# Patient Record
Sex: FEMALE | Race: WHITE | NOT HISPANIC OR LATINO | Employment: OTHER | URBAN - METROPOLITAN AREA
[De-identification: names, ages, dates, MRNs, and addresses within clinical notes are randomized per-mention and may not be internally consistent; named-entity substitution may affect disease eponyms.]

---

## 2017-05-13 ENCOUNTER — APPOINTMENT (EMERGENCY)
Dept: RADIOLOGY | Facility: HOSPITAL | Age: 77
DRG: 190 | End: 2017-05-13
Payer: COMMERCIAL

## 2017-05-13 ENCOUNTER — HOSPITAL ENCOUNTER (INPATIENT)
Facility: HOSPITAL | Age: 77
LOS: 2 days | Discharge: HOME | DRG: 190 | End: 2017-05-15
Attending: EMERGENCY MEDICINE | Admitting: INTERNAL MEDICINE
Payer: COMMERCIAL

## 2017-05-13 DIAGNOSIS — R09.02 HYPOXIA: ICD-10-CM

## 2017-05-13 DIAGNOSIS — R06.03 RESPIRATORY DISTRESS: Primary | ICD-10-CM

## 2017-05-13 DIAGNOSIS — J45.909 REACTIVE AIRWAY DISEASE: ICD-10-CM

## 2017-05-13 PROBLEM — K21.9 GERD (GASTROESOPHAGEAL REFLUX DISEASE): Status: ACTIVE | Noted: 2017-05-13

## 2017-05-13 PROBLEM — E87.1 HYPONATREMIA: Status: ACTIVE | Noted: 2017-05-13

## 2017-05-13 PROBLEM — Z72.0 TOBACCO ABUSE: Status: ACTIVE | Noted: 2017-05-13

## 2017-05-13 PROBLEM — J96.01 ACUTE RESPIRATORY FAILURE WITH HYPOXIA (HCC): Status: ACTIVE | Noted: 2017-05-13

## 2017-05-13 PROBLEM — J20.9 BRONCHITIS, ACUTE: Status: ACTIVE | Noted: 2017-05-13

## 2017-05-13 LAB
ALBUMIN SERPL BCP-MCNC: 4.1 G/DL (ref 3.5–5)
ALP SERPL-CCNC: 87 U/L (ref 46–116)
ALT SERPL W P-5'-P-CCNC: 17 U/L (ref 12–78)
ANION GAP SERPL CALCULATED.3IONS-SCNC: 11 MMOL/L (ref 4–13)
APTT PPP: 29 SECONDS (ref 23–35)
AST SERPL W P-5'-P-CCNC: 21 U/L (ref 5–45)
BASOPHILS # BLD AUTO: 0.07 THOUSANDS/ΜL (ref 0–0.1)
BASOPHILS NFR BLD AUTO: 1 % (ref 0–1)
BILIRUB SERPL-MCNC: 0.4 MG/DL (ref 0.2–1)
BUN SERPL-MCNC: 8 MG/DL (ref 5–25)
CALCIUM SERPL-MCNC: 9.1 MG/DL (ref 8.3–10.1)
CHLORIDE SERPL-SCNC: 95 MMOL/L (ref 100–108)
CO2 SERPL-SCNC: 24 MMOL/L (ref 21–32)
CREAT SERPL-MCNC: 0.81 MG/DL (ref 0.6–1.3)
EOSINOPHIL # BLD AUTO: 0.25 THOUSAND/ΜL (ref 0–0.61)
EOSINOPHIL NFR BLD AUTO: 4 % (ref 0–6)
ERYTHROCYTE [DISTWIDTH] IN BLOOD BY AUTOMATED COUNT: 18.5 % (ref 11.6–15.1)
GFR SERPL CREATININE-BSD FRML MDRD: >60 ML/MIN/1.73SQ M
GLUCOSE SERPL-MCNC: 118 MG/DL (ref 65–140)
GLUCOSE SERPL-MCNC: 266 MG/DL (ref 65–140)
GLUCOSE SERPL-MCNC: 267 MG/DL (ref 65–140)
HCT VFR BLD AUTO: 39.8 % (ref 34.8–46.1)
HGB BLD-MCNC: 12.9 G/DL (ref 11.5–15.4)
INR PPP: 0.96 (ref 0.86–1.16)
LYMPHOCYTES # BLD AUTO: 2.38 THOUSANDS/ΜL (ref 0.6–4.47)
LYMPHOCYTES NFR BLD AUTO: 36 % (ref 14–44)
MCH RBC QN AUTO: 23.1 PG (ref 26.8–34.3)
MCHC RBC AUTO-ENTMCNC: 32.4 G/DL (ref 31.4–37.4)
MCV RBC AUTO: 71 FL (ref 82–98)
MONOCYTES # BLD AUTO: 1.01 THOUSAND/ΜL (ref 0.17–1.22)
MONOCYTES NFR BLD AUTO: 16 % (ref 4–12)
NEUTROPHILS # BLD AUTO: 2.82 THOUSANDS/ΜL (ref 1.85–7.62)
NEUTS SEG NFR BLD AUTO: 43 % (ref 43–75)
NT-PROBNP SERPL-MCNC: 73 PG/ML
PLATELET # BLD AUTO: 333 THOUSANDS/UL (ref 149–390)
PMV BLD AUTO: 8.3 FL (ref 8.9–12.7)
POTASSIUM SERPL-SCNC: 3.9 MMOL/L (ref 3.5–5.3)
PROT SERPL-MCNC: 7.3 G/DL (ref 6.4–8.2)
PROTHROMBIN TIME: 13.1 SECONDS (ref 12.1–14.4)
RBC # BLD AUTO: 5.58 MILLION/UL (ref 3.81–5.12)
SODIUM SERPL-SCNC: 130 MMOL/L (ref 136–145)
TROPONIN I SERPL-MCNC: <0.02 NG/ML
WBC # BLD AUTO: 6.53 THOUSAND/UL (ref 4.31–10.16)

## 2017-05-13 PROCEDURE — 85610 PROTHROMBIN TIME: CPT | Performed by: EMERGENCY MEDICINE

## 2017-05-13 PROCEDURE — 94660 CPAP INITIATION&MGMT: CPT

## 2017-05-13 PROCEDURE — 85025 COMPLETE CBC W/AUTO DIFF WBC: CPT | Performed by: EMERGENCY MEDICINE

## 2017-05-13 PROCEDURE — 87205 SMEAR GRAM STAIN: CPT | Performed by: NURSE PRACTITIONER

## 2017-05-13 PROCEDURE — 96374 THER/PROPH/DIAG INJ IV PUSH: CPT

## 2017-05-13 PROCEDURE — 94640 AIRWAY INHALATION TREATMENT: CPT

## 2017-05-13 PROCEDURE — 93005 ELECTROCARDIOGRAM TRACING: CPT | Performed by: EMERGENCY MEDICINE

## 2017-05-13 PROCEDURE — 84484 ASSAY OF TROPONIN QUANT: CPT | Performed by: EMERGENCY MEDICINE

## 2017-05-13 PROCEDURE — 96375 TX/PRO/DX INJ NEW DRUG ADDON: CPT

## 2017-05-13 PROCEDURE — 94760 N-INVAS EAR/PLS OXIMETRY 1: CPT

## 2017-05-13 PROCEDURE — 71010 HB CHEST X-RAY 1 VIEW FRONTAL (PORTABLE): CPT

## 2017-05-13 PROCEDURE — 99285 EMERGENCY DEPT VISIT HI MDM: CPT

## 2017-05-13 PROCEDURE — 83880 ASSAY OF NATRIURETIC PEPTIDE: CPT | Performed by: EMERGENCY MEDICINE

## 2017-05-13 PROCEDURE — 36415 COLL VENOUS BLD VENIPUNCTURE: CPT | Performed by: EMERGENCY MEDICINE

## 2017-05-13 PROCEDURE — 82948 REAGENT STRIP/BLOOD GLUCOSE: CPT

## 2017-05-13 PROCEDURE — 87070 CULTURE OTHR SPECIMN AEROBIC: CPT | Performed by: NURSE PRACTITIONER

## 2017-05-13 PROCEDURE — 85730 THROMBOPLASTIN TIME PARTIAL: CPT | Performed by: EMERGENCY MEDICINE

## 2017-05-13 PROCEDURE — 80053 COMPREHEN METABOLIC PANEL: CPT | Performed by: EMERGENCY MEDICINE

## 2017-05-13 RX ORDER — LEVALBUTEROL 1.25 MG/.5ML
1.25 SOLUTION, CONCENTRATE RESPIRATORY (INHALATION)
Status: DISCONTINUED | OUTPATIENT
Start: 2017-05-13 | End: 2017-05-15 | Stop reason: HOSPADM

## 2017-05-13 RX ORDER — AZITHROMYCIN 250 MG/1
500 TABLET, FILM COATED ORAL DAILY
Status: DISCONTINUED | OUTPATIENT
Start: 2017-05-13 | End: 2017-05-13

## 2017-05-13 RX ORDER — LISINOPRIL 2.5 MG/1
5 TABLET ORAL DAILY
COMMUNITY
End: 2017-05-26 | Stop reason: HOSPADM

## 2017-05-13 RX ORDER — LORAZEPAM 2 MG/ML
0.5 INJECTION INTRAMUSCULAR ONCE
Status: COMPLETED | OUTPATIENT
Start: 2017-05-13 | End: 2017-05-13

## 2017-05-13 RX ORDER — METHYLPREDNISOLONE SODIUM SUCCINATE 125 MG/2ML
125 INJECTION, POWDER, LYOPHILIZED, FOR SOLUTION INTRAMUSCULAR; INTRAVENOUS DAILY
Status: DISCONTINUED | OUTPATIENT
Start: 2017-05-13 | End: 2017-05-13

## 2017-05-13 RX ORDER — LORAZEPAM 0.5 MG/1
0.5 TABLET ORAL DAILY
Status: ON HOLD | COMMUNITY
End: 2017-05-15

## 2017-05-13 RX ORDER — LISINOPRIL 2.5 MG/1
2.5 TABLET ORAL DAILY
Status: DISCONTINUED | OUTPATIENT
Start: 2017-05-13 | End: 2017-05-15 | Stop reason: HOSPADM

## 2017-05-13 RX ORDER — PANTOPRAZOLE SODIUM 40 MG/1
40 TABLET, DELAYED RELEASE ORAL EVERY 12 HOURS
COMMUNITY

## 2017-05-13 RX ORDER — DILTIAZEM HYDROCHLORIDE 180 MG/1
180 CAPSULE, COATED, EXTENDED RELEASE ORAL DAILY
Status: DISCONTINUED | OUTPATIENT
Start: 2017-05-13 | End: 2017-05-15 | Stop reason: HOSPADM

## 2017-05-13 RX ORDER — GABAPENTIN 100 MG/1
200 CAPSULE ORAL 3 TIMES DAILY
COMMUNITY
End: 2020-04-13

## 2017-05-13 RX ORDER — OXYCODONE HYDROCHLORIDE AND ACETAMINOPHEN 5; 325 MG/1; MG/1
1 TABLET ORAL EVERY 8 HOURS PRN
Status: DISCONTINUED | OUTPATIENT
Start: 2017-05-13 | End: 2017-05-15 | Stop reason: HOSPADM

## 2017-05-13 RX ORDER — NICOTINE 21 MG/24HR
1 PATCH, TRANSDERMAL 24 HOURS TRANSDERMAL DAILY
Status: DISCONTINUED | OUTPATIENT
Start: 2017-05-13 | End: 2017-05-15 | Stop reason: HOSPADM

## 2017-05-13 RX ORDER — AZITHROMYCIN 250 MG/1
250 TABLET, FILM COATED ORAL DAILY
Status: DISCONTINUED | OUTPATIENT
Start: 2017-05-14 | End: 2017-05-13

## 2017-05-13 RX ORDER — OXYCODONE HYDROCHLORIDE AND ACETAMINOPHEN 5; 325 MG/1; MG/1
1 TABLET ORAL 2 TIMES DAILY PRN
COMMUNITY
End: 2022-04-22 | Stop reason: HOSPADM

## 2017-05-13 RX ORDER — ALBUTEROL SULFATE 90 UG/1
2 AEROSOL, METERED RESPIRATORY (INHALATION) EVERY 6 HOURS PRN
COMMUNITY
End: 2020-07-30 | Stop reason: SDUPTHER

## 2017-05-13 RX ORDER — METHYLPREDNISOLONE SODIUM SUCCINATE 40 MG/ML
40 INJECTION, POWDER, LYOPHILIZED, FOR SOLUTION INTRAMUSCULAR; INTRAVENOUS EVERY 8 HOURS
Status: DISCONTINUED | OUTPATIENT
Start: 2017-05-13 | End: 2017-05-14

## 2017-05-13 RX ORDER — DILTIAZEM HYDROCHLORIDE 180 MG/1
180 CAPSULE, COATED, EXTENDED RELEASE ORAL DAILY
COMMUNITY

## 2017-05-13 RX ADMIN — METHYLPREDNISOLONE SODIUM SUCCINATE 40 MG: 40 INJECTION, POWDER, FOR SOLUTION INTRAMUSCULAR; INTRAVENOUS at 17:38

## 2017-05-13 RX ADMIN — IPRATROPIUM BROMIDE 0.5 MG: 0.5 SOLUTION RESPIRATORY (INHALATION) at 19:33

## 2017-05-13 RX ADMIN — NICOTINE 1 PATCH: 14 PATCH TRANSDERMAL at 12:05

## 2017-05-13 RX ADMIN — OXYCODONE HYDROCHLORIDE AND ACETAMINOPHEN 1 TABLET: 5; 325 TABLET ORAL at 13:07

## 2017-05-13 RX ADMIN — FLUTICASONE PROPIONATE AND SALMETEROL 1 PUFF: 50; 250 POWDER RESPIRATORY (INHALATION) at 11:38

## 2017-05-13 RX ADMIN — IPRATROPIUM BROMIDE 0.5 MG: 0.5 SOLUTION RESPIRATORY (INHALATION) at 07:38

## 2017-05-13 RX ADMIN — OXYCODONE HYDROCHLORIDE AND ACETAMINOPHEN 1 TABLET: 5; 325 TABLET ORAL at 20:52

## 2017-05-13 RX ADMIN — LORAZEPAM 0.5 MG: 2 INJECTION INTRAMUSCULAR; INTRAVENOUS at 06:38

## 2017-05-13 RX ADMIN — INSULIN LISPRO 2 UNITS: 100 INJECTION, SOLUTION INTRAVENOUS; SUBCUTANEOUS at 21:46

## 2017-05-13 RX ADMIN — ENOXAPARIN SODIUM 40 MG: 40 INJECTION SUBCUTANEOUS at 11:38

## 2017-05-13 RX ADMIN — INSULIN LISPRO 2 UNITS: 100 INJECTION, SOLUTION INTRAVENOUS; SUBCUTANEOUS at 18:29

## 2017-05-13 RX ADMIN — IPRATROPIUM BROMIDE 0.5 MG: 0.5 SOLUTION RESPIRATORY (INHALATION) at 13:41

## 2017-05-13 RX ADMIN — LEVALBUTEROL HYDROCHLORIDE 1.25 MG: 1.25 SOLUTION, CONCENTRATE RESPIRATORY (INHALATION) at 19:33

## 2017-05-13 RX ADMIN — LEVALBUTEROL HYDROCHLORIDE 1.25 MG: 1.25 SOLUTION, CONCENTRATE RESPIRATORY (INHALATION) at 13:41

## 2017-05-13 RX ADMIN — LISINOPRIL 2.5 MG: 2.5 TABLET ORAL at 11:38

## 2017-05-13 RX ADMIN — FLUTICASONE PROPIONATE AND SALMETEROL 1 PUFF: 50; 250 POWDER RESPIRATORY (INHALATION) at 21:45

## 2017-05-13 RX ADMIN — DILTIAZEM HYDROCHLORIDE 180 MG: 180 CAPSULE, COATED, EXTENDED RELEASE ORAL at 11:37

## 2017-05-13 RX ADMIN — AZITHROMYCIN FOR INJECTION INJECTION, POWDER, LYOPHILIZED, FOR SOLUTION 500 MG: 500 INJECTION INTRAVENOUS at 07:10

## 2017-05-13 RX ADMIN — LEVALBUTEROL HYDROCHLORIDE 1.25 MG: 1.25 SOLUTION, CONCENTRATE RESPIRATORY (INHALATION) at 07:38

## 2017-05-14 LAB
ANION GAP SERPL CALCULATED.3IONS-SCNC: 8 MMOL/L (ref 4–13)
BUN SERPL-MCNC: 15 MG/DL (ref 5–25)
CALCIUM SERPL-MCNC: 9.6 MG/DL (ref 8.3–10.1)
CHLORIDE SERPL-SCNC: 96 MMOL/L (ref 100–108)
CO2 SERPL-SCNC: 27 MMOL/L (ref 21–32)
CREAT SERPL-MCNC: 0.87 MG/DL (ref 0.6–1.3)
ERYTHROCYTE [DISTWIDTH] IN BLOOD BY AUTOMATED COUNT: 18.7 % (ref 11.6–15.1)
GFR SERPL CREATININE-BSD FRML MDRD: >60 ML/MIN/1.73SQ M
GLUCOSE SERPL-MCNC: 178 MG/DL (ref 65–140)
GLUCOSE SERPL-MCNC: 183 MG/DL (ref 65–140)
GLUCOSE SERPL-MCNC: 223 MG/DL (ref 65–140)
GLUCOSE SERPL-MCNC: 236 MG/DL (ref 65–140)
GLUCOSE SERPL-MCNC: 253 MG/DL (ref 65–140)
HCT VFR BLD AUTO: 38.8 % (ref 34.8–46.1)
HGB BLD-MCNC: 12.3 G/DL (ref 11.5–15.4)
MCH RBC QN AUTO: 22.8 PG (ref 26.8–34.3)
MCHC RBC AUTO-ENTMCNC: 31.7 G/DL (ref 31.4–37.4)
MCV RBC AUTO: 72 FL (ref 82–98)
PLATELET # BLD AUTO: 297 THOUSANDS/UL (ref 149–390)
PMV BLD AUTO: 8.5 FL (ref 8.9–12.7)
POTASSIUM SERPL-SCNC: 4.3 MMOL/L (ref 3.5–5.3)
RBC # BLD AUTO: 5.39 MILLION/UL (ref 3.81–5.12)
SODIUM SERPL-SCNC: 131 MMOL/L (ref 136–145)
WBC # BLD AUTO: 5.45 THOUSAND/UL (ref 4.31–10.16)

## 2017-05-14 PROCEDURE — 82948 REAGENT STRIP/BLOOD GLUCOSE: CPT

## 2017-05-14 PROCEDURE — 94660 CPAP INITIATION&MGMT: CPT

## 2017-05-14 PROCEDURE — 94640 AIRWAY INHALATION TREATMENT: CPT

## 2017-05-14 PROCEDURE — 94760 N-INVAS EAR/PLS OXIMETRY 1: CPT

## 2017-05-14 PROCEDURE — 85027 COMPLETE CBC AUTOMATED: CPT | Performed by: PHYSICIAN ASSISTANT

## 2017-05-14 PROCEDURE — 80048 BASIC METABOLIC PNL TOTAL CA: CPT | Performed by: PHYSICIAN ASSISTANT

## 2017-05-14 RX ORDER — GABAPENTIN 100 MG/1
200 CAPSULE ORAL 3 TIMES DAILY
Status: DISCONTINUED | OUTPATIENT
Start: 2017-05-14 | End: 2017-05-15 | Stop reason: HOSPADM

## 2017-05-14 RX ORDER — PANTOPRAZOLE SODIUM 40 MG/1
40 TABLET, DELAYED RELEASE ORAL EVERY 12 HOURS
Status: DISCONTINUED | OUTPATIENT
Start: 2017-05-14 | End: 2017-05-15 | Stop reason: HOSPADM

## 2017-05-14 RX ORDER — METHYLPREDNISOLONE SODIUM SUCCINATE 40 MG/ML
40 INJECTION, POWDER, LYOPHILIZED, FOR SOLUTION INTRAMUSCULAR; INTRAVENOUS EVERY 12 HOURS SCHEDULED
Status: DISCONTINUED | OUTPATIENT
Start: 2017-05-14 | End: 2017-05-14

## 2017-05-14 RX ORDER — METHYLPREDNISOLONE SODIUM SUCCINATE 40 MG/ML
40 INJECTION, POWDER, LYOPHILIZED, FOR SOLUTION INTRAMUSCULAR; INTRAVENOUS EVERY 12 HOURS SCHEDULED
Status: COMPLETED | OUTPATIENT
Start: 2017-05-14 | End: 2017-05-14

## 2017-05-14 RX ORDER — ACETAMINOPHEN 325 MG/1
650 TABLET ORAL EVERY 6 HOURS PRN
Status: DISCONTINUED | OUTPATIENT
Start: 2017-05-14 | End: 2017-05-15 | Stop reason: HOSPADM

## 2017-05-14 RX ORDER — LORAZEPAM 0.5 MG/1
0.5 TABLET ORAL
Status: DISCONTINUED | OUTPATIENT
Start: 2017-05-14 | End: 2017-05-14

## 2017-05-14 RX ORDER — PREDNISONE 20 MG/1
40 TABLET ORAL DAILY
Status: DISCONTINUED | OUTPATIENT
Start: 2017-05-15 | End: 2017-05-15

## 2017-05-14 RX ORDER — LORAZEPAM 0.5 MG/1
0.5 TABLET ORAL EVERY 8 HOURS PRN
Status: DISCONTINUED | OUTPATIENT
Start: 2017-05-14 | End: 2017-05-15 | Stop reason: HOSPADM

## 2017-05-14 RX ADMIN — IPRATROPIUM BROMIDE 0.5 MG: 0.5 SOLUTION RESPIRATORY (INHALATION) at 07:18

## 2017-05-14 RX ADMIN — METHYLPREDNISOLONE SODIUM SUCCINATE 40 MG: 40 INJECTION, POWDER, FOR SOLUTION INTRAMUSCULAR; INTRAVENOUS at 09:04

## 2017-05-14 RX ADMIN — INSULIN LISPRO 3 UNITS: 100 INJECTION, SOLUTION INTRAVENOUS; SUBCUTANEOUS at 11:44

## 2017-05-14 RX ADMIN — METHYLPREDNISOLONE SODIUM SUCCINATE 40 MG: 40 INJECTION, POWDER, FOR SOLUTION INTRAMUSCULAR; INTRAVENOUS at 04:10

## 2017-05-14 RX ADMIN — ENOXAPARIN SODIUM 40 MG: 40 INJECTION SUBCUTANEOUS at 09:05

## 2017-05-14 RX ADMIN — PANTOPRAZOLE SODIUM 40 MG: 40 TABLET, DELAYED RELEASE ORAL at 23:47

## 2017-05-14 RX ADMIN — OXYCODONE HYDROCHLORIDE AND ACETAMINOPHEN 1 TABLET: 5; 325 TABLET ORAL at 20:59

## 2017-05-14 RX ADMIN — GABAPENTIN 200 MG: 100 CAPSULE ORAL at 20:59

## 2017-05-14 RX ADMIN — GABAPENTIN 200 MG: 100 CAPSULE ORAL at 13:06

## 2017-05-14 RX ADMIN — LORAZEPAM 0.5 MG: 0.5 TABLET ORAL at 09:16

## 2017-05-14 RX ADMIN — LEVALBUTEROL HYDROCHLORIDE 1.25 MG: 1.25 SOLUTION, CONCENTRATE RESPIRATORY (INHALATION) at 13:47

## 2017-05-14 RX ADMIN — INSULIN LISPRO 3 UNITS: 100 INJECTION, SOLUTION INTRAVENOUS; SUBCUTANEOUS at 16:09

## 2017-05-14 RX ADMIN — NICOTINE 1 PATCH: 14 PATCH TRANSDERMAL at 09:06

## 2017-05-14 RX ADMIN — LISINOPRIL 2.5 MG: 2.5 TABLET ORAL at 09:05

## 2017-05-14 RX ADMIN — INSULIN LISPRO 1 UNITS: 100 INJECTION, SOLUTION INTRAVENOUS; SUBCUTANEOUS at 21:00

## 2017-05-14 RX ADMIN — INSULIN LISPRO 1 UNITS: 100 INJECTION, SOLUTION INTRAVENOUS; SUBCUTANEOUS at 07:42

## 2017-05-14 RX ADMIN — LORAZEPAM 0.5 MG: 0.5 TABLET ORAL at 04:09

## 2017-05-14 RX ADMIN — LEVALBUTEROL HYDROCHLORIDE 1.25 MG: 1.25 SOLUTION, CONCENTRATE RESPIRATORY (INHALATION) at 19:14

## 2017-05-14 RX ADMIN — METHYLPREDNISOLONE SODIUM SUCCINATE 40 MG: 40 INJECTION, POWDER, FOR SOLUTION INTRAMUSCULAR; INTRAVENOUS at 20:59

## 2017-05-14 RX ADMIN — OXYCODONE HYDROCHLORIDE AND ACETAMINOPHEN 1 TABLET: 5; 325 TABLET ORAL at 07:40

## 2017-05-14 RX ADMIN — PANTOPRAZOLE SODIUM 40 MG: 40 TABLET, DELAYED RELEASE ORAL at 13:06

## 2017-05-14 RX ADMIN — LORAZEPAM 0.5 MG: 0.5 TABLET ORAL at 20:59

## 2017-05-14 RX ADMIN — LEVALBUTEROL HYDROCHLORIDE 1.25 MG: 1.25 SOLUTION, CONCENTRATE RESPIRATORY (INHALATION) at 07:17

## 2017-05-14 RX ADMIN — FLUTICASONE PROPIONATE AND SALMETEROL 1 PUFF: 50; 250 POWDER RESPIRATORY (INHALATION) at 21:10

## 2017-05-14 RX ADMIN — IPRATROPIUM BROMIDE 0.5 MG: 0.5 SOLUTION RESPIRATORY (INHALATION) at 13:47

## 2017-05-14 RX ADMIN — IPRATROPIUM BROMIDE 0.5 MG: 0.5 SOLUTION RESPIRATORY (INHALATION) at 19:15

## 2017-05-14 RX ADMIN — GABAPENTIN 200 MG: 100 CAPSULE ORAL at 18:03

## 2017-05-14 RX ADMIN — FLUTICASONE PROPIONATE AND SALMETEROL 1 PUFF: 50; 250 POWDER RESPIRATORY (INHALATION) at 09:05

## 2017-05-14 RX ADMIN — DILTIAZEM HYDROCHLORIDE 180 MG: 180 CAPSULE, COATED, EXTENDED RELEASE ORAL at 09:04

## 2017-05-15 VITALS
DIASTOLIC BLOOD PRESSURE: 72 MMHG | WEIGHT: 160.27 LBS | RESPIRATION RATE: 18 BRPM | HEART RATE: 83 BPM | SYSTOLIC BLOOD PRESSURE: 153 MMHG | HEIGHT: 61 IN | BODY MASS INDEX: 30.26 KG/M2 | TEMPERATURE: 98.1 F | OXYGEN SATURATION: 90 %

## 2017-05-15 PROBLEM — J20.9 BRONCHITIS, ACUTE: Status: RESOLVED | Noted: 2017-05-13 | Resolved: 2017-05-15

## 2017-05-15 PROBLEM — E87.1 HYPONATREMIA: Status: RESOLVED | Noted: 2017-05-13 | Resolved: 2017-05-15

## 2017-05-15 PROBLEM — J96.01 ACUTE RESPIRATORY FAILURE WITH HYPOXIA (HCC): Status: RESOLVED | Noted: 2017-05-13 | Resolved: 2017-05-15

## 2017-05-15 LAB
ANION GAP SERPL CALCULATED.3IONS-SCNC: 7 MMOL/L (ref 4–13)
BASOPHILS # BLD AUTO: 0 THOUSANDS/ΜL (ref 0–0.1)
BASOPHILS NFR BLD AUTO: 0 % (ref 0–1)
BUN SERPL-MCNC: 19 MG/DL (ref 5–25)
CALCIUM SERPL-MCNC: 9.5 MG/DL (ref 8.3–10.1)
CHLORIDE SERPL-SCNC: 98 MMOL/L (ref 100–108)
CO2 SERPL-SCNC: 28 MMOL/L (ref 21–32)
CREAT SERPL-MCNC: 0.83 MG/DL (ref 0.6–1.3)
EOSINOPHIL # BLD AUTO: 0 THOUSAND/ΜL (ref 0–0.61)
EOSINOPHIL NFR BLD AUTO: 0 % (ref 0–6)
ERYTHROCYTE [DISTWIDTH] IN BLOOD BY AUTOMATED COUNT: 19.1 % (ref 11.6–15.1)
GFR SERPL CREATININE-BSD FRML MDRD: >60 ML/MIN/1.73SQ M
GLUCOSE SERPL-MCNC: 156 MG/DL (ref 65–140)
GLUCOSE SERPL-MCNC: 204 MG/DL (ref 65–140)
GLUCOSE SERPL-MCNC: 215 MG/DL (ref 65–140)
GLUCOSE SERPL-MCNC: 220 MG/DL (ref 65–140)
HCT VFR BLD AUTO: 39.9 % (ref 34.8–46.1)
HGB BLD-MCNC: 12.6 G/DL (ref 11.5–15.4)
LYMPHOCYTES # BLD AUTO: 0.52 THOUSANDS/ΜL (ref 0.6–4.47)
LYMPHOCYTES NFR BLD AUTO: 5 % (ref 14–44)
MCH RBC QN AUTO: 23.2 PG (ref 26.8–34.3)
MCHC RBC AUTO-ENTMCNC: 31.6 G/DL (ref 31.4–37.4)
MCV RBC AUTO: 74 FL (ref 82–98)
MONOCYTES # BLD AUTO: 0.64 THOUSAND/ΜL (ref 0.17–1.22)
MONOCYTES NFR BLD AUTO: 6 % (ref 4–12)
NEUTROPHILS # BLD AUTO: 10.03 THOUSANDS/ΜL (ref 1.85–7.62)
NEUTS SEG NFR BLD AUTO: 89 % (ref 43–75)
PLATELET # BLD AUTO: 350 THOUSANDS/UL (ref 149–390)
PMV BLD AUTO: 9.1 FL (ref 8.9–12.7)
POTASSIUM SERPL-SCNC: 4.9 MMOL/L (ref 3.5–5.3)
RBC # BLD AUTO: 5.43 MILLION/UL (ref 3.81–5.12)
SODIUM SERPL-SCNC: 133 MMOL/L (ref 136–145)
WBC # BLD AUTO: 11.19 THOUSAND/UL (ref 4.31–10.16)

## 2017-05-15 PROCEDURE — 85025 COMPLETE CBC W/AUTO DIFF WBC: CPT | Performed by: NURSE PRACTITIONER

## 2017-05-15 PROCEDURE — 80048 BASIC METABOLIC PNL TOTAL CA: CPT | Performed by: NURSE PRACTITIONER

## 2017-05-15 PROCEDURE — A9270 NON-COVERED ITEM OR SERVICE: HCPCS | Performed by: NURSE PRACTITIONER

## 2017-05-15 PROCEDURE — 82948 REAGENT STRIP/BLOOD GLUCOSE: CPT

## 2017-05-15 PROCEDURE — 94760 N-INVAS EAR/PLS OXIMETRY 1: CPT

## 2017-05-15 PROCEDURE — 94640 AIRWAY INHALATION TREATMENT: CPT

## 2017-05-15 RX ORDER — ACETAMINOPHEN 325 MG/1
650 TABLET ORAL EVERY 6 HOURS PRN
Qty: 30 TABLET | Refills: 0 | Status: SHIPPED | OUTPATIENT
Start: 2017-05-15 | End: 2017-05-19 | Stop reason: ALTCHOICE

## 2017-05-15 RX ORDER — METHYLPREDNISOLONE 4 MG/1
TABLET ORAL
Qty: 30 TABLET | Refills: 1 | Status: SHIPPED | OUTPATIENT
Start: 2017-05-15 | End: 2017-05-26 | Stop reason: HOSPADM

## 2017-05-15 RX ORDER — LORAZEPAM 0.5 MG/1
0.5 TABLET ORAL DAILY
Qty: 20 TABLET | Refills: 0 | Status: SHIPPED | OUTPATIENT
Start: 2017-05-15 | End: 2020-04-13

## 2017-05-15 RX ADMIN — ENOXAPARIN SODIUM 40 MG: 40 INJECTION SUBCUTANEOUS at 08:39

## 2017-05-15 RX ADMIN — PREDNISONE 40 MG: 20 TABLET ORAL at 09:04

## 2017-05-15 RX ADMIN — GABAPENTIN 200 MG: 100 CAPSULE ORAL at 09:04

## 2017-05-15 RX ADMIN — DILTIAZEM HYDROCHLORIDE 180 MG: 180 CAPSULE, COATED, EXTENDED RELEASE ORAL at 09:03

## 2017-05-15 RX ADMIN — LISINOPRIL 2.5 MG: 2.5 TABLET ORAL at 09:05

## 2017-05-15 RX ADMIN — INSULIN LISPRO 2 UNITS: 100 INJECTION, SOLUTION INTRAVENOUS; SUBCUTANEOUS at 12:22

## 2017-05-15 RX ADMIN — LEVALBUTEROL HYDROCHLORIDE 1.25 MG: 1.25 SOLUTION, CONCENTRATE RESPIRATORY (INHALATION) at 07:57

## 2017-05-15 RX ADMIN — INSULIN LISPRO 2 UNITS: 100 INJECTION, SOLUTION INTRAVENOUS; SUBCUTANEOUS at 16:10

## 2017-05-15 RX ADMIN — GABAPENTIN 200 MG: 100 CAPSULE ORAL at 16:22

## 2017-05-15 RX ADMIN — LORAZEPAM 0.5 MG: 0.5 TABLET ORAL at 09:03

## 2017-05-15 RX ADMIN — INSULIN LISPRO 1 UNITS: 100 INJECTION, SOLUTION INTRAVENOUS; SUBCUTANEOUS at 08:38

## 2017-05-15 RX ADMIN — FLUTICASONE PROPIONATE AND SALMETEROL 1 PUFF: 50; 250 POWDER RESPIRATORY (INHALATION) at 09:06

## 2017-05-15 RX ADMIN — IPRATROPIUM BROMIDE 0.5 MG: 0.5 SOLUTION RESPIRATORY (INHALATION) at 07:57

## 2017-05-15 RX ADMIN — NICOTINE 1 PATCH: 14 PATCH TRANSDERMAL at 09:05

## 2017-05-15 RX ADMIN — OXYCODONE HYDROCHLORIDE AND ACETAMINOPHEN 1 TABLET: 5; 325 TABLET ORAL at 09:02

## 2017-05-15 RX ADMIN — PANTOPRAZOLE SODIUM 40 MG: 40 TABLET, DELAYED RELEASE ORAL at 11:30

## 2017-05-16 LAB
ATRIAL RATE: 61 BPM
BACTERIA SPT RESP CULT: NORMAL
GRAM STN SPEC: NORMAL
P AXIS: 63 DEGREES
PR INTERVAL: 138 MS
QRS AXIS: -27 DEGREES
QRSD INTERVAL: 76 MS
QT INTERVAL: 416 MS
QTC INTERVAL: 418 MS
T WAVE AXIS: 68 DEGREES
VENTRICULAR RATE: 61 BPM

## 2017-05-19 ENCOUNTER — APPOINTMENT (EMERGENCY)
Dept: RADIOLOGY | Facility: HOSPITAL | Age: 77
DRG: 189 | End: 2017-05-19
Payer: COMMERCIAL

## 2017-05-19 ENCOUNTER — HOSPITAL ENCOUNTER (INPATIENT)
Facility: HOSPITAL | Age: 77
LOS: 7 days | Discharge: HOME | DRG: 189 | End: 2017-05-26
Attending: EMERGENCY MEDICINE | Admitting: INTERNAL MEDICINE
Payer: COMMERCIAL

## 2017-05-19 DIAGNOSIS — J44.1 COPD EXACERBATION (HCC): Primary | ICD-10-CM

## 2017-05-19 DIAGNOSIS — R09.02 HYPOXIA: ICD-10-CM

## 2017-05-19 DIAGNOSIS — E11.8 TYPE 2 DIABETES MELLITUS WITH COMPLICATION, WITH LONG-TERM CURRENT USE OF INSULIN (HCC): ICD-10-CM

## 2017-05-19 DIAGNOSIS — R06.03 RESPIRATORY DISTRESS: ICD-10-CM

## 2017-05-19 DIAGNOSIS — Z79.4 TYPE 2 DIABETES MELLITUS WITH COMPLICATION, WITH LONG-TERM CURRENT USE OF INSULIN (HCC): ICD-10-CM

## 2017-05-19 PROBLEM — D72.829 LEUKOCYTOSIS: Status: ACTIVE | Noted: 2017-05-19

## 2017-05-19 PROBLEM — J96.01 ACUTE RESPIRATORY FAILURE WITH HYPOXIA (HCC): Status: ACTIVE | Noted: 2017-05-19

## 2017-05-19 LAB
ALBUMIN SERPL BCP-MCNC: 3.7 G/DL (ref 3.5–5)
ALP SERPL-CCNC: 86 U/L (ref 46–116)
ALT SERPL W P-5'-P-CCNC: 23 U/L (ref 12–78)
ANION GAP SERPL CALCULATED.3IONS-SCNC: 9 MMOL/L (ref 4–13)
ARTERIAL PATENCY WRIST A: ABNORMAL
AST SERPL W P-5'-P-CCNC: 10 U/L (ref 5–45)
BASE EXCESS BLDA CALC-SCNC: 3 MMOL/L (ref -2–3)
BASOPHILS # BLD AUTO: 0.01 THOUSANDS/ΜL (ref 0–0.1)
BASOPHILS NFR BLD AUTO: 0 % (ref 0–1)
BILIRUB SERPL-MCNC: 0.5 MG/DL (ref 0.2–1)
BUN SERPL-MCNC: 19 MG/DL (ref 5–25)
CA-I BLD-SCNC: 1.13 MMOL/L (ref 1.12–1.32)
CALCIUM SERPL-MCNC: 9.1 MG/DL (ref 8.3–10.1)
CHLORIDE SERPL-SCNC: 94 MMOL/L (ref 100–108)
CLARITY, POC: CLEAR
CO2 SERPL-SCNC: 30 MMOL/L (ref 21–32)
COLOR, POC: NORMAL
CREAT SERPL-MCNC: 0.84 MG/DL (ref 0.6–1.3)
EOSINOPHIL # BLD AUTO: 0.1 THOUSAND/ΜL (ref 0–0.61)
EOSINOPHIL NFR BLD AUTO: 1 % (ref 0–6)
ERYTHROCYTE [DISTWIDTH] IN BLOOD BY AUTOMATED COUNT: 19.9 % (ref 11.6–15.1)
EXT BILIRUBIN, UA: NEGATIVE
EXT BLOOD URINE: NEGATIVE
EXT GLUCOSE, UA: NEGATIVE
EXT KETONES: NEGATIVE
EXT NITRITE, UA: NEGATIVE
EXT PH, UA: 7
EXT PROTEIN, UA: NORMAL
EXT SPECIFIC GRAVITY, UA: 1.01
EXT UROBILINOGEN: NORMAL
FIO2 GAS DIL.REBREATH: 45 L
GFR SERPL CREATININE-BSD FRML MDRD: >60 ML/MIN/1.73SQ M
GLUCOSE SERPL-MCNC: 151 MG/DL (ref 65–140)
GLUCOSE SERPL-MCNC: 161 MG/DL (ref 65–140)
GLUCOSE SERPL-MCNC: 170 MG/DL (ref 65–140)
GLUCOSE SERPL-MCNC: 236 MG/DL (ref 65–140)
HCO3 BLDA-SCNC: 25.3 MMOL/L (ref 22–28)
HCT VFR BLD AUTO: 41.9 % (ref 34.8–46.1)
HCT VFR BLD CALC: 47 % (ref 34.8–46.1)
HGB BLD-MCNC: 13.5 G/DL (ref 11.5–15.4)
HGB BLDA-MCNC: 16 G/DL (ref 11.5–15.4)
LACTATE SERPL-SCNC: 1.6 MMOL/L (ref 0.5–2)
LYMPHOCYTES # BLD AUTO: 1.87 THOUSANDS/ΜL (ref 0.6–4.47)
LYMPHOCYTES NFR BLD AUTO: 12 % (ref 14–44)
MCH RBC QN AUTO: 23.2 PG (ref 26.8–34.3)
MCHC RBC AUTO-ENTMCNC: 32.2 G/DL (ref 31.4–37.4)
MCV RBC AUTO: 72 FL (ref 82–98)
MONOCYTES # BLD AUTO: 2.35 THOUSAND/ΜL (ref 0.17–1.22)
MONOCYTES NFR BLD AUTO: 15 % (ref 4–12)
NEUTROPHILS # BLD AUTO: 11.86 THOUSANDS/ΜL (ref 1.85–7.62)
NEUTS SEG NFR BLD AUTO: 72 % (ref 43–75)
PCO2 BLD: 26 MMOL/L (ref 21–32)
PCO2 BLD: 31 MM HG (ref 36–44)
PH BLD: 7.52 [PH] (ref 7.35–7.45)
PLATELET # BLD AUTO: 353 THOUSANDS/UL (ref 149–390)
PMV BLD AUTO: 9 FL (ref 8.9–12.7)
PO2 BLD: 45 MM HG (ref 75–129)
POTASSIUM BLD-SCNC: 3.7 MMOL/L (ref 3.5–5.3)
POTASSIUM SERPL-SCNC: 3.6 MMOL/L (ref 3.5–5.3)
PROT SERPL-MCNC: 7 G/DL (ref 6.4–8.2)
RBC # BLD AUTO: 5.83 MILLION/UL (ref 3.81–5.12)
SAO2 % BLD FROM PO2: 86 % (ref 95–98)
SODIUM BLD-SCNC: 131 MMOL/L (ref 136–145)
SODIUM SERPL-SCNC: 133 MMOL/L (ref 136–145)
SPECIMEN SOURCE: ABNORMAL
TROPONIN I SERPL-MCNC: <0.02 NG/ML
WBC # BLD AUTO: 16.19 THOUSAND/UL (ref 4.31–10.16)
WBC # BLD EST: NORMAL 10*3/UL

## 2017-05-19 PROCEDURE — A9270 NON-COVERED ITEM OR SERVICE: HCPCS | Performed by: PHYSICIAN ASSISTANT

## 2017-05-19 PROCEDURE — 82948 REAGENT STRIP/BLOOD GLUCOSE: CPT

## 2017-05-19 PROCEDURE — 93005 ELECTROCARDIOGRAM TRACING: CPT

## 2017-05-19 PROCEDURE — 82803 BLOOD GASES ANY COMBINATION: CPT

## 2017-05-19 PROCEDURE — 82330 ASSAY OF CALCIUM: CPT

## 2017-05-19 PROCEDURE — 99285 EMERGENCY DEPT VISIT HI MDM: CPT

## 2017-05-19 PROCEDURE — 96374 THER/PROPH/DIAG INJ IV PUSH: CPT

## 2017-05-19 PROCEDURE — 84484 ASSAY OF TROPONIN QUANT: CPT | Performed by: PHYSICIAN ASSISTANT

## 2017-05-19 PROCEDURE — 71020 HB CHEST X-RAY 2VW FRONTAL&LATL: CPT

## 2017-05-19 PROCEDURE — 84132 ASSAY OF SERUM POTASSIUM: CPT

## 2017-05-19 PROCEDURE — 96375 TX/PRO/DX INJ NEW DRUG ADDON: CPT

## 2017-05-19 PROCEDURE — 94760 N-INVAS EAR/PLS OXIMETRY 1: CPT

## 2017-05-19 PROCEDURE — 94640 AIRWAY INHALATION TREATMENT: CPT

## 2017-05-19 PROCEDURE — 36415 COLL VENOUS BLD VENIPUNCTURE: CPT | Performed by: PHYSICIAN ASSISTANT

## 2017-05-19 PROCEDURE — 94644 CONT INHLJ TX 1ST HOUR: CPT

## 2017-05-19 PROCEDURE — 83605 ASSAY OF LACTIC ACID: CPT | Performed by: PHYSICIAN ASSISTANT

## 2017-05-19 PROCEDURE — 81002 URINALYSIS NONAUTO W/O SCOPE: CPT | Performed by: PHYSICIAN ASSISTANT

## 2017-05-19 PROCEDURE — 84295 ASSAY OF SERUM SODIUM: CPT

## 2017-05-19 PROCEDURE — 82947 ASSAY GLUCOSE BLOOD QUANT: CPT

## 2017-05-19 PROCEDURE — 94660 CPAP INITIATION&MGMT: CPT

## 2017-05-19 PROCEDURE — 93005 ELECTROCARDIOGRAM TRACING: CPT | Performed by: PHYSICIAN ASSISTANT

## 2017-05-19 PROCEDURE — 85014 HEMATOCRIT: CPT

## 2017-05-19 PROCEDURE — 85025 COMPLETE CBC W/AUTO DIFF WBC: CPT | Performed by: PHYSICIAN ASSISTANT

## 2017-05-19 PROCEDURE — 80053 COMPREHEN METABOLIC PANEL: CPT | Performed by: PHYSICIAN ASSISTANT

## 2017-05-19 RX ORDER — PANTOPRAZOLE SODIUM 40 MG/1
40 TABLET, DELAYED RELEASE ORAL
Status: DISCONTINUED | OUTPATIENT
Start: 2017-05-20 | End: 2017-05-26 | Stop reason: HOSPADM

## 2017-05-19 RX ORDER — ALBUTEROL SULFATE 2.5 MG/3ML
SOLUTION RESPIRATORY (INHALATION)
Status: DISPENSED
Start: 2017-05-19 | End: 2017-05-20

## 2017-05-19 RX ORDER — GABAPENTIN 100 MG/1
200 CAPSULE ORAL 3 TIMES DAILY
Status: DISCONTINUED | OUTPATIENT
Start: 2017-05-19 | End: 2017-05-26 | Stop reason: HOSPADM

## 2017-05-19 RX ORDER — NICOTINE 21 MG/24HR
14 PATCH, TRANSDERMAL 24 HOURS TRANSDERMAL DAILY
Status: DISCONTINUED | OUTPATIENT
Start: 2017-05-20 | End: 2017-05-26 | Stop reason: HOSPADM

## 2017-05-19 RX ORDER — ACETAMINOPHEN 325 MG/1
650 TABLET ORAL EVERY 6 HOURS PRN
Status: DISCONTINUED | OUTPATIENT
Start: 2017-05-19 | End: 2017-05-26 | Stop reason: HOSPADM

## 2017-05-19 RX ORDER — SODIUM CHLORIDE 9 MG/ML
100 INJECTION, SOLUTION INTRAVENOUS CONTINUOUS
Status: DISCONTINUED | OUTPATIENT
Start: 2017-05-19 | End: 2017-05-20

## 2017-05-19 RX ORDER — OXYCODONE HYDROCHLORIDE AND ACETAMINOPHEN 5; 325 MG/1; MG/1
1 TABLET ORAL EVERY 4 HOURS PRN
Status: DISCONTINUED | OUTPATIENT
Start: 2017-05-19 | End: 2017-05-26 | Stop reason: HOSPADM

## 2017-05-19 RX ORDER — LORAZEPAM 2 MG/ML
0.5 INJECTION INTRAMUSCULAR ONCE
Status: COMPLETED | OUTPATIENT
Start: 2017-05-19 | End: 2017-05-19

## 2017-05-19 RX ORDER — DILTIAZEM HYDROCHLORIDE 180 MG/1
180 CAPSULE, COATED, EXTENDED RELEASE ORAL DAILY
Status: DISCONTINUED | OUTPATIENT
Start: 2017-05-20 | End: 2017-05-26 | Stop reason: HOSPADM

## 2017-05-19 RX ORDER — METHYLPREDNISOLONE SODIUM SUCCINATE 40 MG/ML
40 INJECTION, POWDER, LYOPHILIZED, FOR SOLUTION INTRAMUSCULAR; INTRAVENOUS EVERY 6 HOURS SCHEDULED
Status: DISCONTINUED | OUTPATIENT
Start: 2017-05-19 | End: 2017-05-19

## 2017-05-19 RX ORDER — LORAZEPAM 2 MG/ML
0.5 INJECTION INTRAMUSCULAR EVERY 4 HOURS PRN
Status: DISCONTINUED | OUTPATIENT
Start: 2017-05-19 | End: 2017-05-24

## 2017-05-19 RX ORDER — LEVALBUTEROL 1.25 MG/.5ML
1.25 SOLUTION, CONCENTRATE RESPIRATORY (INHALATION)
Status: DISCONTINUED | OUTPATIENT
Start: 2017-05-19 | End: 2017-05-22

## 2017-05-19 RX ORDER — HEPARIN SODIUM 5000 [USP'U]/ML
5000 INJECTION, SOLUTION INTRAVENOUS; SUBCUTANEOUS EVERY 8 HOURS SCHEDULED
Status: DISCONTINUED | OUTPATIENT
Start: 2017-05-19 | End: 2017-05-26 | Stop reason: HOSPADM

## 2017-05-19 RX ORDER — SODIUM CHLORIDE FOR INHALATION 0.9 %
3 VIAL, NEBULIZER (ML) INHALATION ONCE
Status: DISCONTINUED | OUTPATIENT
Start: 2017-05-19 | End: 2017-05-26 | Stop reason: HOSPADM

## 2017-05-19 RX ORDER — METHYLPREDNISOLONE SODIUM SUCCINATE 40 MG/ML
40 INJECTION, POWDER, LYOPHILIZED, FOR SOLUTION INTRAMUSCULAR; INTRAVENOUS EVERY 8 HOURS SCHEDULED
Status: DISCONTINUED | OUTPATIENT
Start: 2017-05-20 | End: 2017-05-19

## 2017-05-19 RX ORDER — METHYLPREDNISOLONE SODIUM SUCCINATE 40 MG/ML
40 INJECTION, POWDER, LYOPHILIZED, FOR SOLUTION INTRAMUSCULAR; INTRAVENOUS EVERY 8 HOURS SCHEDULED
Status: DISCONTINUED | OUTPATIENT
Start: 2017-05-20 | End: 2017-05-20

## 2017-05-19 RX ORDER — ALBUTEROL SULFATE 2.5 MG/3ML
10 SOLUTION RESPIRATORY (INHALATION) ONCE
Status: COMPLETED | OUTPATIENT
Start: 2017-05-19 | End: 2017-05-19

## 2017-05-19 RX ORDER — METHYLPREDNISOLONE SODIUM SUCCINATE 125 MG/2ML
125 INJECTION, POWDER, LYOPHILIZED, FOR SOLUTION INTRAMUSCULAR; INTRAVENOUS ONCE
Status: COMPLETED | OUTPATIENT
Start: 2017-05-19 | End: 2017-05-19

## 2017-05-19 RX ADMIN — GABAPENTIN 200 MG: 100 CAPSULE ORAL at 21:27

## 2017-05-19 RX ADMIN — IPRATROPIUM BROMIDE 1 MG: 0.5 SOLUTION RESPIRATORY (INHALATION) at 16:45

## 2017-05-19 RX ADMIN — LORAZEPAM 0.5 MG: 2 INJECTION INTRAMUSCULAR; INTRAVENOUS at 23:18

## 2017-05-19 RX ADMIN — ALBUTEROL SULFATE 10 MG: 2.5 SOLUTION RESPIRATORY (INHALATION) at 16:45

## 2017-05-19 RX ADMIN — LORAZEPAM 0.5 MG: 2 INJECTION INTRAMUSCULAR; INTRAVENOUS at 18:43

## 2017-05-19 RX ADMIN — LEVALBUTEROL HYDROCHLORIDE 1.25 MG: 1.25 SOLUTION, CONCENTRATE RESPIRATORY (INHALATION) at 19:25

## 2017-05-19 RX ADMIN — METHYLPREDNISOLONE SODIUM SUCCINATE 40 MG: 40 INJECTION, POWDER, FOR SOLUTION INTRAMUSCULAR; INTRAVENOUS at 23:10

## 2017-05-19 RX ADMIN — OXYCODONE HYDROCHLORIDE AND ACETAMINOPHEN 1 TABLET: 5; 325 TABLET ORAL at 21:27

## 2017-05-19 RX ADMIN — INSULIN LISPRO 2 UNITS: 100 INJECTION, SOLUTION INTRAVENOUS; SUBCUTANEOUS at 23:11

## 2017-05-19 RX ADMIN — SODIUM CHLORIDE 100 ML/HR: 0.9 INJECTION, SOLUTION INTRAVENOUS at 19:54

## 2017-05-19 RX ADMIN — HEPARIN SODIUM 5000 UNITS: 5000 INJECTION, SOLUTION INTRAVENOUS; SUBCUTANEOUS at 21:27

## 2017-05-19 RX ADMIN — METHYLPREDNISOLONE SODIUM SUCCINATE 125 MG: 125 INJECTION, POWDER, FOR SOLUTION INTRAMUSCULAR; INTRAVENOUS at 18:40

## 2017-05-19 RX ADMIN — IPRATROPIUM BROMIDE 0.5 MG: 0.5 SOLUTION RESPIRATORY (INHALATION) at 19:25

## 2017-05-19 RX ADMIN — FLUTICASONE PROPIONATE AND SALMETEROL 1 PUFF: 50; 250 POWDER RESPIRATORY (INHALATION) at 23:11

## 2017-05-20 LAB
ANION GAP SERPL CALCULATED.3IONS-SCNC: 7 MMOL/L (ref 4–13)
ANISOCYTOSIS BLD QL SMEAR: PRESENT
ATRIAL RATE: 112 BPM
BASOPHILS # BLD MANUAL: 0 THOUSAND/UL (ref 0–0.1)
BASOPHILS NFR MAR MANUAL: 0 % (ref 0–1)
BUN SERPL-MCNC: 20 MG/DL (ref 5–25)
CALCIUM SERPL-MCNC: 9 MG/DL (ref 8.3–10.1)
CHLORIDE SERPL-SCNC: 97 MMOL/L (ref 100–108)
CO2 SERPL-SCNC: 28 MMOL/L (ref 21–32)
CREAT SERPL-MCNC: 0.79 MG/DL (ref 0.6–1.3)
EOSINOPHIL # BLD MANUAL: 0 THOUSAND/UL (ref 0–0.4)
EOSINOPHIL NFR BLD MANUAL: 0 % (ref 0–6)
ERYTHROCYTE [DISTWIDTH] IN BLOOD BY AUTOMATED COUNT: 19.2 % (ref 11.6–15.1)
GFR SERPL CREATININE-BSD FRML MDRD: >60 ML/MIN/1.73SQ M
GLUCOSE SERPL-MCNC: 225 MG/DL (ref 65–140)
GLUCOSE SERPL-MCNC: 258 MG/DL (ref 65–140)
GLUCOSE SERPL-MCNC: 281 MG/DL (ref 65–140)
GLUCOSE SERPL-MCNC: 318 MG/DL (ref 65–140)
GLUCOSE SERPL-MCNC: 347 MG/DL (ref 65–140)
HCT VFR BLD AUTO: 40 % (ref 34.8–46.1)
HGB BLD-MCNC: 12.8 G/DL (ref 11.5–15.4)
HYPERCHROMIA BLD QL SMEAR: PRESENT
LYMPHOCYTES # BLD AUTO: 0.27 THOUSAND/UL (ref 0.6–4.47)
LYMPHOCYTES # BLD AUTO: 3 % (ref 14–44)
MCH RBC QN AUTO: 23.1 PG (ref 26.8–34.3)
MCHC RBC AUTO-ENTMCNC: 32 G/DL (ref 31.4–37.4)
MCV RBC AUTO: 72 FL (ref 82–98)
MICROCYTES BLD QL AUTO: PRESENT
MONOCYTES # BLD AUTO: 0 THOUSAND/UL (ref 0–1.22)
MONOCYTES NFR BLD: 0 % (ref 4–12)
MYELOCYTES NFR BLD MANUAL: 1 % (ref 0–1)
NEUTROPHILS # BLD MANUAL: 8.49 THOUSAND/UL (ref 1.85–7.62)
NEUTS SEG NFR BLD AUTO: 96 % (ref 43–75)
P AXIS: 86 DEGREES
PLATELET # BLD AUTO: 311 THOUSANDS/UL (ref 149–390)
PLATELET BLD QL SMEAR: ADEQUATE
PMV BLD AUTO: 8.9 FL (ref 8.9–12.7)
POTASSIUM SERPL-SCNC: 4.3 MMOL/L (ref 3.5–5.3)
PR INTERVAL: 148 MS
QRS AXIS: -80 DEGREES
QRSD INTERVAL: 84 MS
QT INTERVAL: 326 MS
QTC INTERVAL: 437 MS
RBC # BLD AUTO: 5.54 MILLION/UL (ref 3.81–5.12)
SODIUM SERPL-SCNC: 132 MMOL/L (ref 136–145)
T WAVE AXIS: 77 DEGREES
TOTAL CELLS COUNTED SPEC: 100
VENTRICULAR RATE: 108 BPM
WBC # BLD AUTO: 8.84 THOUSAND/UL (ref 4.31–10.16)

## 2017-05-20 PROCEDURE — 94760 N-INVAS EAR/PLS OXIMETRY 1: CPT

## 2017-05-20 PROCEDURE — A9270 NON-COVERED ITEM OR SERVICE: HCPCS | Performed by: PHYSICIAN ASSISTANT

## 2017-05-20 PROCEDURE — 85027 COMPLETE CBC AUTOMATED: CPT | Performed by: PHYSICIAN ASSISTANT

## 2017-05-20 PROCEDURE — 82948 REAGENT STRIP/BLOOD GLUCOSE: CPT

## 2017-05-20 PROCEDURE — 94640 AIRWAY INHALATION TREATMENT: CPT

## 2017-05-20 PROCEDURE — 85007 BL SMEAR W/DIFF WBC COUNT: CPT | Performed by: PHYSICIAN ASSISTANT

## 2017-05-20 PROCEDURE — 80048 BASIC METABOLIC PNL TOTAL CA: CPT | Performed by: PHYSICIAN ASSISTANT

## 2017-05-20 PROCEDURE — 94660 CPAP INITIATION&MGMT: CPT

## 2017-05-20 RX ORDER — AZITHROMYCIN 250 MG/1
250 TABLET, FILM COATED ORAL 3 TIMES WEEKLY
Status: DISCONTINUED | OUTPATIENT
Start: 2017-05-22 | End: 2017-05-26 | Stop reason: HOSPADM

## 2017-05-20 RX ORDER — METHYLPREDNISOLONE SODIUM SUCCINATE 40 MG/ML
40 INJECTION, POWDER, LYOPHILIZED, FOR SOLUTION INTRAMUSCULAR; INTRAVENOUS EVERY 12 HOURS SCHEDULED
Status: DISCONTINUED | OUTPATIENT
Start: 2017-05-20 | End: 2017-05-21

## 2017-05-20 RX ADMIN — METHYLPREDNISOLONE SODIUM SUCCINATE 40 MG: 40 INJECTION, POWDER, FOR SOLUTION INTRAMUSCULAR; INTRAVENOUS at 05:03

## 2017-05-20 RX ADMIN — LEVALBUTEROL HYDROCHLORIDE 1.25 MG: 1.25 SOLUTION, CONCENTRATE RESPIRATORY (INHALATION) at 13:10

## 2017-05-20 RX ADMIN — SODIUM CHLORIDE 100 ML/HR: 0.9 INJECTION, SOLUTION INTRAVENOUS at 05:15

## 2017-05-20 RX ADMIN — IPRATROPIUM BROMIDE 0.5 MG: 0.5 SOLUTION RESPIRATORY (INHALATION) at 07:44

## 2017-05-20 RX ADMIN — HEPARIN SODIUM 5000 UNITS: 5000 INJECTION, SOLUTION INTRAVENOUS; SUBCUTANEOUS at 21:11

## 2017-05-20 RX ADMIN — INSULIN LISPRO 3 UNITS: 100 INJECTION, SOLUTION INTRAVENOUS; SUBCUTANEOUS at 09:49

## 2017-05-20 RX ADMIN — LEVALBUTEROL HYDROCHLORIDE 1.25 MG: 1.25 SOLUTION, CONCENTRATE RESPIRATORY (INHALATION) at 21:34

## 2017-05-20 RX ADMIN — HEPARIN SODIUM 5000 UNITS: 5000 INJECTION, SOLUTION INTRAVENOUS; SUBCUTANEOUS at 15:00

## 2017-05-20 RX ADMIN — PANTOPRAZOLE SODIUM 40 MG: 40 TABLET, DELAYED RELEASE ORAL at 05:03

## 2017-05-20 RX ADMIN — METHYLPREDNISOLONE SODIUM SUCCINATE 40 MG: 40 INJECTION, POWDER, FOR SOLUTION INTRAMUSCULAR; INTRAVENOUS at 21:00

## 2017-05-20 RX ADMIN — GABAPENTIN 200 MG: 100 CAPSULE ORAL at 17:09

## 2017-05-20 RX ADMIN — GABAPENTIN 200 MG: 100 CAPSULE ORAL at 21:00

## 2017-05-20 RX ADMIN — INSULIN LISPRO 2 UNITS: 100 INJECTION, SOLUTION INTRAVENOUS; SUBCUTANEOUS at 21:17

## 2017-05-20 RX ADMIN — HEPARIN SODIUM 5000 UNITS: 5000 INJECTION, SOLUTION INTRAVENOUS; SUBCUTANEOUS at 05:03

## 2017-05-20 RX ADMIN — LEVALBUTEROL HYDROCHLORIDE 1.25 MG: 1.25 SOLUTION, CONCENTRATE RESPIRATORY (INHALATION) at 01:43

## 2017-05-20 RX ADMIN — DILTIAZEM HYDROCHLORIDE 180 MG: 180 CAPSULE, COATED, EXTENDED RELEASE ORAL at 09:23

## 2017-05-20 RX ADMIN — NICOTINE 14 MG: 14 PATCH TRANSDERMAL at 09:22

## 2017-05-20 RX ADMIN — LORAZEPAM 0.5 MG: 2 INJECTION INTRAMUSCULAR; INTRAVENOUS at 21:26

## 2017-05-20 RX ADMIN — GABAPENTIN 200 MG: 100 CAPSULE ORAL at 09:21

## 2017-05-20 RX ADMIN — LEVALBUTEROL HYDROCHLORIDE 1.25 MG: 1.25 SOLUTION, CONCENTRATE RESPIRATORY (INHALATION) at 07:43

## 2017-05-20 RX ADMIN — IPRATROPIUM BROMIDE 0.5 MG: 0.5 SOLUTION RESPIRATORY (INHALATION) at 01:44

## 2017-05-20 RX ADMIN — INSULIN LISPRO 3 UNITS: 100 INJECTION, SOLUTION INTRAVENOUS; SUBCUTANEOUS at 17:09

## 2017-05-20 RX ADMIN — FLUTICASONE PROPIONATE AND SALMETEROL 1 PUFF: 50; 250 POWDER RESPIRATORY (INHALATION) at 09:48

## 2017-05-20 RX ADMIN — OXYCODONE HYDROCHLORIDE AND ACETAMINOPHEN 1 TABLET: 5; 325 TABLET ORAL at 05:03

## 2017-05-20 RX ADMIN — IPRATROPIUM BROMIDE 0.5 MG: 0.5 SOLUTION RESPIRATORY (INHALATION) at 13:10

## 2017-05-20 RX ADMIN — OXYCODONE HYDROCHLORIDE AND ACETAMINOPHEN 1 TABLET: 5; 325 TABLET ORAL at 21:14

## 2017-05-20 RX ADMIN — INSULIN LISPRO 4 UNITS: 100 INJECTION, SOLUTION INTRAVENOUS; SUBCUTANEOUS at 12:30

## 2017-05-20 RX ADMIN — IPRATROPIUM BROMIDE 0.5 MG: 0.5 SOLUTION RESPIRATORY (INHALATION) at 21:34

## 2017-05-20 RX ADMIN — FLUTICASONE PROPIONATE AND SALMETEROL 1 PUFF: 50; 250 POWDER RESPIRATORY (INHALATION) at 21:00

## 2017-05-21 LAB
GLUCOSE SERPL-MCNC: 276 MG/DL (ref 65–140)
GLUCOSE SERPL-MCNC: 293 MG/DL (ref 65–140)
GLUCOSE SERPL-MCNC: 312 MG/DL (ref 65–140)
GLUCOSE SERPL-MCNC: 385 MG/DL (ref 65–140)

## 2017-05-21 PROCEDURE — 82948 REAGENT STRIP/BLOOD GLUCOSE: CPT

## 2017-05-21 PROCEDURE — A9270 NON-COVERED ITEM OR SERVICE: HCPCS | Performed by: PHYSICIAN ASSISTANT

## 2017-05-21 PROCEDURE — 94760 N-INVAS EAR/PLS OXIMETRY 1: CPT

## 2017-05-21 PROCEDURE — 94640 AIRWAY INHALATION TREATMENT: CPT

## 2017-05-21 RX ORDER — METHYLPREDNISOLONE SODIUM SUCCINATE 40 MG/ML
30 INJECTION, POWDER, LYOPHILIZED, FOR SOLUTION INTRAMUSCULAR; INTRAVENOUS EVERY 12 HOURS SCHEDULED
Status: DISCONTINUED | OUTPATIENT
Start: 2017-05-21 | End: 2017-05-23

## 2017-05-21 RX ADMIN — LEVALBUTEROL HYDROCHLORIDE 1.25 MG: 1.25 SOLUTION, CONCENTRATE RESPIRATORY (INHALATION) at 20:50

## 2017-05-21 RX ADMIN — HEPARIN SODIUM 5000 UNITS: 5000 INJECTION, SOLUTION INTRAVENOUS; SUBCUTANEOUS at 21:37

## 2017-05-21 RX ADMIN — IPRATROPIUM BROMIDE 0.5 MG: 0.5 SOLUTION RESPIRATORY (INHALATION) at 07:47

## 2017-05-21 RX ADMIN — OXYCODONE HYDROCHLORIDE AND ACETAMINOPHEN 1 TABLET: 5; 325 TABLET ORAL at 21:36

## 2017-05-21 RX ADMIN — LEVALBUTEROL HYDROCHLORIDE 1.25 MG: 1.25 SOLUTION, CONCENTRATE RESPIRATORY (INHALATION) at 07:47

## 2017-05-21 RX ADMIN — LORAZEPAM 0.5 MG: 2 INJECTION INTRAMUSCULAR; INTRAVENOUS at 21:37

## 2017-05-21 RX ADMIN — LORAZEPAM 0.5 MG: 2 INJECTION INTRAMUSCULAR; INTRAVENOUS at 09:30

## 2017-05-21 RX ADMIN — PANTOPRAZOLE SODIUM 40 MG: 40 TABLET, DELAYED RELEASE ORAL at 05:43

## 2017-05-21 RX ADMIN — LEVALBUTEROL HYDROCHLORIDE 1.25 MG: 1.25 SOLUTION, CONCENTRATE RESPIRATORY (INHALATION) at 13:45

## 2017-05-21 RX ADMIN — NICOTINE 14 MG: 14 PATCH TRANSDERMAL at 09:32

## 2017-05-21 RX ADMIN — HEPARIN SODIUM 5000 UNITS: 5000 INJECTION, SOLUTION INTRAVENOUS; SUBCUTANEOUS at 05:43

## 2017-05-21 RX ADMIN — GABAPENTIN 200 MG: 100 CAPSULE ORAL at 17:00

## 2017-05-21 RX ADMIN — FLUTICASONE PROPIONATE AND SALMETEROL 1 PUFF: 50; 250 POWDER RESPIRATORY (INHALATION) at 13:10

## 2017-05-21 RX ADMIN — INSULIN LISPRO 3 UNITS: 100 INJECTION, SOLUTION INTRAVENOUS; SUBCUTANEOUS at 17:00

## 2017-05-21 RX ADMIN — INSULIN LISPRO 4 UNITS: 100 INJECTION, SOLUTION INTRAVENOUS; SUBCUTANEOUS at 21:38

## 2017-05-21 RX ADMIN — IPRATROPIUM BROMIDE 0.5 MG: 0.5 SOLUTION RESPIRATORY (INHALATION) at 13:45

## 2017-05-21 RX ADMIN — METHYLPREDNISOLONE SODIUM SUCCINATE 30 MG: 40 INJECTION, POWDER, FOR SOLUTION INTRAMUSCULAR; INTRAVENOUS at 21:37

## 2017-05-21 RX ADMIN — GABAPENTIN 200 MG: 100 CAPSULE ORAL at 21:37

## 2017-05-21 RX ADMIN — DILTIAZEM HYDROCHLORIDE 180 MG: 180 CAPSULE, COATED, EXTENDED RELEASE ORAL at 09:29

## 2017-05-21 RX ADMIN — INSULIN LISPRO 3 UNITS: 100 INJECTION, SOLUTION INTRAVENOUS; SUBCUTANEOUS at 10:36

## 2017-05-21 RX ADMIN — HEPARIN SODIUM 5000 UNITS: 5000 INJECTION, SOLUTION INTRAVENOUS; SUBCUTANEOUS at 13:09

## 2017-05-21 RX ADMIN — LEVALBUTEROL HYDROCHLORIDE 1.25 MG: 1.25 SOLUTION, CONCENTRATE RESPIRATORY (INHALATION) at 01:31

## 2017-05-21 RX ADMIN — INSULIN LISPRO 3 UNITS: 100 INJECTION, SOLUTION INTRAVENOUS; SUBCUTANEOUS at 13:10

## 2017-05-21 RX ADMIN — GABAPENTIN 200 MG: 100 CAPSULE ORAL at 09:29

## 2017-05-21 RX ADMIN — IPRATROPIUM BROMIDE 0.5 MG: 0.5 SOLUTION RESPIRATORY (INHALATION) at 01:31

## 2017-05-21 RX ADMIN — METHYLPREDNISOLONE SODIUM SUCCINATE 40 MG: 40 INJECTION, POWDER, FOR SOLUTION INTRAMUSCULAR; INTRAVENOUS at 09:30

## 2017-05-21 RX ADMIN — IPRATROPIUM BROMIDE 0.5 MG: 0.5 SOLUTION RESPIRATORY (INHALATION) at 20:50

## 2017-05-21 RX ADMIN — FLUTICASONE PROPIONATE AND SALMETEROL 1 PUFF: 50; 250 POWDER RESPIRATORY (INHALATION) at 21:38

## 2017-05-22 LAB
GLUCOSE SERPL-MCNC: 320 MG/DL (ref 65–140)
GLUCOSE SERPL-MCNC: 344 MG/DL (ref 65–140)
GLUCOSE SERPL-MCNC: 351 MG/DL (ref 65–140)
GLUCOSE SERPL-MCNC: 352 MG/DL (ref 65–140)

## 2017-05-22 PROCEDURE — A9270 NON-COVERED ITEM OR SERVICE: HCPCS | Performed by: PHYSICIAN ASSISTANT

## 2017-05-22 PROCEDURE — 94760 N-INVAS EAR/PLS OXIMETRY 1: CPT

## 2017-05-22 PROCEDURE — 82948 REAGENT STRIP/BLOOD GLUCOSE: CPT

## 2017-05-22 PROCEDURE — A9270 NON-COVERED ITEM OR SERVICE: HCPCS | Performed by: NURSE PRACTITIONER

## 2017-05-22 PROCEDURE — 94640 AIRWAY INHALATION TREATMENT: CPT

## 2017-05-22 PROCEDURE — A9270 NON-COVERED ITEM OR SERVICE: HCPCS | Performed by: INTERNAL MEDICINE

## 2017-05-22 RX ORDER — LEVALBUTEROL 1.25 MG/.5ML
1.25 SOLUTION, CONCENTRATE RESPIRATORY (INHALATION)
Status: DISCONTINUED | OUTPATIENT
Start: 2017-05-23 | End: 2017-05-26 | Stop reason: HOSPADM

## 2017-05-22 RX ORDER — ALBUTEROL SULFATE 2.5 MG/3ML
2.5 SOLUTION RESPIRATORY (INHALATION) EVERY 6 HOURS PRN
Status: DISCONTINUED | OUTPATIENT
Start: 2017-05-22 | End: 2017-05-26 | Stop reason: HOSPADM

## 2017-05-22 RX ADMIN — LORAZEPAM 0.5 MG: 2 INJECTION INTRAMUSCULAR; INTRAVENOUS at 21:56

## 2017-05-22 RX ADMIN — HEPARIN SODIUM 5000 UNITS: 5000 INJECTION, SOLUTION INTRAVENOUS; SUBCUTANEOUS at 05:45

## 2017-05-22 RX ADMIN — GABAPENTIN 200 MG: 100 CAPSULE ORAL at 21:58

## 2017-05-22 RX ADMIN — IPRATROPIUM BROMIDE 0.5 MG: 0.5 SOLUTION RESPIRATORY (INHALATION) at 07:14

## 2017-05-22 RX ADMIN — LEVALBUTEROL HYDROCHLORIDE 1.25 MG: 1.25 SOLUTION, CONCENTRATE RESPIRATORY (INHALATION) at 07:14

## 2017-05-22 RX ADMIN — GABAPENTIN 200 MG: 100 CAPSULE ORAL at 16:02

## 2017-05-22 RX ADMIN — FLUTICASONE PROPIONATE AND SALMETEROL 1 PUFF: 50; 500 POWDER RESPIRATORY (INHALATION) at 21:59

## 2017-05-22 RX ADMIN — HEPARIN SODIUM 5000 UNITS: 5000 INJECTION, SOLUTION INTRAVENOUS; SUBCUTANEOUS at 21:54

## 2017-05-22 RX ADMIN — LEVALBUTEROL HYDROCHLORIDE 1.25 MG: 1.25 SOLUTION, CONCENTRATE RESPIRATORY (INHALATION) at 19:31

## 2017-05-22 RX ADMIN — IPRATROPIUM BROMIDE 0.5 MG: 0.5 SOLUTION RESPIRATORY (INHALATION) at 13:20

## 2017-05-22 RX ADMIN — LORAZEPAM 0.5 MG: 2 INJECTION INTRAMUSCULAR; INTRAVENOUS at 08:19

## 2017-05-22 RX ADMIN — METHYLPREDNISOLONE SODIUM SUCCINATE 30 MG: 40 INJECTION, POWDER, FOR SOLUTION INTRAMUSCULAR; INTRAVENOUS at 08:18

## 2017-05-22 RX ADMIN — INSULIN LISPRO 4 UNITS: 100 INJECTION, SOLUTION INTRAVENOUS; SUBCUTANEOUS at 12:27

## 2017-05-22 RX ADMIN — GABAPENTIN 200 MG: 100 CAPSULE ORAL at 08:16

## 2017-05-22 RX ADMIN — DILTIAZEM HYDROCHLORIDE 180 MG: 180 CAPSULE, COATED, EXTENDED RELEASE ORAL at 08:16

## 2017-05-22 RX ADMIN — INSULIN LISPRO 4 UNITS: 100 INJECTION, SOLUTION INTRAVENOUS; SUBCUTANEOUS at 16:46

## 2017-05-22 RX ADMIN — METHYLPREDNISOLONE SODIUM SUCCINATE 30 MG: 40 INJECTION, POWDER, FOR SOLUTION INTRAMUSCULAR; INTRAVENOUS at 21:57

## 2017-05-22 RX ADMIN — INSULIN LISPRO 4 UNITS: 100 INJECTION, SOLUTION INTRAVENOUS; SUBCUTANEOUS at 08:28

## 2017-05-22 RX ADMIN — OXYCODONE HYDROCHLORIDE AND ACETAMINOPHEN 1 TABLET: 5; 325 TABLET ORAL at 08:18

## 2017-05-22 RX ADMIN — NICOTINE 14 MG: 14 PATCH TRANSDERMAL at 08:21

## 2017-05-22 RX ADMIN — PANTOPRAZOLE SODIUM 40 MG: 40 TABLET, DELAYED RELEASE ORAL at 05:45

## 2017-05-22 RX ADMIN — OXYCODONE HYDROCHLORIDE AND ACETAMINOPHEN 1 TABLET: 5; 325 TABLET ORAL at 22:06

## 2017-05-22 RX ADMIN — AZITHROMYCIN 250 MG: 250 TABLET, FILM COATED ORAL at 08:17

## 2017-05-22 RX ADMIN — HEPARIN SODIUM 5000 UNITS: 5000 INJECTION, SOLUTION INTRAVENOUS; SUBCUTANEOUS at 14:18

## 2017-05-22 RX ADMIN — FLUTICASONE PROPIONATE AND SALMETEROL 1 PUFF: 50; 250 POWDER RESPIRATORY (INHALATION) at 08:21

## 2017-05-22 RX ADMIN — INSULIN LISPRO 3 UNITS: 100 INJECTION, SOLUTION INTRAVENOUS; SUBCUTANEOUS at 21:55

## 2017-05-22 RX ADMIN — IPRATROPIUM BROMIDE 0.5 MG: 0.5 SOLUTION RESPIRATORY (INHALATION) at 19:31

## 2017-05-22 RX ADMIN — LEVALBUTEROL HYDROCHLORIDE 1.25 MG: 1.25 SOLUTION, CONCENTRATE RESPIRATORY (INHALATION) at 13:20

## 2017-05-23 LAB
GLUCOSE SERPL-MCNC: 256 MG/DL (ref 65–140)
GLUCOSE SERPL-MCNC: 329 MG/DL (ref 65–140)
GLUCOSE SERPL-MCNC: 377 MG/DL (ref 65–140)
GLUCOSE SERPL-MCNC: 390 MG/DL (ref 65–140)

## 2017-05-23 PROCEDURE — A9270 NON-COVERED ITEM OR SERVICE: HCPCS | Performed by: PHYSICIAN ASSISTANT

## 2017-05-23 PROCEDURE — 82948 REAGENT STRIP/BLOOD GLUCOSE: CPT

## 2017-05-23 PROCEDURE — 94760 N-INVAS EAR/PLS OXIMETRY 1: CPT

## 2017-05-23 PROCEDURE — 94640 AIRWAY INHALATION TREATMENT: CPT

## 2017-05-23 RX ORDER — INSULIN GLARGINE 100 [IU]/ML
10 INJECTION, SOLUTION SUBCUTANEOUS
Status: DISCONTINUED | OUTPATIENT
Start: 2017-05-23 | End: 2017-05-24

## 2017-05-23 RX ORDER — METHYLPREDNISOLONE SODIUM SUCCINATE 40 MG/ML
20 INJECTION, POWDER, LYOPHILIZED, FOR SOLUTION INTRAMUSCULAR; INTRAVENOUS EVERY 12 HOURS SCHEDULED
Status: COMPLETED | OUTPATIENT
Start: 2017-05-23 | End: 2017-05-23

## 2017-05-23 RX ORDER — PREDNISONE 20 MG/1
20 TABLET ORAL 2 TIMES DAILY WITH MEALS
Status: DISCONTINUED | OUTPATIENT
Start: 2017-05-24 | End: 2017-05-26 | Stop reason: HOSPADM

## 2017-05-23 RX ADMIN — LORAZEPAM 0.5 MG: 2 INJECTION INTRAMUSCULAR; INTRAVENOUS at 08:17

## 2017-05-23 RX ADMIN — INSULIN LISPRO 3 UNITS: 100 INJECTION, SOLUTION INTRAVENOUS; SUBCUTANEOUS at 17:50

## 2017-05-23 RX ADMIN — METHYLPREDNISOLONE SODIUM SUCCINATE 20 MG: 40 INJECTION, POWDER, FOR SOLUTION INTRAMUSCULAR; INTRAVENOUS at 21:26

## 2017-05-23 RX ADMIN — DILTIAZEM HYDROCHLORIDE 180 MG: 180 CAPSULE, COATED, EXTENDED RELEASE ORAL at 08:16

## 2017-05-23 RX ADMIN — IPRATROPIUM BROMIDE 0.5 MG: 0.5 SOLUTION RESPIRATORY (INHALATION) at 07:37

## 2017-05-23 RX ADMIN — GABAPENTIN 200 MG: 100 CAPSULE ORAL at 08:17

## 2017-05-23 RX ADMIN — FLUTICASONE PROPIONATE AND SALMETEROL 1 PUFF: 50; 500 POWDER RESPIRATORY (INHALATION) at 21:27

## 2017-05-23 RX ADMIN — PANTOPRAZOLE SODIUM 40 MG: 40 TABLET, DELAYED RELEASE ORAL at 05:24

## 2017-05-23 RX ADMIN — LEVALBUTEROL HYDROCHLORIDE 1.25 MG: 1.25 SOLUTION, CONCENTRATE RESPIRATORY (INHALATION) at 07:37

## 2017-05-23 RX ADMIN — GABAPENTIN 200 MG: 100 CAPSULE ORAL at 16:13

## 2017-05-23 RX ADMIN — INSULIN LISPRO 5 UNITS: 100 INJECTION, SOLUTION INTRAVENOUS; SUBCUTANEOUS at 12:40

## 2017-05-23 RX ADMIN — INSULIN LISPRO 2 UNITS: 100 INJECTION, SOLUTION INTRAVENOUS; SUBCUTANEOUS at 08:20

## 2017-05-23 RX ADMIN — GABAPENTIN 200 MG: 100 CAPSULE ORAL at 21:26

## 2017-05-23 RX ADMIN — OXYCODONE HYDROCHLORIDE AND ACETAMINOPHEN 1 TABLET: 5; 325 TABLET ORAL at 08:16

## 2017-05-23 RX ADMIN — FLUTICASONE PROPIONATE AND SALMETEROL 1 PUFF: 50; 500 POWDER RESPIRATORY (INHALATION) at 08:19

## 2017-05-23 RX ADMIN — HEPARIN SODIUM 5000 UNITS: 5000 INJECTION, SOLUTION INTRAVENOUS; SUBCUTANEOUS at 05:24

## 2017-05-23 RX ADMIN — HEPARIN SODIUM 5000 UNITS: 5000 INJECTION, SOLUTION INTRAVENOUS; SUBCUTANEOUS at 21:26

## 2017-05-23 RX ADMIN — HEPARIN SODIUM 5000 UNITS: 5000 INJECTION, SOLUTION INTRAVENOUS; SUBCUTANEOUS at 14:26

## 2017-05-23 RX ADMIN — LEVALBUTEROL HYDROCHLORIDE 1.25 MG: 1.25 SOLUTION, CONCENTRATE RESPIRATORY (INHALATION) at 13:46

## 2017-05-23 RX ADMIN — METHYLPREDNISOLONE SODIUM SUCCINATE 30 MG: 40 INJECTION, POWDER, FOR SOLUTION INTRAMUSCULAR; INTRAVENOUS at 08:14

## 2017-05-23 RX ADMIN — NICOTINE 14 MG: 14 PATCH TRANSDERMAL at 08:14

## 2017-05-23 RX ADMIN — LORAZEPAM 0.5 MG: 2 INJECTION INTRAMUSCULAR; INTRAVENOUS at 21:34

## 2017-05-23 RX ADMIN — OXYCODONE HYDROCHLORIDE AND ACETAMINOPHEN 1 TABLET: 5; 325 TABLET ORAL at 21:29

## 2017-05-23 RX ADMIN — INSULIN GLARGINE 10 UNITS: 100 INJECTION, SOLUTION SUBCUTANEOUS at 21:26

## 2017-05-23 RX ADMIN — INSULIN LISPRO 4 UNITS: 100 INJECTION, SOLUTION INTRAVENOUS; SUBCUTANEOUS at 21:26

## 2017-05-23 RX ADMIN — LEVALBUTEROL HYDROCHLORIDE 1.25 MG: 1.25 SOLUTION, CONCENTRATE RESPIRATORY (INHALATION) at 19:17

## 2017-05-23 RX ADMIN — IPRATROPIUM BROMIDE 0.5 MG: 0.5 SOLUTION RESPIRATORY (INHALATION) at 13:46

## 2017-05-23 RX ADMIN — IPRATROPIUM BROMIDE 0.5 MG: 0.5 SOLUTION RESPIRATORY (INHALATION) at 19:17

## 2017-05-24 LAB
GLUCOSE SERPL-MCNC: 239 MG/DL (ref 65–140)
GLUCOSE SERPL-MCNC: 298 MG/DL (ref 65–140)
GLUCOSE SERPL-MCNC: 321 MG/DL (ref 65–140)
GLUCOSE SERPL-MCNC: 350 MG/DL (ref 65–140)

## 2017-05-24 PROCEDURE — 94760 N-INVAS EAR/PLS OXIMETRY 1: CPT

## 2017-05-24 PROCEDURE — 94640 AIRWAY INHALATION TREATMENT: CPT

## 2017-05-24 PROCEDURE — 82948 REAGENT STRIP/BLOOD GLUCOSE: CPT

## 2017-05-24 PROCEDURE — A9270 NON-COVERED ITEM OR SERVICE: HCPCS | Performed by: PHYSICIAN ASSISTANT

## 2017-05-24 PROCEDURE — A9270 NON-COVERED ITEM OR SERVICE: HCPCS | Performed by: INTERNAL MEDICINE

## 2017-05-24 PROCEDURE — A9270 NON-COVERED ITEM OR SERVICE: HCPCS | Performed by: NURSE PRACTITIONER

## 2017-05-24 RX ORDER — LORAZEPAM 0.5 MG/1
0.5 TABLET ORAL EVERY 8 HOURS PRN
Status: DISCONTINUED | OUTPATIENT
Start: 2017-05-24 | End: 2017-05-26 | Stop reason: HOSPADM

## 2017-05-24 RX ORDER — INSULIN GLARGINE 100 [IU]/ML
20 INJECTION, SOLUTION SUBCUTANEOUS
Status: DISCONTINUED | OUTPATIENT
Start: 2017-05-24 | End: 2017-05-26 | Stop reason: HOSPADM

## 2017-05-24 RX ADMIN — OXYCODONE HYDROCHLORIDE AND ACETAMINOPHEN 1 TABLET: 5; 325 TABLET ORAL at 21:59

## 2017-05-24 RX ADMIN — INSULIN LISPRO 3 UNITS: 100 INJECTION, SOLUTION INTRAVENOUS; SUBCUTANEOUS at 13:20

## 2017-05-24 RX ADMIN — AZITHROMYCIN 250 MG: 250 TABLET, FILM COATED ORAL at 08:17

## 2017-05-24 RX ADMIN — PREDNISONE 20 MG: 20 TABLET ORAL at 08:17

## 2017-05-24 RX ADMIN — IPRATROPIUM BROMIDE 0.5 MG: 0.5 SOLUTION RESPIRATORY (INHALATION) at 08:34

## 2017-05-24 RX ADMIN — PREDNISONE 20 MG: 20 TABLET ORAL at 17:12

## 2017-05-24 RX ADMIN — LORAZEPAM 0.5 MG: 0.5 TABLET ORAL at 17:19

## 2017-05-24 RX ADMIN — IPRATROPIUM BROMIDE 0.5 MG: 0.5 SOLUTION RESPIRATORY (INHALATION) at 20:07

## 2017-05-24 RX ADMIN — GABAPENTIN 200 MG: 100 CAPSULE ORAL at 22:00

## 2017-05-24 RX ADMIN — IPRATROPIUM BROMIDE 0.5 MG: 0.5 SOLUTION RESPIRATORY (INHALATION) at 13:30

## 2017-05-24 RX ADMIN — LEVALBUTEROL HYDROCHLORIDE 1.25 MG: 1.25 SOLUTION, CONCENTRATE RESPIRATORY (INHALATION) at 08:34

## 2017-05-24 RX ADMIN — INSULIN LISPRO 2 UNITS: 100 INJECTION, SOLUTION INTRAVENOUS; SUBCUTANEOUS at 08:24

## 2017-05-24 RX ADMIN — INSULIN LISPRO 3 UNITS: 100 INJECTION, SOLUTION INTRAVENOUS; SUBCUTANEOUS at 17:15

## 2017-05-24 RX ADMIN — INSULIN LISPRO 4 UNITS: 100 INJECTION, SOLUTION INTRAVENOUS; SUBCUTANEOUS at 13:20

## 2017-05-24 RX ADMIN — GABAPENTIN 200 MG: 100 CAPSULE ORAL at 08:17

## 2017-05-24 RX ADMIN — LEVALBUTEROL HYDROCHLORIDE 1.25 MG: 1.25 SOLUTION, CONCENTRATE RESPIRATORY (INHALATION) at 13:30

## 2017-05-24 RX ADMIN — PANTOPRAZOLE SODIUM 40 MG: 40 TABLET, DELAYED RELEASE ORAL at 05:55

## 2017-05-24 RX ADMIN — HEPARIN SODIUM 5000 UNITS: 5000 INJECTION, SOLUTION INTRAVENOUS; SUBCUTANEOUS at 13:22

## 2017-05-24 RX ADMIN — LORAZEPAM 0.5 MG: 2 INJECTION INTRAMUSCULAR; INTRAVENOUS at 08:20

## 2017-05-24 RX ADMIN — LORAZEPAM 0.5 MG: 0.5 TABLET ORAL at 09:03

## 2017-05-24 RX ADMIN — INSULIN LISPRO 4 UNITS: 100 INJECTION, SOLUTION INTRAVENOUS; SUBCUTANEOUS at 17:14

## 2017-05-24 RX ADMIN — HEPARIN SODIUM 5000 UNITS: 5000 INJECTION, SOLUTION INTRAVENOUS; SUBCUTANEOUS at 05:55

## 2017-05-24 RX ADMIN — INSULIN LISPRO 3 UNITS: 100 INJECTION, SOLUTION INTRAVENOUS; SUBCUTANEOUS at 22:04

## 2017-05-24 RX ADMIN — GABAPENTIN 200 MG: 100 CAPSULE ORAL at 17:12

## 2017-05-24 RX ADMIN — NICOTINE 14 MG: 14 PATCH TRANSDERMAL at 08:18

## 2017-05-24 RX ADMIN — INSULIN GLARGINE 20 UNITS: 100 INJECTION, SOLUTION SUBCUTANEOUS at 22:00

## 2017-05-24 RX ADMIN — FLUTICASONE PROPIONATE AND SALMETEROL 1 PUFF: 50; 500 POWDER RESPIRATORY (INHALATION) at 22:03

## 2017-05-24 RX ADMIN — OXYCODONE HYDROCHLORIDE AND ACETAMINOPHEN 1 TABLET: 5; 325 TABLET ORAL at 08:20

## 2017-05-24 RX ADMIN — DILTIAZEM HYDROCHLORIDE 180 MG: 180 CAPSULE, COATED, EXTENDED RELEASE ORAL at 08:17

## 2017-05-24 RX ADMIN — HEPARIN SODIUM 5000 UNITS: 5000 INJECTION, SOLUTION INTRAVENOUS; SUBCUTANEOUS at 21:59

## 2017-05-24 RX ADMIN — LEVALBUTEROL HYDROCHLORIDE 1.25 MG: 1.25 SOLUTION, CONCENTRATE RESPIRATORY (INHALATION) at 20:07

## 2017-05-24 RX ADMIN — FLUTICASONE PROPIONATE AND SALMETEROL 1 PUFF: 50; 500 POWDER RESPIRATORY (INHALATION) at 08:22

## 2017-05-25 LAB
GLUCOSE SERPL-MCNC: 151 MG/DL (ref 65–140)
GLUCOSE SERPL-MCNC: 315 MG/DL (ref 65–140)
GLUCOSE SERPL-MCNC: 325 MG/DL (ref 65–140)
GLUCOSE SERPL-MCNC: 351 MG/DL (ref 65–140)

## 2017-05-25 PROCEDURE — A9270 NON-COVERED ITEM OR SERVICE: HCPCS | Performed by: PHYSICIAN ASSISTANT

## 2017-05-25 PROCEDURE — A9270 NON-COVERED ITEM OR SERVICE: HCPCS | Performed by: INTERNAL MEDICINE

## 2017-05-25 PROCEDURE — 94760 N-INVAS EAR/PLS OXIMETRY 1: CPT

## 2017-05-25 PROCEDURE — 82948 REAGENT STRIP/BLOOD GLUCOSE: CPT

## 2017-05-25 PROCEDURE — 94640 AIRWAY INHALATION TREATMENT: CPT

## 2017-05-25 RX ADMIN — INSULIN LISPRO 4 UNITS: 100 INJECTION, SOLUTION INTRAVENOUS; SUBCUTANEOUS at 08:20

## 2017-05-25 RX ADMIN — LORAZEPAM 0.5 MG: 0.5 TABLET ORAL at 19:11

## 2017-05-25 RX ADMIN — INSULIN LISPRO 4 UNITS: 100 INJECTION, SOLUTION INTRAVENOUS; SUBCUTANEOUS at 18:05

## 2017-05-25 RX ADMIN — HEPARIN SODIUM 5000 UNITS: 5000 INJECTION, SOLUTION INTRAVENOUS; SUBCUTANEOUS at 22:45

## 2017-05-25 RX ADMIN — LORAZEPAM 0.5 MG: 0.5 TABLET ORAL at 15:41

## 2017-05-25 RX ADMIN — INSULIN LISPRO 3 UNITS: 100 INJECTION, SOLUTION INTRAVENOUS; SUBCUTANEOUS at 22:44

## 2017-05-25 RX ADMIN — INSULIN LISPRO 4 UNITS: 100 INJECTION, SOLUTION INTRAVENOUS; SUBCUTANEOUS at 18:04

## 2017-05-25 RX ADMIN — FLUTICASONE PROPIONATE AND SALMETEROL 1 PUFF: 50; 500 POWDER RESPIRATORY (INHALATION) at 22:45

## 2017-05-25 RX ADMIN — FLUTICASONE PROPIONATE AND SALMETEROL 1 PUFF: 50; 500 POWDER RESPIRATORY (INHALATION) at 08:19

## 2017-05-25 RX ADMIN — OXYCODONE HYDROCHLORIDE AND ACETAMINOPHEN 1 TABLET: 5; 325 TABLET ORAL at 22:57

## 2017-05-25 RX ADMIN — LEVALBUTEROL HYDROCHLORIDE 1.25 MG: 1.25 SOLUTION, CONCENTRATE RESPIRATORY (INHALATION) at 07:17

## 2017-05-25 RX ADMIN — INSULIN LISPRO 1 UNITS: 100 INJECTION, SOLUTION INTRAVENOUS; SUBCUTANEOUS at 11:58

## 2017-05-25 RX ADMIN — GABAPENTIN 200 MG: 100 CAPSULE ORAL at 22:45

## 2017-05-25 RX ADMIN — IPRATROPIUM BROMIDE 0.5 MG: 0.5 SOLUTION RESPIRATORY (INHALATION) at 07:17

## 2017-05-25 RX ADMIN — DILTIAZEM HYDROCHLORIDE 180 MG: 180 CAPSULE, COATED, EXTENDED RELEASE ORAL at 08:19

## 2017-05-25 RX ADMIN — IPRATROPIUM BROMIDE 0.5 MG: 0.5 SOLUTION RESPIRATORY (INHALATION) at 13:21

## 2017-05-25 RX ADMIN — LEVALBUTEROL HYDROCHLORIDE 1.25 MG: 1.25 SOLUTION, CONCENTRATE RESPIRATORY (INHALATION) at 19:21

## 2017-05-25 RX ADMIN — LEVALBUTEROL HYDROCHLORIDE 1.25 MG: 1.25 SOLUTION, CONCENTRATE RESPIRATORY (INHALATION) at 13:21

## 2017-05-25 RX ADMIN — INSULIN LISPRO 4 UNITS: 100 INJECTION, SOLUTION INTRAVENOUS; SUBCUTANEOUS at 11:58

## 2017-05-25 RX ADMIN — HEPARIN SODIUM 5000 UNITS: 5000 INJECTION, SOLUTION INTRAVENOUS; SUBCUTANEOUS at 13:00

## 2017-05-25 RX ADMIN — PREDNISONE 20 MG: 20 TABLET ORAL at 19:03

## 2017-05-25 RX ADMIN — IPRATROPIUM BROMIDE 0.5 MG: 0.5 SOLUTION RESPIRATORY (INHALATION) at 19:20

## 2017-05-25 RX ADMIN — PREDNISONE 20 MG: 20 TABLET ORAL at 08:19

## 2017-05-25 RX ADMIN — GABAPENTIN 200 MG: 100 CAPSULE ORAL at 19:03

## 2017-05-25 RX ADMIN — INSULIN GLARGINE 20 UNITS: 100 INJECTION, SOLUTION SUBCUTANEOUS at 22:45

## 2017-05-25 RX ADMIN — GABAPENTIN 200 MG: 100 CAPSULE ORAL at 08:19

## 2017-05-25 RX ADMIN — HYDROCODONE BITARTRATE AND HOMATROPINE METHYLBROMIDE 5 ML: 5; 1.5 SOLUTION ORAL at 19:04

## 2017-05-25 RX ADMIN — OXYCODONE HYDROCHLORIDE AND ACETAMINOPHEN 1 TABLET: 5; 325 TABLET ORAL at 08:25

## 2017-05-25 RX ADMIN — LORAZEPAM 0.5 MG: 0.5 TABLET ORAL at 08:27

## 2017-05-25 RX ADMIN — INSULIN LISPRO 3 UNITS: 100 INJECTION, SOLUTION INTRAVENOUS; SUBCUTANEOUS at 08:20

## 2017-05-25 RX ADMIN — PANTOPRAZOLE SODIUM 40 MG: 40 TABLET, DELAYED RELEASE ORAL at 05:53

## 2017-05-25 RX ADMIN — NICOTINE 14 MG: 14 PATCH TRANSDERMAL at 08:20

## 2017-05-25 RX ADMIN — HEPARIN SODIUM 5000 UNITS: 5000 INJECTION, SOLUTION INTRAVENOUS; SUBCUTANEOUS at 05:53

## 2017-05-26 VITALS
WEIGHT: 159.39 LBS | TEMPERATURE: 98.1 F | HEIGHT: 61 IN | RESPIRATION RATE: 18 BRPM | OXYGEN SATURATION: 93 % | DIASTOLIC BLOOD PRESSURE: 74 MMHG | SYSTOLIC BLOOD PRESSURE: 156 MMHG | BODY MASS INDEX: 30.09 KG/M2 | HEART RATE: 84 BPM

## 2017-05-26 LAB
GLUCOSE SERPL-MCNC: 246 MG/DL (ref 65–140)
GLUCOSE SERPL-MCNC: 344 MG/DL (ref 65–140)

## 2017-05-26 PROCEDURE — A9270 NON-COVERED ITEM OR SERVICE: HCPCS | Performed by: INTERNAL MEDICINE

## 2017-05-26 PROCEDURE — 94760 N-INVAS EAR/PLS OXIMETRY 1: CPT

## 2017-05-26 PROCEDURE — A9270 NON-COVERED ITEM OR SERVICE: HCPCS | Performed by: PHYSICIAN ASSISTANT

## 2017-05-26 PROCEDURE — A9270 NON-COVERED ITEM OR SERVICE: HCPCS | Performed by: NURSE PRACTITIONER

## 2017-05-26 PROCEDURE — 82948 REAGENT STRIP/BLOOD GLUCOSE: CPT

## 2017-05-26 PROCEDURE — 94640 AIRWAY INHALATION TREATMENT: CPT

## 2017-05-26 RX ORDER — INSULIN GLARGINE 100 [IU]/ML
25 INJECTION, SOLUTION SUBCUTANEOUS
Qty: 10 ML | Refills: 0 | Status: SHIPPED | OUTPATIENT
Start: 2017-05-26 | End: 2020-04-13

## 2017-05-26 RX ORDER — AZITHROMYCIN 250 MG/1
250 TABLET, FILM COATED ORAL 3 TIMES WEEKLY
Qty: 12 TABLET | Refills: 0 | Status: SHIPPED | OUTPATIENT
Start: 2017-05-26 | End: 2020-04-13

## 2017-05-26 RX ORDER — PREDNISONE 10 MG/1
30 TABLET ORAL DAILY
Qty: 18 TABLET | Refills: 0 | Status: SHIPPED | OUTPATIENT
Start: 2017-05-26 | End: 2020-04-13

## 2017-05-26 RX ORDER — NICOTINE 21 MG/24HR
1 PATCH, TRANSDERMAL 24 HOURS TRANSDERMAL DAILY
Qty: 28 PATCH | Refills: 0 | Status: SHIPPED | OUTPATIENT
Start: 2017-05-26 | End: 2020-04-13

## 2017-05-26 RX ADMIN — LEVALBUTEROL HYDROCHLORIDE 1.25 MG: 1.25 SOLUTION, CONCENTRATE RESPIRATORY (INHALATION) at 14:13

## 2017-05-26 RX ADMIN — NICOTINE 14 MG: 14 PATCH TRANSDERMAL at 09:01

## 2017-05-26 RX ADMIN — DILTIAZEM HYDROCHLORIDE 180 MG: 180 CAPSULE, COATED, EXTENDED RELEASE ORAL at 09:02

## 2017-05-26 RX ADMIN — HYDROCODONE BITARTRATE AND HOMATROPINE METHYLBROMIDE 5 ML: 5; 1.5 SOLUTION ORAL at 14:50

## 2017-05-26 RX ADMIN — LEVALBUTEROL HYDROCHLORIDE 1.25 MG: 1.25 SOLUTION, CONCENTRATE RESPIRATORY (INHALATION) at 07:34

## 2017-05-26 RX ADMIN — INSULIN LISPRO 4 UNITS: 100 INJECTION, SOLUTION INTRAVENOUS; SUBCUTANEOUS at 09:03

## 2017-05-26 RX ADMIN — INSULIN LISPRO 4 UNITS: 100 INJECTION, SOLUTION INTRAVENOUS; SUBCUTANEOUS at 11:40

## 2017-05-26 RX ADMIN — FLUTICASONE PROPIONATE AND SALMETEROL 1 PUFF: 50; 500 POWDER RESPIRATORY (INHALATION) at 09:02

## 2017-05-26 RX ADMIN — IPRATROPIUM BROMIDE 0.5 MG: 0.5 SOLUTION RESPIRATORY (INHALATION) at 07:34

## 2017-05-26 RX ADMIN — AZITHROMYCIN 250 MG: 250 TABLET, FILM COATED ORAL at 09:01

## 2017-05-26 RX ADMIN — LORAZEPAM 0.5 MG: 0.5 TABLET ORAL at 09:09

## 2017-05-26 RX ADMIN — HEPARIN SODIUM 5000 UNITS: 5000 INJECTION, SOLUTION INTRAVENOUS; SUBCUTANEOUS at 05:42

## 2017-05-26 RX ADMIN — PANTOPRAZOLE SODIUM 40 MG: 40 TABLET, DELAYED RELEASE ORAL at 05:42

## 2017-05-26 RX ADMIN — GABAPENTIN 200 MG: 100 CAPSULE ORAL at 09:01

## 2017-05-26 RX ADMIN — PREDNISONE 20 MG: 20 TABLET ORAL at 09:01

## 2017-05-26 RX ADMIN — IPRATROPIUM BROMIDE 0.5 MG: 0.5 SOLUTION RESPIRATORY (INHALATION) at 14:14

## 2017-05-26 RX ADMIN — OXYCODONE HYDROCHLORIDE AND ACETAMINOPHEN 1 TABLET: 5; 325 TABLET ORAL at 09:02

## 2017-05-26 RX ADMIN — INSULIN LISPRO 2 UNITS: 100 INJECTION, SOLUTION INTRAVENOUS; SUBCUTANEOUS at 11:40

## 2017-05-26 RX ADMIN — HYDROCODONE BITARTRATE AND HOMATROPINE METHYLBROMIDE 5 ML: 5; 1.5 SOLUTION ORAL at 06:10

## 2017-06-01 ENCOUNTER — ALLSCRIPTS OFFICE VISIT (OUTPATIENT)
Dept: OTHER | Facility: OTHER | Age: 77
End: 2017-06-01

## 2017-08-15 ENCOUNTER — ALLSCRIPTS OFFICE VISIT (OUTPATIENT)
Dept: OTHER | Facility: OTHER | Age: 77
End: 2017-08-15

## 2017-12-11 ENCOUNTER — GENERIC CONVERSION - ENCOUNTER (OUTPATIENT)
Dept: OTHER | Facility: OTHER | Age: 77
End: 2017-12-11

## 2018-01-13 VITALS
WEIGHT: 162 LBS | HEART RATE: 86 BPM | BODY MASS INDEX: 31.8 KG/M2 | DIASTOLIC BLOOD PRESSURE: 80 MMHG | RESPIRATION RATE: 16 BRPM | SYSTOLIC BLOOD PRESSURE: 120 MMHG | OXYGEN SATURATION: 93 % | HEIGHT: 60 IN | TEMPERATURE: 97 F

## 2018-01-14 VITALS
DIASTOLIC BLOOD PRESSURE: 90 MMHG | OXYGEN SATURATION: 94 % | SYSTOLIC BLOOD PRESSURE: 158 MMHG | TEMPERATURE: 98.8 F | RESPIRATION RATE: 14 BRPM | HEIGHT: 60 IN | WEIGHT: 168 LBS | HEART RATE: 76 BPM | BODY MASS INDEX: 32.98 KG/M2

## 2018-01-23 NOTE — MISCELLANEOUS
Message  phone call from [address], looking for medical records on [address] [facility]  4-3-14 and 1-30-14 ofc letters faxed to [phone]      Active Problems    1  Anemia (285 9) (D64 9)   2  Chronic hypoxemic respiratory failure (518 83,799 02) (J96 11)   3  COPD (chronic obstructive pulmonary disease) (496) (J44 9)   4  History of Current Smoker (305 1)    Current Meds   1  Advair Diskus 250-50 MCG/DOSE Inhalation Aerosol Powder Breath Activated; INHALE   1 PUFF TWICE DAILY; Therapy: (Recorded:16Jan2014) to Recorded   2  Ativan TABS (LORazepam); 1 5mg at bedtime; Therapy: (Recorded:86Bdg4205) to Recorded   3  Calcium 600/Vitamin D 600-400 MG-UNIT Oral Tablet Chewable; Therapy: (Recorded:16Jan2014) to Recorded   4  Cardizem  MG Oral Capsule Extended Release 24 Hour (DilTIAZem HCl ER   Coated Beads); Therapy: (Recorded:16Jan2014) to Recorded   5  MetFORMIN HCl  MG Oral Tablet Extended Release 24 Hour; 2 tabs in the am   1   tab in pm;   Therapy: (Recorded:[id number]) to Recorded   6  Neurontin 100 MG Oral Capsule (Gabapentin); TAKE 2 CAPSULES 3 TIMES DAILY; Therapy: (Recorded:15Aug2017) to Recorded   7  OxyCODONE HCl - 5 MG Oral Tablet; oxycodine 5 25mg  bid; Therapy: (Recorded:15Aug2017) to Recorded   8  PriLOSEC PACK; bid; Therapy: (Recorded:30Jan2014) to Recorded   9  Proventil HFA AERS; Therapy: (Recorded:16Jan2014) to Recorded    Allergies    1  Metamucil CAPS   2   PredniSONE TABS   3  TETANUS    Signatures   Electronically signed by : Saima Palacios, ; Dec 11 2017 10:10AM EST                       (Author)

## 2018-01-29 ENCOUNTER — DOCTOR'S OFFICE (OUTPATIENT)
Dept: URBAN - METROPOLITAN AREA CLINIC 136 | Facility: CLINIC | Age: 78
Setting detail: OPHTHALMOLOGY
End: 2018-01-29
Payer: COMMERCIAL

## 2018-01-29 DIAGNOSIS — H35.379: ICD-10-CM

## 2018-01-29 DIAGNOSIS — E11.9: ICD-10-CM

## 2018-01-29 DIAGNOSIS — F17.200: ICD-10-CM

## 2018-01-29 DIAGNOSIS — H34.8122: ICD-10-CM

## 2018-01-29 DIAGNOSIS — H35.033: ICD-10-CM

## 2018-01-29 DIAGNOSIS — H25.11: ICD-10-CM

## 2018-01-29 DIAGNOSIS — H43.812: ICD-10-CM

## 2018-01-29 DIAGNOSIS — Z96.1: ICD-10-CM

## 2018-01-29 PROCEDURE — 92014 COMPRE OPH EXAM EST PT 1/>: CPT | Performed by: OPHTHALMOLOGY

## 2018-01-29 PROCEDURE — 92134 CPTRZ OPH DX IMG PST SGM RTA: CPT | Performed by: OPHTHALMOLOGY

## 2018-01-29 ASSESSMENT — REFRACTION_AUTOREFRACTION
OS_CYLINDER: SPH
OD_CYLINDER: +0.75
OS_SPHERE: +1.75
OD_SPHERE: +1.25
OD_AXIS: 136

## 2018-01-29 ASSESSMENT — VISUAL ACUITY
OS_BCVA: 20/70+1
OD_BCVA: 20/70-1

## 2018-01-29 ASSESSMENT — REFRACTION_OUTSIDERX
OD_VA1: 20/40+
OD_VA2: 20/25(J1)
OD_CYLINDER: +0.75
OS_VA2: 20/25(J1)
OD_ADD: +2.75
OS_VA1: 20/40
OS_VA3: 20/
OS_ADD: +2.75
OS_CYLINDER: SPH
OU_VA: 20/
OD_SPHERE: +1.25
OS_SPHERE: +1.75
OD_AXIS: 135
OD_VA3: 20/

## 2018-01-29 ASSESSMENT — REFRACTION_CURRENTRX
OD_SPHERE: +4.00
OS_OVR_VA: 20/
OD_OVR_VA: 20/
OD_ADD: +2.50
OS_AXIS: 172
OS_CYLINDER: +0.25
OS_OVR_VA: 20/
OD_AXIS: 171
OS_VPRISM_DIRECTION: PROGS
OS_OVR_VA: 20/
OD_OVR_VA: 20/
OS_SPHERE: +1.75
OD_CYLINDER: +0.25
OS_ADD: +2.50
OD_VPRISM_DIRECTION: PROGS
OD_OVR_VA: 20/

## 2018-01-29 ASSESSMENT — REFRACTION_MANIFEST
OD_VA3: 20/
OD_VA3: 20/
OS_VA1: 20/
OD_VA1: 20/
OU_VA: 20/
OD_VA1: 20/
OS_VA2: 20/
OS_VA2: 20/
OS_VA3: 20/
OS_VA1: 20/
OS_VA3: 20/
OD_VA2: 20/
OD_VA2: 20/
OU_VA: 20/

## 2018-01-29 ASSESSMENT — SPHEQUIV_DERIVED: OD_SPHEQUIV: 1.625

## 2018-01-29 ASSESSMENT — CONFRONTATIONAL VISUAL FIELD TEST (CVF)
OD_FINDINGS: FULL
OS_FINDINGS: FULL

## 2018-02-21 ENCOUNTER — DOCTOR'S OFFICE (OUTPATIENT)
Dept: URBAN - METROPOLITAN AREA CLINIC 137 | Facility: CLINIC | Age: 78
Setting detail: OPHTHALMOLOGY
End: 2018-02-21
Payer: MEDICARE

## 2018-02-21 DIAGNOSIS — H25.11: ICD-10-CM

## 2018-02-21 DIAGNOSIS — H35.379: ICD-10-CM

## 2018-02-21 DIAGNOSIS — H35.033: ICD-10-CM

## 2018-02-21 DIAGNOSIS — H43.812: ICD-10-CM

## 2018-02-21 DIAGNOSIS — Z96.1: ICD-10-CM

## 2018-02-21 DIAGNOSIS — H34.8122: ICD-10-CM

## 2018-02-21 DIAGNOSIS — E11.9: ICD-10-CM

## 2018-02-21 PROCEDURE — 92014 COMPRE OPH EXAM EST PT 1/>: CPT | Performed by: OPHTHALMOLOGY

## 2018-02-21 ASSESSMENT — REFRACTION_MANIFEST
OU_VA: 20/
OD_VA2: 20/
OD_VA3: 20/
OS_VA2: 20/
OU_VA: 20/
OD_VA1: 20/
OS_VA3: 20/
OS_VA3: 20/
OS_VA2: 20/
OS_VA1: 20/
OS_VA1: 20/
OD_VA2: 20/
OD_VA3: 20/
OD_VA1: 20/

## 2018-02-21 ASSESSMENT — REFRACTION_OUTSIDERX
OD_AXIS: 135
OS_VA3: 20/
OD_CYLINDER: +0.75
OS_VA2: 20/25(J1)
OS_CYLINDER: SPH
OD_VA1: 20/40+
OD_VA2: 20/25(J1)
OU_VA: 20/
OD_SPHERE: +1.25
OS_ADD: +2.75
OD_VA3: 20/
OS_VA1: 20/40
OS_SPHERE: +1.75
OD_ADD: +2.75

## 2018-02-21 ASSESSMENT — REFRACTION_CURRENTRX
OS_OVR_VA: 20/
OD_CYLINDER: +0.75
OS_CYLINDER: SPH
OD_VPRISM_DIRECTION: PROGS
OD_OVR_VA: 20/
OD_ADD: +2.75
OS_SPHERE: +1.75
OS_OVR_VA: 20/
OS_OVR_VA: 20/
OD_AXIS: 135
OS_ADD: +2.75
OD_SPHERE: +1.25
OD_OVR_VA: 20/
OD_OVR_VA: 20/
OS_VPRISM_DIRECTION: PROGS

## 2018-02-21 ASSESSMENT — VISUAL ACUITY
OS_BCVA: 20/40
OD_BCVA: 20/70

## 2018-02-21 ASSESSMENT — REFRACTION_AUTOREFRACTION
OD_AXIS: 143
OS_SPHERE: +1.50
OS_CYLINDER: SPH
OD_CYLINDER: +1.00
OD_SPHERE: -0.25

## 2018-02-21 ASSESSMENT — CONFRONTATIONAL VISUAL FIELD TEST (CVF)
OS_FINDINGS: FULL
OD_FINDINGS: FULL

## 2018-02-21 ASSESSMENT — SPHEQUIV_DERIVED: OD_SPHEQUIV: 0.25

## 2018-03-16 ENCOUNTER — DOCTOR'S OFFICE (OUTPATIENT)
Dept: URBAN - METROPOLITAN AREA CLINIC 137 | Facility: CLINIC | Age: 78
Setting detail: OPHTHALMOLOGY
End: 2018-03-16
Payer: MEDICARE

## 2018-03-16 DIAGNOSIS — H25.011: ICD-10-CM

## 2018-03-16 PROCEDURE — CATARACT K CATARACT KIT: Performed by: OPHTHALMOLOGY

## 2018-03-16 PROCEDURE — 76519 ECHO EXAM OF EYE: CPT | Performed by: OPHTHALMOLOGY

## 2018-04-09 ENCOUNTER — AMBUL SURGICAL CARE (OUTPATIENT)
Dept: URBAN - METROPOLITAN AREA SURGERY 32 | Facility: SURGERY | Age: 78
Setting detail: OPHTHALMOLOGY
End: 2018-04-09
Payer: MEDICARE

## 2018-04-09 DIAGNOSIS — H25.11: ICD-10-CM

## 2018-04-09 PROCEDURE — G8918 PT W/O PREOP ORDER IV AB PRO: HCPCS | Performed by: OPHTHALMOLOGY

## 2018-04-09 PROCEDURE — G8907 PT DOC NO EVENTS ON DISCHARG: HCPCS | Performed by: OPHTHALMOLOGY

## 2018-04-09 PROCEDURE — 66984 XCAPSL CTRC RMVL W/O ECP: CPT | Performed by: OPHTHALMOLOGY

## 2018-04-10 ENCOUNTER — RX ONLY (RX ONLY)
Age: 78
End: 2018-04-10

## 2018-04-10 ENCOUNTER — DOCTOR'S OFFICE (OUTPATIENT)
Dept: URBAN - METROPOLITAN AREA CLINIC 137 | Facility: CLINIC | Age: 78
Setting detail: OPHTHALMOLOGY
End: 2018-04-10
Payer: MEDICARE

## 2018-04-10 DIAGNOSIS — Z96.1: ICD-10-CM

## 2018-04-10 PROCEDURE — 99024 POSTOP FOLLOW-UP VISIT: CPT | Performed by: OPHTHALMOLOGY

## 2018-04-10 ASSESSMENT — REFRACTION_CURRENTRX
OD_CYLINDER: +0.75
OS_OVR_VA: 20/
OS_ADD: +2.75
OD_OVR_VA: 20/
OS_VPRISM_DIRECTION: PROGS
OD_AXIS: 135
OD_OVR_VA: 20/
OS_CYLINDER: SPH
OD_ADD: +2.75
OD_SPHERE: +1.25
OD_VPRISM_DIRECTION: PROGS
OD_OVR_VA: 20/
OS_OVR_VA: 20/
OS_OVR_VA: 20/
OS_SPHERE: +1.75

## 2018-04-10 ASSESSMENT — REFRACTION_OUTSIDERX
OD_VA3: 20/
OD_VA2: 20/25(J1)
OD_ADD: +2.75
OS_VA1: 20/40
OD_CYLINDER: +0.75
OS_CYLINDER: SPH
OS_VA3: 20/
OD_VA1: 20/40+
OS_ADD: +2.75
OU_VA: 20/
OD_AXIS: 135
OD_SPHERE: +1.25
OS_SPHERE: +1.75
OS_VA2: 20/25(J1)

## 2018-04-10 ASSESSMENT — REFRACTION_MANIFEST
OD_VA2: 20/
OS_VA1: 20/
OS_VA3: 20/
OU_VA: 20/
OD_VA1: 20/
OS_VA3: 20/
OS_VA2: 20/
OS_VA2: 20/
OS_VA1: 20/
OD_VA2: 20/
OD_VA3: 20/
OD_VA3: 20/
OU_VA: 20/
OD_VA1: 20/

## 2018-04-10 ASSESSMENT — REFRACTION_AUTOREFRACTION
OD_SPHERE: -0.25
OS_CYLINDER: SPH
OD_CYLINDER: +1.00
OS_SPHERE: +1.50
OD_AXIS: 143

## 2018-04-10 ASSESSMENT — SPHEQUIV_DERIVED: OD_SPHEQUIV: 0.25

## 2018-04-10 ASSESSMENT — VISUAL ACUITY
OD_BCVA: 20/70
OS_BCVA: 20/25+1

## 2018-04-18 ENCOUNTER — DOCTOR'S OFFICE (OUTPATIENT)
Dept: URBAN - METROPOLITAN AREA CLINIC 137 | Facility: CLINIC | Age: 78
Setting detail: OPHTHALMOLOGY
End: 2018-04-18
Payer: MEDICARE

## 2018-04-18 DIAGNOSIS — Z96.1: ICD-10-CM

## 2018-04-18 PROCEDURE — 99024 POSTOP FOLLOW-UP VISIT: CPT | Performed by: OPHTHALMOLOGY

## 2018-04-18 ASSESSMENT — REFRACTION_CURRENTRX
OS_OVR_VA: 20/
OS_CYLINDER: SPH
OD_AXIS: 135
OD_VPRISM_DIRECTION: PROGS
OD_CYLINDER: +0.75
OS_SPHERE: +1.75
OS_OVR_VA: 20/
OD_SPHERE: +1.25
OS_ADD: +2.75
OD_ADD: +2.75
OS_OVR_VA: 20/
OD_OVR_VA: 20/
OS_VPRISM_DIRECTION: PROGS
OD_OVR_VA: 20/
OD_OVR_VA: 20/

## 2018-04-18 ASSESSMENT — REFRACTION_OUTSIDERX
OU_VA: 20/
OS_SPHERE: +1.75
OD_VA3: 20/
OS_VA2: 20/25(J1)
OD_CYLINDER: +0.75
OS_VA3: 20/
OD_ADD: +2.75
OS_CYLINDER: SPH
OD_AXIS: 135
OD_SPHERE: +1.25
OD_VA2: 20/25(J1)
OD_VA1: 20/40+
OS_VA1: 20/40
OS_ADD: +2.75

## 2018-04-18 ASSESSMENT — REFRACTION_MANIFEST
OD_VA2: 20/
OS_VA2: 20/
OS_VA3: 20/
OS_VA2: 20/
OD_VA1: 20/
OU_VA: 20/
OS_VA1: 20/
OU_VA: 20/
OD_VA1: 20/
OS_VA3: 20/
OD_VA2: 20/
OD_VA3: 20/
OS_VA1: 20/
OD_VA3: 20/

## 2018-04-18 ASSESSMENT — REFRACTION_AUTOREFRACTION
OS_CYLINDER: SPH
OS_SPHERE: +1.25
OD_AXIS: 49
OD_CYLINDER: +0.50
OD_SPHERE: -0.50

## 2018-04-18 ASSESSMENT — VISUAL ACUITY
OS_BCVA: 20/25+2
OD_BCVA: 20/70

## 2018-04-18 ASSESSMENT — SPHEQUIV_DERIVED: OD_SPHEQUIV: -0.25

## 2018-05-16 ENCOUNTER — DOCTOR'S OFFICE (OUTPATIENT)
Dept: URBAN - METROPOLITAN AREA CLINIC 137 | Facility: CLINIC | Age: 78
Setting detail: OPHTHALMOLOGY
End: 2018-05-16
Payer: COMMERCIAL

## 2018-05-16 DIAGNOSIS — H52.4: ICD-10-CM

## 2018-05-16 PROCEDURE — 92015 DETERMINE REFRACTIVE STATE: CPT | Performed by: OPHTHALMOLOGY

## 2018-05-16 ASSESSMENT — REFRACTION_OUTSIDERX
OD_CYLINDER: +0.25
OS_SPHERE: +1.75
OD_VA1: 20/20
OD_VA3: 20/
OD_CYLINDER: +0.75
OS_VA3: 20/
OS_VA1: 20/40
OS_CYLINDER: SPH
OS_ADD: +2.50
OS_VA3: 20/
OD_ADD: +2.75
OS_SPHERE: +1.75
OD_AXIS: 135
OD_VA3: 20/
OD_VA2: 20/20(J1+)
OD_VA1: 20/40+
OS_VA1: 20/40-2
OS_CYLINDER: SPH
OU_VA: 20/20
OU_VA: 20/
OD_AXIS: 50
OS_VA2: 20/25(J1)
OD_SPHERE: +1.25
OS_VA2: 20/20(J1+)
OS_ADD: +2.75
OD_ADD: +2.50
OD_VA2: 20/25(J1)
OD_SPHERE: -0.25

## 2018-05-16 ASSESSMENT — VISUAL ACUITY
OD_BCVA: 20/50-2
OS_BCVA: 20/20-2

## 2018-05-16 ASSESSMENT — REFRACTION_MANIFEST
OS_VA2: 20/
OD_VA2: 20/
OD_VA3: 20/
OS_VA3: 20/
OS_VA1: 20/
OD_VA1: 20/
OU_VA: 20/

## 2018-05-16 ASSESSMENT — REFRACTION_CURRENTRX
OS_OVR_VA: 20/
OD_SPHERE: +1.25
OS_SPHERE: +1.75
OS_ADD: +2.75
OD_CYLINDER: +0.75
OD_OVR_VA: 20/
OS_OVR_VA: 20/
OD_ADD: +2.75
OD_AXIS: 135
OD_VPRISM_DIRECTION: PROGS
OS_VPRISM_DIRECTION: PROGS
OD_OVR_VA: 20/
OS_CYLINDER: SPH
OS_OVR_VA: 20/
OD_OVR_VA: 20/

## 2018-05-16 ASSESSMENT — REFRACTION_AUTOREFRACTION
OD_AXIS: 26
OS_AXIS: 88
OS_CYLINDER: +0.25
OD_CYLINDER: +0.50
OS_SPHERE: +1.25
OD_SPHERE: -0.25

## 2018-05-16 ASSESSMENT — SPHEQUIV_DERIVED
OS_SPHEQUIV: 1.375
OD_SPHEQUIV: 0

## 2018-05-17 ENCOUNTER — OPTICAL OFFICE (OUTPATIENT)
Dept: URBAN - METROPOLITAN AREA CLINIC 146 | Facility: CLINIC | Age: 78
Setting detail: OPHTHALMOLOGY
End: 2018-05-17
Payer: COMMERCIAL

## 2018-05-17 DIAGNOSIS — H52.4: ICD-10-CM

## 2018-05-17 PROCEDURE — V2781 PROGRESSIVE LENS PER LENS: HCPCS | Performed by: OPHTHALMOLOGY

## 2018-05-17 PROCEDURE — V2203 LENS SPHCYL BIFOCAL 4.00D/.1: HCPCS | Performed by: OPHTHALMOLOGY

## 2018-05-17 PROCEDURE — V2020 VISION SVCS FRAMES PURCHASES: HCPCS | Performed by: OPHTHALMOLOGY

## 2019-01-25 ENCOUNTER — DOCTOR'S OFFICE (OUTPATIENT)
Dept: URBAN - METROPOLITAN AREA CLINIC 137 | Facility: CLINIC | Age: 79
Setting detail: OPHTHALMOLOGY
End: 2019-01-25
Payer: MEDICARE

## 2019-01-25 ENCOUNTER — RX ONLY (RX ONLY)
Age: 79
End: 2019-01-25

## 2019-01-25 DIAGNOSIS — H34.8122: ICD-10-CM

## 2019-01-25 DIAGNOSIS — H35.379: ICD-10-CM

## 2019-01-25 DIAGNOSIS — E11.9: ICD-10-CM

## 2019-01-25 DIAGNOSIS — Z96.1: ICD-10-CM

## 2019-01-25 DIAGNOSIS — H43.812: ICD-10-CM

## 2019-01-25 DIAGNOSIS — H35.033: ICD-10-CM

## 2019-01-25 PROCEDURE — 92014 COMPRE OPH EXAM EST PT 1/>: CPT | Performed by: OPHTHALMOLOGY

## 2019-01-25 PROCEDURE — 92134 CPTRZ OPH DX IMG PST SGM RTA: CPT | Performed by: OPHTHALMOLOGY

## 2019-01-25 ASSESSMENT — CONFRONTATIONAL VISUAL FIELD TEST (CVF)
OS_FINDINGS: FULL
OD_FINDINGS: FULL

## 2019-01-27 ASSESSMENT — REFRACTION_MANIFEST
OD_VA2: 20/20(J1+)
OS_CYLINDER: SPH
OS_CYLINDER: SPH
OD_VA3: 20/
OS_VA2: 20/20(J1+)
OD_SPHERE: -0.25
OD_SPHERE: +1.25
OS_VA3: 20/
OU_VA: 20/
OS_VA1: 20/40-2
OD_VA1: 20/40+
OS_ADD: +2.50
OS_VA1: 20/40
OS_ADD: +2.75
OS_VA2: 20/25(J1)
OS_VA3: 20/
OD_VA1: 20/20
OD_CYLINDER: +0.25
OD_AXIS: 135
OD_CYLINDER: +0.75
OU_VA: 20/20
OD_AXIS: 50
OD_VA2: 20/25(J1)
OD_ADD: +2.75
OD_ADD: +2.50
OS_SPHERE: +1.75
OD_VA3: 20/
OS_SPHERE: +1.75

## 2019-01-27 ASSESSMENT — SPHEQUIV_DERIVED
OD_SPHEQUIV: 1.625
OD_SPHEQUIV: -0.125
OD_SPHEQUIV: 0
OS_SPHEQUIV: 1.375

## 2019-01-27 ASSESSMENT — REFRACTION_CURRENTRX
OS_OVR_VA: 20/
OS_CYLINDER: SPH
OS_VPRISM_DIRECTION: PROGS
OS_OVR_VA: 20/
OD_ADD: +2.75
OS_OVR_VA: 20/
OD_OVR_VA: 20/
OS_ADD: +2.75
OS_SPHERE: +1.75
OD_SPHERE: +1.25
OD_VPRISM_DIRECTION: PROGS
OD_CYLINDER: +0.75
OD_OVR_VA: 20/
OD_OVR_VA: 20/
OD_AXIS: 135

## 2019-01-27 ASSESSMENT — VISUAL ACUITY
OD_BCVA: 20/60+1
OS_BCVA: 20/20-2

## 2019-01-27 ASSESSMENT — REFRACTION_AUTOREFRACTION
OD_CYLINDER: +0.50
OS_SPHERE: +1.25
OD_SPHERE: -0.25
OS_CYLINDER: +0.25
OS_AXIS: 88
OD_AXIS: 26

## 2019-05-01 ENCOUNTER — OPTICAL OFFICE (OUTPATIENT)
Dept: URBAN - METROPOLITAN AREA CLINIC 146 | Facility: CLINIC | Age: 79
Setting detail: OPHTHALMOLOGY
End: 2019-05-01

## 2019-05-01 ENCOUNTER — DOCTOR'S OFFICE (OUTPATIENT)
Dept: URBAN - METROPOLITAN AREA CLINIC 137 | Facility: CLINIC | Age: 79
Setting detail: OPHTHALMOLOGY
End: 2019-05-01
Payer: COMMERCIAL

## 2019-05-01 DIAGNOSIS — H52.4: ICD-10-CM

## 2019-05-01 DIAGNOSIS — H52.02: ICD-10-CM

## 2019-05-01 PROCEDURE — V2781 PROGRESSIVE LENS PER LENS: HCPCS | Performed by: OPTOMETRIST

## 2019-05-01 PROCEDURE — V2020 VISION SVCS FRAMES PURCHASES: HCPCS | Performed by: OPTOMETRIST

## 2019-05-01 PROCEDURE — 92015 DETERMINE REFRACTIVE STATE: CPT | Performed by: OPTOMETRIST

## 2019-05-01 ASSESSMENT — REFRACTION_MANIFEST
OD_VA2: 20/
OD_VA3: 20/
OS_VA2: 20/
OD_SPHERE: PLANO
OS_VA2: 20/
OD_VA1: 20/
OS_CYLINDER: SPH
OS_VA1: 20/40
OU_VA: 20/20
OS_VA3: 20/
OS_ADD: +2.50
OD_CYLINDER: SPH
OU_VA: 20/
OD_VA3: 20/
OD_VA1: 20/20
OD_VA2: 20/
OD_ADD: +2.50
OS_SPHERE: +0.75
OS_VA1: 20/
OS_VA3: 20/

## 2019-05-01 ASSESSMENT — REFRACTION_CURRENTRX
OS_SPHERE: +2.25
OS_AXIS: 96
OD_OVR_VA: 20/
OS_OVR_VA: 20/
OD_CYLINDER: -0.25
OD_SPHERE: +0.25
OD_OVR_VA: 20/
OS_OVR_VA: 20/
OS_OVR_VA: 20/
OS_ADD: +0.25
OS_VPRISM_DIRECTION: PROGS
OD_ADD: +1.00
OD_VPRISM_DIRECTION: PROGS
OD_OVR_VA: 20/
OS_CYLINDER: -0.25
OD_AXIS: 64

## 2019-05-01 ASSESSMENT — VISUAL ACUITY
OD_BCVA: 20/60-1
OS_BCVA: 20/25+2

## 2019-05-01 ASSESSMENT — REFRACTION_AUTOREFRACTION
OS_SPHERE: +1.75
OD_SPHERE: PLANO
OD_AXIS: 118
OS_CYLINDER: SPH
OD_CYLINDER: -0.50

## 2019-05-01 ASSESSMENT — CONFRONTATIONAL VISUAL FIELD TEST (CVF)
OS_FINDINGS: FULL
OD_FINDINGS: FULL

## 2020-04-06 ENCOUNTER — TELEPHONE (OUTPATIENT)
Dept: PULMONOLOGY | Facility: CLINIC | Age: 80
End: 2020-04-06

## 2020-04-13 ENCOUNTER — DOCUMENTATION (OUTPATIENT)
Dept: PULMONOLOGY | Facility: CLINIC | Age: 80
End: 2020-04-13

## 2020-04-13 ENCOUNTER — TELEMEDICINE (OUTPATIENT)
Dept: PULMONOLOGY | Facility: CLINIC | Age: 80
End: 2020-04-13
Payer: COMMERCIAL

## 2020-04-13 VITALS — HEIGHT: 61 IN | WEIGHT: 180 LBS | BODY MASS INDEX: 33.99 KG/M2

## 2020-04-13 DIAGNOSIS — Z87.891 FORMER SMOKER: ICD-10-CM

## 2020-04-13 DIAGNOSIS — I10 HYPERTENSION, UNSPECIFIED TYPE: ICD-10-CM

## 2020-04-13 DIAGNOSIS — J96.11 CHRONIC RESPIRATORY FAILURE WITH HYPOXIA (HCC): ICD-10-CM

## 2020-04-13 DIAGNOSIS — E11.9 TYPE 2 DIABETES MELLITUS WITHOUT COMPLICATION, UNSPECIFIED WHETHER LONG TERM INSULIN USE (HCC): ICD-10-CM

## 2020-04-13 DIAGNOSIS — J44.9 COPD, MODERATE (HCC): Primary | ICD-10-CM

## 2020-04-13 DIAGNOSIS — R06.00 EXERTIONAL DYSPNEA: ICD-10-CM

## 2020-04-13 PROBLEM — D72.829 LEUKOCYTOSIS: Status: RESOLVED | Noted: 2017-05-19 | Resolved: 2020-04-13

## 2020-04-13 PROBLEM — R06.09 EXERTIONAL DYSPNEA: Status: ACTIVE | Noted: 2020-04-13

## 2020-04-13 PROCEDURE — 99214 OFFICE O/P EST MOD 30 MIN: CPT | Performed by: INTERNAL MEDICINE

## 2020-04-13 RX ORDER — ROPINIROLE 0.5 MG/1
TABLET, FILM COATED ORAL
Qty: 30 TABLET | Refills: 0
Start: 2020-04-13

## 2020-04-13 RX ORDER — LOSARTAN POTASSIUM 25 MG/1
25 TABLET ORAL DAILY
Qty: 30 TABLET | Refills: 0
Start: 2020-04-13 | End: 2021-09-20 | Stop reason: DRUGHIGH

## 2020-04-13 RX ORDER — GABAPENTIN 300 MG/1
300 CAPSULE ORAL 4 TIMES DAILY
COMMUNITY
Start: 2020-03-23

## 2020-04-14 ENCOUNTER — TELEPHONE (OUTPATIENT)
Dept: PULMONOLOGY | Facility: CLINIC | Age: 80
End: 2020-04-14

## 2020-07-29 ENCOUNTER — DOCUMENTATION (OUTPATIENT)
Dept: PULMONOLOGY | Facility: CLINIC | Age: 80
End: 2020-07-29

## 2020-07-29 NOTE — PROGRESS NOTES

## 2020-07-30 ENCOUNTER — OFFICE VISIT (OUTPATIENT)
Dept: PULMONOLOGY | Facility: CLINIC | Age: 80
End: 2020-07-30
Payer: COMMERCIAL

## 2020-07-30 VITALS
HEIGHT: 61 IN | DIASTOLIC BLOOD PRESSURE: 90 MMHG | BODY MASS INDEX: 33.99 KG/M2 | WEIGHT: 180 LBS | TEMPERATURE: 98.4 F | OXYGEN SATURATION: 93 % | HEART RATE: 73 BPM | SYSTOLIC BLOOD PRESSURE: 140 MMHG

## 2020-07-30 DIAGNOSIS — Q21.1 ASD (ATRIAL SEPTAL DEFECT): ICD-10-CM

## 2020-07-30 DIAGNOSIS — Z87.891 FORMER SMOKER: ICD-10-CM

## 2020-07-30 DIAGNOSIS — J96.11 CHRONIC RESPIRATORY FAILURE WITH HYPOXIA (HCC): ICD-10-CM

## 2020-07-30 DIAGNOSIS — J44.9 COPD, SEVERITY TO BE DETERMINED (HCC): Primary | ICD-10-CM

## 2020-07-30 PROBLEM — Q21.10 ASD (ATRIAL SEPTAL DEFECT): Status: ACTIVE | Noted: 2020-07-30

## 2020-07-30 PROCEDURE — 99215 OFFICE O/P EST HI 40 MIN: CPT | Performed by: INTERNAL MEDICINE

## 2020-07-30 PROCEDURE — 94618 PULMONARY STRESS TESTING: CPT | Performed by: INTERNAL MEDICINE

## 2020-07-30 RX ORDER — ALBUTEROL SULFATE 90 UG/1
2 AEROSOL, METERED RESPIRATORY (INHALATION) EVERY 6 HOURS PRN
Qty: 1 INHALER | Refills: 3 | Status: SHIPPED | OUTPATIENT
Start: 2020-07-30 | End: 2021-09-20 | Stop reason: SDUPTHER

## 2020-07-30 NOTE — ASSESSMENT & PLAN NOTE
- check echocardiogram to evaluate for other causes of breathlessness especially given history of cardiac surgery

## 2020-07-30 NOTE — PROGRESS NOTES
Pulmonary Follow Up Note   Kimberly Amaya 78 y o  female MRN: [id number]  7/30/2020      Referring provider: Dr Jaylen Kennedy and Plan:    COPD, severity to be determined [facility])  - await PFTs  We will schedule these today  - discontinue Incruse and start LABA/LAMA  Rx Anoro sent to pharmacy  - albuterol as needed  - 6 minutes walk test in the office shows she does not qualify for oxygen with ambulation  She would like a portable concentrator; therefore, I had ordered a home evaluation for a portable concentrator   - Gloria is significantly short of breath after 6 minutes walk test   She has short shallow breaths  I would like to check an echocardiogram to evaluate for other causes of breathlessness  Chronic respiratory failure with hypoxia (HCC)  - continue nocturnal oxygen  She does not require oxygen with ambulation or at rest based on our testing in the office today  We will send a prescription for a home evaluation for portable conserving device  Former smoker  - remains committed to abstinence  Await pulmonary function testing  ASD (atrial septal defect)  - check echocardiogram to evaluate for other causes of breathlessness especially given history of cardiac surgery  Diagnoses and all orders for this visit:    COPD, severity to be determined ([facility] [address] )  -     POCT 6 minute walk  -     umeclidinium-vilanterol (ANORO ELLIPTA) 62 5-25 MCG/INH inhaler; Inhale 1 puff daily  -     albuterol (PROVENTIL HFA,VENTOLIN HFA) 90 mcg/act inhaler; Inhale 2 puffs every 6 (six) hours as needed for wheezing  -     Home Oxygen Portability Evaluation Only    Chronic respiratory failure with hypoxia [facility])    Former smoker    Short of breath on exertion  -     Echo complete with contrast if indicated; Future    ASD (atrial septal defect)     Return for follow-up in 3 months  History of Present Illness   HPI:  Kimberly Amaya is a 78 y o  female who presents for follow up    She was last seen virtually by our practice in April  She reports for the past six months, she has had increased shortness of breath and is using her oxygen more frequently  She wears 3LPM with sleep and uses oxygen at 2LPM during the daytime  She would like a portable concentrator to use with her when she goes to the store  She has used Advair for many years  She did not find it helpful  She was started on Incruse after her telemedicine visit in April  She finds it helpful  She is less short of breath since using it  She an Albuterol inhaler to use as needed, which is 1-2 times daily  She finds going up stairs to be difficult  She estimates that she can walk about one quarter block without stopping  She stops due to back pain and shortness of breath  Smoked 1 ppd x 60 years, quit in 2017      Review of Systems  GEN: no fever or chills  HENT: no sinus congestion, no postnasal drip, no rhinorrhea  EYES: no visual changes  RESP: +shortness of breath, no cough, no wheezing  CARDIO: no chest pain, no leg edema  GI: no abdominal pain, no diarrhea  ENDO:  no polydipsia  : no urgency, no dysuria  MSK: no back pain  ALLERGY: not immunocompromised  NEURO: no dizziness, no seizures  HEME: does not bruise easily  PSYCH: no hallucinations    Historical Information   Past Medical History:   Diagnosis Date    Cardiac disease     [facility]) [facility] [address] )     Diabetes mellitus (HCC)     GERD (gastroesophageal reflux disease)     Hiatal hernia     Hypertension     PUD (peptic ulcer disease)     Residual ASD (atrial septal defect) following repair      Past Surgical History:   Procedure Laterality Date    ASD REPAIR      BACK SURGERY      x3    JOINT REPLACEMENT      bilateral knee surgery    KNEE SURGERY      SHOULDER SURGERY       Family History   Problem Relation Age of Onset    Cancer Mother     Asthma Father          Meds/Allergies     Current Outpatient Medications:     albuterol (PROVENTIL HFA,VENTOLIN HFA) 90 mcg/act inhaler, Inhale 2 puffs every 6 (six) hours as needed for wheezing, Disp: , Rfl:     diltiazem (CARDIZEM CD) 180 mg 24 hr capsule, Take 180 mg by mouth daily, Disp: , Rfl:     gabapentin (NEURONTIN) 300 mg capsule, Take 300 mg by mouth 4 (four) times a day, Disp: , Rfl:     losartan (COZAAR) 25 mg tablet, Take 1 tablet (25 mg total) by mouth daily, Disp: 30 tablet, Rfl: 0    metFORMIN (GLUCOPHAGE) 1000 MG tablet, 1000 mg QAM and 500 mg QPM, Disp: 1 tablet, Rfl: 0    oxyCODONE-acetaminophen (PERCOCET) 5-325 mg per tablet, Take 1 tablet by mouth 2 (two) times a day as needed for moderate pain, Disp: , Rfl:     pantoprazole (PROTONIX) 40 mg tablet, Take 40 mg by mouth every 12 (twelve) hours, Disp: , Rfl:     rOPINIRole (REQUIP) 0 5 mg tablet, 2-3 tablets PO at HS, Disp: 30 tablet, Rfl: 0    sitaGLIPtin (JANUVIA) 100 mg tablet, Take 1 tablet (100 mg total) by mouth daily, Disp: 30 tablet, Rfl: 0    umeclidinium bromide (INCRUSE ELLIPTA) 62 5 mcg/inh AEPB inhaler, Inhale 1 puff daily, Disp: 2 Inhaler, Rfl: 0    fluticasone-salmeterol (Advair Diskus) 250-50 mcg/dose inhaler, Advair Diskus 250 mcg-50 mcg/dose powder for inhalation, Disp: , Rfl:   Allergies   Allergen Reactions    Prednisone      The patient reports "they make me goofy "  No true allergic reaction    Psyllium     Tetanus Toxoids        Vitals: Blood pressure 140/90, pulse 73, temperature 98 4 °F (36 9 °C), height 5' 1" (1 549 m), weight 81 6 kg (180 lb), SpO2 93 %  Body mass index is 34 01 kg/m²  Oxygen Therapy  SpO2: 93 %  Oxygen Therapy: None (Room air)      Physical Exam  GEN: WDWN, nad, comfortable, speaking in full sentences, no conversational dyspnea  HEENT: NCAT, EOMI, MMM  CVS: Regular, I do not hear any murmurs; no r/g  LUNGS: Clear b/l bs, no wheezing, good air movement  ABD: soft, nd  EXT: No c/c/e  NEURO: No focal deficits  MS: Moving all extremities  SKIN: warm, dry  PSYCH: calm, cooperative      Labs:  I have personally reviewed pertinent lab results  Lab Results   Component Value Date    WBC 8 84 05/20/2017    HGB 12 8 05/20/2017    HCT 40 0 05/20/2017    MCV 72 (L) 05/20/2017     05/20/2017     Lab Results   Component Value Date    GLUCOSE 170 (H) 05/19/2017    CALCIUM 9 0 05/20/2017     07/14/2015    K 4 3 05/20/2017    CO2 28 05/20/2017    CL 97 (L) 05/20/2017    BUN 20 05/20/2017    CREATININE 0 79 05/20/2017     No results found for: IGE  Lab Results   Component Value Date    ALT 23 05/19/2017    AST 10 05/19/2017    ALKPHOS 86 05/19/2017    BILITOT 0 3 07/13/2015       Imaging and other studies: I have personally reviewed pertinent films in PACS  Two-view chest x-ray reviewed by me personally shows no active disease, moderate to large hiatal hernia    Pulmonary function testing:  None available    EKG, Pathology, and Other Studies: I have personally reviewed pertinent reports  EKG shows sinus tachycardia with left axis deviation    ALEJANDRA Esposito [facility]

## 2020-07-30 NOTE — ASSESSMENT & PLAN NOTE
- continue nocturnal oxygen  She does not require oxygen with ambulation or at rest based on our testing in the office today  We will send a prescription for a home evaluation for portable conserving device

## 2020-12-03 DIAGNOSIS — J44.9 COPD, SEVERITY TO BE DETERMINED (HCC): ICD-10-CM

## 2020-12-03 RX ORDER — UMECLIDINIUM BROMIDE AND VILANTEROL TRIFENATATE 62.5; 25 UG/1; UG/1
POWDER RESPIRATORY (INHALATION)
Refills: 3 | OUTPATIENT
Start: 2020-12-03

## 2020-12-07 ENCOUNTER — HOSPITAL ENCOUNTER (OUTPATIENT)
Dept: NON INVASIVE DIAGNOSTICS | Facility: CLINIC | Age: 80
Discharge: HOME | End: 2020-12-07
Payer: COMMERCIAL

## 2020-12-07 DIAGNOSIS — Q21.1 ASD (ATRIAL SEPTAL DEFECT): ICD-10-CM

## 2020-12-07 PROCEDURE — 93306 TTE W/DOPPLER COMPLETE: CPT

## 2020-12-07 PROCEDURE — 93306 TTE W/DOPPLER COMPLETE: CPT | Performed by: INTERNAL MEDICINE

## 2020-12-11 ENCOUNTER — TELEPHONE (OUTPATIENT)
Dept: PULMONOLOGY | Facility: CLINIC | Age: 80
End: 2020-12-11

## 2020-12-14 ENCOUNTER — DOCTOR'S OFFICE (OUTPATIENT)
Dept: URBAN - METROPOLITAN AREA CLINIC 137 | Facility: CLINIC | Age: 80
Setting detail: OPHTHALMOLOGY
End: 2020-12-14
Payer: COMMERCIAL

## 2020-12-14 VITALS — HEIGHT: 55 IN

## 2020-12-14 DIAGNOSIS — H52.11: ICD-10-CM

## 2020-12-14 DIAGNOSIS — H52.02: ICD-10-CM

## 2020-12-14 PROBLEM — H34.812 CENTRAL RETINAL VEIN OCCLUSION; LEFT EYE: Status: ACTIVE | Noted: 2019-01-25

## 2020-12-14 PROCEDURE — 92014 COMPRE OPH EXAM EST PT 1/>: CPT | Performed by: OPTOMETRIST

## 2020-12-14 ASSESSMENT — REFRACTION_AUTOREFRACTION
OS_CYLINDER: SPH
OD_SPHERE: -0.50
OD_CYLINDER: -0.50
OD_AXIS: 137
OS_SPHERE: +1.50

## 2020-12-14 ASSESSMENT — REFRACTION_CURRENTRX
OS_ADD: +2.50
OS_SPHERE: +1.50
OD_SPHERE: PLANO
OD_VPRISM_DIRECTION: PROGS
OD_ADD: +2.50
OS_OVR_VA: 20/
OS_CYLINDER: SPH
OS_VPRISM_DIRECTION: PROGS
OD_OVR_VA: 20/

## 2020-12-14 ASSESSMENT — REFRACTION_MANIFEST
OD_VA1: 20/20
OD_CYLINDER: SPH
OS_CYLINDER: 0.00
OD_ADD: +2.50
OS_SPHERE: +1.50
OS_ADD: +2.50
OD_SPHERE: -0.50
OS_SPHERE: +0.75
OD_SPHERE: PLANO
OS_AXIS: 0
OS_CYLINDER: SPH
OS_VA1: 20/40
OD_CYLINDER: 0.00
OD_ADD: +2.50
OS_VA1: 20/30
OD_VA1: 20/20
OU_VA: 20/20
OS_ADD: +2.50
OD_AXIS: 0

## 2020-12-14 ASSESSMENT — KERATOMETRY
OD_AXISANGLE_DEGREES: 2
OS_AXISANGLE_DEGREES: 178
OD_K1POWER_DIOPTERS: 46.50
OS_K1POWER_DIOPTERS: 46.25
OD_K2POWER_DIOPTERS: 46.75
OS_K2POWER_DIOPTERS: 46.50

## 2020-12-14 ASSESSMENT — SPHEQUIV_DERIVED
OD_SPHEQUIV: -0.75
OD_SPHEQUIV: -0.5
OS_SPHEQUIV: 1.5

## 2020-12-14 ASSESSMENT — VISUAL ACUITY
OD_BCVA: 20/40+2
OS_BCVA: 20/20

## 2020-12-14 ASSESSMENT — TONOMETRY
OD_IOP_MMHG: 18
OS_IOP_MMHG: 18

## 2020-12-14 ASSESSMENT — AXIALLENGTH_DERIVED
OD_AL: 22.6759
OD_AL: 22.7662
OS_AL: 22.0581

## 2020-12-14 ASSESSMENT — CONFRONTATIONAL VISUAL FIELD TEST (CVF)
OS_FINDINGS: FULL
OD_FINDINGS: FULL

## 2020-12-15 ENCOUNTER — OPTICAL OFFICE (OUTPATIENT)
Dept: URBAN - METROPOLITAN AREA CLINIC 146 | Facility: CLINIC | Age: 80
Setting detail: OPHTHALMOLOGY
End: 2020-12-15
Payer: COMMERCIAL

## 2020-12-15 DIAGNOSIS — H52.02: ICD-10-CM

## 2020-12-15 DIAGNOSIS — H52.11: ICD-10-CM

## 2020-12-15 PROCEDURE — V2781 PROGRESSIVE LENS PER LENS: HCPCS | Performed by: OPTOMETRIST

## 2020-12-15 PROCEDURE — V2200 LENS SPHER BIFOC PLANO 4.00D: HCPCS | Performed by: OPTOMETRIST

## 2020-12-15 PROCEDURE — V2020 VISION SVCS FRAMES PURCHASES: HCPCS | Performed by: OPTOMETRIST

## 2020-12-29 ENCOUNTER — OFFICE VISIT (OUTPATIENT)
Dept: PULMONOLOGY | Facility: CLINIC | Age: 80
End: 2020-12-29
Payer: COMMERCIAL

## 2020-12-29 VITALS
DIASTOLIC BLOOD PRESSURE: 80 MMHG | SYSTOLIC BLOOD PRESSURE: 160 MMHG | HEART RATE: 87 BPM | HEIGHT: 61 IN | BODY MASS INDEX: 33.08 KG/M2 | TEMPERATURE: 96.6 F | WEIGHT: 175.2 LBS | OXYGEN SATURATION: 91 %

## 2020-12-29 DIAGNOSIS — Z23 NEED FOR PNEUMOCOCCAL VACCINATION: ICD-10-CM

## 2020-12-29 DIAGNOSIS — E11.9 TYPE 2 DIABETES MELLITUS WITHOUT COMPLICATION, UNSPECIFIED WHETHER LONG TERM INSULIN USE (HCC): ICD-10-CM

## 2020-12-29 DIAGNOSIS — J96.11 CHRONIC RESPIRATORY FAILURE WITH HYPOXIA (HCC): ICD-10-CM

## 2020-12-29 DIAGNOSIS — J44.9 COPD, SEVERITY TO BE DETERMINED (HCC): ICD-10-CM

## 2020-12-29 DIAGNOSIS — Q21.1 ASD (ATRIAL SEPTAL DEFECT): ICD-10-CM

## 2020-12-29 DIAGNOSIS — J44.9 COPD, SEVERE (HCC): Primary | ICD-10-CM

## 2020-12-29 PROBLEM — F17.211 CIGARETTE NICOTINE DEPENDENCE IN REMISSION: Status: ACTIVE | Noted: 2017-05-13

## 2020-12-29 PROCEDURE — 94010 BREATHING CAPACITY TEST: CPT | Performed by: INTERNAL MEDICINE

## 2020-12-29 PROCEDURE — 99214 OFFICE O/P EST MOD 30 MIN: CPT | Performed by: INTERNAL MEDICINE

## 2020-12-29 PROCEDURE — 90732 PPSV23 VACC 2 YRS+ SUBQ/IM: CPT | Performed by: INTERNAL MEDICINE

## 2020-12-29 PROCEDURE — G0009 ADMIN PNEUMOCOCCAL VACCINE: HCPCS | Performed by: INTERNAL MEDICINE

## 2020-12-29 RX ORDER — UMECLIDINIUM BROMIDE AND VILANTEROL TRIFENATATE 62.5; 25 UG/1; UG/1
1 POWDER RESPIRATORY (INHALATION) DAILY
Qty: 1 INHALER | Refills: 3 | Status: SHIPPED | OUTPATIENT
Start: 2020-12-29 | End: 2021-08-04

## 2020-12-29 RX ORDER — UMECLIDINIUM BROMIDE AND VILANTEROL TRIFENATATE 62.5; 25 UG/1; UG/1
POWDER RESPIRATORY (INHALATION)
Qty: 1 INHALER | Refills: 3 | Status: SHIPPED | OUTPATIENT
Start: 2020-12-29 | End: 2020-12-29 | Stop reason: SDUPTHER

## 2021-02-19 ENCOUNTER — IMMUNIZATIONS (OUTPATIENT)
Dept: FAMILY MEDICINE CLINIC | Facility: HOSPITAL | Age: 81
End: 2021-02-19

## 2021-02-19 DIAGNOSIS — Z23 ENCOUNTER FOR IMMUNIZATION: Primary | ICD-10-CM

## 2021-02-19 PROCEDURE — 0001A SARS-COV-2 / COVID-19 MRNA VACCINE (PFIZER-BIONTECH) 30 MCG: CPT

## 2021-02-19 PROCEDURE — 91300 SARS-COV-2 / COVID-19 MRNA VACCINE (PFIZER-BIONTECH) 30 MCG: CPT

## 2021-03-14 ENCOUNTER — IMMUNIZATIONS (OUTPATIENT)
Dept: FAMILY MEDICINE CLINIC | Facility: HOSPITAL | Age: 81
End: 2021-03-14

## 2021-03-14 DIAGNOSIS — Z23 ENCOUNTER FOR IMMUNIZATION: Primary | ICD-10-CM

## 2021-03-14 PROCEDURE — 0002A SARS-COV-2 / COVID-19 MRNA VACCINE (PFIZER-BIONTECH) 30 MCG: CPT

## 2021-03-14 PROCEDURE — 91300 SARS-COV-2 / COVID-19 MRNA VACCINE (PFIZER-BIONTECH) 30 MCG: CPT

## 2021-08-03 DIAGNOSIS — J44.9 COPD, SEVERITY TO BE DETERMINED (HCC): ICD-10-CM

## 2021-08-04 RX ORDER — UMECLIDINIUM BROMIDE AND VILANTEROL TRIFENATATE 62.5; 25 UG/1; UG/1
POWDER RESPIRATORY (INHALATION)
Qty: 60 BLISTER | Refills: 3 | Status: SHIPPED | OUTPATIENT
Start: 2021-08-04 | End: 2021-09-20

## 2021-09-20 ENCOUNTER — OFFICE VISIT (OUTPATIENT)
Dept: PULMONOLOGY | Facility: CLINIC | Age: 81
End: 2021-09-20
Payer: COMMERCIAL

## 2021-09-20 VITALS
BODY MASS INDEX: 33.99 KG/M2 | OXYGEN SATURATION: 97 % | TEMPERATURE: 99.1 F | WEIGHT: 180 LBS | DIASTOLIC BLOOD PRESSURE: 80 MMHG | HEART RATE: 62 BPM | HEIGHT: 61 IN | SYSTOLIC BLOOD PRESSURE: 154 MMHG

## 2021-09-20 DIAGNOSIS — J96.11 CHRONIC RESPIRATORY FAILURE WITH HYPOXIA (HCC): ICD-10-CM

## 2021-09-20 DIAGNOSIS — J44.9 COPD, SEVERE (HCC): Primary | ICD-10-CM

## 2021-09-20 DIAGNOSIS — F17.211 CIGARETTE NICOTINE DEPENDENCE IN REMISSION: ICD-10-CM

## 2021-09-20 DIAGNOSIS — Z23 NEEDS FLU SHOT: ICD-10-CM

## 2021-09-20 PROCEDURE — 99214 OFFICE O/P EST MOD 30 MIN: CPT | Performed by: INTERNAL MEDICINE

## 2021-09-20 RX ORDER — TRIAMCINOLONE ACETONIDE 40 MG/ML
1 INJECTION, SUSPENSION INTRA-ARTICULAR; INTRAMUSCULAR
COMMUNITY
End: 2022-04-22 | Stop reason: HOSPADM

## 2021-09-20 RX ORDER — LOSARTAN POTASSIUM 50 MG/1
TABLET ORAL
COMMUNITY
Start: 2021-07-03

## 2021-09-20 RX ORDER — UMECLIDINIUM BROMIDE AND VILANTEROL TRIFENATATE 62.5; 25 UG/1; UG/1
POWDER RESPIRATORY (INHALATION)
COMMUNITY
End: 2021-09-20 | Stop reason: SDUPTHER

## 2021-09-20 RX ORDER — OXYCODONE HYDROCHLORIDE 10 MG/1
TABLET, FILM COATED, EXTENDED RELEASE ORAL
COMMUNITY
Start: 2021-09-11 | End: 2022-05-16

## 2021-09-20 RX ORDER — ALBUTEROL SULFATE 90 UG/1
2 AEROSOL, METERED RESPIRATORY (INHALATION) EVERY 6 HOURS PRN
Qty: 54 G | Refills: 3 | Status: SHIPPED | OUTPATIENT
Start: 2021-09-20

## 2021-09-20 RX ORDER — BUDESONIDE, GLYCOPYRROLATE, AND FORMOTEROL FUMARATE 160; 9; 4.8 UG/1; UG/1; UG/1
2 AEROSOL, METERED RESPIRATORY (INHALATION) 2 TIMES DAILY
Qty: 31.1 G | Refills: 3 | Status: SHIPPED | OUTPATIENT
Start: 2021-09-20 | End: 2022-05-16 | Stop reason: SDUPTHER

## 2021-09-20 RX ORDER — LIDOCAINE HYDROCHLORIDE 10 MG/ML
0.5 INJECTION, SOLUTION INFILTRATION; PERINEURAL
COMMUNITY
End: 2022-05-16

## 2021-09-20 RX ORDER — DILTIAZEM HYDROCHLORIDE 180 MG/1
CAPSULE, EXTENDED RELEASE ORAL
COMMUNITY
Start: 2021-08-18 | End: 2021-09-20 | Stop reason: SDUPTHER

## 2021-09-20 NOTE — PROGRESS NOTES
Pulmonary Follow Up Note   Frankie Monroe 80 y o  female MRN: [id number]  9/20/2021      Referring provider: Dr Ashley Peterson and Plan:    Cigarette nicotine dependence in remission  - remains committed to abstinence   - does not qualify for screening CT scan based on age    Chronic respiratory failure with hypoxia ([facility] [address] )  - cont  Oxygen 3L nightly and 2 L daytime  COPD, severe ([facility] [address] )  No recent exacerbations  - D/C Anoro  Start Kitchenbug  Instructed to rinse after use  Samples given  - Cont  Albuterol as needed  - Declines pulmonary rehab  - pt was to get flu shot but left before given  - Received COVID vaccine      Diagnoses and all orders for this visit:    COPD, severe ([facility] )  -     Budeson-Glycopyrrol-Formoterol (Breztri Aerosphere) 160-9-4 8 MCG/ACT AERO; Inhale 2 puffs 2 (two) times a day Rinse mouth after use  -     albuterol (PROVENTIL HFA,VENTOLIN HFA) 90 mcg/act inhaler; Inhale 2 puffs every 6 (six) hours as needed for wheezing    Chronic respiratory failure with hypoxia (HCC)    Cigarette nicotine dependence in remission    Needs flu shot  -     Cancel: influenza vaccine, high-dose, PF 0 7 mL (FLUZONE HIGH-DOSE)    Other orders  -     Discontinue: umeclidinium-vilanterol (Anoro Ellipta) 62 5-25 MCG/INH inhaler; Anoro Ellipta 62 5 mcg-25 mcg/actuation powder for inhalation   TAKE 1 PUFF BY MOUTH EVERY DAY  -     ascorbic acid (VITAMIN C) 1000 MG tablet; Take 1,000 mg by mouth 2 (two) times a day  -     Discontinue: diltiazem (Tiadylt ER) 180 MG 24 hr capsule; TAKE 1 CAPSULE BY MOUTH EVERY DAY  -     OxyCONTIN 10 MG 12 hr tablet; TAKE ONE TABLET BY MOUTH EVERY 12 HOURS FOR PAIN  DNF UNTIL 9/10/21  -     lidocaine (XYLOCAINE) 1 %; 0 5 mL  -     triamcinolone acetonide (KENALOG-40) 40 mg/mL; 1 mL  -     losartan (COZAAR) 50 mg tablet; TAKE 1 TABLET BY MOUTH EVERY DAY  -     metFORMIN (GLUCOPHAGE) 500 mg tablet    Discussed with pt, return for follow up in six months      History of Present Illness HPI:  Angelkia Cedeño is a 80 y o  female who Presents for follow-up of severe COPD and chronic hypoxic respiratory failure  She was last seen by me in late December  Since then, she is doing okay  States her breathing is unchanged  She can walk about 1/4 block and then has to stop to catch her breath  She has a chair lift at home for past few months  Very helpful  Wears oxygen 3L nightly and 2L during daytime  Uses anoro daily  Unsure if it's helpful  Uses Albuterol as needed, which is about 5-6 times per week  No ER visits or UC visits since she was last here  Has chronic back pain      Review of Systems   positive as mentioned above and negative otherwise    Historical Information   Past Medical History:   Diagnosis Date    Cardiac disease     [facility]     Diabetes mellitus ([facility] )     GERD (gastroesophageal reflux disease)     Hiatal hernia     Hypertension     PUD (peptic ulcer disease)     Residual ASD (atrial septal defect) following repair      Past Surgical History:   Procedure Laterality Date    ASD REPAIR      BACK SURGERY      x3    JOINT REPLACEMENT      bilateral knee surgery    KNEE SURGERY      SHOULDER SURGERY       Family History   Problem Relation Age of Onset    Cancer Mother     Asthma Father          Meds/Allergies     Current Outpatient Medications:     albuterol (PROVENTIL HFA,VENTOLIN HFA) 90 mcg/act inhaler, Inhale 2 puffs every 6 (six) hours as needed for wheezing, Disp: 1 Inhaler, Rfl: 3    Anoro Ellipta 62 5-25 MCG/INH inhaler, TAKE 1 PUFF BY MOUTH EVERY DAY, Disp: 60 blister, Rfl: 3    ascorbic acid (VITAMIN C) 1000 MG tablet, Take 1,000 mg by mouth 2 (two) times a day, Disp: , Rfl:     diltiazem (CARDIZEM CD) 180 mg 24 hr capsule, Take 180 mg by mouth daily, Disp: , Rfl:     gabapentin (NEURONTIN) 300 mg capsule, Take 300 mg by mouth 4 (four) times a day, Disp: , Rfl:     lidocaine (XYLOCAINE) 1 %, 0 5 mL, Disp: , Rfl:     losartan (COZAAR) 50 mg tablet, TAKE 1 TABLET BY MOUTH EVERY DAY, Disp: , Rfl:     metFORMIN (GLUCOPHAGE) 1000 MG tablet, 1000 mg QAM and 500 mg QPM, Disp: 1 tablet, Rfl: 0    metFORMIN (GLUCOPHAGE) 500 mg tablet, , Disp: , Rfl:     oxyCODONE-acetaminophen (PERCOCET) 5-325 mg per tablet, Take 1 tablet by mouth 2 (two) times a day as needed for moderate pain, Disp: , Rfl:     OxyCONTIN 10 MG 12 hr tablet, TAKE ONE TABLET BY MOUTH EVERY 12 HOURS FOR PAIN  DNF UNTIL 9/10/21, Disp: , Rfl:     pantoprazole (PROTONIX) 40 mg tablet, Take 40 mg by mouth every 12 (twelve) hours, Disp: , Rfl:     rOPINIRole (REQUIP) 0 5 mg tablet, 2-3 tablets PO at HS, Disp: 30 tablet, Rfl: 0    sitaGLIPtin (JANUVIA) 100 mg tablet, Take 1 tablet (100 mg total) by mouth daily, Disp: 30 tablet, Rfl: 0    triamcinolone acetonide (KENALOG-40) 40 mg/mL, 1 mL, Disp: , Rfl:   Allergies   Allergen Reactions    Prednisone      The patient reports "they make me goofy "  No true allergic reaction    Psyllium     Tetanus Toxoids        Vitals: Blood pressure 154/80, pulse 62, temperature 99 1 °F (37 3 °C), height 5' 1" (1 549 m), weight 81 6 kg (180 lb), SpO2 97 %  Body mass index is 34 01 kg/m²  Oxygen Therapy  SpO2: 97 %  Oxygen Therapy: Supplemental oxygen  O2 Delivery Method: Nasal cannula  O2 Flow Rate (L/min): 2 L/min      Physical Exam   general: WDWN, nad, comfortable  HEENT: NCAT; EOMI; anicteric sclera  CVS: Regular  Lungs: CTA b/l  Abdomen:soft, nd  Extremities: no c/c/e  Neuro:  Nonfocal   Psych:  Cooperative   Skin:  Dry, warm    Labs: I have personally reviewed pertinent lab results    Lab Results   Component Value Date    WBC 8 84 05/20/2017    HGB 12 8 05/20/2017    HCT 40 0 05/20/2017    MCV 72 (L) 05/20/2017     05/20/2017     Lab Results   Component Value Date    GLUCOSE 170 (H) 05/19/2017    CALCIUM 9 0 05/20/2017     07/14/2015    K 4 3 05/20/2017    CO2 28 05/20/2017    CL 97 (L) 05/20/2017 BUN 20 05/20/2017    CREATININE 0 79 05/20/2017     No results found for: IGE  Lab Results   Component Value Date    ALT 23 05/19/2017    AST 10 05/19/2017    ALKPHOS 86 05/19/2017    BILITOT 0 3 07/13/2015       Labs per my interpretation show from 2020 show normal renal function, normal CBC    Pulmonary function testing:  Spirometry December 2020: Severe obstruction with an FEV1 0 69 L, 42% predicted    EKG, Pathology, and Other Studies: I have personally reviewed pertinent reports      echo December 2020: EF 78%, grade 1 diastolic dysfunction, atrial septal patch in place    Arlen L  Dostal, D O    Shraddha  [facility]

## 2021-09-20 NOTE — ASSESSMENT & PLAN NOTE
No recent exacerbations  - D/C Anoro  Start Pet Ready  Instructed to rinse after use  Samples given  - Cont  Albuterol as needed    - Declines pulmonary rehab  - flu shot today  - Received COVID vaccine

## 2022-02-22 ENCOUNTER — TELEPHONE (OUTPATIENT)
Dept: GASTROENTEROLOGY | Facility: CLINIC | Age: 82
End: 2022-02-22

## 2022-02-22 NOTE — TELEPHONE ENCOUNTER
Recall call went out via [facility] in 4/2021 with no return calls from pt to schedule  Pt is due for a colon with Dr Valdemar Aponte for hx of multiple colon polyps (13)  Due to pt's age, pt should be scheduled for an office visit to discuss recall colon  I called and spoke to pt whom informed she just got out of nursing home and she asked me to call her back in 1 mo to see if she is ready to schedule an appt    Will do so per her request

## 2022-03-22 NOTE — TELEPHONE ENCOUNTER
Called and spoke to pt whom informed that she has a lot on her plate right now and will call when ready to schedule

## 2022-04-20 ENCOUNTER — HOSPITAL ENCOUNTER (INPATIENT)
Facility: HOSPITAL | Age: 82
LOS: 1 days | Discharge: HOME HEALTH | DRG: 123 | End: 2022-04-22
Attending: EMERGENCY MEDICINE | Admitting: INTERNAL MEDICINE
Payer: COMMERCIAL

## 2022-04-20 ENCOUNTER — APPOINTMENT (EMERGENCY)
Dept: CT IMAGING | Facility: HOSPITAL | Age: 82
DRG: 123 | End: 2022-04-20
Payer: COMMERCIAL

## 2022-04-20 DIAGNOSIS — H34.232 BRANCH RETINAL ARTERY OCCLUSION OF LEFT EYE: Primary | ICD-10-CM

## 2022-04-20 PROBLEM — H54.62 VISION LOSS OF LEFT EYE: Status: ACTIVE | Noted: 2022-04-20

## 2022-04-20 PROBLEM — G89.29 CHRONIC PAIN: Status: ACTIVE | Noted: 2022-04-20

## 2022-04-20 LAB
ANION GAP SERPL CALCULATED.3IONS-SCNC: 10 MMOL/L (ref 4–13)
ANION GAP SERPL CALCULATED.3IONS-SCNC: 7 MMOL/L (ref 4–13)
BASOPHILS # BLD AUTO: 0.08 THOUSANDS/ΜL (ref 0–0.1)
BASOPHILS NFR BLD AUTO: 1 % (ref 0–1)
BUN SERPL-MCNC: 15 MG/DL (ref 5–25)
BUN SERPL-MCNC: 15 MG/DL (ref 5–25)
CALCIUM SERPL-MCNC: 9 MG/DL (ref 8.3–10.1)
CALCIUM SERPL-MCNC: 9.6 MG/DL (ref 8.3–10.1)
CHLORIDE SERPL-SCNC: 100 MMOL/L (ref 100–108)
CHLORIDE SERPL-SCNC: 102 MMOL/L (ref 100–108)
CO2 SERPL-SCNC: 26 MMOL/L (ref 21–32)
CO2 SERPL-SCNC: 31 MMOL/L (ref 21–32)
CREAT SERPL-MCNC: 0.79 MG/DL (ref 0.6–1.3)
CREAT SERPL-MCNC: 0.92 MG/DL (ref 0.6–1.3)
CRP SERPL QL: 19.3 MG/L
EOSINOPHIL # BLD AUTO: 0.17 THOUSAND/ΜL (ref 0–0.61)
EOSINOPHIL NFR BLD AUTO: 2 % (ref 0–6)
ERYTHROCYTE [DISTWIDTH] IN BLOOD BY AUTOMATED COUNT: 15 % (ref 11.6–15.1)
ERYTHROCYTE [SEDIMENTATION RATE] IN BLOOD: 45 MM/HOUR (ref 0–29)
GFR SERPL CREATININE-BSD FRML MDRD: 58 ML/MIN/1.73SQ M
GFR SERPL CREATININE-BSD FRML MDRD: 70 ML/MIN/1.73SQ M
GLUCOSE SERPL-MCNC: 136 MG/DL (ref 65–140)
GLUCOSE SERPL-MCNC: 82 MG/DL (ref 65–140)
HCT VFR BLD AUTO: 41.3 % (ref 34.8–46.1)
HGB BLD-MCNC: 13 G/DL (ref 11.5–15.4)
IMM GRANULOCYTES # BLD AUTO: 0.02 THOUSAND/UL (ref 0–0.2)
IMM GRANULOCYTES NFR BLD AUTO: 0 % (ref 0–2)
LYMPHOCYTES # BLD AUTO: 1.31 THOUSANDS/ΜL (ref 0.6–4.47)
LYMPHOCYTES NFR BLD AUTO: 18 % (ref 14–44)
MCH RBC QN AUTO: 28.4 PG (ref 26.8–34.3)
MCHC RBC AUTO-ENTMCNC: 31.5 G/DL (ref 31.4–37.4)
MCV RBC AUTO: 90 FL (ref 82–98)
MONOCYTES # BLD AUTO: 1.02 THOUSAND/ΜL (ref 0.17–1.22)
MONOCYTES NFR BLD AUTO: 14 % (ref 4–12)
NEUTROPHILS # BLD AUTO: 4.85 THOUSANDS/ΜL (ref 1.85–7.62)
NEUTS SEG NFR BLD AUTO: 65 % (ref 43–75)
NRBC BLD AUTO-RTO: 0 /100 WBCS
PLATELET # BLD AUTO: 250 THOUSANDS/UL (ref 149–390)
PMV BLD AUTO: 9.5 FL (ref 8.9–12.7)
POTASSIUM SERPL-SCNC: 5.9 MMOL/L (ref 3.5–5.3)
POTASSIUM SERPL-SCNC: 6.7 MMOL/L (ref 3.5–5.3)
RBC # BLD AUTO: 4.57 MILLION/UL (ref 3.81–5.12)
SODIUM SERPL-SCNC: 136 MMOL/L (ref 136–145)
SODIUM SERPL-SCNC: 140 MMOL/L (ref 136–145)
WBC # BLD AUTO: 7.45 THOUSAND/UL (ref 4.31–10.16)

## 2022-04-20 PROCEDURE — 85025 COMPLETE CBC W/AUTO DIFF WBC: CPT | Performed by: EMERGENCY MEDICINE

## 2022-04-20 PROCEDURE — 99285 EMERGENCY DEPT VISIT HI MDM: CPT

## 2022-04-20 PROCEDURE — 70496 CT ANGIOGRAPHY HEAD: CPT

## 2022-04-20 PROCEDURE — 36415 COLL VENOUS BLD VENIPUNCTURE: CPT | Performed by: EMERGENCY MEDICINE

## 2022-04-20 PROCEDURE — 93005 ELECTROCARDIOGRAM TRACING: CPT

## 2022-04-20 PROCEDURE — 96366 THER/PROPH/DIAG IV INF ADDON: CPT

## 2022-04-20 PROCEDURE — 85652 RBC SED RATE AUTOMATED: CPT | Performed by: EMERGENCY MEDICINE

## 2022-04-20 PROCEDURE — 99285 EMERGENCY DEPT VISIT HI MDM: CPT | Performed by: EMERGENCY MEDICINE

## 2022-04-20 PROCEDURE — 96365 THER/PROPH/DIAG IV INF INIT: CPT

## 2022-04-20 PROCEDURE — 70498 CT ANGIOGRAPHY NECK: CPT

## 2022-04-20 PROCEDURE — 80048 BASIC METABOLIC PNL TOTAL CA: CPT | Performed by: EMERGENCY MEDICINE

## 2022-04-20 PROCEDURE — 86140 C-REACTIVE PROTEIN: CPT | Performed by: EMERGENCY MEDICINE

## 2022-04-20 RX ORDER — LOSARTAN POTASSIUM 50 MG/1
50 TABLET ORAL DAILY
Status: DISCONTINUED | OUTPATIENT
Start: 2022-04-21 | End: 2022-04-22 | Stop reason: HOSPADM

## 2022-04-20 RX ORDER — BUDESONIDE AND FORMOTEROL FUMARATE DIHYDRATE 160; 4.5 UG/1; UG/1
2 AEROSOL RESPIRATORY (INHALATION) 2 TIMES DAILY
Status: DISCONTINUED | OUTPATIENT
Start: 2022-04-21 | End: 2022-04-22 | Stop reason: HOSPADM

## 2022-04-20 RX ORDER — ATORVASTATIN CALCIUM 40 MG/1
40 TABLET, FILM COATED ORAL EVERY EVENING
Status: DISCONTINUED | OUTPATIENT
Start: 2022-04-21 | End: 2022-04-22 | Stop reason: HOSPADM

## 2022-04-20 RX ORDER — ALBUTEROL SULFATE 90 UG/1
2 AEROSOL, METERED RESPIRATORY (INHALATION) EVERY 6 HOURS PRN
Status: DISCONTINUED | OUTPATIENT
Start: 2022-04-20 | End: 2022-04-22 | Stop reason: HOSPADM

## 2022-04-20 RX ORDER — BRIMONIDINE TARTRATE 2 MG/ML
1 SOLUTION/ DROPS OPHTHALMIC 2 TIMES DAILY
Status: DISCONTINUED | OUTPATIENT
Start: 2022-04-20 | End: 2022-04-22 | Stop reason: HOSPADM

## 2022-04-20 RX ORDER — DILTIAZEM HYDROCHLORIDE 180 MG/1
180 CAPSULE, COATED, EXTENDED RELEASE ORAL ONCE
Status: COMPLETED | OUTPATIENT
Start: 2022-04-20 | End: 2022-04-20

## 2022-04-20 RX ORDER — OXYCODONE HCL 10 MG/1
10 TABLET, FILM COATED, EXTENDED RELEASE ORAL EVERY 12 HOURS SCHEDULED
Status: DISCONTINUED | OUTPATIENT
Start: 2022-04-21 | End: 2022-04-22 | Stop reason: HOSPADM

## 2022-04-20 RX ORDER — ASPIRIN 325 MG
325 TABLET ORAL ONCE
Status: COMPLETED | OUTPATIENT
Start: 2022-04-21 | End: 2022-04-21

## 2022-04-20 RX ORDER — ROPINIROLE 1 MG/1
1 TABLET, FILM COATED ORAL
Status: DISCONTINUED | OUTPATIENT
Start: 2022-04-21 | End: 2022-04-22 | Stop reason: HOSPADM

## 2022-04-20 RX ORDER — DILTIAZEM HYDROCHLORIDE 180 MG/1
180 CAPSULE, COATED, EXTENDED RELEASE ORAL DAILY
Status: DISCONTINUED | OUTPATIENT
Start: 2022-04-21 | End: 2022-04-22 | Stop reason: HOSPADM

## 2022-04-20 RX ORDER — LOSARTAN POTASSIUM 50 MG/1
50 TABLET ORAL ONCE
Status: COMPLETED | OUTPATIENT
Start: 2022-04-20 | End: 2022-04-20

## 2022-04-20 RX ORDER — ASCORBIC ACID 500 MG
1000 TABLET ORAL 2 TIMES DAILY
Status: DISCONTINUED | OUTPATIENT
Start: 2022-04-21 | End: 2022-04-22 | Stop reason: HOSPADM

## 2022-04-20 RX ORDER — PANTOPRAZOLE SODIUM 40 MG/1
40 TABLET, DELAYED RELEASE ORAL
Status: DISCONTINUED | OUTPATIENT
Start: 2022-04-21 | End: 2022-04-22 | Stop reason: HOSPADM

## 2022-04-20 RX ORDER — OXYCODONE HYDROCHLORIDE 10 MG/1
10 TABLET ORAL EVERY 6 HOURS PRN
Status: DISCONTINUED | OUTPATIENT
Start: 2022-04-20 | End: 2022-04-22 | Stop reason: HOSPADM

## 2022-04-20 RX ORDER — GABAPENTIN 300 MG/1
300 CAPSULE ORAL 4 TIMES DAILY
Status: DISCONTINUED | OUTPATIENT
Start: 2022-04-21 | End: 2022-04-22 | Stop reason: HOSPADM

## 2022-04-20 RX ADMIN — LOSARTAN POTASSIUM 50 MG: 50 TABLET, FILM COATED ORAL at 20:22

## 2022-04-20 RX ADMIN — IOHEXOL 85 ML: 350 INJECTION, SOLUTION INTRAVENOUS at 20:16

## 2022-04-20 RX ADMIN — DILTIAZEM HYDROCHLORIDE 180 MG: 180 CAPSULE, COATED, EXTENDED RELEASE ORAL at 20:22

## 2022-04-20 RX ADMIN — SODIUM CHLORIDE, SODIUM LACTATE, POTASSIUM CHLORIDE, AND CALCIUM CHLORIDE 500 ML: .6; .31; .03; .02 INJECTION, SOLUTION INTRAVENOUS at 17:51

## 2022-04-20 NOTE — ED NOTES
Patient transported to CT via stretcher by CT Tech   Primary RN updated     Cassie Hassan RN  04/20/22 2391

## 2022-04-20 NOTE — ED PROCEDURE NOTE
PROCEDURE  ECG 12 Lead Documentation Only    Date/Time: 4/20/2022 6:14 PM  Performed by: Ann Espitia MD  Authorized by: Ann Espitia MD     Indications / Diagnosis:  Left brao / c va  ECG reviewed by me, the ED Provider: yes    Patient location:  ED and bedside  Previous ECG:     Previous ECG:  Compared to current    Comparison ECG info:  5/19/17- decreased rate/ rad- - no other sign changes    Similarity:  Changes noted    Comparison to cardiac monitor: Yes    Interpretation:     Interpretation: non-specific    Rate:     ECG rate:  75    ECG rate assessment: normal    Rhythm:     Rhythm: sinus rhythm    Ectopy:     Ectopy: none    QRS:     QRS axis:  Right    QRS intervals:  Normal  Conduction:     Conduction: normal    ST segments:     ST segments:  Normal  T waves:     T waves: flattening      Flattening:  V1  Q waves:     Q waves:  V1  Other findings:     Other findings: U wave    Comments:      No ecg signs of ischemia/ injury          Ann Espitia MD  04/20/22 [id number]

## 2022-04-20 NOTE — LETTER
Thank you for allowing us to participate in the care of your patient, Deepak Morley, who was hospitalized from [unfilled] through 4/22/2022 with the admitting diagnosis of vision change  We are starting lipitor, eye drops and aspirin  If you have any additional questions or would like to discuss further, please feel free to contact me      Zeferino Francis [facility] Internal Medicine, Hospitalist  [phone]

## 2022-04-21 ENCOUNTER — PREP FOR PROCEDURE (OUTPATIENT)
Dept: OTHER | Facility: HOSPITAL | Age: 82
End: 2022-04-21

## 2022-04-21 ENCOUNTER — APPOINTMENT (OUTPATIENT)
Dept: MRI IMAGING | Facility: HOSPITAL | Age: 82
DRG: 123 | End: 2022-04-21
Payer: COMMERCIAL

## 2022-04-21 ENCOUNTER — APPOINTMENT (OUTPATIENT)
Dept: NON INVASIVE DIAGNOSTICS | Facility: HOSPITAL | Age: 82
DRG: 123 | End: 2022-04-21
Payer: COMMERCIAL

## 2022-04-21 DIAGNOSIS — H34.9 RETINAL ARTERY OCCLUSION: Primary | ICD-10-CM

## 2022-04-21 LAB
ALBUMIN SERPL BCP-MCNC: 2.9 G/DL (ref 3.5–5)
ALP SERPL-CCNC: 87 U/L (ref 46–116)
ALT SERPL W P-5'-P-CCNC: 12 U/L (ref 12–78)
ANION GAP SERPL CALCULATED.3IONS-SCNC: 7 MMOL/L (ref 4–13)
AORTIC VALVE MEAN VELOCITY: 15.2 M/S
AST SERPL W P-5'-P-CCNC: 15 U/L (ref 5–45)
ATRIAL RATE: 75 BPM
AV AREA BY CONTINUOUS VTI: 1.4 CM2
AV AREA PEAK VELOCITY: 1.3 CM2
AV LVOT MEAN GRADIENT: 3 MMHG
AV LVOT PEAK GRADIENT: 5 MMHG
AV MEAN GRADIENT: 10 MMHG
AV PEAK GRADIENT: 19 MMHG
AV VALVE AREA: 1.36 CM2
AV VELOCITY RATIO: 0.5
AVA (PLAN): 1.6 CM2
BASOPHILS # BLD AUTO: 0.07 THOUSANDS/ΜL (ref 0–0.1)
BASOPHILS NFR BLD AUTO: 1 % (ref 0–1)
BILIRUB SERPL-MCNC: 0.2 MG/DL (ref 0.2–1)
BUN SERPL-MCNC: 15 MG/DL (ref 5–25)
CALCIUM ALBUM COR SERPL-MCNC: 9.9 MG/DL (ref 8.3–10.1)
CALCIUM SERPL-MCNC: 9 MG/DL (ref 8.3–10.1)
CHLORIDE SERPL-SCNC: 105 MMOL/L (ref 100–108)
CO2 SERPL-SCNC: 30 MMOL/L (ref 21–32)
CREAT SERPL-MCNC: 0.82 MG/DL (ref 0.6–1.3)
DOP CALC AO PEAK VEL: 2.16 M/S
DOP CALC AO VTI: 47.71 CM
DOP CALC LVOT AREA: 2.54 CM2
DOP CALC LVOT DIAMETER: 1.8 CM
DOP CALC LVOT PEAK VEL VTI: 25.55 CM
DOP CALC LVOT PEAK VEL: 1.07 M/S
DOP CALC LVOT STROKE INDEX: 38.5 ML/M2
DOP CALC LVOT STROKE VOLUME: 64.98 CM3
E WAVE DECELERATION TIME: 327 MS
EOSINOPHIL # BLD AUTO: 0.23 THOUSAND/ΜL (ref 0–0.61)
EOSINOPHIL NFR BLD AUTO: 4 % (ref 0–6)
ERYTHROCYTE [DISTWIDTH] IN BLOOD BY AUTOMATED COUNT: 15.1 % (ref 11.6–15.1)
GFR SERPL CREATININE-BSD FRML MDRD: 67 ML/MIN/1.73SQ M
GLUCOSE P FAST SERPL-MCNC: 185 MG/DL (ref 65–99)
GLUCOSE SERPL-MCNC: 147 MG/DL (ref 65–140)
GLUCOSE SERPL-MCNC: 150 MG/DL (ref 65–140)
GLUCOSE SERPL-MCNC: 165 MG/DL (ref 65–140)
GLUCOSE SERPL-MCNC: 170 MG/DL (ref 65–140)
GLUCOSE SERPL-MCNC: 185 MG/DL (ref 65–140)
GLUCOSE SERPL-MCNC: 299 MG/DL (ref 65–140)
HCT VFR BLD AUTO: 40.4 % (ref 34.8–46.1)
HGB BLD-MCNC: 12.5 G/DL (ref 11.5–15.4)
IMM GRANULOCYTES # BLD AUTO: 0.03 THOUSAND/UL (ref 0–0.2)
IMM GRANULOCYTES NFR BLD AUTO: 1 % (ref 0–2)
LYMPHOCYTES # BLD AUTO: 1.08 THOUSANDS/ΜL (ref 0.6–4.47)
LYMPHOCYTES NFR BLD AUTO: 17 % (ref 14–44)
MCH RBC QN AUTO: 28.6 PG (ref 26.8–34.3)
MCHC RBC AUTO-ENTMCNC: 30.9 G/DL (ref 31.4–37.4)
MCV RBC AUTO: 92 FL (ref 82–98)
MONOCYTES # BLD AUTO: 0.94 THOUSAND/ΜL (ref 0.17–1.22)
MONOCYTES NFR BLD AUTO: 15 % (ref 4–12)
MV PEAK A VEL: 1.1 M/S
MV PEAK E VEL: 94 CM/S
MV STENOSIS PRESSURE HALF TIME: 95 MS
MV VALVE AREA P 1/2 METHOD: 2.32 CM2
NEUTROPHILS # BLD AUTO: 4.15 THOUSANDS/ΜL (ref 1.85–7.62)
NEUTS SEG NFR BLD AUTO: 62 % (ref 43–75)
NRBC BLD AUTO-RTO: 0 /100 WBCS
P AXIS: 60 DEGREES
PA SYSTOLIC PRESSURE: 45 MMHG
PLATELET # BLD AUTO: 201 THOUSANDS/UL (ref 149–390)
PMV BLD AUTO: 10.1 FL (ref 8.9–12.7)
POTASSIUM SERPL-SCNC: 3.9 MMOL/L (ref 3.5–5.3)
PR INTERVAL: 138 MS
PROT SERPL-MCNC: 6.2 G/DL (ref 6.4–8.2)
PV PEAK GRADIENT: 3 MMHG
QRS AXIS: 145 DEGREES
QRSD INTERVAL: 90 MS
QT INTERVAL: 390 MS
QTC INTERVAL: 435 MS
RBC # BLD AUTO: 4.37 MILLION/UL (ref 3.81–5.12)
SL CV LV EF: 60
SODIUM SERPL-SCNC: 142 MMOL/L (ref 136–145)
T WAVE AXIS: 46 DEGREES
TR MAX PG: 41 MMHG
TR PEAK VELOCITY: 3.2 M/S
TRICUSPID VALVE PEAK REGURGITATION VELOCITY: 3.22 M/S
VENTRICULAR RATE: 75 BPM
WBC # BLD AUTO: 6.5 THOUSAND/UL (ref 4.31–10.16)

## 2022-04-21 PROCEDURE — 80053 COMPREHEN METABOLIC PANEL: CPT | Performed by: INTERNAL MEDICINE

## 2022-04-21 PROCEDURE — 99232 SBSQ HOSP IP/OBS MODERATE 35: CPT | Performed by: INTERNAL MEDICINE

## 2022-04-21 PROCEDURE — 93321 DOPPLER ECHO F-UP/LMTD STD: CPT | Performed by: INTERNAL MEDICINE

## 2022-04-21 PROCEDURE — 93308 TTE F-UP OR LMTD: CPT

## 2022-04-21 PROCEDURE — 93308 TTE F-UP OR LMTD: CPT | Performed by: INTERNAL MEDICINE

## 2022-04-21 PROCEDURE — 85025 COMPLETE CBC W/AUTO DIFF WBC: CPT | Performed by: INTERNAL MEDICINE

## 2022-04-21 PROCEDURE — 99223 1ST HOSP IP/OBS HIGH 75: CPT | Performed by: PSYCHIATRY & NEUROLOGY

## 2022-04-21 PROCEDURE — 93325 DOPPLER ECHO COLOR FLOW MAPG: CPT | Performed by: INTERNAL MEDICINE

## 2022-04-21 PROCEDURE — G1004 CDSM NDSC: HCPCS

## 2022-04-21 PROCEDURE — 93010 ELECTROCARDIOGRAM REPORT: CPT | Performed by: INTERNAL MEDICINE

## 2022-04-21 PROCEDURE — 82948 REAGENT STRIP/BLOOD GLUCOSE: CPT

## 2022-04-21 PROCEDURE — 93321 DOPPLER ECHO F-UP/LMTD STD: CPT

## 2022-04-21 PROCEDURE — 70551 MRI BRAIN STEM W/O DYE: CPT

## 2022-04-21 PROCEDURE — 93325 DOPPLER ECHO COLOR FLOW MAPG: CPT

## 2022-04-21 RX ORDER — ASPIRIN 81 MG/1
81 TABLET, CHEWABLE ORAL DAILY
Status: DISCONTINUED | OUTPATIENT
Start: 2022-04-22 | End: 2022-04-22 | Stop reason: HOSPADM

## 2022-04-21 RX ADMIN — INSULIN LISPRO 1 UNITS: 100 INJECTION, SOLUTION INTRAVENOUS; SUBCUTANEOUS at 21:10

## 2022-04-21 RX ADMIN — ASPIRIN 325 MG ORAL TABLET 325 MG: 325 PILL ORAL at 01:10

## 2022-04-21 RX ADMIN — BRIMONIDINE TARTRATE 1 DROP: 2 SOLUTION/ DROPS OPHTHALMIC at 01:46

## 2022-04-21 RX ADMIN — ENOXAPARIN SODIUM 40 MG: 40 INJECTION SUBCUTANEOUS at 09:30

## 2022-04-21 RX ADMIN — BUDESONIDE AND FORMOTEROL FUMARATE DIHYDRATE 2 PUFF: 160; 4.5 AEROSOL RESPIRATORY (INHALATION) at 17:31

## 2022-04-21 RX ADMIN — PANTOPRAZOLE SODIUM 40 MG: 40 TABLET, DELAYED RELEASE ORAL at 05:17

## 2022-04-21 RX ADMIN — OXYCODONE HYDROCHLORIDE 10 MG: 10 TABLET, FILM COATED, EXTENDED RELEASE ORAL at 01:10

## 2022-04-21 RX ADMIN — OXYCODONE HYDROCHLORIDE AND ACETAMINOPHEN 1000 MG: 500 TABLET ORAL at 09:30

## 2022-04-21 RX ADMIN — INSULIN LISPRO 1 UNITS: 100 INJECTION, SOLUTION INTRAVENOUS; SUBCUTANEOUS at 07:49

## 2022-04-21 RX ADMIN — OXYCODONE HYDROCHLORIDE AND ACETAMINOPHEN 1000 MG: 500 TABLET ORAL at 17:31

## 2022-04-21 RX ADMIN — GABAPENTIN 300 MG: 300 CAPSULE ORAL at 09:30

## 2022-04-21 RX ADMIN — GABAPENTIN 300 MG: 300 CAPSULE ORAL at 11:56

## 2022-04-21 RX ADMIN — DILTIAZEM HYDROCHLORIDE 180 MG: 180 CAPSULE, COATED, EXTENDED RELEASE ORAL at 09:31

## 2022-04-21 RX ADMIN — INSULIN LISPRO 2 UNITS: 100 INJECTION, SOLUTION INTRAVENOUS; SUBCUTANEOUS at 01:56

## 2022-04-21 RX ADMIN — GABAPENTIN 300 MG: 300 CAPSULE ORAL at 01:09

## 2022-04-21 RX ADMIN — ATORVASTATIN CALCIUM 40 MG: 40 TABLET, FILM COATED ORAL at 01:09

## 2022-04-21 RX ADMIN — BUDESONIDE AND FORMOTEROL FUMARATE DIHYDRATE 2 PUFF: 160; 4.5 AEROSOL RESPIRATORY (INHALATION) at 01:35

## 2022-04-21 RX ADMIN — OXYCODONE HYDROCHLORIDE 10 MG: 10 TABLET, FILM COATED, EXTENDED RELEASE ORAL at 09:31

## 2022-04-21 RX ADMIN — OXYCODONE HYDROCHLORIDE 10 MG: 10 TABLET ORAL at 15:17

## 2022-04-21 RX ADMIN — ATORVASTATIN CALCIUM 40 MG: 40 TABLET, FILM COATED ORAL at 17:31

## 2022-04-21 RX ADMIN — OXYCODONE HYDROCHLORIDE 10 MG: 10 TABLET, FILM COATED, EXTENDED RELEASE ORAL at 21:08

## 2022-04-21 RX ADMIN — BUDESONIDE AND FORMOTEROL FUMARATE DIHYDRATE 2 PUFF: 160; 4.5 AEROSOL RESPIRATORY (INHALATION) at 09:32

## 2022-04-21 RX ADMIN — ROPINIROLE HYDROCHLORIDE 1 MG: 1 TABLET, FILM COATED ORAL at 21:08

## 2022-04-21 RX ADMIN — ROPINIROLE HYDROCHLORIDE 1 MG: 1 TABLET, FILM COATED ORAL at 01:10

## 2022-04-21 RX ADMIN — LOSARTAN POTASSIUM 50 MG: 50 TABLET, FILM COATED ORAL at 09:31

## 2022-04-21 RX ADMIN — GABAPENTIN 300 MG: 300 CAPSULE ORAL at 21:08

## 2022-04-21 RX ADMIN — BRIMONIDINE TARTRATE 1 DROP: 2 SOLUTION/ DROPS OPHTHALMIC at 21:09

## 2022-04-21 RX ADMIN — INSULIN LISPRO 1 UNITS: 100 INJECTION, SOLUTION INTRAVENOUS; SUBCUTANEOUS at 11:55

## 2022-04-21 RX ADMIN — BRIMONIDINE TARTRATE 1 DROP: 2 SOLUTION/ DROPS OPHTHALMIC at 09:32

## 2022-04-21 RX ADMIN — GABAPENTIN 300 MG: 300 CAPSULE ORAL at 17:31

## 2022-04-21 NOTE — ASSESSMENT & PLAN NOTE
Lab Results   Component Value Date    HGBA1C 6 3 (H) 04/06/2022       Recent Labs     04/21/22  0150 04/21/22  0659 04/21/22  1152   POCGLU 299* 150* 165*       Blood Sugar Average: Last 72 hrs:  · (P) 107 [id number] home medication metformin 1 g a m , 500 mg p m ; Januvia 100 mg daily  · Hold home medication  · SSI  · Accu-Chek  · Diabetic diet

## 2022-04-21 NOTE — ASSESSMENT & PLAN NOTE
Lab Results   Component Value Date    HGBA1C 6 3 (H) 04/06/2022       No results for input(s): POCGLU in the last 72 hours      Blood Sugar Average: Last 72 hrs:  ·  home medication metformin 1 g a m , 500 mg p m ; Januvia 100 mg daily  · Hold home medication  · SSI  · Accu-Chek  · Diabetic diet

## 2022-04-21 NOTE — CASE MANAGEMENT
Case Management Progress Note    Patient name Gerri Morrow  Location S /S [address] [id number] MRN [id number]  : 1940 Date 2022       LOS (days): 0  Geometric Mean LOS (GMLOS) (days): 2 00  Days to GMLOS:1 9        OBJECTIVE:    Current admission status: Inpatient  Preferred Pharmacy:   [place] [address]  Phone: [phone] Fax: [phone]    Primary Care Provider: Martha Ponce DO    Primary Insurance: Jamin MCNEIL [address] REP  Secondary Insurance:     PROGRESS NOTE:    JENNY able to resume services upon d/c  F/U providers updated

## 2022-04-21 NOTE — ASSESSMENT & PLAN NOTE
· Secondary to severe COPD, on 2-3 L oxygen nasal cannula at baseline  Oxygen saturation 97%  · On albuterol p r n , breztri bid   · Continue albuterol  Breztri not on the hospital formulary   Symbicort instead   · Continue nasal canula oxygen

## 2022-04-21 NOTE — UTILIZATION REVIEW
Initial Clinical Review  OBSERVATION  4/20/22 @ 2205 CONVERTED TO INPATIENT ADMISSION 4/21/22 @1405 DUE TO CONTINUED STAY REQUIRED TO EVALUATE AND TREAT PATIENT WITH VISION LOSS L EYE  WITH CONTINUED WORKUP  NEUROLOGY CONSULT  Admission: Date/Time/Statement:   Admission Orders (From admission, onward)     Ordered        04/21/22 1405  Inpatient Admission  Once            04/20/22 2205  Place in Observation  Once                      Orders Placed This Encounter   Procedures    Inpatient Admission     Standing Status:   Standing     Number of Occurrences:   1     Order Specific Question:   Level of Care     Answer:   Med Surg [16]     Order Specific Question:   Estimated length of stay     Answer:   More than 2 Midnights     Order Specific Question:   Certification     Answer:   I certify that inpatient services are medically necessary for this patient for a duration of greater than two midnights  See H&P and MD Progress Notes for additional information about the patient's course of treatment  ED Arrival Information     Expected Arrival Acuity    - 4/20/2022 16:42 Urgent         Means of arrival Escorted by Service Admission type    Wheelchair Family Member Hospitalist Elective         Arrival complaint    Eye Problem        Chief Complaint   Patient presents with    Loss of Vision     pt was at eye doctor today for partial loss of vision in left eye since last night  and told she had a choesterol chip in it and to come to er for eval       Initial Presentation: 80 y o  female PMH of hypertension, type 2 diabetes , severe COPD and chronic respiratory failure on 2- 3 L oxygen ,ASD, A fib s/p ablation, chronic back pain,  who presents to Ed from ophthalmology visit with sudden vision loss L lower field yesterday    Pt noted by Ophthalmology to have a 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  On exam, continued L lower field vision loss  Pt tachycardic, wheezing present   NIHSS=1    Labs -K 5 9 , elevated ESR, CRP   ECG_ST    CTA head/ neck shows nothing acute  Pt given IVF in Ed  Pt admitted as OBS to telemetry with vision loss l eye  PLan -Stroke pathway-  x1 then ASA 81 mg daily,statin, telemetry, MRI brain, neuro checks, neurology consult, Brimonidine 0 2% bid OS- per ophthalmology recommendations   Monitor BP    Date: 4/21 Converted to IP  ST- pt passed nsg dysphagia assessment   Tolerating regular diet w/o s/s dysphagia or aspiration  No speech/language deficits reported, no need for formal ST/swallow eval art this time   PT-Pt mobilizing with rollator and O2 at baseline, denies need skilled PT  Per nursing pt alert, oriented x4 , continues with diminished L lower visual field vision, blurred vision  Strength 4/5 BLE, 5 /5 BUE  Full sensation  Telemetry - sinus rhythm  Medicine-awaiting MRI brain, neuro consult  states her vision is improving from yesterday    Neurology consult-Acute visual loss of left eye  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, 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  CTA completed, however was limited due to patient motion  No obvious evidence of critical stenosis or large vessel occlusion noted on CTA  MRI brain unremarkable for evidence of acute infarct  Visual field deficits noted in inferior nasal and temporal quadrants of the left eye   Plan - nmo need to obtain US carotids as bilat carotids look ok on CTA head/neck  Will need loop recorder as outpt  Continue ASA 81 mg daily  Do not recommend DAPT w/hx GI bleed  Continue statin,telemetry  Will need clearance from ophthalmologist and fitness to drive testing prior to return to driving  Date 4/22   Day 2  MRI neg for infarct  Continue Brimonidine 0 2% bid and follow-up outpatient with ophthalmology  Continue ASA 81 mg daily  Per nsg notes,mildly impaired vision L eye, L eye yellow crusted drainage            ED Triage Vitals [04/20/22 1655]   Temperature Pulse Respirations Blood Pressure SpO2   98 1 °F (36 7 °C) 96 20 141/78 93 %      Temp Source Heart Rate Source Patient Position - Orthostatic VS BP Location FiO2 (%)   Oral Monitor Sitting Left arm --      Pain Score       No Pain          Wt Readings from Last 1 Encounters:   04/21/22 75 3 kg (166 lb)     Additional Vital Signs:   Date/Time Temp Pulse Resp BP MAP (mmHg) SpO2 Calculated FIO2 (%) - Nasal Cannula Nasal Cannula O2 Flow Rate (L/min) O2 Device   04/22/22 0838 -- 70 -- -- -- -- -- -- --   04/22/22 06:56:28 98 1 °F (36 7 °C) 50 Abnormal  18 126/69 88 96 % -- -- --   04/22/22 02:35:26 97 7 °F (36 5 °C) 52 Abnormal  -- 121/68 86 95 % -- -- --   04/21/22 21:37:13 -- 56 -- 115/64 81 95 % -- -- --   04/21/22 19:16:04 98 1 °F (36 7 °C) 59 -- 124/70 88 93 % -- -- --   04/21/22 1513 98 8 °F (37 1 °C) 58 16 117/62 80 90 % -- -- --   04/21/22 1325 -- 73 -- 138/79 -- -- -- -- --   04/21/22 1100 98 5 °F (36 9 °C) 73 16 138/79 102 95 % -- -- Nasal cannula   04/21/22 0930 -- 70 18 126/67 -- 96 % 28 2 L/min Nasal cannula       Date/Time Temp Pulse Resp BP MAP (mmHg) SpO2 Calculated FIO2 (%) - Nasal Cannula Nasal Cannula O2 Flow Rate (L/min) O2 Device   04/21/22 00:55:08 98 6 °F (37 °C) 99 -- 120/74 89 95 % -- -- --   04/21/22 0021 -- 103 -- 137/79 102 98 % 28 2 L/min Nasal cannula   04/20/22 2030 -- 96 20 165/77 111 97 % 28 2 L/min Nasal cannula       Pertinent Labs/Diagnostic Test Results:   MRI brain wo contrast   Final Result by Aga Liriano MD (04/21 1418)      No acute intracranial pathology  Nonemergent findings  Beginning confluent, nonspecific patchy periventricular and subcortical white matter foci of abnormal T2 and FLAIR hyperintensity are usually secondary to changes of microangiopathy  Workstation performed: [id number]         CTA head and neck with and without contrast   Final Result by Danny Edwards MD (04/20 2142)         1  No evidence of acute infarct, intracranial hemorrhage or mass     2   Patient motion artifact limits evaluation of the carotid siphons  Within this limitation, no evidence of hemodynamically significant stenosis, dissection or occlusion of the carotid or vertebral arteries or major vessels of the [place]  Workstation performed: [id number]         4/20 ECG-ED- ECG rate:  75    ECG rate assessment: normal    Rhythm:     Rhythm: sinus rhythm    Ectopy:     Ectopy: none    QRS:     QRS axis:  Right    QRS intervals:  Normal  Conduction:     Conduction: normal    ST segments:     ST segments:  Normal  T waves:     T waves: flattening      Flattening:  V1  Q waves:     Q waves:  V1  Other findings:     Other findings: U wave    Comments:      No ecg signs of ischemia/ injury     4/21 echo- TTE-  Left Ventricle: Left ventricular cavity size is normal  Wall thickness is mildly increased  The left ventricular ejection fraction is 60%  Systolic function is normal  Wall motion is normal  Diastolic function is mildly abnormal, consistent with grade I (abnormal) relaxation    Aortic Valve: There is mild stenosis    Mitral Valve: There is moderate annular calcification  There is mild regurgitation    Tricuspid Valve: There is mild regurgitation    Pulmonary Artery: The estimated pulmonary artery systolic pressure is 23 3 mmHg   The pulmonary artery systolic pressure is mildly increased              Results from last 7 days   Lab Units 04/22/22  0517 04/21/22  0543 04/20/22  1747   WBC Thousand/uL 6 14 6 50 7 45   HEMOGLOBIN g/dL 11 4* 12 5 13 0   HEMATOCRIT % 37 2 40 4 41 3   PLATELETS Thousands/uL 207 201 250   NEUTROS ABS Thousands/µL  --  4 15 4 85         Results from last 7 days   Lab Units 04/22/22  0516 04/21/22  0543 04/20/22  2044 04/20/22  1747   SODIUM mmol/L 140 142 140 136   POTASSIUM mmol/L 4 3 3 9 5 9* 6 7*   CHLORIDE mmol/L 102 105 102 100   CO2 mmol/L 29 30 31 26   ANION GAP mmol/L 9 7 7 10   BUN mg/dL 18 15 15 15   CREATININE mg/dL 0 88 0 82 0 79 0 92   EGFR ml/min/1 73sq m 61 67 70 58   CALCIUM mg/dL 9 0 9 0 9 0 9 6     Results from last 7 days   Lab Units 04/21/22  0543   AST U/L 15   ALT U/L 12   ALK PHOS U/L 87   TOTAL PROTEIN g/dL 6 2*   ALBUMIN g/dL 2 9*   TOTAL BILIRUBIN mg/dL 0 20     Results from last 7 days   Lab Units 04/22/22  0655 04/21/22  2048 04/21/22  1641 04/21/22  1152 04/21/22  0659 04/21/22  0150   POC GLUCOSE mg/dl 159* 170* 147* 165* 150* 299*     Results from last 7 days   Lab Units 04/22/22  0516 04/21/22  0543 04/20/22  2044 04/20/22  1747   GLUCOSE RANDOM mg/dL 161* 185* 82 136                   Results from last 7 days   Lab Units 04/20/22  1747   CRP mg/L 19 3*   SED RATE mm/hour 45*                 ED Treatment:   Medication Administration from 04/20/2022 1642 to 04/21/2022 0039       Date/Time Order Dose Route Action     04/20/2022 1751 lactated ringers bolus 500 mL 500 mL Intravenous New Bag     04/20/2022 2022 diltiazem (CARDIZEM CD) 24 hr capsule 180 mg 180 mg Oral Given     04/20/2022 2022 losartan (COZAAR) tablet 50 mg 50 mg Oral Given     04/20/2022 2016 iohexol (OMNIPAQUE) 350 MG/ML injection (SINGLE-DOSE) 100 mL 85 mL Intravenous Given        Past Medical History:   Diagnosis Date    Cardiac disease     [facility]     [facility]     GERD (gastroesophageal reflux disease)     Hiatal hernia     Hypertension     PUD (peptic ulcer disease)     Residual ASD (atrial septal defect) following repair      Present on Admission:   Hypertension   Diabetes mellitus ([facility]   Chronic respiratory failure with hypoxia ([facility] )      Admitting Diagnosis: Eye problem [H57 9]  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 [H34 232]  Age/Sex: 80 y o  female  Admission Orders:  Scheduled Medications:  ascorbic acid, 1,000 mg, Oral, BID  aspirin, 81 mg, Oral, Daily  atorvastatin, 40 mg, Oral, QPM  brimonidine tartrate, 1 drop, Left Eye, BID  budesonide-formoterol, 2 puff, Inhalation, BID  diltiazem, 180 mg, Oral, Daily  enoxaparin, 40 mg, Subcutaneous, Daily  gabapentin, 300 mg, Oral, 4x Daily  insulin lispro, 1-5 Units, Subcutaneous, HS  insulin lispro, 1-6 Units, Subcutaneous, TID AC  losartan, 50 mg, Oral, Daily  oxyCODONE, 10 mg, Oral, Q12H CARRIE  pantoprazole, 40 mg, Oral, Early Morning  rOPINIRole, 1 mg, Oral, HS    aspirin tablet 325 mg  Dose: 325 mg  Freq: Once Route: PO  Start: 04/21/22 0100 End: 04/21/22 0110    Continuous IV Infusions:     PRN Meds:  albuterol, 2 puff, Inhalation, Q6H PRN  oxyCODONE, 10 mg, Oral, Q6H PRN x1 4/21      Telemetry   SCD's  Up w/ assist   fall monitoring          IP CONSULT TO NEUROLOGY  IP CONSULT TO CASE MANAGEMENT    Network Utilization Review Department  ATTENTION: Please call with any questions or concerns to [phone] and carefully listen to the prompts so that you are directed to the right person  All voicemails are confidential   Scot Trevizo all requests for admission clinical reviews, approved or denied determinations and any other requests to dedicated fax number below belonging to the campus where the patient is receiving treatment   List of dedicated fax numbers for the Facilities:  [address] DENIALS (Administrative/Medical Necessity) [phone]   [address] (Maternity/NICU/Pediatrics) [phone]   [address] [place] [address] [address] [place] [address]  [address] [address] [facility] [place] [phone] [place] [address] Carlos Mehta 1481 [address] [facility] [place] [address] [phone]

## 2022-04-21 NOTE — ASSESSMENT & PLAN NOTE
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  Patient was requested to come to ER to be evaluated  · CTA head and neck showed no evidence of infarct, intracranial hemorrhage a or masses, no evidence of significant stenosis, dissection or occlusions of the carotid or vertebral arteries or major vessels of the [place]  · CRP: 19 3, ESR 45  · /77 mmHg  No other neurologic deficits   · Differentials include carotid artery stenosis( unlikely based on CTA results), inflammatory disease ( Giant arteritis, LES, granulomatosis)- less likely patient has no hx of autoimmune disease and has no other complaint; small artery disease, cardiogenic embolic- most likely  · NIH score 1  · Lipid panel : , T, HDL: 77,      · HbA1c (): 6 3  · EKG : sinus tachycardia     Plan:  Stroke pathway  · Aspirin 325 mg once, continue aspirin 81 mg  tomorrow   · Atorvastatin 40 mg   · Monitor on telemetry   · MRI brain to exclude infarcts   · Neurology consult  · Neuro checks  · Brimonidine 0 2% bid OS- per ophthalmology recommendations

## 2022-04-21 NOTE — PHYSICAL THERAPY NOTE
Physical Therapy Screen    Patient Name: Clint Vivar    LCOAE'X Date: 4/21/2022     Problem List  Principal Problem:    Vision loss of left eye  Active Problems:    Diabetes mellitus ([facility] )    Hypertension    Chronic respiratory failure with hypoxia (HCC)    Chronic pain    BMI 31 0-31 9,adult       Past Medical History  Past Medical History:   Diagnosis Date    Cardiac disease     COPD [facility]ch[facility] obstructive [facility] disease) [facility]     [facility] mellitus ([facility] )     GERD (gastroesophageal reflux disease)     Hiatal hernia     Hypertension     PUD (peptic ulcer disease)     Residual ASD (atrial septal defect) following repair         Past Surgical History  Past Surgical History:   Procedure Laterality Date    ASD REPAIR      BACK SURGERY      x3    JOINT REPLACEMENT      bilateral knee surgery    KNEE SURGERY      SHOULDER SURGERY             04/21/22 0945   PT Last Visit   PT Visit Date 04/21/22   Note Type   Note type Screen; Nurse states pt is mobilizing with Rollator and O2 as she does at home;  PT discussed with pt and pt states she is mobilizing at baseline, denies need for skilled PT;  Pt agreeable to DC PT orders;  PT educated and encouraged pt to ambulate TID and OOB for all meals while in hospital   Will DC PT order, reconsult if mobility status changes       Gokul Mancera, PT

## 2022-04-21 NOTE — H&P
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  Patient was requested to come to ER to be evaluated  · CTA head and neck showed no evidence of infarct, intracranial hemorrhage a or masses, no evidence of significant stenosis, dissection or occlusions of the carotid or vertebral arteries or major vessels of the [place]  · CRP: 19 3, ESR 45  · /77 mmHg  No other neurologic deficits   · Differentials include carotid artery stenosis( unlikely based on CTA results), inflammatory disease ( Giant arteritis, LES, granulomatosis)- less likely patient has no hx of autoimmune disease and has no other complaint; small artery disease, cardiogenic embolic- most likely  · NIH score 1  · Lipid panel : , T, HDL: 77,   · HbA1c (): 6 3  · EKG : sinus tachycardia     Plan:  Stroke pathway  · Aspirin 325 mg once, continue aspirin 81 mg  tomorrow   · Atorvastatin 40 mg   · Monitor on telemetry   · MRI brain to exclude infarcts   · Neurology consult  · Neuro checks  · Brimonidine 0 2% bid OS- per ophthalmology recommendations     Hypertension  Assessment & Plan  · Acceptable  · Continue Cardizem 180 mg daily and losartan 50  · Monitor    Chronic pain  Assessment & Plan  · Chronic back pain( stenosis + surgery in the past)  · Follows with pain medicine   · On gabapentin 300 mg x4 times/day; Xtampfa 9 mg prn  Lam Mehta not on formulary   Will order Oxycodone prn     Chronic respiratory failure with hypoxia (HCC)  Assessment & Plan  · Secondary to severe COPD, on 2-3 L oxygen nasal cannula at baseline  Oxygen saturation 97%  · On albuterol p r n , breztri bid   · Continue albuterol  Breztri not on the hospital formulary  Symbicort instead   · Continue nasal canula oxygen     Diabetes mellitus ([facility] [address] )  Assessment & Plan  Lab Results   Component Value Date    HGBA1C 6 3 (H) 04/06/2022       No results for input(s): POCGLU in the last 72 hours  Blood Sugar Average: Last 72 hrs:  ·  home medication metformin 1 g a m , 500 mg p m ; Januvia 100 mg daily  · Hold home medication  · SSI  · Accu-Chek  · Diabetic diet    VTE Pharmacologic Prophylaxis: VTE Score: 9 High Risk (Score >/= 5) - Pharmacological DVT Prophylaxis Ordered: enoxaparin (Lovenox)  Sequential Compression Devices Ordered  Code Status: Level 3 - DNAR and DNI   Discussion with family: Patient declined call to   Anticipated Length of Stay: Patient will be admitted on an observation basis with an anticipated length of stay of less than 2 midnights secondary to Vision loss  Chief Complaint:  Vision loss    History of Present Illness:  Prem Choi is a 80 y o  female with a PMH of hypertension, type 2 diabetes , severe COPD and chronic respiratory failure on 2- 3 L oxygen , chronic back pain,  who presents with vision loss  Patient reported she had a sudden left lower field vision loss yesterday, she was seen by Ophthalmology today who recommended her to be seen in emergency room after he noted patient to have a 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  Patient had no other neurologic deficits also denied palpitations  Patient has history of ASD with repair in 76s to [de-identified], also mention she had atrial fibrillation back then and she had ablation  In emergency room patient had stable vital signs, physical exam was unremarkable except for subjective left lower field vision loss  CT arterial had a neck showed no evidence of infarct, intracranial hemorrhage a or masses    No evidence of significant stenosis, dissection, occlusion    Of note, patient was on aspirin before but it was discontinued because of GI bleeding concern ( although she was not bleeding, per patient)  Is on pantoprazole daily     Patient was admitted for further management    Review of Systems:  Review of Systems   Constitutional: Negative for activity change and fatigue  HENT: Negative for trouble swallowing  Eyes: Positive for visual disturbance  Respiratory: Negative for cough, chest tightness and shortness of breath  Cardiovascular: Negative for chest pain  Gastrointestinal: Negative for abdominal distention, abdominal pain, blood in stool, constipation and diarrhea  Genitourinary: Negative for dysuria and hematuria  Musculoskeletal: Positive for back pain  Neurological: Negative for facial asymmetry, speech difficulty, weakness, numbness and headaches  All other systems reviewed and are negative  Past Medical and Surgical History:   Past Medical History:   Diagnosis Date    Cardiac disease     COPD [facility]ch[facility] obstructive pulmonary disease) ([facility] )     Diabetes mellitus ([facility] )     GERD (gastroesophageal reflux disease)     Hiatal hernia     Hypertension     PUD (peptic ulcer disease)     Residual ASD (atrial septal defect) following repair        Past Surgical History:   Procedure Laterality Date    ASD REPAIR      BACK SURGERY      x3    JOINT REPLACEMENT      bilateral knee surgery    KNEE SURGERY      SHOULDER SURGERY         Meds/Allergies:  Prior to Admission medications    Medication Sig Start Date End Date Taking?  Authorizing Provider   albuterol (PROVENTIL HFA,VENTOLIN HFA) 90 mcg/act inhaler Inhale 2 puffs every 6 (six) hours as needed for wheezing 9/20/21  Yes Arlen Grant,    ascorbic acid (VITAMIN C) 1000 MG tablet Take 1,000 mg by mouth 2 (two) times a day   Yes Historical Provider, MD   Budeson-Glycopyrrol-Formoterol (Breztri Aerosphere) 160-9-4 8 MCG/ACT AERO Inhale 2 puffs 2 (two) times a day Rinse mouth after use  9/20/21  Yes Arlen Grant,    diltiazem (CARDIZEM CD) 180 mg 24 hr capsule Take 180 mg by mouth daily   Yes Historical Provider, MD   gabapentin (NEURONTIN) 300 mg capsule Take 300 mg by mouth 4 (four) times a day 3/23/20  Yes Historical Provider, MD   losartan (COZAAR) 50 mg tablet TAKE 1 TABLET BY MOUTH EVERY DAY 7/3/21  Yes Historical Provider, MD   metFORMIN (GLUCOPHAGE) 1000 MG tablet 1000 mg QAM and 500 mg QPM 4/13/20  Yes ROMANA Duran   OxyCONTIN 10 MG 12 hr tablet TAKE ONE TABLET BY MOUTH EVERY 12 HOURS FOR PAIN  DNF UNTIL 9/10/21 9/11/21  Yes Historical Provider, MD   pantoprazole (PROTONIX) 40 mg tablet Take 40 mg by mouth every 12 (twelve) hours   Yes Historical Provider, MD   rOPINIRole (REQUIP) 0 5 mg tablet 2-3 tablets PO at HS 4/13/20  Yes ROMANA Villareal   sitaGLIPtin (JANUVIA) 100 mg tablet Take 1 tablet (100 mg total) by mouth daily 4/13/20  Yes ROMANA Villareal   lidocaine (XYLOCAINE) 1 % 0 5 mL    Historical Provider, MD   metFORMIN (GLUCOPHAGE) 500 mg tablet  9/18/21   Historical Provider, MD   oxyCODONE-acetaminophen (PERCOCET) 5-325 mg per tablet Take 1 tablet by mouth 2 (two) times a day as needed for moderate pain  Patient not taking: Reported on 4/20/2022     Historical Provider, MD   triamcinolone acetonide (KENALOG-40) 40 mg/mL 1 mL  Patient not taking: Reported on 4/20/2022     Historical Provider, MD     I have reviewed home medications with patient personally  Allergies: Allergies   Allergen Reactions    Prednisone      The patient reports "they make me goofy "  No true allergic reaction    Psyllium     Tetanus Toxoids        Social History:  Marital Status:    Occupation: retired nurse     Patient Pre-hospital Living Situation: With other family member: son and his family   Patient Pre-hospital Level of Mobility: walks with walker  Patient Pre-hospital Diet Restrictions: none   Substance Use History:   Social History Substance and Sexual Activity   Alcohol Use No     Social History     Tobacco Use   Smoking Status Former Smoker    Packs/day: 1 00    Years: 60 00    Pack years: 60 00    Start date:     Quit date: 2017    Years since quittin 9   Smokeless Tobacco Never Used   Tobacco Comment    stopped smoking 2 days ago     Social History     Substance and Sexual Activity   Drug Use No       Family History:  Family History   Problem Relation Age of Onset    Cancer Mother     Asthma Father        Physical Exam:     Vitals:   Blood Pressure: 165/77 (22)  Pulse: 96 (22)  Temperature: 98 1 °F (36 7 °C) (22)  Temp Source: Oral (22)  Respirations: 20 (22)  SpO2: 97 % (22)    Physical Exam  Vitals reviewed  Constitutional:       General: She is not in acute distress  Appearance: She is not diaphoretic  HENT:      Head: Normocephalic  Eyes:      General: Visual field deficit present  No scleral icterus  Right eye: No discharge  Left eye: No discharge  Extraocular Movements: Extraocular movements intact  Pupils: Pupils are equal, round, and reactive to light  Cardiovascular:      Rate and Rhythm: Regular rhythm  Tachycardia present  Pulmonary:      Effort: No respiratory distress  Breath sounds: Wheezing present  No rhonchi or rales  Abdominal:      General: There is no distension  Palpations: Abdomen is soft  Tenderness: There is no abdominal tenderness  There is no guarding or rebound  Musculoskeletal:      Right lower leg: No edema  Left lower leg: No edema  Skin:     General: Skin is warm  Neurological:      Mental Status: She is alert and oriented to person, place, and time  Cranial Nerves: No cranial nerve deficit, dysarthria or facial asymmetry  Sensory: No sensory deficit  Motor: No weakness or pronator drift     Psychiatric:         Mood and Affect: Mood normal  Behavior: Behavior normal          Thought Content: Thought content normal          Judgment: Judgment normal           Additional Data:     Lab Results:  Results from last 7 days   Lab Units 04/20/22  1747   WBC Thousand/uL 7 45   HEMOGLOBIN g/dL 13 0   HEMATOCRIT % 41 3   PLATELETS Thousands/uL 250   NEUTROS PCT % 65   LYMPHS PCT % 18   MONOS PCT % 14*   EOS PCT % 2     Results from last 7 days   Lab Units 04/20/22  2044   SODIUM mmol/L 140   POTASSIUM mmol/L 5 9*   CHLORIDE mmol/L 102   CO2 mmol/L 31   BUN mg/dL 15   CREATININE mg/dL 0 79   ANION GAP mmol/L 7   CALCIUM mg/dL 9 0   GLUCOSE RANDOM mg/dL 82                       Imaging: Reviewed radiology reports from this admission including: CT head  CTA head and neck with and without contrast   Final Result by Olivier Rascon MD (04/20 2142)         1  No evidence of acute infarct, intracranial hemorrhage or mass  2   Patient motion artifact limits evaluation of the carotid siphons  Within this limitation, no evidence of hemodynamically significant stenosis, dissection or occlusion of the carotid or vertebral arteries or major vessels of the [place]  Workstation performed: [id number]         MRI Inpatient Order    (Results Pending)       EKG and Other Studies Reviewed on Admission:   · EKG: Sinus Tachycardia  HR 90     ** Please Note: This note has been constructed using a voice recognition system   **

## 2022-04-21 NOTE — PROGRESS NOTES
[facility]  Progress Note - Star Coleman 1940, 80 y o  female MRN: [id number]  Unit/Bed#: S -01 Encounter: [id number]  Primary Care Provider: Elsa Polk DO   Date and time admitted to hospital: 2022  4:59 PM    * Vision loss of left eye  Assessment & Plan  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  Patient was requested to come to ER to be evaluated  · CTA head and neck showed no evidence of infarct, intracranial hemorrhage a or masses, no evidence of significant stenosis, dissection or occlusions of the carotid or vertebral arteries or major vessels of the [place]  · CRP: 19 3, ESR 45  · /77 mmHg  No other neurologic deficits   · Differentials include carotid artery stenosis( unlikely based on CTA results), inflammatory disease ( Giant arteritis, LES, granulomatosis)- less likely patient has no hx of autoimmune disease and has no other complaint; small artery disease, cardiogenic embolic- most likely  · NIH score 1  · Lipid panel : , T, HDL: 77,   · HbA1c (): 6 3  · EKG : sinus tachycardia     Plan:  Stroke pathway  · Atorvastatin 40 mg   · Monitor on telemetry   · MRI brain to exclude infarcts, awaiting  · Neurology on consult appreciate recs  · Neuro checks  · Brimonidine 0 2% bid OS- per ophthalmology recommendations     Chronic respiratory failure with hypoxia (HCC)  Assessment & Plan  · Secondary to severe COPD, on 2-3 L oxygen nasal cannula at baseline  Oxygen saturation 97%  · On albuterol p r n , breztri bid   · Continue albuterol  Breztri not on the hospital formulary  Symbicort instead   · Continue nasal canula oxygen     BMI 31 0-31 9,adult  Assessment & Plan  Body mass index is 31 37 kg/m²       Recommend incorporating a more whole foods plant-predominant diet along with decreasing consumption of red meats and processed foods   Per AHA guidelines, recommend moderate-vigorous intensity exercise for 30 minutes a day for 5 days a week or a total of 150 min/week as tolerated      Chronic pain  Assessment & Plan  · Chronic back pain( stenosis + surgery in the past)  · Follows with pain medicine   · On gabapentin 300 mg x4 times/day; Xtampfa 9 mg prn  Eligha Senate not on formulary  · Oxycodone prn     Hypertension  Assessment & Plan  · Acceptable  · Continue Cardizem 180 mg daily and losartan 50  · Monitor    Diabetes mellitus [facility])  Assessment & Plan  Lab Results   Component Value Date    HGBA1C 6 3 (H) 2022       Recent Labs     22  0150 22  0659 22  1152   POCGLU 299* 150* 165*       Blood Sugar Average: Last 72 hrs:  · (P) 830 [id number] home medication metformin 1 g a m , 500 mg p m ; Januvia 100 mg daily  · Hold home medication  · SSI  · Accu-Chek  · Diabetic diet        VTE Pharmacologic Prophylaxis: VTE Score: 9 High Risk (Score >/= 5) - Pharmacological DVT Prophylaxis Ordered: enoxaparin (Lovenox)  Sequential Compression Devices Ordered  Patient Centered Rounds: I performed bedside rounds with nursing staff today  Discussions with Specialists or Other Care Team Provider: neuro    Education and Discussions with Family / Patient: Attempted to update  (son) via phone  Unable to contact  Current Length of Stay: 0 day(s)  Current Patient Status: Inpatient   Discharge Plan: Anticipate discharge tomorrow to home  Code Status: Level 3 - DNAR and DNI    Subjective:   Patient was seen and examined today at bedside  No complaints  Denies cp, sob, abdominal pain  Patient had bm "day before yesterday"  This is normal for her  Patient states her vision is improving from yesterday       Objective:     Vitals:   Temp (24hrs), Av 4 °F (36 9 °C), Min:98 1 °F (36 7 °C), Max:98 6 °F (37 °C)    Temp:  [98 1 °F (36 7 °C)-98 6 °F (37 °C)] 98 5 °F (36 9 °C)  HR:  [] 73  Resp:  [16-20] 16  BP: (120-165)/(67-79) 138/79  SpO2:  [93 %-98 %] 95 %  Body mass index is 31 37 kg/m²  Input and Output Summary (last 24 hours):   No intake or output data in the 24 hours ending 04/21/22 1412    Physical Exam:   Physical Exam  Vitals and nursing note reviewed  Constitutional:       General: She is not in acute distress  Appearance: She is well-developed  HENT:      Head: Normocephalic and atraumatic  Right Ear: External ear normal       Left Ear: External ear normal    Eyes:      General: No scleral icterus  Right eye: No discharge  Left eye: No discharge  Extraocular Movements: Extraocular movements intact  Conjunctiva/sclera: Conjunctivae normal       Pupils: Pupils are equal, round, and reactive to light  Comments: Left sided inferior peripheral vision impaired in let eye when compared to right, across entire lower left field of vision  Abnormal findings not characterizable on ophthalmologic exam in left eye  Cardiovascular:      Rate and Rhythm: Normal rate and regular rhythm  Pulses: Normal pulses  Heart sounds: No murmur heard  No gallop  Pulmonary:      Effort: Pulmonary effort is normal  No respiratory distress  Breath sounds: Normal breath sounds  No wheezing or rhonchi  Abdominal:      General: Bowel sounds are normal  There is no distension  Palpations: Abdomen is soft  Tenderness: There is no abdominal tenderness  There is no guarding  Musculoskeletal:      Cervical back: Neck supple  Right lower leg: No edema  Left lower leg: No edema  Skin:     General: Skin is warm and dry  Coloration: Skin is not jaundiced or pale  Neurological:      Mental Status: She is alert  Psychiatric:         Mood and Affect: Mood normal          Behavior: Behavior normal          Thought Content:  Thought content normal          Judgment: Judgment normal           Additional Data: Labs:  Results from last 7 days   Lab Units 04/21/22  0543   WBC Thousand/uL 6 50   HEMOGLOBIN g/dL 12 5   HEMATOCRIT % 40 4   PLATELETS Thousands/uL 201   NEUTROS PCT % 62   LYMPHS PCT % 17   MONOS PCT % 15*   EOS PCT % 4     Results from last 7 days   Lab Units 04/21/22  0543   SODIUM mmol/L 142   POTASSIUM mmol/L 3 9   CHLORIDE mmol/L 105   CO2 mmol/L 30   BUN mg/dL 15   CREATININE mg/dL 0 82   ANION GAP mmol/L 7   CALCIUM mg/dL 9 0   ALBUMIN g/dL 2 9*   TOTAL BILIRUBIN mg/dL 0 20   ALK PHOS U/L 87   ALT U/L 12   AST U/L 15   GLUCOSE RANDOM mg/dL 185*         Results from last 7 days   Lab Units 04/21/22  1152 04/21/22  0659 04/21/22  0150   POC GLUCOSE mg/dl 165* 150* 299*               Lines/Drains:  Invasive Devices  Report    Peripheral Intravenous Line            Peripheral IV 04/20/22 Right Forearm <1 day                  Telemetry:  Telemetry Orders (From admission, onward)             48 Hour Telemetry Monitoring  Continuous x 48 hours        References:    Telemetry Guidelines   Question:  Reason for 48 Hour Telemetry  Answer:  Acute CVA (<24 hrs old, hemispheric strokes, selected brainstem strokes, cardiac arrhythmias)                 Telemetry Reviewed: Normal Sinus Rhythm  Indication for Continued Telemetry Use: No indication for continued use  Will discontinue                Imaging: Reviewed radiology reports from this admission including: CTA head and neck    Recent Cultures (last 7 days):         Last 24 Hours Medication List:   Current Facility-Administered Medications   Medication Dose Route Frequency Provider Last Rate    albuterol  2 puff Inhalation Q6H PRN Jackie De La Vega MD      ascorbic acid  1,000 mg Oral BID MD Rupert Bailon ON 4/22/2022] aspirin  81 mg Oral Daily Jackie De La Vega MD      atorvastatin  40 mg Oral QPM Jackie De La Vega MD      brimonidine tartrate  1 drop Left Eye BID Jackie De La Vega MD      budesonide-formoterol  2 puff Inhalation BID Humble Max MD      diltiazem  180 mg Oral Daily Humble Max MD      enoxaparin  40 mg Subcutaneous Daily Humble Max MD      gabapentin  300 mg Oral 4x Daily Humble Max MD      insulin lispro  1-5 Units Subcutaneous HS Humble Max MD      insulin lispro  1-6 Units Subcutaneous TID AC Humble Max MD      losartan  50 mg Oral Daily Humble Max MD      oxyCODONE  10 mg Oral Q12H [address] Humble Max MD      oxyCODONE  10 mg Oral Q6H PRN Humble Max MD      pantoprazole  40 mg Oral Early Morning Humble Max MD      rOPINIRole  1 mg Oral HS Humble Max MD          Today, Patient Was Seen By: Carmelina Khan DO    **Please Note: This note may have been constructed using a voice recognition system  **

## 2022-04-21 NOTE — CONSULTS
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  Workup:  - Hyperkalemic on presentation, 6 7  - CRP elevated 19 3  - Sed rate elevated 45  - Echo:  EF 60%, normal motion normal; mild aortic stenosis; moderate regurg an annular calcification of mitral valve; mild tricuspid regurg    Acute visual loss of left eye  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, 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  CTA completed, however was limited due to patient motion  No obvious evidence of critical stenosis or large vessel occlusion noted on CTA  MRI brain unremarkable for evidence of acute infarct      Plan:   - Would not recommend carotid US as bilateral carotids look OK on CTA head and neck  - Would need a loop monitor in the outpatient setting as patient is agreeable to anticoagulation if an arrhythmia is captured; order placed  - S/p  mg x 1  - Continue ASA 81 mg daily  - Would not recommend DAPT given history of GI bleed  - Continue atorvastatin 40 mg daily  - Telemetry  - Frequent neuro checks  - PT/ OT/ speech  - Stroke education  - Medical management supportive care per primary team, notify with changes  - Will need clearance from ophthalmologist and fitness to drive testing prior to return to driving    Hypertension  Assessment & Plan  - Goal normotension as MRI brain is unremarkable  - Medical management per primary team    Diabetes mellitus [facility])  Assessment & Plan  Lab Results   Component Value Date    HGBA1C 6 3 (H) 04/06/2022       Recent Labs     04/21/22  0150 04/21/22  0659   POCGLU 299* 150*       Blood Sugar Average: Last 72 hrs:  (P) 224 5    Star Coleman will need follow up in in 6 weeks with neurovascular attending or advance practitioner  She will not require outpatient neurological testing  History of Present Illness     Reason for Consult / Principal Problem: 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    HPI: Star Coleman is a 80 y o   female with HTN, DM type 2, CAD, prior tobacco abuse, COPD on 2-3 L O2 NC at baseline, chronic hypoxic respiratory failure who presents to [facility] ED on 04/20/2022 for left eye vision loss after presenting from the ophthalmologist where patient was 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  Patient reports she woke up on Tuesday 04/19/2022 in her normal state of, denying any issues with her vision  She states that later on in the day approximately 530 is when she acute change in her vision, stating she lost the lower part her vision in her left  Patient states at that point it was too late to go to eye doctor, so she made an appointment to go the following day  Patient states she initially visited her primary eye doctor who then referred her to an ophthalmologist in [facility]  She states that the ophthalmologist had reported seeing a plaque in her left eye instructed her to go to the ED  Patient states that she has history of retinal occlusion affecting the left eye resulting in complete visual loss approximately 10 years ago, however states that her vision completely resolved following this event  Patient states that she was never placed on blood thinners for this incident in the past   Patient denies any stroke history, current blood thinner use  Patient states that she was driving prior to this event, however does not wish to drive anymore  Per chart review, patient was evaluated by outpatient ophthalmologist who noted a Hollenhorst plaque and was advised to come into the ED   Patient presented to [facility] ED on 2022, BP stable at 141/78  CTA head and neck unremarkable for evidence of acute intracranial abnormalities  Evaluation of the carotid siphons were limited to patient motion  From the obtain study, there was no evidence significant stenosis, dissection, or occlusion of the carotid or vertebral arteries, or major vessels of the [place]  Today patient continues to experience the visual loss, denying any worsening of her vision  Denies any chest pain, headache, speech difficulties, weakness, numbness, swallowing difficulties    Inpatient consult to Neurology  Consult performed by: Amos Willingham PA-C  Consult ordered by: Daquan Owen MD        Review of Systems  12 point ROS performed, as stated above, all others negative    Historical Information   Past Medical History:   Diagnosis Date    Cardiac disease     [facility] [address] [facility]     Diabetes mellitus ([facility] )     GERD (gastroesophageal reflux disease)     Hiatal hernia     Hypertension     PUD (peptic ulcer disease)     Residual ASD (atrial septal defect) following repair      Past Surgical History:   Procedure Laterality Date    ASD REPAIR      BACK SURGERY      x3    JOINT REPLACEMENT      bilateral knee surgery    KNEE SURGERY      SHOULDER SURGERY       Social History   Social History     Substance and Sexual Activity   Alcohol Use No     Social History     Substance and Sexual Activity   Drug Use No     E-Cigarette/Vaping     E-Cigarette/Vaping Substances     Social History     Tobacco Use   Smoking Status Former Smoker    Packs/day: 1 00    Years: 60 00    Pack years: 60 00    Start date:     Quit date: 2017    Years since quittin 9   Smokeless Tobacco Never Used   Tobacco Comment    stopped smoking 2 days ago     Family History:   Family History   Problem Relation Age of Onset    Cancer Mother     Asthma Father        Review of previous medical records was completed  Meds/Allergies   all current active meds have been reviewed, current meds:   Current Facility-Administered Medications   Medication Dose Route Frequency    albuterol (PROVENTIL HFA,VENTOLIN HFA) inhaler 2 puff  2 puff Inhalation Q6H PRN    ascorbic acid (VITAMIN C) tablet 1,000 mg  1,000 mg Oral BID    [START ON 4/22/2022] aspirin chewable tablet 81 mg  81 mg Oral Daily    atorvastatin (LIPITOR) tablet 40 mg  40 mg Oral QPM    brimonidine tartrate 0 2 % ophthalmic solution 1 drop  1 drop Left Eye BID    budesonide-formoterol (SYMBICORT) 160-4 5 mcg/act inhaler 2 puff  2 puff Inhalation BID    diltiazem (CARDIZEM CD) 24 hr capsule 180 mg  180 mg Oral Daily    enoxaparin (LOVENOX) subcutaneous injection 40 mg  40 mg Subcutaneous Daily    gabapentin (NEURONTIN) capsule 300 mg  300 mg Oral 4x Daily    insulin lispro (HumaLOG) 100 units/mL subcutaneous injection 1-5 Units  1-5 Units Subcutaneous HS    insulin lispro (HumaLOG) 100 units/mL subcutaneous injection 1-6 Units  1-6 Units Subcutaneous TID AC    losartan (COZAAR) tablet 50 mg  50 mg Oral Daily    oxyCODONE (OxyCONTIN) 12 hr tablet 10 mg  10 mg Oral Q12H CARRIE    oxyCODONE (ROXICODONE) immediate release tablet 10 mg  10 mg Oral Q6H PRN    pantoprazole (PROTONIX) EC tablet 40 mg  40 mg Oral Early Morning    rOPINIRole (REQUIP) tablet 1 mg  1 mg Oral HS   , PTA meds:   Prior to Admission Medications   Prescriptions Last Dose Informant Patient Reported? Taking? Budeson-Glycopyrrol-Formoterol (Breztri Aerosphere) 160-9-4 8 MCG/ACT AERO 4/20/2022 at Unknown time  No Yes   Sig: Inhale 2 puffs 2 (two) times a day Rinse mouth after use  OxyCONTIN 10 MG 12 hr tablet 4/20/2022 at Unknown time Self Yes Yes   Sig: TAKE ONE TABLET BY MOUTH EVERY 12 HOURS FOR PAIN   DNF UNTIL 9/10/21   albuterol (PROVENTIL HFA,VENTOLIN HFA) 90 mcg/act inhaler 4/19/2022 at Unknown time  No Yes   Sig: Inhale 2 puffs every 6 (six) hours as needed for wheezing ascorbic acid (VITAMIN C) 1000 MG tablet 2022 at Unknown time Self Yes Yes   Sig: Take 1,000 mg by mouth 2 (two) times a day   diltiazem (CARDIZEM CD) 180 mg 24 hr capsule 2022 at Unknown time Self Yes Yes   Sig: Take 180 mg by mouth daily   gabapentin (NEURONTIN) 300 mg capsule 2022 at Unknown time Self Yes Yes   Sig: Take 300 mg by mouth 4 (four) times a day   lidocaine (XYLOCAINE) 1 %  Self Yes No   Si 5 mL   losartan (COZAAR) 50 mg tablet 2022 at Unknown time Self Yes Yes   Sig: TAKE 1 TABLET BY MOUTH EVERY DAY   metFORMIN (GLUCOPHAGE) 1000 MG tablet 2022 at Unknown time Self No Yes   Si mg QAM and 500 mg QPM   metFORMIN (GLUCOPHAGE) 500 mg tablet  Self Yes No   oxyCODONE-acetaminophen (PERCOCET) 5-325 mg per tablet Not Taking at Unknown time Self Yes No   Sig: Take 1 tablet by mouth 2 (two) times a day as needed for moderate pain   Patient not taking: Reported on 2022    pantoprazole (PROTONIX) 40 mg tablet 2022 at Unknown time Self Yes Yes   Sig: Take 40 mg by mouth every 12 (twelve) hours   rOPINIRole (REQUIP) 0 5 mg tablet 2022 at Unknown time Self No Yes   Si-3 tablets PO at HS   sitaGLIPtin (JANUVIA) 100 mg tablet 2022 at Unknown time Self No Yes   Sig: Take 1 tablet (100 mg total) by mouth daily   triamcinolone acetonide (KENALOG-40) 40 mg/mL Not Taking at Unknown time Self Yes No   Si mL   Patient not taking: Reported on 2022       Facility-Administered Medications: None    and     Allergies   Allergen Reactions    Prednisone      The patient reports "they make me goofy "  No true allergic reaction    Psyllium     Tetanus Toxoids        Objective   Vitals:Blood pressure 117/62, pulse 58, temperature 98 8 °F (37 1 °C), resp  rate 16, height 5' 1" (1 549 m), weight 75 3 kg (166 lb), SpO2 90 %, not currently breastfeeding  ,Body mass index is 31 37 kg/m²    No intake or output data in the 24 hours ending 22 1723    Invasive Devices: Invasive Devices  Report    Peripheral Intravenous Line            Peripheral IV 04/20/22 Right Forearm <1 day              Physical Exam  Vitals and nursing note reviewed  Constitutional:       General: She is not in acute distress  Appearance: Normal appearance  She is not ill-appearing, toxic-appearing or diaphoretic  HENT:      Head: Normocephalic and atraumatic  Eyes:      General: No scleral icterus  Right eye: No discharge  Left eye: No discharge  Extraocular Movements: Extraocular movements intact and EOM normal       Conjunctiva/sclera: Conjunctivae normal       Pupils: Pupils are equal, round, and reactive to light  Musculoskeletal:         General: Normal range of motion  Cervical back: Normal range of motion and neck supple  Skin:     General: Skin is warm and dry  Coloration: Skin is not jaundiced or pale  Findings: No bruising, erythema, lesion or rash  Neurological:      Mental Status: She is alert  Coordination: Finger-Nose-Finger Test normal    Psychiatric:         Mood and Affect: Mood normal          Behavior: Behavior normal          Thought Content: Thought content normal          Judgment: Judgment normal        Neurologic Exam     Mental Status   Patient is alert, sitting up in bed  Oriented x3  No dysarthria or aphasia noted  Able to name objects provided, follows central and appendicular commands, is all questions appropriately  Cranial Nerves     CN III, IV, VI   Pupils are equal, round, and reactive to light  Extraocular motions are normal    Nystagmus: none   Upgaze: normal  Downgaze: normal  Conjugate gaze: present    CN V   Facial sensation intact  CN VII   Facial expression full, symmetric       CN VIII   Hearing: intact    CN XI   CN XI normal      CN XII   Tongue deviation: none  Visual field deficits noted in inferior nasal and temporal quadrants of the left eye     Motor Exam   Muscle bulk: normal  Overall muscle tone: normal  Right arm pronator drift: absent  Left arm pronator drift: absent  Bilateral upper and lower extremity strength testing 5/5 throughout except as noted:   Very trace weakness in right deltoid, right , and right hip flexion when compared to the left, 5-/5     Sensory Exam   Light touch normal    Temperature sensation intact throughout   No evidence of extinction with bilateral simultaneous stimulation     Gait, Coordination, and Reflexes     Coordination   Finger to nose coordination: normal    Tremor   Resting tremor: absent  No ataxia or dysmetria bilateral upper extremity finger-to-nose testing   No involuntary movements or seizure-like activity noted throughout exam     Lab Results: I have personally reviewed pertinent reports    Recent Results (from the past 24 hour(s))   CBC and differential    Collection Time: 04/20/22  5:47 PM   Result Value Ref Range    WBC 7 45 4 31 - 10 16 Thousand/uL    RBC 4 57 3 81 - 5 12 Million/uL    Hemoglobin 13 0 11 5 - 15 4 g/dL    Hematocrit 41 3 34 8 - 46 1 %    MCV 90 82 - 98 fL    MCH 28 4 26 8 - 34 3 pg    MCHC 31 5 31 4 - 37 4 g/dL    RDW 15 0 11 6 - 15 1 %    MPV 9 5 8 9 - 12 7 fL    Platelets 045 234 - 596 Thousands/uL    nRBC 0 /100 WBCs    Neutrophils Relative 65 43 - 75 %    Immat GRANS % 0 0 - 2 %    Lymphocytes Relative 18 14 - 44 %    Monocytes Relative 14 (H) 4 - 12 %    Eosinophils Relative 2 0 - 6 %    Basophils Relative 1 0 - 1 %    Neutrophils Absolute 4 85 1 85 - 7 62 Thousands/µL    Immature Grans Absolute 0 02 0 00 - 0 20 Thousand/uL    Lymphocytes Absolute 1 31 0 60 - 4 47 Thousands/µL    Monocytes Absolute 1 02 0 17 - 1 22 Thousand/µL    Eosinophils Absolute 0 17 0 00 - 0 61 Thousand/µL    Basophils Absolute 0 08 0 00 - 0 10 Thousands/µL   Basic metabolic panel    Collection Time: 04/20/22  5:47 PM   Result Value Ref Range    Sodium 136 136 - 145 mmol/L    Potassium 6 7 (HH) 3 5 - 5 3 mmol/L    Chloride 100 100 - 108 mmol/L    CO2 26 21 - 32 mmol/L    ANION GAP 10 4 - 13 mmol/L    BUN 15 5 - 25 mg/dL    Creatinine 0 92 0 60 - 1 30 mg/dL    Glucose 136 65 - 140 mg/dL    Calcium 9 6 8 3 - 10 1 mg/dL    eGFR 58 ml/min/1 73sq m   Sedimentation rate, automated    Collection Time: 04/20/22  5:47 PM   Result Value Ref Range    Sed Rate 45 (H) 0 - 29 mm/hour   C-reactive protein    Collection Time: 04/20/22  5:47 PM   Result Value Ref Range    CRP 19 3 (H) <3 0 mg/L   ECG 12 lead    Collection Time: 04/20/22  6:05 PM   Result Value Ref Range    Ventricular Rate 75 BPM    Atrial Rate 75 BPM    SC Interval 138 ms    QRSD Interval 90 ms    QT Interval 390 ms    QTC Interval 435 ms    P Axis 60 degrees    QRS Axis 145 degrees    T Wave Axis 46 degrees   Basic metabolic panel    Collection Time: 04/20/22  8:44 PM   Result Value Ref Range    Sodium 140 136 - 145 mmol/L    Potassium 5 9 (H) 3 5 - 5 3 mmol/L    Chloride 102 100 - 108 mmol/L    CO2 31 21 - 32 mmol/L    ANION GAP 7 4 - 13 mmol/L    BUN 15 5 - 25 mg/dL    Creatinine 0 79 0 60 - 1 30 mg/dL    Glucose 82 65 - 140 mg/dL    Calcium 9 0 8 3 - 10 1 mg/dL    eGFR 70 ml/min/1 73sq m   Fingerstick Glucose (POCT)    Collection Time: 04/21/22  1:50 AM   Result Value Ref Range    POC Glucose 299 (H) 65 - 140 mg/dl   Comprehensive metabolic panel    Collection Time: 04/21/22  5:43 AM   Result Value Ref Range    Sodium 142 136 - 145 mmol/L    Potassium 3 9 3 5 - 5 3 mmol/L    Chloride 105 100 - 108 mmol/L    CO2 30 21 - 32 mmol/L    ANION GAP 7 4 - 13 mmol/L    BUN 15 5 - 25 mg/dL    Creatinine 0 82 0 60 - 1 30 mg/dL    Glucose 185 (H) 65 - 140 mg/dL    Glucose, Fasting 185 (H) 65 - 99 mg/dL    Calcium 9 0 8 3 - 10 1 mg/dL    Corrected Calcium 9 9 8 3 - 10 1 mg/dL    AST 15 5 - 45 U/L    ALT 12 12 - 78 U/L    Alkaline Phosphatase 87 46 - 116 U/L    Total Protein 6 2 (L) 6 4 - 8 2 g/dL    Albumin 2 9 (L) 3 5 - 5 0 g/dL    Total Bilirubin 0 20 0 20 - 1 00 mg/dL    eGFR 67 ml/min/1 73sq m   CBC and differential Collection Time: 04/21/22  5:43 AM   Result Value Ref Range    WBC 6 50 4 31 - 10 16 Thousand/uL    RBC 4 37 3 81 - 5 12 Million/uL    Hemoglobin 12 5 11 5 - 15 4 g/dL    Hematocrit 40 4 34 8 - 46 1 %    MCV 92 82 - 98 fL    MCH 28 6 26 8 - 34 3 pg    MCHC 30 9 (L) 31 4 - 37 4 g/dL    RDW 15 1 11 6 - 15 1 %    MPV 10 1 8 9 - 12 7 fL    Platelets 781 628 - 270 Thousands/uL    nRBC 0 /100 WBCs    Neutrophils Relative 62 43 - 75 %    Immat GRANS % 1 0 - 2 %    Lymphocytes Relative 17 14 - 44 %    Monocytes Relative 15 (H) 4 - 12 %    Eosinophils Relative 4 0 - 6 %    Basophils Relative 1 0 - 1 %    Neutrophils Absolute 4 15 1 85 - 7 62 Thousands/µL    Immature Grans Absolute 0 03 0 00 - 0 20 Thousand/uL    Lymphocytes Absolute 1 08 0 60 - 4 47 Thousands/µL    Monocytes Absolute 0 94 0 17 - 1 22 Thousand/µL    Eosinophils Absolute 0 23 0 00 - 0 61 Thousand/µL    Basophils Absolute 0 07 0 00 - 0 10 Thousands/µL   Fingerstick Glucose (POCT)    Collection Time: 04/21/22  6:59 AM   Result Value Ref Range    POC Glucose 150 (H) 65 - 140 mg/dl   Fingerstick Glucose (POCT)    Collection Time: 04/21/22 11:52 AM   Result Value Ref Range    POC Glucose 165 (H) 65 - 140 mg/dl   Echo follow up/limited w/ contrast if indicated    Collection Time: 04/21/22  2:00 PM   Result Value Ref Range    AV area peak richard 1 3 cm2    Aortic valve mean velocity 15 20 m/s    Triscuspid Valve Regurgitation Peak Gradient 41 0 mmHg    Tricuspid valve peak regurgitation velocity 3 22 m/s    TR Peak Richard 3 2 m/s    LVOT mn grad 3 0 mmHg    AV area by cont VTI 1 4 cm2    AV mean gradient 10 mmHg    AV LVOT peak gradient 5 mmHg    MV valve area p 1/2 method 2 32 cm2    PV peak gradient antegrade 3 mmHg    E wave deceleration time 327 ms    LVOT diameter 1 8 cm    LVOT peak richard 1 07 m/s    LVOT peak VTI 25 55 cm    Ao peak richard retrograde 2 16 m/s    Ao VTI 47 71 cm    LVOT stroke volume 64 98 cm3    AV peak gradient 19 mmHg    MV Peak E Richard 94 cm/s    MV Peak A Richard 1 1 m/s    MV stenosis pressure 1/2 time 95 ms    LVOT SI 38 50 ml/m2    Aortic Valve Area by Planimetry 1 6 cm2    LVOT area 2 54 cm2    AV Velocity Ratio 0 50     AV valve area 1 36 cm2    LV EF 60     PASP 45 0 mmHg   Fingerstick Glucose (POCT)    Collection Time: 04/21/22  4:41 PM   Result Value Ref Range    POC Glucose 147 (H) 65 - 140 mg/dl   ]  Imaging Studies: I have personally reviewed pertinent reports and I have personally reviewed pertinent films in PACS  EKG, Pathology, and Other Studies: I have personally reviewed pertinent reports  VTE Prophylaxis: Enoxaparin (Lovenox)    Dictation voice to text software has been used in the creation of this document  Please consider this in light of any contextual or grammatical errors

## 2022-04-21 NOTE — ASSESSMENT & PLAN NOTE
· Chronic back pain( stenosis + surgery in the past)  · Follows with pain medicine   · On gabapentin 300 mg x4 times/day; Xtampfa 9 mg prn  Cayden Galo not on formulary     · Oxycodone prn

## 2022-04-21 NOTE — PLAN OF CARE
Problem: Prexisting or High Potential for Compromised Skin Integrity  Goal: Skin integrity is maintained or improved  Description: INTERVENTIONS:  - Identify patients at risk for skin breakdown  - Assess and monitor skin integrity  - Assess and monitor nutrition and hydration status  - Monitor labs   - Assess for incontinence   - Turn and reposition patient  - Assist with mobility/ambulation  - Relieve pressure over bony prominences  - Avoid friction and shearing  - Provide appropriate hygiene as needed including keeping skin clean and dry  - Evaluate need for skin moisturizer/barrier cream  - Collaborate with interdisciplinary team   - Patient/family teaching  - Consider wound care consult   Outcome: Progressing     Problem: Potential for Falls  Goal: Patient will remain free of falls  Description: INTERVENTIONS:  - Educate patient/family on patient safety including physical limitations  - Instruct patient to call for assistance with activity   - Consult OT/PT to assist with strengthening/mobility   - Keep Call bell within reach  - Keep bed low and locked with side rails adjusted as appropriate  - Keep care items and personal belongings within reach  - Initiate and maintain comfort rounds  - Make Fall Risk Sign visible to staff  - Offer Toileting every  Hours, in advance of need  - Initiate/Maintain alarm  - Obtain necessary fall risk management equipment:   - Apply yellow socks and bracelet for high fall risk patients  - Consider moving patient to room near nurses station  Outcome: Progressing     Problem: MOBILITY - ADULT  Goal: Maintain or return to baseline ADL function  Description: INTERVENTIONS:  -  Assess patient's ability to carry out ADLs; assess patient's baseline for ADL function and identify physical deficits which impact ability to perform ADLs (bathing, care of mouth/teeth, toileting, grooming, dressing, etc )  - Assess/evaluate cause of self-care deficits   - Assess range of motion  - Assess patient's mobility; develop plan if impaired  - Assess patient's need for assistive devices and provide as appropriate  - Encourage maximum independence but intervene and supervise when necessary  - Involve family in performance of ADLs  - Assess for home care needs following discharge   - Consider OT consult to assist with ADL evaluation and planning for discharge  - Provide patient education as appropriate  Outcome: Progressing

## 2022-04-21 NOTE — CASE MANAGEMENT
Case Management Assessment & Discharge Planning Note    Patient name Huong Ramírez  Location S /S -74 MRN [id number]  : 1940 Date 2022       Current Admission Date: 2022  Current Admission Diagnosis:Vision loss of left eye   Patient Active Problem List    Diagnosis Date Noted    BMI 31 0-31 9,adult 2022    Vision loss of left eye 2022    Chronic pain 2022    ASD (atrial septal defect) 2020    Exertional dyspnea 2020    Chronic respiratory failure with hypoxia ([facility] ) 2017    GERD (gastroesophageal reflux disease) 2017    Cigarette nicotine dependence in remission 2017    Diabetes mellitus ([place] [id number] ) 2016    Hypertension 2016    COPD, severe ([facility] ) 2016      LOS (days): 0  Geometric Mean LOS (GMLOS) (days):   Days to GMLOS:     OBJECTIVE:              Current admission status: Observation       Preferred Pharmacy:   [id number] [address] [facility] [address]  Phone: [phone] Fax: [phone]    Primary Care Provider: Judson Baum DO    Primary Insurance: [facility]  Secondary Insurance:     ASSESSMENT:  Active Health Care Proxies    There are no active Health Care Proxies on file  Patient Information  Mental Status: Alert  During Assessment patient was accompanied by: Not accompanied during assessment  Assessment information provided by[de-identified] Patient  Primary Caregiver: Self  Support Systems: Self,Son,Other (Comment) (granddaughter)  [place] of Residence: [address] [facility] [address] do you live in?: [place] entry access options   Select all that apply : No steps to enter home  Type of Current Residence: 2 story home  Upon entering residence, is there a bedroom on the main floor (no further steps)?: No  A bedroom is located on the following floor levels of residence (select all that apply):: 2nd Floor  Upon entering residence, is there a bathroom on the main floor (no further steps)?: No  Indicate which floors of current residence have a bathroom (select all the apply):: 2nd Floor  Number of steps to 2nd floor from main floor: One Flight (Has stair glide)  Living Arrangements: Lives w/ Son,Other (Comment) (and granddaughter)    Activities of Daily Living Prior to Admission  Functional Status: Assistance  Completes ADLs independently?: Yes (some assistance for ADLS - [facility] from [facility] comes on Fridays otherwise pt is able to dress self and wash up (does not shower due to fear of falls))  Ambulates independently?: Yes  Does patient use assisted devices?: Yes  Assisted Devices (DME) used: Rollator  Does patient currently own DME?: Yes  What DME does the patient currently own?: Rollator  Does patient have a history of Outpatient Therapy (PT/OT)?: No  Does the patient have a history of Short-Term Rehab?: Yes ([facility])  Does patient have a history of HHC?: Yes Brice Hanley)  Does patient currently have [facility]?: Yes Brice Hanley)    Current [address] [place] [facility]  Type of Current Home Care Services: Home health aide,Home OT,Nurse visit  Current Home Health Agency[de-identified] [address] [place] [address] Provider[de-identified] PCP    Patient Information Continued  Income Source: Pension/senior care  Does patient have prescription coverage?: Yes  Does patient receive dialysis treatments?: No  Does patient have a history of substance abuse?: No  Does patient have a history of Mental Health Diagnosis?: No    Means of Transportation  Means of Transport to Appts[de-identified] Family transport  In the past 12 months, has lack of transportation kept you from medical appointments or from getting medications?: No  In the past 12 months, has lack of transportation kept you from meetings, work, or from getting things needed for daily living?: No  Was application for public transport provided?: N/A    DISCHARGE DETAILS:    Discharge planning discussed with[de-identified] pt  Freedom of Choice: Yes  Comments - Freedom of Choice: [facility] for [place]    Other Referral/Resources/Interventions Provided:  Interventions: HHC  Referral Comments: CM met with pt to discuss [facility]  Pt reports she is current with [facility] for RN, HHA, and OT services  Pt reports she was dc from PT services  Pt reports she would like to continue with services upon d/c  Pt reports she is also in the process of applying for waiver services - reports she [facility] home [facility] from [facility]      Treatment Team Recommendation: Home with [facility]  Discharge Destination Plan[de-identified] Home with [place] at Discharge : Family

## 2022-04-21 NOTE — PLAN OF CARE
Problem: Prexisting or High Potential for Compromised Skin Integrity  Goal: Skin integrity is maintained or improved  Description: INTERVENTIONS:  - Identify patients at risk for skin breakdown  - Assess and monitor skin integrity  - Assess and monitor nutrition and hydration status  - Monitor labs   - Assess for incontinence   - Turn and reposition patient  - Assist with mobility/ambulation  - Relieve pressure over bony prominences  - Avoid friction and shearing  - Provide appropriate hygiene as needed including keeping skin clean and dry  - Evaluate need for skin moisturizer/barrier cream  - Collaborate with interdisciplinary team   - Patient/family teaching  - Consider wound care consult   Outcome: Progressing     Problem: Potential for Falls  Goal: Patient will remain free of falls  Description: INTERVENTIONS:  - Educate patient/family on patient safety including physical limitations  - Instruct patient to call for assistance with activity   - Consult OT/PT to assist with strengthening/mobility   - Keep Call bell within reach  - Keep bed low and locked with side rails adjusted as appropriate  - Keep care items and personal belongings within reach  - Initiate and maintain comfort rounds  - Make Fall Risk Sign visible to staff  - Apply yellow socks and bracelet for high fall risk patients  - Consider moving patient to room near nurses station  Outcome: Progressing     Problem: MOBILITY - ADULT  Goal: Maintain or return to baseline ADL function  Description: INTERVENTIONS:  -  Assess patient's ability to carry out ADLs; assess patient's baseline for ADL function and identify physical deficits which impact ability to perform ADLs (bathing, care of mouth/teeth, toileting, grooming, dressing, etc )  - Assess/evaluate cause of self-care deficits   - Assess range of motion  - Assess patient's mobility; develop plan if impaired  - Assess patient's need for assistive devices and provide as appropriate  - Encourage maximum independence but intervene and supervise when necessary  - Involve family in performance of ADLs  - Assess for home care needs following discharge   - Consider OT consult to assist with ADL evaluation and planning for discharge  - Provide patient education as appropriate  Outcome: Progressing  Goal: Maintains/Returns to pre admission functional level  Description: INTERVENTIONS:  - Perform BMAT or MOVE assessment daily    - Set and communicate daily mobility goal to care team and patient/family/caregiver     - Collaborate with rehabilitation services on mobility goals if consulted  - Out of bed for toileting  - Record patient progress and toleration of activity level   Outcome: Progressing

## 2022-04-21 NOTE — ASSESSMENT & PLAN NOTE
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, 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  CTA completed, however was limited due to patient motion  No obvious evidence of critical stenosis or large vessel occlusion noted on CTA  MRI brain unremarkable for evidence of acute infarct      Plan:   - Would not recommend carotid US as bilateral carotids look OK on CTA head and neck  - Would need a loop monitor in the outpatient setting as patient is agreeable to anticoagulation if an arrhythmia is captured; order placed  - S/p  mg x 1  - Continue ASA 81 mg daily  - Would not recommend DAPT given history of GI bleed  - Continue atorvastatin 40 mg daily  - Telemetry  - Frequent neuro checks  - PT/ OT/ speech  - Stroke education  - Medical management supportive care per primary team, notify with changes  - Will need clearance from ophthalmologist and fitness to drive testing prior to return to driving

## 2022-04-21 NOTE — ASSESSMENT & PLAN NOTE
Lab Results   Component Value Date    HGBA1C 6 3 (H) 04/06/2022       Recent Labs     04/21/22  0150 04/21/22  0659   POCGLU 299* 150*       Blood Sugar Average: Last 72 hrs:  (P) 224 5

## 2022-04-21 NOTE — ASSESSMENT & PLAN NOTE
· Chronic back pain( stenosis + surgery in the past)  · Follows with pain medicine   · On gabapentin 300 mg x4 times/day; Xtampfa 9 mg prn  Samuel Mask not on formulary   Will order Oxycodone prn

## 2022-04-21 NOTE — SPEECH THERAPY NOTE
Speech Language/Pathology  Speech/Language Pathology  Assessment    Patient Name: Oren Manriquez  JQDWE'C Date: 4/21/2022     Problem List  Principal Problem:    Vision loss of left eye  Active Problems:    Diabetes mellitus ([facility] )    Hypertension    Chronic respiratory failure with hypoxia (HCC)    Chronic pain    BMI 31 0-31 9,adult    Past Medical History  Past Medical History:   Diagnosis Date    Cardiac disease     COPD [facility]chronic obstructive pulmonary disease) [facility] )     [facility]     GERD (gastroesophageal reflux disease)     Hiatal hernia     Hypertension     PUD (peptic ulcer disease)     Residual ASD (atrial septal defect) following repair      Past Surgical History  Past Surgical History:   Procedure Laterality Date    ASD REPAIR      BACK SURGERY      x3    JOINT REPLACEMENT      bilateral knee surgery    KNEE SURGERY      SHOULDER SURGERY       Oren Manriquez is a 80 y o  female with a PMH of hypertension, type 2 diabetes , severe COPD and chronic respiratory failure on 2- 3 L oxygen , chronic back pain,  who presents with vision loss  Patient reported she had a sudden left lower field vision loss yesterday, she was seen by Ophthalmology today who recommended her to be seen in emergency room after he noted patient to have a 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  Patient had no other neurologic deficits also denied palpitations  Patient has history of ASD with repair in 76s to [de-identified], also mention she had atrial fibrillation back then and she had ablation  In emergency room patient had stable vital signs, physical exam was unremarkable except for subjective left lower field vision loss  Consult received for speech/swallow eval on stroke pathway  Pt passed nsg swallow screen; tolerating regular diet w/o s/s dysphagia or aspiration  No speech/language deficits reported   NIH score is 1 for vision loss  MRI: not ordered    No need for formal speech/swallow eval at this time  Reconsult if needed      Mauro Bauer CCC-SLP  Speech Pathologist  Available via  tiger text

## 2022-04-21 NOTE — ASSESSMENT & PLAN NOTE
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  Patient was requested to come to ER to be evaluated  · CTA head and neck showed no evidence of infarct, intracranial hemorrhage a or masses, no evidence of significant stenosis, dissection or occlusions of the carotid or vertebral arteries or major vessels of the [place]  · CRP: 19 3, ESR 45  · /77 mmHg  No other neurologic deficits   · Differentials include carotid artery stenosis( unlikely based on CTA results), inflammatory disease ( Giant arteritis, LES, granulomatosis)- less likely patient has no hx of autoimmune disease and has no other complaint; small artery disease, cardiogenic embolic- most likely  · NIH score 1  · Lipid panel : , T, HDL: 77,      · HbA1c (): 6 3  · EKG : sinus tachycardia     Plan:  Stroke pathway  · Atorvastatin 40 mg   · Monitor on telemetry   · MRI brain to exclude infarcts, awaiting  · Neurology on consult appreciate recs  · Neuro checks  · Brimonidine 0 2% bid OS- per ophthalmology recommendations

## 2022-04-21 NOTE — ASSESSMENT & PLAN NOTE
Body mass index is 31 37 kg/m²       Recommend incorporating a more whole foods plant-predominant diet along with decreasing consumption of red meats and processed foods   Per AHA guidelines, recommend moderate-vigorous intensity exercise for 30 minutes a day for 5 days a week or a total of 150 min/week as tolerated

## 2022-04-22 VITALS
SYSTOLIC BLOOD PRESSURE: 126 MMHG | OXYGEN SATURATION: 96 % | WEIGHT: 166 LBS | DIASTOLIC BLOOD PRESSURE: 69 MMHG | HEIGHT: 61 IN | RESPIRATION RATE: 18 BRPM | BODY MASS INDEX: 31.34 KG/M2 | TEMPERATURE: 98.1 F | HEART RATE: 70 BPM

## 2022-04-22 PROBLEM — H34.232 RETINAL ARTERY OCCLUSION, BRANCH, LEFT: Status: ACTIVE | Noted: 2022-04-20

## 2022-04-22 LAB
ANION GAP SERPL CALCULATED.3IONS-SCNC: 9 MMOL/L (ref 4–13)
BUN SERPL-MCNC: 18 MG/DL (ref 5–25)
CALCIUM SERPL-MCNC: 9 MG/DL (ref 8.3–10.1)
CHLORIDE SERPL-SCNC: 102 MMOL/L (ref 100–108)
CO2 SERPL-SCNC: 29 MMOL/L (ref 21–32)
CREAT SERPL-MCNC: 0.88 MG/DL (ref 0.6–1.3)
ERYTHROCYTE [DISTWIDTH] IN BLOOD BY AUTOMATED COUNT: 15.1 % (ref 11.6–15.1)
GFR SERPL CREATININE-BSD FRML MDRD: 61 ML/MIN/1.73SQ M
GLUCOSE SERPL-MCNC: 159 MG/DL (ref 65–140)
GLUCOSE SERPL-MCNC: 161 MG/DL (ref 65–140)
HCT VFR BLD AUTO: 37.2 % (ref 34.8–46.1)
HGB BLD-MCNC: 11.4 G/DL (ref 11.5–15.4)
MCH RBC QN AUTO: 28.5 PG (ref 26.8–34.3)
MCHC RBC AUTO-ENTMCNC: 30.6 G/DL (ref 31.4–37.4)
MCV RBC AUTO: 93 FL (ref 82–98)
PLATELET # BLD AUTO: 207 THOUSANDS/UL (ref 149–390)
PMV BLD AUTO: 9.8 FL (ref 8.9–12.7)
POTASSIUM SERPL-SCNC: 4.3 MMOL/L (ref 3.5–5.3)
RBC # BLD AUTO: 4 MILLION/UL (ref 3.81–5.12)
SODIUM SERPL-SCNC: 140 MMOL/L (ref 136–145)
WBC # BLD AUTO: 6.14 THOUSAND/UL (ref 4.31–10.16)

## 2022-04-22 PROCEDURE — 85027 COMPLETE CBC AUTOMATED: CPT

## 2022-04-22 PROCEDURE — 80048 BASIC METABOLIC PNL TOTAL CA: CPT

## 2022-04-22 PROCEDURE — 82948 REAGENT STRIP/BLOOD GLUCOSE: CPT

## 2022-04-22 PROCEDURE — 99239 HOSP IP/OBS DSCHRG MGMT >30: CPT | Performed by: INTERNAL MEDICINE

## 2022-04-22 RX ORDER — ATORVASTATIN CALCIUM 40 MG/1
40 TABLET, FILM COATED ORAL EVERY EVENING
Qty: 30 TABLET | Refills: 0 | Status: SHIPPED | OUTPATIENT
Start: 2022-04-22 | End: 2022-06-08

## 2022-04-22 RX ORDER — ASPIRIN 81 MG/1
81 TABLET, CHEWABLE ORAL DAILY
Qty: 30 TABLET | Refills: 0 | Status: SHIPPED | OUTPATIENT
Start: 2022-04-23

## 2022-04-22 RX ORDER — BRIMONIDINE TARTRATE 2 MG/ML
1 SOLUTION/ DROPS OPHTHALMIC 2 TIMES DAILY
Qty: 5 ML | Refills: 0 | Status: SHIPPED | OUTPATIENT
Start: 2022-04-22

## 2022-04-22 RX ADMIN — PANTOPRAZOLE SODIUM 40 MG: 40 TABLET, DELAYED RELEASE ORAL at 05:51

## 2022-04-22 RX ADMIN — ENOXAPARIN SODIUM 40 MG: 40 INJECTION SUBCUTANEOUS at 08:34

## 2022-04-22 RX ADMIN — BUDESONIDE AND FORMOTEROL FUMARATE DIHYDRATE 2 PUFF: 160; 4.5 AEROSOL RESPIRATORY (INHALATION) at 08:32

## 2022-04-22 RX ADMIN — LOSARTAN POTASSIUM 50 MG: 50 TABLET, FILM COATED ORAL at 08:34

## 2022-04-22 RX ADMIN — GABAPENTIN 300 MG: 300 CAPSULE ORAL at 08:33

## 2022-04-22 RX ADMIN — OXYCODONE HYDROCHLORIDE 10 MG: 10 TABLET, FILM COATED, EXTENDED RELEASE ORAL at 08:34

## 2022-04-22 RX ADMIN — ASPIRIN 81 MG CHEWABLE TABLET 81 MG: 81 TABLET CHEWABLE at 08:33

## 2022-04-22 RX ADMIN — INSULIN LISPRO 1 UNITS: 100 INJECTION, SOLUTION INTRAVENOUS; SUBCUTANEOUS at 08:26

## 2022-04-22 RX ADMIN — OXYCODONE HYDROCHLORIDE AND ACETAMINOPHEN 1000 MG: 500 TABLET ORAL at 08:34

## 2022-04-22 RX ADMIN — DILTIAZEM HYDROCHLORIDE 180 MG: 180 CAPSULE, COATED, EXTENDED RELEASE ORAL at 08:34

## 2022-04-22 RX ADMIN — BRIMONIDINE TARTRATE 1 DROP: 2 SOLUTION/ DROPS OPHTHALMIC at 08:37

## 2022-04-22 NOTE — ASSESSMENT & PLAN NOTE
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  Patient was requested to come to ER to be evaluated  · CTA head and neck showed no evidence of infarct, intracranial hemorrhage a or masses, no evidence of significant stenosis, dissection or occlusions of the carotid or vertebral arteries or major vessels of the [place]  · CRP: 19 3, ESR 45  · /77 mmHg  No other neurologic deficits   · Differentials include carotid artery stenosis( unlikely based on CTA results), inflammatory disease ( Giant arteritis, LES, granulomatosis)- less likely patient has no hx of autoimmune disease and has no other complaint; small artery disease, cardiogenic embolic- most likely  · NIH score 1  · Lipid panel : , T, HDL: 77,      · HbA1c (): 6 3  · EKG : sinus tachycardia   Followed stroke pathway, MRI neg for infarct    Continue Brimonidine 0 2% bid and follow-up outpatient with opthalmology

## 2022-04-22 NOTE — ASSESSMENT & PLAN NOTE
Lab Results   Component Value Date    HGBA1C 6 3 (H) 04/06/2022       Recent Labs     04/21/22  1152 04/21/22  1641 04/21/22 2048 04/22/22  0655   POCGLU 165* 147* 170* 159*       Blood Sugar Average: Last 72 hrs:  · (P) 434 [id number] home medication metformin 1 g a m , 500 mg p m ; Januvia 100 mg daily  · Resume home medications

## 2022-04-22 NOTE — ED PROVIDER NOTES
History  Chief Complaint   Patient presents with    Loss of Vision     pt was at eye doctor today for partial loss of vision in left eye since last night  and told she had a choesterol chip in it and to come to er for eval     80 yr female with remote hx of afib with ablation - home o2 dependent copd-  Started 24 hrs ago-  With lose of vision in lower half of left eye -- with no other symptoms- since has regained vision in left visual field between  3-6 o'clock--  Pt denies any head/neck pain- injury -  No dysarthria/ dysphagia/ dysphonia/ dysmetria- no temporal pain/swelling/ redness- no jaw cludication no pmr type symptoms- no focal bue/ble weakness- s sent by ophthalmologist  For left brao - and neuro workup       History provided by:  Patient   used: No        Prior to Admission Medications   Prescriptions Last Dose Informant Patient Reported? Taking? Budeson-Glycopyrrol-Formoterol (Breztri Aerosphere) 160-9-4 8 MCG/ACT AERO 2022 at Unknown time  No Yes   Sig: Inhale 2 puffs 2 (two) times a day Rinse mouth after use  OxyCONTIN 10 MG 12 hr tablet 2022 at Unknown time Self Yes Yes   Sig: TAKE ONE TABLET BY MOUTH EVERY 12 HOURS FOR PAIN   DNF UNTIL 9/10/21   albuterol (PROVENTIL HFA,VENTOLIN HFA) 90 mcg/act inhaler 2022 at Unknown time  No Yes   Sig: Inhale 2 puffs every 6 (six) hours as needed for wheezing   ascorbic acid (VITAMIN C) 1000 MG tablet 2022 at Unknown time Self Yes Yes   Sig: Take 1,000 mg by mouth 2 (two) times a day   diltiazem (CARDIZEM CD) 180 mg 24 hr capsule 2022 at Unknown time Self Yes Yes   Sig: Take 180 mg by mouth daily   gabapentin (NEURONTIN) 300 mg capsule 2022 at Unknown time Self Yes Yes   Sig: Take 300 mg by mouth 4 (four) times a day   lidocaine (XYLOCAINE) 1 %  Self Yes No   Si 5 mL   losartan (COZAAR) 50 mg tablet 2022 at Unknown time Self Yes Yes   Sig: TAKE 1 TABLET BY MOUTH EVERY DAY   metFORMIN (GLUCOPHAGE) 1000 MG tablet 2022 at Unknown time Self No Yes   Si mg QAM and 500 mg QPM   metFORMIN (GLUCOPHAGE) 500 mg tablet  Self Yes No   oxyCODONE-acetaminophen (PERCOCET) 5-325 mg per tablet Not Taking at Unknown time Self Yes No   Sig: Take 1 tablet by mouth 2 (two) times a day as needed for moderate pain   Patient not taking: Reported on 2022    pantoprazole (PROTONIX) 40 mg tablet 2022 at Unknown time Self Yes Yes   Sig: Take 40 mg by mouth every 12 (twelve) hours   rOPINIRole (REQUIP) 0 5 mg tablet 2022 at Unknown time Self No Yes   Si-3 tablets PO at HS   sitaGLIPtin (JANUVIA) 100 mg tablet 2022 at Unknown time Self No Yes   Sig: Take 1 tablet (100 mg total) by mouth daily   triamcinolone acetonide (KENALOG-40) 40 mg/mL Not Taking at Unknown time Self Yes No   Si mL   Patient not taking: Reported on 2022       Facility-Administered Medications: None       Past Medical History:   Diagnosis Date    Cardiac disease     COPD [facility] obstructive [facility]     Diabetes mellitus ([facility] )     GERD (gastroesophageal reflux disease)     Hiatal hernia     Hypertension     PUD (peptic ulcer disease)     Residual ASD (atrial septal defect) following repair        Past Surgical History:   Procedure Laterality Date    ASD REPAIR      BACK SURGERY      x3    JOINT REPLACEMENT      bilateral knee surgery    KNEE SURGERY      SHOULDER SURGERY         Family History   Problem Relation Age of Onset    Cancer Mother     Asthma Father      I have reviewed and agree with the history as documented      E-Cigarette/Vaping     E-Cigarette/Vaping Substances     Social History     Tobacco Use    Smoking status: Former Smoker     Packs/day: 1 00     Years: 60 00     Pack years: 60 00     Start date:      Quit date: 2017     Years since quittin 9    Smokeless tobacco: Never Used    Tobacco comment: stopped smoking 2 days ago   Substance Use Topics    Alcohol use: No    Drug use: No       Review of Systems   Constitutional: Negative  HENT: Negative  Eyes: Negative  Respiratory: Negative  Cardiovascular: Negative  Gastrointestinal: Negative  Endocrine: Negative  Genitourinary: Negative  Musculoskeletal: Negative  Skin: Negative  Allergic/Immunologic: Negative  Neurological: Negative  Hematological: Negative  Psychiatric/Behavioral: Negative  Physical Exam  Physical Exam  Vitals and nursing note reviewed  Constitutional:       General: She is not in acute distress  Appearance: Normal appearance  She is not ill-appearing, toxic-appearing or diaphoretic  Comments: avss- pulse ox 93  On ra- interpretation is  Low- normal- pt placed on 2 liters o2 nc    HENT:      Head: Normocephalic and atraumatic  Comments: No bilateral temporal artery area tenderness/edema/ erythema/ nodularity      Right Ear: Tympanic membrane, ear canal and external ear normal  There is no impacted cerumen  Left Ear: Tympanic membrane, ear canal and external ear normal  There is no impacted cerumen  Nose: Nose normal  No congestion or rhinorrhea  Mouth/Throat:      Mouth: Mucous membranes are moist       Pharynx: Oropharynx is clear  No oropharyngeal exudate or posterior oropharyngeal erythema  Comments: Sym palate rise  Eyes:      General: No scleral icterus  Right eye: No discharge  Left eye: No discharge  Extraocular Movements: Extraocular movements intact  Conjunctiva/sclera: Conjunctivae normal       Pupils: Pupils are equal, round, and reactive to light  Comments: Mm pink   Neck:      Vascular: No carotid bruit  Comments: No pmt c/t/l/s spine - no carotid brutis  Cardiovascular:      Rate and Rhythm: Normal rate and regular rhythm  Pulses: Normal pulses  Heart sounds: Murmur heard  No friction rub  No gallop      Pulmonary:      Effort: Pulmonary effort is normal  No respiratory distress  Breath sounds: Normal breath sounds  No stridor  No wheezing, rhonchi or rales  Chest:      Chest wall: No tenderness  Abdominal:      General: Bowel sounds are normal  There is no distension  Palpations: Abdomen is soft  There is no mass  Tenderness: There is no abdominal tenderness  There is no right CVA tenderness, left CVA tenderness, guarding or rebound  Hernia: No hernia is present  Comments: Soft nt/nd- no hsm- no cva tenderness- no ascites- no peritoneal signs- no pulsatile abd mass/bruit/ tenderness   Musculoskeletal:         General: No swelling, tenderness, deformity or signs of injury  Normal range of motion  Cervical back: Normal range of motion and neck supple  No rigidity or tenderness  Right lower leg: Edema present  Left lower leg: Edema present  Comments: Trace bgle pretibial edema- nt- no asym/ erythema- equal bilateral radial/dp pulses   Lymphadenopathy:      Cervical: No cervical adenopathy  Skin:     General: Skin is warm  Capillary Refill: Capillary refill takes less than 2 seconds  Coloration: Skin is not jaundiced or pale  Findings: No bruising, erythema, lesion or rash  Neurological:      General: No focal deficit present  Mental Status: She is alert and oriented to person, place, and time  Mental status is at baseline  Cranial Nerves: No cranial nerve deficit  Sensory: No sensory deficit  Motor: No weakness  Coordination: Coordination normal       Gait: Gait normal       Comments: Normal non focal neuro exam    Psychiatric:         Mood and Affect: Mood normal          Behavior: Behavior normal          Thought Content:  Thought content normal          Judgment: Judgment normal          Vital Signs  ED Triage Vitals [04/20/22 1655]   Temperature Pulse Respirations Blood Pressure SpO2   98 1 °F (36 7 °C) 96 20 141/78 93 %      Temp Source Heart Rate Source Patient Position - Orthostatic VS BP Location FiO2 (%)   Oral Monitor Sitting Left arm --      Pain Score       No Pain           Vitals:    04/21/22 2137 04/22/22 0235 04/22/22 0656 04/22/22 0838   BP: 115/64 121/68 126/69    Pulse: 56 (!) 52 (!) 50 70   Patient Position - Orthostatic VS:             Visual Acuity      ED Medications  Medications   lactated ringers bolus 500 mL (0 mL Intravenous Stopped 4/20/22 1950)   diltiazem (CARDIZEM CD) 24 hr capsule 180 mg (180 mg Oral Given 4/20/22 2022)   losartan (COZAAR) tablet 50 mg (50 mg Oral Given 4/20/22 2022)   iohexol (OMNIPAQUE) 350 MG/ML injection (SINGLE-DOSE) 100 mL (85 mL Intravenous Given 4/20/22 2016)   aspirin tablet 325 mg (325 mg Oral Given 4/21/22 0110)       Diagnostic Studies  Results Reviewed     Procedure Component Value Units Date/Time    Comprehensive metabolic panel [[id number]]  (Abnormal) Collected: 04/21/22 0543    Lab Status: Final result Specimen: Blood from Arm, Left Updated: 04/21/22 0730     Sodium 142 mmol/L      Potassium 3 9 mmol/L      Chloride 105 mmol/L      CO2 30 mmol/L      ANION GAP 7 mmol/L      BUN 15 mg/dL      Creatinine 0 82 mg/dL      Glucose 185 mg/dL      Glucose, Fasting 185 mg/dL      Calcium 9 0 mg/dL      Corrected Calcium 9 9 mg/dL      AST 15 U/L      ALT 12 U/L      Alkaline Phosphatase 87 U/L      Total Protein 6 2 g/dL      Albumin 2 9 g/dL      Total Bilirubin 0 20 mg/dL      eGFR 67 ml/min/1 73sq m     Narrative:      [place] guidelines for Chronic Kidney Disease (CKD):     Stage 1 with normal or high GFR (GFR > 90 mL/min/1 73 square meters)    Stage 2 Mild CKD (GFR = 60-89 mL/min/1 73 square meters)    Stage 3A Moderate CKD (GFR = 45-59 mL/min/1 73 square meters)    Stage 3B Moderate CKD (GFR = 30-44 mL/min/1 73 square meters)    Stage 4 Severe CKD (GFR = 15-29 mL/min/1 73 square meters)    Stage 5 End Stage CKD (GFR <15 mL/min/1 73 square meters)  Note: GFR calculation is accurate only with a steady state creatinine    CBC and differential [[id number]]  (Abnormal) Collected: 04/21/22 0543    Lab Status: Final result Specimen: Blood from Arm, Left Updated: 04/21/22 0602     WBC 6 50 Thousand/uL      RBC 4 37 Million/uL      Hemoglobin 12 5 g/dL      Hematocrit 40 4 %      MCV 92 fL      MCH 28 6 pg      MCHC 30 9 g/dL      RDW 15 1 %      MPV 10 1 fL      Platelets 225 Thousands/uL      nRBC 0 /100 WBCs      Neutrophils Relative 62 %      Immat GRANS % 1 %      Lymphocytes Relative 17 %      Monocytes Relative 15 %      Eosinophils Relative 4 %      Basophils Relative 1 %      Neutrophils Absolute 4 15 Thousands/µL      Immature Grans Absolute 0 03 Thousand/uL      Lymphocytes Absolute 1 08 Thousands/µL      Monocytes Absolute 0 94 Thousand/µL      Eosinophils Absolute 0 23 Thousand/µL      Basophils Absolute 0 07 Thousands/µL     Basic metabolic panel [[id number]]  (Abnormal) Collected: 04/20/22 2044    Lab Status: Final result Specimen: Blood from Arm, Right Updated: 04/20/22 2106     Sodium 140 mmol/L      Potassium 5 9 mmol/L      Chloride 102 mmol/L      CO2 31 mmol/L      ANION GAP 7 mmol/L      BUN 15 mg/dL      Creatinine 0 79 mg/dL      Glucose 82 mg/dL      Calcium 9 0 mg/dL      eGFR 70 ml/min/1 73sq m     Narrative:      [place] guidelines for Chronic Kidney Disease (CKD):     Stage 1 with normal or high GFR (GFR > 90 mL/min/1 73 square meters)    Stage 2 Mild CKD (GFR = 60-89 mL/min/1 73 square meters)    Stage 3A Moderate CKD (GFR = 45-59 mL/min/1 73 square meters)    Stage 3B Moderate CKD (GFR = 30-44 mL/min/1 73 square meters)    Stage 4 Severe CKD (GFR = 15-29 mL/min/1 73 square meters)    Stage 5 End Stage CKD (GFR <15 mL/min/1 73 square meters)  Note: GFR calculation is accurate only with a steady state creatinine    C-reactive protein [[id number]]  (Abnormal) Collected: 04/20/22 1747    Lab Status: Final result Specimen: Blood from Arm, Left Updated: 04/20/22 1953 CRP 19 3 mg/L     Narrative:      Note: Unit of Measure change to mg/L at [facility] will show at 10 fold increase in CRP result to match network standards      Basic metabolic panel [[id number]]  (Abnormal) Collected: 04/20/22 1747    Lab Status: Final result Specimen: Blood from Arm, Left Updated: 04/20/22 1939     Sodium 136 mmol/L      Potassium 6 7 mmol/L      Chloride 100 mmol/L      CO2 26 mmol/L      ANION GAP 10 mmol/L      BUN 15 mg/dL      Creatinine 0 92 mg/dL      Glucose 136 mg/dL      Calcium 9 6 mg/dL      eGFR 58 ml/min/1 73sq m     Narrative:      [place] guidelines for Chronic Kidney Disease (CKD):     Stage 1 with normal or high GFR (GFR > 90 mL/min/1 73 square meters)    Stage 2 Mild CKD (GFR = 60-89 mL/min/1 73 square meters)    Stage 3A Moderate CKD (GFR = 45-59 mL/min/1 73 square meters)    Stage 3B Moderate CKD (GFR = 30-44 mL/min/1 73 square meters)    Stage 4 Severe CKD (GFR = 15-29 mL/min/1 73 square meters)    Stage 5 End Stage CKD (GFR <15 mL/min/1 73 square meters)  Note: GFR calculation is accurate only with a steady state creatinine    Sedimentation rate, automated [[id number]]  (Abnormal) Collected: 04/20/22 1747    Lab Status: Final result Specimen: Blood from Arm, Left Updated: 04/20/22 1815     Sed Rate 45 mm/hour     CBC and differential [[id number]]  (Abnormal) Collected: 04/20/22 1747    Lab Status: Final result Specimen: Blood from Arm, Left Updated: 04/20/22 1757     WBC 7 45 Thousand/uL      RBC 4 57 Million/uL      Hemoglobin 13 0 g/dL      Hematocrit 41 3 %      MCV 90 fL      MCH 28 4 pg      MCHC 31 5 g/dL      RDW 15 0 %      MPV 9 5 fL      Platelets 404 Thousands/uL      nRBC 0 /100 WBCs      Neutrophils Relative 65 %      Immat GRANS % 0 %      Lymphocytes Relative 18 %      Monocytes Relative 14 %      Eosinophils Relative 2 %      Basophils Relative 1 %      Neutrophils Absolute 4 85 Thousands/µL      Immature Grans Absolute 0 02 Thousand/uL      Lymphocytes Absolute 1 31 Thousands/µL      Monocytes Absolute 1 02 Thousand/µL      Eosinophils Absolute 0 17 Thousand/µL      Basophils Absolute 0 08 Thousands/µL                  MRI brain wo contrast   Final Result by Aga Liriano MD (04/21 1418)      No acute intracranial pathology  Nonemergent findings above  Workstation performed: [id number]         CTA head and neck with and without contrast   Final Result by Danny Edwards MD (04/20 2142)         1  No evidence of acute infarct, intracranial hemorrhage or mass  2   Patient motion artifact limits evaluation of the carotid siphons  Within this limitation, no evidence of hemodynamically significant stenosis, dissection or occlusion of the carotid or vertebral arteries or major vessels of the [place]                    Workstation performed: [id number]                    Procedures  Procedures         ED Course  ED Course as of 04/22/22 1739 Wed Apr 20, 2022 1732 Er md note- 4/6/22 labs from [facility] reviewed by er md- 1/22 cardiac echo report reviewed by er md    65 Erf md note- ophthalmology note reviewed by er md- pt  with normal iop/ sent into er for workup for left brao started about 24 hrs ago and is improved    1734 Er md note- pt has not taekn any of her meds today --    65 Er md note- pt states is not on any antiplt/coags do to son believes past hx of colonic bleed    46 Er md not-e past hx of afib with eps ablation 10 yrs [place]- no problems since- asd repair in 1960's   1739  Er md note- as sympotms approx 25 hrs old- will not call a cva alert- stoke neurology on call tiger texted to discuss- will do cta head neck/ labs/ ecg/ place on cardiac monitor and plan for admit to cva nancy   1747 Er md note- case d/w dr Key Linares - on call stroke neurologist- wants to send esr/crp - wait for cta  pending kidney function- and admit to  cva pathway                   Stroke Assessment     Row Name 04/20/22 1736             NIH Stroke Scale    Interval --      Level of Consciousness (1a ) 0      LOC Questions (1b ) 0      LOC Commands (1c ) 0      Best Gaze (2 ) 0      Visual (3 ) 1      Facial Palsy (4 ) 0      Motor Arm, Left (5a ) 0      Motor Arm, Right (5b ) 0      Motor Leg, Left (6a ) 0      Motor Leg, Right (6b ) 0      Limb Ataxia (7 ) 0      Sensory (8 ) 0      Best Language (9 ) 0      Dysarthria (10 ) 0      Extinction and Inattention (11 ) (Formerly Neglect) 0      Total 1                Most Recent Value   TPA Decision Options    TPA Decision Patient not a TPA candidate  Patient is not a candidate options comment                                  MDM    Disposition  Final diagnoses:   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     Time reflects when diagnosis was documented in both MDM as applicable and the Disposition within this note     Time User Action Codes Description Comment    4/20/2022 10:05 PM Denita Palumbo Add [G04 [id number]] 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     4/22/2022  9:21 AM Charles Zepeda Modify [B82 [id number]] 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       ED Disposition     ED Disposition Condition Date/Time Comment    Admit Stable Wed Apr 20, 2022 10:04 PM Case was discussed withr im resident for dr Arden Gaucher  and the patient's admission status was agreed to be Admission Status: observation status to the service of Dr Blayne Anguiano for dr Phill Sullivan   Follow-up Information     Follow up With Specialties Details Why Contact Info Additional [address], Hospice Services Follow up  [address] [facility] [address] Neurology [facility] Neurology Follow up in 4 week(s) Please follow-up with outpatient neurovascular attending or advanced practitioner in 4-6 weeks  Contact the number provided above if you do not receive phone call from the office to schedule appointment    [phone] Mami Carroll [address] [facility] Neurology [address], [address], [address], [address] [facility]          Discharge Medication List as of 4/22/2022 10:54 AM      START taking these medications    Details   aspirin 81 mg chewable tablet Chew 1 tablet (81 mg total) daily, Starting Sat 4/23/2022, Normal      atorvastatin (LIPITOR) 40 mg tablet Take 1 tablet (40 mg total) by mouth every evening, Starting Fri 4/22/2022, Until Sun 5/22/2022, Normal      brimonidine tartrate 0 2 % ophthalmic solution Administer 1 drop into the left eye 2 (two) times a day, Starting Fri 4/22/2022, Normal         CONTINUE these medications which have NOT CHANGED    Details   albuterol (PROVENTIL HFA,VENTOLIN HFA) 90 mcg/act inhaler Inhale 2 puffs every 6 (six) hours as needed for wheezing, Starting Mon 9/20/2021, Normal      ascorbic acid (VITAMIN C) 1000 MG tablet Take 1,000 mg by mouth 2 (two) times a day, Historical Med      Budeson-Glycopyrrol-Formoterol (Breztri Aerosphere) 160-9-4 8 MCG/ACT AERO Inhale 2 puffs 2 (two) times a day Rinse mouth after use , Starting Mon 9/20/2021, Normal      diltiazem (CARDIZEM CD) 180 mg 24 hr capsule Take 180 mg by mouth daily, Historical Med      gabapentin (NEURONTIN) 300 mg capsule Take 300 mg by mouth 4 (four) times a day, Starting Mon 3/23/2020, Historical Med      losartan (COZAAR) 50 mg tablet TAKE 1 TABLET BY MOUTH EVERY DAY, Historical Med      !! metFORMIN (GLUCOPHAGE) 1000 MG tablet 1000 mg QAM and 500 mg QPM, No Print      OxyCONTIN 10 MG 12 hr tablet TAKE ONE TABLET BY MOUTH EVERY 12 HOURS FOR PAIN   DNF UNTIL 9/10/21, Historical Med      pantoprazole (PROTONIX) 40 mg tablet Take 40 mg by mouth every 12 (twelve) hours, Historical Med      rOPINIRole (REQUIP) 0 5 mg tablet 2-3 tablets PO at HS, No Print      sitaGLIPtin (JANUVIA) 100 mg tablet Take 1 tablet (100 mg total) by mouth daily, Starting Mon 4/13/2020, No Print lidocaine (XYLOCAINE) 1 % 0 5 mL, Historical Med      !! metFORMIN (GLUCOPHAGE) 500 mg tablet Starting Sat 9/18/2021, Historical Med       !! - Potential duplicate medications found  Please discuss with provider        STOP taking these medications       oxyCODONE-acetaminophen (PERCOCET) 5-325 mg per tablet Comments:   Reason for Stopping:         triamcinolone acetonide (KENALOG-40) 40 mg/mL Comments:   Reason for Stopping:                   PDMP Review     None          ED Provider  Electronically Signed by           Charlene Marshall MD  04/24/22 7341

## 2022-04-22 NOTE — DISCHARGE INSTR - AVS FIRST PAGE
Dear Shalonda Noland,     It was our pleasure to care for you here at [facility], [address]  It is our hope that we were always able to exceed the expected standards for your care during your stay  You were hospitalized due to ***  You were cared for on the *** floor by Irina Zayas DO under the service of Luz Roach MD with the [facility] who covers for your primary care physician (PCP), Dominique Blue DO, while you were hospitalized  If you have any questions or concerns related to this hospitalization, you may contact us at [phone]  For follow up as well as any medication refills, we recommend that you follow up with your primary care physician  A registered nurse will reach out to you by phone within a few days after your discharge to answer any additional questions that you may have after going home  However, at this time we provide for you here, the most important instructions / recommendations at discharge:     Notable Medication Adjustments -   Lipitor 40mg once daily at dinnertime  Eye drops   Baby aspirin 81mg once daily  Testing Required after Discharge -   None   Important follow up information -   Please follow up with your eye doctor in one week  Please follow up with your primary care provider in one week  Other Instructions -   Please continue to wear a mask when going out in public and please wash your hands frequently  Please continue to practice social distancing  Please review this entire after visit summary as additional general instructions including medication list, appointments, activity, diet, any pertinent wound care, and other additional recommendations from your care team that may be provided for you        Sincerely,     Irina Zayas DO

## 2022-04-22 NOTE — DISCHARGE SUMMARY
[facility]  Discharge- Aga Doll 1940, 80 y o  female MRN: [id number]  Unit/Bed#: S -01 Encounter: [id number]  Primary Care Provider: Trisha Lozoya DO   Date and time admitted to hospital: 2022  4:59 PM    * Retinal artery occlusion, branch, left  Assessment & Plan  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  Patient was requested to come to ER to be evaluated  · CTA head and neck showed no evidence of infarct, intracranial hemorrhage a or masses, no evidence of significant stenosis, dissection or occlusions of the carotid or vertebral arteries or major vessels of the [place]  · CRP: 19 3, ESR 45  · /77 mmHg  No other neurologic deficits   · Differentials include carotid artery stenosis( unlikely based on CTA results), inflammatory disease ( Giant arteritis, LES, granulomatosis)- less likely patient has no hx of autoimmune disease and has no other complaint; small artery disease, cardiogenic embolic- most likely  · NIH score 1  · Lipid panel : , T, HDL: 77,   · HbA1c (): 6 3  · EKG : sinus tachycardia   Followed stroke pathway, MRI neg for infarct    Continue Brimonidine 0 2% bid and follow-up outpatient with opthalmology    Chronic respiratory failure with hypoxia ([facility] [address] )  Assessment & Plan  Back at baseline  Resume home oxygen     BMI 31 0-31 9,adult  Assessment & Plan  Body mass index is 31 37 kg/m²       Recommend incorporating a more whole foods plant-predominant diet along with decreasing consumption of red meats and processed foods   Per AHA guidelines, recommend moderate-vigorous intensity exercise for 30 minutes a day for 5 days a week or a total of 150 min/week as tolerated      Chronic pain  Assessment & Plan  · Chronic back pain( stenosis + surgery in the past)  · Follow with pain medicine Hypertension  Assessment & Plan  · Acceptable      Diabetes mellitus [facility])  Assessment & Plan  Lab Results   Component Value Date    HGBA1C 6 3 (H) 04/06/2022       Recent Labs     04/21/22  1152 04/21/22  1641 04/21/22  2048 04/22/22  0655   POCGLU 165* 147* 170* 159*       Blood Sugar Average: Last 72 hrs:  · (P) 647 [id number] home medication metformin 1 g a m , 500 mg p m ; Januvia 100 mg daily  · Resume home medications      Medical Problems             Resolved Problems  Date Reviewed: 4/22/2022    None              Discharging Resident: Ezzard Claude, DO  Discharging Attending: Cristina Barcenas MD  PCP: Elena Jones DO  Admission Date:   Admission Orders (From admission, onward)     Ordered        04/21/22 1405  Inpatient Admission  Once            04/20/22 2205  Place in Observation  Once                      Discharge Date: 04/22/22    Consultations During Hospital Stay:  · Neurology, case management, PT OT    Procedures Performed:   · None    Significant Findings / Test Results:   · CTA negative, MRI negative    Incidental Findings:   · None    Test Results Pending at Discharge (will require follow up): · None     Outpatient Tests Requested:  · CT 03/55/7600    Complications:  None    Reason for Admission:  Retinal artery occlusion left    Hospital Course:   Chantel Sanchez is a 80 y o  female patient who originally presented to the hospital on 4/20/2022 due to sudden vision loss in the lower half of the left field of vision only in left eye  Denied headache, chest pain, shortness of breath, abdominal pain, numbness and tingling or weakness  MRI was performed, CTA was performed  Negative findings  Patient remained hemodynamically stable throughout course of treatment and improved with supportive care  Patient qualifies for home health and that referral was sent  Patient had home a held before and so it is being reinstated      Patient was sent home on Lipitor 40 mg daily Ophthalmic solution per ophthalmology drops b i d  Aspirin 81 mg daily per neurology      Please see above list of diagnoses and related plan for additional information  Condition at Discharge: good    Discharge Day Visit / Exam:   Subjective:  Patient was seen examined today at bedside  No complaints  Patient is excited to go home  Vitals: Blood Pressure: 126/69 (04/22/22 0656)  Pulse: 70 (04/22/22 0838)  Temperature: 98 1 °F (36 7 °C) (04/22/22 0656)  Temp Source: Oral (04/21/22 1100)  Respirations: 18 (04/22/22 0656)  Height: 5' 1" (154 9 cm) (04/21/22 1325)  Weight - Scale: 75 3 kg (166 lb) (04/21/22 1325)  SpO2: 96 % (04/22/22 0656)  Exam:   Physical Exam  Vitals and nursing note reviewed  Constitutional:       General: She is not in acute distress  Appearance: She is well-developed  HENT:      Head: Normocephalic and atraumatic  Eyes:      Conjunctiva/sclera: Conjunctivae normal    Cardiovascular:      Rate and Rhythm: Normal rate and regular rhythm  Heart sounds: No murmur heard  Pulmonary:      Effort: Pulmonary effort is normal  No respiratory distress  Breath sounds: Normal breath sounds  Abdominal:      Palpations: Abdomen is soft  Tenderness: There is no abdominal tenderness  Musculoskeletal:      Cervical back: Neck supple  Skin:     General: Skin is warm and dry  Neurological:      Mental Status: She is alert  Discussion with Family: Attempted to update  (son) via phone  Unable to contact  Discharge instructions/Information to patient and family:   See after visit summary for information provided to patient and family  Provisions for Follow-Up Care:  See after visit summary for information related to follow-up care and any pertinent home health orders         Disposition:   Home    Planned Readmission:  None    Discharge Medications:  See after visit summary for reconciled discharge medications provided to patient and/or family        **Please Note: This note may have been constructed using a voice recognition system**

## 2022-04-22 NOTE — CASE MANAGEMENT
Case Management Progress Note    Patient name Gerri Morrow  Location S /S [address] [id number] MRN [id number]  : 1940 Date 2022       LOS (days): 1  Geometric Mean LOS (GMLOS) (days): 2 00  Days to GMLOS:1 2        OBJECTIVE:        Current admission status: Inpatient  Preferred Pharmacy:   [place], [place] [address]   Phone: [phone] Fax: [phone]    Primary Care Provider: Martha Ponce DO    Primary Insurance: [facility]  Secondary Insurance:     PROGRESS NOTE:    Cm met with pt and confirmed Madiha Mendoza will resume services at d/c  CM also provided her with IEP waiver step by step information  Pt had in home visit completed and was unsure of next step  Pt did confirm she has Home O2 and has her portable O2 concentrator for her transport  Pt denied any other needs at this time  CM notified Madiha Mendoza pt would be d/c via [facility]

## 2022-04-26 ENCOUNTER — TELEPHONE (OUTPATIENT)
Dept: NEUROLOGY | Facility: CLINIC | Age: 82
End: 2022-04-26

## 2022-04-26 NOTE — TELEPHONE ENCOUNTER
Patient called to schedule her hospital follow up with neurovascular  Called Dr Lupe Mas MA and she advised I can offer the patient for 6/9/22 at 930 am in [place]  Offered that date and time to Mrs Farhat Biggs and she accepted

## 2022-04-29 ENCOUNTER — TELEPHONE (OUTPATIENT)
Dept: CARDIOLOGY CLINIC | Facility: CLINIC | Age: 82
End: 2022-04-29

## 2022-04-29 NOTE — TELEPHONE ENCOUNTER
Called pt about her loop implant  Currently we do not have any date available for héctor CARBAJAL suggested [facility] but she said she will talk to her son about it   She will call back to let us know

## 2022-05-16 ENCOUNTER — OFFICE VISIT (OUTPATIENT)
Dept: PULMONOLOGY | Facility: CLINIC | Age: 82
End: 2022-05-16
Payer: COMMERCIAL

## 2022-05-16 VITALS
DIASTOLIC BLOOD PRESSURE: 70 MMHG | OXYGEN SATURATION: 92 % | BODY MASS INDEX: 29.83 KG/M2 | SYSTOLIC BLOOD PRESSURE: 120 MMHG | HEART RATE: 83 BPM | HEIGHT: 61 IN | TEMPERATURE: 98.1 F | WEIGHT: 158 LBS

## 2022-05-16 DIAGNOSIS — G89.29 OTHER CHRONIC PAIN: ICD-10-CM

## 2022-05-16 DIAGNOSIS — F17.211 CIGARETTE NICOTINE DEPENDENCE IN REMISSION: ICD-10-CM

## 2022-05-16 DIAGNOSIS — J96.11 CHRONIC RESPIRATORY FAILURE WITH HYPOXIA (HCC): ICD-10-CM

## 2022-05-16 DIAGNOSIS — J44.9 COPD, SEVERE (HCC): Primary | ICD-10-CM

## 2022-05-16 DIAGNOSIS — R63.4 UNINTENTIONAL WEIGHT LOSS: ICD-10-CM

## 2022-05-16 DIAGNOSIS — J18.9 PNEUMONIA OF BOTH LOWER LOBES DUE TO INFECTIOUS ORGANISM: ICD-10-CM

## 2022-05-16 DIAGNOSIS — H34.232 RETINAL ARTERY OCCLUSION, BRANCH, LEFT: ICD-10-CM

## 2022-05-16 PROCEDURE — 99214 OFFICE O/P EST MOD 30 MIN: CPT | Performed by: INTERNAL MEDICINE

## 2022-05-16 RX ORDER — BUDESONIDE, GLYCOPYRROLATE, AND FORMOTEROL FUMARATE 160; 9; 4.8 UG/1; UG/1; UG/1
2 AEROSOL, METERED RESPIRATORY (INHALATION) 2 TIMES DAILY
Qty: 31.1 G | Refills: 3 | Status: SHIPPED | OUTPATIENT
Start: 2022-05-16

## 2022-05-16 RX ORDER — OXYCODONE 13.5 MG/1
13.5 CAPSULE, EXTENDED RELEASE ORAL 2 TIMES DAILY
Qty: 60 CAPSULE | Refills: 0
Start: 2022-05-16

## 2022-05-16 NOTE — ASSESSMENT & PLAN NOTE
The patient had recent sudden lower left vision loss  Her symptoms continue  She is using eyedrops  She is following closely with ophthalmology

## 2022-05-16 NOTE — ASSESSMENT & PLAN NOTE
- admitted in January with AECOPD and aspiration pneumonia  - check repeat CT scan to ensure resolution of infiltrates

## 2022-05-16 NOTE — PROGRESS NOTES
Pulmonary Follow Up Note   Oren Manriquez 80 y o  female MRN: [id number]  5/16/2022      Referring provider: Dr Amarilys Solorio and Plan:    Cigarette nicotine dependence in remission  - remains committed to abstinence  - does not qualify for screening CT scan based on age    Chronic pain  - currently on Xtampza ER    Chronic respiratory failure with hypoxia ([facility] [address] )  - Cont  2-3LPM with ambulation  COPD, severe ([facility] [address] )  Recent exacerbation in January/FEbruary, now improved  - Refill [place]  Discussed risks/benefits including pneumonia  - Use Albuterol as needed  - discussed pulmonary rehab and she declines at this time  - up to date on vaccines  Pneumonia due to infectious organism  - admitted in January with AECOPD and aspiration pneumonia  - check repeat CT scan to ensure resolution of infiltrates  Unintentional weight loss  Lost 17 pounds since she was here in September  Was hospitalized x 2 and was in the nursing home  - recent labs are normal    - suspect weight loss due to hospitalizations  - cont to monitor  If continues to lose weight, may need more extensive work up  Unclear that this is pulmonary cachexia  Pt has end stage COPD but is not breathless at rest     Retinal artery occlusion, branch, left  The patient had recent sudden lower left vision loss  Her symptoms continue  She is using eyedrops  She is following closely with ophthalmology  Diagnoses and all orders for this visit:    COPD, severe ([facility] [address] )  -     Budeson-Glycopyrrol-Formoterol (Breztri Aerosphere) 160-9-4 8 MCG/ACT AERO; Inhale 2 puffs  in the morning and 2 puffs before bedtime  Rinse mouth after use       Chronic respiratory failure with hypoxia (HCC)    Pneumonia of both lower lobes due to infectious organism  -     CT chest without contrast; Future    Retinal artery occlusion, branch, left    Unintentional weight loss    Cigarette nicotine dependence in remission    Other chronic pain  -     oxyCODONE ER [facility]) 13 5 MG C12A; Take 13 5 mg by mouth 2 (two) times a day Max Daily Amount: 27 mg    I discussed the plan of care in detail with the patient and her son  Concerns and questions addressed  Return for follow-up in 4 months or sooner if needed  History of Present Illness   HPI:  Vamshi Almanza is a 80 y o  female who presents for follow-up of severe COPD  She was last seen by me in September  Since then, she feels her breathing is okay, back to baseline  She has shortness of breath on exertion  She can ambulate a few feet and then stops to catch her breath  She is using Breztri but ran out  Rinses after use  Finds it helpful  Rarely needs Albuterol  Wears 2L oxygen with ambulation, 3L when she is at home  Her vision is still bad  She is using eyedrops  Hospital records reviewed  She was admitted at [facility] 1/21-1/28 after a fall  She was found have intractable back pain as well as COVID-19  She was treated for COVID with remdesivir and Decadron  She was admitted from 01/30 to 2/6 with tachycardia and hypoxia in the setting of COVID  She was treated for COVID pneumonia, exacerbation of COPD as well as aspiration pneumonia  She was treated with Pip/Tazo and steroids  The patient was admitted 04/20 to 4/22 with sudden loss of vision in her left lower vision field  She was seen by ophthalmology and found have retinal artery occlusion  Her stroke workup was negative  Decreased appetite, losing weight  Does not like foods that she used to find appealing  She is here with her son who provides additional history        Review of Systems  Positive as above and negative otherwise    Historical Information   Past Medical History:   Diagnosis Date    Cardiac disease     COPD [facility]chronic obstructive pulmonary disease) ([facility] [address] )     Diabetes mellitus ([facility] [address] )     GERD (gastroesophageal reflux disease)     Hiatal hernia     Hypertension     PUD (peptic ulcer disease)     Residual ASD (atrial septal defect) following repair      Past Surgical History:   Procedure Laterality Date    ASD REPAIR      BACK SURGERY      x3    JOINT REPLACEMENT      bilateral knee surgery    KNEE SURGERY      SHOULDER SURGERY       Family History   Problem Relation Age of Onset    Cancer Mother     Asthma Father          Meds/Allergies     Current Outpatient Medications:     albuterol (PROVENTIL HFA,VENTOLIN HFA) 90 mcg/act inhaler, Inhale 2 puffs every 6 (six) hours as needed for wheezing, Disp: 54 g, Rfl: 3    ascorbic acid (VITAMIN C) 1000 MG tablet, Take 1,000 mg by mouth 2 (two) times a day, Disp: , Rfl:     aspirin 81 mg chewable tablet, Chew 1 tablet (81 mg total) daily, Disp: 30 tablet, Rfl: 0    atorvastatin (LIPITOR) 40 mg tablet, Take 1 tablet (40 mg total) by mouth every evening, Disp: 30 tablet, Rfl: 0    brimonidine tartrate 0 2 % ophthalmic solution, Administer 1 drop into the left eye 2 (two) times a day, Disp: 5 mL, Rfl: 0    Budeson-Glycopyrrol-Formoterol (Breztri Aerosphere) 160-9-4 8 MCG/ACT AERO, Inhale 2 puffs 2 (two) times a day Rinse mouth after use , Disp: 31 1 g, Rfl: 3    diltiazem (CARDIZEM CD) 180 mg 24 hr capsule, Take 180 mg by mouth daily, Disp: , Rfl:     gabapentin (NEURONTIN) 300 mg capsule, Take 300 mg by mouth 4 (four) times a day, Disp: , Rfl:     losartan (COZAAR) 50 mg tablet, TAKE 1 TABLET BY MOUTH EVERY DAY, Disp: , Rfl:     metFORMIN (GLUCOPHAGE) 1000 MG tablet, 1000 mg QAM and 500 mg QPM, Disp: 1 tablet, Rfl: 0    metFORMIN (GLUCOPHAGE) 500 mg tablet, , Disp: , Rfl:     pantoprazole (PROTONIX) 40 mg tablet, Take 40 mg by mouth every 12 (twelve) hours, Disp: , Rfl:     rOPINIRole (REQUIP) 0 5 mg tablet, 2-3 tablets PO at HS, Disp: 30 tablet, Rfl: 0    sitaGLIPtin (JANUVIA) 100 mg tablet, Take 1 tablet (100 mg total) by mouth daily, Disp: 30 tablet, Rfl: 0    lidocaine (XYLOCAINE) 1 %, 0 5 mL (Patient not taking: Reported on 5/16/2022), Disp: , Rfl:    OxyCONTIN 10 MG 12 hr tablet, TAKE ONE TABLET BY MOUTH EVERY 12 HOURS FOR PAIN  DNF UNTIL 9/10/21 (Patient not taking: Reported on 5/16/2022), Disp: , Rfl:   Allergies   Allergen Reactions    Prednisone      The patient reports "they make me goofy "  No true allergic reaction    Psyllium     Tetanus Toxoids        Vitals: Blood pressure 120/70, pulse 83, temperature 98 1 °F (36 7 °C), temperature source Tympanic, height 5' 1" (1 549 m), weight 71 7 kg (158 lb), SpO2 92 %, not currently breastfeeding  Body mass index is 29 85 kg/m²  Oxygen Therapy  SpO2: 92 %  Oxygen Therapy: Supplemental oxygen  O2 Delivery Method: Nasal cannula  O2 Flow Rate (L/min): 2 L/min      Physical Exam  GEN: WDWN, nad, comfortable  HEENT: NCAT, EOMI  CVS: Regular, no m/r/g  LUNGS: CTA b/l  ABD: soft  EXT: No c/c/e  NEURO: No focal deficits  MS: Moving all extremities  SKIN: warm, dry  PSYCH: calm, cooperative      Labs: I have personally reviewed pertinent lab results  Lab Results   Component Value Date    WBC 6 14 04/22/2022    HGB 11 4 (L) 04/22/2022    HCT 37 2 04/22/2022    MCV 93 04/22/2022     04/22/2022     Lab Results   Component Value Date    GLUCOSE 170 (H) 05/19/2017    CALCIUM 9 0 04/22/2022     07/14/2015    K 4 3 04/22/2022    CO2 29 04/22/2022     04/22/2022    BUN 18 04/22/2022    CREATININE 0 88 04/22/2022     No results found for: IGE  Lab Results   Component Value Date    ALT 12 04/21/2022    AST 15 04/21/2022    ALKPHOS 87 04/21/2022    BILITOT 0 3 07/13/2015     Labs per my interpretation show anemia, normal renal function    Pulmonary function testing:  Spirometry 12/20: Per my review shows severe obstruction with an FEV1/FVC 64% predicted and FEV1 1 69 L, 42% predicted    EKG, Pathology, and Other Studies: I have personally reviewed pertinent reports  Echo 4/22: EF normal, grade 1 diastolic dysfunction; mild pulmonary hypertension PA P 45 mmHg    [facility]

## 2022-05-16 NOTE — ASSESSMENT & PLAN NOTE
Recent exacerbation in January/FEbruary, now improved  - Refill [place]  Discussed risks/benefits including pneumonia  - Use Albuterol as needed  - discussed pulmonary rehab and she declines at this time  - up to date on vaccines

## 2022-05-16 NOTE — ASSESSMENT & PLAN NOTE
Lost 17 pounds since she was here in September  Was hospitalized x 2 and was in the nursing home  - recent labs are normal    - suspect weight loss due to hospitalizations  - cont to monitor  If continues to lose weight, may need more extensive work up  Unclear that this is pulmonary cachexia   Pt has end stage COPD but is not breathless at rest

## 2022-05-25 NOTE — TELEPHONE ENCOUNTER
Spoke with pt and her son,    Travis Sanchez to see if [facility]  She doesn't want to and would wait for July schedule to open up for sarah Castro and her son doesn't understand why she needs one  I asked if she had a stroke recently and they said no she didn't  Looking at the hospital encounter on  4/22  She had a sudden loss of vision in the left lower vision feel  She is brought to the ER for further stroke workup and MRI was negative  Neurology of cleared the patient for discharge from their standpoint and the patient herself states that her symptoms are improving and she wishes to be discharged home with visiting nurses  I told her son that I will reach out to Neuro to see if its still needed or not  He said ok  Robert Rubio,     Does the patient still need the loop implant?     Thank you

## 2022-06-08 ENCOUNTER — OFFICE VISIT (OUTPATIENT)
Dept: NEUROLOGY | Facility: CLINIC | Age: 82
End: 2022-06-08
Payer: COMMERCIAL

## 2022-06-08 ENCOUNTER — TELEPHONE (OUTPATIENT)
Dept: NEUROLOGY | Facility: CLINIC | Age: 82
End: 2022-06-08

## 2022-06-08 VITALS
HEIGHT: 61 IN | BODY MASS INDEX: 29.83 KG/M2 | HEART RATE: 80 BPM | WEIGHT: 158 LBS | DIASTOLIC BLOOD PRESSURE: 80 MMHG | SYSTOLIC BLOOD PRESSURE: 110 MMHG

## 2022-06-08 DIAGNOSIS — H34.232 RETINAL ARTERY OCCLUSION, BRANCH, LEFT: ICD-10-CM

## 2022-06-08 DIAGNOSIS — H34.239: Primary | ICD-10-CM

## 2022-06-08 DIAGNOSIS — G89.29 CHRONIC PAIN: ICD-10-CM

## 2022-06-08 DIAGNOSIS — I10 HYPERTENSION, UNSPECIFIED TYPE: ICD-10-CM

## 2022-06-08 PROCEDURE — 99215 OFFICE O/P EST HI 40 MIN: CPT | Performed by: PSYCHIATRY & NEUROLOGY

## 2022-06-08 NOTE — PROGRESS NOTES
Patient ID: Clint Vivar is a 80 y o  female  Assessment/Plan: This is a 81 y/o  Female who is here as a hospital follow up for retinal branch artery occlusion, reviewed imaging which was negative for any acute findings  PLAN:      Diagnoses and all orders for this visit:    Retinal arterial branch occlusion  -for stroke prevention continue with combination of aspirin and atorvastatin  -BP goal < 130/80, BP is at goal in the office  -LDL goal <70  -refer patient to cardiology for loop recorder placement    -does not smoke at this time   -no snoring  -I advised patient to avoid using NSAIDs for headaches or other pain and to stick to tylenol if needed  -Recommend mediterranean diet & regular exercise regimen atleast 4-5 times a week for 20-30 minutes  -I educated patient/family regarding medication compliance  -     Ambulatory Referral to Cardiology; Future    Retinal artery occlusion, branch, left    Hypertension, unspecified type    Chronic pain  -continue with gabapentin 300mg QID       Follow up - 6 months  I would be happy to see the patient sooner if any new questions/concerns arise  Patient/Guardian was advised to the call the office if they have any questions and concerns in the meantime  Patient/Guardian does understand that if they have any new stroke like symptoms such as facial droop on one side, weakness/paralysis on either side, speech trouble, numbness on one side, balance issues, any vision changes, extreme dizziness or any new headache, to call 9-1-1 immediately or to proceed to the nearest ER immediately  Subjective:    HPI    This is a 81 y/o Female who is here as a hospital follow up for retinal artery branch occlusion  Patient had visual symptoms on the Left eye, and could not see things and woke up w/ these symptoms few months ago, and then she had extensive workup most of which was negative, so it was thought to be retinal artery branch occlusion   I personally reviewed all imaging (MRI brain which was negative for a stroke)  Patient denies any new TIA/CVA like symptoms and is complaint w/ her medications and she has not noticed a difference in her visual symptoms she states  She was recommend a loop recorder placement in the hospital but she is not sure if she wants to at this time  The following portions of the patient's history were reviewed and updated as appropriate:   She  has a past medical history of Cardiac disease, COPD (chronic obstructive pulmonary disease) ([facility] ), Diabetes mellitus ([facility] ), GERD (gastroesophageal reflux disease), Hiatal hernia, Hypertension, PUD (peptic ulcer disease), and Residual ASD (atrial septal defect) following repair  She   Patient Active Problem List    Diagnosis Date Noted    Pneumonia due to infectious organism 05/16/2022    Unintentional weight loss 05/16/2022    BMI 31 0-31 9,adult 04/21/2022    Retinal artery occlusion, branch, left 04/20/2022    Chronic pain 04/20/2022    ASD (atrial septal defect) 07/30/2020    Exertional dyspnea 04/13/2020    Chronic respiratory failure with hypoxia ([facility] ) 05/13/2017    GERD (gastroesophageal reflux disease) 05/13/2017    Cigarette nicotine dependence in remission 05/13/2017    Diabetes mellitus ([facility] ) 03/04/2016    Hypertension 03/04/2016    COPD, severe ([facility] ) 03/03/2016     She  has a past surgical history that includes ASD repair; Knee surgery; Shoulder surgery; Back surgery; and Joint replacement  Her family history includes Asthma in her father; Cancer in her mother  She  reports that she quit smoking about 5 years ago  She started smoking about 66 years ago  She has a 60 00 pack-year smoking history  She has never used smokeless tobacco  She reports that she does not drink alcohol and does not use drugs    Current Outpatient Medications   Medication Sig Dispense Refill    albuterol (PROVENTIL HFA,VENTOLIN HFA) 90 mcg/act inhaler Inhale 2 puffs every 6 (six) hours as needed for wheezing 54 g 3    ascorbic acid (VITAMIN C) 1000 MG tablet Take 1,000 mg by mouth 2 (two) times a day      aspirin 81 mg chewable tablet Chew 1 tablet (81 mg total) daily 30 tablet 0    atorvastatin (LIPITOR) 40 mg tablet Take 1 tablet (40 mg total) by mouth every evening 30 tablet 0    brimonidine tartrate 0 2 % ophthalmic solution Administer 1 drop into the left eye 2 (two) times a day 5 mL 0    Budeson-Glycopyrrol-Formoterol (Breztri Aerosphere) 160-9-4 8 MCG/ACT AERO Inhale 2 puffs  in the morning and 2 puffs before bedtime  Rinse mouth after use    31 1 g 3    diltiazem (CARDIZEM CD) 180 mg 24 hr capsule Take 180 mg by mouth daily      gabapentin (NEURONTIN) 300 mg capsule Take 300 mg by mouth 4 (four) times a day      losartan (COZAAR) 50 mg tablet TAKE 1 TABLET BY MOUTH EVERY DAY      metFORMIN (GLUCOPHAGE) 1000 MG tablet 1000 mg QAM and 500 mg QPM 1 tablet 0    metFORMIN (GLUCOPHAGE) 500 mg tablet       oxyCODONE ER (Xtampza ER) 13 5 MG C12A Take 13 5 mg by mouth 2 (two) times a day Max Daily Amount: 27 mg 60 capsule 0    pantoprazole (PROTONIX) 40 mg tablet Take 40 mg by mouth every 12 (twelve) hours      rOPINIRole (REQUIP) 0 5 mg tablet 2-3 tablets PO at HS 30 tablet 0    sitaGLIPtin (JANUVIA) 100 mg tablet Take 1 tablet (100 mg total) by mouth daily 30 tablet 0     No current facility-administered medications for this visit       Current Outpatient Medications on File Prior to Visit   Medication Sig    albuterol (PROVENTIL HFA,VENTOLIN HFA) 90 mcg/act inhaler Inhale 2 puffs every 6 (six) hours as needed for wheezing    ascorbic acid (VITAMIN C) 1000 MG tablet Take 1,000 mg by mouth 2 (two) times a day    aspirin 81 mg chewable tablet Chew 1 tablet (81 mg total) daily    atorvastatin (LIPITOR) 40 mg tablet Take 1 tablet (40 mg total) by mouth every evening    brimonidine tartrate 0 2 % ophthalmic solution Administer 1 drop into the left eye 2 (two) times a day    Budeson-Glycopyrrol-Formoterol (Breztri Aerosphere) 160-9-4 8 MCG/ACT AERO Inhale 2 puffs  in the morning and 2 puffs before bedtime  Rinse mouth after use  Norlene Mini diltiazem (CARDIZEM CD) 180 mg 24 hr capsule Take 180 mg by mouth daily    gabapentin (NEURONTIN) 300 mg capsule Take 300 mg by mouth 4 (four) times a day    losartan (COZAAR) 50 mg tablet TAKE 1 TABLET BY MOUTH EVERY DAY    metFORMIN (GLUCOPHAGE) 1000 MG tablet 1000 mg QAM and 500 mg QPM    metFORMIN (GLUCOPHAGE) 500 mg tablet     oxyCODONE ER (Xtampza ER) 13 5 MG C12A Take 13 5 mg by mouth 2 (two) times a day Max Daily Amount: 27 mg    pantoprazole (PROTONIX) 40 mg tablet Take 40 mg by mouth every 12 (twelve) hours    rOPINIRole (REQUIP) 0 5 mg tablet 2-3 tablets PO at HS    sitaGLIPtin (JANUVIA) 100 mg tablet Take 1 tablet (100 mg total) by mouth daily     No current facility-administered medications on file prior to visit  She is allergic to prednisone, psyllium, and tetanus toxoids            Objective:    Blood pressure 110/80, pulse 80, height 5' 1" (1 549 m), weight 71 7 kg (158 lb), not currently breastfeeding  Physical Exam  General - Patient is alert, awake, oriented to time, place and person, follows commands    Neuro:   Cranial nerves: PERRL, EOMI, facial sensation intact, able to raise eyebrows symmetrically, cannot assess facial droop and tongue deviation due to face mask requirement  Motor: 5/5 throughout, normal tone, no pronator drift noted  Sensory - intact to soft touch throughout  Reflexes - 2+ throughout  Coordination - no ataxia/dysmetria noted  Gait - normal   Neurological Exam      ROS:  Reviewed ROS   Review of Systems   Constitutional: Positive for fatigue  Negative for appetite change and fever  HENT: Negative  Negative for hearing loss, tinnitus, trouble swallowing and voice change  Eyes: Positive for visual disturbance  Negative for photophobia and pain  Respiratory: Negative    Negative for shortness of breath  Cardiovascular: Negative  Negative for palpitations  Gastrointestinal: Negative  Negative for nausea and vomiting  Endocrine: Negative  Negative for cold intolerance  Genitourinary: Negative  Negative for dysuria, frequency and urgency  Musculoskeletal: Positive for back pain and gait problem  Negative for myalgias and neck pain  Skin: Negative  Negative for rash  Neurological: Positive for weakness  Negative for dizziness, tremors, seizures, syncope, facial asymmetry, speech difficulty, light-headedness, numbness and headaches  Hematological: Negative  Does not bruise/bleed easily  Psychiatric/Behavioral: Negative  Negative for confusion, hallucinations and sleep disturbance

## 2022-06-08 NOTE — TELEPHONE ENCOUNTER
Spoke to patient and confirmed her 3:30 pm appointment with Dr Anita Mesa for today, 6/8/2022 at [facility]

## 2022-09-25 ENCOUNTER — APPOINTMENT (EMERGENCY)
Dept: CT IMAGING | Facility: HOSPITAL | Age: 82
End: 2022-09-25
Payer: COMMERCIAL

## 2022-09-25 ENCOUNTER — APPOINTMENT (OUTPATIENT)
Dept: RADIOLOGY | Facility: HOSPITAL | Age: 82
End: 2022-09-25
Payer: COMMERCIAL

## 2022-09-25 ENCOUNTER — HOSPITAL ENCOUNTER (OUTPATIENT)
Facility: HOSPITAL | Age: 82
Setting detail: OBSERVATION
End: 2022-09-26
Attending: EMERGENCY MEDICINE | Admitting: INTERNAL MEDICINE
Payer: COMMERCIAL

## 2022-09-25 DIAGNOSIS — R10.13 EPIGASTRIC ABDOMINAL PAIN: Primary | ICD-10-CM

## 2022-09-25 DIAGNOSIS — I95.9 TRANSIENT HYPOTENSION: ICD-10-CM

## 2022-09-25 DIAGNOSIS — R06.82 TACHYPNEA: ICD-10-CM

## 2022-09-25 DIAGNOSIS — I77.811 ABDOMINAL AORTIC ECTASIA (HCC): ICD-10-CM

## 2022-09-25 DIAGNOSIS — T68.XXXA HYPOTHERMIA, INITIAL ENCOUNTER: ICD-10-CM

## 2022-09-25 DIAGNOSIS — K44.9 HIATAL HERNIA: ICD-10-CM

## 2022-09-25 LAB
ALBUMIN SERPL BCP-MCNC: 4 G/DL (ref 3.5–5)
ALP SERPL-CCNC: 72 U/L (ref 34–104)
ALT SERPL W P-5'-P-CCNC: 8 U/L (ref 7–52)
ANION GAP SERPL CALCULATED.3IONS-SCNC: 14 MMOL/L (ref 4–13)
AST SERPL W P-5'-P-CCNC: 11 U/L (ref 13–39)
BASOPHILS # BLD AUTO: 0.14 THOUSANDS/ΜL (ref 0–0.1)
BASOPHILS NFR BLD AUTO: 1 % (ref 0–1)
BILIRUB SERPL-MCNC: 0.45 MG/DL (ref 0.2–1)
BNP SERPL-MCNC: 52 PG/ML (ref 0–100)
BUN SERPL-MCNC: 15 MG/DL (ref 5–25)
CALCIUM SERPL-MCNC: 9.9 MG/DL (ref 8.4–10.2)
CARDIAC TROPONIN I PNL SERPL HS: 3 NG/L
CHLORIDE SERPL-SCNC: 99 MMOL/L (ref 96–108)
CO2 SERPL-SCNC: 25 MMOL/L (ref 21–32)
CREAT SERPL-MCNC: 0.96 MG/DL (ref 0.6–1.3)
EOSINOPHIL # BLD AUTO: 0.36 THOUSAND/ΜL (ref 0–0.61)
EOSINOPHIL NFR BLD AUTO: 3 % (ref 0–6)
ERYTHROCYTE [DISTWIDTH] IN BLOOD BY AUTOMATED COUNT: 14.6 % (ref 11.6–15.1)
GFR SERPL CREATININE-BSD FRML MDRD: 55 ML/MIN/1.73SQ M
GLUCOSE SERPL-MCNC: 178 MG/DL (ref 65–140)
GLUCOSE SERPL-MCNC: 194 MG/DL (ref 65–140)
HCT VFR BLD AUTO: 39.1 % (ref 34.8–46.1)
HGB BLD-MCNC: 12.2 G/DL (ref 11.5–15.4)
IMM GRANULOCYTES # BLD AUTO: 0.07 THOUSAND/UL (ref 0–0.2)
IMM GRANULOCYTES NFR BLD AUTO: 1 % (ref 0–2)
LYMPHOCYTES # BLD AUTO: 3.46 THOUSANDS/ΜL (ref 0.6–4.47)
LYMPHOCYTES NFR BLD AUTO: 27 % (ref 14–44)
MCH RBC QN AUTO: 28.1 PG (ref 26.8–34.3)
MCHC RBC AUTO-ENTMCNC: 31.2 G/DL (ref 31.4–37.4)
MCV RBC AUTO: 90 FL (ref 82–98)
MONOCYTES # BLD AUTO: 1.37 THOUSAND/ΜL (ref 0.17–1.22)
MONOCYTES NFR BLD AUTO: 11 % (ref 4–12)
NEUTROPHILS # BLD AUTO: 7.45 THOUSANDS/ΜL (ref 1.85–7.62)
NEUTS SEG NFR BLD AUTO: 57 % (ref 43–75)
NRBC BLD AUTO-RTO: 0 /100 WBCS
PLATELET # BLD AUTO: 281 THOUSANDS/UL (ref 149–390)
PMV BLD AUTO: 9.4 FL (ref 8.9–12.7)
POTASSIUM SERPL-SCNC: 3.6 MMOL/L (ref 3.5–5.3)
PROT SERPL-MCNC: 6.8 G/DL (ref 6.4–8.4)
RBC # BLD AUTO: 4.34 MILLION/UL (ref 3.81–5.12)
SODIUM SERPL-SCNC: 138 MMOL/L (ref 135–147)
WBC # BLD AUTO: 12.85 THOUSAND/UL (ref 4.31–10.16)

## 2022-09-25 PROCEDURE — 36415 COLL VENOUS BLD VENIPUNCTURE: CPT

## 2022-09-25 PROCEDURE — 85025 COMPLETE CBC W/AUTO DIFF WBC: CPT

## 2022-09-25 PROCEDURE — G1004 CDSM NDSC: HCPCS

## 2022-09-25 PROCEDURE — 85610 PROTHROMBIN TIME: CPT

## 2022-09-25 PROCEDURE — 80053 COMPREHEN METABOLIC PANEL: CPT

## 2022-09-25 PROCEDURE — 74174 CTA ABD&PLVS W/CONTRAST: CPT

## 2022-09-25 PROCEDURE — 84484 ASSAY OF TROPONIN QUANT: CPT

## 2022-09-25 PROCEDURE — 99285 EMERGENCY DEPT VISIT HI MDM: CPT

## 2022-09-25 PROCEDURE — 82948 REAGENT STRIP/BLOOD GLUCOSE: CPT

## 2022-09-25 PROCEDURE — 83880 ASSAY OF NATRIURETIC PEPTIDE: CPT | Performed by: EMERGENCY MEDICINE

## 2022-09-25 PROCEDURE — 85730 THROMBOPLASTIN TIME PARTIAL: CPT

## 2022-09-25 PROCEDURE — 93005 ELECTROCARDIOGRAM TRACING: CPT

## 2022-09-25 PROCEDURE — 71275 CT ANGIOGRAPHY CHEST: CPT

## 2022-09-25 PROCEDURE — 96374 THER/PROPH/DIAG INJ IV PUSH: CPT

## 2022-09-25 PROCEDURE — 99281 EMR DPT VST MAYX REQ PHY/QHP: CPT | Performed by: EMERGENCY MEDICINE

## 2022-09-25 RX ORDER — FENTANYL CITRATE 50 UG/ML
INJECTION, SOLUTION INTRAMUSCULAR; INTRAVENOUS
Status: DISCONTINUED
Start: 2022-09-25 | End: 2022-09-26 | Stop reason: HOSPADM

## 2022-09-25 RX ORDER — FENTANYL CITRATE 50 UG/ML
25 INJECTION, SOLUTION INTRAMUSCULAR; INTRAVENOUS ONCE
Status: COMPLETED | OUTPATIENT
Start: 2022-09-25 | End: 2022-09-26

## 2022-09-25 RX ORDER — ONDANSETRON 2 MG/ML
4 INJECTION INTRAMUSCULAR; INTRAVENOUS ONCE
Status: COMPLETED | OUTPATIENT
Start: 2022-09-25 | End: 2022-09-26

## 2022-09-25 RX ORDER — FENTANYL CITRATE 50 UG/ML
12.5 INJECTION, SOLUTION INTRAMUSCULAR; INTRAVENOUS ONCE
Status: COMPLETED | OUTPATIENT
Start: 2022-09-25 | End: 2022-09-25

## 2022-09-25 RX ORDER — FENTANYL CITRATE 50 UG/ML
12.5 INJECTION, SOLUTION INTRAMUSCULAR; INTRAVENOUS ONCE
Status: DISCONTINUED | OUTPATIENT
Start: 2022-09-25 | End: 2022-09-25

## 2022-09-25 RX ADMIN — FENTANYL CITRATE 12.5 MCG: 50 INJECTION INTRAMUSCULAR; INTRAVENOUS at 23:17

## 2022-09-25 RX ADMIN — IOHEXOL 100 ML: 350 INJECTION, SOLUTION INTRAVENOUS at 23:28

## 2022-09-26 ENCOUNTER — ANESTHESIA EVENT (INPATIENT)
Dept: PERIOP | Facility: HOSPITAL | Age: 82
DRG: 326 | End: 2022-09-26
Payer: COMMERCIAL

## 2022-09-26 ENCOUNTER — HOSPITAL ENCOUNTER (INPATIENT)
Facility: HOSPITAL | Age: 82
LOS: 8 days | Discharge: NURSING, REHAB OR LONG-TERM CARE | DRG: 326 | End: 2022-10-04
Attending: THORACIC SURGERY (CARDIOTHORACIC VASCULAR SURGERY) | Admitting: THORACIC SURGERY (CARDIOTHORACIC VASCULAR SURGERY)
Payer: COMMERCIAL

## 2022-09-26 ENCOUNTER — ANESTHESIA (INPATIENT)
Dept: PERIOP | Facility: HOSPITAL | Age: 82
DRG: 326 | End: 2022-09-26
Payer: COMMERCIAL

## 2022-09-26 ENCOUNTER — APPOINTMENT (OUTPATIENT)
Dept: RADIOLOGY | Facility: HOSPITAL | Age: 82
End: 2022-09-26
Payer: COMMERCIAL

## 2022-09-26 VITALS
RESPIRATION RATE: 41 BRPM | HEART RATE: 85 BPM | DIASTOLIC BLOOD PRESSURE: 83 MMHG | OXYGEN SATURATION: 96 % | TEMPERATURE: 97.6 F | SYSTOLIC BLOOD PRESSURE: 174 MMHG

## 2022-09-26 DIAGNOSIS — K44.9 LARGE HIATAL HERNIA: Primary | ICD-10-CM

## 2022-09-26 DIAGNOSIS — J44.9 CHRONIC OBSTRUCTIVE PULMONARY DISEASE, UNSPECIFIED COPD TYPE (HCC): ICD-10-CM

## 2022-09-26 PROBLEM — R53.82 CHRONIC FATIGUE: Status: ACTIVE | Noted: 2017-12-14

## 2022-09-26 PROBLEM — M54.9 BACKACHE: Status: ACTIVE | Noted: 2022-01-21

## 2022-09-26 PROBLEM — M79.676 PAIN IN TOE: Status: ACTIVE | Noted: 2021-06-30

## 2022-09-26 PROBLEM — R31.29 MICROSCOPIC HEMATURIA: Status: ACTIVE | Noted: 2019-08-21

## 2022-09-26 PROBLEM — M25.376 INSTABILITY OF FOOT JOINT: Status: ACTIVE | Noted: 2021-06-07

## 2022-09-26 PROBLEM — M19.079 PRIMARY LOCALIZED OSTEOARTHROSIS OF ANKLE AND FOOT: Status: ACTIVE | Noted: 2019-07-09

## 2022-09-26 PROBLEM — R05.9 COUGH IN ADULT: Status: ACTIVE | Noted: 2018-03-28

## 2022-09-26 PROBLEM — M21.41 ACQUIRED PES PLANUS OF RIGHT FOOT: Status: ACTIVE | Noted: 2019-10-28

## 2022-09-26 PROBLEM — K44.0 PARAESOPHAGEAL HERNIA WITH OBSTRUCTION BUT NO GANGRENE: Status: ACTIVE | Noted: 2022-09-26

## 2022-09-26 PROBLEM — J69.0 ASPIRATION PNEUMONIA (HCC): Status: ACTIVE | Noted: 2022-01-31

## 2022-09-26 PROBLEM — R03.1 LOW BLOOD PRESSURE READING: Status: ACTIVE | Noted: 2022-09-26

## 2022-09-26 PROBLEM — R50.9 FEVER IN ADULT: Status: ACTIVE | Noted: 2018-03-28

## 2022-09-26 PROBLEM — M25.579 ANKLE PAIN: Status: ACTIVE | Noted: 2019-07-09

## 2022-09-26 PROBLEM — N39.41 URGE INCONTINENCE: Status: ACTIVE | Noted: 2019-08-21

## 2022-09-26 PROBLEM — B35.1 ONYCHOMYCOSIS: Status: ACTIVE | Noted: 2021-06-30

## 2022-09-26 PROBLEM — R10.9 ABDOMINAL PAIN: Status: ACTIVE | Noted: 2022-09-26

## 2022-09-26 PROBLEM — M25.571 SINUS TARSI SYNDROME OF RIGHT ANKLE: Status: ACTIVE | Noted: 2020-10-07

## 2022-09-26 PROBLEM — R65.10 SIRS (SYSTEMIC INFLAMMATORY RESPONSE SYNDROME) (HCC): Status: ACTIVE | Noted: 2022-09-26

## 2022-09-26 PROBLEM — E87.20 LACTIC ACIDOSIS: Status: ACTIVE | Noted: 2022-09-26

## 2022-09-26 PROBLEM — U07.1 COVID-19: Status: ACTIVE | Noted: 2022-01-30

## 2022-09-26 PROBLEM — Z86.2 HISTORY OF IRON DEFICIENCY ANEMIA: Status: ACTIVE | Noted: 2018-04-11

## 2022-09-26 LAB
2HR DELTA HS TROPONIN: 4 NG/L
4HR DELTA HS TROPONIN: 7 NG/L
ABO GROUP BLD: NORMAL
ABO GROUP BLD: NORMAL
ANION GAP SERPL CALCULATED.3IONS-SCNC: 8 MMOL/L (ref 4–13)
ANION GAP SERPL CALCULATED.3IONS-SCNC: 8 MMOL/L (ref 4–13)
APTT PPP: 27 SECONDS (ref 23–37)
BACTERIA UR QL AUTO: NORMAL /HPF
BASE EX.OXY STD BLDV CALC-SCNC: 70.5 % (ref 60–80)
BASE EXCESS BLDA CALC-SCNC: 2.7 MMOL/L
BASE EXCESS BLDV CALC-SCNC: -6.8 MMOL/L
BASOPHILS # BLD AUTO: 0.05 THOUSANDS/ΜL (ref 0–0.1)
BASOPHILS NFR BLD AUTO: 1 % (ref 0–1)
BETA-HYDROXYBUTYRATE: 0.8 MMOL/L
BILIRUB UR QL STRIP: NEGATIVE
BLD GP AB SCN SERPL QL: NEGATIVE
BLD GP AB SCN SERPL QL: NEGATIVE
BUN SERPL-MCNC: 14 MG/DL (ref 5–25)
BUN SERPL-MCNC: 9 MG/DL (ref 5–25)
CALCIUM SERPL-MCNC: 8.5 MG/DL (ref 8.3–10.1)
CALCIUM SERPL-MCNC: 8.7 MG/DL (ref 8.4–10.2)
CARDIAC TROPONIN I PNL SERPL HS: 10 NG/L
CARDIAC TROPONIN I PNL SERPL HS: 7 NG/L
CHLORIDE SERPL-SCNC: 103 MMOL/L (ref 96–108)
CHLORIDE SERPL-SCNC: 105 MMOL/L (ref 96–108)
CLARITY UR: CLEAR
CO2 SERPL-SCNC: 24 MMOL/L (ref 21–32)
CO2 SERPL-SCNC: 26 MMOL/L (ref 21–32)
COLOR UR: ABNORMAL
CREAT SERPL-MCNC: 0.65 MG/DL (ref 0.6–1.3)
CREAT SERPL-MCNC: 0.83 MG/DL (ref 0.6–1.3)
EOSINOPHIL # BLD AUTO: 0.02 THOUSAND/ΜL (ref 0–0.61)
EOSINOPHIL NFR BLD AUTO: 0 % (ref 0–6)
ERYTHROCYTE [DISTWIDTH] IN BLOOD BY AUTOMATED COUNT: 14.7 % (ref 11.6–15.1)
ERYTHROCYTE [DISTWIDTH] IN BLOOD BY AUTOMATED COUNT: 14.9 % (ref 11.6–15.1)
GFR SERPL CREATININE-BSD FRML MDRD: 65 ML/MIN/1.73SQ M
GFR SERPL CREATININE-BSD FRML MDRD: 82 ML/MIN/1.73SQ M
GLUCOSE SERPL-MCNC: 131 MG/DL (ref 65–140)
GLUCOSE SERPL-MCNC: 158 MG/DL (ref 65–140)
GLUCOSE SERPL-MCNC: 172 MG/DL (ref 65–140)
GLUCOSE SERPL-MCNC: 175 MG/DL (ref 65–140)
GLUCOSE SERPL-MCNC: 207 MG/DL (ref 65–140)
GLUCOSE UR STRIP-MCNC: NEGATIVE MG/DL
HCO3 BLDA-SCNC: 28.8 MMOL/L (ref 22–28)
HCO3 BLDV-SCNC: 20 MMOL/L (ref 24–30)
HCT VFR BLD AUTO: 33 % (ref 34.8–46.1)
HCT VFR BLD AUTO: 35.6 % (ref 34.8–46.1)
HGB BLD-MCNC: 10.4 G/DL (ref 11.5–15.4)
HGB BLD-MCNC: 10.9 G/DL (ref 11.5–15.4)
HGB UR QL STRIP.AUTO: NEGATIVE
IMM GRANULOCYTES # BLD AUTO: 0.05 THOUSAND/UL (ref 0–0.2)
IMM GRANULOCYTES NFR BLD AUTO: 1 % (ref 0–2)
INR PPP: 0.99 (ref 0.84–1.19)
KETONES UR STRIP-MCNC: NEGATIVE MG/DL
LACTATE SERPL-SCNC: 2.6 MMOL/L (ref 0.5–2)
LACTATE SERPL-SCNC: 2.8 MMOL/L (ref 0.5–2)
LACTATE SERPL-SCNC: 3.2 MMOL/L (ref 0.5–2)
LACTATE SERPL-SCNC: 3.7 MMOL/L (ref 0.5–2)
LACTATE SERPL-SCNC: 4.8 MMOL/L (ref 0.5–2)
LEUKOCYTE ESTERASE UR QL STRIP: ABNORMAL
LIPASE SERPL-CCNC: 16 U/L (ref 11–82)
LYMPHOCYTES # BLD AUTO: 0.68 THOUSANDS/ΜL (ref 0.6–4.47)
LYMPHOCYTES NFR BLD AUTO: 7 % (ref 14–44)
MAGNESIUM SERPL-MCNC: 1.6 MG/DL (ref 1.6–2.6)
MCH RBC QN AUTO: 28.1 PG (ref 26.8–34.3)
MCH RBC QN AUTO: 28.6 PG (ref 26.8–34.3)
MCHC RBC AUTO-ENTMCNC: 30.6 G/DL (ref 31.4–37.4)
MCHC RBC AUTO-ENTMCNC: 31.5 G/DL (ref 31.4–37.4)
MCV RBC AUTO: 91 FL (ref 82–98)
MCV RBC AUTO: 92 FL (ref 82–98)
MONOCYTES # BLD AUTO: 0.82 THOUSAND/ΜL (ref 0.17–1.22)
MONOCYTES NFR BLD AUTO: 8 % (ref 4–12)
NASAL CANNULA: 5
NEUTROPHILS # BLD AUTO: 8.82 THOUSANDS/ΜL (ref 1.85–7.62)
NEUTS SEG NFR BLD AUTO: 83 % (ref 43–75)
NITRITE UR QL STRIP: NEGATIVE
NON-SQ EPI CELLS URNS QL MICRO: NORMAL /HPF
NRBC BLD AUTO-RTO: 0 /100 WBCS
O2 CT BLDA-SCNC: 15.8 ML/DL (ref 16–23)
O2 CT BLDV-SCNC: 12.3 ML/DL
OXYHGB MFR BLDA: 95.8 % (ref 94–97)
PCO2 BLDA: 50.8 MM HG (ref 36–44)
PCO2 BLDV: 44.8 MM HG (ref 42–50)
PH BLDA: 7.37 [PH] (ref 7.35–7.45)
PH BLDV: 7.27 [PH] (ref 7.3–7.4)
PH UR STRIP.AUTO: 5 [PH]
PLATELET # BLD AUTO: 200 THOUSANDS/UL (ref 149–390)
PLATELET # BLD AUTO: 217 THOUSANDS/UL (ref 149–390)
PMV BLD AUTO: 9.6 FL (ref 8.9–12.7)
PMV BLD AUTO: 9.6 FL (ref 8.9–12.7)
PO2 BLDA: 85.1 MM HG (ref 75–129)
PO2 BLDV: 40.7 MM HG (ref 35–45)
POTASSIUM SERPL-SCNC: 3.5 MMOL/L (ref 3.5–5.3)
POTASSIUM SERPL-SCNC: 4.3 MMOL/L (ref 3.5–5.3)
PROT UR STRIP-MCNC: NEGATIVE MG/DL
PROTHROMBIN TIME: 13.3 SECONDS (ref 11.6–14.5)
RBC # BLD AUTO: 3.64 MILLION/UL (ref 3.81–5.12)
RBC # BLD AUTO: 3.88 MILLION/UL (ref 3.81–5.12)
RBC #/AREA URNS AUTO: NORMAL /HPF
RH BLD: POSITIVE
RH BLD: POSITIVE
SODIUM SERPL-SCNC: 137 MMOL/L (ref 135–147)
SODIUM SERPL-SCNC: 137 MMOL/L (ref 135–147)
SP GR UR STRIP.AUTO: 1.05 (ref 1–1.03)
SPECIMEN EXPIRATION DATE: NORMAL
SPECIMEN EXPIRATION DATE: NORMAL
SPECIMEN SOURCE: ABNORMAL
UROBILINOGEN UR STRIP-ACNC: <2 MG/DL
WBC # BLD AUTO: 10.44 THOUSAND/UL (ref 4.31–10.16)
WBC # BLD AUTO: 12.19 THOUSAND/UL (ref 4.31–10.16)
WBC #/AREA URNS AUTO: NORMAL /HPF

## 2022-09-26 PROCEDURE — 86900 BLOOD TYPING SEROLOGIC ABO: CPT

## 2022-09-26 PROCEDURE — 99223 1ST HOSP IP/OBS HIGH 75: CPT | Performed by: THORACIC SURGERY (CARDIOTHORACIC VASCULAR SURGERY)

## 2022-09-26 PROCEDURE — 84484 ASSAY OF TROPONIN QUANT: CPT

## 2022-09-26 PROCEDURE — 82948 REAGENT STRIP/BLOOD GLUCOSE: CPT

## 2022-09-26 PROCEDURE — 99220 PR INITIAL OBSERVATION CARE/DAY 70 MINUTES: CPT | Performed by: INTERNAL MEDICINE

## 2022-09-26 PROCEDURE — 88302 TISSUE EXAM BY PATHOLOGIST: CPT | Performed by: STUDENT IN AN ORGANIZED HEALTH CARE EDUCATION/TRAINING PROGRAM

## 2022-09-26 PROCEDURE — 86850 RBC ANTIBODY SCREEN: CPT

## 2022-09-26 PROCEDURE — 83605 ASSAY OF LACTIC ACID: CPT | Performed by: EMERGENCY MEDICINE

## 2022-09-26 PROCEDURE — 86901 BLOOD TYPING SEROLOGIC RH(D): CPT

## 2022-09-26 PROCEDURE — S2900 ROBOTIC SURGICAL SYSTEM: HCPCS | Performed by: THORACIC SURGERY (CARDIOTHORACIC VASCULAR SURGERY)

## 2022-09-26 PROCEDURE — 83735 ASSAY OF MAGNESIUM: CPT | Performed by: PHYSICIAN ASSISTANT

## 2022-09-26 PROCEDURE — 83690 ASSAY OF LIPASE: CPT | Performed by: EMERGENCY MEDICINE

## 2022-09-26 PROCEDURE — 83605 ASSAY OF LACTIC ACID: CPT | Performed by: NURSE PRACTITIONER

## 2022-09-26 PROCEDURE — 82805 BLOOD GASES W/O2 SATURATION: CPT | Performed by: SURGERY

## 2022-09-26 PROCEDURE — 71045 X-RAY EXAM CHEST 1 VIEW: CPT

## 2022-09-26 PROCEDURE — 36600 WITHDRAWAL OF ARTERIAL BLOOD: CPT

## 2022-09-26 PROCEDURE — 0DS64ZZ REPOSITION STOMACH, PERCUTANEOUS ENDOSCOPIC APPROACH: ICD-10-PCS | Performed by: THORACIC SURGERY (CARDIOTHORACIC VASCULAR SURGERY)

## 2022-09-26 PROCEDURE — 96376 TX/PRO/DX INJ SAME DRUG ADON: CPT

## 2022-09-26 PROCEDURE — 99222 1ST HOSP IP/OBS MODERATE 55: CPT | Performed by: SURGERY

## 2022-09-26 PROCEDURE — 83605 ASSAY OF LACTIC ACID: CPT | Performed by: INTERNAL MEDICINE

## 2022-09-26 PROCEDURE — C1781 MESH (IMPLANTABLE): HCPCS | Performed by: THORACIC SURGERY (CARDIOTHORACIC VASCULAR SURGERY)

## 2022-09-26 PROCEDURE — 85025 COMPLETE CBC W/AUTO DIFF WBC: CPT

## 2022-09-26 PROCEDURE — 80048 BASIC METABOLIC PNL TOTAL CA: CPT | Performed by: PHYSICIAN ASSISTANT

## 2022-09-26 PROCEDURE — 96365 THER/PROPH/DIAG IV INF INIT: CPT

## 2022-09-26 PROCEDURE — 80048 BASIC METABOLIC PNL TOTAL CA: CPT

## 2022-09-26 PROCEDURE — 8E0W4CZ ROBOTIC ASSISTED PROCEDURE OF TRUNK REGION, PERCUTANEOUS ENDOSCOPIC APPROACH: ICD-10-PCS | Performed by: THORACIC SURGERY (CARDIOTHORACIC VASCULAR SURGERY)

## 2022-09-26 PROCEDURE — 0BUT4JZ SUPPLEMENT DIAPHRAGM WITH SYNTHETIC SUBSTITUTE, PERCUTANEOUS ENDOSCOPIC APPROACH: ICD-10-PCS | Performed by: THORACIC SURGERY (CARDIOTHORACIC VASCULAR SURGERY)

## 2022-09-26 PROCEDURE — 43282 LAP PARAESOPH HER RPR W/MESH: CPT | Performed by: THORACIC SURGERY (CARDIOTHORACIC VASCULAR SURGERY)

## 2022-09-26 PROCEDURE — 0DJ08ZZ INSPECTION OF UPPER INTESTINAL TRACT, VIA NATURAL OR ARTIFICIAL OPENING ENDOSCOPIC: ICD-10-PCS | Performed by: THORACIC SURGERY (CARDIOTHORACIC VASCULAR SURGERY)

## 2022-09-26 PROCEDURE — 96375 TX/PRO/DX INJ NEW DRUG ADDON: CPT

## 2022-09-26 PROCEDURE — 81001 URINALYSIS AUTO W/SCOPE: CPT | Performed by: EMERGENCY MEDICINE

## 2022-09-26 PROCEDURE — 36415 COLL VENOUS BLD VENIPUNCTURE: CPT | Performed by: EMERGENCY MEDICINE

## 2022-09-26 PROCEDURE — 82010 KETONE BODYS QUAN: CPT

## 2022-09-26 PROCEDURE — 87040 BLOOD CULTURE FOR BACTERIA: CPT | Performed by: EMERGENCY MEDICINE

## 2022-09-26 PROCEDURE — 85027 COMPLETE CBC AUTOMATED: CPT | Performed by: PHYSICIAN ASSISTANT

## 2022-09-26 PROCEDURE — 82805 BLOOD GASES W/O2 SATURATION: CPT | Performed by: EMERGENCY MEDICINE

## 2022-09-26 PROCEDURE — C9113 INJ PANTOPRAZOLE SODIUM, VIA: HCPCS | Performed by: PHYSICIAN ASSISTANT

## 2022-09-26 DEVICE — BIO-A TISSUE REINFORCEMENT 7CMX10CM
Type: IMPLANTABLE DEVICE | Site: ABDOMEN | Status: FUNCTIONAL
Brand: GORE BIO-A TISSUE REINFORCEMENT

## 2022-09-26 RX ORDER — METOCLOPRAMIDE HYDROCHLORIDE 5 MG/ML
10 INJECTION INTRAMUSCULAR; INTRAVENOUS ONCE
Status: COMPLETED | OUTPATIENT
Start: 2022-09-26 | End: 2022-09-26

## 2022-09-26 RX ORDER — SODIUM CHLORIDE 9 MG/ML
INJECTION, SOLUTION INTRAVENOUS CONTINUOUS PRN
Status: DISCONTINUED | OUTPATIENT
Start: 2022-09-26 | End: 2022-09-26

## 2022-09-26 RX ORDER — FENTANYL CITRATE 50 UG/ML
25 INJECTION, SOLUTION INTRAMUSCULAR; INTRAVENOUS ONCE
Status: DISCONTINUED | OUTPATIENT
Start: 2022-09-26 | End: 2022-09-27

## 2022-09-26 RX ORDER — ENOXAPARIN SODIUM 100 MG/ML
40 INJECTION SUBCUTANEOUS DAILY
Status: DISCONTINUED | OUTPATIENT
Start: 2022-09-26 | End: 2022-09-26 | Stop reason: HOSPADM

## 2022-09-26 RX ORDER — BUDESONIDE AND FORMOTEROL FUMARATE DIHYDRATE 160; 4.5 UG/1; UG/1
2 AEROSOL RESPIRATORY (INHALATION) 2 TIMES DAILY
Status: CANCELLED | OUTPATIENT
Start: 2022-09-26

## 2022-09-26 RX ORDER — HYDRALAZINE HYDROCHLORIDE 20 MG/ML
5 INJECTION INTRAMUSCULAR; INTRAVENOUS ONCE
Status: COMPLETED | OUTPATIENT
Start: 2022-09-27 | End: 2022-09-27

## 2022-09-26 RX ORDER — ACETAMINOPHEN 325 MG/1
650 TABLET ORAL EVERY 4 HOURS PRN
Status: DISCONTINUED | OUTPATIENT
Start: 2022-09-26 | End: 2022-09-26 | Stop reason: HOSPADM

## 2022-09-26 RX ORDER — INSULIN LISPRO 100 [IU]/ML
1-5 INJECTION, SOLUTION INTRAVENOUS; SUBCUTANEOUS
Status: DISCONTINUED | OUTPATIENT
Start: 2022-09-26 | End: 2022-09-26

## 2022-09-26 RX ORDER — ROCURONIUM BROMIDE 10 MG/ML
INJECTION, SOLUTION INTRAVENOUS AS NEEDED
Status: DISCONTINUED | OUTPATIENT
Start: 2022-09-26 | End: 2022-09-26

## 2022-09-26 RX ORDER — BRIMONIDINE TARTRATE 2 MG/ML
1 SOLUTION/ DROPS OPHTHALMIC 2 TIMES DAILY
Status: DISCONTINUED | OUTPATIENT
Start: 2022-09-26 | End: 2022-09-26 | Stop reason: HOSPADM

## 2022-09-26 RX ORDER — ATORVASTATIN CALCIUM 40 MG/1
40 TABLET, FILM COATED ORAL EVERY EVENING
Status: DISCONTINUED | OUTPATIENT
Start: 2022-09-26 | End: 2022-09-26 | Stop reason: HOSPADM

## 2022-09-26 RX ORDER — BRIMONIDINE TARTRATE 2 MG/ML
1 SOLUTION/ DROPS OPHTHALMIC 2 TIMES DAILY
Status: DISCONTINUED | OUTPATIENT
Start: 2022-09-26 | End: 2022-10-04 | Stop reason: HOSPADM

## 2022-09-26 RX ORDER — HYDROMORPHONE HCL IN WATER/PF 6 MG/30 ML
0.2 PATIENT CONTROLLED ANALGESIA SYRINGE INTRAVENOUS EVERY 2 HOUR PRN
Status: DISCONTINUED | OUTPATIENT
Start: 2022-09-26 | End: 2022-09-26

## 2022-09-26 RX ORDER — POTASSIUM CHLORIDE 14.9 MG/ML
20 INJECTION INTRAVENOUS
Status: COMPLETED | OUTPATIENT
Start: 2022-09-26 | End: 2022-09-27

## 2022-09-26 RX ORDER — SODIUM CHLORIDE, SODIUM LACTATE, POTASSIUM CHLORIDE, CALCIUM CHLORIDE 600; 310; 30; 20 MG/100ML; MG/100ML; MG/100ML; MG/100ML
75 INJECTION, SOLUTION INTRAVENOUS CONTINUOUS
Status: DISCONTINUED | OUTPATIENT
Start: 2022-09-26 | End: 2022-09-27

## 2022-09-26 RX ORDER — INSULIN LISPRO 100 [IU]/ML
1-5 INJECTION, SOLUTION INTRAVENOUS; SUBCUTANEOUS
Status: DISCONTINUED | OUTPATIENT
Start: 2022-09-26 | End: 2022-09-26 | Stop reason: HOSPADM

## 2022-09-26 RX ORDER — HYDROMORPHONE HCL/PF 1 MG/ML
0.5 SYRINGE (ML) INJECTION EVERY 2 HOUR PRN
Status: DISCONTINUED | OUTPATIENT
Start: 2022-09-26 | End: 2022-09-26

## 2022-09-26 RX ORDER — PHENYLEPHRINE HYDROCHLORIDE 10 MG/ML
INJECTION INTRAVENOUS AS NEEDED
Status: DISCONTINUED | OUTPATIENT
Start: 2022-09-26 | End: 2022-09-26

## 2022-09-26 RX ORDER — GABAPENTIN 300 MG/1
300 CAPSULE ORAL 4 TIMES DAILY
Status: DISCONTINUED | OUTPATIENT
Start: 2022-09-26 | End: 2022-09-26 | Stop reason: HOSPADM

## 2022-09-26 RX ORDER — ASPIRIN 81 MG/1
81 TABLET, CHEWABLE ORAL DAILY
Status: DISCONTINUED | OUTPATIENT
Start: 2022-09-26 | End: 2022-09-26 | Stop reason: HOSPADM

## 2022-09-26 RX ORDER — CEFAZOLIN SODIUM 1 G/3ML
INJECTION, POWDER, FOR SOLUTION INTRAMUSCULAR; INTRAVENOUS AS NEEDED
Status: DISCONTINUED | OUTPATIENT
Start: 2022-09-26 | End: 2022-09-26

## 2022-09-26 RX ORDER — OXYCODONE HCL 10 MG/1
10 TABLET, FILM COATED, EXTENDED RELEASE ORAL EVERY 12 HOURS SCHEDULED
Status: DISCONTINUED | OUTPATIENT
Start: 2022-09-26 | End: 2022-09-26 | Stop reason: HOSPADM

## 2022-09-26 RX ORDER — ROPINIROLE 0.25 MG/1
0.5 TABLET, FILM COATED ORAL
Status: DISCONTINUED | OUTPATIENT
Start: 2022-09-26 | End: 2022-09-26 | Stop reason: HOSPADM

## 2022-09-26 RX ORDER — ALBUMIN, HUMAN INJ 5% 5 %
SOLUTION INTRAVENOUS CONTINUOUS PRN
Status: DISCONTINUED | OUTPATIENT
Start: 2022-09-26 | End: 2022-09-26

## 2022-09-26 RX ORDER — BUDESONIDE AND FORMOTEROL FUMARATE DIHYDRATE 160; 4.5 UG/1; UG/1
2 AEROSOL RESPIRATORY (INHALATION) 2 TIMES DAILY
Status: DISCONTINUED | OUTPATIENT
Start: 2022-09-26 | End: 2022-09-26 | Stop reason: HOSPADM

## 2022-09-26 RX ORDER — CEFEPIME HYDROCHLORIDE 2 G/50ML
2000 INJECTION, SOLUTION INTRAVENOUS ONCE
Status: COMPLETED | OUTPATIENT
Start: 2022-09-26 | End: 2022-09-26

## 2022-09-26 RX ORDER — FENTANYL CITRATE/PF 50 MCG/ML
25 SYRINGE (ML) INJECTION
Status: DISCONTINUED | OUTPATIENT
Start: 2022-09-26 | End: 2022-09-26 | Stop reason: HOSPADM

## 2022-09-26 RX ORDER — PANTOPRAZOLE SODIUM 40 MG/1
40 TABLET, DELAYED RELEASE ORAL EVERY 12 HOURS
Status: DISCONTINUED | OUTPATIENT
Start: 2022-09-26 | End: 2022-09-26 | Stop reason: HOSPADM

## 2022-09-26 RX ORDER — LOSARTAN POTASSIUM 50 MG/1
50 TABLET ORAL DAILY
Status: CANCELLED | OUTPATIENT
Start: 2022-09-26

## 2022-09-26 RX ORDER — PROPOFOL 10 MG/ML
INJECTION, EMULSION INTRAVENOUS AS NEEDED
Status: DISCONTINUED | OUTPATIENT
Start: 2022-09-26 | End: 2022-09-26

## 2022-09-26 RX ORDER — ALBUTEROL SULFATE 90 UG/1
2 AEROSOL, METERED RESPIRATORY (INHALATION) EVERY 6 HOURS PRN
Status: DISCONTINUED | OUTPATIENT
Start: 2022-09-26 | End: 2022-10-04 | Stop reason: HOSPADM

## 2022-09-26 RX ORDER — BRIMONIDINE TARTRATE 2 MG/ML
1 SOLUTION/ DROPS OPHTHALMIC 2 TIMES DAILY
Status: CANCELLED | OUTPATIENT
Start: 2022-09-26

## 2022-09-26 RX ORDER — LORAZEPAM 2 MG/ML
0.5 INJECTION INTRAMUSCULAR ONCE
Status: COMPLETED | OUTPATIENT
Start: 2022-09-26 | End: 2022-09-26

## 2022-09-26 RX ORDER — PANTOPRAZOLE SODIUM 40 MG/10ML
40 INJECTION, POWDER, LYOPHILIZED, FOR SOLUTION INTRAVENOUS EVERY 12 HOURS SCHEDULED
Status: DISCONTINUED | OUTPATIENT
Start: 2022-09-26 | End: 2022-10-02

## 2022-09-26 RX ORDER — ONDANSETRON 2 MG/ML
4 INJECTION INTRAMUSCULAR; INTRAVENOUS EVERY 4 HOURS PRN
Status: CANCELLED | OUTPATIENT
Start: 2022-09-26

## 2022-09-26 RX ORDER — BUDESONIDE AND FORMOTEROL FUMARATE DIHYDRATE 160; 4.5 UG/1; UG/1
2 AEROSOL RESPIRATORY (INHALATION) 2 TIMES DAILY
Status: DISCONTINUED | OUTPATIENT
Start: 2022-09-26 | End: 2022-10-04 | Stop reason: HOSPADM

## 2022-09-26 RX ORDER — INSULIN LISPRO 100 [IU]/ML
1-5 INJECTION, SOLUTION INTRAVENOUS; SUBCUTANEOUS
Status: CANCELLED | OUTPATIENT
Start: 2022-09-26

## 2022-09-26 RX ORDER — ALBUTEROL SULFATE 90 UG/1
2 AEROSOL, METERED RESPIRATORY (INHALATION) EVERY 6 HOURS PRN
Status: CANCELLED | OUTPATIENT
Start: 2022-09-26

## 2022-09-26 RX ORDER — ONDANSETRON 2 MG/ML
4 INJECTION INTRAMUSCULAR; INTRAVENOUS EVERY 4 HOURS PRN
Status: DISCONTINUED | OUTPATIENT
Start: 2022-09-26 | End: 2022-10-04 | Stop reason: HOSPADM

## 2022-09-26 RX ORDER — ENOXAPARIN SODIUM 100 MG/ML
40 INJECTION SUBCUTANEOUS DAILY
Status: DISCONTINUED | OUTPATIENT
Start: 2022-09-26 | End: 2022-10-04 | Stop reason: HOSPADM

## 2022-09-26 RX ORDER — LABETALOL HYDROCHLORIDE 5 MG/ML
10 INJECTION, SOLUTION INTRAVENOUS EVERY 4 HOURS PRN
Status: DISCONTINUED | OUTPATIENT
Start: 2022-09-26 | End: 2022-10-04 | Stop reason: HOSPADM

## 2022-09-26 RX ORDER — LIDOCAINE HYDROCHLORIDE 10 MG/ML
INJECTION, SOLUTION EPIDURAL; INFILTRATION; INTRACAUDAL; PERINEURAL AS NEEDED
Status: DISCONTINUED | OUTPATIENT
Start: 2022-09-26 | End: 2022-09-26

## 2022-09-26 RX ORDER — ASCORBIC ACID 500 MG
1000 TABLET ORAL 2 TIMES DAILY
Status: DISCONTINUED | OUTPATIENT
Start: 2022-09-26 | End: 2022-09-26 | Stop reason: HOSPADM

## 2022-09-26 RX ORDER — SODIUM CHLORIDE 9 MG/ML
125 INJECTION, SOLUTION INTRAVENOUS CONTINUOUS
Status: DISCONTINUED | OUTPATIENT
Start: 2022-09-26 | End: 2022-09-26 | Stop reason: HOSPADM

## 2022-09-26 RX ORDER — ENOXAPARIN SODIUM 100 MG/ML
40 INJECTION SUBCUTANEOUS DAILY
Status: CANCELLED | OUTPATIENT
Start: 2022-09-26

## 2022-09-26 RX ORDER — ONDANSETRON 2 MG/ML
4 INJECTION INTRAMUSCULAR; INTRAVENOUS EVERY 4 HOURS PRN
Status: DISCONTINUED | OUTPATIENT
Start: 2022-09-26 | End: 2022-09-26 | Stop reason: HOSPADM

## 2022-09-26 RX ORDER — DILTIAZEM HYDROCHLORIDE 180 MG/1
180 CAPSULE, COATED, EXTENDED RELEASE ORAL DAILY
Status: CANCELLED | OUTPATIENT
Start: 2022-09-26

## 2022-09-26 RX ORDER — SUCCINYLCHOLINE/SOD CL,ISO/PF 100 MG/5ML
SYRINGE (ML) INTRAVENOUS AS NEEDED
Status: DISCONTINUED | OUTPATIENT
Start: 2022-09-26 | End: 2022-09-26

## 2022-09-26 RX ORDER — BUPIVACAINE HYDROCHLORIDE 2.5 MG/ML
INJECTION, SOLUTION EPIDURAL; INFILTRATION; INTRACAUDAL AS NEEDED
Status: DISCONTINUED | OUTPATIENT
Start: 2022-09-26 | End: 2022-09-26 | Stop reason: HOSPADM

## 2022-09-26 RX ORDER — MAGNESIUM SULFATE HEPTAHYDRATE 40 MG/ML
4 INJECTION, SOLUTION INTRAVENOUS ONCE
Status: COMPLETED | OUTPATIENT
Start: 2022-09-26 | End: 2022-09-27

## 2022-09-26 RX ORDER — FENTANYL CITRATE 50 UG/ML
INJECTION, SOLUTION INTRAMUSCULAR; INTRAVENOUS AS NEEDED
Status: DISCONTINUED | OUTPATIENT
Start: 2022-09-26 | End: 2022-09-26

## 2022-09-26 RX ORDER — INSULIN LISPRO 100 [IU]/ML
1-5 INJECTION, SOLUTION INTRAVENOUS; SUBCUTANEOUS EVERY 6 HOURS SCHEDULED
Status: DISCONTINUED | OUTPATIENT
Start: 2022-09-26 | End: 2022-10-01

## 2022-09-26 RX ORDER — MAGNESIUM HYDROXIDE 1200 MG/15ML
LIQUID ORAL AS NEEDED
Status: DISCONTINUED | OUTPATIENT
Start: 2022-09-26 | End: 2022-09-26 | Stop reason: HOSPADM

## 2022-09-26 RX ORDER — ALBUTEROL SULFATE 90 UG/1
2 AEROSOL, METERED RESPIRATORY (INHALATION) EVERY 6 HOURS PRN
Status: DISCONTINUED | OUTPATIENT
Start: 2022-09-26 | End: 2022-09-26 | Stop reason: HOSPADM

## 2022-09-26 RX ADMIN — HYDROMORPHONE HYDROCHLORIDE 0.2 MG: 0.2 INJECTION, SOLUTION INTRAMUSCULAR; INTRAVENOUS; SUBCUTANEOUS at 20:27

## 2022-09-26 RX ADMIN — FENTANYL CITRATE 50 MCG: 50 INJECTION INTRAMUSCULAR; INTRAVENOUS at 14:17

## 2022-09-26 RX ADMIN — Medication 25 MCG: at 17:20

## 2022-09-26 RX ADMIN — TOPICAL ANESTHETIC 2 SPRAY: 200 SPRAY DENTAL; PERIODONTAL at 04:25

## 2022-09-26 RX ADMIN — POTASSIUM CHLORIDE 20 MEQ: 14.9 INJECTION, SOLUTION INTRAVENOUS at 22:15

## 2022-09-26 RX ADMIN — BRIMONIDINE TARTRATE 1 DROP: 2 SOLUTION/ DROPS OPHTHALMIC at 20:28

## 2022-09-26 RX ADMIN — FENTANYL CITRATE 25 MCG: 50 INJECTION INTRAMUSCULAR; INTRAVENOUS at 00:33

## 2022-09-26 RX ADMIN — MAGNESIUM SULFATE HEPTAHYDRATE 4 G: 40 INJECTION, SOLUTION INTRAVENOUS at 22:14

## 2022-09-26 RX ADMIN — ALBUMIN (HUMAN): 12.5 INJECTION, SOLUTION INTRAVENOUS at 13:59

## 2022-09-26 RX ADMIN — SODIUM CHLORIDE, SODIUM LACTATE, POTASSIUM CHLORIDE, AND CALCIUM CHLORIDE: .6; .31; .03; .02 INJECTION, SOLUTION INTRAVENOUS at 13:29

## 2022-09-26 RX ADMIN — SODIUM CHLORIDE: 0.9 INJECTION, SOLUTION INTRAVENOUS at 15:06

## 2022-09-26 RX ADMIN — PANTOPRAZOLE SODIUM 40 MG: 40 INJECTION, POWDER, FOR SOLUTION INTRAVENOUS at 20:26

## 2022-09-26 RX ADMIN — ONDANSETRON 4 MG: 2 INJECTION INTRAMUSCULAR; INTRAVENOUS at 14:08

## 2022-09-26 RX ADMIN — POTASSIUM CHLORIDE 20 MEQ: 14.9 INJECTION, SOLUTION INTRAVENOUS at 23:38

## 2022-09-26 RX ADMIN — SODIUM CHLORIDE, SODIUM LACTATE, POTASSIUM CHLORIDE, AND CALCIUM CHLORIDE 125 ML/HR: .6; .31; .03; .02 INJECTION, SOLUTION INTRAVENOUS at 10:37

## 2022-09-26 RX ADMIN — LABETALOL HYDROCHLORIDE 10 MG: 5 INJECTION, SOLUTION INTRAVENOUS at 21:17

## 2022-09-26 RX ADMIN — CEFEPIME HYDROCHLORIDE 2000 MG: 2 INJECTION, SOLUTION INTRAVENOUS at 00:44

## 2022-09-26 RX ADMIN — SUGAMMADEX 400 MG: 100 INJECTION, SOLUTION INTRAVENOUS at 16:18

## 2022-09-26 RX ADMIN — SODIUM CHLORIDE: 0.9 INJECTION, SOLUTION INTRAVENOUS at 13:01

## 2022-09-26 RX ADMIN — METOCLOPRAMIDE 10 MG: 5 INJECTION, SOLUTION INTRAMUSCULAR; INTRAVENOUS at 07:31

## 2022-09-26 RX ADMIN — Medication 100 MG: at 12:41

## 2022-09-26 RX ADMIN — LORAZEPAM 0.5 MG: 2 INJECTION, SOLUTION INTRAMUSCULAR; INTRAVENOUS at 04:28

## 2022-09-26 RX ADMIN — ROCURONIUM BROMIDE 50 MG: 50 INJECTION, SOLUTION INTRAVENOUS at 12:56

## 2022-09-26 RX ADMIN — SODIUM CHLORIDE: 9 INJECTION INTRAMUSCULAR; INTRAVENOUS; SUBCUTANEOUS at 22:04

## 2022-09-26 RX ADMIN — PHENYLEPHRINE HYDROCHLORIDE 100 MCG: 10 INJECTION INTRAVENOUS at 12:41

## 2022-09-26 RX ADMIN — HYDROMORPHONE HYDROCHLORIDE 0.2 MG: 0.2 INJECTION, SOLUTION INTRAMUSCULAR; INTRAVENOUS; SUBCUTANEOUS at 18:24

## 2022-09-26 RX ADMIN — ONDANSETRON 4 MG: 2 INJECTION INTRAMUSCULAR; INTRAVENOUS at 04:15

## 2022-09-26 RX ADMIN — SODIUM CHLORIDE 100 ML/HR: 0.9 INJECTION, SOLUTION INTRAVENOUS at 04:43

## 2022-09-26 RX ADMIN — FENTANYL CITRATE 50 MCG: 50 INJECTION INTRAMUSCULAR; INTRAVENOUS at 13:29

## 2022-09-26 RX ADMIN — INSULIN LISPRO 1 UNITS: 100 INJECTION, SOLUTION INTRAVENOUS; SUBCUTANEOUS at 18:32

## 2022-09-26 RX ADMIN — HYDROMORPHONE HYDROCHLORIDE 0.5 MG: 1 INJECTION, SOLUTION INTRAMUSCULAR; INTRAVENOUS; SUBCUTANEOUS at 10:37

## 2022-09-26 RX ADMIN — ROCURONIUM BROMIDE 20 MG: 50 INJECTION, SOLUTION INTRAVENOUS at 15:12

## 2022-09-26 RX ADMIN — ROCURONIUM BROMIDE 20 MG: 50 INJECTION, SOLUTION INTRAVENOUS at 13:43

## 2022-09-26 RX ADMIN — SODIUM CHLORIDE, SODIUM LACTATE, POTASSIUM CHLORIDE, AND CALCIUM CHLORIDE 500 ML: .6; .31; .03; .02 INJECTION, SOLUTION INTRAVENOUS at 02:36

## 2022-09-26 RX ADMIN — PROPOFOL 100 MG: 10 INJECTION, EMULSION INTRAVENOUS at 12:41

## 2022-09-26 RX ADMIN — LIDOCAINE HYDROCHLORIDE 50 MG: 10 INJECTION, SOLUTION EPIDURAL; INFILTRATION; INTRACAUDAL; PERINEURAL at 12:41

## 2022-09-26 RX ADMIN — ONDANSETRON 4 MG: 2 INJECTION INTRAMUSCULAR; INTRAVENOUS at 00:33

## 2022-09-26 RX ADMIN — Medication 25 MCG: at 17:25

## 2022-09-26 RX ADMIN — SODIUM CHLORIDE, SODIUM LACTATE, POTASSIUM CHLORIDE, AND CALCIUM CHLORIDE 500 ML: .6; .31; .03; .02 INJECTION, SOLUTION INTRAVENOUS at 00:24

## 2022-09-26 RX ADMIN — PHENYLEPHRINE HYDROCHLORIDE 200 MCG: 10 INJECTION INTRAVENOUS at 14:27

## 2022-09-26 RX ADMIN — CEFAZOLIN 1000 MG: 1 INJECTION, POWDER, FOR SOLUTION INTRAMUSCULAR; INTRAVENOUS at 12:55

## 2022-09-26 NOTE — DISCHARGE SUMMARY
[facility]  Discharge- Jessi Alcazar 1940, 80 y o  female MRN: [id number]  Unit/Bed#: ED-23 Encounter: [id number]  Primary Care Provider: Brain Vasquez DO   Date and time admitted to hospital: 9/25/2022 10:59 PM    * Abdominal pain  Assessment & Plan  Assessment:  · CTA demonstrated large hiatal hernia with intrathoracic stomach which appeared distended with fluid/debris   · Abdominal pain likely related to large hiatal hernia    · Elevated lactate after IV resuscitation concerning for possible ischemia  · Patient hemodynamically stable after IVF and pain controled with fentanyl  · Discussed with surgery resident on-call  · Discussed with thoracic surgery attending    Plan:  · NG tube placed with suction  · Will transfer to [facility] for thoracic surgery evaluation  · Serial abdominal exams  · Trend lactate  · NPO for now    Lactic acidosis  Assessment & Plan  Assessment:  · Slight anion gap elevation of 14  · WBC elevated, unclear infectious source  · Beta hydroxybutyrate slightly elevated  · Abdominal pain concerning for hiatal hernia ischemia    Ddx:  Ischemic hiatal hernia vs infectious process vs less likely DKA    Plan:  · See plan for abdominal pain  · Trend lactate  · Continue IVF      SIRS (systemic inflammatory response syndrome) ([facility] )  Assessment & Plan  POA:  Tachypnea, hypotension, hypothermia, lactic acidosis, leukocytosis source unclear    Assessment:  -infectious source remains unclear at this point  -hypothermia possibly due to diaphoresis in setting of abdominal pain  -uncertain if vasovagal reaction could be responsible for hypotension  -CTA demonstrated nonspecific finding right upper lobe  -patient received 2 g cefepime in ED  -orthostatics negative in the ED (after IV fluids)    Plan:  -follow-up results of UA  -continue to monitor off antibiotics  -IVF    Large hiatal hernia  Assessment & Plan  Plan:  -see plan for abdominal pain    Low blood pressure reading  Assessment & Plan  Assessment:  -present on arrival to ED  -responded to IV fluids    Plan:  -continue IV fluid hydration  -holding home Cozaar and Cardizem for now    Medical Problems             Resolved Problems  Date Reviewed: 9/26/2022   None               Discharging Resident: Litzy Zepeda MD  Discharging Attending: Ari Nino MD  PCP: Amira Dey DO  Admission Date:   Admission Orders (From admission, onward)     Ordered        09/26/22 0059  Place in Observation  Once                      Discharge/Transfer Date: 09/26/22    Disposition:   Transfer to:  [facility]  Reason for Transfer:  Thoracic surgery Services not provided at sending facility  Accepting Provider at [facility]: Dr Shefali Tsang Stay:  · General surgery  · Thoracic surgery    Procedures Performed:   · None    Significant Findings / Test Results:   CTA chest abdomen pelvis w wo contrast    Result Date: 9/26/2022  · Impression: No evidence of acute aortic pathology  Diffuse atherosclerotic changes throughout the aortoiliac vasculature as above  Infrarenal abdominal aortic ectasia measuring up to 3 1 cm in maximal transverse dimension  Large hiatal hernia/intrathoracic stomach which appears distended with fluid and debris,? Source of the patient's epigastric pain  Correlate clinically  Additional findings above  Workstation performed: [id number]   ·     Incidental Findings:   · None     Test Results Pending at Discharge (will require follow up): · None     Outpatient Tests Requested:  · None    Complications:  None    Reason for Admission:  Abdominal pain    Hospital Course:   Brie Ellis is a 80 y o  female patient who originally presented to the hospital on 9/25/2022 due to abdominal pain  Patient had acute onset abdominal pain after eating an eclair  Patient describes the pain as 9/10 aching, under diaphragm, nonradiating   Patient called EMS after a few hours when the pain worsened and she became short of breath  Patient also states she was lightheaded prior to EMS arrival but with no syncope or presyncope  Upon arrival to the ED, patient was hypotensive in the 98Q systolic, tachypneic, diaphoretic  Patient received 2 L fluid bolus (this was not documented in the chart, as a provider was not yet assigned to the patient)  Vital signs improved        CTA c/a/p was obtained given patient's history of AAA  No acute aortic pathology was identified however a large hiatal hernia with intrathoracic stomach which appeared distended with fluid/debris  Patient does have known hiatal hernia dating back to at least 2015  Mild leukocytosis  CMP unremarkable except for slightly increased anion gap of 14  Lactate was measured 3 7 over 1 hour after arrival to emergency department, after initial 2 L bolus  Abdominal pain/shortness of breath had resolved  Patient received additional 1 L IV fluids  Repeat lactate came back 4 8  Patient continued to be asymptomatic  Thoracic surgery was contacted  NG tube was placed and confirmed by x-ray  Plan was made to transfer patient to [facility] for thoracic surgery evaluation  Please see above list of diagnoses and related plan for additional information  Condition at Discharge: good    Discharge Day Visit / Exam:     See same-day progress for visit/exam    Discussion with Family: Attempted to update  (son) via phone  Unable to contact  Voicemail full  ** Please Note: This note may have been constructed using a voice recognition system  **

## 2022-09-26 NOTE — ED NOTES
UPDATED Transport Info  Going to [address] [place] [address] accepting  [employer] via Yamile Gibson, [address] [facility]  09/26/22 1934

## 2022-09-26 NOTE — ANESTHESIA PREPROCEDURE EVALUATION
Procedure:  REPAIR HERNIA PARAESOPHAGEAL LAPAROSCOPIC W ROBOTICS, possible gastropexy, possible mesh placement (N/A Abdomen)    81yo F presenting with large incarcerate hiatal hernia planned to undergo laparoscopy repair       - denies any chest pain, palpitations, shortness of breath, syncope, lightheadedness, seizures   - denies any recent infectious symptoms such as fevers, chills, cough   - denies taking any anticoagulation medications or any issues with bleeding, bruising, clotting    ECHO (04/21/22):   Left Ventricle: Left ventricular cavity size is normal  Wall thickness is mildly increased  The left ventricular ejection fraction is 60%  Systolic function is normal  Wall motion is normal  Diastolic function is mildly abnormal, consistent with grade I (abnormal) relaxation    Aortic Valve: There is mild stenosis    Mitral Valve: There is moderate annular calcification  There is mild regurgitation    Tricuspid Valve: There is mild regurgitation    Pulmonary Artery: The estimated pulmonary artery systolic pressure is 40 4 mmHg  The pulmonary artery systolic pressure is mildly increased        Relevant Problems   ANESTHESIA (within normal limits)      CARDIO   (+) Benign essential hypertension   (+) Exertional dyspnea   (+) Hyperlipidemia   (+) Hypertension      ENDO   (+) Type II diabetes mellitus (HCC)      GI/HEPATIC   (+) GERD (gastroesophageal reflux disease)   (+) GI bleed   (+) 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      /RENAL (within normal limits)      GYN (within normal limits)      HEMATOLOGY   (+) Anemia      MUSCULOSKELETAL   (+) Backache   (+) DDD (degenerative disc disease), lumbosacral   (+) Primary localized osteoarthrosis of ankle and foot      NEURO/PSYCH   (+) Chronic pain   (+) Depression   (+) History of iron deficiency anemia      PULMONARY   (+) Aspiration pneumonia (HCC)   (+) COPD (chronic obstructive pulmonary disease) (HCC)   (+) Chronic hypoxemic respiratory failure (HCC)   (+) Exertional dyspnea      Cardiovascular and Mediastinum   (+) ASD (atrial septal defect)      Endocrine   (+) Diabetes mellitus ([facility] )      Other   (+) BMI 31 0-31 9,adult      Lab Results   Component Value Date    WBC 10 44 (H) 09/26/2022    HGB 10 9 (L) 09/26/2022    HCT 35 6 09/26/2022    MCV 92 09/26/2022     09/26/2022     Lab Results   Component Value Date     07/14/2015    SODIUM 137 09/26/2022    K 4 3 09/26/2022     09/26/2022    CO2 26 09/26/2022    ANIONGAP 14 07/21/2015    AGAP 8 09/26/2022    BUN 14 09/26/2022    CREATININE 0 83 09/26/2022    GLUC 175 (H) 09/26/2022    GLUF 185 (H) 04/21/2022    CALCIUM 8 7 09/26/2022    AST 11 (L) 09/25/2022    ALT 8 09/25/2022    ALKPHOS 72 09/25/2022    PROT 6 6 07/13/2015    TP 6 8 09/25/2022    BILITOT 0 3 07/13/2015    TBILI 0 45 09/25/2022    EGFR 65 09/26/2022     Lab Results   Component Value Date    INR 0 99 09/25/2022    INR 0 96 05/13/2017    INR 1 20 (H) 07/13/2015    PROTIME 13 3 09/25/2022    PROTIME 13 1 05/13/2017    PROTIME 14 4 (H) 07/13/2015     Lab Results   Component Value Date    PTT 27 09/25/2022       Physical Exam    Airway    Mallampati score: II  TM Distance: >3 FB  Neck ROM: full     Dental       Cardiovascular  Rhythm: regular, Rate: normal, Cardiovascular exam normal    Pulmonary  Pulmonary exam normal Breath sounds clear to auscultation,     Other Findings        Anesthesia Plan  ASA Score- 3 Emergent    Anesthesia Type- general with ASA Monitors  Additional Monitors: arterial line  Airway Plan: ETT  Plan Factors-Exercise tolerance (METS): >4 METS  Chart reviewed  EKG reviewed  Imaging results reviewed  Existing labs reviewed  Patient summary reviewed  Patient is not a current smoker  Patient did not smoke on day of surgery  Obstructive sleep apnea risk education given perioperatively  Induction- intravenous      Postoperative Plan- Plan for postoperative opioid use  Informed Consent- Anesthetic plan and risks discussed with son  I personally reviewed this patient with the CRNA  Discussed and agreed on the Anesthesia Plan with the CRNA  Kelin Randall

## 2022-09-26 NOTE — ED NOTES
Patient transported to CT with monitor and RNs     Santino Ruiz [facility]  09/25/22 3502

## 2022-09-26 NOTE — OP NOTE
OPERATIVE REPORT  PATIENT NAME: Saskia Figueroa    :  1940  MRN: [id number]  Pt Location: [facility] OR ROOM 15    SURGERY DATE: 2022    Surgeon(s) and Role:     * Tarah Finney MD - Primary     * Celio Metcalf PA-C - Assisting     * Merry Gilbert DO - Assisting     * Meredith Tomas MD - Observing    Preop Diagnosis:  Large hiatal hernia [K44 9]    Post-Op Diagnosis Codes:     * Large hiatal hernia [K44 9]    Procedure(s) (LRB):  REPAIR HERNIA PARAESOPHAGEAL LAPAROSCOPIC W ROBOTICS, gastropexy, mesh placement (N/A)  ESOPHAGOGASTRODUODENOSCOPY (EGD) (N/A)  LYSIS ADHESIONS (N/A)    Specimen(s):  ID Type Source Tests Collected by Time Destination   1 : paraesophageal hernia sac Tissue Other TISSUE EXAM Tarah Finney MD 2022 1537        Estimated Blood Loss:   Minimal    Drains:  NG/OG/Enteral Tube Nasogastric 16 Fr Right nare (Active)   Placement Reverification Auscultation 22 1000   Site Assessment Clean;Dry; Intact 22 1000   Status Suction-low continuous 22 1000   Drainage Appearance Tan 22 1000   Output (mL) 50 mL 22 1255   Number of days: 0       Urethral Catheter Double-lumen;Non-latex 16 Fr  (Active)   Number of days: 0       Anesthesia Type:   Choice    Operative Indications:  Large hiatal hernia [K44 9]      Operative Findings:  Giant type III PEH    Complications:   None    Procedure and Technique:  Procedure and Technique:  INDICATION:  Saskia Figueroa is a 80 y o  female who presented with a large hiatal hernia  She had an elevated lactate and was particularly tender to palpation  So the decision was made to emergently take her to the OR  The risks and benefits of the procedure were explained to the patient and she elected to proceed  Consent was obtained after all of her questions were answered      OPERATION IN DETAIL:    The patient was correctly identified by name and medical record number in the holding area and brought to the operative suite, where she was placed supine on the operative table  After satisfactory induction of general endotracheal anesthesia, an EGD was then performed  The scope was gently advanced through the oropharynx and into the esophagus  Using gentle insufflation, the GE junction was identified at approximately 30 cm  The stomach was entered  This appeared pink with no signs of any duskiness  The pylorus was entered without difficulty  This stomach was then fully desufflated and the scope was retracted back into the esophagus  The patient was then positioned prepped and draped appropriately  A time-out was performed confirming patient and procedure  A small incision was made at Pierre's point in the midclavicular line  The Veress needle was inserted through the abdominal wall and into the abdominal cavity using the water drop technique  Next the abdomen was then insufflated without issue  A supraumbilical 8 mm incision was made and a robotic trocar was placed  The 30 degree robotic camera was inserted and the abdomen was thoroughly inspected  There was no sign Veress needle or trocar injury  However, she had extensive adhesions located throughout her left abdomen  An 8 mm robotic port site was placed on the right as well as a right lower quadrant 12 mm assistant port  Once these were in, using a hot carlos, a laparoscopic lysis of adhesions was performed  This was approximately a 30 minute lysis of adhesion  Once of the adhesions were down, 2 additional ports were placed to the left of the umbilicus  Finally a 5 mm incision was made just to the left of the xiphoid process  A Lola liver retractor was placed through here and secured to the bedside with a sterile mahmood  The patient was then placed in full reverse [place]  The Aicenti Xi robot was docked at this time  A tip-up fenestrated grasper, caudiere grasper and robotic vessel sealer were inserted and tested   I un-scrubbed at this time and went to the robotic console  Dissection was started at the pars flaccida  This was entered using the vessel sealer and dissected up to the right autumn  The hernia sac was then opened, exposing the plane between the hernia sac and the mediastinum  The sac was sharply and bluntly dissected from the pleura laterally on both sides, the aorta posteriorly and the pericardium anteriorly with the harmonic scalpel  After the hernia sac was dissected, the esophagus was mobilized first by dissecting out the right autumn and taking down the phrenoesophageal membrane, making sure to preserve the peritoneal lining of the right autumn  Circumferential mobilization of the esophagus was performed to help establish adequate intra-abdominal esophageal length of 2-3cm  The left autumn and base of the crura were also dissected during the mobilization of the esophagus  Attention was then turned to the mobilization of the fundus and the division of the short gastric vessels  With the traction of gastrosplenic omentum and countertraction of the stomach, the short gastric vessels were ligated with the harmonic scalpel along the upper third of the greater curve of the stomach  Care was taken as the superior pole of the spleen was approached to avoid injury  Retrogastric attachments were taken down and a window was created into the lesser sac  Exposure of the left autumn was achieved with further dissection of the short gastric and retroesophageal attachments  Attention was then turned to performing a tension free hiatal closure  The crura were reapproximated with 4 interrupted sutures of #0 Ethibond, 3 posteriorly and 1 anterior  At the completion of the hiatal closure, a grasper was easily passed through the hiatus and the closure did not appear to be too tight  Attention was then turned to creating a gastropexy  The greater curve of the stomach was lifted up to the abdominal wall    Using a nonabsorbable V lock suture, and the gastropexy was run from the greater curve to the abdominal wall for approximately 6 cm  After completion of the wrap, a Bio-A mesh was placed in the retro-esophageal space to buttress the crural repair  At this time the procedure was complete  All ports were removed under direct visualization  The 12mm incision was closed with a suture passer and 0 vicryl  The remaining port sites were closed with #4-0 Monocryl  The wounds were dressed with Dermabond        I was present for the entire procedure    Patient Disposition:  PACU  and extubated and stable        SIGNATURE: Letty Flannery MD  DATE: September 26, 2022  TIME: 4:26 PM

## 2022-09-26 NOTE — QUICK NOTE
Repeat lactate came back elevated 4 8    Patient examined at bedside  Patient was sleeping comfortably  Vital signs stable  232 systolic  Pulse 80  Slight tachypnea at 24 breaths/min satting 93% on home 3 L  Patient awoke  When asked about pain, patient states absolutely none"

## 2022-09-26 NOTE — ASSESSMENT & PLAN NOTE
Assessment:  · CTA demonstrated large hiatal hernia with intrathoracic stomach which appeared distended with fluid/debris   · Abdominal pain likely related to large hiatal hernia    · Elevated lactate after IV resuscitation concerning for possible ischemia  · Patient hemodynamically stable after IVF and pain controled with fentanyl  · Discussed with surgery resident on-call    Plan:  · Serial abdominal exams  · Trend lactate  · NPO for now

## 2022-09-26 NOTE — ASSESSMENT & PLAN NOTE
Assessment:  · CTA demonstrated large hiatal hernia with intrathoracic stomach which appeared distended with fluid/debris   · Abdominal pain likely related to large hiatal hernia    · Elevated lactate after IV resuscitation concerning for possible ischemia  · Patient hemodynamically stable after IVF and pain controled with fentanyl  · Discussed with surgery resident on-call  · Discussed with thoracic surgery attending    Plan:  · NG tube placed with suction  · Will transfer to [place] for thoracic surgery evaluation  · Serial abdominal exams  · Trend lactate  · NPO for now

## 2022-09-26 NOTE — ED NOTES
Per Kelsie Burgos, previous RN, fluids are to run wide open at this time  After he called report to receiving RN at [place] was advised transport has been canceled level of care upgraded to ICU       Timoteo Rojas RN  09/26/22 0901

## 2022-09-26 NOTE — ANESTHESIA POSTPROCEDURE EVALUATION
Post-Op Assessment Note    CV Status:  Stable  Pain Score: 0    Pain management: adequate     Mental Status:  Sleepy   Hydration Status:  Euvolemic and stable   PONV Controlled:  None   Airway Patency:  Patent      Post Op Vitals Reviewed: Yes      Staff: CRNA         No complications documented      /71 (09/26/22 1645)    Temp 98 8 °F (37 1 °C) (09/26/22 1645)    Pulse 82 (09/26/22 1645)   Resp 18 (09/26/22 1645)    SpO2 99 % (09/26/22 1645)

## 2022-09-26 NOTE — ASSESSMENT & PLAN NOTE
Assessment:  · Slight anion gap elevation of 14  · WBC elevated, unclear infectious source  · Beta hydroxybutyrate slightly elevated  · Abdominal pain concerning for hiatal hernia ischemia    Ddx:  Ischemic hiatal hernia vs infectious process vs less likely DKA    Plan:  · See plan for abdominal pain  · Trend lactate  · Continue IVF

## 2022-09-26 NOTE — H&P
H&P- Thoracic Surgery  Saskia Figueroa 80 y o  female MRN: [id number]  Unit/Bed#: [facility] 517-01 Encounter: [id number]        Assessment/Plan     Assessment:  82F w/ large incarcerated hiatal hernia    Lactic Acidosis POA - Lactic 3 7    Plan:  Admit to ICU, appreciate care  NPO with nasogastric tube  Pain control PRN  LR @ 125  Trend endpoints - Lactic acidosis down trending    Plan for patient to OR for Robotic Hiatal hernia repair with possible mesh placement and gastropexy   - we did discuss the risks of surgery including prolonged intubation, possible ischemic stomach with need for esophgastomy  Patient expressed understanding of issue and consented to proceed to surgery   - I did personally reach out to the patients son Stiven Frey via phone however his phone was off and voicemail is full  History of Present Illness     HPI:  Saskia Figuerao is a 80 y o  female who presents as a transfer from [facility] with a large incarcerated hiatal hernia  Patient's past medical history significant for COPD on oxygen 2 L baseline, infrarenal abdominal aortic aneurysm, and diabetes  She initially presented to [facility] with abdominal pain that began around 8 or 9:00 p m  After she ate any clear  Patient described the pain as a 10/10 and her epigastric and chest region and it was nonradiating  Patient states that prior to EMS arriving she felt lightheaded  Upon arrival to the [facility] Emergency Department she was hypotensive with systolics in the 92V and 95D, tachypneic and diaphoretic  She did receive a fluid bolus and her vitals improved  Patient has a known hiatal hernia dating back to 2015  CT scan did show a large intrathoracic stomach with large amount of debris       Review of Systems   Constitutional: Positive for diaphoresis and fatigue  HENT: Negative  Eyes: Negative  Respiratory: Positive for chest tightness and shortness of breath      Cardiovascular: Negative  Gastrointestinal: Positive for abdominal pain, nausea and vomiting  Endocrine: Negative  Genitourinary: Negative  Musculoskeletal: Negative  Skin: Negative  Allergic/Immunologic: Negative  Neurological: Positive for light-headedness  Hematological: Negative  Psychiatric/Behavioral: Negative          Historical Information   Past Medical History:   Diagnosis Date    Cardiac disease     COPD [facility]chronic obstructive pulmonary disease) ([facility] )     Diabetes mellitus ([facility] )     GERD (gastroesophageal reflux disease)     Hiatal hernia     Hypertension     PUD (peptic ulcer disease)     Residual ASD (atrial septal defect) following repair      Past Surgical History:   Procedure Laterality Date    ASD REPAIR      BACK SURGERY      x3    JOINT REPLACEMENT      bilateral knee surgery    KNEE SURGERY      SHOULDER SURGERY       Social History   Social History     Substance and Sexual Activity   Alcohol Use No     Social History     Substance and Sexual Activity   Drug Use No     Social History     Tobacco Use   Smoking Status Former Smoker    Packs/day: 1 00    Years: 60 00    Pack years: 60 00    Start date:     Quit date: 2017    Years since quittin 4   Smokeless Tobacco Never Used   Tobacco Comment    stopped smoking 2 days ago     Family History:   Family History   Problem Relation Age of Onset    Cancer Mother     Asthma Father        Meds/Allergies   all medications and allergies reviewed  Allergies   Allergen Reactions    Prednisone      The patient reports "they make me goofy "  No true allergic reaction    Psyllium     Tetanus Toxoids        Objective   First Vitals:   Blood Pressure: 124/90 (22)  Pulse: 92 (22)  Temperature: 99 4 °F (37 4 °C) (22)  Temp Source: Oral (22)  Respirations: (!) 28 (22)  Height: 5' 1" (154 9 cm) (22)  Weight - Scale: 70 9 kg (156 lb 4 9 oz) (22)  SpO2: 94 % (09/26/22 0914)    Current Vitals:   Blood Pressure: 167/78 (09/26/22 1000)  Pulse: 98 (09/26/22 1000)  Temperature: 99 4 °F (37 4 °C) (09/26/22 0929)  Temp Source: Oral (09/26/22 0929)  Respirations: (!) 23 (09/26/22 1000)  Height: 5' 1" (154 9 cm) (09/26/22 0929)  Weight - Scale: 70 9 kg (156 lb 4 9 oz) (09/26/22 0929)  SpO2: 97 % (09/26/22 1000)    No intake or output data in the 24 hours ending 09/26/22 1031    Invasive Devices  Report    Peripheral Intravenous Line  Duration           Peripheral IV 09/25/22 Left Hand <1 day    Peripheral IV 09/25/22 Right Antecubital <1 day    Peripheral IV 09/25/22 Right Wrist <1 day          Drain  Duration           NG/OG/Enteral Tube Nasogastric 16 Fr Right nare <1 day                Physical Exam  Constitutional:       Appearance: She is ill-appearing and diaphoretic  HENT:      Head: Normocephalic and atraumatic  Nose: Nose normal       Mouth/Throat:      Mouth: Mucous membranes are dry  Eyes:      General: No scleral icterus  Cardiovascular:      Rate and Rhythm: Tachycardia present  Pulmonary:      Effort: Pulmonary effort is normal    Abdominal:      General: There is distension  Palpations: Abdomen is soft  Tenderness: There is abdominal tenderness  Comments: Epigastric tenderness   Musculoskeletal:         General: Normal range of motion  Cervical back: Neck supple  Skin:     General: Skin is warm  Neurological:      General: No focal deficit present  Mental Status: She is alert and oriented to person, place, and time  Psychiatric:         Mood and Affect: Mood normal          Lab Results:   I have personally reviewed pertinent lab results    , CBC:   Lab Results   Component Value Date    WBC 10 44 (H) 09/26/2022    HGB 10 9 (L) 09/26/2022    HCT 35 6 09/26/2022    MCV 92 09/26/2022     09/26/2022    MCH 28 1 09/26/2022    MCHC 30 6 (L) 09/26/2022    RDW 14 9 09/26/2022    MPV 9 6 09/26/2022    NRBC 0 09/26/2022 , CMP:   Lab Results   Component Value Date    SODIUM 137 09/26/2022    K 4 3 09/26/2022     09/26/2022    CO2 26 09/26/2022    BUN 14 09/26/2022    CREATININE 0 83 09/26/2022    CALCIUM 8 7 09/26/2022    AST 11 (L) 09/25/2022    ALT 8 09/25/2022    ALKPHOS 72 09/25/2022    EGFR 65 09/26/2022     Imaging: I have personally reviewed pertinent reports  and I have personally reviewed pertinent films in PACS  EKG, Pathology, and Other Studies: I have personally reviewed pertinent reports     and I have personally reviewed pertinent films in PACS    Code Status: Prior  Advance Directive and Living Will:      Power of :    POLST:

## 2022-09-26 NOTE — ASSESSMENT & PLAN NOTE
POA:  Tachypnea, hypotension, hypothermia, lactic acidosis, leukocytosis source unclear    Assessment:  -infectious source remains unclear at this point  -hypothermia possibly due to diaphoresis in setting of abdominal pain  -uncertain if vasovagal reaction could be responsible for hypotension  -CTA demonstrated nonspecific finding right upper lobe  -patient received 2 g cefepime in ED  -orthostatics negative in the ED (after IV fluids)    Plan:  -follow-up results of UA  -continue to monitor off antibiotics  -IVF

## 2022-09-26 NOTE — ASSESSMENT & PLAN NOTE
Assessment:  -present on arrival to ED  -responded to IV fluids    Plan:  -continue IV fluid hydration  -holding home Cozaar and Cardizem for now

## 2022-09-26 NOTE — CONSULTS
[address] 80 y o  female MRN: [id number]  Unit/Bed#: RADHA PICKARD Encounter: [id number]      -------------------------------------------------------------------------------------------------------------  Chief Complaint: Abdominal pain    History of Present Illness   HX and PE limited by:  History obtained from chart review, the patient and unobtainable from patient due to clinical condition  Clarissa Anthony is a 80 y o  female who presents with abdominal pain  Per chart review, pt began complaining of severe upper abdominal pain yesterday after eating  She was evaluated at [address] [place] [address] 4 ED  She was hypotensive with systolic BP in 17-35F, tachypnic and diaphoretic on presentation to ED  She had a known large hiatal hernia  CT scan confirmed an intrathoracic stomach filled with debris  She was initially hypotensive but responded to IV fluids  Lactates trended 3 7-4 8 to 3 2 to 2 8  She had an initial leukocytosis of 12 8 that trended to 10 4  She had an NG placed with 200-300 cc of thick gastric contents  She was transferred to [facility]  Pt was evaluated at bedside this morning along with nursing staff  She continues to complain of nausea and left upper abdominal pain  She is unable to rate or describe her pain at this time, but can localize with her finger  She did not verbalize any other complaints at time of examination  She was able to void spontaneously on bed pan  Shortly after, she was taken to operating room for emergent paraesophageal hernia repair     -------------------------------------------------------------------------------------------------------------  Assessment and Plan:    Neuro: n/a    CV:    Diagnosis: HTN  o Plan: Hypertensive preoperatively   Home meds: Managed on Losartan 50 mg qd and Diltiazem 180mg qd   Diagnosis: Infrarenal abdominal aortic aneurysm  o Measured up to 3 1 cm on imaging on 9/25/22   Diagnosis: Hx Retinal branch artery occlusion  o Plan: Aspirin and Atorvastatin held    Pulm:   Diagnosis: COPD  o Plan: Pt on 2L O2 at baseline  Take albuterol  Possible continued intubation postoperative given pt respiratory hx  GI:    Diagnosis: Large Incarcerated Hiatal Hernia  o Plan: NPO w/ NG tube  Pain control PRN, IV fluid resuscitation  Plan for Emergent Robotic Hiatal Hernia repair with possible mesh palcement and gastropexy    :  - Cardoza Catheter to remain postoperatively    F/E/N:    Plan: NPO preoperatively    Heme/Onc:    Diagnosis: Neutrophilia - ANC 8 80 Thousands/µL  o Plan: continue to monitor    Endo:    Diagnosis: Diabetes Mellitus  o Plan: BP goal less than 200 mg, plan for SSI inpt  Home meds: Managed with Metformin, Sitagliptin - hold while inpt    ID:    Diagnosis: SIRS  o Plan: mets criteria, HR> 90, RR 23 rpm  Remains afebrile  o Blood cultures pending, continue to monitor temp post-op    MSK/Skin:    Diagnosis: Sacral wound  o Plan: bandage applied pre-operatively   Diagnosis: Chronic Pain  o Plan: Gabapentin 300 mg QID (home med)    Disposition: Admit to SICU  Code Status: Prior  --------------------------------------------------------------------------------------------------------------  Review of Systems   Reason unable to perform ROS: Further ROS was limited due to limited pt communication  Constitutional: Positive for diaphoresis and fatigue  Negative for chills and fever  Respiratory: Positive for shortness of breath  Negative for cough  Pt on 2L O2 at baseline   Cardiovascular: Negative for chest pain  Gastrointestinal: Positive for abdominal pain and nausea  Genitourinary: Negative for dysuria  Review of systems was unable to be performed secondary to limited pt communication    Physical Exam  Vitals reviewed  Exam conducted with a chaperone present  Constitutional:       Appearance: She is ill-appearing and diaphoretic  HENT:      Head: Normocephalic and atraumatic  Nose: Nose normal    Eyes:      Conjunctiva/sclera: Conjunctivae normal    Cardiovascular:      Rate and Rhythm: Normal rate and regular rhythm  Pulses: Normal pulses  Heart sounds: Normal heart sounds  Pulmonary:      Effort: Tachypnea present  Breath sounds: Normal breath sounds and air entry  Comments: Pt on 2L O2 NC  Abdominal:      General: There is distension  Tenderness: There is abdominal tenderness in the epigastric area and left upper quadrant  There is no guarding or rebound  Musculoskeletal:         General: No swelling or tenderness  Cervical back: Normal range of motion  Right lower leg: No edema  Left lower leg: No edema  Skin:     General: Skin is warm  Neurological:      General: No focal deficit present  Mental Status: She is alert  She is disoriented  Motor: Motor function is intact  No weakness  Psychiatric:      Comments: Pt limited to answering questions with yes or no answers and did not answer all questions at time of examination       --------------------------------------------------------------------------------------------------------------  Vitals:   Vitals:    09/26/22 0914 09/26/22 0929 09/26/22 1000   BP: 124/90 166/90 167/78   Pulse: 92 84 98   Resp: (!) 28 (!) 33 (!) 23   Temp:  99 4 °F (37 4 °C)    TempSrc:  Oral    SpO2: 94% 94% 97%   Weight:  70 9 kg (156 lb 4 9 oz)    Height:  5' 1" (1 549 m)      Temp  Min: 95 6 °F (35 3 °C)  Max: 99 4 °F (37 4 °C)     Height: 5' 1" (154 9 cm)  Body mass index is 29 53 kg/m²      Laboratory and Diagnostics:  Results from last 7 days   Lab Units 09/26/22  0513 09/25/22  2308   WBC Thousand/uL 10 44* 12 85*   HEMOGLOBIN g/dL 10 9* 12 2   HEMATOCRIT % 35 6 39 1   PLATELETS Thousands/uL 200 281   NEUTROS PCT % 83* 57   MONOS PCT % 8 11     Results from last 7 days   Lab Units 09/26/22  0513 09/25/22  2308   SODIUM mmol/L 137 138   POTASSIUM mmol/L 4 3 3 6   CHLORIDE mmol/L 103 99   CO2 mmol/L 26 25   ANION GAP mmol/L 8 14*   BUN mg/dL 14 15   CREATININE mg/dL 0 83 0 96   CALCIUM mg/dL 8 7 9 9   GLUCOSE RANDOM mg/dL 175* 178*   ALT U/L  --  8   AST U/L  --  11*   ALK PHOS U/L  --  72   ALBUMIN g/dL  --  4 0   TOTAL BILIRUBIN mg/dL  --  0 45          Results from last 7 days   Lab Units 09/25/22  2308   INR  0 99   PTT seconds 27          Results from last 7 days   Lab Units 09/26/22  1050 09/26/22  0734 09/26/22  0514 09/26/22  0252 09/26/22  0025   LACTIC ACID mmol/L 2 6* 2 8* 3 2* 4 8* 3 7*     ABG:  Results from last 7 days   Lab Units 09/26/22  1110   PH ART  7 371   PCO2 ART mm Hg 50 8*   PO2 ART mm Hg 85 1   HCO3 ART mmol/L 28 8*   BASE EXC ART mmol/L 2 7   ABG SOURCE  Radial, Right     VBG:  Results from last 7 days   Lab Units 09/26/22  1110 09/26/22  0127   PH GAMALIEL   --  7 267*   PCO2 GAMALIEL mm Hg  --  44 8   PO2 GAMALIEL mm Hg  --  40 7   HCO3 GAMALIEL mmol/L  --  20 0*   BASE EXC GAMALIEL mmol/L  --  -6 8   ABG SOURCE  Radial, Right  --            Micro:  Results from last 7 days   Lab Units 09/26/22  0043   BLOOD CULTURE  Received in Microbiology Lab  Culture in Progress  Received in Microbiology Lab  Culture in Progress  EKG: NSR rate of 64 bpm, Complete RBBB unchanged from prior EKG  Imaging: I have personally reviewed radiology reports of pt imaging  9/25/22 - CT Abdomen/Pelvis w/ & w/o IV contrast  IMPRESSION:  - No evidence of acute aortic pathology  - Diffuse atherosclerotic changes throughout the aortoiliac vasculature as above  - Infrarenal abdominal aortic ectasia measuring up to 3 1 cm in maximal transverse dimension   - Large hiatal hernia/intrathoracic stomach which appears distended with fluid and debris,? Source of the patient's epigastric pain  Correlate clinically      Historical Information   Past Medical History:   Diagnosis Date    Cardiac disease     [facility]ch[facility] obstructive [facility] ([facility] )     Diabetes mellitus ([facility] )     GERD (gastroesophageal reflux disease)     Hiatal hernia     Hypertension     PUD (peptic ulcer disease)     Residual ASD (atrial septal defect) following repair      Past Surgical History:   Procedure Laterality Date    ASD REPAIR      BACK SURGERY      x3    JOINT REPLACEMENT      bilateral knee surgery    KNEE SURGERY      SHOULDER SURGERY       Social History   Social History     Substance and Sexual Activity   Alcohol Use No     Social History     Substance and Sexual Activity   Drug Use No     Social History     Tobacco Use   Smoking Status Former Smoker    Packs/day: 1 00    Years: 60 00    Pack years: 60 00    Start date:     Quit date: 2017    Years since quittin 4   Smokeless Tobacco Never Used   Tobacco Comment    stopped smoking 2 days ago     Exercise History: n/a  Family History:   Family History   Problem Relation Age of Onset    Cancer Mother     Asthma Father      Family history unknown      Medications:  Current Facility-Administered Medications   Medication Dose Route Frequency    [MAR Hold] albuterol (PROVENTIL HFA,VENTOLIN HFA) inhaler 2 puff  2 puff Inhalation Q6H PRN    [MAR Hold] brimonidine tartrate 0 2 % ophthalmic solution 1 drop  1 drop Left Eye BID    [MAR Hold] budesonide-formoterol (SYMBICORT) 160-4 5 mcg/act inhaler 2 puff  2 puff Inhalation BID    [MAR Hold] enoxaparin (LOVENOX) subcutaneous injection 40 mg  40 mg Subcutaneous Daily    [MAR Hold] fentanyl citrate (PF) 100 MCG/2ML 25 mcg  25 mcg Intravenous Once    [MAR Hold] HYDROmorphone (DILAUDID) injection 0 5 mg  0 5 mg Intravenous Q2H PRN    [MAR Hold] HYDROmorphone HCl (DILAUDID) injection 0 2 mg  0 2 mg Intravenous Q2H PRN    [MAR Hold] HYDROmorphone HCl (DILAUDID) injection 0 2 mg  0 2 mg Intravenous Q2H PRN    [MAR Hold] insulin lispro (HumaLOG) 100 units/mL subcutaneous injection 1-5 Units  1-5 Units Subcutaneous Q6H [facility]    lactated ringers infusion  125 mL/hr Intravenous Continuous    [MAR Hold] ondansetron (ZOFRAN) injection 4 mg  4 mg Intravenous Q4H PRN    [MAR Hold] pantoprazole (PROTONIX) injection 40 mg  40 mg Intravenous Q12H [address]     Home medications:  Prior to Admission Medications   Prescriptions Last Dose Informant Patient Reported? Taking? Budeson-Glycopyrrol-Formoterol (Breztri Aerosphere) 160-9-4 8 MCG/ACT AERO   No No   Sig: Inhale 2 puffs  in the morning and 2 puffs before bedtime  Rinse mouth after use      albuterol (PROVENTIL HFA,VENTOLIN HFA) 90 mcg/act inhaler   No No   Sig: Inhale 2 puffs every 6 (six) hours as needed for wheezing   ascorbic acid (VITAMIN C) 1000 MG tablet  Self Yes No   Sig: Take 1,000 mg by mouth 2 (two) times a day   aspirin 81 mg chewable tablet   No No   Sig: Chew 1 tablet (81 mg total) daily   atorvastatin (LIPITOR) 40 mg tablet   No No   Sig: Take 1 tablet (40 mg total) by mouth every evening   brimonidine tartrate 0 2 % ophthalmic solution   No No   Sig: Administer 1 drop into the left eye 2 (two) times a day   diltiazem (CARDIZEM CD) 180 mg 24 hr capsule  Self Yes No   Sig: Take 180 mg by mouth daily   gabapentin (NEURONTIN) 300 mg capsule  Self Yes No   Sig: Take 300 mg by mouth 4 (four) times a day   losartan (COZAAR) 50 mg tablet  Self Yes No   Sig: TAKE 1 TABLET BY MOUTH EVERY DAY   metFORMIN (GLUCOPHAGE) 1000 MG tablet  Self No No   Si mg QAM and 500 mg QPM   metFORMIN (GLUCOPHAGE) 500 mg tablet  Self Yes No   oxyCODONE ER (Xtampza ER) 13 5 MG C12A   No No   Sig: Take 13 5 mg by mouth 2 (two) times a day Max Daily Amount: 27 mg   pantoprazole (PROTONIX) 40 mg tablet  Self Yes No   Sig: Take 40 mg by mouth every 12 (twelve) hours   rOPINIRole (REQUIP) 0 5 mg tablet  Self No No   Si-3 tablets PO at HS   sitaGLIPtin (JANUVIA) 100 mg tablet  Self No No   Sig: Take 1 tablet (100 mg total) by mouth daily      Facility-Administered Medications: None     Allergies:   Allergies   Allergen Reactions    Prednisone      The patient reports "they make me goofy "  No true allergic reaction    Psyllium     Tetanus Toxoids      ------------------------------------------------------------------------------------------------------------  Advance Directive and Living Will:      Power of :    POLST:    ------------------------------------------------------------------------------------------------------------  Anticipated Length of Stay is > 2 midnights    Care Time Delivered:   No Critical Care time spent     Mariana Handley, DO    Portions of the record may have been created with voice recognition software  Occasional wrong word or "sound a like" substitutions may have occurred due to the inherent limitations of voice recognition software    Read the chart carefully and recognize, using context, where substitutions have occurred

## 2022-09-26 NOTE — ED NOTES
Transfer Information:    Ambulance Squad: Alisha Melara Time: 0830   Destination:    Accepting Physician: Dr Maria Victoria Valdez   Report Number: [phone]          Pramod Reddy RN  09/26/22 [id number]

## 2022-09-26 NOTE — PLAN OF CARE
Problem: Potential for Falls  Goal: Patient will remain free of falls  Description: INTERVENTIONS:  - Educate patient/family on patient safety including physical limitations  - Instruct patient to call for assistance with activity   - Consult OT/PT to assist with strengthening/mobility   - Keep Call bell within reach  - Keep bed low and locked with side rails adjusted as appropriate  - Keep care items and personal belongings within reach  - Initiate and maintain comfort rounds  - Make Fall Risk Sign visible to staff  - Offer Toileting every Hours, in advance of need  - Initiate/Maintain alarm  - Obtain necessary fall risk management equipment:   - Apply yellow socks and bracelet for high fall risk patients  - Consider moving patient to room near nurses station  Outcome: Progressing     Problem: PAIN - ADULT  Goal: Verbalizes/displays adequate comfort level or baseline comfort level  Description: Interventions:  - Encourage patient to monitor pain and request assistance  - Assess pain using appropriate pain scale  - Administer analgesics based on type and severity of pain and evaluate response  - Implement non-pharmacological measures as appropriate and evaluate response  - Consider cultural and social influences on pain and pain management  - Notify physician/advanced practitioner if interventions unsuccessful or patient reports new pain  Outcome: Progressing     Problem: SAFETY ADULT  Goal: Patient will remain free of falls  Description: INTERVENTIONS:  - Educate patient/family on patient safety including physical limitations  - Instruct patient to call for assistance with activity   - Consult OT/PT to assist with strengthening/mobility   - Keep Call bell within reach  - Keep bed low and locked with side rails adjusted as appropriate  - Keep care items and personal belongings within reach  - Initiate and maintain comfort rounds  - Make Fall Risk Sign visible to staff  - Offer Toileting every  Hours, in advance of need  - Initiate/Maintain alarm  - Obtain necessary fall risk management equipment:   - Apply yellow socks and bracelet for high fall risk patients  - Consider moving patient to room near nurses station  Outcome: Progressing     Problem: DISCHARGE PLANNING  Goal: Discharge to home or other facility with appropriate resources  Description: INTERVENTIONS:  - Identify barriers to discharge w/patient and caregiver  - Arrange for needed discharge resources and transportation as appropriate  - Identify discharge learning needs (meds, wound care, etc )  - Arrange for interpretive services to assist at discharge as needed  - Refer to Case Management Department for coordinating discharge planning if the patient needs post-hospital services based on physician/advanced practitioner order or complex needs related to functional status, cognitive ability, or social support system  Outcome: Progressing

## 2022-09-26 NOTE — H&P
[facility]  H&P- Nathalie Petite 1940, 80 y o  female MRN: [id number]  Unit/Bed#: ED-23 Encounter: [id number]  Primary Care Provider: Darnelle Councilman, DO   Date and time admitted to hospital: 9/25/2022 10:59 PM    * Abdominal pain  Assessment & Plan  Assessment:  · CTA demonstrated large hiatal hernia with intrathoracic stomach which appeared distended with fluid/debris   · Abdominal pain likely related to large hiatal hernia    · Elevated lactate after IV resuscitation concerning for possible ischemia  · Patient hemodynamically stable after IVF and pain controled with fentanyl  · Discussed with surgery resident on-call    Plan:  · Serial abdominal exams  · Trend lactate  · NPO for now    Lactic acidosis  Assessment & Plan  Assessment:  · Slight anion gap elevation of 14  · WBC elevated, unclear infectious source  · Beta hydroxybutyrate slightly elevated  · Abdominal pain concerning for hiatal hernia ischemia    Ddx:  Ischemic hiatal hernia vs infectious process vs metformin vs less likely DKA vs acquisition with tourniquet    Plan:  · Trend lactate  · Continue IVF      SIRS (systemic inflammatory response syndrome) (HCC)  Assessment & Plan  POA:  Tachypnea, hypotension, hypothermia, lactic acidosis, leukocytosis source unclear    Assessment:  -infectious source remains unclear at this point  -hypothermia possibly due to diaphoresis in setting of abdominal pain  -uncertain if vasovagal reaction could be responsible for hypotension  -CTA demonstrated nonspecific finding right upper lobe  -patient received 2 g cefepime in ED  -orthostatics negative in the ED (after IV fluids)    Plan:  -follow-up results of UA  -continue to monitor off antibiotics  -IVF    Large hiatal hernia  Assessment & Plan  Plan:  -see plan for abdominal pain  -consider thoracic surgery consult    Low blood pressure reading  Assessment & Plan  Assessment:  -present on arrival to ED  -responded to IV fluids    Plan:  -continue IV fluid hydration  -holding home Cozaar and Cardizem for now    VTE Pharmacologic Prophylaxis: VTE Score: 5 Moderate Risk (Score 3-4) - Pharmacological DVT Prophylaxis Ordered: enoxaparin (Lovenox)  Code Status: Level 3 - DNAR and DNI   Discussion with family: Patient declined call to   Anticipated Length of Stay: Patient will be admitted on an observation basis with an anticipated length of stay of less than 2 midnights secondary to Abdominal pain  Chief Complaint:  Abdominal pain    History of Present Illness:  Harpal Farnsworth is a 80 y o  female with a PMH of COPD on 2 L, DM 2, infrarenal AAA, hiatal hernia, GERD who presents with abdominal pain  Patient states abdominal pain began around 8 or 9 p m  after she ate an eclair  Patient describes the pain as in 10 aching, under diaphragm, nonradiating  Patient called EMS the pain worsened and she became short of breath  Patient also states she was lightheaded prior to EMS arrival but with no syncope or presyncope  Upon arrival to the ED, patient was hypotensive in the 17C systolic, tachypneic, diaphoretic  Patient received 2 L fluid bolus (this was not documented in the chart, as a provider was not yet assigned to the patient)  Vital signs improved  CTA c/a/p was obtained given patient's history of AAA  No acute aortic pathology was identified however a large hiatal hernia with intrathoracic stomach which appeared distended with fluid/debris  Patient does have known hiatal hernia dating back to at least 2015  Mild leukocytosis  CMP unremarkable except for slightly increased anion gap of 14  Lactate was measured 3 7 over 1 hour after arrival to emergency department, after 2 L bolus  Patient's abdominal pain resolved by approximately 1:00 a m  after she had received fentanyl  Shortness of breath resolved      Patient does meet SIRS criteria given hypothermia, tachypnea, leukocytosis, lactic acidosis  Patient received 1 dose cefepime 2 g in ED  Orthostatics were negative in ED after IVF  Layin/62, pulse 82  Standin/65, pulse 83    Review of Systems:  Review of Systems   Constitutional: Positive for chills  Negative for fatigue and fever  Patient reports feeling cold after being diaphoretic   HENT: Negative for rhinorrhea and sore throat  Respiratory: Negative for cough, chest tightness and shortness of breath  Shortness of breath resolved upon my exam    Cardiovascular: Negative for chest pain  Gastrointestinal: Negative for abdominal pain, constipation, diarrhea, nausea and vomiting  Abdominal pain resolved upon my exam  Patient feels that she needs to have a bowel movement  Musculoskeletal:        Chronic back pain   Neurological: Negative for dizziness, syncope and light-headedness  Past Medical and Surgical History:   Past Medical History:   Diagnosis Date    Cardiac disease     COPD [facility]chronic obstructive pulmonary disease) ([facility] )     Diabetes mellitus ([facility] )     GERD (gastroesophageal reflux disease)     Hiatal hernia     Hypertension     PUD (peptic ulcer disease)     Residual ASD (atrial septal defect) following repair        Past Surgical History:   Procedure Laterality Date    ASD REPAIR      BACK SURGERY      x3    JOINT REPLACEMENT      bilateral knee surgery    KNEE SURGERY      SHOULDER SURGERY         Meds/Allergies:  Prior to Admission medications    Medication Sig Start Date End Date Taking?  Authorizing Provider   albuterol (PROVENTIL HFA,VENTOLIN HFA) 90 mcg/act inhaler Inhale 2 puffs every 6 (six) hours as needed for wheezing 21   Arlen Grant DO   ascorbic acid (VITAMIN C) 1000 MG tablet Take 1,000 mg by mouth 2 (two) times a day    Historical Provider, MD   aspirin 81 mg chewable tablet Chew 1 tablet (81 mg total) daily 22   Krystal Mckeon DO   atorvastatin (LIPITOR) 40 mg tablet Take 1 tablet (40 mg total) by mouth every evening 4/22/22 6/8/22  Son Aguilar DO   brimonidine tartrate 0 2 % ophthalmic solution Administer 1 drop into the left eye 2 (two) times a day 4/22/22   Son Aguilar DO   Budeson-Glycopyrrol-Formoterol (Breztri Aerosphere) 160-9-4 8 MCG/ACT AERO Inhale 2 puffs  in the morning and 2 puffs before bedtime  Rinse mouth after use    5/16/22   Arlen Grant DO   diltiazem (CARDIZEM CD) 180 mg 24 hr capsule Take 180 mg by mouth daily    Historical Provider, MD   gabapentin (NEURONTIN) 300 mg capsule Take 300 mg by mouth 4 (four) times a day 3/23/20   Historical Provider, MD   losartan (COZAAR) 50 mg tablet TAKE 1 TABLET BY MOUTH EVERY DAY 7/3/21   Historical Provider, MD   metFORMIN (GLUCOPHAGE) 1000 MG tablet 1000 mg QAM and 500 mg QPM 4/13/20   ROMANA Burris   metFORMIN (GLUCOPHAGE) 500 mg tablet  9/18/21   Historical Provider, MD   oxyCODONE ER Jayton Gavel ER) 13 5 MG C12A Take 13 5 mg by mouth 2 (two) times a day Max Daily Amount: 27 mg 5/16/22   Arlen Grant DO   pantoprazole (PROTONIX) 40 mg tablet Take 40 mg by mouth every 12 (twelve) hours    Historical Provider, MD   rOPINIRole (REQUIP) 0 5 mg tablet 2-3 tablets PO at HS 4/13/20   ROMANA Burris   sitaGLIPtin (JANUVIA) 100 mg tablet Take 1 tablet (100 mg total) by mouth daily 4/13/20   ROMANA Burris     I have reviewed home medications with patient personally  Allergies: Allergies   Allergen Reactions    Prednisone      The patient reports "they make me goofy "  No true allergic reaction    Psyllium     Tetanus Toxoids        Social History:  Marital Status:     Occupation:  Retired  Patient Pre-hospital Living Situation: With other family member: Son and his family  Patient Pre-hospital Level of Mobility: walks with walker  Patient Pre-hospital Diet Restrictions:  None  Substance Use History:   Social History     Substance and Sexual Activity   Alcohol Use No     Social History Tobacco Use   Smoking Status Former Smoker    Packs/day: 1 00    Years: 60 00    Pack years: 60 00    Start date:     Quit date: 2017    Years since quittin 4   Smokeless Tobacco Never Used   Tobacco Comment    stopped smoking 2 days ago     Social History     Substance and Sexual Activity   Drug Use No       Family History:  Family History   Problem Relation Age of Onset    Cancer Mother     Asthma Father        Physical Exam:     Vitals:   Blood Pressure: 134/71 (22 0300)  Pulse: 77 (22 0300)  Temperature: 97 6 °F (36 4 °C) (22 0236)  Temp Source: Oral (22 023)  Respirations: (!) 24 (22 030)  SpO2: 95 % (22)    Physical Exam  Vitals and nursing note reviewed  Constitutional:       General: She is not in acute distress  Appearance: She is well-developed  HENT:      Head: Normocephalic and atraumatic  Eyes:      Conjunctiva/sclera: Conjunctivae normal    Cardiovascular:      Rate and Rhythm: Normal rate and regular rhythm  Heart sounds: Murmur heard  Systolic murmur is present  Pulmonary:      Effort: Pulmonary effort is normal  No respiratory distress  Breath sounds: Normal breath sounds  Comments:   Wheezing noted in right upper lung  ? rales noted in left lower lung  No bowel sounds  Abdominal:      General: Abdomen is flat  Bowel sounds are normal  There is no distension  Palpations: Abdomen is soft  Tenderness: There is abdominal tenderness  There is no guarding  Comments:   Mild tenderness to palpation   Musculoskeletal:      Cervical back: Neck supple  Right lower leg: Edema present  Left lower leg: Edema present  Skin:     General: Skin is warm and dry  Neurological:      General: No focal deficit present  Mental Status: She is alert and oriented to person, place, and time     Psychiatric:         Mood and Affect: Mood normal          Behavior: Behavior normal           Additional Data:     Lab Results:  Results from last 7 days   Lab Units 09/25/22 2308   WBC Thousand/uL 12 85*   HEMOGLOBIN g/dL 12 2   HEMATOCRIT % 39 1   PLATELETS Thousands/uL 281   NEUTROS PCT % 57   LYMPHS PCT % 27   MONOS PCT % 11   EOS PCT % 3     Results from last 7 days   Lab Units 09/25/22  2308   SODIUM mmol/L 138   POTASSIUM mmol/L 3 6   CHLORIDE mmol/L 99   CO2 mmol/L 25   BUN mg/dL 15   CREATININE mg/dL 0 96   ANION GAP mmol/L 14*   CALCIUM mg/dL 9 9   ALBUMIN g/dL 4 0   TOTAL BILIRUBIN mg/dL 0 45   ALK PHOS U/L 72   ALT U/L 8   AST U/L 11*   GLUCOSE RANDOM mg/dL 178*     Results from last 7 days   Lab Units 09/25/22 2308   INR  0 99     Results from last 7 days   Lab Units 09/25/22  2301   POC GLUCOSE mg/dl 194*         Results from last 7 days   Lab Units 09/26/22  0252 09/26/22  0025   LACTIC ACID mmol/L 4 8* 3 7*       Imaging: Reviewed radiology reports from this admission including: abdominal/pelvic CT and Personally reviewed the following imaging: abdominal/pelvic CT  CTA chest abdomen pelvis w wo contrast   Final Result by River Freitas MD (09/26 0021)      No evidence of acute aortic pathology  Diffuse atherosclerotic changes throughout the aortoiliac vasculature as above  Infrarenal abdominal aortic ectasia measuring up to 3 1 cm in maximal transverse dimension  Large hiatal hernia/intrathoracic stomach which appears distended with fluid and debris,? Source of the patient's epigastric pain  Correlate clinically  Additional findings above  Workstation performed: [id number]             EKG and Other Studies Reviewed on Admission:   · EKG: NSR, RBBB  ** Please Note: This note has been constructed using a voice recognition system   **

## 2022-09-26 NOTE — ED PROVIDER NOTES
History  Chief Complaint   Patient presents with    Hypotension     EMS called for abdominal pain  When ems arrived, found patient to be hypotensive and complaining of back and chest pain  Juliana Gonzalez is a 80year old female with infrarenal aortic aneurysm, significant hiatal hernia, peptic ulcer disease, presenting via EMS with sudden onset epigastric abdominal pain with associated hypotension  Patient's pain began shortly having a "eclair"  Initially on arrival by EMS, patient was well-appearing, however she suddenly became diaphoretic, pale, and hypotensive with blood pressures in the 70s over 40s  On arrival to the emergency department, she continued with the described ill appearance  Patient is describing significant epigastric abdominal pain  Of note, patient takes aspirin daily  History provided by:  Patient and EMS personnel  History limited by:  Acuity of condition   used: No        Prior to Admission Medications   Prescriptions Last Dose Informant Patient Reported? Taking? Budeson-Glycopyrrol-Formoterol (Breztri Aerosphere) 160-9-4 8 MCG/ACT AERO   No No   Sig: Inhale 2 puffs  in the morning and 2 puffs before bedtime  Rinse mouth after use      albuterol (PROVENTIL HFA,VENTOLIN HFA) 90 mcg/act inhaler   No No   Sig: Inhale 2 puffs every 6 (six) hours as needed for wheezing   ascorbic acid (VITAMIN C) 1000 MG tablet  Self Yes No   Sig: Take 1,000 mg by mouth 2 (two) times a day   aspirin 81 mg chewable tablet   No No   Sig: Chew 1 tablet (81 mg total) daily   atorvastatin (LIPITOR) 40 mg tablet   No No   Sig: Take 1 tablet (40 mg total) by mouth every evening   brimonidine tartrate 0 2 % ophthalmic solution   No No   Sig: Administer 1 drop into the left eye 2 (two) times a day   diltiazem (CARDIZEM CD) 180 mg 24 hr capsule  Self Yes No   Sig: Take 180 mg by mouth daily   gabapentin (NEURONTIN) 300 mg capsule  Self Yes No   Sig: Take 300 mg by mouth 4 (four) times a day losartan (COZAAR) 50 mg tablet  Self Yes No   Sig: TAKE 1 TABLET BY MOUTH EVERY DAY   metFORMIN (GLUCOPHAGE) 1000 MG tablet  Self No No   Si mg QAM and 500 mg QPM   metFORMIN (GLUCOPHAGE) 500 mg tablet  Self Yes No   oxyCODONE ER (Xtampza ER) 13 5 MG C12A   No No   Sig: Take 13 5 mg by mouth 2 (two) times a day Max Daily Amount: 27 mg   pantoprazole (PROTONIX) 40 mg tablet  Self Yes No   Sig: Take 40 mg by mouth every 12 (twelve) hours   rOPINIRole (REQUIP) 0 5 mg tablet  Self No No   Si-3 tablets PO at HS   sitaGLIPtin (JANUVIA) 100 mg tablet  Self No No   Sig: Take 1 tablet (100 mg total) by mouth daily      Facility-Administered Medications: None       Past Medical History:   Diagnosis Date    Cardiac disease     COPD (chronic obstructive pulmonary disease) (HCC)     Diabetes mellitus (HCC)     GERD (gastroesophageal reflux disease)     Hiatal hernia     Hypertension     PUD (peptic ulcer disease)     Residual ASD (atrial septal defect) following repair        Past Surgical History:   Procedure Laterality Date    ASD REPAIR      BACK SURGERY      x3    JOINT REPLACEMENT      bilateral knee surgery    KNEE SURGERY      SHOULDER SURGERY         Family History   Problem Relation Age of Onset    Cancer Mother     Asthma Father      I have reviewed and agree with the history as documented  E-Cigarette/Vaping     E-Cigarette/Vaping Substances     Social History     Tobacco Use    Smoking status: Former Smoker     Packs/day: 1 00     Years: 60 00     Pack years: 60 00     Start date:      Quit date: 2017     Years since quittin 4    Smokeless tobacco: Never Used    Tobacco comment: stopped smoking 2 days ago   Substance Use Topics    Alcohol use: No    Drug use: No        Review of Systems   Constitutional: Positive for diaphoresis  Negative for chills and fever  HENT: Negative for ear pain and sore throat  Eyes: Negative for pain and visual disturbance     Respiratory: Negative for cough and shortness of breath  Cardiovascular: Negative for chest pain and palpitations  Gastrointestinal: Positive for abdominal pain and nausea  Negative for vomiting  Genitourinary: Negative for dysuria and hematuria  Musculoskeletal: Negative for arthralgias and back pain  Skin: Negative for color change and rash  Neurological: Negative for seizures and syncope  All other systems reviewed and are negative  Physical Exam  ED Triage Vitals   Temperature Pulse Respirations Blood Pressure SpO2   09/26/22 0034 09/25/22 2302 09/25/22 2304 09/25/22 2302 09/25/22 2310   (!) 95 6 °F (35 3 °C) 66 (!) 56 (!) 80/57 93 %      Temp Source Heart Rate Source Patient Position - Orthostatic VS BP Location FiO2 (%)   09/26/22 0034 09/25/22 2302 09/25/22 2302 09/25/22 2302 --   Rectal Monitor Sitting Right arm       Pain Score       --                    Orthostatic Vital Signs  Vitals:    09/25/22 2302 09/25/22 2311 09/26/22 0030   BP: (!) 80/57 102/59 151/70   Pulse: 66 63 73   Patient Position - Orthostatic VS: Sitting Sitting Sitting       Physical Exam  Vitals and nursing note reviewed  Constitutional:       General: She is in acute distress  Appearance: She is ill-appearing and diaphoretic  Comments: Patient is very pale and diaphoretic, she appears uncomfortable secondary to pain, she is hypotensive   HENT:      Head: Normocephalic and atraumatic  Mouth/Throat:      Mouth: Mucous membranes are moist       Pharynx: Oropharynx is clear  Eyes:      General: No scleral icterus  Conjunctiva/sclera: Conjunctivae normal    Cardiovascular:      Rate and Rhythm: Normal rate and regular rhythm  Heart sounds: Normal heart sounds  Pulmonary:      Effort: Respiratory distress present  Breath sounds: No wheezing, rhonchi or rales  Comments: Patient was tachypneic on arrival  Abdominal:      General: Abdomen is flat  There is no distension        Palpations: Abdomen is soft       Tenderness: There is abdominal tenderness in the epigastric area  There is no guarding or rebound  Comments: Bedside ultrasound did not show any evidence of free fluid in the abdomen   Musculoskeletal:         General: No tenderness or signs of injury  Cervical back: Neck supple  No rigidity  Skin:     General: Skin is warm  Coloration: Skin is not jaundiced  Findings: No erythema or rash  Neurological:      General: No focal deficit present  Mental Status: She is alert  Mental status is at baseline  Psychiatric:         Mood and Affect: Mood normal          Behavior: Behavior normal          ED Medications  Medications   lactated ringers bolus 500 mL (500 mL Intravenous New Bag 9/26/22 0024)   cefepime (MAXIPIME) IVPB (premix in dextrose) 2,000 mg 50 mL (2,000 mg Intravenous New Bag 9/26/22 0044)   fentanyl citrate (PF) 100 MCG/2ML 12 5 mcg (12 5 mcg Intravenous Given 9/25/22 2317)   ondansetron (ZOFRAN) injection 4 mg (4 mg Intravenous Given 9/26/22 0033)   iohexol (OMNIPAQUE) 350 MG/ML injection (SINGLE-DOSE) 100 mL (100 mL Intravenous Given 9/25/22 2328)   fentanyl citrate (PF) 100 MCG/2ML 25 mcg (25 mcg Intravenous Given 9/26/22 0033)       Diagnostic Studies  Results Reviewed     Procedure Component Value Units Date/Time    Blood gas, venous [[id number]] Updated: 09/26/22 0056    Lab Status: No result Specimen: Blood from Arm, Right     Blood culture #1 [[id number]] Collected: 09/26/22 0043    Lab Status: In process Specimen: Blood from Arm, Right Updated: 09/26/22 0050    Blood culture #2 [[id number]] Collected: 09/26/22 0043    Lab Status: In process Specimen: Blood from Arm, Left Updated: 09/26/22 0050    Lipase [[id number]] Collected: 09/26/22 0025    Lab Status: In process Specimen: Blood from Arm, Right Updated: 09/26/22 0039    Lactic acid, plasma [[id number]] Collected: 09/26/22 0025    Lab Status:  In process Specimen: Blood from Arm, Right Updated: 09/26/22 9117 UA (URINE) with reflex to Scope [[id number]]     Lab Status: No result Specimen: Urine     APTT [[id number]]  (Normal) Collected: 09/25/22 2308    Lab Status: Final result Specimen: Blood from Arm, Right Updated: 09/26/22 0029     PTT 27 seconds     Protime-INR [[id number]]  (Normal) Collected: 09/25/22 2308    Lab Status: Final result Specimen: Blood from Arm, Right Updated: 09/26/22 0029     Protime 13 3 seconds      INR 0 99    HS Troponin I 4hr [[id number]]     Lab Status: No result Specimen: Blood     B-Type Natriuretic Peptide(BNP) AN, CA, EA Camp[facility] Only [[id number]]  (Normal) Collected: 09/25/22 2312    Lab Status: Final result Specimen: Blood Updated: 09/25/22 2354     BNP 52 pg/mL     HS Troponin 0hr (reflex protocol) [[id number]]  (Normal) Collected: 09/25/22 2308    Lab Status: Final result Specimen: Blood from Arm, Right Updated: 09/25/22 2339     hs TnI 0hr 3 ng/L     HS Troponin I 2hr [[id number]]     Lab Status: No result Specimen: Blood     Comprehensive metabolic panel [[id number]]  (Abnormal) Collected: 09/25/22 2308    Lab Status: Final result Specimen: Blood from Arm, Right Updated: 09/25/22 2331     Sodium 138 mmol/L      Potassium 3 6 mmol/L      Chloride 99 mmol/L      CO2 25 mmol/L      ANION GAP 14 mmol/L      BUN 15 mg/dL      Creatinine 0 96 mg/dL      Glucose 178 mg/dL      Calcium 9 9 mg/dL      AST 11 U/L      ALT 8 U/L      Alkaline Phosphatase 72 U/L      Total Protein 6 8 g/dL      Albumin 4 0 g/dL      Total Bilirubin 0 45 mg/dL      eGFR 55 ml/min/1 73sq m     Narrative:      [place] guidelines for Chronic Kidney Disease (CKD):     Stage 1 with normal or high GFR (GFR > 90 mL/min/1 73 square meters)    Stage 2 Mild CKD (GFR = 60-89 mL/min/1 73 square meters)    Stage 3A Moderate CKD (GFR = 45-59 mL/min/1 73 square meters)    Stage 3B Moderate CKD (GFR = 30-44 mL/min/1 73 square meters)    Stage 4 Severe CKD (GFR = 15-29 mL/min/1 73 square meters)    Stage 5 End Stage CKD (GFR <15 mL/min/1 73 square meters)  Note: GFR calculation is accurate only with a steady state creatinine    CBC and differential [[id number]]  (Abnormal) Collected: 09/25/22 2308    Lab Status: Final result Specimen: Blood from Arm, Right Updated: 09/25/22 2315     WBC 12 85 Thousand/uL      RBC 4 34 Million/uL      Hemoglobin 12 2 g/dL      Hematocrit 39 1 %      MCV 90 fL      MCH 28 1 pg      MCHC 31 2 g/dL      RDW 14 6 %      MPV 9 4 fL      Platelets 022 Thousands/uL      nRBC 0 /100 WBCs      Neutrophils Relative 57 %      Immat GRANS % 1 %      Lymphocytes Relative 27 %      Monocytes Relative 11 %      Eosinophils Relative 3 %      Basophils Relative 1 %      Neutrophils Absolute 7 45 Thousands/µL      Immature Grans Absolute 0 07 Thousand/uL      Lymphocytes Absolute 3 46 Thousands/µL      Monocytes Absolute 1 37 Thousand/µL      Eosinophils Absolute 0 36 Thousand/µL      Basophils Absolute 0 14 Thousands/µL     Fingerstick Glucose (POCT) [[id number]]  (Abnormal) Collected: 09/25/22 2301    Lab Status: Final result Updated: 09/25/22 2305     POC Glucose 194 mg/dl                  CTA chest abdomen pelvis w wo contrast   Final Result by Micheal Hinojosa MD (09/26 0021)      No evidence of acute aortic pathology  Diffuse atherosclerotic changes throughout the aortoiliac vasculature as above  Infrarenal abdominal aortic ectasia measuring up to 3 1 cm in maximal transverse dimension  Large hiatal hernia/intrathoracic stomach which appears distended with fluid and debris,? Source of the patient's epigastric pain  Correlate clinically  Additional findings above        Workstation performed: [id number]               Procedures  ECG 12 Lead Documentation Only    Date/Time: 9/25/2022 11:53 PM  Performed by: Megan Luciano DO  Authorized by: Megan Luciano DO     Indications / Diagnosis:  Hypotension, epigastric abdominal pain  ECG reviewed by me, the ED Provider: yes    Patient location:  ED  Previous ECG:     Previous ECG:  Compared to current    Similarity:  No change  Interpretation:     Interpretation: abnormal    Rate:     ECG rate:  64    ECG rate assessment: normal    Rhythm:     Rhythm: sinus rhythm    Ectopy:     Ectopy: none    QRS:     QRS axis:  Right    QRS intervals: Wide  Conduction:     Conduction: abnormal      Abnormal conduction: complete RBBB    ST segments:     ST segments:  Normal  T waves:     T waves: normal    Comments:      Normal sinus rhythm with complete right bundle branch block unchanged from prior          ED Course  ED Course as of 09/26/22 0059   Sun Sep 25, 2022   2340 Blood pressures have improved with IV fluids, will continue to monitor   Mon Sep 26, 2022   0042 Patient's blood pressures remained stable, she also endorses improvement in her pain and other symptomatology  Patient is slightly hypothermic, will place on [employer]  Work of breathing is also improved, less tachypneic  Patient to be admitted  Initial Sepsis Screening     Row Name 09/26/22 0043                Is the patient's history suggestive of a new or worsening infection? Yes (Proceed)  -CR        Suspected source of infection acute abdominal infection  -CR        Are two or more of the following signs & symptoms of infection both present and new to the patient? Yes (Proceed)  -CR        Indicate SIRS criteria Hypothermia < 36C (96 8F); Tachypnea > 20 resp per min;Leukocytosis (WBC > 05122 IJL)  -CR        If the answer is yes to both questions, suspicion of sepsis is present --        If severe sepsis is present AND tissue hypoperfusion perists in the hour after fluid resuscitation or lactate > 4, the patient meets criteria for SEPTIC SHOCK --        Are any of the following organ dysfunction criteria present within 6 hours of suspected infection and SIRS criteria that are NOT considered to be chronic conditions?  No  -CR        Organ dysfunction --        Date of presentation of severe sepsis --        Time of presentation of severe sepsis --        Tissue hypoperfusion persists in the hour after crystalloid fluid administration, evidenced, by either: --        Was hypotension present within one hour of the conclusion of crystalloid fluid administration? --        Date of presentation of septic shock --        Time of presentation of septic shock --              User Key  (r) = Recorded By, (t) = Taken By, (c) = Cosigned By    [address] Name Provider Type    JENNY Tomas DO Resident                          MDM  Number of Diagnoses or Management Options  Abdominal aortic ectasia [facility]): minor  Epigastric abdominal pain: new and requires workup  Hiatal hernia: established and worsening  Hypothermia, initial encounter: new and requires workup  Tachypnea: new and requires workup  Transient hypotension: new and requires workup  Risk of Complications, Morbidity, and/or Mortality  General comments: Alla Colón is a 80year old female with infrarenal aortic aneurysm, significant hiatal hernia, peptic ulcer disease, presenting via EMS with sudden onset epigastric abdominal pain with associated hypotension  Patient had blood pressures in the 70s over 40s prior to arrival to the emergency department  On arrival, the patient was ill-appearing, she was pale and diaphoretic, she was still hypotensive  She had tenderness to palpation of the epigastric region of her abdomen  Bedside ultrasound did not show any free fluid in the abdomen  Stat CTA of the chest and abdomen was ordered the patient was taken to the CT scanner, concern for possible AAA rupture  CT did not show any acute pathology in the chest or abdomen, no evidence of AAA rupture, though she did have a small aortic aneurysm noted to be 3 1 cm  She has a large hiatal hernia, there was a large amount of food/debris in the stomach, questionable to be the source for pain    No other significant findings  Suspect that the patient may have had a vasovagal event that led to her transient hypotension, appears the patient has a large amount fluid would in stomach, eclair may have led to the event  She had a slightly elevated white blood cell count, otherwise blood work was unremarkable  ECG unchanged from prior, initial troponin of 3  Of note, patient found to be slightly hypothermic and was placed on a [employer]  With fentanyl and Zofran, the patient or significant improvement in her symptoms, she feels much better than before  Patient to be admitted for further treatment evaluation  Patient Progress  Patient progress: stable      Disposition  Final diagnoses:   Hypothermia, initial encounter   Transient hypotension   Tachypnea   Hiatal hernia   Epigastric abdominal pain   Abdominal aortic ectasia ([facility] [address] )     Time reflects when diagnosis was documented in both MDM as applicable and the Disposition within this note     Time User Action Codes Description Comment    9/26/2022 12:34 AM Joseph Yen Nuala Mews  XXXA] Hypothermia, initial encounter     9/26/2022 12:35 AM Joseph Yen Add [I95 9] Transient hypotension     9/26/2022 12:35 AM Joseph Yen [R06 82] Tachypnea     9/26/2022 12:35 AM Joseph Yen Add [K44 9] Hiatal hernia     9/26/2022 12:35 AM Joseph Yen Add [R10 13] Epigastric abdominal pain     9/26/2022 12:35 AM Joseph Yen Modify [T68  XXXA] Hypothermia, initial encounter     9/26/2022 12:35 AM Joseph Yen Modify [R10 13] Epigastric abdominal pain     9/26/2022 12:36 AM Joseph Yen Add [I77 811] Abdominal aortic ectasia [facility])       ED Disposition     ED Disposition   Admit    Condition   Stable    Date/Time   Mon Sep 26, 2022 12:59 AM    Comment   Case was discussed with SLIM resident and the patient's admission status was agreed to be Admission Status: observation status to the service of Dr Jose C Martinez             Follow-up Information    None         Patient's Medications   Discharge Prescriptions    No medications on file     No discharge procedures on file  PDMP Review     None           ED Provider  Attending physically available and evaluated Jessi Alcazar I managed the patient along with the ED Attending      Electronically Signed by         José Miguel Underwood DO  09/26/22 5274

## 2022-09-26 NOTE — SEPSIS NOTE
Sepsis Note   Jessi Alcazar 80 y o  female MRN: [id number]  Unit/Bed#: ED-23 Encounter: [id number]       qSOFA     [address] Name 09/26/22 0030 09/25/22 2311 09/25/22 2304 09/25/22 2302       Altered mental status GCS < 15 -- -- -- --     Respiratory Rate > / =22 1 1 1 --     Systolic BP < / =442 0 0 -- 1     Q Sofa Score 1 1 2 --                Initial Sepsis Screening     Row Name 09/26/22 0043                Is the patient's history suggestive of a new or worsening infection? Yes (Proceed)  -CR        Suspected source of infection acute abdominal infection  -CR        Are two or more of the following signs & symptoms of infection both present and new to the patient? Yes (Proceed)  -CR        Indicate SIRS criteria Hypothermia < 36C (96 8F); Tachypnea > 20 resp per min;Leukocytosis (WBC > 76151 IJL)  -CR        If the answer is yes to both questions, suspicion of sepsis is present --        If severe sepsis is present AND tissue hypoperfusion perists in the hour after fluid resuscitation or lactate > 4, the patient meets criteria for SEPTIC SHOCK --        Are any of the following organ dysfunction criteria present within 6 hours of suspected infection and SIRS criteria that are NOT considered to be chronic conditions?  No  -CR        Organ dysfunction --        Date of presentation of severe sepsis --        Time of presentation of severe sepsis --        Tissue hypoperfusion persists in the hour after crystalloid fluid administration, evidenced, by either: --        Was hypotension present within one hour of the conclusion of crystalloid fluid administration? --        Date of presentation of septic shock --        Time of presentation of septic shock --              User Key  (r) = Recorded By, (t) = Taken By, (c) = Cosigned By    [address] Name Provider Type    CR DO Marielena Mar

## 2022-09-26 NOTE — ED ATTENDING ATTESTATION
9/25/2022  Bruce CARBAJAL MD, saw and evaluated the patient  I have discussed the patient with the resident/non-physician practitioner and agree with the resident's/non-physician practitioner's findings, Plan of Care, and MDM as documented in the resident's/non-physician practitioner's note, except where noted  All available labs and Radiology studies were reviewed  I was present for key portions of any procedure(s) performed by the resident/non-physician practitioner and I was immediately available to provide assistance  At this point I agree with the current assessment done in the Emergency Department    I have conducted an independent evaluation of this patient a history and physical is as follows:SEE H AND P ABOVE- AGREE WITH ER RESDIDENT TX PLAN/ DISPO    ED Course  ED Course as of 09/26/22 0102   Sun Sep 25, 2022   2341 ER MD NOTE; 12 LEAD ECG REVIEWED AND COMPARED BY ME TO PREVIOUS-- NSR WITH RBB--  INCREASED QRS INT- BUT NO OTHER ACUTE CHANGES   2342 ER MD NOTE-  PT- RE-ASSESSED AFTER RETURN FROM CT SCAN -- PT STILL WITH MID EPIGASTRIC TENDERNESS ---  SBP > 100---   PT RECEIVED 1 LITER OF NS - WILL CONT FOR ANOTHER 500 ML - PT VOMITED X 1 IN CT SCAN NON BLOODY -COFFEE GROUNDS- FOOD CONTENTS- - RECEIVED IV ZOFRAN--  STILL WITH MID EPIGASTRIC PAIN - WILL ORDER FENTANYL 25 MCG X 1--    2358 ER MD NOTE- PT - RE-EVALUTED  - CURRENT SBP 'S- PULSE IN 60'S- PULSE OX 96 % ON 4 LITERS O2 NC-- STILL C/O FEELING CHILLY- AND UPPER ABD PAIN -- ER MD ATTEMPTED TO GET ORAL TEMP- WOULD NOT REGISTER -  NURSE WILL TRY AGAIN  SOON -- AWAIT CTA OF CHEST/ABD PELVIS READ - ON CHART PT IS A LEVEL 3 DNR-- AFTER CT READ WILL DISCUSS THIS WITH PT    Mon Sep 26, 2022   0029 ER MD NOTE- RECTAL TEMP 95 6   0035 ER MD NOTE- PT - RE-EVALUATED- CONTINUES TO LOOK IMPROVED- WITH DECREASED DIAPHORESIS-- DECREASED WOB- DECREASED ABD PAIN --  AS PER RECORDS PT IS A LEVEL 3 DNR- DISCUSSED THIS WITH PT-- PT CONFIRMS THAT SHE IS A LEVEL 3 DNR-- WANTS NO INTUBATION-  ARTIFICAL IV BLOOD PRESSURE SUPPORT --  WILL TEXT SLIM ABOUT ADMIT- WILL COVER WITH A DOSE O F IV ANTIBIOTICS          Critical Care Time  Procedures

## 2022-09-26 NOTE — ED PROCEDURE NOTE
PROCEDURE  CriticalCare Time  Performed by: Dorothy Senior MD  Authorized by: Dorothy Senior MD     Critical care provider statement:     Critical care time (minutes):  60    Critical care start time:  9/25/2022 11:00 PM    Critical care end time:  9/26/2022 12:00 AM    Critical care time was exclusive of:  Separately billable procedures and treating other patients and teaching time    Critical care was necessary to treat or prevent imminent or life-threatening deterioration of the following conditions:  Shock    Critical care was time spent personally by me on the following activities:  Examination of patient, evaluation of patient's response to treatment, obtaining history from patient or surrogate, development of treatment plan with patient or surrogate, re-evaluation of patient's condition, ordering and review of radiographic studies, ordering and review of laboratory studies, ordering and performing treatments and interventions and review of old charts    I assumed direction of critical care for this patient from another provider in my specialty: no    Comments:      PT PRESENTS ACUTELY DIAPHORETIC/ WITH SBP IN 70'S-- ORDERING OF TESTS AND  INTERVENTIONS-  AND FREQUENT PT RE-ASSESSMENTS- RE-EVALUATIONS AND DISCUSSIONS WITH PT          Dorothy Senior MD  09/26/22 0105

## 2022-09-26 NOTE — ASSESSMENT & PLAN NOTE
Assessment:  · Slight anion gap elevation of 14  · WBC elevated, unclear infectious source  · Beta hydroxybutyrate slightly elevated  · Abdominal pain concerning for hiatal hernia ischemia    Ddx:  Ischemic hiatal hernia vs infectious process vs acquisition with tourniquet    Plan:  · Trend lactate  · Continue IVF

## 2022-09-26 NOTE — ANESTHESIA PROCEDURE NOTES
Arterial Line Insertion  Performed by: Alicia De La Rosa MD  Authorized by: Alicia De La Rosa MD   Consent: Verbal consent obtained  Written consent obtained  Risks and benefits: risks, benefits and alternatives were discussed  Consent given by: power of  (karl Rika Sears)  Patient understanding: patient states understanding of the procedure being performed  Patient consent: the patient's understanding of the procedure matches consent given  Procedure consent: procedure consent matches procedure scheduled  Relevant documents: relevant documents present and verified  Test results: test results available and properly labeled  Site marked: the operative site was marked  Radiology Images: Radiology Images displayed and confirmed  If images not available, report reviewed  Required items: required blood products, implants, devices, and special equipment available  Patient identity confirmed: arm band, verbally with patient and hospital-assigned identification number  Time out: Immediately prior to procedure a "time out" was called to verify the correct patient, procedure, equipment, support staff and site/side marked as required  Preparation: Patient was prepped and draped in the usual sterile fashion  Indications: hemodynamic monitoring  Orientation:  Right  Location: radial artery  Sedation:  Patient sedated: yes  Sedation type: (general anesthesia, placed post induction)  Vitals: Vital signs were monitored during sedation      Procedure Details:  Floyd's test normal: yes  Needle gauge: 20  Seldinger technique: Seldinger technique used  Number of attempts: 1    Post-procedure:  Post-procedure: dressing applied  Waveform: good waveform  Patient tolerance: Patient tolerated the procedure well with no immediate complications and patient tolerated the procedure well with no immediate complications

## 2022-09-26 NOTE — ED NOTES
Spoke with patient's daughter in law Heavenly Hough and advised of difficulty reaching son  Shelley Sayres will advise son Diana Ambrosio and will reach out to son Wing Cardona  Informed that patient is being transferred to Santiago Guillermo RN  09/26/22 1150

## 2022-09-27 ENCOUNTER — APPOINTMENT (OUTPATIENT)
Dept: RADIOLOGY | Facility: HOSPITAL | Age: 82
DRG: 326 | End: 2022-09-27
Payer: COMMERCIAL

## 2022-09-27 LAB
ANION GAP SERPL CALCULATED.3IONS-SCNC: 6 MMOL/L (ref 4–13)
ATRIAL RATE: 64 BPM
BASOPHILS # BLD AUTO: 0.06 THOUSANDS/ΜL (ref 0–0.1)
BASOPHILS NFR BLD AUTO: 1 % (ref 0–1)
BUN SERPL-MCNC: 6 MG/DL (ref 5–25)
CALCIUM SERPL-MCNC: 8.7 MG/DL (ref 8.3–10.1)
CHLORIDE SERPL-SCNC: 100 MMOL/L (ref 96–108)
CO2 SERPL-SCNC: 30 MMOL/L (ref 21–32)
CREAT SERPL-MCNC: 0.58 MG/DL (ref 0.6–1.3)
EOSINOPHIL # BLD AUTO: 0.04 THOUSAND/ΜL (ref 0–0.61)
EOSINOPHIL NFR BLD AUTO: 0 % (ref 0–6)
ERYTHROCYTE [DISTWIDTH] IN BLOOD BY AUTOMATED COUNT: 14.8 % (ref 11.6–15.1)
GFR SERPL CREATININE-BSD FRML MDRD: 86 ML/MIN/1.73SQ M
GLUCOSE SERPL-MCNC: 133 MG/DL (ref 65–140)
GLUCOSE SERPL-MCNC: 134 MG/DL (ref 65–140)
GLUCOSE SERPL-MCNC: 151 MG/DL (ref 65–140)
GLUCOSE SERPL-MCNC: 158 MG/DL (ref 65–140)
HCT VFR BLD AUTO: 35.4 % (ref 34.8–46.1)
HGB BLD-MCNC: 11.1 G/DL (ref 11.5–15.4)
IMM GRANULOCYTES # BLD AUTO: 0.06 THOUSAND/UL (ref 0–0.2)
IMM GRANULOCYTES NFR BLD AUTO: 1 % (ref 0–2)
LYMPHOCYTES # BLD AUTO: 0.83 THOUSANDS/ΜL (ref 0.6–4.47)
LYMPHOCYTES NFR BLD AUTO: 8 % (ref 14–44)
MAGNESIUM SERPL-MCNC: 2.2 MG/DL (ref 1.6–2.6)
MCH RBC QN AUTO: 28.2 PG (ref 26.8–34.3)
MCHC RBC AUTO-ENTMCNC: 31.4 G/DL (ref 31.4–37.4)
MCV RBC AUTO: 90 FL (ref 82–98)
MONOCYTES # BLD AUTO: 1.35 THOUSAND/ΜL (ref 0.17–1.22)
MONOCYTES NFR BLD AUTO: 12 % (ref 4–12)
NEUTROPHILS # BLD AUTO: 8.62 THOUSANDS/ΜL (ref 1.85–7.62)
NEUTS SEG NFR BLD AUTO: 78 % (ref 43–75)
NRBC BLD AUTO-RTO: 0 /100 WBCS
P AXIS: 5 DEGREES
PLATELET # BLD AUTO: 186 THOUSANDS/UL (ref 149–390)
PMV BLD AUTO: 10.1 FL (ref 8.9–12.7)
POTASSIUM SERPL-SCNC: 3.6 MMOL/L (ref 3.5–5.3)
PR INTERVAL: 124 MS
QRS AXIS: 158 DEGREES
QRSD INTERVAL: 140 MS
QT INTERVAL: 484 MS
QTC INTERVAL: 499 MS
RBC # BLD AUTO: 3.94 MILLION/UL (ref 3.81–5.12)
SODIUM SERPL-SCNC: 136 MMOL/L (ref 135–147)
T WAVE AXIS: 31 DEGREES
VENTRICULAR RATE: 64 BPM
WBC # BLD AUTO: 10.96 THOUSAND/UL (ref 4.31–10.16)

## 2022-09-27 PROCEDURE — 85025 COMPLETE CBC W/AUTO DIFF WBC: CPT | Performed by: SURGERY

## 2022-09-27 PROCEDURE — 74240 X-RAY XM UPR GI TRC 1CNTRST: CPT

## 2022-09-27 PROCEDURE — 82948 REAGENT STRIP/BLOOD GLUCOSE: CPT

## 2022-09-27 PROCEDURE — 99232 SBSQ HOSP IP/OBS MODERATE 35: CPT | Performed by: SURGERY

## 2022-09-27 PROCEDURE — 93010 ELECTROCARDIOGRAM REPORT: CPT | Performed by: INTERNAL MEDICINE

## 2022-09-27 PROCEDURE — 99024 POSTOP FOLLOW-UP VISIT: CPT | Performed by: THORACIC SURGERY (CARDIOTHORACIC VASCULAR SURGERY)

## 2022-09-27 PROCEDURE — C9113 INJ PANTOPRAZOLE SODIUM, VIA: HCPCS

## 2022-09-27 PROCEDURE — 80048 BASIC METABOLIC PNL TOTAL CA: CPT | Performed by: SURGERY

## 2022-09-27 PROCEDURE — 83735 ASSAY OF MAGNESIUM: CPT | Performed by: SURGERY

## 2022-09-27 PROCEDURE — C9113 INJ PANTOPRAZOLE SODIUM, VIA: HCPCS | Performed by: PHYSICIAN ASSISTANT

## 2022-09-27 RX ORDER — OXYCODONE HYDROCHLORIDE 5 MG/1
2.5 TABLET ORAL EVERY 4 HOURS PRN
Status: DISCONTINUED | OUTPATIENT
Start: 2022-09-27 | End: 2022-10-02

## 2022-09-27 RX ORDER — OXYCODONE HYDROCHLORIDE 5 MG/1
5 TABLET ORAL EVERY 4 HOURS PRN
Status: DISCONTINUED | OUTPATIENT
Start: 2022-09-27 | End: 2022-10-02

## 2022-09-27 RX ORDER — ACETAMINOPHEN 325 MG/1
650 TABLET ORAL EVERY 6 HOURS SCHEDULED
Status: DISCONTINUED | OUTPATIENT
Start: 2022-09-27 | End: 2022-10-04 | Stop reason: HOSPADM

## 2022-09-27 RX ORDER — METHOCARBAMOL 500 MG/1
500 TABLET, FILM COATED ORAL EVERY 6 HOURS SCHEDULED
Status: DISCONTINUED | OUTPATIENT
Start: 2022-09-27 | End: 2022-10-04 | Stop reason: HOSPADM

## 2022-09-27 RX ADMIN — PANTOPRAZOLE SODIUM 40 MG: 40 INJECTION, POWDER, FOR SOLUTION INTRAVENOUS at 08:46

## 2022-09-27 RX ADMIN — SODIUM CHLORIDE, SODIUM LACTATE, POTASSIUM CHLORIDE, AND CALCIUM CHLORIDE 125 ML/HR: .6; .31; .03; .02 INJECTION, SOLUTION INTRAVENOUS at 08:39

## 2022-09-27 RX ADMIN — HYDRALAZINE HYDROCHLORIDE 5 MG: 20 INJECTION, SOLUTION INTRAMUSCULAR; INTRAVENOUS at 00:02

## 2022-09-27 RX ADMIN — LABETALOL HYDROCHLORIDE 10 MG: 5 INJECTION, SOLUTION INTRAVENOUS at 04:43

## 2022-09-27 RX ADMIN — METHOCARBAMOL TABLETS 500 MG: 500 TABLET, COATED ORAL at 17:21

## 2022-09-27 RX ADMIN — ENOXAPARIN SODIUM 40 MG: 40 INJECTION SUBCUTANEOUS at 08:46

## 2022-09-27 RX ADMIN — IOHEXOL 50 ML: 240 INJECTION, SOLUTION INTRATHECAL; INTRAVASCULAR; INTRAVENOUS; ORAL at 10:45

## 2022-09-27 RX ADMIN — INSULIN LISPRO 1 UNITS: 100 INJECTION, SOLUTION INTRAVENOUS; SUBCUTANEOUS at 12:31

## 2022-09-27 RX ADMIN — BUDESONIDE AND FORMOTEROL FUMARATE DIHYDRATE 2 PUFF: 160; 4.5 AEROSOL RESPIRATORY (INHALATION) at 08:46

## 2022-09-27 RX ADMIN — LABETALOL HYDROCHLORIDE 10 MG: 5 INJECTION, SOLUTION INTRAVENOUS at 13:11

## 2022-09-27 RX ADMIN — BUDESONIDE AND FORMOTEROL FUMARATE DIHYDRATE 2 PUFF: 160; 4.5 AEROSOL RESPIRATORY (INHALATION) at 17:24

## 2022-09-27 RX ADMIN — LABETALOL HYDROCHLORIDE 10 MG: 5 INJECTION, SOLUTION INTRAVENOUS at 08:56

## 2022-09-27 RX ADMIN — BRIMONIDINE TARTRATE 1 DROP: 2 SOLUTION/ DROPS OPHTHALMIC at 08:46

## 2022-09-27 RX ADMIN — BRIMONIDINE TARTRATE 1 DROP: 2 SOLUTION/ DROPS OPHTHALMIC at 20:12

## 2022-09-27 RX ADMIN — ACETAMINOPHEN 650 MG: 325 TABLET, FILM COATED ORAL at 17:21

## 2022-09-27 RX ADMIN — PANTOPRAZOLE SODIUM 40 MG: 40 INJECTION, POWDER, FOR SOLUTION INTRAVENOUS at 20:11

## 2022-09-27 RX ADMIN — SODIUM CHLORIDE, SODIUM LACTATE, POTASSIUM CHLORIDE, AND CALCIUM CHLORIDE 125 ML/HR: .6; .31; .03; .02 INJECTION, SOLUTION INTRAVENOUS at 00:00

## 2022-09-27 NOTE — QUICK NOTE
Post Op Check:    S/p Robotic PEH repair, gastropexy, and EGD  Patient reports incisional pain  No nausea or vomiting  Denies fevers/chills  No chest pain or sob  General: NAD  HENT: NCAT MMM  NG in place with scant bilious output  Neck: supple, no JVD  CV: nl rate  Lungs: nl wob  No resp distress  On 2L NC  ABD: Soft, appropriately tender, nondistended, incisions c/d/i    Extrem: No CCE  Neuro: AAOx3     Plan:  Diet NPO/NGT  IVF  Plan for UGI tomorrow  Continue to monitor  Pain and nausea control PRN    Radha Winter,   Surgery, PGY-3

## 2022-09-27 NOTE — UTILIZATION REVIEW
Initial Clinical Review    Admission: Date/Time/Statement:   Admission Orders (From admission, onward)     Ordered        09/26/22 1037  Inpatient Admission  Once                      Orders Placed This Encounter   Procedures    Inpatient Admission     Standing Status:   Standing     Number of Occurrences:   1     Order Specific Question:   Level of Care     Answer:   Critical Care [15]     Order Specific Question:   Estimated length of stay     Answer:   More than 2 Midnights     Order Specific Question:   Certification     Answer:   I certify that inpatient services are medically necessary for this patient for a duration of greater than two midnights  See H&P and MD Progress Notes for additional information about the patient's course of treatment  Initial Presentation: 80 y o  female with PMH of COPD on home O2 @2L NC, infrarenal AAA and DM was transferred from [facility] to [facility] for a higher level of care  Pt initially presented at [facility] with abdominal pain at 8 pm today  Pt described pain as 10/10 in her epigastric and chest region, nonradiating  Feels lightheaded prior to arrival of E<S  In the ED, she was found hypotensive w/ SBP in the 60s and 70s, tachypneic and diaphoretic  Given IVF bolus w/ improvement in VS  CT scan did show a large intrathoracic stomach with large amount of debris  NGT placed  Transferred to [facility] for thoracic surg eval    On arrival to [facility], pt tachypneic, on 5L NC  W/ epigastric tenderness, abdominal distension, tachycardia, MM dry, diaphoretic  She is c/o epigastric distress, nausea and dry heaves  Plan: Inpatient admission for evaluation and treatment of Incarcerated paraesophageal hernia with obstruction and possible gangrene: NPO/NGT  Pain control prn  IVF  Trend endpoints   OR for emergent paraesophageal repair, possible resection including a possible cervical esophagostomy if stomach was necrotic     09/26 Critical Care Consult: Pt continues to complain of nausea and left upper abdominal pain  She is unable to rate or describe her pain at this time, but can localize with her finger  To OR for emergent paraesophageal hernia repair  NPO/NGT  Continue gentle fluid resuscitation  DVT ppx  PT OT postop  OP Note:    SURGERY DATE: 9/26/2022  REPAIR HERNIA PARAESOPHAGEAL LAPAROSCOPIC W ROBOTICS, gastropexy, mesh placement (N/A)  ESOPHAGOGASTRODUODENOSCOPY (EGD) (N/A)  LYSIS ADHESIONS (N/A)  Anesthesia Type: General endotracheal anesthesia  Operative Findings:  Giant type III PEH    Date: 09/27   Day 2:   Progress Notes: Pt POD 1 s/p robotic PEHR with gastropexy  Pt was HTN overnight and placed on Labetalol prn 10 mg q4h  Pain well controlled on PCA 0 2mg 5 min 1 hour limit  Paasing flatus, +BMs  1 2mg  NGT output 50cc  On 2L NC  DC NGT  IVF  NPO, can advance to CLD  OOB, IS  Bld cxs no growth 24 hrs, cont to mon  DC debbie baeza  PT/OT  DC PCA once transitioned to on pain meds  On exam, aaox3  Abdomen soft, appropriately tender, nd, incisions cdi       ED Triage Vitals   Temperature Pulse Respirations Blood Pressure SpO2   09/26/22 0929 09/26/22 0914 09/26/22 0914 09/26/22 0914 09/26/22 0914   99 4 °F (37 4 °C) 92 (!) 28 124/90 94 %      Temp Source Heart Rate Source Patient Position - Orthostatic VS BP Location FiO2 (%)   09/26/22 0929 09/27/22 0800 09/27/22 0800 09/27/22 0800 --   Oral Monitor Lying Left arm       Pain Score       09/26/22 0929       No Pain          Wt Readings from Last 1 Encounters:   09/27/22 71 1 kg (156 lb 12 oz)     Additional Vital Signs:   Date/Time Temp Pulse Resp BP MAP (mmHg) Arterial Line BP MAP SpO2 Calculated FIO2 (%) - Nasal Cannula Nasal Cannula O2 Flow Rate (L/min) O2 Device Cardiac (WDL) Patient Position - Orthostatic VS   09/27/22 1100 -- 68 23 Abnormal  165/79 114 -- -- 94 % -- -- -- -- --   09/27/22 1000 -- 72 25 Abnormal  164/74 107 -- -- 94 % -- -- -- -- --   09/27/22 0900 -- 74 21 157/77 109 -- -- 94 % -- -- -- -- --   09/27/22 0850 -- 76 27 Abnormal  174/85 Abnormal  122 -- -- 94 % -- -- -- -- --   09/27/22 0800 99 6 °F (37 6 °C) 81 20 165/76 112 -- -- 94 % 28 2 L/min Nasal cannula -- Lying   09/27/22 0700 -- 76 20 158/78 111 -- -- 93 % -- -- -- -- --   09/27/22 0600 -- 79 28 Abnormal  174/86 Abnormal  123 -- -- 93 % -- -- -- -- --   09/27/22 0500 -- 77 27 Abnormal  165/87 120 157/131 144 mmHg 94 % -- -- -- -- --   09/27/22 0400 -- 84 34 Abnormal  172/83 Abnormal  119 154/141 148 mmHg 96 % -- -- -- -- --   09/27/22 0300 -- 82 24 Abnormal  172/84 Abnormal  120 180/99 135 mmHg 95 % -- -- -- -- --   09/27/22 0200 -- 86 29 Abnormal  161/79 113 170/168 169 mmHg 94 % -- -- -- -- --   09/27/22 0100 -- 84 26 Abnormal  161/83 115 157/110 136 mmHg 95 % -- -- -- -- --   09/27/22 0000 -- 76 34 Abnormal  172/84 Abnormal  120 131/94 114 mmHg 94 % -- -- -- -- --   09/26/22 2300 -- 73 25 Abnormal  176/85 Abnormal  122 139/135 137 mmHg 95 % -- -- -- -- --   09/26/22 2200 -- 69 24 Abnormal  170/86 121 148/92 119 mmHg 96 % -- -- -- -- --   09/26/22 2100 -- 75 22 173/84 Abnormal  121 135/101 120 mmHg 96 % -- -- -- -- --   09/26/22 2000 -- 73 21 189/90 Abnormal  128 119/91 107 mmHg 96 % -- -- -- -- --   09/26/22 1900 -- 81 31 Abnormal  169/87 120 116/116 116 mmHg 97 % -- -- -- -- --   09/26/22 1800 98 °F (36 7 °C) -- -- -- -- -- -- -- -- -- -- -- --   09/26/22 1745 -- 70 20 170/79 117 92/86 90 mmHg 96 % -- -- -- -- --   09/26/22 1730 98 8 °F (37 1 °C) 68 20 161/79 105 138/84 108 mmHg 96 % 28 2 L/min Nasal cannula WDL --   09/26/22 1715 -- 76 20 169/81 112 134/78 104 mmHg 95 % -- -- -- -- --   09/26/22 1700 -- 74 24 Abnormal  152/77 113 122/74 98 mmHg 96 % -- -- -- -- --   09/26/22 1645 98 8 °F (37 1 °C) 82 18 151/71 104 -- -- 99 % 28 2 L/min Nasal cannula WDL --   09/26/22 1200 -- 95 45 Abnormal  137/73 97 -- -- 92 % -- -- -- -- --   09/26/22 1100 -- 91 22 154/73 105 -- -- 94 % -- -- -- -- --   09/26/22 1000 -- 98 23 Abnormal  167/78 112 -- -- 97 % -- -- -- -- --   09/26/22 0929 99 4 °F (37 4 °C) 84 33 Abnormal  166/90 121 -- -- 94 % 40 5 L/min Nasal cannula -- --       Pertinent Labs/Diagnostic Test Results:   FL upper GI UGI   Final Result by Molly Nicolas MD (09/27 1126)      Postsurgical changes of paraesophageal hernia repair and gastropexy without evidence of leak  Contrast only reached the 3rd portion of the duodenum by the end of the study, likely related to the small amount of contrast able to be tolerated  Workstation performed: [id number]           09/25   CTA chest abdomen pelvis:  No evidence of acute aortic pathology      Diffuse atherosclerotic changes throughout the aortoiliac vasculature as above      Infrarenal abdominal aortic ectasia measuring up to 3 1 cm in maximal transverse dimension      Large hiatal hernia/intrathoracic stomach which appears distended with fluid and debris,? Source of the patient's epigastric pain    Correlate clinically      EKG result: Normal sinus rhythm  Right bundle branch block      Results from last 7 days   Lab Units 09/27/22 0537 09/26/22 1655 09/26/22  0513 09/25/22  2308   WBC Thousand/uL 10 96* 12 19* 10 44* 12 85*   HEMOGLOBIN g/dL 11 1* 10 4* 10 9* 12 2   HEMATOCRIT % 35 4 33 0* 35 6 39 1   PLATELETS Thousands/uL 186 217 200 281   NEUTROS ABS Thousands/µL 8 62*  --  8 82* 7 45         Results from last 7 days   Lab Units 09/27/22 0537 09/26/22  1655 09/26/22  0513 09/25/22  2308   SODIUM mmol/L 136 137 137 138   POTASSIUM mmol/L 3 6 3 5 4 3 3 6   CHLORIDE mmol/L 100 105 103 99   CO2 mmol/L 30 24 26 25   ANION GAP mmol/L 6 8 8 14*   BUN mg/dL 6 9 14 15   CREATININE mg/dL 0 58* 0 65 0 83 0 96   EGFR ml/min/1 73sq m 86 82 65 55   CALCIUM mg/dL 8 7 8 5 8 7 9 9   MAGNESIUM mg/dL 2 2 1 6  --   --      Results from last 7 days   Lab Units 09/25/22  2308   AST U/L 11*   ALT U/L 8   ALK PHOS U/L 72   TOTAL PROTEIN g/dL 6 8   ALBUMIN g/dL 4 0   TOTAL BILIRUBIN mg/dL 0 45     Results from last 7 days Lab Units 09/27/22  1229 09/27/22  0509 09/26/22  2336 09/26/22  1655 09/26/22  0757 09/25/22  2301   POC GLUCOSE mg/dl 151* 133 131 158* 207* 194*     Results from last 7 days   Lab Units 09/27/22  0537 09/26/22  1655 09/26/22  0513 09/25/22  2308   GLUCOSE RANDOM mg/dL 158* 172* 175* 178*             BETA-HYDROXYBUTYRATE   Date Value Ref Range Status   09/26/2022 0 8 (H) <0 6 mmol/L Final      Results from last 7 days   Lab Units 09/26/22  1110   PH ART  7 371   PCO2 ART mm Hg 50 8*   PO2 ART mm Hg 85 1   HCO3 ART mmol/L 28 8*   BASE EXC ART mmol/L 2 7   O2 CONTENT ART mL/dL 15 8*   O2 HGB, ARTERIAL % 95 8   ABG SOURCE  Radial, Right     Results from last 7 days   Lab Units 09/26/22  0127   PH GAMALIEL  7 267*   PCO2 GAMALIEL mm Hg 44 8   PO2 GAMALIEL mm Hg 40 7   HCO3 GAMALIEL mmol/L 20 0*   BASE EXC GAMALIEL mmol/L -6 8   O2 CONTENT GAMALIEL ml/dL 12 3   O2 HGB, VENOUS % 70 5             Results from last 7 days   Lab Units 09/26/22  0306 09/26/22  0127 09/25/22  2308   HS TNI 0HR ng/L  --   --  3   HS TNI 2HR ng/L  --  7  --    HSTNI D2 ng/L  --  4  --    HS TNI 4HR ng/L 10  --   --    HSTNI D4 ng/L 7  --   --          Results from last 7 days   Lab Units 09/25/22  2308   PROTIME seconds 13 3   INR  0 99   PTT seconds 27             Results from last 7 days   Lab Units 09/26/22  1050 09/26/22  0734 09/26/22  0514 09/26/22  0252 09/26/22  0025   LACTIC ACID mmol/L 2 6* 2 8* 3 2* 4 8* 3 7*             Results from last 7 days   Lab Units 09/25/22  2312   BNP pg/mL 52             Results from last 7 days   Lab Units 09/26/22  0025   LIPASE u/L 16     Results from last 7 days   Lab Units 09/26/22  0735   CLARITY UA  Clear   COLOR UA  Light Yellow   SPEC GRAV UA  1 046*   PH UA  5 0   GLUCOSE UA mg/dl Negative   KETONES UA mg/dl Negative   BLOOD UA  Negative   PROTEIN UA mg/dl Negative   NITRITE UA  Negative   BILIRUBIN UA  Negative   UROBILINOGEN UA (BE) mg/dl <2 0   LEUKOCYTES UA  Trace*   WBC UA /hpf 1-2   RBC UA /hpf None Seen   BACTERIA UA /hpf None Seen   EPITHELIAL CELLS WET PREP /hpf None Seen     Results from last 7 days   Lab Units 09/26/22  0043   BLOOD CULTURE  No Growth at 24 hrs  No Growth at 24 hrs  Past Medical History:   Diagnosis Date    Cardiac disease     COPD (chronic obstructive pulmonary disease) ([facility] )     Diabetes mellitus ([facility] )     GERD (gastroesophageal reflux disease)     Hiatal hernia     Hypertension     PUD (peptic ulcer disease)     Residual ASD (atrial septal defect) following repair      Present on Admission:   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      Admitting Diagnosis: Hiatal hernia [K44 9]  Age/Sex: 80 y o  female  Admission Orders:  SCD  PT/OT  Continuous pulse ox  I/O  Daily weights    Scheduled Medications:  brimonidine tartrate, 1 drop, Left Eye, BID  budesonide-formoterol, 2 puff, Inhalation, BID  enoxaparin, 40 mg, Subcutaneous, Daily  insulin lispro, 1-5 Units, Subcutaneous, Q6H CARRIE  pantoprazole, 40 mg, Intravenous, Q12H [facility]      Continuous IV Infusions:  HYDROmorphone, , Intravenous, Continuous  lactated ringers, 75 mL/hr, Intravenous, Continuous      PRN Meds:  albuterol, 2 puff, Inhalation, Q6H PRN  labetalol, 10 mg, Intravenous, Q4H PRN 09/26 x 1, 09/27 x 3  ondansetron, 4 mg, Intravenous, Q4H PRN 09/26 x 1  HYDROmorphone HCl (DILAUDID) injection 0 2 mg q2h IV prn 09/26 x 2  HYDROmorphone (DILAUDID) injection 0 5 mg q2h IV prn 09/26 x 1    IP CONSULT TO SURGICAL CRITICAL CARE    Network Utilization Review Department  ATTENTION: Please call with any questions or concerns to [phone] and carefully listen to the prompts so that you are directed to the right person  All voicemails are confidential   Kahlil Lincoln all requests for admission clinical reviews, approved or denied determinations and any other requests to dedicated fax number below belonging to the campus where the patient is receiving treatment   List of dedicated fax numbers for the Facilities:  [address] NUMBER   ADMISSION DENIALS (Administrative/Medical Necessity) [phone]   [address] (Maternity/NICU/Pediatrics) [address] [place] [address],[address] Floor [facility] [address] [place] [address]  [phone]   Hien Berry 50 [address] [place] [address] [id number] [address] [place] [address] [facility] [address] [facility] [phone]

## 2022-09-27 NOTE — CASE MANAGEMENT
Case Management Discharge Planning Note    Patient name Denver Chadwick  Location [address] [place] [address] [address] [id number] MRN [id number]  : 1940 Date 2022       Current Admission Date: 2022  Current Admission Diagnosis: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   Patient Active Problem List    Diagnosis Date Noted    Abdominal pain 2022    Low blood pressure reading 2022    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 2022    SIRS (systemic inflammatory response syndrome) ([facility] ) 2022    Lactic acidosis 2022    Unintentional weight loss 2022    BMI 31 0-31 9,adult 2022    Retinal artery occlusion, branch, left 2022    Chronic pain 2022    Aspiration pneumonia ([facility] ) 2022    COVID-19 2022    Backache 2022    Onychomycosis 2021    Pain in toe 2021    Instability of foot joint 2021    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    ASD (atrial septal defect) 2020    Exertional dyspnea 2020    Acquired pes planus of right foot 10/28/2019    Microscopic hematuria 2019    Urge incontinence 2019    Ankle pain 2019    Primary localized osteoarthrosis of ankle and foot 2019    History of iron deficiency anemia 2018    Cough in adult 2018    Fever in adult 2018    Chronic fatigue 2017    Chronic hypoxemic respiratory failure ([facility] ) 2017    GERD (gastroesophageal reflux disease) 2017    Hyponatremia 2017    Cigarette nicotine dependence in remission 2017    Controlled substance agreement signed 2016    Well adult exam 2016    Depression 2016    Diabetes mellitus ([facility] ) 2016    Hypertension 2016    GI bleed 2015    DDD (degenerative disc disease), lumbosacral 2015    Arthropathy 04/15/2014    Anemia 2013    Tobacco use disorder 2013    COPD (chronic obstructive pulmonary disease) ([facility] ) 12/19/2012    Benign essential hypertension 12/19/2012    Hyperlipidemia 12/19/2012    Disc disorder of lumbar region 12/19/2012    Type II diabetes mellitus ([facility] ) 12/19/2012      LOS (days): 1  Geometric Mean LOS (GMLOS) (days): 2 60  Days to GMLOS:1 4     OBJECTIVE:  Risk of Unplanned Readmission Score: 14 92         Current admission status: Inpatient   Preferred Pharmacy:   [facility], [address] [place] [address]  Phone: [phone] Fax: [phone]    Primary Care Provider: Cesar Solano DO    Primary Insurance: [facility]  Secondary Insurance:     DISCHARGE DETAILS:    Discharge planning discussed with[de-identified] patient and son, Alvin Screws of Choice: Yes     Contacts  Patient Contacts: Cole Ponce  Relationship to Patient[de-identified] Family  Contact Method: Phone  Phone Number: [phone]  Reason/Outcome: Continuity of Care, Emergency Contact, Discharge Planning    Additional Comments: Patient reports she lives with her son, Lisette Mcwilliams and granddaughter  They are home with her  Patient has stair glide to second floor of the home  She uses a wheeled walker for monility and have device on both fllors  Patient has oxygen set up on both floors of the home and uses 2-3 L  Patient currently has no community services  She had past [address] from [facility] and past snf at [facility]

## 2022-09-27 NOTE — PROGRESS NOTES
Daily Progress Note - Critical Care   Kimberly Amaya 80 y o  female MRN: [id number]  Unit/Bed#: [facility] 306-67 Encounter: [id number]        ----------------------------------------------------------------------------------------  HPI/24hr events:     Kimberly Amaya is a 80 y o  female who presents with abdominal pain      Per chart review, pt began complaining of severe upper abdominal pain yesterday after eating  She was evaluated at [address] [place] [address] 4 ED  She was hypotensive with systolic BP in 81-02C, tachypnic and diaphoretic on presentation to ED  She had a known large hiatal hernia   CT scan confirmed an intrathoracic stomach filled with debris   She was initially hypotensive but responded to IV fluids   Lactates trended 3 7-4 8 to 3 2 to 2 8   She had an initial leukocytosis of 12 8 that trended to 10  4   She had an NG placed with 200-300 cc of thick gastric contents        She was transferred to [facility]  Pt was evaluated at bedside this morning along with nursing staff  She continues to complain of nausea and left upper abdominal pain  She is unable to rate or describe her pain at this time, but can localize with her finger  She did not verbalize any other complaints at time of examination  She was taken to operating room for emergent paraesophageal hernia repair  She was hypertensive over night and was placed on labatalol PRN 10mg q4  He pain is well controlled on PCA 0 2mg 5 min 1 hour limit 1 2mg        ---------------------------------------------------------------------------------------  SUBJECTIVE  She endorses that she did well overnight  Denies pain, SOB, and subjective fever       Review of Systems  Review of systems was reviewed and negative unless stated above in HPI/24-hour events   ---------------------------------------------------------------------------------------  Assessment and Plan:    Neuro:   Diagnosis: Post Operative Pain control   Plan: Dilaudid PCA PRN 0 2mg q5 min      CV:   · Diagnosis: HTN  ? Plan: Labatolol 10mg q4 hours PRN  · Diagnosis: Infrarenal abdominal aortic aneurysm  ? Measured up to 3 1 cm on imaging on 9/25/22  · Diagnosis: Hx Retinal branch artery occlusion  ? Plan: Aspirin and Atorvastatin held     Pulm:  · Diagnosis: COPD  ? Plan: supplemental O2 as needed  ? Encourage IS (currently 200), and pulmonary toilet  ? OOB       GI:   · Diagnosis: Large Incarcerated Hiatal Hernia s/p reduction 9/27/22  Plan: NPO w/ NG tube  Pain control PRN, IV fluid resuscitation      :   Plan: d/c lewis, adequate urine output     F/E/N:   · F: IVF at 125 LR,  D/C ivf once diet is resumes  · E: goal K>4, Mg>2  · N: NPO, NG     Heme/Onc:   · Diagnosis: Neutrophilia - ANC 8 80 Thousands/µL  ? Plan: continue to monitor     Endo:   · Diagnosis: Diabetes Mellitus  ? Plan: Blood glucouse less than 200,  ? SSI, hold home medications    ID:   · Diagnosis: SIRS  ? Plan: mets criteria, HR> 90, RR 23 rpm  Remains afebrile  ? Blood cultures pending no growth 24 hours, continue to moniter     MSK/Skin:   · Diagnosis: Sacral wound  ? Plan: bandage applied pre-operatively      Patient appropriate for transfer out of the ICU today?: No  Disposition: Continue Critical Care   Code Status: Level 1 - Full Code  ---------------------------------------------------------------------------------------  ICU CORE MEASURES    Prophylaxis   VTE Pharmacologic Prophylaxis: Enoxaparin (Lovenox)  VTE Mechanical Prophylaxis: sequential compression device  Stress Ulcer Prophylaxis: Pantoprazole IV     ABCDE Protocol (if indicated)  Plan to perform spontaneous awakening trial today? Not applicable  Plan to perform spontaneous breathing trial today? Not applicable  Obvious barriers to extubation?  Not applicable  CAM-ICU: Negitive    Invasive Devices Review  Invasive Devices  Report    Peripheral Intravenous Line  Duration           Peripheral IV 09/25/22 Left Hand 1 day    Peripheral IV 09/25/22 Right Antecubital 1 day    Peripheral IV 22 Right Wrist 1 day          Drain  Duration           NG/OG/Enteral Tube Nasogastric 16 Fr Right nare 1 day    Urethral Catheter Double-lumen;Non-latex 16 Fr  <1 day              Can any invasive devices be discontinued today?  No  ---------------------------------------------------------------------------------------  OBJECTIVE    Vitals   Vitals:    22 0300 22 0400 22 0500 22 0553   BP: (!) 172/84 (!) 172/83 165/87    Pulse: 82 84 77    Resp: (!) 24 (!) 34 (!) 27    Temp:       TempSrc:       SpO2: 95% 96% 94%    Weight:    71 1 kg (156 lb 12 oz)   Height:         Temp (24hrs), Av 8 °F (37 1 °C), Min:98 °F (36 7 °C), Max:99 4 °F (37 4 °C)  Current: Temperature: 98 °F (36 7 °C)  HR: 76  BP: 158/78   RR: 20  SpO2: 93%    Respiratory:  SpO2: SpO2: 93 %  Nasal Cannula O2 Flow Rate (L/min): 2 L/min    Invasive/non-invasive ventilation settings   Respiratory  Report   Lab Data (Last 4 hours)    None         O2/Vent Data (Last 4 hours)    None                Physical Exam        Laboratory and Diagnostics:  Results from last 7 days   Lab Units 22  0522  0522  2308   WBC Thousand/uL 10 96* 12 19* 10 44* 12 85*   HEMOGLOBIN g/dL 11 1* 10 4* 10 9* 12 2   HEMATOCRIT % 35 4 33 0* 35 6 39 1   PLATELETS Thousands/uL 186 217 200 281   NEUTROS PCT % 78*  --  83* 57   MONOS PCT % 12  --  8 11     Results from last 7 days   Lab Units 22  0522  2308   SODIUM mmol/L 137 137 138   POTASSIUM mmol/L 3 5 4 3 3 6   CHLORIDE mmol/L 105 103 99   CO2 mmol/L 24 26 25   ANION GAP mmol/L 8 8 14*   BUN mg/dL 9 14 15   CREATININE mg/dL 0 65 0 83 0 96   CALCIUM mg/dL 8 5 8 7 9 9   GLUCOSE RANDOM mg/dL 172* 175* 178*   ALT U/L  --   --  8   AST U/L  --   --  11*   ALK PHOS U/L  --   --  72   ALBUMIN g/dL  --   --  4 0   TOTAL BILIRUBIN mg/dL  --   --  0 45     Results from last 7 days   Lab Units 22  9244 MAGNESIUM mg/dL 1 6      Results from last 7 days   Lab Units 09/25/22  2308   INR  0 99   PTT seconds 27          Results from last 7 days   Lab Units 09/26/22  1050 09/26/22  0734 09/26/22  0514 09/26/22  0252 09/26/22  0025   LACTIC ACID mmol/L 2 6* 2 8* 3 2* 4 8* 3 7*     ABG:  Results from last 7 days   Lab Units 09/26/22  1110   PH ART  7 371   PCO2 ART mm Hg 50 8*   PO2 ART mm Hg 85 1   HCO3 ART mmol/L 28 8*   BASE EXC ART mmol/L 2 7   ABG SOURCE  Radial, Right     VBG:  Results from last 7 days   Lab Units 09/26/22  1110 09/26/22  0127   PH GAMALIEL   --  7 267*   PCO2 GAMALIEL mm Hg  --  44 8   PO2 GAMALIEL mm Hg  --  40 7   HCO3 GAMALIEL mmol/L  --  20 0*   BASE EXC GAMALIEL mmol/L  --  -6 8   ABG SOURCE  Radial, Right  --            Micro  Results from last 7 days   Lab Units 09/26/22  0043   BLOOD CULTURE  Received in Microbiology Lab  Culture in Progress  Received in Microbiology Lab  Culture in Progress  Intake and Output  I/O       09/25 0701  09/26 0700 09/26 0701  09/27 0700    I V  (mL/kg)  3832 6 (53 9)    NG/GT  0    IV Piggyback  560    Total Intake(mL/kg)  4392 6 (61 8)    Urine (mL/kg/hr)  4900    Emesis/NG output  50    Blood  50    Total Output  5000    Net  -607 4          Unmeasured Urine Occurrence  1 x        UOP: 4900 24hr    Height and Weights   Height: 5' 1" (154 9 cm)     Body mass index is 29 62 kg/m²  Weight (last 2 days)     Date/Time Weight    09/27/22 0553 71 1 (156 75)    09/26/22 0929 70 9 (156 31)            Nutrition       Diet Orders   (From admission, onward)             Start     Ordered    09/26/22 1651  Diet NPO  Diet effective now        References:    Nutrtion Support Algorithm Enteral vs  Parenteral   Question Answer Comment   Diet Type NPO    RD to adjust diet per protocol?  No        09/26/22 1651                    Active Medications  Scheduled Meds:  Current Facility-Administered Medications   Medication Dose Route Frequency Provider Last Rate    albuterol  2 puff Inhalation Q6H PRN Angelica Body, KRYSTAL      brimonidine tartrate  1 drop Left Eye BID Angelica Body, KRYSTAL      budesonide-formoterol  2 puff Inhalation BID Angelica Body, KRYSTAL      enoxaparin  40 mg Subcutaneous Daily Angelica Body, KRYSTAL      fentanyl citrate (PF)  25 mcg Intravenous Once Angelica Body, KRYSTAL      HYDROmorphone   Intravenous Continuous Roxanne Murphy MD      insulin lispro  1-5 Units Subcutaneous Q6H [facility] [place]      labetalol  10 mg Intravenous Q4H PRN Roxanne Murphy MD      lactated ringers  125 mL/hr Intravenous Continuous Angelica Body, KRYSTAL 125 mL/hr (09/27/22 0000)    ondansetron  4 mg Intravenous Q4H PRN Angelica Body, KRYSTAL      pantoprazole  40 mg Intravenous Q12H [facility] Char Marin PA-C       Continuous Infusions:  HYDROmorphone,   lactated ringers, 125 mL/hr, Last Rate: 125 mL/hr (09/27/22 0000)      PRN Meds:   albuterol, 2 puff, Q6H PRN  labetalol, 10 mg, Q4H PRN  ondansetron, 4 mg, Q4H PRN        Allergies   Allergies   Allergen Reactions    Prednisone      The patient reports "they make me goofy "  No true allergic reaction    Psyllium     Tetanus Toxoids      ---------------------------------------------------------------------------------------  Advance Directive and Living Will:      Power of :    POLST:    ---------------------------------------------------------------------------------------  Care Time Delivered:   No Critical Care time spent     Martha Cortez MD      Portions of the record may have been created with voice recognition software  Occasional wrong word or "sound a like" substitutions may have occurred due to the inherent limitations of voice recognition software    Read the chart carefully and recognize, using context, where substitutions have occurred

## 2022-09-27 NOTE — PROGRESS NOTES
Progress Note - Julianna Reed 80 y o  female MRN: [id number]    Unit/Bed#: [facility] 517-01 Encounter: [id number]      Assessment:  82F w/ an incarcerated hiatal hernia now s/p robotic PEH repair w/ mesh, gastropexy, and EGD on 9/26    HTN, OVSS  Uo-4 9L  NG-50 cc    Labs pending    Plan:  -Likely discontinue NG tube this AM, will perform swallow study  If swallow negative for leak can have clear liquid diet  -Pain/nausea control PRN  -Aggressive pulmonary toliet with IS 10x per hour  -Discontinue IVF  -Discontinue lewis/aryan  -Can dc pca once cleared from UGI and transition to oral pain regimen  -DVT ppx  -Out of ICU today    Subjective:   No acute events overnight  Reports shes having a lot of incisional pain, controlled with PCA  No fevers/chills  Passing flatus and having bms  No chest pain or sob  Passing flatus    Objective:     Vitals: Blood pressure (!) 172/83, pulse 84, temperature 98 °F (36 7 °C), resp  rate (!) 34, height 5' 1" (1 549 m), weight 71 1 kg (156 lb 12 oz), SpO2 96 %, not currently breastfeeding  ,Body mass index is 29 62 kg/m²  Intake/Output Summary (Last 24 hours) at 9/27/2022 0555  Last data filed at 9/27/2022 0551  Gross per 24 hour   Intake 4392 57 ml   Output 5000 ml   Net -607 43 ml       Physical Exam:   General: NAD  HENT: NCAT MMM  NG with scant bilious output  Neck: supple, no JVD  CV: nl rate  Lungs: nl wob  No resp distress  ABD: Soft, approrpaitely tender, nondistended, incisions c/d/i    Extrem: No CCE  Neuro: AAOx3       Invasive Devices  Report    Peripheral Intravenous Line  Duration           Peripheral IV 09/25/22 Left Hand 1 day    Peripheral IV 09/25/22 Right Antecubital 1 day    Peripheral IV 09/25/22 Right Wrist 1 day          Drain  Duration           NG/OG/Enteral Tube Nasogastric 16 Fr Right nare 1 day    Urethral Catheter Double-lumen;Non-latex 16 Fr  <1 day

## 2022-09-28 ENCOUNTER — APPOINTMENT (OUTPATIENT)
Dept: RADIOLOGY | Facility: HOSPITAL | Age: 82
DRG: 326 | End: 2022-09-28
Payer: COMMERCIAL

## 2022-09-28 LAB
ANION GAP SERPL CALCULATED.3IONS-SCNC: 8 MMOL/L (ref 4–13)
ARTERIAL PATENCY WRIST A: NO
BASE EXCESS BLDA CALC-SCNC: 7.3 MMOL/L
BASOPHILS # BLD AUTO: 0.05 THOUSANDS/ΜL (ref 0–0.1)
BASOPHILS NFR BLD AUTO: 1 % (ref 0–1)
BUN SERPL-MCNC: 7 MG/DL (ref 5–25)
CALCIUM SERPL-MCNC: 9.1 MG/DL (ref 8.3–10.1)
CHLORIDE SERPL-SCNC: 97 MMOL/L (ref 96–108)
CO2 SERPL-SCNC: 31 MMOL/L (ref 21–32)
CREAT SERPL-MCNC: 0.61 MG/DL (ref 0.6–1.3)
EOSINOPHIL # BLD AUTO: 0.05 THOUSAND/ΜL (ref 0–0.61)
EOSINOPHIL NFR BLD AUTO: 1 % (ref 0–6)
ERYTHROCYTE [DISTWIDTH] IN BLOOD BY AUTOMATED COUNT: 14.7 % (ref 11.6–15.1)
GFR SERPL CREATININE-BSD FRML MDRD: 84 ML/MIN/1.73SQ M
GLUCOSE SERPL-MCNC: 121 MG/DL (ref 65–140)
GLUCOSE SERPL-MCNC: 125 MG/DL (ref 65–140)
GLUCOSE SERPL-MCNC: 133 MG/DL (ref 65–140)
GLUCOSE SERPL-MCNC: 140 MG/DL (ref 65–140)
GLUCOSE SERPL-MCNC: 163 MG/DL (ref 65–140)
HCO3 BLDA-SCNC: 31.8 MMOL/L (ref 22–28)
HCT VFR BLD AUTO: 37.6 % (ref 34.8–46.1)
HGB BLD-MCNC: 11.9 G/DL (ref 11.5–15.4)
IMM GRANULOCYTES # BLD AUTO: 0.05 THOUSAND/UL (ref 0–0.2)
IMM GRANULOCYTES NFR BLD AUTO: 1 % (ref 0–2)
LACTATE SERPL-SCNC: 1.3 MMOL/L (ref 0.5–2)
LYMPHOCYTES # BLD AUTO: 0.84 THOUSANDS/ΜL (ref 0.6–4.47)
LYMPHOCYTES NFR BLD AUTO: 8 % (ref 14–44)
MCH RBC QN AUTO: 28.1 PG (ref 26.8–34.3)
MCHC RBC AUTO-ENTMCNC: 31.6 G/DL (ref 31.4–37.4)
MCV RBC AUTO: 89 FL (ref 82–98)
MONOCYTES # BLD AUTO: 1.11 THOUSAND/ΜL (ref 0.17–1.22)
MONOCYTES NFR BLD AUTO: 10 % (ref 4–12)
NEUTROPHILS # BLD AUTO: 8.55 THOUSANDS/ΜL (ref 1.85–7.62)
NEUTS SEG NFR BLD AUTO: 79 % (ref 43–75)
NON VENT TYPE- NRB: ABNORMAL
NRBC BLD AUTO-RTO: 0 /100 WBCS
O2 CT BLDA-SCNC: 17.1 ML/DL (ref 16–23)
OXYHGB MFR BLDA: 97.8 % (ref 94–97)
PCO2 BLDA: 44.5 MM HG (ref 36–44)
PH BLDA: 7.47 [PH] (ref 7.35–7.45)
PLATELET # BLD AUTO: 171 THOUSANDS/UL (ref 149–390)
PMV BLD AUTO: 9.7 FL (ref 8.9–12.7)
PO2 BLDA: 109.8 MM HG (ref 75–129)
POTASSIUM SERPL-SCNC: 3.2 MMOL/L (ref 3.5–5.3)
RBC # BLD AUTO: 4.24 MILLION/UL (ref 3.81–5.12)
SODIUM SERPL-SCNC: 136 MMOL/L (ref 135–147)
SPECIMEN SOURCE: ABNORMAL
WBC # BLD AUTO: 10.65 THOUSAND/UL (ref 4.31–10.16)

## 2022-09-28 PROCEDURE — 85025 COMPLETE CBC W/AUTO DIFF WBC: CPT

## 2022-09-28 PROCEDURE — 99223 1ST HOSP IP/OBS HIGH 75: CPT | Performed by: INTERNAL MEDICINE

## 2022-09-28 PROCEDURE — 71045 X-RAY EXAM CHEST 1 VIEW: CPT

## 2022-09-28 PROCEDURE — C9113 INJ PANTOPRAZOLE SODIUM, VIA: HCPCS

## 2022-09-28 PROCEDURE — 94640 AIRWAY INHALATION TREATMENT: CPT

## 2022-09-28 PROCEDURE — 94760 N-INVAS EAR/PLS OXIMETRY 1: CPT

## 2022-09-28 PROCEDURE — 97163 PT EVAL HIGH COMPLEX 45 MIN: CPT

## 2022-09-28 PROCEDURE — 83605 ASSAY OF LACTIC ACID: CPT

## 2022-09-28 PROCEDURE — 82948 REAGENT STRIP/BLOOD GLUCOSE: CPT

## 2022-09-28 PROCEDURE — 99024 POSTOP FOLLOW-UP VISIT: CPT | Performed by: THORACIC SURGERY (CARDIOTHORACIC VASCULAR SURGERY)

## 2022-09-28 PROCEDURE — 80048 BASIC METABOLIC PNL TOTAL CA: CPT

## 2022-09-28 PROCEDURE — 97167 OT EVAL HIGH COMPLEX 60 MIN: CPT

## 2022-09-28 PROCEDURE — 82805 BLOOD GASES W/O2 SATURATION: CPT

## 2022-09-28 RX ORDER — SODIUM CHLORIDE, SODIUM GLUCONATE, SODIUM ACETATE, POTASSIUM CHLORIDE, MAGNESIUM CHLORIDE, SODIUM PHOSPHATE, DIBASIC, AND POTASSIUM PHOSPHATE .53; .5; .37; .037; .03; .012; .00082 G/100ML; G/100ML; G/100ML; G/100ML; G/100ML; G/100ML; G/100ML
75 INJECTION, SOLUTION INTRAVENOUS CONTINUOUS
Status: DISCONTINUED | OUTPATIENT
Start: 2022-09-28 | End: 2022-09-29

## 2022-09-28 RX ORDER — POTASSIUM CHLORIDE 20 MEQ/1
20 TABLET, EXTENDED RELEASE ORAL ONCE
Status: COMPLETED | OUTPATIENT
Start: 2022-09-28 | End: 2022-09-28

## 2022-09-28 RX ORDER — LEVALBUTEROL 1.25 MG/.5ML
1.25 SOLUTION, CONCENTRATE RESPIRATORY (INHALATION)
Status: DISCONTINUED | OUTPATIENT
Start: 2022-09-28 | End: 2022-09-29

## 2022-09-28 RX ORDER — DILTIAZEM HYDROCHLORIDE 180 MG/1
180 CAPSULE, COATED, EXTENDED RELEASE ORAL DAILY
Status: DISCONTINUED | OUTPATIENT
Start: 2022-09-28 | End: 2022-10-04 | Stop reason: HOSPADM

## 2022-09-28 RX ORDER — METOCLOPRAMIDE HYDROCHLORIDE 5 MG/ML
10 INJECTION INTRAMUSCULAR; INTRAVENOUS EVERY 6 HOURS SCHEDULED
Status: COMPLETED | OUTPATIENT
Start: 2022-09-28 | End: 2022-09-29

## 2022-09-28 RX ORDER — POTASSIUM CHLORIDE 14.9 MG/ML
20 INJECTION INTRAVENOUS
Status: DISCONTINUED | OUTPATIENT
Start: 2022-09-28 | End: 2022-09-28

## 2022-09-28 RX ORDER — ATORVASTATIN CALCIUM 40 MG/1
40 TABLET, FILM COATED ORAL EVERY EVENING
Status: DISCONTINUED | OUTPATIENT
Start: 2022-09-28 | End: 2022-10-04 | Stop reason: HOSPADM

## 2022-09-28 RX ORDER — HYDROMORPHONE HCL/PF 1 MG/ML
0.5 SYRINGE (ML) INJECTION
Status: DISCONTINUED | OUTPATIENT
Start: 2022-09-28 | End: 2022-10-01

## 2022-09-28 RX ORDER — SODIUM CHLORIDE FOR INHALATION 0.9 %
3 VIAL, NEBULIZER (ML) INHALATION
Status: DISCONTINUED | OUTPATIENT
Start: 2022-09-28 | End: 2022-09-29

## 2022-09-28 RX ADMIN — ENOXAPARIN SODIUM 40 MG: 40 INJECTION SUBCUTANEOUS at 08:20

## 2022-09-28 RX ADMIN — LEVALBUTEROL 1.25 MG: 1.25 SOLUTION, CONCENTRATE RESPIRATORY (INHALATION) at 10:13

## 2022-09-28 RX ADMIN — METHOCARBAMOL TABLETS 500 MG: 500 TABLET, COATED ORAL at 23:00

## 2022-09-28 RX ADMIN — BRIMONIDINE TARTRATE 1 DROP: 2 SOLUTION/ DROPS OPHTHALMIC at 20:59

## 2022-09-28 RX ADMIN — PANTOPRAZOLE SODIUM 40 MG: 40 INJECTION, POWDER, FOR SOLUTION INTRAVENOUS at 08:20

## 2022-09-28 RX ADMIN — ISODIUM CHLORIDE 3 ML: 0.03 SOLUTION RESPIRATORY (INHALATION) at 10:13

## 2022-09-28 RX ADMIN — POTASSIUM CHLORIDE 20 MEQ: 14.9 INJECTION, SOLUTION INTRAVENOUS at 15:42

## 2022-09-28 RX ADMIN — ACETAMINOPHEN 650 MG: 325 TABLET, FILM COATED ORAL at 00:29

## 2022-09-28 RX ADMIN — BUDESONIDE AND FORMOTEROL FUMARATE DIHYDRATE 2 PUFF: 160; 4.5 AEROSOL RESPIRATORY (INHALATION) at 17:29

## 2022-09-28 RX ADMIN — ATORVASTATIN CALCIUM 40 MG: 40 TABLET, FILM COATED ORAL at 17:33

## 2022-09-28 RX ADMIN — BUDESONIDE AND FORMOTEROL FUMARATE DIHYDRATE 2 PUFF: 160; 4.5 AEROSOL RESPIRATORY (INHALATION) at 08:21

## 2022-09-28 RX ADMIN — LEVALBUTEROL 1.25 MG: 1.25 SOLUTION, CONCENTRATE RESPIRATORY (INHALATION) at 19:31

## 2022-09-28 RX ADMIN — OXYCODONE HYDROCHLORIDE 5 MG: 5 TABLET ORAL at 13:59

## 2022-09-28 RX ADMIN — METHOCARBAMOL TABLETS 500 MG: 500 TABLET, COATED ORAL at 00:29

## 2022-09-28 RX ADMIN — OXYCODONE HYDROCHLORIDE 5 MG: 5 TABLET ORAL at 09:15

## 2022-09-28 RX ADMIN — BRIMONIDINE TARTRATE 1 DROP: 2 SOLUTION/ DROPS OPHTHALMIC at 08:21

## 2022-09-28 RX ADMIN — OXYCODONE HYDROCHLORIDE 5 MG: 5 TABLET ORAL at 21:09

## 2022-09-28 RX ADMIN — METOCLOPRAMIDE HYDROCHLORIDE 10 MG: 5 INJECTION INTRAMUSCULAR; INTRAVENOUS at 23:00

## 2022-09-28 RX ADMIN — DILTIAZEM HYDROCHLORIDE 180 MG: 180 CAPSULE, COATED, EXTENDED RELEASE ORAL at 08:20

## 2022-09-28 RX ADMIN — ONDANSETRON 4 MG: 2 INJECTION INTRAMUSCULAR; INTRAVENOUS at 09:14

## 2022-09-28 RX ADMIN — POTASSIUM CHLORIDE 20 MEQ: 1500 TABLET, EXTENDED RELEASE ORAL at 21:13

## 2022-09-28 RX ADMIN — ACETAMINOPHEN 650 MG: 325 TABLET, FILM COATED ORAL at 23:00

## 2022-09-28 RX ADMIN — HYDROMORPHONE HYDROCHLORIDE 0.5 MG: 1 INJECTION, SOLUTION INTRAMUSCULAR; INTRAVENOUS; SUBCUTANEOUS at 16:30

## 2022-09-28 RX ADMIN — ISODIUM CHLORIDE 3 ML: 0.03 SOLUTION RESPIRATORY (INHALATION) at 19:30

## 2022-09-28 RX ADMIN — LEVALBUTEROL 1.25 MG: 1.25 SOLUTION, CONCENTRATE RESPIRATORY (INHALATION) at 13:02

## 2022-09-28 RX ADMIN — ISODIUM CHLORIDE 3 ML: 0.03 SOLUTION RESPIRATORY (INHALATION) at 13:02

## 2022-09-28 RX ADMIN — PANTOPRAZOLE SODIUM 40 MG: 40 INJECTION, POWDER, FOR SOLUTION INTRAVENOUS at 20:59

## 2022-09-28 RX ADMIN — ACETAMINOPHEN 650 MG: 325 TABLET, FILM COATED ORAL at 17:33

## 2022-09-28 RX ADMIN — METHOCARBAMOL TABLETS 500 MG: 500 TABLET, COATED ORAL at 17:33

## 2022-09-28 RX ADMIN — POTASSIUM CHLORIDE 20 MEQ: 14.9 INJECTION, SOLUTION INTRAVENOUS at 17:43

## 2022-09-28 RX ADMIN — METOCLOPRAMIDE HYDROCHLORIDE 10 MG: 5 INJECTION INTRAMUSCULAR; INTRAVENOUS at 17:33

## 2022-09-28 RX ADMIN — SODIUM CHLORIDE, SODIUM GLUCONATE, SODIUM ACETATE, POTASSIUM CHLORIDE AND MAGNESIUM CHLORIDE 75 ML/HR: 526; 502; 368; 37; 30 INJECTION, SOLUTION INTRAVENOUS at 15:55

## 2022-09-28 NOTE — OCCUPATIONAL THERAPY NOTE
Pt is a 80-year-old female admitted to [facility] on 09/26/22 for thoracic surgery evaluation  Pt transferred from [facility] on 09/26/22  Upon receiving CT scan at [facility], pt confirmed w/intrathoracic stomach filled w/debris  On 09/26/22, pt underwent repair hernia paraesophageal laparascopic w/robotics, lysis, and EGD  Pt is post-op day 2  OT eval orders active  Pt  has a past medical history of Cardiac disease, COPD (chronic obstructive pulmonary disease) ([address] ), Diabetes mellitus ([address] ), GERD (gastroesophageal reflux disease), Hiatal hernia, Hypertension, PUD (peptic ulcer disease), and Residual ASD (atrial septal defect) following repair  PLOF information obtained per EMR  Pta, pt required assistance for ADLs and IADLs  Pt uses wheeled walker at baseline  Pt on O2 (2-3L) in the home and is available on both floors of home  Pt lives with her son and granddaughter in a 2-story home, w/a stairglide to the 2nd floor  Bathroom set-up unable to be given at this time due to pt's confused and agitated behaviors during OT eval session  Presently, pt is S for eating, MIN A for grooming, MOD A for UB bathing/dressing, LB bathing/dressing, toileting assistance, functional assistance, MOD A x2 for rolling L + sup>sit (w/increased time, mod vc -- pt very impulsive), MOD A x2 for sit>stand + stand>sit (w/increased time, mod vc - pt impulsive), and MOD A x2 for functional mobility w/HHA, mod vc, and increased time  Pt's limitations include decreased ADL status, decreased UE ROM/strength, decreased safe judgment during ADL, decreased cognition, decreased endurance, decreased self-care trans, and decreased high-level ADLs  These limitations will directly impact safe occupational functioning in all areas of occupation including ADLs, IADLs, home and health management, community and functional mobility, and engaging in meaningful activities   The patient's raw score on the AM-PAC Daily Activity inpatient short form is 15, standardized score is 34 69, less than 39 4  Patients at this level are likely to benefit from discharge to post-acute rehabilitation services  Recommendations for pt are to follow with rehab 3-5x a week for 10-14 days to meet goals       Goals   Pt will complete grooming tasks with MOD I using appropriate DME as needed   Pt will follow one step commands with 1-2 verbal cues for safe engagement in typical daily routine   Pt will complete UB ADLs w/ Min A using appropriate DME as needed   Pt will complete LB ADLs w/Min A using appropriate DME as needed   Pt will complete toileting activity with Min A using appropriate DME as needed   Pt will increase activity tolerance to 30 minutes using appropriate DME as needed   Pt will complete transfers with Min A using appropriate DME as needed   Pt will complete fx mobility task with Min A using appropriate DME as needed

## 2022-09-28 NOTE — PLAN OF CARE
Problem: OCCUPATIONAL THERAPY ADULT  Goal: Performs self-care activities at highest level of function for planned discharge setting  See evaluation for individualized goals  Description: Treatment Interventions: ADL retraining, Functional transfer training, UE strengthening/ROM, Endurance training, Cognitive reorientation, Continued evaluation, Energy conservation, Activityengagement          See flowsheet documentation for full assessment, interventions and recommendations  Note: Limitation: Decreased ADL status, Decreased UE ROM, Decreased UE strength, Decreased Safe judgement during ADL, Decreased cognition, Decreased endurance, Decreased self-care trans, Decreased high-level ADLs  Prognosis: Fair  Assessment: Pt is a 80-year-old female admitted to [facility] on 09/26/22 for thoracic surgery evaluation  Pt transferred from [facility] on 09/26/22  Upon receiving CT scan at [facility], pt confirmed w/intrathoracic stomach filled w/debris  On 09/26/22, pt underwent repair hernia paraesophageal laparascopic w/robotics, lysis, and EGD  Pt is post-op day 2  OT eval orders active  Pt  has a past medical history of Cardiac disease, COPD (chronic obstructive pulmonary disease) ([address] ), Diabetes mellitus ([address] ), GERD (gastroesophageal reflux disease), Hiatal hernia, Hypertension, PUD (peptic ulcer disease), and Residual ASD (atrial septal defect) following repair  PLOF information obtained per EMR  Pta, pt required assistance for ADLs and IADLs  Pt uses wheeled walker at baseline  Pt on O2 (2-3L) in the home and is available on both floors of home  Pt lives with her son and granddaughter in a 2-story home, w/a stairglide to the 2nd floor  Bathroom set-up unable to be given at this time due to pt's confused and agitated behaviors during OT eval session   Presently, pt is S for eating, MIN A for grooming, MOD A for UB bathing/dressing, MAX A for LB bathing/dressing, toileting assistance, functional assistance, MOD A x2 for rolling L + sup>sit (w/increased time, mod vc -- pt very impulsive), MOD A x2 for sit>stand + stand>sit (w/increased time, mod vc - pt impulsive), and MOD A x2 for functional mobility w/HHA, mod vc, and increased time  Pt's limitations include decreased ADL status, decreased UE ROM/strength, decreased safe judgment during ADL, decreased cognition, decreased endurance, decreased self-care trans, and decreased high-level ADLs  These limitations will directly impact safe occupational functioning in all areas of occupation including ADLs, IADLs, home and health management, community and functional mobility, and engaging in meaningful activities  The patient's raw score on the AM-PAC Daily Activity inpatient short form is 15, standardized score is 34 69, less than 39 4  Patients at this level are likely to benefit from discharge to post-acute rehabilitation services  Recommendations for pt are to follow with rehab 3-5x a week for 10-14 days to meet goals       OT Discharge Recommendation: Post acute rehabilitation services

## 2022-09-28 NOTE — PROGRESS NOTES
Progress Note - Samaria Mae 80 y o  female MRN: [id number]    Unit/Bed#: [facility] 502-01 Encounter: [id number]      Assessment:  82F w/ an incarcerated hiatal hernia now s/p robotic PEH repair w/ mesh, gastropexy, and EGD on 9/26    HTN  Uo-1 8L    Labs pending    Plan:  -Advanced to full liquid (no carbonation) diet today  -PCA discontinued, pain well controlled w/ tylenol alone  -Aggressive pulmonary toliet with IS 10x per hour  -Discontinue lewis  -DVT ppx    Subjective:   No acute events overnight  No pain  No fevers/chills  Passing flatus and having bms  No chest pain or sob  Objective:     Vitals: Blood pressure (!) 180/84, pulse 62, temperature 98 8 °F (37 1 °C), temperature source Oral, resp  rate 18, height 5' 1" (1 549 m), weight 69 kg (152 lb 1 9 oz), SpO2 97 %, not currently breastfeeding  ,Body mass index is 28 74 kg/m²  Intake/Output Summary (Last 24 hours) at 9/28/2022 0602  Last data filed at 9/28/2022 0444  Gross per 24 hour   Intake 1486 97 ml   Output 1875 ml   Net -388 03 ml       Physical Exam:   General: NAD  HENT: NCAT MMM  Neck: supple, no JVD  CV: nl rate  Lungs: nl wob  No resp distress, on 2 5L O2 (normal for pt, COPD hx)  ABD: Soft, not tender to palpation, nondistended, incisions c/d/i    Extrem: No CCE  Neuro: AAOx3       Invasive Devices  Report    Peripheral Intravenous Line  Duration           Peripheral IV 09/25/22 Left Hand 2 days    Peripheral IV 09/25/22 Right Antecubital 2 days    Peripheral IV 09/25/22 Right Wrist 2 days          Drain  Duration           Urethral Catheter Double-lumen;Non-latex 16 Fr  1 day

## 2022-09-28 NOTE — PHYSICAL THERAPY NOTE
Physical Therapy Evaluation     Patient's Name: Susa Koyanagi    Admitting Diagnosis  Hiatal hernia [K44 9]    Problem List  Patient Active Problem List   Diagnosis    COPD (chronic obstructive pulmonary disease) ([facility] )    Diabetes mellitus ([facility] )    Hypertension    Chronic hypoxemic respiratory failure (HCC)    GERD (gastroesophageal reflux disease)    Hyponatremia    Cigarette nicotine dependence in remission    Exertional dyspnea    ASD (atrial septal defect)    Retinal artery occlusion, branch, left    Chronic pain    BMI 31 0-31 9,adult    Aspiration pneumonia (HCC)    Unintentional weight loss    Abdominal pain    Low blood pressure reading    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    Acquired pes planus of right foot    Anemia    Ankle pain    Arthropathy    Benign essential hypertension    Chronic fatigue    Controlled substance agreement signed    Cough in adult    COVID-19    DDD (degenerative disc disease), lumbosacral    Depression    Fever in adult    GI bleed    History of iron deficiency anemia    Hyperlipidemia    Instability of foot joint    Microscopic hematuria    Onychomycosis    Pain in toe    Primary localized osteoarthrosis of ankle and foot    Sinus tarsi syndrome of right ankle    Disc disorder of lumbar region    Tobacco use disorder    Urge incontinence    Well adult exam    Backache    Type II diabetes mellitus ([facility] )    SIRS (systemic inflammatory response syndrome) ([facility])    Lactic acidosis       Past Medical History  Past Medical History:   Diagnosis Date    Cardiac disease     COPD (chronic obstructive pulmonary disease) ([facility] )     Diabetes mellitus ([facility] )     GERD (gastroesophageal reflux disease)     Hiatal hernia     Hypertension     PUD (peptic ulcer disease)     Residual ASD (atrial septal defect) following repair        Past Surgical History  Past Surgical History:   Procedure Laterality Date    ABDOMINAL ADHESION SURGERY N/A 9/26/2022    Procedure: LYSIS ADHESIONS; Surgeon: Kameron Villafana MD;  [facility];  Service: Thoracic    ASD REPAIR      BACK SURGERY      x3    ESOPHAGOGASTRODUODENOSCOPY N/A 9/26/2022    Procedure: ESOPHAGOGASTRODUODENOSCOPY (EGD); Surgeon: Kameron Villafana MD;  [facility];  Service: Thoracic    JOINT REPLACEMENT      bilateral knee surgery    KNEE SURGERY      PARAESOPHAGEAL HERNIA REPAIR N/A 9/26/2022    Procedure: REPAIR HERNIA PARAESOPHAGEAL LAPAROSCOPIC W ROBOTICS, gastropexy, mesh placement;  Surgeon: Kameron Villafana MD;  [facility];  Service: Thoracic    SHOULDER SURGERY            09/28/22 0850   PT Last Visit   PT Visit Date 09/28/22   Note Type   Note type Evaluation   Pain Assessment   Pain Assessment Tool 0-10   Pain Score 10 - Worst Possible Pain   Pain Location/Orientation Location: Incision   Restrictions/Precautions   Weight Bearing Precautions Per Order No   Other Precautions Cognitive; Chair Alarm; Bed Alarm;Multiple lines;Telemetry; Fall Risk;Pain;Agitated   Home Living   Type of [address] Two level  (unclear ASH)   Home Equipment Walker;Stair glide  (use RW PTA, 2-3L home O2)   Additional Comments Pt agitated and appears confused, unable to obtain home set up/PLOF  Information obtained from EMR  Prior Function   Lives With Son;Other (Comment)  (granddaughter)   General   Family/Caregiver Present No   Cognition   Overall Cognitive Status Impaired   Arousal/Participation Uncooperative   Attention Difficulty attending to directions   Orientation Level Unable to assess   Memory Decreased recall of precautions   Following Commands Unable to follow one step commands   Comments Pt appears confused and agitated  Shouting "leave me alone" repeatedly  Decreased safety awareness and insight  Resistant to all education  Initially refusing OOB, upon PT exiting room, pt noted to be attempting to climb out of bed  Pt assisted to bedside chair     Subjective   Subjective "Leave me alone!"   RLE Assessment   RLE Assessment   (functionally 3-/5)   LLE Assessment   LLE Assessment   (functinoally 3-/5)   Bed Mobility   Supine to Sit 4  Minimal assistance   Additional items Assist x 1;HOB elevated; Increased time required;Verbal cues   Additional Comments Supine in bed upon PT arrival   Pt left upright in bedside chair with chair alarm intact and all needs in reach  Transfers   Sit to Stand 2  Maximal assistance   Additional items Assist x 2; Increased time required;Verbal cues   Stand to Sit 2  Maximal assistance   Additional items Assist x 2; Increased time required;Verbal cues   Additional Comments Transfers with b/l HHA  Ambulation/Elevation   Gait pattern Excessively slow; Step to;Short stride; Foward flexed;Decreased foot clearance;Knees flexed; Improper Weight shift   Gait Assistance 3  Moderate assist   Additional items Assist x 2;Verbal cues; Tactile cues   Assistive Device Other (Comment)  (b/l HHA)   Distance 3 ft from bed to chair   Balance   Static Sitting Fair   Dynamic Sitting Fair -   Static Standing Poor +   Dynamic Standing Poor   Ambulatory Poor -   Endurance Deficit   Endurance Deficit Yes   Endurance Deficit Description cog, fatigue, weakness   Activity Tolerance   Activity Tolerance Treatment limited secondary to agitation   Medical Staff Made Aware OT, OTS   Nurse Made Aware RN cleared pt to be seen by PT   Assessment   Prognosis Fair   Problem List Decreased strength;Decreased endurance; Impaired balance;Decreased mobility; Decreased coordination;Decreased cognition; Impaired judgement;Decreased safety awareness;Pain   Assessment Pt seen for high complexity PT evaluation due to decrease in functional mobility status compared to baseline  Pt with active PT eval/treat and ambulate pt orders at this time  Pt is a 80 y o  F who presented to [facility] with paraesophageal hernia with obstruction on 9/26/22    Pt  has a past medical history of Cardiac disease, COPD (chronic obstructive pulmonary disease) ([facility] ), Diabetes mellitus ([facility] ), GERD (gastroesophageal reflux disease), Hiatal hernia, Hypertension, PUD (peptic ulcer disease), and Residual ASD (atrial septal defect) following repair  Pt resides with son and granddaughter in [facility]  Limited home set up and PLOF due to pt cognition  Pt presents with decreased strength, balance, endurance, cognition that contribute to limitations in bed mobility, functional transfers, functional mobility  Pt requires Min A for supine>sit, Max A x 2 for STS, and Mod A x 2 for steps to chair at this time  Pt left upright in bedside chair with chair alarm intact and with all needs in reach  Pt will benefit from skilled therapy in order to address current impairments and functional limitations  PT to follow pt and recommending rehab once medically cleared  The patient's AM-PAC Basic Mobility Inpatient Short Form Raw Score is 10  A Raw score of less than or equal to 16 suggests the patient may benefit from discharge to post-acute rehabilitation services  Please also refer to the recommendation of the Physical Therapist for safe discharge planning  Barriers to Discharge Inaccessible home environment;Decreased caregiver support   Goals   Patient Goals to be left alone   STG Expiration Date 10/12/22   Short Term Goal #1 1  Pt will demonstrate ability to perform all aspects of bed mobility with I in order to increase independence and decrease burden on caregivers  2  Pt will demonstrate ability to perform functional transfers with Mod I in order to increase independence and decrease burden on caregivers  3  Pt will demonstrate ability to ambulate 200 ft with least restrictive AD with Mod I in order to return to mobility safely  4  Pt will demonstrate ability to negotiate full flight steps with/without HR and Mod I in order to return to household/community mobility safely   5  Pt will demonstrate improved balance by one grade order to decrease risk of falls   6  Pt will increase b/l LE strength by 1 grade in order to increase ease of functional mobility and transfers  Plan   Treatment/Interventions Functional transfer training;LE strengthening/ROM; Therapeutic exercise; Endurance training;Cognitive reorientation;Patient/family training;Equipment eval/education; Bed mobility;Gait training;Spoke to nursing   PT Frequency 2-3x/wk   Recommendation   PT Discharge Recommendation Post acute rehabilitation services   AM-PAC Basic Mobility Inpatient   Turning in Bed Without Bedrails 3   Lying on Back to Sitting on Edge of Flat Bed 3   Moving Bed to Chair 1   Standing Up From Chair 1   Walk in Room 1   Climb 3-5 Stairs 1   Basic Mobility Inpatient Raw Score 10   Turning Head Towards Sound 3   Follow Simple Instructions 2   Low Function Basic Mobility Raw Score 15   Low Function Basic Mobility Standardized Score 23 9   Highest Level Of Mobility   JH-HLM Goal 4: Move to chair/commode   JH-HLM Achieved 4: Move to chair/commode   Modified Bonneville Scale   Modified [place]ne[place] Scale 4         Anahy Diaz DPT

## 2022-09-28 NOTE — OCCUPATIONAL THERAPY NOTE
Occupational Therapy Evaluation     Patient Name: Veronica JIMENEZ Date: 9/28/2022  Problem List  Principal Problem:    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    Past Medical History  Past Medical History:   Diagnosis Date    Cardiac disease     COPD (chronic obstructive pulmonary disease) ([facility] )     Diabetes mellitus ([facility] )     GERD (gastroesophageal reflux disease)     Hiatal hernia     Hypertension     PUD (peptic ulcer disease)     Residual ASD (atrial septal defect) following repair      Past Surgical History  Past Surgical History:   Procedure Laterality Date    ABDOMINAL ADHESION SURGERY N/A 9/26/2022    Procedure: LYSIS ADHESIONS;  Surgeon: Jacky Crum MD;  [facility];  Service: Thoracic    ASD REPAIR      BACK SURGERY      x3    ESOPHAGOGASTRODUODENOSCOPY N/A 9/26/2022    Procedure: ESOPHAGOGASTRODUODENOSCOPY (EGD); Surgeon: Jacky Crum MD;  [facility];  Service: Thoracic    JOINT REPLACEMENT      bilateral knee surgery    KNEE SURGERY      PARAESOPHAGEAL HERNIA REPAIR N/A 9/26/2022    Procedure: REPAIR HERNIA PARAESOPHAGEAL LAPAROSCOPIC W ROBOTICS, gastropexy, mesh placement;  Surgeon: Jacky Crum MD;  [facility];  Service: Thoracic    SHOULDER SURGERY        09/28/22 0851   OT Last Visit   OT Visit Date 09/28/22   Note Type   Note type Evaluation   Restrictions/Precautions   Weight Bearing Precautions Per Order No   Other Precautions Agitated;Cognitive; Chair Alarm; Bed Alarm;Telemetry; Fall Risk;Impulsive  (SCD)   Pain Assessment   Pain Assessment Tool 0-10   Pain Score 10 - Worst Possible Pain   Pain Location/Orientation Orientation: Bilateral;Location: Abdomen   Hospital Pain Intervention(s) Ambulation/increased activity;Repositioned   Home Living   Type of Home House   Home Layout Two level   Home Equipment Walker;Stair glide; Other (Comment)  (Information obtained per EMR: pt uses a wheeled walker for mobility and has devices on 1st and 2nd floor )   Additional Comments Home living information obtained per EMR: pt has o2 (2-3L) in 1st and 2nd floor  Prior Function   Level of [place] Needs assistance with ADLs and functional mobility   Lives With Family;Son;Other (Comment)  (granddaughter)   Receives Help From Family  (Son and granddaughter)   ADL Assistance Needs assistance   IADLs Needs assistance   Falls in the last 6 months   (Pt unable to report at this time )   Vocational Other (Comment)  (Pt unable to report at this time )   Comments Pertinent PLOF information obtained per EMR  Pt was minimally verbal w/decreased eye contact, was not able to verify nor verbalize further PLOF info  Lifestyle   Autonomy Pta, pt required assistance for ADLs and IADLs  Pt uses wheeled walker at baseline  Pt does not drive  Reciprocal Relationships Pt has social supports including family (son and granddaughter)  Service to Others Retired  Intrinsic Gratification Pt unable to state at this time/pt confused  Psychosocial   Psychosocial (WDL) X   Patient Behaviors/Mood Angry;Irritable;Delusions; Non-compliant; Uncooperative  (Pt was agitated saying she wanted to be left alone  Pt was observed w/trying to get out of bed and angered when attempting to help EOB  Pt was able to re-direct and began to inconsistently cooperate during session   Pt w/confused and agitated behaviors )   Ability to Express Feelings Unable to express   Ability to Express Needs Refuses to express   Ability to Express Thoughts Refuses to express   Ability to Understand Others Rarely/never understands   Subjective   Subjective "leave me alone"   ADL   Where Assessed Edge of bed   Eating Assistance 5  Supervision/Setup   Grooming Assistance 4  Minimal Assistance   UB Bathing Assistance 3  Moderate Assistance   LB Bathing Assistance 2  Maximal Assistance   UB Dressing Assistance 3  Moderate Assistance   LB Dressing Assistance 2  Maximal Assistance   Toileting Assistance  2  Maximal Assistance   Functional Assistance 2  Maximal Assistance   Bed Mobility   Rolling L 3  Moderate assistance   Additional items Assist x 2; Increased time required;Verbal cues; Impulsive  (mod vc for safety - inconsistently followed; pt was agitated when initially begin to roll to side, then became less agitated as transition progressed )   Supine to Sit 3  Moderate assistance   Additional items Increased time required;Verbal cues; Impulsive  (mod vc for safety - inconsistently followed; pt was agitated but then became less agitated as transition progressed )   Additional Comments Pt was found supine in bed with bed alarm intact and left in chair with chair alarm intact and call bell within reach  Transfers   Sit to Stand 3  Moderate assistance   Additional items Assist x 2; Increased time required; Impulsive;Verbal cues  (mod vc - inconsistently followed )   Stand to Sit 3  Moderate assistance   Additional items Assist x 2; Increased time required;Verbal cues; Impulsive  (mod vc - inconsistently followed )   Additional Comments Pt required HHA for support and was able to transition into the chair from EOB  Pt demonstrates lack of safety awareness and insight into condition  Pt has decreased recall of precautions with increasing confused and agitated behaviors  Functional Mobility   Functional Mobility 3  Moderate assistance  (x2)   Additional Comments Pt required HHA, observed w/impulsive, agitateed, and confused behaviors  Pt able to transition to chair w/mod a x2  Additional items Hand hold assistance   Balance   Static Sitting Fair   Dynamic Sitting Fair -   Static Standing Poor -   Dynamic Standing Poor -   Ambulatory Poor -   Activity Tolerance   Activity Tolerance Treatment limited secondary to agitation  (Pt was agitated and required consistent stimuli to engage in OT eval session  Pt reported 10/10 pain in abdomen   Pt w/confused behaviors )   Medical Staff Made Aware MARCO Moore Nurse Made Aware RN aware   RUE Assessment   RUE Assessment WFL   LUE Assessment   LUE Assessment WFL   Hand Function   Gross Motor Coordination Functional   Fine Motor Coordination Functional   Cognition   Overall Cognitive Status Impaired   Arousal/Participation Persistent stimuli required;Poorly responsive; Uncooperative   Attention Difficulty attending to directions   Orientation Level Oriented to person;Disoriented to time;Disoriented to situation;Oriented to place   Memory Decreased recall of precautions;Decreased recall of recent events;Decreased short term memory   Following Commands Follows one step commands with increased time or repetition   Comments Pt was unable to follow conversation and inconsistently attend to vc  Pt was agitated and confused during OT eval session  Pt was able to de-escalate and engaged in directions for task inconsistently  Pt has decreased recall of precautions and lacks safety awareness + insight into condtion  Assessment   Limitation Decreased ADL status; Decreased UE ROM; Decreased UE strength;Decreased Safe judgement during ADL;Decreased cognition;Decreased endurance;Decreased self-care trans;Decreased high-level ADLs   Prognosis Fair   Assessment Pt is a 80-year-old female admitted to [facility] on 09/26/22 for thoracic surgery evaluation  Pt transferred from [facility] on 09/26/[facility]  Upon receiving CT scan at [facility], pt confirmed w/intrathoracic stomach filled w/debris  On 09/26/22, pt underwent repair hernia paraesophageal laparascopic w/robotics, lysis, and EGD  Pt is post-op day 2  OT eval orders active  Pt  has a past medical history of Cardiac disease, COPD (chronic obstructive pulmonary disease) ([address] ), Diabetes mellitus ([address] ), GERD (gastroesophageal reflux disease), Hiatal hernia, Hypertension, PUD (peptic ulcer disease), and Residual ASD (atrial septal defect) following repair  PLOF information obtained per EMR  Pta, pt required assistance for ADLs and IADLs   Pt uses wheeled walker at baseline  Pt on O2 (2-3L) in the home and is available on both floors of home  Pt lives with her son and granddaughter in a 2-story home, w/a stairglide to the 2nd floor  Bathroom set-up unable to be given at this time due to pt's confused and agitated behaviors during OT eval session  Presently, pt is S for eating, MIN A for grooming, MOD A for UB bathing/dressing, MAX A for LB bathing/dressing, toileting assistance, functional assistance, MOD A x2 for rolling L + sup>sit (w/increased time, mod vc -- pt very impulsive), MOD A x2 for sit>stand + stand>sit (w/increased time, mod vc - pt impulsive), and MOD A x2 for functional mobility w/HHA, mod vc, and increased time  Pt's limitations include decreased ADL status, decreased UE ROM/strength, decreased safe judgment during ADL, decreased cognition, decreased endurance, decreased self-care trans, and decreased high-level ADLs  These limitations will directly impact safe occupational functioning in all areas of occupation including ADLs, IADLs, home and health management, community and functional mobility, and engaging in meaningful activities  The patient's raw score on the AM-PAC Daily Activity inpatient short form is 15, standardized score is 34 69, less than 39 4  Patients at this level are likely to benefit from discharge to post-acute rehabilitation services  Recommendations for pt are to follow with rehab 3-5x a week for 10-14 days to meet goals  Goals   Patient Goals Pt unable to state at this time  LTG Time Frame 10-14   Plan   Treatment Interventions ADL retraining;Functional transfer training;UE strengthening/ROM; Endurance training;Cognitive reorientation;Continued evaluation; Energy conservation; Activityengagement   Goal Expiration Date 10/12/22   OT Frequency 3-5x/wk   Recommendation   OT Discharge Recommendation Post acute rehabilitation services   [facility] Daily Activity Inpatient   Lower Body Dressing 2   Bathing 2   Toileting 2   Upper Body Dressing 2   Grooming 3   Eating 4   Daily Activity Raw Score 15   Daily Activity Standardized Score (Calc for Raw Score >=11) 34 69   AM-PAC Applied Cognition Inpatient   Following a Speech/Presentation 2   Understanding Ordinary Conversation 1   Taking Medications 2   Remembering Where Things Are Placed or Put Away 1   Remembering List of 4-5 Errands 1   Taking Care of Complicated Tasks 1   Applied Cognition Raw Score 8   Applied Cognition Standardized Score 19 32   Modified [place]ar Scale   Modified Oleg Scale 4     Goals   Pt will complete grooming tasks with MOD I using appropriate DME as needed   Pt will follow one step commands with 1-2 verbal cues for safe engagement in typical daily routine   Pt will complete UB ADLs w/ Min A using appropriate DME as needed   Pt will complete LB ADLs w/Min A using appropriate DME as needed   Pt will complete toileting activity with Min A using appropriate DME as needed   Pt will increase activity tolerance to 30 minutes using appropriate DME as needed   Pt will complete transfers with Min A using appropriate DME as needed   Pt will complete fx mobility task with Min A using appropriate DME as needed     Candida Harper, OTS

## 2022-09-28 NOTE — QUICK NOTE
Quick Note - Thoracic Surgery   Angela Trev 80 y o  female MRN: [id number]  Unit/Bed#: [facility] 502-01 Encounter: [id number]    Informed by nursing that patient's respiratory status acutely worsened, requiring uptitrating of supplemental oxygen to non-rebreather at 15 LPM  Patient is visibly short of breath and dry-heaving       Plan:  · STAT labs, lactate, ABG  · STAT CXR  · D/w RT -- will give nebulizer now, possibly HFNC  · Pulmonology consult -- may need medical critical care if respiratory status does not improve quickly  · Upgraded to stepdown level of care      Floyd Stone MD   PGY4, General Surgery

## 2022-09-28 NOTE — OCCUPATIONAL THERAPY NOTE
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    Past Medical History  Past Medical History:   Diagnosis Date    Cardiac disease     COPD (chronic obstructive pulmonary disease) ([facility] [address] )     Diabetes mellitus ([facility] [address] )     GERD (gastroesophageal reflux disease)     Hiatal hernia     Hypertension     PUD (peptic ulcer disease)     Residual ASD (atrial septal defect) following repair      Past Surgical History  Past Surgical History:   Procedure Laterality Date    ABDOMINAL ADHESION SURGERY N/A 9/26/2022    Procedure: LYSIS ADHESIONS;  Surgeon: Robert Robles MD;  [facility]: [facility];  Service: Thoracic    ASD REPAIR      BACK SURGERY      x3    ESOPHAGOGASTRODUODENOSCOPY N/A 9/26/2022    Procedure: ESOPHAGOGASTRODUODENOSCOPY (EGD); Surgeon: Robert Robles MD;  [facility];  Service: Thoracic    JOINT REPLACEMENT      bilateral knee surgery    KNEE SURGERY      PARAESOPHAGEAL HERNIA REPAIR N/A 9/26/2022    Procedure: REPAIR HERNIA PARAESOPHAGEAL LAPAROSCOPIC W ROBOTICS, gastropexy, mesh placement;  Surgeon: Robert Robles MD;  [facility];  Service: Thoracic    SHOULDER SURGERY           09/28/22 0851   OT Last Visit   OT Visit Date 09/28/22   Note Type   Note type Evaluation   Restrictions/Precautions   Weight Bearing Precautions Per Order No   Other Precautions Agitated;Cognitive; Chair Alarm; Bed Alarm;Telemetry; Fall Risk;Impulsive  (SCD)   Pain Assessment   Pain Assessment Tool 0-10   Pain Score 10 - Worst Possible Pain   Pain Location/Orientation Orientation: Bilateral;Location: Abdomen   Hospital Pain Intervention(s) Ambulation/increased activity;Repositioned   Home Living   Type of Home House   Home Layout Two level   Home Equipment Walker;Stair glide; Other (Comment)  (Information obtained per EMR: pt uses a wheeled walker for mobility and has devices on 1st and 2nd floor )   Additional Comments Home living information obtained per EMR: pt has o2 (2-3L) in 1st and 2nd floor  Prior Function   Level of [place] Needs assistance with ADLs and functional mobility   Lives With Family;Son;Other (Comment)  (granddaughter)   Receives Help From Family  (Son and granddaughter)   ADL Assistance Needs assistance   IADLs Needs assistance   Falls in the last 6 months   (Pt unable to report at this time )   Vocational Other (Comment)  (Pt unable to report at this time )   Comments Pertinent PLOF information obtained per EMR  Pt was minimally verbal w/decreased eye contact, was not able to verify nor verbalize further PLOF info  Lifestyle   Autonomy Pta, pt required assistance for ADLs and IADLs  Pt uses wheeled walker at baseline  Pt does not drive  Reciprocal Relationships Pt has social supports including family (son and granddaughter)  Service to Others Retired  Intrinsic Gratification Pt unable to state at this time/pt confused  Psychosocial   Psychosocial (WDL) X   Patient Behaviors/Mood Angry;Irritable;Delusions; Non-compliant; Uncooperative  (Pt was agitated saying she wanted to be left alone  Pt was observed w/trying to get out of bed and angered when attempting to help EOB  Pt was able to re-direct and began to inconsistently cooperate during session   Pt w/confused and agitated behaviors )   Ability to Express Feelings Unable to express   Ability to Express Needs Refuses to express   Ability to Express Thoughts Refuses to express   Ability to Understand Others Rarely/never understands   Subjective   Subjective "leave me alone"   ADL   Where Assessed Edge of bed   Eating Assistance 5  Supervision/Setup   Grooming Assistance 4  Minimal Assistance   UB Bathing Assistance 3  Moderate Assistance   LB Bathing Assistance 2  Maximal Assistance   UB Dressing Assistance 3  Moderate Assistance   LB Dressing Assistance 2  Maximal Assistance   Toileting Assistance  2  Maximal Assistance   Functional Assistance 2  Maximal Assistance   Bed Mobility   Rolling L 4  Minimal assistance   Additional items Increased time required;Verbal cues; Impulsive  (mod vc for safety - inconsistently followed; pt was agitated when initially begin to roll to side, then became less agitated as transition progressed )   Supine to Sit 4  Minimal assistance   Additional items Increased time required;Verbal cues; Impulsive;Assist x 1  (mod vc for safety - inconsistently followed; pt was agitated but then became less agitated as transition progressed )   Additional Comments Pt was found supine in bed with bed alarm intact and left in chair with chair alarm intact and call bell within reach  Transfers   Sit to Stand 2  Maximal assistance   Additional items Assist x 2; Increased time required; Impulsive;Verbal cues  (mod vc - inconsistently followed )   Stand to Sit 2  Maximal assistance   Additional items Assist x 2; Increased time required;Verbal cues; Impulsive  (mod vc - inconsistently followed )   Additional Comments Pt required HHA for support and was able to transition into the chair from EOB  Pt demonstrates lack of safety awareness and insight into condition  Pt has decreased recall of precautions with increasing confused and agitated behaviors  Functional Mobility   Functional Mobility 3  Moderate assistance  (x2)   Additional Comments Pt required HHA, observed w/impulsive, agitateed, and confused behaviors  Pt able to transition to chair w/mod a x2  Additional items Hand hold assistance   Balance   Static Sitting Fair   Dynamic Sitting Fair -   Static Standing Poor -   Dynamic Standing Poor -   Ambulatory Poor -   Activity Tolerance   Activity Tolerance Treatment limited secondary to agitation  (Pt was agitated and required consistent stimuli to engage in OT eval session  Pt reported 10/10 pain in abdomen   Pt w/confused behaviors )   Medical Staff Made Aware MARCO Santos Nurse Made Aware RN aware   RUE Assessment   RUE Assessment WFL   LUE Assessment   LUE Assessment WFL   Hand Function   Gross Motor Coordination Functional   Fine Motor Coordination Functional   Cognition   Overall Cognitive Status Impaired   Arousal/Participation Persistent stimuli required;Poorly responsive; Uncooperative   Attention Difficulty attending to directions   Orientation Level Oriented to person;Disoriented to time;Disoriented to situation;Oriented to place   Memory Decreased recall of precautions;Decreased recall of recent events;Decreased short term memory   Following Commands Follows one step commands with increased time or repetition   Comments Pt was unable to follow conversation and inconsistently attend to vc  Pt was agitated and confused during OT eval session  Pt was able to de-escalate and engaged in directions for task inconsistently  Pt has decreased recall of precautions and lacks safety awareness + insight into condtion  Assessment   Limitation Decreased ADL status; Decreased UE ROM; Decreased UE strength;Decreased Safe judgement during ADL;Decreased cognition;Decreased endurance;Decreased self-care trans;Decreased high-level ADLs   Prognosis Fair   Assessment Pt is a 80-year-old female admitted to [facility] on 09/26/22 for thoracic surgery evaluation  Pt transferred from [facility] on 09/26/22  Upon [facility], pt confirmed w/intrathoracic stomach filled w/debris  On 09/26/22, pt underwent repair hernia paraesophageal laparascopic w/robotics, lysis, and EGD  Pt is post-op day 2  OT eval orders active  Pt  has a past medical history of Cardiac disease, COPD (chronic obstructive pulmonary disease) ([address] ), Diabetes mellitus ([address] ), GERD (gastroesophageal reflux disease), Hiatal hernia, Hypertension, PUD (peptic ulcer disease), and Residual ASD (atrial septal defect) following repair  PLOF information obtained per EMR  Pta, pt required assistance for ADLs and IADLs   Pt uses wheeled walker at baseline  Pt on O2 (2-3L) in the home and is available on both floors of home  Pt lives with her son and granddaughter in a 2-story home, w/a stairglide to the 2nd floor  Bathroom set-up unable to be given at this time due to pt's confused and agitated behaviors during OT eval session  Presently, pt is S for eating, MIN A for grooming, MOD A for UB bathing/dressing, MAX A for LB bathing/dressing, toileting assistance, functional assistance, Min A for rolling L + Min A x1 sup>sit (w/increased time, mod vc -- pt very impulsive), Max A x2 for sit>stand + stand>sit (w/increased time, mod vc - pt impulsive), and MOD A x2 for functional mobility w/HHA, mod vc, and increased time  Pt's limitations include decreased ADL status, decreased UE ROM/strength, decreased safe judgment during ADL, decreased cognition, decreased endurance, decreased self-care trans, and decreased high-level ADLs  These limitations will directly impact safe occupational functioning in all areas of occupation including ADLs, IADLs, home and health management, community and functional mobility, and engaging in meaningful activities  The patient's raw score on the AM-PAC Daily Activity inpatient short form is 15, standardized score is 34 69, less than 39 4  Patients at this level are likely to benefit from discharge to post-acute rehabilitation services  Recommendations for pt are to follow with rehab 3-5x a week for 10-14 days to meet goals  Goals   Patient Goals Pt unable to state at this time  LTG Time Frame 10-14   Plan   Treatment Interventions ADL retraining;Functional transfer training;UE strengthening/ROM; Endurance training;Cognitive reorientation;Continued evaluation; Energy conservation; Activityengagement   Goal Expiration Date 10/12/22   OT Frequency 3-5x/wk   Recommendation   OT Discharge Recommendation Post acute rehabilitation services   [facility] Daily Activity Inpatient   Lower Body Dressing 2   Bathing 2   Toileting 2   Upper Body Dressing 2   Grooming 3   Eating 4   Daily Activity Raw Score 15   Daily Activity Standardized Score (Calc for Raw Score >=11) 34 69   AM-PAC Applied Cognition Inpatient   Following a Speech/Presentation 2   Understanding Ordinary Conversation 1   Taking Medications 2   Remembering Where Things Are Placed or Put Away 1   Remembering List of 4-5 Errands 1   Taking Care of Complicated Tasks 1   Applied Cognition Raw Score 8   Applied Cognition Standardized Score 19 32   Modified Phillips Scale   Modified Phillips Scale 4       Pt will complete grooming tasks with S using appropriate DME as needed   Pt will follow one step commands with 1-2 verbal cues for safe engagement in typical daily routine   Pt will complete UB ADLs w/ Min A using appropriate DME as needed   Pt will complete LB ADLs w/Min A using appropriate DME as needed   Pt will complete toileting activity with Min A using appropriate DME as needed   Pt will increase activity tolerance to 30 minutes using appropriate DME as needed   Pt will complete transfers with Min A using appropriate DME as needed   Pt will complete fx mobility task with Min A using appropriate DME as needed

## 2022-09-28 NOTE — PLAN OF CARE
Problem: PHYSICAL THERAPY ADULT  Goal: Performs mobility at highest level of function for planned discharge setting  See evaluation for individualized goals  Description: Treatment/Interventions: Functional transfer training, LE strengthening/ROM, Therapeutic exercise, Endurance training, Cognitive reorientation, Patient/family training, Equipment eval/education, Bed mobility, Gait training, Spoke to nursing          See flowsheet documentation for full assessment, interventions and recommendations  Note: Prognosis: Fair  Problem List: Decreased strength, Decreased endurance, Impaired balance, Decreased mobility, Decreased coordination, Decreased cognition, Impaired judgement, Decreased safety awareness, Pain  Assessment: Pt seen for high complexity PT evaluation due to decrease in functional mobility status compared to baseline  Pt with active PT eval/treat and ambulate pt orders at this time  Pt is a 80 y o  F who presented to [facility] with paraesophageal hernia with obstruction on 9/26/22  Pt  has a past medical history of Cardiac disease, COPD (chronic obstructive pulmonary disease) ([facility] [address] Diabetes mellitus ([facility] [address] ), GERD (gastroesophageal reflux disease), Hiatal hernia, Hypertension, PUD (peptic ulcer disease), and Residual ASD (atrial septal defect) following repair  Pt resides with son and granddaughter in [facility]  Limited home set up and PLOF due to pt cognition  Pt presents with decreased strength, balance, endurance, cognition that contribute to limitations in bed mobility, functional transfers, functional mobility  Pt requires Min A for supine>sit, Max A x 2 for STS, and Mod A x 2 for steps to chair at this time  Pt left upright in bedside chair with chair alarm intact and with all needs in reach  Pt will benefit from skilled therapy in order to address current impairments and functional limitations  PT to follow pt and recommending rehab once medically cleared    The patient's AM-PAC Basic Mobility Inpatient Short Form Raw Score is 10  A Raw score of less than or equal to 16 suggests the patient may benefit from discharge to post-acute rehabilitation services  Please also refer to the recommendation of the Physical Therapist for safe discharge planning  Barriers to Discharge: Inaccessible home environment, Decreased caregiver support     PT Discharge Recommendation: Post acute rehabilitation services    See flowsheet documentation for full assessment

## 2022-09-28 NOTE — PROGRESS NOTES
Dennis Jennings, made him aware that patient is complaining of increased abdominal pain, and dry heaving  Bp 171/89, HR 82, oxygen saturation 88% on 6L NC  Team coming to see patient

## 2022-09-28 NOTE — CONSULTS
PULMONOLOGY CONSULT NOTE     Name: Radha Castaneda   Age & Sex: 80 y o  female   MRN: [id number]  Unit/Bed#: [facility] 502-01   Encounter: [id number]        Reason for consultation: COPD, acute on chronic respiratory failure post-op    Requesting physician: Radha Chicas MD    Assessment:  Patient is an 81 yo F w/PMH of severe COPD, chronic respiratory failure (2-3L oxygen at home), HTN, and diabetes s/p paraesophageal hernia repair (9/26) whose respiratory status acutely worsened requiring 15 L/min mid flow  · Pt is now clinically improving, able to wean down to 8 L/min  · Most likely secondary to aspiration pneumonitis/pneumonia  Differential includes pulmonary embolus, atelectasis, diaphragmatic injury, neuromuscular causes, etc       Problem list:  1  Acute on chronic respiratory failure  2  Severe COPD  3  Paraesophageal hernia  4  HTN  5  Diabetes    Plan:  · Supplemental O2 to maintain sat >88%  · Wean as tolerated  · Encourage pulmonary hygiene - IS, OOB as tolerated  · Repeat CXR tomorrow  · If respiratory status worsens, or does not continue to improve, would consider additional workup   · Continue nebs, Symbicort, PRN albuterol  · POD2 robotic PEHR with gastropexy - Management per surgery      History of Present Illness   HPI:  Radha Castaneda is a 80 y o  female w/PMH of severe COPD, chronic respiratory failure, HTN, and diabetes who is POD2 of paraesophageal hernia repair (9/26)  Nursing noted this morning that her respiratory status had acutely worsened requiring escalation from [facility] to non-rebreather at 15 L/min  Per nursing, the patient was dry heaving and in a tripod position during the episode; she became hypoxic and tachycardic  Pt seen and examined at bedside  Pt found resting comfortable in bed on 15L mid flow, decreased to 8L, sating 95%  Not in acute distress   Pt was able to respond appropriately to questions but did not want to talk much, expressed frustration with being in the hospital  Pt was able to drink some liquids today  She reports improvement in breathing, denies episodes of choking or issues swallowing  Denies fever/chills, chest pain, leg swelling  Endorses abdominal pain at the site of the surgery  Pt is relatively independent at baseline  Lives with son who occasionally helps with medications  Pt able to walk around with walker  Review of systems:  12 point review of systems was completed and was otherwise negative except as listed in HPI  Historical Information   Past Medical History:   Diagnosis Date    Cardiac disease     COPD [facility]chronic obstructive [facility] disease) [facility] )     Diabetes mellitus ([facility] )     GERD (gastroesophageal reflux disease)     Hiatal hernia     Hypertension     PUD (peptic ulcer disease)     Residual ASD (atrial septal defect) following repair      Past Surgical History:   Procedure Laterality Date    ABDOMINAL ADHESION SURGERY N/A 9/26/2022    Procedure: LYSIS ADHESIONS;  Surgeon: Luis Thomas MD;  [facility];  Service: Thoracic    ASD REPAIR      BACK SURGERY      x3    ESOPHAGOGASTRODUODENOSCOPY N/A 9/26/2022    Procedure: ESOPHAGOGASTRODUODENOSCOPY (EGD);   Surgeon: Luis Thomas MD;  [facility];  Service: Thoracic    JOINT REPLACEMENT      bilateral knee surgery    KNEE SURGERY      PARAESOPHAGEAL HERNIA REPAIR N/A 9/26/2022    Procedure: REPAIR HERNIA PARAESOPHAGEAL LAPAROSCOPIC W ROBOTICS, gastropexy, mesh placement;  Surgeon: Luis Thomas MD;  [facility];  Service: Thoracic    SHOULDER SURGERY       Family History   Problem Relation Age of Onset    Cancer Mother     Asthma Father        Occupational History: Retired, previously a nurse    Social History: former smoker, 2PPD for 40 years, quit 10 years ago    Meds/Allergies   Current Facility-Administered Medications   Medication Dose Route Frequency    acetaminophen (TYLENOL) tablet 650 mg  650 mg Oral Q6H [address]    albuterol (PROVENTIL HFA,VENTOLIN HFA) inhaler 2 puff  2 puff Inhalation Q6H PRN    atorvastatin (LIPITOR) tablet 40 mg  40 mg Oral QPM    brimonidine tartrate 0 2 % ophthalmic solution 1 drop  1 drop Left Eye BID    budesonide-formoterol (SYMBICORT) 160-4 5 mcg/act inhaler 2 puff  2 puff Inhalation BID    diltiazem (CARDIZEM CD) 24 hr capsule 180 mg  180 mg Oral Daily    enoxaparin (LOVENOX) subcutaneous injection 40 mg  40 mg Subcutaneous Daily    insulin lispro (HumaLOG) 100 units/mL subcutaneous injection 1-5 Units  1-5 Units Subcutaneous Q6H [address]    labetalol (NORMODYNE) injection 10 mg  10 mg Intravenous Q4H PRN    levalbuterol (XOPENEX) inhalation solution 1 25 mg  1 25 mg Nebulization TID    And    sodium chloride 0 9 % inhalation solution 3 mL  3 mL Nebulization TID    methocarbamol (ROBAXIN) tablet 500 mg  500 mg Oral Q6H [address]    ondansetron (ZOFRAN) injection 4 mg  4 mg Intravenous Q4H PRN    oxyCODONE (ROXICODONE) IR tablet 2 5 mg  2 5 mg Oral Q4H PRN    oxyCODONE (ROXICODONE) IR tablet 5 mg  5 mg Oral Q4H PRN    pantoprazole (PROTONIX) injection 40 mg  40 mg Intravenous Q12H CARRIE     Medications Prior to Admission   Medication    albuterol (PROVENTIL HFA,VENTOLIN HFA) 90 mcg/act inhaler    ascorbic acid (VITAMIN C) 1000 MG tablet    aspirin 81 mg chewable tablet    atorvastatin (LIPITOR) 40 mg tablet    brimonidine tartrate 0 2 % ophthalmic solution    Budeson-Glycopyrrol-Formoterol (Breztri Aerosphere) 160-9-4 8 MCG/ACT AERO    diltiazem (CARDIZEM CD) 180 mg 24 hr capsule    gabapentin (NEURONTIN) 300 mg capsule    losartan (COZAAR) 50 mg tablet    metFORMIN (GLUCOPHAGE) 1000 MG tablet    metFORMIN (GLUCOPHAGE) 500 mg tablet    oxyCODONE ER (Xtampza ER) 13 5 MG C12A    pantoprazole (PROTONIX) 40 mg tablet    rOPINIRole (REQUIP) 0 5 mg tablet    sitaGLIPtin (JANUVIA) 100 mg tablet     Allergies   Allergen Reactions    Prednisone      The patient reports "they make me goofy "  No true allergic reaction    Psyllium     Tetanus Toxoids        Vitals: Blood pressure (!) 176/100, pulse 93, temperature 97 7 °F (36 5 °C), resp  rate 20, height 5' 1" (1 549 m), weight 69 kg (152 lb 1 9 oz), SpO2 93 %, not currently breastfeeding  , 15L mid flow, Body mass index is 28 74 kg/m²  Intake/Output Summary (Last 24 hours) at 9/28/2022 1206  Last data filed at 9/28/2022 0754  Gross per 24 hour   Intake 1236 55 ml   Output 1300 ml   Net -63 45 ml       Physical Exam  Vitals reviewed  Constitutional:       General: She is not in acute distress  HENT:      Head: Normocephalic and atraumatic  Nose: Nose normal       Mouth/Throat:      Mouth: Mucous membranes are moist       Pharynx: Oropharynx is clear  Eyes:      Extraocular Movements: Extraocular movements intact  Conjunctiva/sclera: Conjunctivae normal       Pupils: Pupils are equal, round, and reactive to light  Cardiovascular:      Rate and Rhythm: Normal rate and regular rhythm  Heart sounds: Normal heart sounds  Pulmonary:      Effort: No respiratory distress  Breath sounds: No wheezing or rhonchi  Comments: Tachypneic, occasional crackles  Abdominal:      General: There is no distension  Palpations: Abdomen is soft  Musculoskeletal:      Right lower leg: No edema  Left lower leg: No edema  Skin:     General: Skin is warm and dry  Neurological:      Mental Status: She is alert and oriented to person, place, and time  Psychiatric:         Mood and Affect: Mood normal          Behavior: Behavior normal          Labs: I have personally reviewed pertinent lab results  , ABG:   Lab Results   Component Value Date    PHART 7 472 (H) 09/28/2022    SVT8MSX 44 5 (H) 09/28/2022    PO2ART 109 8 09/28/2022    NYQ6GOG 31 8 (H) 09/28/2022    BEART 7 3 09/28/2022    SOURCE Artery 09/28/2022   , CBC:   Lab Results   Component Value Date    WBC 10 65 (H) 09/28/2022    HGB 11 9 09/28/2022    HCT 37 6 09/28/2022    MCV 89 09/28/2022     09/28/2022    MCH 28 1 09/28/2022    MCHC 31 6 09/28/2022    RDW 14 7 09/28/2022    MPV 9 7 09/28/2022    NRBC 0 09/28/2022   , CMP:   Lab Results   Component Value Date    SODIUM 136 09/28/2022    K 3 2 (L) 09/28/2022    CL 97 09/28/2022    CO2 31 09/28/2022    BUN 7 09/28/2022    CREATININE 0 61 09/28/2022    CALCIUM 9 1 09/28/2022    EGFR 84 09/28/2022     Laboratory and Diagnostics  Results from last 7 days   Lab Units 09/28/22  1013 09/27/22  0537 09/26/22  1655 09/26/22  0513 09/25/22  2308   WBC Thousand/uL 10 65* 10 96* 12 19* 10 44* 12 85*   HEMOGLOBIN g/dL 11 9 11 1* 10 4* 10 9* 12 2   HEMATOCRIT % 37 6 35 4 33 0* 35 6 39 1   PLATELETS Thousands/uL 171 186 217 200 281   NEUTROS PCT % 79* 78*  --  83* 57   MONOS PCT % 10 12  --  8 11     Results from last 7 days   Lab Units 09/28/22  1013 09/27/22  0537 09/26/22  1655 09/26/22  0513 09/25/22  2308   SODIUM mmol/L 136 136 137 137 138   POTASSIUM mmol/L 3 2* 3 6 3 5 4 3 3 6   CHLORIDE mmol/L 97 100 105 103 99   CO2 mmol/L 31 30 24 26 25   ANION GAP mmol/L 8 6 8 8 14*   BUN mg/dL 7 6 9 14 15   CREATININE mg/dL 0 61 0 58* 0 65 0 83 0 96   CALCIUM mg/dL 9 1 8 7 8 5 8 7 9 9   GLUCOSE RANDOM mg/dL 163* 158* 172* 175* 178*   ALT U/L  --   --   --   --  8   AST U/L  --   --   --   --  11*   ALK PHOS U/L  --   --   --   --  72   ALBUMIN g/dL  --   --   --   --  4 0   TOTAL BILIRUBIN mg/dL  --   --   --   --  0 45     Results from last 7 days   Lab Units 09/27/22  0537 09/26/22  1655   MAGNESIUM mg/dL 2 2 1 6      Results from last 7 days   Lab Units 09/25/22  2308   INR  0 99   PTT seconds 27          Results from last 7 days   Lab Units 09/28/22  1014 09/26/22  1050 09/26/22  0734 09/26/22  0514 09/26/22  0252 09/26/22  0025   LACTIC ACID mmol/L 1 3 2 6* 2 8* 3 2* 4 8* 3 7*                           ABG:   Results from last 7 days   Lab Units 09/28/22  1009   PH ART  7 472*   PCO2 ART mm Hg 44 5*   PO2 ART mm Hg 109 8 HCO3 ART mmol/L 31 8*   BASE EXC ART mmol/L 7 3   ABG SOURCE  Artery       Imaging and other studies: I have personally reviewed pertinent reports  and I have personally reviewed pertinent films in PACS  XR chest 1 view portable    Result Date: 9/26/2022  Impression: Tip of nasogastric tube located within the stomach, which lies within a large hiatal hernia  Workstation performed: [id number]     FL upper GI UGI    Result Date: 9/27/2022  Impression: Postsurgical changes of paraesophageal hernia repair and gastropexy without evidence of leak  Contrast only reached the 3rd portion of the duodenum by the end of the study, likely related to the small amount of contrast able to be tolerated  Workstation performed: [id number]       Pulmonary function testing:   POCT Spirometry 12/29/2020  FEV1/FVC 64%; FVC 1 09 L, 48% predicted; FEV1 0 69 L, 42% predicted   Impression: Severe obstruction    EKG, Pathology, and Other Studies: I have personally reviewed pertinent reports      9/25/22 - Normal sinus rhythm with complete right bundle branch block unchanged from prior    Code Status: Level 1 - Full Code    VTE Pharmacologic Prophylaxis: Enoxaparin (Lovenox)  VTE Mechanical Prophylaxis: sequential compression device

## 2022-09-28 NOTE — PROGRESS NOTES
Dennis Felton Fernando him aware that patient is in tripod position, and is continuing to dry davis  Also made them aware that she is tachycardic, HR in the 110-120's and is having increased ectopy on tele monitor  Team coming to see patient  No new orders at this time

## 2022-09-29 ENCOUNTER — APPOINTMENT (OUTPATIENT)
Dept: RADIOLOGY | Facility: HOSPITAL | Age: 82
DRG: 326 | End: 2022-09-29
Payer: COMMERCIAL

## 2022-09-29 LAB
ANION GAP SERPL CALCULATED.3IONS-SCNC: 3 MMOL/L (ref 4–13)
BASOPHILS # BLD AUTO: 0.05 THOUSANDS/ΜL (ref 0–0.1)
BASOPHILS NFR BLD AUTO: 1 % (ref 0–1)
BUN SERPL-MCNC: 9 MG/DL (ref 5–25)
CALCIUM SERPL-MCNC: 9 MG/DL (ref 8.3–10.1)
CHLORIDE SERPL-SCNC: 100 MMOL/L (ref 96–108)
CO2 SERPL-SCNC: 32 MMOL/L (ref 21–32)
CREAT SERPL-MCNC: 0.61 MG/DL (ref 0.6–1.3)
EOSINOPHIL # BLD AUTO: 0.1 THOUSAND/ΜL (ref 0–0.61)
EOSINOPHIL NFR BLD AUTO: 1 % (ref 0–6)
ERYTHROCYTE [DISTWIDTH] IN BLOOD BY AUTOMATED COUNT: 14.6 % (ref 11.6–15.1)
GFR SERPL CREATININE-BSD FRML MDRD: 84 ML/MIN/1.73SQ M
GLUCOSE SERPL-MCNC: 108 MG/DL (ref 65–140)
GLUCOSE SERPL-MCNC: 114 MG/DL (ref 65–140)
GLUCOSE SERPL-MCNC: 131 MG/DL (ref 65–140)
GLUCOSE SERPL-MCNC: 140 MG/DL (ref 65–140)
GLUCOSE SERPL-MCNC: 166 MG/DL (ref 65–140)
HCT VFR BLD AUTO: 32.6 % (ref 34.8–46.1)
HGB BLD-MCNC: 10.2 G/DL (ref 11.5–15.4)
IMM GRANULOCYTES # BLD AUTO: 0.02 THOUSAND/UL (ref 0–0.2)
IMM GRANULOCYTES NFR BLD AUTO: 0 % (ref 0–2)
LYMPHOCYTES # BLD AUTO: 0.93 THOUSANDS/ΜL (ref 0.6–4.47)
LYMPHOCYTES NFR BLD AUTO: 13 % (ref 14–44)
MCH RBC QN AUTO: 28.2 PG (ref 26.8–34.3)
MCHC RBC AUTO-ENTMCNC: 31.3 G/DL (ref 31.4–37.4)
MCV RBC AUTO: 90 FL (ref 82–98)
MONOCYTES # BLD AUTO: 0.97 THOUSAND/ΜL (ref 0.17–1.22)
MONOCYTES NFR BLD AUTO: 13 % (ref 4–12)
NEUTROPHILS # BLD AUTO: 5.39 THOUSANDS/ΜL (ref 1.85–7.62)
NEUTS SEG NFR BLD AUTO: 72 % (ref 43–75)
NRBC BLD AUTO-RTO: 0 /100 WBCS
PLATELET # BLD AUTO: 156 THOUSANDS/UL (ref 149–390)
PMV BLD AUTO: 9.7 FL (ref 8.9–12.7)
POTASSIUM SERPL-SCNC: 3.6 MMOL/L (ref 3.5–5.3)
RBC # BLD AUTO: 3.62 MILLION/UL (ref 3.81–5.12)
SODIUM SERPL-SCNC: 135 MMOL/L (ref 135–147)
WBC # BLD AUTO: 7.46 THOUSAND/UL (ref 4.31–10.16)

## 2022-09-29 PROCEDURE — 85025 COMPLETE CBC W/AUTO DIFF WBC: CPT | Performed by: THORACIC SURGERY (CARDIOTHORACIC VASCULAR SURGERY)

## 2022-09-29 PROCEDURE — C9113 INJ PANTOPRAZOLE SODIUM, VIA: HCPCS

## 2022-09-29 PROCEDURE — 94760 N-INVAS EAR/PLS OXIMETRY 1: CPT | Performed by: SOCIAL WORKER

## 2022-09-29 PROCEDURE — 92610 EVALUATE SWALLOWING FUNCTION: CPT

## 2022-09-29 PROCEDURE — 80048 BASIC METABOLIC PNL TOTAL CA: CPT | Performed by: THORACIC SURGERY (CARDIOTHORACIC VASCULAR SURGERY)

## 2022-09-29 PROCEDURE — 71045 X-RAY EXAM CHEST 1 VIEW: CPT

## 2022-09-29 PROCEDURE — 82948 REAGENT STRIP/BLOOD GLUCOSE: CPT

## 2022-09-29 PROCEDURE — 99024 POSTOP FOLLOW-UP VISIT: CPT | Performed by: THORACIC SURGERY (CARDIOTHORACIC VASCULAR SURGERY)

## 2022-09-29 PROCEDURE — 94640 AIRWAY INHALATION TREATMENT: CPT | Performed by: SOCIAL WORKER

## 2022-09-29 PROCEDURE — 99232 SBSQ HOSP IP/OBS MODERATE 35: CPT | Performed by: INTERNAL MEDICINE

## 2022-09-29 RX ORDER — ALBUTEROL SULFATE 2.5 MG/3ML
2.5 SOLUTION RESPIRATORY (INHALATION) EVERY 4 HOURS PRN
Status: DISCONTINUED | OUTPATIENT
Start: 2022-09-29 | End: 2022-09-30

## 2022-09-29 RX ADMIN — BUDESONIDE AND FORMOTEROL FUMARATE DIHYDRATE 2 PUFF: 160; 4.5 AEROSOL RESPIRATORY (INHALATION) at 08:55

## 2022-09-29 RX ADMIN — ISODIUM CHLORIDE 3 ML: 0.03 SOLUTION RESPIRATORY (INHALATION) at 08:08

## 2022-09-29 RX ADMIN — OXYCODONE HYDROCHLORIDE 5 MG: 5 TABLET ORAL at 17:18

## 2022-09-29 RX ADMIN — BRIMONIDINE TARTRATE 1 DROP: 2 SOLUTION/ DROPS OPHTHALMIC at 08:55

## 2022-09-29 RX ADMIN — OXYCODONE HYDROCHLORIDE 5 MG: 5 TABLET ORAL at 01:02

## 2022-09-29 RX ADMIN — OXYCODONE HYDROCHLORIDE 5 MG: 5 TABLET ORAL at 12:18

## 2022-09-29 RX ADMIN — METHOCARBAMOL TABLETS 500 MG: 500 TABLET, COATED ORAL at 23:17

## 2022-09-29 RX ADMIN — ACETAMINOPHEN 650 MG: 325 TABLET, FILM COATED ORAL at 11:13

## 2022-09-29 RX ADMIN — ENOXAPARIN SODIUM 40 MG: 40 INJECTION SUBCUTANEOUS at 08:55

## 2022-09-29 RX ADMIN — ATORVASTATIN CALCIUM 40 MG: 40 TABLET, FILM COATED ORAL at 17:11

## 2022-09-29 RX ADMIN — BRIMONIDINE TARTRATE 1 DROP: 2 SOLUTION/ DROPS OPHTHALMIC at 19:46

## 2022-09-29 RX ADMIN — BUDESONIDE AND FORMOTEROL FUMARATE DIHYDRATE 2 PUFF: 160; 4.5 AEROSOL RESPIRATORY (INHALATION) at 17:11

## 2022-09-29 RX ADMIN — METHOCARBAMOL TABLETS 500 MG: 500 TABLET, COATED ORAL at 17:11

## 2022-09-29 RX ADMIN — METOCLOPRAMIDE HYDROCHLORIDE 10 MG: 5 INJECTION INTRAMUSCULAR; INTRAVENOUS at 04:30

## 2022-09-29 RX ADMIN — ACETAMINOPHEN 650 MG: 325 TABLET, FILM COATED ORAL at 23:17

## 2022-09-29 RX ADMIN — METOCLOPRAMIDE HYDROCHLORIDE 10 MG: 5 INJECTION INTRAMUSCULAR; INTRAVENOUS at 17:11

## 2022-09-29 RX ADMIN — METOCLOPRAMIDE HYDROCHLORIDE 10 MG: 5 INJECTION INTRAMUSCULAR; INTRAVENOUS at 11:14

## 2022-09-29 RX ADMIN — PANTOPRAZOLE SODIUM 40 MG: 40 INJECTION, POWDER, FOR SOLUTION INTRAVENOUS at 19:46

## 2022-09-29 RX ADMIN — HYDROMORPHONE HYDROCHLORIDE 0.5 MG: 1 INJECTION, SOLUTION INTRAMUSCULAR; INTRAVENOUS; SUBCUTANEOUS at 19:42

## 2022-09-29 RX ADMIN — METHOCARBAMOL TABLETS 500 MG: 500 TABLET, COATED ORAL at 04:29

## 2022-09-29 RX ADMIN — OXYCODONE HYDROCHLORIDE 5 MG: 5 TABLET ORAL at 21:23

## 2022-09-29 RX ADMIN — OXYCODONE HYDROCHLORIDE 5 MG: 5 TABLET ORAL at 07:17

## 2022-09-29 RX ADMIN — HYDROMORPHONE HYDROCHLORIDE 0.5 MG: 1 INJECTION, SOLUTION INTRAMUSCULAR; INTRAVENOUS; SUBCUTANEOUS at 14:59

## 2022-09-29 RX ADMIN — LEVALBUTEROL 1.25 MG: 1.25 SOLUTION, CONCENTRATE RESPIRATORY (INHALATION) at 08:08

## 2022-09-29 RX ADMIN — SODIUM CHLORIDE, SODIUM GLUCONATE, SODIUM ACETATE, POTASSIUM CHLORIDE AND MAGNESIUM CHLORIDE 75 ML/HR: 526; 502; 368; 37; 30 INJECTION, SOLUTION INTRAVENOUS at 05:58

## 2022-09-29 RX ADMIN — DILTIAZEM HYDROCHLORIDE 180 MG: 180 CAPSULE, COATED, EXTENDED RELEASE ORAL at 08:55

## 2022-09-29 RX ADMIN — ACETAMINOPHEN 650 MG: 325 TABLET, FILM COATED ORAL at 04:29

## 2022-09-29 RX ADMIN — ACETAMINOPHEN 650 MG: 325 TABLET, FILM COATED ORAL at 17:10

## 2022-09-29 RX ADMIN — INSULIN LISPRO 1 UNITS: 100 INJECTION, SOLUTION INTRAVENOUS; SUBCUTANEOUS at 11:14

## 2022-09-29 RX ADMIN — PANTOPRAZOLE SODIUM 40 MG: 40 INJECTION, POWDER, FOR SOLUTION INTRAVENOUS at 08:55

## 2022-09-29 RX ADMIN — METHOCARBAMOL TABLETS 500 MG: 500 TABLET, COATED ORAL at 11:14

## 2022-09-29 NOTE — PROGRESS NOTES
Progress Note - Kristen Benjamin 80 y o  female MRN: [id number]    Unit/Bed#: [facility] 502-01 Encounter: [id number]      Assessment:  82F w/ PMH of severe COPD (2-3L O2 at home) presented to ED w/ an incarcerated hiatal hernia now s/p robotic PEH repair w/ mesh, gastropexy, and EGD on 9/26 (POD3)  WBC 7 46 (10 65)  CXR (9/28) no acute cardiopulmonary dz    Plan:  -CLD w/ strict aspiration precaution  -Continue bronchodilators, pulm  hygiene  -SLP consult for aspiration assessment  -Pain well controlled w/ tylenol alone  -DVT ppx    Subjective:   Acute drop in resp  status w/ SOB and tripoding yesterday at 11am, requiring 15L mid flow, STAT labs and CXR (unremarkable findings)  Now weaned down to 3L NC (95% O2 sat) and doing well  No acute events overnight  No pain  No fevers/chills  No SOB  Passing flatus and having bms  Objective:     Vitals: Blood pressure 137/70, pulse 71, temperature 98 °F (36 7 °C), resp  rate 20, height 5' 1" (1 549 m), weight 66 5 kg (146 lb 9 7 oz), SpO2 94 %, not currently breastfeeding  ,Body mass index is 27 7 kg/m²  Intake/Output Summary (Last 24 hours) at 9/29/2022 0515  Last data filed at 9/29/2022 0438  Gross per 24 hour   Intake 1533 75 ml   Output 1175 ml   Net 358 75 ml       Physical Exam:   General: NAD  HENT: NCAT  Neck: supple, no JVD  CV: MMM w/o MGR  Lungs: CTAB, no resp distress (on 3L O2)  ABD: soft, mild tenderness to palpation, slight distension, incisions c/d/i    Extrem: No CCE  Neuro: AAOx3       Invasive Devices  Report    Peripheral Intravenous Line  Duration           Peripheral IV 09/25/22 Left Hand 3 days    Peripheral IV 09/29/22 Distal;Right;Upper;Ventral (anterior) Arm <1 day

## 2022-09-29 NOTE — RESTORATIVE TECHNICIAN NOTE
Restorative Technician Note      Patient Name: Radha Castaneda     Note Type: Mobility  Patient Position Upon Consult: Supine  Activity Performed: Transferred; Stood  Patient Position at End of Consult: Bedside chair;  All needs within reach; Bed/Chair alarm activated

## 2022-09-29 NOTE — CASE MANAGEMENT
Case Management Discharge Planning Note    Patient name Fern Gramajo  Location [address] [place] [address] [address] 502/PPHP [id number] MRN [id number]  : 1940 Date 2022       Current Admission Date: 2022  Current Admission Diagnosis: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   Patient Active Problem List    Diagnosis Date Noted    Abdominal pain 2022    Low blood pressure reading 2022    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 2022    SIRS (systemic inflammatory response syndrome) ([facility] ) 2022    Lactic acidosis 2022    Unintentional weight loss 2022    BMI 31 0-31 9,adult 2022    Retinal artery occlusion, branch, left 2022    Chronic pain 2022    [facility] ) 2022    COVID-19 2022    Backache 2022    Onychomycosis 2021    Pain in toe 2021    Instability of foot joint 2021    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    ASD (atrial septal defect) 2020    Exertional dyspnea 2020    Acquired pes planus of right foot 10/28/2019    Microscopic hematuria 2019    Urge incontinence 2019    Ankle pain 2019    Primary localized osteoarthrosis of ankle and foot 2019    History of iron deficiency anemia 2018    Cough in adult 2018    Fever in adult 2018    Chronic fatigue 2017    Chronic hypoxemic respiratory failure [facility] ) 2017    GERD (gastroesophageal reflux disease) 2017    Hyponatremia 2017    Cigarette nicotine dependence in remission 2017    Controlled substance agreement signed 2016    Well adult exam 2016    Depression 2016    Diabetes mellitus ([facility] ) 2016    Hypertension 2016    GI bleed 2015    DDD (degenerative disc disease), lumbosacral 2015    Arthropathy 04/15/2014    Anemia 2013    Tobacco use disorder 2013    COPD (chronic obstructive pulmonary disease) [facility] ) 12/19/2012    Benign essential hypertension 12/19/2012    Hyperlipidemia 12/19/2012    Disc disorder of lumbar region 12/19/2012    Type II diabetes mellitus ([facility] ) 12/19/2012      LOS (days): 3  Geometric Mean LOS (GMLOS) (days): 2 60  Days to GMLOS:-0 7     OBJECTIVE:  Risk of Unplanned Readmission Score: 16 72         Current admission status: Inpatient   [facility]   [facility] [address] [place] [address]  [address] [address]  Phone: [phone] Fax: [phone]    Primary Care Provider: Carlos Morley DO    Primary Insurance: [facility] [address]  Secondary Insurance:     DISCHARGE DETAILS:    Other Referral/Resources/Interventions Provided:  Interventions: Short Term Rehab  Referral Comments: [facility] snf referrals were placed in [place]  Treatment Team Recommendation: Short Term Rehab  Discharge Destination Plan[de-identified] Short Term Rehab    Additional Comments: Therapy recommedns SNF for rehab  Met with patient who is fatigued and asks CM to call her son, Nati Serrano  Called Nati Serrano to discuss dishcarge needs  He asked if family can take patient directly home and therapy notes were reviewed with him  Explained levels of rehab -acute, snf and HHC  He reports patient had past stay at [facility] and he does not want referral there  He consents to [facility] SNF referrals in [place]  Son also plans to discuss patient's dc needs with his sister-in-law, Lorenzo Prescott  [facility] SN[facility] referrals were made in [place]

## 2022-09-29 NOTE — RESPIRATORY THERAPY NOTE
Resp Care   09/29/22 0723   Inhalation Therapy Tx   $ Inhalation Therapy Performed Yes   $ Pulse Oximetry Spot Check Charge Completed   SpO2 94 %   Breath Sounds Pre-Treatment Bilateral Clear;Diminished   Breath Sounds Post-Treatment Bilateral Clear;Diminished   Post-Treatment Pulse 73   Delivery Source UDN;Oxygen   Position Semi Milan's   Treatment Tolerance Tolerated well   Resp Comments Pt is now on 2Lpm NC  Pt states PRN UDN only at home, denies need at this time  Will change UDN to PRN for SOB and wheezing

## 2022-09-29 NOTE — CASE MANAGEMENT
Case Management Discharge Planning Note    Patient name Aidan Maurer  Location [address] [place] [address] Rd 502/PPHP [id number] MRN [id number]  : 1940 Date 2022       Current Admission Date: 2022  Current Admission Diagnosis: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   Patient Active Problem List    Diagnosis Date Noted    Abdominal pain 2022    Low blood pressure reading 2022    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 2022    SIRS (systemic inflammatory response syndrome) ([facility] ) 2022    Lactic acidosis 2022    Unintentional weight loss 2022    BMI 31 0-31 9,adult 2022    Retinal artery occlusion, branch, left 2022    Chronic pain 2022    Aspiration pneumonia ([facility] ) 2022    COVID-19 2022    Backache 2022    Onychomycosis 2021    Pain in toe 2021    Instability of foot joint 2021    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    ASD (atrial septal defect) 2020    Exertional dyspnea 2020    Acquired pes planus of right foot 10/28/2019    Microscopic hematuria 2019    Urge incontinence 2019    Ankle pain 2019    Primary localized osteoarthrosis of ankle and foot 2019    History of iron deficiency anemia 2018    Cough in adult 2018    Fever in adult 2018    Chronic fatigue 2017    [facility] 2017    GERD (gastroesophageal reflux disease) 2017    Hyponatremia 2017    Cigarette nicotine dependence in remission 2017    Controlled substance agreement signed 2016    Well adult exam 2016    Depression 2016    Diabetes mellitus [facility] ) 2016    Hypertension 2016    GI bleed 2015    DDD (degenerative disc disease), lumbosacral 2015    Arthropathy 04/15/2014    Anemia 2013    Tobacco use disorder 2013    COPD (chronic obstructive pulmonary disease) [facility] ) 12/19/2012    Benign essential hypertension 12/19/2012    Hyperlipidemia 12/19/2012    Disc disorder of lumbar region 12/19/2012    Type II diabetes mellitus ([facility] ) 12/19/2012      LOS (days): 3  Geometric Mean LOS (GMLOS) (days): 2 60  Days to GMLOS:-0 7     OBJECTIVE:  Risk of Unplanned Readmission Score: 16 59         Current admission status: Inpatient   Preferred Pharmacy:   [place] [address]  Phone: [phone] Fax: [phone]    Primary Care Provider: Victorine Goodpasture, DO    Primary Insurance: Pending sale to [facility] [address]  Secondary Insurance:     DISCHARGE DETAILS:    Additional Comments: Call received from daughter-in-law, Jaret Mcnamara [phone] to discuss dc plans  Explained need for SNF rehab and blanket referrals made in [employer]  Discussed limited number of facilities in network with insurance and need for authorization for snf admission

## 2022-09-29 NOTE — RESTORATIVE TECHNICIAN NOTE
Restorative Technician Note      Patient Name: Aguillon Rosalia     Note Type: Mobility  Patient Position Upon Consult: Supine  Activity Performed: Repositioned (Chair Position in bed)  Patient Position at End of Consult: Other (comment); Bed/Chair alarm activated;  All needs within reach (Chair Position in bed)

## 2022-09-29 NOTE — PROGRESS NOTES
PULMONOLOGY PROGRESS NOTE     Name: Olive Ervin   Age & Sex: 80 y o  female   MRN: [id number]  Unit/Bed#: [address]   Encounter: [id number]    PATIENT INFORMATION     Name: Olive Ervin   Age & Sex: 80 y o  female   MRN: [id number]  Hospital Stay Days: 3    ASSESSMENT/PLAN     Assessment:  Patient is an 79 yo F w/PMH of severe COPD, chronic respiratory failure (2-3L oxygen at home), HTN, and diabetes s/p paraesophageal hernia repair (9/26) whose respiratory status acutely worsened requiring 15 L/min mid flow  · Pt is now clinically improving, able to wean down to [facility] which is the patient's baseline  · Most likely secondary to aspiration pneumonitis/pneumonia  Differential includes anxiety, pulmonary embolus (Wells score = low probability), atelectasis, diaphragmatic injury, neuromuscular causes, etc       Problem list:  1  Acute on chronic respiratory failure  2  Severe COPD  3  Paraesophageal hernia  4  HTN  5  Diabetes    Plan:  · Supplemental O2 to maintain sat >88%  · Wean as tolerated  · Encourage pulmonary hygiene - IS, OOB as tolerated  · Continue nebs, Symbicort, PRN albuterol  · POD3 robotic PEHR with gastropexy - Management per surgery  · CXR 9/29 similar to previous, clinically back to baseline  · No fevers or evidence of pneumonia, would not recommend abx at this time  · Stable from a respiratory standpoint, will sign off  Disposition: Plan for discharge pending case management rehab placement    SUBJECTIVE     Patient seen and examined at bedside  No acute events overnight  Pt endorses improvement in her breathing and her abdominal pain  Pt was able to continue eating her clear liquid diet, denied any episodes of choking or difficulty swallowing  Denies fevers/chills  She reports being scared and anxious in the hospital and does not want to be left alone       OBJECTIVE     Vitals:    09/29/22 0434 09/29/22 0716 09/29/22 0719 09/29/22 0723   BP:  142/72  142/72   BP Location: Pulse:  69  63   Resp:  18     Temp:  98 °F (36 7 °C) 98 °F (36 7 °C)    TempSrc:       SpO2:  93%  94%   Weight: 66 5 kg (146 lb 9 7 oz)      Height:          Temperature:   Temp (24hrs), Av 1 °F (36 7 °C), Min:97 7 °F (36 5 °C), Max:99 2 °F (37 3 °C)    Temperature: 98 °F (36 7 °C)  Intake & Output:  I/O        07 07 0701   07 07 07    P  O  240 480     I V  (mL/kg) 1247 (18 1) 1053 8 (15 8)     NG/GT       IV Piggyback  100     Total Intake(mL/kg) 1487 (21 6) 1633 8 (24 6)     Urine (mL/kg/hr) 1875 (1 1) 1300 (0 8)     Emesis/NG output       Blood       Total Output 1875 1300     Net -388 +333 8                Weights:        Body mass index is 27 7 kg/m²  Weight (last 2 days)     Date/Time Weight    22 0434 66 5 (146 61)    22 0450 69 (152 12)    22 0553 71 1 (156 75)        Physical Exam  Constitutional:       General: She is not in acute distress  HENT:      Head: Normocephalic and atraumatic  Nose: Nose normal       Comments: 2LNC     Mouth/Throat:      Mouth: Mucous membranes are moist       Pharynx: Oropharynx is clear  Eyes:      Extraocular Movements: Extraocular movements intact  Conjunctiva/sclera: Conjunctivae normal       Pupils: Pupils are equal, round, and reactive to light  Cardiovascular:      Rate and Rhythm: Normal rate and regular rhythm  Heart sounds: Normal heart sounds  No murmur heard  No friction rub  No gallop  Pulmonary:      Effort: Pulmonary effort is normal  No respiratory distress  Breath sounds: No wheezing, rhonchi or rales  Abdominal:      General: There is no distension  Palpations: Abdomen is soft  Tenderness: There is abdominal tenderness  Musculoskeletal:      Right lower leg: No edema  Left lower leg: No edema  Skin:     General: Skin is warm and dry  Neurological:      Mental Status: She is alert and oriented to person, place, and time     Psychiatric: Comments: Pt is anxious to be alone       LABORATORY DATA     Labs: I have personally reviewed pertinent reports  Results from last 7 days   Lab Units 09/29/22  0314 09/28/22  1013 09/27/22  0537   WBC Thousand/uL 7 46 10 65* 10 96*   HEMOGLOBIN g/dL 10 2* 11 9 11 1*   HEMATOCRIT % 32 6* 37 6 35 4   PLATELETS Thousands/uL 156 171 186   NEUTROS PCT % 72 79* 78*   MONOS PCT % 13* 10 12      Results from last 7 days   Lab Units 09/29/22  0314 09/28/22  1013 09/27/22  0537 09/26/22  0513 09/25/22  2308   POTASSIUM mmol/L 3 6 3 2* 3 6   < > 3 6   CHLORIDE mmol/L 100 97 100   < > 99   CO2 mmol/L 32 31 30   < > 25   BUN mg/dL 9 7 6   < > 15   CREATININE mg/dL 0 61 0 61 0 58*   < > 0 96   CALCIUM mg/dL 9 0 9 1 8 7   < > 9 9   ALK PHOS U/L  --   --   --   --  72   ALT U/L  --   --   --   --  8   AST U/L  --   --   --   --  11*    < > = values in this interval not displayed  Results from last 7 days   Lab Units 09/27/22  0537 09/26/22  1655   MAGNESIUM mg/dL 2 2 1 6          Results from last 7 days   Lab Units 09/25/22  2308   INR  0 99   PTT seconds 27     Results from last 7 days   Lab Units 09/28/22  1014   LACTIC ACID mmol/L 1 3             ABG:   Results from last 7 days   Lab Units 09/28/22  1009   PH ART  7 472*   PCO2 ART mm Hg 44 5*   PO2 ART mm Hg 109 8   HCO3 ART mmol/L 31 8*   BASE EXC ART mmol/L 7 3   ABG SOURCE  Artery       IMAGING & DIAGNOSTIC TESTING     Radiology Results: I have personally reviewed pertinent reports  XR chest portable    Result Date: 9/28/2022  Impression: No acute cardiopulmonary disease  Workstation performed: [id number]     XR chest 1 view portable    Result Date: 9/26/2022  Impression: Tip of nasogastric tube located within the stomach, which lies within a large hiatal hernia  Workstation performed: [id number]     FL upper GI UGI    Result Date: 9/27/2022  Impression: Postsurgical changes of paraesophageal hernia repair and gastropexy without evidence of leak   Contrast only reached the 3rd portion of the duodenum by the end of the study, likely related to the small amount of contrast able to be tolerated  Workstation performed: [id number]     Other Diagnostic Testing: I have personally reviewed pertinent reports      ACTIVE MEDICATIONS     Current Facility-Administered Medications   Medication Dose Route Frequency    acetaminophen (TYLENOL) tablet 650 mg  650 mg Oral Q6H [address]    albuterol (PROVENTIL HFA,VENTOLIN HFA) inhaler 2 puff  2 puff Inhalation Q6H PRN    albuterol inhalation solution 2 5 mg  2 5 mg Nebulization Q4H PRN    atorvastatin (LIPITOR) tablet 40 mg  40 mg Oral QPM    brimonidine tartrate 0 2 % ophthalmic solution 1 drop  1 drop Left Eye BID    budesonide-formoterol (SYMBICORT) 160-4 5 mcg/act inhaler 2 puff  2 puff Inhalation BID    diltiazem (CARDIZEM CD) 24 hr capsule 180 mg  180 mg Oral Daily    enoxaparin (LOVENOX) subcutaneous injection 40 mg  40 mg Subcutaneous Daily    HYDROmorphone (DILAUDID) injection 0 5 mg  0 5 mg Intravenous Q3H PRN    insulin lispro (HumaLOG) 100 units/mL subcutaneous injection 1-5 Units  1-5 Units Subcutaneous Q6H [address]    labetalol (NORMODYNE) injection 10 mg  10 mg Intravenous Q4H PRN    methocarbamol (ROBAXIN) tablet 500 mg  500 mg Oral Q6H [address]    metoclopramide (REGLAN) injection 10 mg  10 mg Intravenous Q6H [address]    multi-electrolyte (PLASMALYTE-A/ISOLYTE-S PH 7 4) IV solution  75 mL/hr Intravenous Continuous    ondansetron (ZOFRAN) injection 4 mg  4 mg Intravenous Q4H PRN    oxyCODONE (ROXICODONE) IR tablet 2 5 mg  2 5 mg Oral Q4H PRN    oxyCODONE (ROXICODONE) IR tablet 5 mg  5 mg Oral Q4H PRN    pantoprazole (PROTONIX) injection 40 mg  40 mg Intravenous Q12H [address]       VTE Pharmacologic Prophylaxis: Enoxaparin (Lovenox)  VTE Mechanical Prophylaxis: sequential compression device

## 2022-09-29 NOTE — SPEECH THERAPY NOTE
Speech Language/Pathology  Speech-Language Pathology Bedside Swallow Evaluation        Patient Name: Dannie Hernandez    UDBQS'J Date: 9/29/2022     Problem List  Principal Problem:    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         Past Medical History  Past Medical History:   Diagnosis Date    Cardiac disease     COPD [facility]ronic obstructive [facility]) [facility] [address] [facility]     Diabetes mellitus ([facility] )     GERD (gastroesophageal reflux disease)     Hiatal hernia     Hypertension     PUD (peptic ulcer disease)     Residual ASD (atrial septal defect) following repair        Past Surgical History  Past Surgical History:   Procedure Laterality Date    ABDOMINAL ADHESION SURGERY N/A 9/26/2022    Procedure: LYSIS ADHESIONS;  Surgeon: Nam Amin MD;  [facility]: [facility];  Service: Thoracic    ASD REPAIR      BACK SURGERY      x3    ESOPHAGOGASTRODUODENOSCOPY N/A 9/26/2022    Procedure: ESOPHAGOGASTRODUODENOSCOPY (EGD); Surgeon: Nam Amin MD;  [facility];  Service: Thoracic    JOINT REPLACEMENT      bilateral knee surgery    KNEE SURGERY      PARAESOPHAGEAL HERNIA REPAIR N/A 9/26/2022    Procedure: REPAIR HERNIA PARAESOPHAGEAL LAPAROSCOPIC W ROBOTICS, gastropexy, mesh placement;  Surgeon: Nam Amin MD;  [facility];  Service: Thoracic    SHOULDER SURGERY         Summary    Assessment was somewhat limited due to pt only agreeable to small amounts of PO at this time  Pt presents with oropharyngeal swallow that appears WNL  There were no overt s/s of aspiration  O2 was monitored during eval, and remained in low to mid 90s on 2L  Risk for aspiration appears low, however, silent aspiration cannot be r/o at bedside  If respiratory status declines again, and/or silent aspiration is suspected by physician, recommend consideration for VBS  ST to follow up for diagnostic dysphagia tx           Recommendations:   Diet: per physician discretion, considering recent repair for paraesophageal hernia  As long as pt is alert, pt can likely tolerate a regular diet/thin liquids when physician feels it is appropriate  Meds: whole with liquid, whole with puree and as tolerated   Frequent Oral care  Close supervision at meals until pt is more consistently alert  Aspiration precautions and compensatory swallowing strategies: upright posture and only feed when fully alert  Other Recommendations/ considerations: ST to see for dysphagia tx, to ensure diet toleration as diet advances per physician orders  Suspect tx will be brief  Current Medical Status  Copied from physician notes (admission and post surgery):  Rose Valdez is a 80year old female with infrarenal aortic aneurysm, significant hiatal hernia, peptic ulcer disease, presenting via EMS with sudden onset epigastric abdominal pain with associated hypotension  Patient's pain began shortly having a "eclair"  Initially on arrival by EMS, patient was well-appearing, however she suddenly became diaphoretic, pale, and hypotensive with blood pressures in the 70s over 40s  On arrival to the emergency department, she continued with the described ill appearance  Patient is describing significant epigastric abdominal pain  Patient is status post surgical repair for paraesophageal hernia  She developed acute hypoxic respiratory failure initially requiring 15 L mid flow which is been weaned down to 8 L mid flow  On examination lungs are clear without evidence of acute exacerbation  Broad differential diagnosis in general but suspect underlying deconditioning with possible aspiration (without evidence of pneumonia), possible mechanical dysfunction, atelectasis  Chest x-ray without significant abnormalities  At this time would recommend continuation supplemental oxygen wean to maintain saturations 80%  Continue bronchodilators  Continue pulmonary hygiene  We did supply her IS at the bedside  Will repeat chest x-ray tomorrow   If unimproved would consider additional imaging (CT chest etc , wells score equals low probability for PE)  80F w/ PMH of severe COPD (2-3L O2 at home) presented to ED w/ an incarcerated hiatal hernia now s/p robotic PEH repair w/ mesh, gastropexy, and EGD on 9/26 (POD3)  WBC 7 46 (10 65)  CXR (9/28) no acute cardiopulmonary dz     Plan:  -CLD w/ strict aspiration precaution  -Continue bronchodilators, pulm  hygiene  -SLP consult for aspiration assessment  -Pain well controlled w/ tylenol alone  -DVT ppx     Subjective:   Acute drop in resp  status w/ SOB and tripoding yesterday at 11am, requiring 15L mid flow, STAT labs and CXR (unremarkable findings)  Now weaned down to 3L NC (95% O2 sat) and doing well  No acute events overnight  No pain  No fevers/chills  No SOB  Passing flatus and having bms  Order received and chart reviewed  Discussed with RN, who reports respiratory status is much improved this morning compared to yesterday  She took sips of thin liquid with RN this morning without any overt s/s of aspiration  There are reports of intermittent difficulty swallowing pills  Sent TT to physician prior to assessment to see if solid foods could be assessed, given recent surgery; Resident gave permission to trial all consistencies of food during assessment  Past medical history:   Please see H&P for details      Special Studies:  CXR 9/28  No acute cardiopulmonary disease     Upper GI 9/27  Due to patient condition, patient was only able to ingest approximately 50 mL of contrast      The esophagus is normal in caliber  Esophageal motility is normal and emptying of contrast from the esophagus is prompt with mild diffuse tertiary contractions  There is no mucosal mass, ulceration or fold thickening identified  Postsurgical changes of paraesophageal hernia repair and gastropexy  The stomach is unremarkable in size  The gastric mucosa is normal   No penetrating ulcers or masses       Contrast empties promptly into the duodenum  The duodenum is normal in caliber  Contrast only reached the 3rd portion of the duodenum by the end of the study, likely related to small amount of contrast ingested  Gastroesophageal reflux was not observed  There is no hiatal hernia  IMPRESSION:     Postsurgical changes of paraesophageal hernia repair and gastropexy without evidence of leak  Contrast only reached the 3rd portion of the duodenum by the end of the study, likely related to the small amount of contrast able to be tolerated  Social/Education/Vocational Hx:  Pt lives with family    Swallow Information   Current Risks for Dysphagia & Aspiration: decreased alertness and COPD  Current Symptoms/Concerns: change in respiratory status  Current Diet: Clear liquids   Baseline Diet: regular diet and thin liquids    Baseline Assessment   Behavior/Cognition: waxing and waning arousal level and eyes closed but talking, opens eyes when spoken to, follows simple commands  Speech/Language Status: able to follow commands and limited verbal output  Patient Positioning: upright in bed     Swallow Mechanism Exam   Facial: symmetrical  Labial: WFL  Lingual: WFL  Velum: symmetrical  Mandible: adequate ROM  Dentition: adequate and some missing  Vocal quality:clear/adequate   Volitional Cough: strong/productive   Respiratory: NC, 2L, sats in low to mid 90s      Consistencies Assessed and Performance   Consistencies Administered: thin liquids, puree, soft solids and hard solids    Oral Stage: adequate bolus retrieval and draw from straw, prompt mastication, bolus formation and transfer, no residue or pocketing, good lip seal      Pharyngeal Stage: hyolaryngeal elevation observed and palpated, swallows appear prompt, there were no overt clinical s/s of aspiration         Esophageal Concerns: recent surgery for hernia repair, no difficulty observed today      Results Reviewed with: patient, RN and MD Dysphagia Goals: 1  Pt will tolerate diet as ordered by physician, with prompt oropharyngeal swallows and no overt s/s of aspiration  2  Pt will tolerate advanced diet consistencies (at physician discretion) with prompt oropharyngeal swallows and no overt s/s of aspiration  3  Pt will tolerate LRD without s/s of aspiration     Discharge recommendation: Suspect pt will not require ST at discharge    Speech Therapy Prognosis   Prognosis: good    Prognosis Considerations: medical status, prior medical history, cognitive status and therapeutic potential

## 2022-09-30 LAB
GLUCOSE SERPL-MCNC: 104 MG/DL (ref 65–140)
GLUCOSE SERPL-MCNC: 106 MG/DL (ref 65–140)
GLUCOSE SERPL-MCNC: 127 MG/DL (ref 65–140)

## 2022-09-30 PROCEDURE — 97530 THERAPEUTIC ACTIVITIES: CPT

## 2022-09-30 PROCEDURE — 99024 POSTOP FOLLOW-UP VISIT: CPT | Performed by: THORACIC SURGERY (CARDIOTHORACIC VASCULAR SURGERY)

## 2022-09-30 PROCEDURE — C9113 INJ PANTOPRAZOLE SODIUM, VIA: HCPCS

## 2022-09-30 PROCEDURE — 97535 SELF CARE MNGMENT TRAINING: CPT

## 2022-09-30 PROCEDURE — 82948 REAGENT STRIP/BLOOD GLUCOSE: CPT

## 2022-09-30 RX ORDER — SIMETHICONE 80 MG
160 TABLET,CHEWABLE ORAL EVERY 6 HOURS PRN
Status: DISCONTINUED | OUTPATIENT
Start: 2022-09-30 | End: 2022-10-04 | Stop reason: HOSPADM

## 2022-09-30 RX ORDER — ROPINIROLE 0.25 MG/1
0.5 TABLET, FILM COATED ORAL
Status: DISCONTINUED | OUTPATIENT
Start: 2022-09-30 | End: 2022-10-04 | Stop reason: HOSPADM

## 2022-09-30 RX ADMIN — ACETAMINOPHEN 650 MG: 325 TABLET, FILM COATED ORAL at 17:13

## 2022-09-30 RX ADMIN — METHOCARBAMOL TABLETS 500 MG: 500 TABLET, COATED ORAL at 17:13

## 2022-09-30 RX ADMIN — HYDROMORPHONE HYDROCHLORIDE 0.5 MG: 1 INJECTION, SOLUTION INTRAMUSCULAR; INTRAVENOUS; SUBCUTANEOUS at 00:38

## 2022-09-30 RX ADMIN — BUDESONIDE AND FORMOTEROL FUMARATE DIHYDRATE 2 PUFF: 160; 4.5 AEROSOL RESPIRATORY (INHALATION) at 08:23

## 2022-09-30 RX ADMIN — METHOCARBAMOL TABLETS 500 MG: 500 TABLET, COATED ORAL at 11:34

## 2022-09-30 RX ADMIN — BUDESONIDE AND FORMOTEROL FUMARATE DIHYDRATE 2 PUFF: 160; 4.5 AEROSOL RESPIRATORY (INHALATION) at 17:15

## 2022-09-30 RX ADMIN — SIMETHICONE 160 MG: 80 TABLET, CHEWABLE ORAL at 02:13

## 2022-09-30 RX ADMIN — HYDROMORPHONE HYDROCHLORIDE 0.5 MG: 1 INJECTION, SOLUTION INTRAMUSCULAR; INTRAVENOUS; SUBCUTANEOUS at 20:16

## 2022-09-30 RX ADMIN — DILTIAZEM HYDROCHLORIDE 180 MG: 180 CAPSULE, COATED, EXTENDED RELEASE ORAL at 08:22

## 2022-09-30 RX ADMIN — PANTOPRAZOLE SODIUM 40 MG: 40 INJECTION, POWDER, FOR SOLUTION INTRAVENOUS at 08:22

## 2022-09-30 RX ADMIN — HYDROMORPHONE HYDROCHLORIDE 0.5 MG: 1 INJECTION, SOLUTION INTRAMUSCULAR; INTRAVENOUS; SUBCUTANEOUS at 15:17

## 2022-09-30 RX ADMIN — PANTOPRAZOLE SODIUM 40 MG: 40 INJECTION, POWDER, FOR SOLUTION INTRAVENOUS at 20:16

## 2022-09-30 RX ADMIN — ENOXAPARIN SODIUM 40 MG: 40 INJECTION SUBCUTANEOUS at 08:22

## 2022-09-30 RX ADMIN — METHOCARBAMOL TABLETS 500 MG: 500 TABLET, COATED ORAL at 05:46

## 2022-09-30 RX ADMIN — OXYCODONE HYDROCHLORIDE 2.5 MG: 5 TABLET ORAL at 13:08

## 2022-09-30 RX ADMIN — BRIMONIDINE TARTRATE 1 DROP: 2 SOLUTION/ DROPS OPHTHALMIC at 20:18

## 2022-09-30 RX ADMIN — ACETAMINOPHEN 650 MG: 325 TABLET, FILM COATED ORAL at 05:46

## 2022-09-30 RX ADMIN — ATORVASTATIN CALCIUM 40 MG: 40 TABLET, FILM COATED ORAL at 17:13

## 2022-09-30 RX ADMIN — OXYCODONE HYDROCHLORIDE 5 MG: 5 TABLET ORAL at 08:22

## 2022-09-30 RX ADMIN — ACETAMINOPHEN 650 MG: 325 TABLET, FILM COATED ORAL at 11:34

## 2022-09-30 RX ADMIN — OXYCODONE HYDROCHLORIDE 5 MG: 5 TABLET ORAL at 17:13

## 2022-09-30 RX ADMIN — OXYCODONE HYDROCHLORIDE 5 MG: 5 TABLET ORAL at 02:13

## 2022-09-30 RX ADMIN — HYDROMORPHONE HYDROCHLORIDE 0.5 MG: 1 INJECTION, SOLUTION INTRAMUSCULAR; INTRAVENOUS; SUBCUTANEOUS at 05:51

## 2022-09-30 RX ADMIN — BRIMONIDINE TARTRATE 1 DROP: 2 SOLUTION/ DROPS OPHTHALMIC at 08:23

## 2022-09-30 RX ADMIN — ROPINIROLE 0.5 MG: 0.25 TABLET, FILM COATED ORAL at 21:01

## 2022-09-30 RX ADMIN — OXYCODONE HYDROCHLORIDE 5 MG: 5 TABLET ORAL at 22:21

## 2022-09-30 NOTE — PHYSICAL THERAPY NOTE
PHYSICAL THERAPY NOTE          Patient Name: Susa Koyanagi  IHODE'H Date: 9/30/2022 09/30/22 1126   PT Last Visit   PT Visit Date 09/30/22   Note Type   Note Type Treatment   Pain Assessment   Pain Assessment Tool 0-10   Pain Score No Pain   Restrictions/Precautions   Weight Bearing Precautions Per Order No   Other Precautions Agitated; Impulsive;Cognitive; Chair Alarm; Bed Alarm; Fall Risk   General   Chart Reviewed Yes   Response to Previous Treatment Patient with no complaints from previous session  Family/Caregiver Present No   Cognition   Overall Cognitive Status Impaired   Arousal/Participation Responsive   Attention Attends with cues to redirect   Memory Decreased recall of precautions;Decreased recall of recent events   Following Commands Follows one step commands with increased time or repetition   Comments Pt irritable  Decreased safety awareness and insight  Subjective   Subjective "I am going to get up, and when I say put me back to bed, they better put me back"   Bed Mobility   Supine to Sit 4  Minimal assistance   Additional items Assist x 1; Increased time required;Verbal cues   Additional Comments Supine in bed upon PT arrival   Pt left on toilet with OT present and all needs in reach  Transfers   Sit to Stand 3  Moderate assistance   Additional items Assist x 1; Increased time required;Verbal cues   Stand to Sit 3  Moderate assistance   Additional items Assist x 1; Increased time required;Verbal cues   Toilet transfer 3  Moderate assistance   Additional items Assist x 1; Increased time required;Verbal cues;Standard toilet   Additional Comments Tranfers with RW   VC for hand placement and safety  STS performed multiple times throughout session  Ambulation/Elevation   Gait pattern Excessively slow; Short stride; Foward flexed;Decreased foot clearance; Improper Weight shift   Gait Assistance 3  Moderate assist Additional items Assist x 1;Verbal cues; Tactile cues   Assistive Device Rolling walker   Distance 3 ft from bed to chair, 10 ft x 2 into/out of bathroom   Balance   Static Sitting Fair   Dynamic Sitting Fair   Static Standing Fair -   Dynamic Standing Poor +   Ambulatory Poor +   Endurance Deficit   Endurance Deficit Yes   Endurance Deficit Description fatigue, weakness, cog   Activity Tolerance   Activity Tolerance Patient limited by fatigue;Treatment limited secondary to agitation   Nurse Made Aware RN cleared pt to be seen by PT   Assessment   Prognosis Fair   Problem List Decreased strength;Decreased endurance; Impaired balance;Decreased mobility; Decreased cognition; Impaired judgement;Decreased safety awareness   Assessment Pt seen for PT treatment session with focus on bed mobility, functional transfers, functional mobility  Pt remains with cognitive impairment and irritability, making participation in therapy session difficult  Pt finally agreeable to OOB to chair only  Pt continues to require assist for all mobility, however, able to perform transfers and ambulation with decreased assist this session compared to last as a result of improving alertness and overall strength  Pt requesting use of bathroom after sitting in bedside chair  Assisted to bathroom with RW and cues for safety  Poor carryover of education 2/2 cog  Pt making slow but steady progress toward goals this session  Pt left on toilet with OT present and with all needs in reach  Pt will benefit from skilled therapy in order to address current impairments and functional limitations  PT to follow pt and recommending rehab once medically cleared  The patient's AM-PAC Basic Mobility Inpatient Short Form Raw Score is 18  A Raw score of greater than 16 suggests the patient may benefit from discharge to home  Please also refer to the recommendation of the Physical Therapist for safe discharge planning    Based on pt current functional status, recommend rehab  Barriers to Discharge Inaccessible home environment;Decreased caregiver support   Goals   Patient Goals to go to the bathroom   STG Expiration Date 10/12/22   Plan   Treatment/Interventions Functional transfer training;LE strengthening/ROM; Therapeutic exercise; Endurance training;Cognitive reorientation;Patient/family training;Equipment eval/education; Bed mobility;Gait training;Spoke to nursing   Progress Progressing toward goals   PT Frequency 2-3x/wk   Recommendation   PT Discharge Recommendation Post acute rehabilitation services   AM-PAC Basic Mobility Inpatient   Turning in Bed Without Bedrails 3   Lying on Back to Sitting on Edge of Flat Bed 3   Moving Bed to Chair 3   Standing Up From Chair 3   Walk in Room 3   Climb 3-5 Stairs 3   Basic Mobility Inpatient Raw Score 18   Basic Mobility Standardized Score 41 05   Highest Level Of Mobility   JH-HLM Goal 6: Walk 10 steps or more   JH-HLM Achieved 6: Walk 10 steps or more     Cristel Diaz, PT, DPT

## 2022-09-30 NOTE — OCCUPATIONAL THERAPY NOTE
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          09/30/22 1135   OT Last Visit   OT Visit Date 09/30/22   Note Type   Note Type Treatment   Restrictions/Precautions   Weight Bearing Precautions Per Order No   Other Precautions Cognitive; Chair Alarm; Bed Alarm; Fall Risk;O2   General   Response to Previous Treatment Patient with no complaints from previous session   Pain Assessment   Pain Assessment Tool 0-10   Pain Score No Pain   ADL   Where Assessed Other (Comment)  (standard toilet)   Equipment Provided Other (Comment)   Toileting Assistance  4  Minimal Assistance   Toileting Deficit Setup;Grab bar use;Perineal hygiene   Toileting Comments min A for perineal hygiene 2' pt having IV in hand  Functional Standing Tolerance   Time 1 min   Activity washing hands   Comments stands at sink w min A while washing hands  Bed Mobility   Additional Comments found sitting EOB, left in chair w all needs in reach and alarm on   Transfers   Sit to Stand 3  Moderate assistance   Additional items Assist x 1; Increased time required;Verbal cues   Stand to Sit 3  Moderate assistance   Additional items Assist x 1; Increased time required;Verbal cues   Additional Comments c rw, vc for hand placement and inc overall safety awareness  Functional Mobility   Functional Mobility 3  Moderate assistance   Additional Comments ax1, EOB<>bath   Additional items Rolling walker   Toilet Transfers   Toilet Transfer From Rolling walker   Toilet Transfer Type To and from   Toilet Transfer to Standard toilet   Toilet Transfer Technique Ambulating   Toilet Transfers Moderate assistance   Toilet Transfers Comments mod Ax1, use of grab bars and vc for inc overall safety awareness  Cognition   Overall Cognitive Status Impaired   Arousal/Participation Alert; Responsive   Attention Attends with cues to redirect   Orientation Level Oriented to person;Oriented to place; Disoriented to situation;Oriented to time   Memory Decreased recall of precautions;Decreased recall of recent events   Following Commands Follows one step commands inconsistently   Comments pt cooperative this date, less VC required to redirect/inc overall safety awareness as compared to previous session  Activity Tolerance   Activity Tolerance Patient tolerated treatment well   Medical Staff Made Aware ok per RN   Assessment   Assessment Pt seen on this date for OT session focusing on ADL retraining, cognitive reorientation, body mechanics, transfer retraining, increasing activity tolerance/endurance and EOB sitting to increase ability to participate in ADL/functional tasks  Pt was found sitting edge of bed and was left in chair w/ all needs within reach, chair alarm on  Pt completed transfers/FM w mod Ax1, RW for support and vc for hand placement on RW  Pt completed toileting tasks w min A for perineal hygiene, however does require mod Ax1 ww use of grab bars to safely complete toilet transfers, to standard toilet  Pt w/ improvements in activity tolerance, transfer ability, however is still limited 2* decreased ADL/High-level ADL status, decreased activity tolerance/endurance, decreased cognition, decreased self-care trans, decreased safety awareness and insight to condition  The patient's raw score on the AM-PAC Daily Activity inpatient short form is 15, standardized score is 34 69, less than 39 4  Patients at this level are likely to benefit from discharge to post-acute rehabilitation services  Please refer to the recommendation of the Occupational Therapist for safe discharge planning  Recommending pt D/C to STR when medically stable  Pt will continue to benefit from acute OT services to meet goals  Plan   Treatment Interventions ADL retraining;Functional transfer training; Endurance training;Energy conservation; Activityengagement   Goal Expiration Date 10/12/22   OT Treatment Day 1   OT Frequency 3-5x/wk   Recommendation   OT Discharge Recommendation Post acute rehabilitation services   AM-PAC Daily Activity Inpatient   Lower Body Dressing 2   Bathing 2   Toileting 2   Upper Body Dressing 2   Grooming 3   Eating 4   Daily Activity Raw Score 15   Daily Activity Standardized Score (Calc for Raw Score >=11) 34 69   AM-PAC Applied Cognition Inpatient   Following a Speech/Presentation 2   Understanding Ordinary Conversation 2   Taking Medications 1   Remembering Where Things Are Placed or Put Away 1   Remembering List of 4-5 Errands 1   Taking Care of Complicated Tasks 1   Applied Cognition Raw Score 8   Applied Cognition Standardized Score 19 32   Modified Oleg Scale   Modified Boone Scale 4       Georges Raphael, MOT, OTR/L

## 2022-09-30 NOTE — PROGRESS NOTES
Progress Note - Asael Oakley 80 y o  female MRN: [id number]    Unit/Bed#: [facility] 502-01 Encounter: [id number]      Assessment:  82F w/ PMH of severe COPD (2-3L O2 at home) presented to ED w/ an incarcerated hiatal hernia now s/p robotic PEH repair w/ mesh, gastropexy, and EGD on 9/26 (POD4)  WBC 7 46 (9/29)  CXR (9/28) no acute cardiopulmonary dz    Plan:  -FLD w/ aspiration precaution  -Continue pulm  hygiene  -Pain well controlled  -DVT ppx    Subjective:   No acute events overnight  No pain  No fevers/chills  No SOB  Passing flatus and having bms  Objective:     Vitals: Blood pressure 153/66, pulse 66, temperature 98 °F (36 7 °C), resp  rate 18, height 5' 1" (1 549 m), weight 66 5 kg (146 lb 9 7 oz), SpO2 96 %, not currently breastfeeding  ,Body mass index is 27 7 kg/m²  Intake/Output Summary (Last 24 hours) at 9/30/2022 0515  Last data filed at 9/29/2022 2318  Gross per 24 hour   Intake 1022 5 ml   Output 1125 ml   Net -102 5 ml       Physical Exam:   General: NAD  HENT: NCAT  Neck: supple  CV: MMM w/o MGR  Lungs: CTAB (on 2L NC)  ABD: soft, NT/ND, incisions c/d/i    Extrem: No CCE  Neuro: AAOx3       Invasive Devices  Report    Peripheral Intravenous Line  Duration           Peripheral IV 09/25/22 Left Hand 4 days    Peripheral IV 09/29/22 Distal;Right;Upper;Ventral (anterior) Arm 1 day

## 2022-09-30 NOTE — PLAN OF CARE
Problem: OCCUPATIONAL THERAPY ADULT  Goal: Performs self-care activities at highest level of function for planned discharge setting  See evaluation for individualized goals  Description: Treatment Interventions: ADL retraining, Functional transfer training, UE strengthening/ROM, Endurance training, Cognitive reorientation, Continued evaluation, Energy conservation, Activityengagement          See flowsheet documentation for full assessment, interventions and recommendations  Outcome: Progressing  Note: Limitation: Decreased ADL status, Decreased UE ROM, Decreased UE strength, Decreased Safe judgement during ADL, Decreased cognition, Decreased endurance, Decreased self-care trans, Decreased high-level ADLs  Prognosis: Fair  Assessment: Pt seen on this date for OT session focusing on ADL retraining, cognitive reorientation, body mechanics, transfer retraining, increasing activity tolerance/endurance and EOB sitting to increase ability to participate in ADL/functional tasks  Pt was found sitting edge of bed and was left in chair w/ all needs within reach, chair alarm on  Pt completed transfers/FM w mod Ax1, RW for support and vc for hand placement on RW  Pt completed toileting tasks w min A for perineal hygiene, however does require mod Ax1 ww use of grab bars to safely complete toilet transfers, to standard toilet  Pt w/ improvements in activity tolerance, transfer ability, however is still limited 2* decreased ADL/High-level ADL status, decreased activity tolerance/endurance, decreased cognition, decreased self-care trans, decreased safety awareness and insight to condition  The patient's raw score on the AM-PAC Daily Activity inpatient short form is 15, standardized score is 34 69, less than 39 4  Patients at this level are likely to benefit from discharge to post-acute rehabilitation services  Please refer to the recommendation of the Occupational Therapist for safe discharge planning    Recommending pt D/C to STR when medically stable  Pt will continue to benefit from acute OT services to meet goals       OT Discharge Recommendation: Post acute rehabilitation services

## 2022-09-30 NOTE — RESPIRATORY THERAPY NOTE
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      Home Pulmonary Mealbuterol mdi       Past Medical History:   Diagnosis Date    Cardiac disease     COPD (chronic obstructive pulmonary disease) ([facility] [address] )     Diabetes mellitus (HCC)     GERD (gastroesophageal reflux disease)     Hiatal hernia     Hypertension     PUD (peptic ulcer disease)     Residual ASD (atrial septal defect) following repair      Social History     Socioeconomic History    Marital status:      Spouse name: Not on file    Number of children: Not on file    Years of education: Not on file    Highest education level: Not on file   Occupational History    Not on file   Tobacco Use    Smoking status: Former Smoker     Packs/day: 1 00     Years: 60 00     Pack years: 60 00     Start date:      Quit date: 2017     Years since quittin 4    Smokeless tobacco: Never Used    Tobacco comment: stopped smoking 2 days ago   Substance and Sexual Activity    Alcohol use: No    Drug use: No    Sexual activity: Not on file   Other Topics Concern    Not on file   Social History Narrative    Not on file     Social Determinants of Health     Financial Resource Strain: Not on file   Food Insecurity: No Food Insecurity    Worried About [address] in the Last Year: Never true    [facility] in the Last Year: Never true   Transportation Needs: No Transportation Needs    Lack of Transportation (Medical): No    Lack of Transportation (Non-Medical):  No   Physical Activity: Not on file   Stress: Not on file   Social Connections: Not on file   Intimate Partner Violence: Not on file   Housing Stability: Low Risk     Unable to Pay for Housing in the Last Year: No    Number of Places Lived in the Last Year: 1    Unstable Housing in the Last Year: No       Subjective         Objective    Physical Exam:   Assessment Type: (P) Assess only  General Appearance: (P) Awake, Alert  Respiratory Pattern: (P) Normal  Chest Assessment: (P) Chest expansion symmetrical  Bilateral Breath Sounds: (P) Clear, Diminished    Vitals:  Blood pressure 153/66, pulse 66, temperature 98 °F (36 7 °C), resp  rate 18, height 5' 1" (1 549 m), weight 66 5 kg (146 lb 9 7 oz), SpO2 96 %, not currently breastfeeding  Results from last 7 days   Lab Units 09/28/22  1009   PH ART  7 472*   PCO2 ART mm Hg 44 5*   PO2 ART mm Hg 109 8   HCO3 ART mmol/L 31 8*   BASE EXC ART mmol/L 7 3   O2 CONTENT ART mL/dL 17 1   O2 HGB, ARTERIAL % 97 8*   ABG SOURCE  Artery   YOLIE TEST  No       Imaging and other studies: I have personally reviewed pertinent reports              Plan    Respiratory Plan: Mild Distress pathway  Airway Clearance Plan: Discontinue Protocol     Resp Comments: (P) pt uses mdi's at home, cxr shows atelectasis, d/c'd prn udn tx's and resp protocol, pt already ordered on an albuterol mdi for sob/wheezing

## 2022-09-30 NOTE — PLAN OF CARE
Problem: PHYSICAL THERAPY ADULT  Goal: Performs mobility at highest level of function for planned discharge setting  See evaluation for individualized goals  Description: Treatment/Interventions: Functional transfer training, LE strengthening/ROM, Therapeutic exercise, Endurance training, Cognitive reorientation, Patient/family training, Equipment eval/education, Bed mobility, Gait training, Spoke to nursing          See flowsheet documentation for full assessment, interventions and recommendations  Outcome: Progressing  Note: Prognosis: Fair  Problem List: Decreased strength, Decreased endurance, Impaired balance, Decreased mobility, Decreased cognition, Impaired judgement, Decreased safety awareness  Assessment: Pt seen for PT treatment session with focus on bed mobility, functional transfers, functional mobility  Pt remains with cognitive impairment and irritability, making participation in therapy session difficult  Pt finally agreeable to OOB to chair only  Pt continues to require assist for all mobility, however, able to perform transfers and ambulation with decreased assist this session compared to last as a result of improving alertness and overall strength  Pt requesting use of bathroom after sitting in bedside chair  Assisted to bathroom with RW and cues for safety  Poor carryover of education 2/2 cog  Pt making slow but steady progress toward goals this session  Pt left on toilet with OT present and with all needs in reach  Pt will benefit from skilled therapy in order to address current impairments and functional limitations  PT to follow pt and recommending rehab once medically cleared  The patient's AM-PAC Basic Mobility Inpatient Short Form Raw Score is 18  A Raw score of greater than 16 suggests the patient may benefit from discharge to home  Please also refer to the recommendation of the Physical Therapist for safe discharge planning    Based on pt current functional status, recommend rehab  Barriers to Discharge: Inaccessible home environment, Decreased caregiver support     PT Discharge Recommendation: Post acute rehabilitation services    See flowsheet documentation for full assessment

## 2022-09-30 NOTE — CASE MANAGEMENT
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   Patient Active Problem List    Diagnosis Date Noted    Abdominal pain 2022    Low blood pressure reading 2022    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 2022    SIRS (systemic inflammatory response syndrome) ([facility] ) 2022    Lactic acidosis 2022    Unintentional weight loss 2022    BMI 31 0-31 9,adult 2022    Retinal artery occlusion, branch, left 2022    Chronic pain 2022    Aspiration pneumonia ([facility] ) 2022    COVID-19 2022    Backache 2022    Onychomycosis 2021    Pain in toe 2021    Instability of foot joint 2021    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    ASD (atrial septal defect) 2020    Exertional dyspnea 2020    Acquired pes planus of right foot 10/28/2019    Microscopic hematuria 2019    Urge incontinence 2019    Ankle pain 2019    Primary localized osteoarthrosis of ankle and foot 2019    History of iron deficiency anemia 2018    Cough in adult 2018    Fever in adult 2018    Chronic fatigue 2017    [facility] hypoxemic respiratory failure [facility] ) 2017    GERD (gastroesophageal reflux disease) 2017    Hyponatremia 2017    Cigarette nicotine dependence in remission 2017    Controlled substance agreement signed 2016    Well adult exam 2016    Depression 2016    Diabetes mellitus [facility] 2016    Hypertension 2016    GI bleed 2015    DDD (degenerative disc disease), lumbosacral 2015    Arthropathy 04/15/2014    Anemia 2013    Tobacco use disorder 2013    COPD (chronic obstructive pulmonary disease) ([facility] ) 12/19/2012    Benign essential hypertension 12/19/2012    Hyperlipidemia 12/19/2012    Disc disorder of lumbar region 12/19/2012    Type II diabetes mellitus ([facility] ) 12/19/2012      LOS (days): 4  Geometric Mean LOS (GMLOS) (days): 2 60  Days to GMLOS:-1 5     OBJECTIVE:  Risk of Unplanned Readmission Score: 16 61         Current admission status: Inpatient   Preferred Pharmacy:   [facility], [address] [place] [id number]  Phone: [phone] Fax: [phone]    Primary Care Provider: Cesar Solano DO    Primary Insurance: [facility]  Secondary Insurance:     DISCHARGE DETAILS:    Additional Comments: Informed by thoracic surgery that patient is ready for dc when bed is available  Patient has been accepted by [facility]  Gave patient [address] provider list and she defers to her family  Facility will need to obtain insurance authorization  Called 3 family memebers, zeina Mcwilliams and Kasey Berger and BRYN Aguilar [place] and left messages to call CM

## 2022-09-30 NOTE — CASE MANAGEMENT
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   Patient Active Problem List    Diagnosis Date Noted    Abdominal pain 2022    Low blood pressure reading 2022    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 2022    SIRS (systemic inflammatory response syndrome) ([facility] ) 2022    Lactic acidosis 2022    Unintentional weight loss 2022    BMI 31 0-31 9,adult 2022    Retinal artery occlusion, branch, left 2022    Chronic pain 2022    Aspiration pneumonia ([facility] ) 2022    COVID-19 2022    Backache 2022    Onychomycosis 2021    Pain in toe 2021    Instability of foot joint 2021    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    ASD (atrial septal defect) 2020    Exertional dyspnea 2020    Acquired pes planus of right foot 10/28/2019    Microscopic hematuria 2019    Urge incontinence 2019    Ankle pain 2019    Primary localized osteoarthrosis of ankle and foot 2019    History of iron deficiency anemia 2018    Cough in adult 2018    Fever in adult 2018    Chronic fatigue 2017    [facility] hypoxemic respiratory failure [facility] 2017    GERD (gastroesophageal reflux disease) 2017    Hyponatremia 2017    Cigarette nicotine dependence in remission 2017    Controlled substance agreement signed 2016    Well adult exam 2016    Depression 2016    Diabetes mellitus ([facility] ) 2016    Hypertension 2016    GI bleed 2015    DDD (degenerative disc disease), lumbosacral 2015    Arthropathy 04/15/2014    Anemia 2013    Tobacco use disorder 2013    COPD (chronic obstructive pulmonary disease) ([facility] ) 12/19/2012    Benign essential hypertension 12/19/2012    Hyperlipidemia 12/19/2012    Disc disorder of lumbar region 12/19/2012    Type II diabetes mellitus ([facility] ) 12/19/2012      LOS (days): 4  Geometric Mean LOS (GMLOS) (days): 2 60  Days to GMLOS:-1 6     OBJECTIVE:  Risk of Unplanned Readmission Score: 16 61         Current admission status: Inpatient   Preferred Pharmacy:   [facility], [address] [place] [address]  Phone: [phone] Fax: [phone]    Primary Care Provider: Brain Vasquez DO    Primary Insurance: [id number]  Secondary Insurance:     DISCHARGE DETAILS:      Additional Comments: Call received from [facility] [place], who cannot amke decision on booster shot or which SNF bed to accept without talking to patient's sons  Family will discuss and inform CM

## 2022-10-01 LAB
BACTERIA BLD CULT: NORMAL
BACTERIA BLD CULT: NORMAL
GLUCOSE SERPL-MCNC: 110 MG/DL (ref 65–140)
GLUCOSE SERPL-MCNC: 121 MG/DL (ref 65–140)
GLUCOSE SERPL-MCNC: 122 MG/DL (ref 65–140)
GLUCOSE SERPL-MCNC: 145 MG/DL (ref 65–140)
GLUCOSE SERPL-MCNC: 154 MG/DL (ref 65–140)
GLUCOSE SERPL-MCNC: 86 MG/DL (ref 65–140)

## 2022-10-01 PROCEDURE — C9113 INJ PANTOPRAZOLE SODIUM, VIA: HCPCS

## 2022-10-01 PROCEDURE — 99024 POSTOP FOLLOW-UP VISIT: CPT | Performed by: THORACIC SURGERY (CARDIOTHORACIC VASCULAR SURGERY)

## 2022-10-01 PROCEDURE — 91300 COVID-19 PFIZER VAC BIVALENT TRIS-SUCROSE 30 MCG/0.3 ML SUSP: CPT

## 2022-10-01 PROCEDURE — 0001A HB IMMUNIZATION ADMIN COVID-19 30MCG/0.3ML FIRST DOSE: CPT

## 2022-10-01 PROCEDURE — 82948 REAGENT STRIP/BLOOD GLUCOSE: CPT

## 2022-10-01 RX ORDER — LIDOCAINE 50 MG/G
2 PATCH TOPICAL DAILY
Status: DISCONTINUED | OUTPATIENT
Start: 2022-10-01 | End: 2022-10-04 | Stop reason: HOSPADM

## 2022-10-01 RX ORDER — INSULIN LISPRO 100 [IU]/ML
1-5 INJECTION, SOLUTION INTRAVENOUS; SUBCUTANEOUS
Status: DISCONTINUED | OUTPATIENT
Start: 2022-10-01 | End: 2022-10-04 | Stop reason: HOSPADM

## 2022-10-01 RX ADMIN — BRIMONIDINE TARTRATE 1 DROP: 2 SOLUTION/ DROPS OPHTHALMIC at 20:56

## 2022-10-01 RX ADMIN — HYDROMORPHONE HYDROCHLORIDE 0.5 MG: 1 INJECTION, SOLUTION INTRAMUSCULAR; INTRAVENOUS; SUBCUTANEOUS at 00:05

## 2022-10-01 RX ADMIN — BRIMONIDINE TARTRATE 1 DROP: 2 SOLUTION/ DROPS OPHTHALMIC at 08:36

## 2022-10-01 RX ADMIN — OXYCODONE HYDROCHLORIDE 5 MG: 5 TABLET ORAL at 18:29

## 2022-10-01 RX ADMIN — METHOCARBAMOL TABLETS 500 MG: 500 TABLET, COATED ORAL at 06:03

## 2022-10-01 RX ADMIN — ACETAMINOPHEN 650 MG: 325 TABLET, FILM COATED ORAL at 06:03

## 2022-10-01 RX ADMIN — BUDESONIDE AND FORMOTEROL FUMARATE DIHYDRATE 2 PUFF: 160; 4.5 AEROSOL RESPIRATORY (INHALATION) at 08:36

## 2022-10-01 RX ADMIN — LABETALOL HYDROCHLORIDE 10 MG: 5 INJECTION, SOLUTION INTRAVENOUS at 00:18

## 2022-10-01 RX ADMIN — PANTOPRAZOLE SODIUM 40 MG: 40 INJECTION, POWDER, FOR SOLUTION INTRAVENOUS at 20:53

## 2022-10-01 RX ADMIN — ACETAMINOPHEN 650 MG: 325 TABLET, FILM COATED ORAL at 12:22

## 2022-10-01 RX ADMIN — OXYCODONE HYDROCHLORIDE 5 MG: 5 TABLET ORAL at 02:27

## 2022-10-01 RX ADMIN — OXYCODONE HYDROCHLORIDE 5 MG: 5 TABLET ORAL at 09:59

## 2022-10-01 RX ADMIN — METHOCARBAMOL TABLETS 500 MG: 500 TABLET, COATED ORAL at 12:22

## 2022-10-01 RX ADMIN — LIDOCAINE 5% 2 PATCH: 700 PATCH TOPICAL at 08:37

## 2022-10-01 RX ADMIN — OXYCODONE HYDROCHLORIDE 5 MG: 5 TABLET ORAL at 06:03

## 2022-10-01 RX ADMIN — ENOXAPARIN SODIUM 40 MG: 40 INJECTION SUBCUTANEOUS at 08:37

## 2022-10-01 RX ADMIN — LABETALOL HYDROCHLORIDE 10 MG: 5 INJECTION, SOLUTION INTRAVENOUS at 07:51

## 2022-10-01 RX ADMIN — OXYCODONE HYDROCHLORIDE 5 MG: 5 TABLET ORAL at 14:20

## 2022-10-01 RX ADMIN — ATORVASTATIN CALCIUM 40 MG: 40 TABLET, FILM COATED ORAL at 18:29

## 2022-10-01 RX ADMIN — METHOCARBAMOL TABLETS 500 MG: 500 TABLET, COATED ORAL at 18:29

## 2022-10-01 RX ADMIN — DILTIAZEM HYDROCHLORIDE 180 MG: 180 CAPSULE, COATED, EXTENDED RELEASE ORAL at 08:37

## 2022-10-01 RX ADMIN — ROPINIROLE 0.5 MG: 0.25 TABLET, FILM COATED ORAL at 20:53

## 2022-10-01 RX ADMIN — PANTOPRAZOLE SODIUM 40 MG: 40 INJECTION, POWDER, FOR SOLUTION INTRAVENOUS at 08:37

## 2022-10-01 RX ADMIN — BNT162B2 ORIGINAL AND OMICRON BA.4/BA.5 0.3 ML: .1125; .1125 INJECTION, SUSPENSION INTRAMUSCULAR at 18:37

## 2022-10-01 RX ADMIN — OXYCODONE HYDROCHLORIDE 5 MG: 5 TABLET ORAL at 22:31

## 2022-10-01 RX ADMIN — ACETAMINOPHEN 650 MG: 325 TABLET, FILM COATED ORAL at 00:05

## 2022-10-01 RX ADMIN — BUDESONIDE AND FORMOTEROL FUMARATE DIHYDRATE 2 PUFF: 160; 4.5 AEROSOL RESPIRATORY (INHALATION) at 18:30

## 2022-10-01 RX ADMIN — ACETAMINOPHEN 650 MG: 325 TABLET, FILM COATED ORAL at 18:29

## 2022-10-01 RX ADMIN — METHOCARBAMOL TABLETS 500 MG: 500 TABLET, COATED ORAL at 00:05

## 2022-10-01 NOTE — PROGRESS NOTES
Progress Note - Thoracic Surgery   Parth Ball 80 y o  female MRN: [id number]  Unit/Bed#: [facility] 502-01 Encounter: [id number]    Assessment:  Incarcerated hiatal hernia, postoperative day 5 status post robotic paraesophageal hernia repair with gastropexy    Plan:   Full liquid diet as tolerated with aspiration precautions    Pulmonary toilet   Incentive spirometry   Pain control    Ambulate in halls    DVT prophylaxis    Discharge planning    Subjective/Objective     Subjective:   No acute events overnight  Patient complains of some abdominal pain as well as chronic back pain  Tolerating full liquids    Objective:    Blood pressure 170/82, pulse 73, temperature 98 °F (36 7 °C), resp  rate 20, height 5' 1" (1 549 m), weight 67 9 kg (149 lb 11 1 oz), SpO2 91 %, not currently breastfeeding  ,Body mass index is 28 28 kg/m²        Intake/Output Summary (Last 24 hours) at 10/1/2022 0804  Last data filed at 10/1/2022 0145  Gross per 24 hour   Intake --   Output 750 ml   Net -750 ml       Invasive Devices  Report    Peripheral Intravenous Line  Duration           Peripheral IV 09/29/22 Distal;Right;Upper;Ventral (anterior) Arm 2 days                Physical Exam:   Gen:  NAD  CV:  warm, well-perfused  Lungs: nl effort  Abd:  soft, NT/ND, incisions clean dry and intact, small stable hematoma at supraumbilical incision  Ext:  no CCE  Neuro: A&Ox3     Results from last 7 days   Lab Units 09/29/22  0314 09/28/22  1013 09/27/22  0537   WBC Thousand/uL 7 46 10 65* 10 96*   HEMOGLOBIN g/dL 10 2* 11 9 11 1*   HEMATOCRIT % 32 6* 37 6 35 4   PLATELETS Thousands/uL 156 171 186     Results from last 7 days   Lab Units 09/29/22  0314 09/28/22  1013 09/27/22  0537   POTASSIUM mmol/L 3 6 3 2* 3 6   CHLORIDE mmol/L 100 97 100   CO2 mmol/L 32 31 30   BUN mg/dL 9 7 6   CREATININE mg/dL 0 61 0 61 0 58*   CALCIUM mg/dL 9 0 9 1 8 7     Results from last 7 days   Lab Units 09/25/22  2308   INR  0 99   PTT seconds 27

## 2022-10-01 NOTE — CASE MANAGEMENT
Case Management Discharge Planning Note    Patient name Nam Staples  Location [address] [place] [address] 502/PPHP [id number] MRN [id number]  : 1940 Date 10/1/2022       Current Admission Date: 2022  Current Admission Diagnosis: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   Patient Active Problem List    Diagnosis Date Noted    Abdominal pain 2022    Low blood pressure reading 2022    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 2022    SIRS (systemic inflammatory response syndrome) ([facility] ) 2022    Lactic acidosis 2022    Unintentional weight loss 2022    BMI 31 0-31 9,adult 2022    Retinal artery occlusion, branch, left 2022    Chronic pain 2022    Aspiration pneumonia ([facility] ) 2022    COVID-19 2022    Backache 2022    Onychomycosis 2021    Pain in toe 2021    Instability of foot joint 2021    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    ASD (atrial septal defect) 2020    Exertional dyspnea 2020    Acquired pes planus of right foot 10/28/2019    Microscopic hematuria 2019    Urge incontinence 2019    Ankle pain 2019    Primary localized osteoarthrosis of ankle and foot 2019    History of iron deficiency anemia 2018    Cough in adult 2018    Fever in adult 2018    Chronic fatigue 2017    Chronic hypoxemic respiratory failure ([facility] ) 2017    GERD (gastroesophageal reflux disease) 2017    Hyponatremia 2017    Cigarette nicotine dependence in remission 2017    Controlled substance agreement signed 2016    Well adult exam 2016    Depression 2016    Diabetes mellitus ([facility] ) 2016    Hypertension 2016    GI bleed 2015    DDD (degenerative disc disease), lumbosacral 2015    Arthropathy 04/15/2014    Anemia 2013    Tobacco use disorder 2013    COPD (chronic obstructive pulmonary disease) ([facility] ) 12/19/2012    Benign essential hypertension 12/19/2012    Hyperlipidemia 12/19/2012    Disc disorder of lumbar region 12/19/2012    Type II diabetes mellitus ([facility] ) 12/19/2012      LOS (days): 5  Geometric Mean LOS (GMLOS) (days): 2 60  Days to GMLOS:-2 6     OBJECTIVE:  Risk of Unplanned Readmission Score: 17 03         Current admission status: Inpatient   Preferred Pharmacy:   [facility], [address] [place] [address]  Phone: [phone] Fax: [phone]    Primary Care Provider: Kaden Calvert DO    Primary Insurance: [facility] REP  Secondary Insurance:     DISCHARGE DETAILS:    Discharge planning discussed with[de-identified] patient, son Kacy Vera and his wife at bedside, , Dr Reyes Sas of Choice: Yes  Comments - Freedom of Choice: pt is medically cleared for d/c  [address] [place] [address] only accepting facilities  Son/DIL selected [facility]  Message sent via [employer] to [facility]O to see when bed will be available   Will need auth from [facility]  CM contacted family/caregiver?: Yes          Treatment Team Recommendation: Short Term Rehab  Discharge Destination Plan[de-identified] SNF, Short Term Rehab         IMM Given (Date):: 10/01/22  IMM Given to[de-identified] Patient

## 2022-10-02 LAB
GLUCOSE SERPL-MCNC: 101 MG/DL (ref 65–140)
GLUCOSE SERPL-MCNC: 120 MG/DL (ref 65–140)
GLUCOSE SERPL-MCNC: 127 MG/DL (ref 65–140)
GLUCOSE SERPL-MCNC: 145 MG/DL (ref 65–140)

## 2022-10-02 PROCEDURE — 99024 POSTOP FOLLOW-UP VISIT: CPT | Performed by: THORACIC SURGERY (CARDIOTHORACIC VASCULAR SURGERY)

## 2022-10-02 PROCEDURE — 82948 REAGENT STRIP/BLOOD GLUCOSE: CPT

## 2022-10-02 PROCEDURE — C9113 INJ PANTOPRAZOLE SODIUM, VIA: HCPCS

## 2022-10-02 RX ORDER — LOSARTAN POTASSIUM 50 MG/1
50 TABLET ORAL DAILY
Status: DISCONTINUED | OUTPATIENT
Start: 2022-10-02 | End: 2022-10-04 | Stop reason: HOSPADM

## 2022-10-02 RX ORDER — PANTOPRAZOLE SODIUM 40 MG/1
40 TABLET, DELAYED RELEASE ORAL
Status: DISCONTINUED | OUTPATIENT
Start: 2022-10-02 | End: 2022-10-04 | Stop reason: HOSPADM

## 2022-10-02 RX ORDER — OXYCODONE HCL 10 MG/1
10 TABLET, FILM COATED, EXTENDED RELEASE ORAL EVERY 12 HOURS SCHEDULED
Refills: 0 | Status: DISCONTINUED | OUTPATIENT
Start: 2022-10-02 | End: 2022-10-04 | Stop reason: HOSPADM

## 2022-10-02 RX ORDER — ASPIRIN 81 MG/1
81 TABLET, CHEWABLE ORAL DAILY
Status: DISCONTINUED | OUTPATIENT
Start: 2022-10-02 | End: 2022-10-04 | Stop reason: HOSPADM

## 2022-10-02 RX ADMIN — METHOCARBAMOL TABLETS 500 MG: 500 TABLET, COATED ORAL at 17:28

## 2022-10-02 RX ADMIN — BUDESONIDE AND FORMOTEROL FUMARATE DIHYDRATE 2 PUFF: 160; 4.5 AEROSOL RESPIRATORY (INHALATION) at 10:13

## 2022-10-02 RX ADMIN — METHOCARBAMOL TABLETS 500 MG: 500 TABLET, COATED ORAL at 04:45

## 2022-10-02 RX ADMIN — ACETAMINOPHEN 650 MG: 325 TABLET, FILM COATED ORAL at 12:06

## 2022-10-02 RX ADMIN — DILTIAZEM HYDROCHLORIDE 180 MG: 180 CAPSULE, COATED, EXTENDED RELEASE ORAL at 08:55

## 2022-10-02 RX ADMIN — ASPIRIN 81 MG CHEWABLE TABLET 81 MG: 81 TABLET CHEWABLE at 08:55

## 2022-10-02 RX ADMIN — ATORVASTATIN CALCIUM 40 MG: 40 TABLET, FILM COATED ORAL at 17:28

## 2022-10-02 RX ADMIN — LIDOCAINE 5% 2 PATCH: 700 PATCH TOPICAL at 08:56

## 2022-10-02 RX ADMIN — ENOXAPARIN SODIUM 40 MG: 40 INJECTION SUBCUTANEOUS at 08:56

## 2022-10-02 RX ADMIN — PANTOPRAZOLE SODIUM 40 MG: 40 INJECTION, POWDER, FOR SOLUTION INTRAVENOUS at 08:56

## 2022-10-02 RX ADMIN — BRIMONIDINE TARTRATE 1 DROP: 2 SOLUTION/ DROPS OPHTHALMIC at 10:13

## 2022-10-02 RX ADMIN — BRIMONIDINE TARTRATE 1 DROP: 2 SOLUTION/ DROPS OPHTHALMIC at 21:22

## 2022-10-02 RX ADMIN — BUDESONIDE AND FORMOTEROL FUMARATE DIHYDRATE 2 PUFF: 160; 4.5 AEROSOL RESPIRATORY (INHALATION) at 17:29

## 2022-10-02 RX ADMIN — LOSARTAN POTASSIUM 50 MG: 50 TABLET, FILM COATED ORAL at 08:55

## 2022-10-02 RX ADMIN — METHOCARBAMOL TABLETS 500 MG: 500 TABLET, COATED ORAL at 12:06

## 2022-10-02 RX ADMIN — ROPINIROLE 0.5 MG: 0.25 TABLET, FILM COATED ORAL at 21:21

## 2022-10-02 RX ADMIN — ACETAMINOPHEN 650 MG: 325 TABLET, FILM COATED ORAL at 04:46

## 2022-10-02 RX ADMIN — PANTOPRAZOLE SODIUM 40 MG: 40 TABLET, DELAYED RELEASE ORAL at 17:28

## 2022-10-02 RX ADMIN — OXYCODONE HYDROCHLORIDE 5 MG: 5 TABLET ORAL at 07:12

## 2022-10-02 RX ADMIN — METHOCARBAMOL TABLETS 500 MG: 500 TABLET, COATED ORAL at 01:23

## 2022-10-02 RX ADMIN — ACETAMINOPHEN 650 MG: 325 TABLET, FILM COATED ORAL at 01:23

## 2022-10-02 RX ADMIN — ACETAMINOPHEN 650 MG: 325 TABLET, FILM COATED ORAL at 17:28

## 2022-10-02 RX ADMIN — OXYCODONE HYDROCHLORIDE 5 MG: 5 TABLET ORAL at 02:41

## 2022-10-02 RX ADMIN — OXYCODONE HYDROCHLORIDE 10 MG: 10 TABLET, FILM COATED, EXTENDED RELEASE ORAL at 08:55

## 2022-10-02 RX ADMIN — OXYCODONE HYDROCHLORIDE 10 MG: 10 TABLET, FILM COATED, EXTENDED RELEASE ORAL at 21:21

## 2022-10-02 NOTE — CASE MANAGEMENT
Case Management Discharge Planning Note    Patient name Kristen Benjamin  Location 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 2022    SIRS (systemic inflammatory response syndrome) ([facility] ) 2022    Lactic acidosis 2022    Unintentional weight loss 2022    BMI 31 0-31 9,adult 2022    Retinal artery occlusion, branch, left 2022    Chronic pain 2022    Aspiration pneumonia ([facility] ) 2022    COVID-19 2022    Backache 2022    Onychomycosis 2021    Pain in toe 2021    Instability of foot joint 2021    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    ASD (atrial septal defect) 2020    Exertional dyspnea 2020    Acquired pes planus of right foot 10/28/2019    Microscopic hematuria 2019    Urge incontinence 2019    Ankle pain 2019    Primary localized osteoarthrosis of ankle and foot 2019    History of iron deficiency anemia 2018    Cough in adult 2018    Fever in adult 2018    Chronic fatigue 2017    [facility] hypoxemic respiratory failure [facility] ) 2017    GERD (gastroesophageal reflux disease) 2017    Hyponatremia 2017    Cigarette nicotine dependence in remission 2017    Controlled substance agreement signed 2016    Well adult exam 2016    Depression 2016    Diabetes mellitus ([facility] ) 2016    Hypertension 2016    GI bleed 2015    DDD (degenerative disc disease), lumbosacral 2015    Arthropathy 04/15/2014    Anemia 2013    Tobacco use disorder 2013    COPD (chronic obstructive pulmonary disease) ([facility] ) 12/19/2012    Benign essential hypertension 12/19/2012    Hyperlipidemia 12/19/2012    Disc disorder of lumbar region 12/19/2012    Type II diabetes mellitus ([facility] ) 12/19/2012      LOS (days): 6  Geometric Mean LOS (GMLOS) (days): 2 60  Days to GMLOS:-3 6     OBJECTIVE:  Risk of Unplanned Readmission Score: 15 95         Current admission status: Inpatient   Preferred Pharmacy:   [facility], [address] [address]  Phone: [phone] Fax: [phone]    Primary Care Provider: Luda Marks DO    Primary Insurance: [facility] REP  Secondary Insurance:     DISCHARGE DETAILS:      Other Referral/Resources/Interventions Provided:  Referral Comments: CM followed up with [address] [place] [address] [phone], received call back from Jen Kirk will need to be obtained, facility to start, pt needs booster, bed availability pending both items completed  Bed availability meeting for [facility] takes place at 10:30am, liaison to f/u with CM Monday following meeting w update

## 2022-10-02 NOTE — PROGRESS NOTES
Progress Note - Thoracic Surgery   Kristen Benjamin 80 y o  female MRN: [id number]  Unit/Bed#: [facility] 502-01 Encounter: [id number]    Assessment:  80 F w/ Incarcerated hiatal hernia status post robotic paraesophageal hernia repair with gastropexy on 9/26    HTN OVSS    Plan:   Full liquid diet   Pulmonary toilet/IS 10 per hour   Pain control prn   Ambulate in halls    DVT prophylaxis    Discharge planning, awaiting insurance auth    Subjective/Objective     Subjective:   No acute events overnight  Only complaint is that she's cold this morning  Objective:    Blood pressure (!) 174/82, pulse 61, temperature 97 9 °F (36 6 °C), resp  rate 17, height 5' 1" (1 549 m), weight 66 1 kg (145 lb 11 6 oz), SpO2 96 %, not currently breastfeeding  ,Body mass index is 27 53 kg/m²  Intake/Output Summary (Last 24 hours) at 10/2/2022 0723  Last data filed at 10/1/2022 1401  Gross per 24 hour   Intake 0 ml   Output 700 ml   Net -700 ml       Invasive Devices  Report    Peripheral Intravenous Line  Duration           Peripheral IV 09/29/22 Distal;Right;Upper;Ventral (anterior) Arm 3 days                Physical Exam:   General: NAD  HENT: NCAT MMM  Neck: supple, no JVD  CV: nl rate  Lungs: nl wob  No resp distress  ABD: Soft, appropriately tender, nondistended   Incisions c/d/i, small hematoma at supraumbilical incision  Extrem: No CCE  Neuro: AAOx3      Results from last 7 days   Lab Units 09/29/22  0314 09/28/22  1013 09/27/22  0537   WBC Thousand/uL 7 46 10 65* 10 96*   HEMOGLOBIN g/dL 10 2* 11 9 11 1*   HEMATOCRIT % 32 6* 37 6 35 4   PLATELETS Thousands/uL 156 171 186     Results from last 7 days   Lab Units 09/29/22  0314 09/28/22  1013 09/27/22  0537   POTASSIUM mmol/L 3 6 3 2* 3 6   CHLORIDE mmol/L 100 97 100   CO2 mmol/L 32 31 30   BUN mg/dL 9 7 6   CREATININE mg/dL 0 61 0 61 0 58*   CALCIUM mg/dL 9 0 9 1 8 7     Results from last 7 days   Lab Units 09/25/22  2308   INR  0 99   PTT seconds 27

## 2022-10-03 LAB
GLUCOSE SERPL-MCNC: 137 MG/DL (ref 65–140)
GLUCOSE SERPL-MCNC: 178 MG/DL (ref 65–140)
GLUCOSE SERPL-MCNC: 179 MG/DL (ref 65–140)
GLUCOSE SERPL-MCNC: 84 MG/DL (ref 65–140)

## 2022-10-03 PROCEDURE — 82948 REAGENT STRIP/BLOOD GLUCOSE: CPT

## 2022-10-03 PROCEDURE — 97535 SELF CARE MNGMENT TRAINING: CPT

## 2022-10-03 PROCEDURE — 99024 POSTOP FOLLOW-UP VISIT: CPT | Performed by: THORACIC SURGERY (CARDIOTHORACIC VASCULAR SURGERY)

## 2022-10-03 PROCEDURE — 97530 THERAPEUTIC ACTIVITIES: CPT

## 2022-10-03 RX ADMIN — METHOCARBAMOL TABLETS 500 MG: 500 TABLET, COATED ORAL at 23:12

## 2022-10-03 RX ADMIN — OXYCODONE HYDROCHLORIDE 10 MG: 10 TABLET, FILM COATED, EXTENDED RELEASE ORAL at 21:10

## 2022-10-03 RX ADMIN — ACETAMINOPHEN 650 MG: 325 TABLET, FILM COATED ORAL at 17:51

## 2022-10-03 RX ADMIN — LOSARTAN POTASSIUM 50 MG: 50 TABLET, FILM COATED ORAL at 09:17

## 2022-10-03 RX ADMIN — METHOCARBAMOL TABLETS 500 MG: 500 TABLET, COATED ORAL at 11:37

## 2022-10-03 RX ADMIN — BRIMONIDINE TARTRATE 1 DROP: 2 SOLUTION/ DROPS OPHTHALMIC at 09:19

## 2022-10-03 RX ADMIN — METHOCARBAMOL TABLETS 500 MG: 500 TABLET, COATED ORAL at 17:51

## 2022-10-03 RX ADMIN — LIDOCAINE 5% 2 PATCH: 700 PATCH TOPICAL at 09:18

## 2022-10-03 RX ADMIN — METHOCARBAMOL TABLETS 500 MG: 500 TABLET, COATED ORAL at 05:01

## 2022-10-03 RX ADMIN — BUDESONIDE AND FORMOTEROL FUMARATE DIHYDRATE 2 PUFF: 160; 4.5 AEROSOL RESPIRATORY (INHALATION) at 09:19

## 2022-10-03 RX ADMIN — INSULIN LISPRO 1 UNITS: 100 INJECTION, SOLUTION INTRAVENOUS; SUBCUTANEOUS at 21:11

## 2022-10-03 RX ADMIN — PANTOPRAZOLE SODIUM 40 MG: 40 TABLET, DELAYED RELEASE ORAL at 16:29

## 2022-10-03 RX ADMIN — DILTIAZEM HYDROCHLORIDE 180 MG: 180 CAPSULE, COATED, EXTENDED RELEASE ORAL at 09:17

## 2022-10-03 RX ADMIN — INSULIN LISPRO 1 UNITS: 100 INJECTION, SOLUTION INTRAVENOUS; SUBCUTANEOUS at 11:36

## 2022-10-03 RX ADMIN — ENOXAPARIN SODIUM 40 MG: 40 INJECTION SUBCUTANEOUS at 09:17

## 2022-10-03 RX ADMIN — BRIMONIDINE TARTRATE 1 DROP: 2 SOLUTION/ DROPS OPHTHALMIC at 21:12

## 2022-10-03 RX ADMIN — ACETAMINOPHEN 650 MG: 325 TABLET, FILM COATED ORAL at 11:38

## 2022-10-03 RX ADMIN — ATORVASTATIN CALCIUM 40 MG: 40 TABLET, FILM COATED ORAL at 17:51

## 2022-10-03 RX ADMIN — ACETAMINOPHEN 650 MG: 325 TABLET, FILM COATED ORAL at 05:01

## 2022-10-03 RX ADMIN — OXYCODONE HYDROCHLORIDE 10 MG: 10 TABLET, FILM COATED, EXTENDED RELEASE ORAL at 09:17

## 2022-10-03 RX ADMIN — ACETAMINOPHEN 650 MG: 325 TABLET, FILM COATED ORAL at 23:12

## 2022-10-03 RX ADMIN — ASPIRIN 81 MG CHEWABLE TABLET 81 MG: 81 TABLET CHEWABLE at 09:17

## 2022-10-03 RX ADMIN — ROPINIROLE 0.5 MG: 0.25 TABLET, FILM COATED ORAL at 21:10

## 2022-10-03 RX ADMIN — PANTOPRAZOLE SODIUM 40 MG: 40 TABLET, DELAYED RELEASE ORAL at 05:01

## 2022-10-03 RX ADMIN — BUDESONIDE AND FORMOTEROL FUMARATE DIHYDRATE 2 PUFF: 160; 4.5 AEROSOL RESPIRATORY (INHALATION) at 17:49

## 2022-10-03 NOTE — PLAN OF CARE
Problem: OCCUPATIONAL THERAPY ADULT  Goal: Performs self-care activities at highest level of function for planned discharge setting  See evaluation for individualized goals  Description: Treatment Interventions: ADL retraining, Functional transfer training, UE strengthening/ROM, Endurance training, Cognitive reorientation, Continued evaluation, Energy conservation, Activityengagement          See flowsheet documentation for full assessment, interventions and recommendations  10/3/2022 1148 by Simba Dennis  Note: Limitation: Decreased ADL status, Decreased UE ROM, Decreased UE strength, Decreased Safe judgement during ADL, Decreased cognition, Decreased endurance, Decreased self-care trans, Decreased high-level ADLs  Prognosis: Fair  Assessment: OT trx session focused on ADL retraining, functional transfer training, UE strengthening/ROM, endurance training, energy conservation, and activity engagement  Pt was found in bed w/bed alarm intact and left in bed w/bed alarm intact and call bell within reach  Presently, pt is S for grooming (w/increased time, min vc), MOD A for UB bathing/dressing (w/increased time, mod vc + L trunk support), total A for LB bathing (w/increased time + vc + trunk support), Total A for LB dressing (w/increased time, vc + L trunk support), MIN A for sit>stand + stand>sit (w/increased time, vc + RW), and MIN A for functional mob (w/increased time, vc + RW)  For UB + LB dressing tasks pt required MOD A to complete these tasks 2' cognitive limitations  All ADL tasks were completed at EOB  Improvements are noted with ADL retraining, functional transfer training, UE strengthening/ROM, endurance training, cognitive reorientation, energy conservation, and activity engagement  Remaining limitations observed w/ADL retraining, functional transfer training, endurance training, energy conservation, and activity engagement   Pt observed w/increased level of engagement and activity tolerance during OT trx session - pt observed w/self-limiting behaviors  Pt can be encouraged to increase level of engagement but is consistent as trx progressed  The patient's raw score on the AM-PAC Daily Activity inpatient short form is 12, standardized score is 30 6, less than 39 4  Patients at this level are likely to benefit from discharge to post-acute rehabilitation services  Recommendation is for pt to continue w/rehab 3-5x a week for 10-14 days to meet goals  OT Discharge Recommendation: Post acute rehabilitation services       10/3/2022 1148 by Lida Orjana  Outcome: Progressing  Note: Limitation: Decreased ADL status, Decreased UE ROM, Decreased UE strength, Decreased Safe judgement during ADL, Decreased cognition, Decreased endurance, Decreased self-care trans, Decreased high-level ADLs  Prognosis: Fair  Assessment: OT trx session focused on ADL retraining, functional transfer training, UE strengthening/ROM, endurance training, energy conservation, and activity engagement  Pt was found in bed w/bed alarm intact and left in bed w/bed alarm intact and call bell within reach  Presently, pt is S for grooming (w/increased time, min vc), MOD A for UB bathing/dressing (w/increased time, mod vc + L trunk support), total A for LB bathing (w/increased time + vc + trunk support), Total A for LB dressing (w/increased time, vc + L trunk support), MIN A for sit>stand + stand>sit (w/increased time, vc + RW), and MIN A for functional mob (w/increased time, vc + RW)  For UB + LB dressing tasks pt required MOD A to complete these tasks 2' cognitive limitations  All ADL tasks were completed at EOB  Improvements are noted with ADL retraining, functional transfer training, UE strengthening/ROM, endurance training, cognitive reorientation, energy conservation, and activity engagement   Remaining limitations observed w/ADL retraining, functional transfer training, endurance training, energy conservation, and activity engagement  Pt observed w/increased level of engagement and activity tolerance during OT trx session - pt observed w/self-limiting behaviors  Pt can be encouraged to increase level of engagement but is consistent as trx progressed  The patient's raw score on the AM-PAC Daily Activity inpatient short form is 12, standardized score is 30 6, less than 39 4  Patients at this level are likely to benefit from discharge to post-acute rehabilitation services  Recommendation is for pt to continue w/rehab 3-5x a week for 10-14 days to meet goals       OT Discharge Recommendation: Post acute rehabilitation services

## 2022-10-03 NOTE — PHYSICAL THERAPY NOTE
PHYSICAL THERAPY NOTE          Patient Name: Julianna Reed  LRVBY'H Date: 10/3/2022       10/03/22 1125   PT Last Visit   PT Visit Date 10/03/22   Note Type   Note Type Treatment   Pain Assessment   Pain Assessment Tool 0-10   Pain Score 8   Pain Location/Orientation Location: Back; Location: Abdomen   Restrictions/Precautions   Weight Bearing Precautions Per Order No   Other Precautions Cognitive; Chair Alarm; Bed Alarm;Multiple lines  (1 5L O2 NC)   General   Chart Reviewed Yes   Response to Previous Treatment Patient with no complaints from previous session  Family/Caregiver Present No   Cognition   Overall Cognitive Status Impaired   Arousal/Participation Responsive   Attention Attends with cues to redirect   Memory Decreased recall of precautions;Decreased recall of recent events;Decreased short term memory   Following Commands Follows one step commands with increased time or repetition   Comments Irritable  Decreased safety awareness and insight  Benefits from less small talk as pt is easily agitated  Subjective   Subjective "I am always in pain"   Bed Mobility   Supine to Sit 5  Supervision   Additional items HOB elevated; Increased time required;Verbal cues   Additional Comments Supine in bed upon PT arrival   Pt left upright in bedside chair with chair alarm intact and all needs in reach  Transfers   Sit to Stand 4  Minimal assistance   Additional items Assist x 1; Increased time required;Verbal cues   Stand to Sit 4  Minimal assistance   Additional items Assist x 1; Increased time required;Verbal cues   Additional Comments Transfers with RW   VC for hand placement and safety  Ambulation/Elevation   Gait pattern Excessively slow; Short stride; Foward flexed; Shuffling;Decreased foot clearance; Improper Weight shift; Poor UE support   Gait Assistance 4  Minimal assist   Additional items Assist x 1;Verbal cues; Tactile cues Assistive Device Rolling walker   Distance 10 ft x 2  (limited by fatigue)   Balance   Static Sitting Fair   Dynamic Sitting Fair -   Static Standing Poor +   Dynamic Standing Poor +   Ambulatory Poor +   Endurance Deficit   Endurance Deficit Yes   Endurance Deficit Description fatigue, weakness, cog   Activity Tolerance   Activity Tolerance Patient limited by fatigue   Nurse Made Aware RN cleared pt to be seen by PT   Assessment   Prognosis Fair   Problem List Decreased strength;Decreased endurance; Impaired balance;Decreased mobility; Decreased coordination;Decreased cognition; Impaired judgement;Decreased safety awareness   Assessment Pt seen for PT treatment session with focus on bed mobility, functional transfers, functional mobility  Pt making slow progress toward goals this session  Pt conitnues to be limited in overall participation in therapy by irritability  Pt continues to require assist for STS and ambulation due to remaining strength and balance deficits  Pt only able to ambulate short distance prior to needing sitting rest due to decreased aerobic and muscular endurance  Pt left upright in bedside chair with chair alarm intact and with all needs in reach  Pt will benefit from skilled therapy in order to address current impairments and functional limitations  PT to follow pt and recommending rehab once medically cleared  The patient's AM-PAC Basic Mobility Inpatient Short Form Raw Score is 18  A Raw score of greater than 16 suggests the patient may benefit from discharge to home  Please also refer to the recommendation of the Physical Therapist for safe discharge planning  Based on pt current functional status, recommend rehab  Barriers to Discharge Inaccessible home environment;Decreased caregiver support   Goals   Patient Goals to sit down   STG Expiration Date 10/12/22   Plan   Treatment/Interventions Functional transfer training;LE strengthening/ROM; Therapeutic exercise;Cognitive reorientation; Endurance training;Patient/family training;Equipment eval/education; Bed mobility;Gait training;Spoke to nursing   Progress Progressing toward goals   PT Frequency 2-3x/wk   Recommendation   PT Discharge Recommendation Post acute rehabilitation services   AM-PAC Basic Mobility Inpatient   Turning in Bed Without Bedrails 3   Lying on Back to Sitting on Edge of Flat Bed 3   Moving Bed to Chair 3   Standing Up From Chair 3   Walk in Room 3   Climb 3-5 Stairs 3   Basic Mobility Inpatient Raw Score 18   Basic Mobility Standardized Score 41 05   Highest Level Of Mobility   JH-HLM Goal 6: Walk 10 steps or more   JH-HLM Achieved 6: Walk 10 steps or more     Cecilia Diaz, PT, DPT

## 2022-10-03 NOTE — PLAN OF CARE
Problem: PHYSICAL THERAPY ADULT  Goal: Performs mobility at highest level of function for planned discharge setting  See evaluation for individualized goals  Description: Treatment/Interventions: Functional transfer training, LE strengthening/ROM, Therapeutic exercise, Endurance training, Cognitive reorientation, Patient/family training, Equipment eval/education, Bed mobility, Gait training, Spoke to nursing          See flowsheet documentation for full assessment, interventions and recommendations  Outcome: Progressing  Note: Prognosis: Fair  Problem List: Decreased strength, Decreased endurance, Impaired balance, Decreased mobility, Decreased coordination, Decreased cognition, Impaired judgement, Decreased safety awareness  Assessment: Pt seen for PT treatment session with focus on bed mobility, functional transfers, functional mobility  Pt making slow progress toward goals this session  Pt conitnues to be limited in overall participation in therapy by irritability  Pt continues to require assist for STS and ambulation due to remaining strength and balance deficits  Pt only able to ambulate short distance prior to needing sitting rest due to decreased aerobic and muscular endurance  Pt left upright in bedside chair with chair alarm intact and with all needs in reach  Pt will benefit from skilled therapy in order to address current impairments and functional limitations  PT to follow pt and recommending rehab once medically cleared  The patient's AM-PAC Basic Mobility Inpatient Short Form Raw Score is 18  A Raw score of greater than 16 suggests the patient may benefit from discharge to home  Please also refer to the recommendation of the Physical Therapist for safe discharge planning  Based on pt current functional status, recommend rehab    Barriers to Discharge: Inaccessible home environment, Decreased caregiver support     PT Discharge Recommendation: Post acute rehabilitation services    See flowsheet documentation for full assessment

## 2022-10-03 NOTE — SPEECH THERAPY NOTE
Speech Language/Pathology Hold  Attempted f/u for pt to assess w/ solids  Pt declined any po trials of any material  She states she does not want any po or any solids at this time

## 2022-10-03 NOTE — PROGRESS NOTES
Progress Note - Thoracic Surgery   Milena Baca 80 y o  female MRN: [id number]  Unit/Bed#: [facility] 502-01 Encounter: [id number]    Assessment:  80 F w/ Incarcerated hiatal hernia status post robotic paraesophageal hernia repair with gastropexy on 9/26 (POD7)    HTN OVSS    Plan:   Full liquid diet   Pulmonary toilet/IS 10 per hour   Pain control prn   Ambulate in halls    DVT prophylaxis    Discharge planning, awaiting insurance auth    Subjective/Objective     Subjective:   No acute events overnight  Pain same level as past few days  No SOB or resp  distress  Objective:    Blood pressure 162/86, pulse 64, temperature 98 4 °F (36 9 °C), temperature source Oral, resp  rate 16, height 5' 1" (1 549 m), weight 66 1 kg (145 lb 11 6 oz), SpO2 96 %, not currently breastfeeding  ,Body mass index is 27 53 kg/m²  Intake/Output Summary (Last 24 hours) at 10/3/2022 0614  Last data filed at 10/3/2022 0504  Gross per 24 hour   Intake 560 ml   Output 1050 ml   Net -490 ml       Invasive Devices  Report    Peripheral Intravenous Line  Duration           Peripheral IV 09/29/22 Distal;Right;Upper;Ventral (anterior) Arm 4 days                Physical Exam:   General: NAD  HENT: NCAT  Neck: supple  CV: MMM w/o MGR  Lungs: nl wob  No resp distress  ABD: Soft, NT/ND   Incisions CDI, small hematoma at supraumbilical incision  Extrem: No CCE  Neuro: AAOx3      Results from last 7 days   Lab Units 09/29/22  0314 09/28/22  1013 09/27/22  0537   WBC Thousand/uL 7 46 10 65* 10 96*   HEMOGLOBIN g/dL 10 2* 11 9 11 1*   HEMATOCRIT % 32 6* 37 6 35 4   PLATELETS Thousands/uL 156 171 186     Results from last 7 days   Lab Units 09/29/22  0314 09/28/22  1013 09/27/22  0537   POTASSIUM mmol/L 3 6 3 2* 3 6   CHLORIDE mmol/L 100 97 100   CO2 mmol/L 32 31 30   BUN mg/dL 9 7 6   CREATININE mg/dL 0 61 0 61 0 58*   CALCIUM mg/dL 9 0 9 1 8 7

## 2022-10-03 NOTE — RESTORATIVE TECHNICIAN NOTE
Restorative Technician Note      Patient Name: Milena Baca     Restorative Tech Visit Date: 10/3/2022  Note Type: Mobility  Patient Position Upon Consult: Supine  Activity Performed: Ambulated  Assistive Device: Roller walker  Patient Position at End of Consult: Supine;  All needs within reach; Bed/Chair alarm activated    Nura Garcia, Restorative Technician

## 2022-10-03 NOTE — CASE MANAGEMENT
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 2022    SIRS (systemic inflammatory response syndrome) ([facility] ) 2022    Lactic acidosis 2022    Unintentional weight loss 2022    BMI 31 0-31 9,adult 2022    Retinal artery occlusion, branch, left 2022    Chronic pain 2022    [facility] ) 2022    COVID-19 2022    Backache 2022    Onychomycosis 2021    Pain in toe 2021    Instability of foot joint 2021    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    ASD (atrial septal defect) 2020    Exertional dyspnea 2020    Acquired pes planus of right foot 10/28/2019    Microscopic hematuria 2019    Urge incontinence 2019    Ankle pain 2019    Primary localized osteoarthrosis of ankle and foot 2019    History of iron deficiency anemia 2018    Cough in adult 2018    Fever in adult 2018    Chronic fatigue 2017    Chronic hypoxemic respiratory failure ([facility] ) 2017    GERD (gastroesophageal reflux disease) 2017    Hyponatremia 2017    Cigarette nicotine dependence in remission 2017    Controlled substance agreement signed 2016    Well adult exam 2016    Depression 2016    Diabetes mellitus ([facility] ) 2016    Hypertension 2016    GI bleed 2015    DDD (degenerative disc disease), lumbosacral 2015    Arthropathy 04/15/2014    Anemia 2013    Tobacco use disorder 2013    COPD (chronic obstructive pulmonary disease) ([facility] ) 12/19/2012    Benign essential hypertension 12/19/2012    Hyperlipidemia 12/19/2012    Disc disorder of lumbar region 12/19/2012    Type II diabetes mellitus ([facility] ) 12/19/2012      LOS (days): 7  Geometric Mean LOS (GMLOS) (days): 2 60  Days to GMLOS:-4 6     OBJECTIVE:  Risk of Unplanned Readmission Score: 16 19         Current admission status: Inpatient   Preferred Pharmacy:   [facility], [address] [place] [address]  Phone: [phone] Fax: [phone]    Primary Care Provider: Skye Negron DO    Primary Insurance: [facility] RE[facility]  Secondary Insurance:     DISCHARGE DETAILS:    Additional Comments: Family has [address] and [address]  Informed OO that patient received COVID booster 10/1/22  Verlon Dose will initiate insurance auth  and they can accept patient Tuesday 10/4/22 pending auth  Called Radha CLEMENTE, and left message      Accepting Facility Name, [facility] : [address]  Receiving Facility/Agency Phone Number: [phone]  Facility/Agency Fax Number: [phone]

## 2022-10-04 ENCOUNTER — TELEPHONE (OUTPATIENT)
Dept: OTHER | Facility: OTHER | Age: 82
End: 2022-10-04

## 2022-10-04 VITALS
OXYGEN SATURATION: 97 % | SYSTOLIC BLOOD PRESSURE: 150 MMHG | WEIGHT: 147.4 LBS | HEIGHT: 61 IN | TEMPERATURE: 98 F | HEART RATE: 65 BPM | RESPIRATION RATE: 14 BRPM | DIASTOLIC BLOOD PRESSURE: 83 MMHG | BODY MASS INDEX: 27.83 KG/M2

## 2022-10-04 DIAGNOSIS — G89.4 CHRONIC PAIN SYNDROME: Primary | ICD-10-CM

## 2022-10-04 LAB
GLUCOSE SERPL-MCNC: 132 MG/DL (ref 65–140)
GLUCOSE SERPL-MCNC: 199 MG/DL (ref 65–140)
GLUCOSE SERPL-MCNC: 84 MG/DL (ref 65–140)

## 2022-10-04 PROCEDURE — 99024 POSTOP FOLLOW-UP VISIT: CPT | Performed by: THORACIC SURGERY (CARDIOTHORACIC VASCULAR SURGERY)

## 2022-10-04 PROCEDURE — 82948 REAGENT STRIP/BLOOD GLUCOSE: CPT

## 2022-10-04 RX ORDER — OXYCODONE HCL 10 MG/1
10 TABLET, FILM COATED, EXTENDED RELEASE ORAL EVERY 12 HOURS SCHEDULED
Qty: 4 TABLET | Refills: 0 | Status: SHIPPED | OUTPATIENT
Start: 2022-10-04 | End: 2022-10-06

## 2022-10-04 RX ADMIN — BRIMONIDINE TARTRATE 1 DROP: 2 SOLUTION/ DROPS OPHTHALMIC at 08:52

## 2022-10-04 RX ADMIN — BUDESONIDE AND FORMOTEROL FUMARATE DIHYDRATE 2 PUFF: 160; 4.5 AEROSOL RESPIRATORY (INHALATION) at 08:52

## 2022-10-04 RX ADMIN — METHOCARBAMOL TABLETS 500 MG: 500 TABLET, COATED ORAL at 11:35

## 2022-10-04 RX ADMIN — OXYCODONE HYDROCHLORIDE 10 MG: 10 TABLET, FILM COATED, EXTENDED RELEASE ORAL at 08:52

## 2022-10-04 RX ADMIN — LOSARTAN POTASSIUM 50 MG: 50 TABLET, FILM COATED ORAL at 08:52

## 2022-10-04 RX ADMIN — INSULIN LISPRO 1 UNITS: 100 INJECTION, SOLUTION INTRAVENOUS; SUBCUTANEOUS at 07:25

## 2022-10-04 RX ADMIN — ACETAMINOPHEN 650 MG: 325 TABLET, FILM COATED ORAL at 05:07

## 2022-10-04 RX ADMIN — DILTIAZEM HYDROCHLORIDE 180 MG: 180 CAPSULE, COATED, EXTENDED RELEASE ORAL at 08:52

## 2022-10-04 RX ADMIN — METHOCARBAMOL TABLETS 500 MG: 500 TABLET, COATED ORAL at 05:07

## 2022-10-04 RX ADMIN — ENOXAPARIN SODIUM 40 MG: 40 INJECTION SUBCUTANEOUS at 08:52

## 2022-10-04 RX ADMIN — LIDOCAINE 5% 2 PATCH: 700 PATCH TOPICAL at 08:52

## 2022-10-04 RX ADMIN — ACETAMINOPHEN 650 MG: 325 TABLET, FILM COATED ORAL at 11:35

## 2022-10-04 RX ADMIN — ASPIRIN 81 MG CHEWABLE TABLET 81 MG: 81 TABLET CHEWABLE at 08:52

## 2022-10-04 NOTE — DISCHARGE SUMMARY
Discharge Summary - Thoracic Surgery   Joesfa Rizzo 80 y o  female MRN: [id number]  Unit/Bed#: [facility] 502-01 Encounter: [id number]    Admission Date:   Admission Orders (From admission, onward)     Ordered        09/26/22 1037  Inpatient Admission  Once                         Discharge Date: 10/4/22    Admitting Diagnosis: Hiatal hernia [K44 9]    Discharge Diagnosis: Hiatal hernia    Medical Problems             Resolved Problems  Date Reviewed: 9/26/2022   None                 Attending: Tate Thao MD    Consulting Physician(s): Monique Shepherd MD    Procedures Performed: robotic paraesophageal hernia repair w/ gastropexy    Pathology: N/A    Hospital Course: Pt w/ known hx of large hiatal hernia presented to ED on 9/25 for severe epigastric pain  CT showed intrathoracic stomach w/ debris  Pt had robotic paraesophageal hernia repair w/ gastropexy and mesh placement on 9/26  Pt recovered well post op until 9/28 when she had acute respiratory desat, followed by unremarkable CXR and labs  Aspiration suspected but SLP r/o swallowing disorder and fu CXR demonstrated normal findings  Pt was dc to SNF on 10/4/22  Condition at Discharge: good     Discharge instructions/Information to patient and family:   See after visit summary for information provided to patient and family  Provisions for Follow-Up Care:  See after visit summary for information related to follow-up care and any pertinent home health orders  Disposition: See After Visit Summary for discharge disposition information  Planned Readmission: No    Discharge Statement   I spent 30 minutes discharging the patient  This time was spent on the day of discharge  I had direct contact with the patient on the day of discharge  Additional documentation is required if more than 30 minutes were spent on discharge       Discharge Medications:  See after visit summary for reconciled discharge medications provided to patient and family

## 2022-10-04 NOTE — UTILIZATION REVIEW
Continued Stay Review    Date: 10/4/22                         Current Patient Class: inpatient   Current Level of Care: med surg    HPI:82 y o  female initially admitted on 9/26/22 due to Incarcerated paraesophageal hernia with obstruction and possible gangrene  Emergent surgical intervention done    Procedure 9/26/22 REPAIR HERNIA PARAESOPHAGEAL LAPAROSCOPIC W ROBOTICS, gastropexy, mesh placement (N/A)  ESOPHAGOGASTRODUODENOSCOPY (EGD) (N/A)  LYSIS ADHESIONS    Assessment/Plan:   10/4/22 Patient is POD 8 s/p robotic paraesophageal hernia repair with gastropexy  Agreeable to rehab  On exam:  On oxygen 2 liters  Abdomen soft, incisions c/d/i  Strength 4/5  Lungs decreased breath sounds  Glucose 199  Continue full liquids, incentive spirometry, pain control  Awaiting authorization for rehab  Vital Signs:   10/04/22 0738 -- 65 14 150/83 105 97 % 28 2 L/min Nasal cannula --   10/04/22 0730 98 °F (36 7 °C) 69 20 150/83 105 97 % -- -- -- --   10/03/22 22:14:49 99 1 °F (37 3 °C) 65 16 148/81 103 95 % --          Pertinent Labs/Diagnostic Results:   XR chest portable   Final Result by Alexander Acharya MD (09/29 1701)      Left basilar atelectasis  Workstation performed: [id number]         XR chest portable   Final Result by Cedrick Munguia MD (09/28 1656)      No acute cardiopulmonary disease  Workstation performed: [id number]         FL upper GI UGI   Final Result by Humberto Whiteside MD (09/27 1126)      Postsurgical changes of paraesophageal hernia repair and gastropexy without evidence of leak  Contrast only reached the 3rd portion of the duodenum by the end of the study, likely related to the small amount of contrast able to be tolerated           Workstation performed: [id number]             9/25/22 ecg Normal sinus rhythm  Right bundle branch block  Abnormal ECG  When compared with ECG of 20-APR-2022 18:05,  Right bundle branch block is now Present  Results from last 7 days   Lab Units 09/29/22  0314 09/28/22  1013   WBC Thousand/uL 7 46 10 65*   HEMOGLOBIN g/dL 10 2* 11 9   HEMATOCRIT % 32 6* 37 6   PLATELETS Thousands/uL 156 171   NEUTROS ABS Thousands/µL 5 39 8 55*     Results from last 7 days   Lab Units 09/29/22  0314 09/28/22  1013   SODIUM mmol/L 135 136   POTASSIUM mmol/L 3 6 3 2*   CHLORIDE mmol/L 100 97   CO2 mmol/L 32 31   ANION GAP mmol/L 3* 8   BUN mg/dL 9 7   CREATININE mg/dL 0 61 0 61   EGFR ml/min/1 73sq m 84 84   CALCIUM mg/dL 9 0 9 1     Results from last 7 days   Lab Units 10/04/22  0611 10/03/22  2102 10/03/22  1558 10/03/22  1108 10/03/22  0632 10/02/22  2103 10/02/22  1614 10/02/22  1056 10/02/22  0606 10/01/22  2025 10/01/22  1600 10/01/22  1047   POC GLUCOSE mg/dl 199* 179* 84 178* 137 127 120 145* 101 121 122 145*     Results from last 7 days   Lab Units 09/29/22  0314 09/28/22  1013   GLUCOSE RANDOM mg/dL 114 163*     BETA-HYDROXYBUTYRATE   Date Value Ref Range Status   09/26/2022 0 8 (H) <0 6 mmol/L Final      Results from last 7 days   Lab Units 09/28/22  1009   [place] ART  7 472*   PCO2 ART mm Hg 44 5*   PO2 ART mm Hg 109 8   HCO3 ART mmol/L 31 8*   BASE EXC ART mmol/L 7 3   O2 CONTENT ART mL/dL 17 1   O2 HGB, ARTERIAL % 97 8*   ABG SOURCE  Artery     Results from last 7 days   Lab Units 09/28/22  1014   LACTIC ACID mmol/L 1 3       Medications:   Scheduled Medications:  acetaminophen, 650 mg, Oral, Q6H CARRIE  aspirin, 81 mg, Oral, Daily  atorvastatin, 40 mg, Oral, QPM  brimonidine tartrate, 1 drop, Left Eye, BID  budesonide-formoterol, 2 puff, Inhalation, BID  diltiazem, 180 mg, Oral, Daily  enoxaparin, 40 mg, Subcutaneous, Daily  insulin lispro, 1-5 Units, Subcutaneous, 4x Daily (AC & HS)  lidocaine, 2 patch, Topical, Daily  losartan, 50 mg, Oral, Daily  methocarbamol, 500 mg, Oral, Q6H CARRIE  oxyCODONE, 10 mg, Oral, Q12H CARRIE  pantoprazole, 40 mg, Oral, BID AC  rOPINIRole, 0 5 mg, Oral, HS    Continuous IV Infusions: none      PRN Meds: not used albuterol, 2 puff, Inhalation, Q6H PRN  labetalol, 10 mg, Intravenous, Q4H PRN  ondansetron, 4 mg, Intravenous, Q4H PRN  simethicone, 160 mg, Oral, Q6H PRN        Discharge Plan: To be determined    Network Utilization Review Department  ATTENTION: Please call with any questions or concerns to [phone] and carefully listen to the prompts so that you are directed to the right person  All voicemails are confidential   China Isra all requests for admission clinical reviews, approved or denied determinations and any other requests to dedicated fax number below belonging to the campus where the patient is receiving treatment   List of dedicated fax numbers for the Facilities:  [address] DENIALS (Administrative/Medical Necessity) [phone]   [address] (Maternity/NICU/Pediatrics) [phone]   [address] [place] [address]  [address] [facility] [place] [address] [address] [place] [address] [address] [facility] [address] [phone]

## 2022-10-04 NOTE — CASE MANAGEMENT
Case Management Discharge Planning Note    Patient name Macario Orlando  Location [address] [facility] [address] [facility] [address] 502/PPHP 732-65 MRN [id number]  : 1940 Date 10/4/2022       Current Admission Date: 2022  Current Admission Diagnosis: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   Patient Active Problem List    Diagnosis Date Noted    Abdominal pain 2022    Low blood pressure reading 2022    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 2022    SIRS (systemic inflammatory response syndrome) ([facility] ) 2022    Lactic acidosis 2022    Unintentional weight loss 2022    BMI 31 0-31 9,adult 2022    Retinal artery occlusion, branch, left 2022    Chronic pain 2022    Aspiration pneumonia ([facility] ) 2022    COVID-19 2022    Backache 2022    Onychomycosis 2021    Pain in toe 2021    Instability of foot joint 2021    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    ASD (atrial septal defect) 2020    Exertional dyspnea 2020    Acquired pes planus of right foot 10/28/2019    Microscopic hematuria 2019    Urge incontinence 2019    Ankle pain 2019    Primary localized osteoarthrosis of ankle and foot 2019    History of iron deficiency anemia 2018    Cough in adult 2018    Fever in adult 2018    Chronic fatigue 2017    Chronic hypoxemic respiratory failure ([facility] ) 2017    GERD (gastroesophageal reflux disease) 2017    Hyponatremia 2017    Cigarette nicotine dependence in remission 2017    Controlled substance agreement signed 2016    Well adult exam 2016    Depression 2016    Diabetes mellitus ([facility] ) 2016    Hypertension 2016    GI bleed 2015    DDD (degenerative disc disease), lumbosacral 2015    Arthropathy 04/15/2014    Anemia 2013    Tobacco use disorder 2013    COPD (chronic obstructive pulmonary disease) ([facility] ) 12/19/2012    Benign essential hypertension 12/19/2012    Hyperlipidemia 12/19/2012    Disc disorder of lumbar region 12/19/2012    Type II diabetes mellitus ([facility] ) 12/19/2012      LOS (days): 8  Geometric Mean LOS (GMLOS) (days): 2 60  Days to GMLOS:-5 5     OBJECTIVE:  Risk of Unplanned Readmission Score: 16 38         Current admission status: Inpatient   Preferred Pharmacy:   [facility], [address] [place] [address]  Phone: [phone] Fax: [phone]    Primary Care Provider: Christina Caballero DO    Primary Insurance: [facility] [address]  Secondary Insurance:     DISCHARGE DETAILS:  Additional Comments: Message received from [facility]  that  bed is available and they obtained insurance auth  Per thoracic surgery the patient is medically cleared for dc  Completed PCS and made request to [facility] for BLS transport  Message received that [facility] will transport at 18:30  Informed MD, RN, SNF and patient  Called DIL Ashleigh and left message  Message received from [place] [phone]  that she received the message about dc to [facility]  today at [phone]  Family is in agreement with this plan  Patient is aware of transport time

## 2022-10-04 NOTE — PROGRESS NOTES
Progress Note - Thoracic Surgery   [facility] 80 y o  female MRN: [id number]  Unit/Bed#: [facility] 502-01 Encounter: [id number]    Assessment:  80 F w/ Incarcerated hiatal hernia status post robotic paraesophageal hernia repair with gastropexy on 9/26  Afebrile, AVSS    Plan:   Full liquid diet   Pulmonary toilet/IS 10 per hour   Pain control prn   Ambulate in halls    DVT prophylaxis    Anticipate discharge today, pending authorization    Subjective/Objective     Subjective:   MARTHAO  Doing well this morning with no new concerns  Hoping to leave today  Objective:    Blood pressure 148/81, pulse 65, temperature 99 1 °F (37 3 °C), temperature source Oral, resp  rate 16, height 5' 1" (1 549 m), weight 66 9 kg (147 lb 6 4 oz), SpO2 95 %, not currently breastfeeding  ,Body mass index is 27 85 kg/m²        Intake/Output Summary (Last 24 hours) at 10/4/2022 0619  Last data filed at 10/4/2022 0301  Gross per 24 hour   Intake 480 ml   Output 300 ml   Net 180 ml       Invasive Devices  Report    Peripheral Intravenous Line  Duration           Peripheral IV 09/29/22 Distal;Right;Upper;Ventral (anterior) Arm 5 days                Physical Exam:   General: NAD, lying comfortably in bed  CV: RRR  Lungs: normal work of breathing, on 2L NC  ABD: soft, non distended, non tedner, incisions c/d/i  Extrem: no edema  Neuro: A&Ox3      Results from last 7 days   Lab Units 09/29/22  0314 09/28/22  1013   WBC Thousand/uL 7 46 10 65*   HEMOGLOBIN g/dL 10 2* 11 9   HEMATOCRIT % 32 6* 37 6   PLATELETS Thousands/uL 156 171     Results from last 7 days   Lab Units 09/29/22  0314 09/28/22  1013   POTASSIUM mmol/L 3 6 3 2*   CHLORIDE mmol/L 100 97   CO2 mmol/L 32 31   BUN mg/dL 9 7   CREATININE mg/dL 0 61 0 61   CALCIUM mg/dL 9 0 9 1

## 2022-10-04 NOTE — TELEPHONE ENCOUNTER
Call received from [facility] at [address] to review new admission orders for this patient  Reached out to on call provider

## 2022-10-05 PROCEDURE — 88302 TISSUE EXAM BY PATHOLOGIST: CPT | Performed by: STUDENT IN AN ORGANIZED HEALTH CARE EDUCATION/TRAINING PROGRAM

## 2022-10-05 NOTE — UTILIZATION REVIEW
Notification of Discharge   This is a Notification of Discharge from our facility [address]  Please be advised that this patient has been discharge from our facility  Below you will find the admission and discharge date and time including the patients disposition  UTILIZATION REVIEW CONTACT:  Aishwarya Nelson  Utilization   Network Utilization Review Department  Phone: [phone] x carefully listen to the prompts  All voicemails are confidential   Email: [email or web address]     PHYSICIAN ADVISORY SERVICES:  FOR [id number] REVIEW - MEDICAL NECESSITY DENIAL  Phone: [phone]  Fax: [phone]  Email: [email or web address]     PRESENTATION DATE: 9/26/2022  9:06 AM  OBERVATION ADMISSION DATE:   INPATIENT ADMISSION DATE: 9/26/22  9:06 AM   DISCHARGE DATE: 10/4/2022  4:48 PM  DISPOSITION: Non [facility] SNF/TCU/SNU Non [facility] SNF/TCU/SNU      IMPORTANT INFORMATION:  Send all requests for admission clinical reviews, approved or denied determinations and any other requests to dedicated fax number below belonging to the campus where the patient is receiving treatment   List of dedicated fax numbers:  [address] DENIALS (Administrative/Medical Necessity) [phone]   [address] (Maternity/NICU/Pediatrics) [phone]   Oziel Abernathy [phone]   [address] [place] [address] [phone]   [address] [place] [address] [phone]   [address] [phone]   [facility]  [phone]   2205 [facility]  2401 [facility] 1000 [facility] [phone]

## 2022-10-06 ENCOUNTER — NURSING HOME VISIT (OUTPATIENT)
Dept: GERIATRICS | Facility: OTHER | Age: 82
End: 2022-10-06
Payer: COMMERCIAL

## 2022-10-06 DIAGNOSIS — J44.9 CHRONIC OBSTRUCTIVE PULMONARY DISEASE, UNSPECIFIED COPD TYPE (HCC): ICD-10-CM

## 2022-10-06 DIAGNOSIS — K44.9 ESOPHAGEAL HIATAL HERNIA: Primary | ICD-10-CM

## 2022-10-06 PROBLEM — R53.1 GENERALIZED WEAKNESS: Status: ACTIVE | Noted: 2022-10-06

## 2022-10-06 PROCEDURE — 99306 1ST NF CARE HIGH MDM 50: CPT | Performed by: FAMILY MEDICINE

## 2022-10-06 NOTE — ASSESSMENT & PLAN NOTE
Was admitted due to abdominal pain found to have an intrathoracic stomach and had a paraesophageal hernia repair w/ gastropexy and mesh placement  Patient recovered well but decompensated due to surgery and hospitalization       At home patient takes [facility] 13 5 mg BID due to back pain and abdominal pain    PLAN:   - Pain control with OxyContin 10 mg Q12hrs  - Outpatient surgery follow up cardiothoracic surgery on 10/20

## 2022-10-06 NOTE — PROGRESS NOTES
[address]  [address] [facility] 27 [facility], [address] [facility] 31  History and Physical    NAME: Jessi Alcazar  AGE: 80 y o  SEX: female [id number]    DATE OF ENCOUNTER: 10/6/2022    Code status:  CPR    Assessment and Plan     Esophageal hiatal hernia  Was admitted due to abdominal pain found to have an intrathoracic stomach and had a paraesophageal hernia repair w/ gastropexy and mesh placement  Patient recovered well but decompensated due to surgery and hospitalization  At home patient takes [facility] ER 13 5 mg BID due to back pain and abdominal pain    PLAN:   - Pain control with OxyContin 10 mg Q12hrs  - Outpatient surgery follow up cardiothoracic surgery on 10/20     Generalized weakness  Due to surgery and hospitalization     PLAN:   - Continue PT/OT     Type II diabetes mellitus ([facility] )    Lab Results   Component Value Date    HGBA1C 6 3 (H) 04/06/2022   Well controlled on current regimen of Metformin 1000 mg AM and 500 mg HS and Januvia 100 mg daily      PLAN:   - Accuchecks ACHS   - Diabetic Diet   - Continue with current medications    COPD (chronic obstructive pulmonary disease) ([facility] )  Long time smoker  Currently requires 2L of oxygen at baseline  Currently on     GERD (gastroesophageal reflux disease)  Home medication of Pantoprazole 40 mg  PLAN:   - continue home medication      Benign essential hypertension  Blood pressure was slightly elevated today   Currently on Losartan 50 mg daily and Cardizem 180 mg q24 hrs     PLAN:   - Will continue to monitor Q shift   - Will continue with home medications      All medications and routine orders were reviewed and updated as needed  Plan discussed with: Patient    Chief Complaint     Seen for admission at [address]    History of Present Illness     Ms Jessi Alcazar is a 79 yo female with a pmhx of COPD on 2L, DM2, infrarenal AAA, hiatal hernia, GERD who presents with abdominal pain   Patient was admitted on 2022 for abdominal pain and shortness of breath  CT scan confirmed an intrathoracic stomach filled with debris  She received IV fluids and on  she had a paraesophageal hernia repair w/ gastropexy and mesh placement  Patient recovered well post op, however, did have acute respiratory desaturation that resolved  Patient was transferred to [facility] due to decompensation and rehab post surgery  Patient was seen and evaluated at bedside  During evaluation patient was not on oxygen and was able to speak without any SOB  Patient is alert and oriented x 3  She reports that her pain is 10/10 and she is having loose stools  Patient just received her medication prior to evaluation and will add Banatrol to help with loose stools  Patient will continue with physical and occupational therapy, once cleared by team patient can be discharged home  HISTORY:  Past Medical History:   Diagnosis Date    Cardiac disease     COPD (chronic obstructive pulmonary disease) ([facility] )     Diabetes mellitus ([facility] )     GERD (gastroesophageal reflux disease)     Hiatal hernia     Hypertension     PUD (peptic ulcer disease)     Residual ASD (atrial septal defect) following repair      Family History   Problem Relation Age of Onset    Cancer Mother     Asthma Father      Social History     Socioeconomic History    Marital status:       Spouse name: Not on file    Number of children: Not on file    Years of education: Not on file    Highest education level: Not on file   Occupational History    Not on file   Tobacco Use    Smoking status: Former Smoker     Packs/day: 1 00     Years: 60 00     Pack years: 60 00     Start date:      Quit date: 2017     Years since quittin 4    Smokeless tobacco: Never Used    Tobacco comment: stopped smoking 2 days ago   Substance and Sexual Activity    Alcohol use: No    Drug use: No    Sexual activity: Not on file   Other Topics Concern    Not on file   Social History Narrative    Not on file     Social Determinants of Health     Financial Resource Strain: Not on file   Food Insecurity: No Food Insecurity    Worried About Running Out of Food in the Last Year: Never true    Madelyn of Food in the Last Year: Never true   Transportation Needs: No Transportation Needs    Lack of Transportation (Medical): No    Lack of Transportation (Non-Medical): No   Physical Activity: Not on file   Stress: Not on file   Social Connections: Not on file   Intimate Partner Violence: Not on file   Housing Stability: Low Risk     Unable to Pay for Housing in the Last Year: No    Number of Places Lived in the Last Year: 1    Unstable Housing in the Last Year: No       Allergies: Allergies   Allergen Reactions    Prednisone      The patient reports "they make me goofy "  No true allergic reaction    Psyllium     Tetanus Toxoids        Review of Systems     Review of Systems   Constitutional: Negative for chills and fever  HENT: Negative for ear pain and sore throat  Eyes: Negative for pain and visual disturbance  Respiratory: Negative for cough and shortness of breath  Cardiovascular: Negative for chest pain and palpitations  Gastrointestinal: Negative for abdominal pain, blood in stool, constipation, nausea and vomiting  Loose stools   Genitourinary: Negative for dysuria and hematuria  Musculoskeletal: Negative for arthralgias and back pain  Skin: Negative for color change and rash  Neurological: Negative for seizures and syncope  All other systems reviewed and are negative  Medications and orders     All medications reviewed and updated in Nursing Home EMR  Objective     Vitals:     Vitals: Temp 98 5 F   HR 74  RR 18  /74  SpO2 96% on 2L O2     Weight: 147 4 lbs       Physical Exam  Vitals and nursing note reviewed  Constitutional:       General: She is not in acute distress  Appearance: Normal appearance   She is not ill-appearing, toxic-appearing or diaphoretic  Cardiovascular:      Rate and Rhythm: Regular rhythm  Pulses: Normal pulses  Heart sounds: Normal heart sounds  No murmur heard  No friction rub  No gallop  Pulmonary:      Effort: Pulmonary effort is normal       Breath sounds: Normal breath sounds  Abdominal:      General: A surgical scar is present  Bowel sounds are normal  There is no distension  Tenderness: There is abdominal tenderness in the right lower quadrant  Negative signs include Mckeon's sign and McBurney's sign  Comments: 3 well healing surgical scars  Bruising is noted along the left lower abdomen and around umbilicus  Skin:     General: Skin is warm  Capillary Refill: Capillary refill takes less than 2 seconds  Neurological:      General: No focal deficit present  Mental Status: She is alert and oriented to person, place, and time  Psychiatric:         Behavior: Behavior normal          Pertinent Laboratory/Diagnostic Studies: The following labs/studies were reviewed please see chart or hospital paperwork for details        Lab Results   Component Value Date    WBC 7 46 09/29/2022    HGB 10 2 (L) 09/29/2022    HCT 32 6 (L) 09/29/2022    MCV 90 09/29/2022     09/29/2022     Lab Results   Component Value Date    SODIUM 135 09/29/2022    K 3 6 09/29/2022     09/29/2022    CO2 32 09/29/2022    BUN 9 09/29/2022    CREATININE 0 61 09/29/2022    GLUC 114 09/29/2022    CALCIUM 9 0 09/29/2022         - Counseling Documentation: patient was counseled regarding: prognosis, impressions, risks and benefits of treatment options and importance of compliance with treatment  - Counseling Time: counseling time more than 50% of visit: 15 minutes      Orin Morataya MD   Family Medicine PGY 2   10/06/22

## 2022-10-06 NOTE — ASSESSMENT & PLAN NOTE
Blood pressure well controlled  Currently on Losartan 50 mg daily and Cardizem 180 mg q24 hrs     PLAN:   - Will continue to monitor Q shift   - Will continue with home medications

## 2022-10-06 NOTE — ASSESSMENT & PLAN NOTE
Lab Results   Component Value Date    HGBA1C 6 3 (H) 04/06/2022   Well controlled on current regimen of Metformin 1000 mg AM and 500 mg HS and Januvia 100 mg daily      PLAN:   - Bertin CARTAGENA   - Diabetic Diet   - Continue with current medications

## 2022-10-07 ENCOUNTER — NURSING HOME VISIT (OUTPATIENT)
Dept: GERIATRICS | Facility: OTHER | Age: 82
End: 2022-10-07
Payer: COMMERCIAL

## 2022-10-07 VITALS
RESPIRATION RATE: 18 BRPM | SYSTOLIC BLOOD PRESSURE: 154 MMHG | OXYGEN SATURATION: 96 % | BODY MASS INDEX: 27.85 KG/M2 | HEART RATE: 65 BPM | DIASTOLIC BLOOD PRESSURE: 75 MMHG | WEIGHT: 147.4 LBS | TEMPERATURE: 98.5 F

## 2022-10-07 DIAGNOSIS — I10 BENIGN ESSENTIAL HYPERTENSION: ICD-10-CM

## 2022-10-07 DIAGNOSIS — K21.9 GASTROESOPHAGEAL REFLUX DISEASE WITHOUT ESOPHAGITIS: ICD-10-CM

## 2022-10-07 DIAGNOSIS — K44.9 ESOPHAGEAL HIATAL HERNIA: ICD-10-CM

## 2022-10-07 DIAGNOSIS — E11.69 TYPE 2 DIABETES MELLITUS WITH OTHER SPECIFIED COMPLICATION, UNSPECIFIED WHETHER LONG TERM INSULIN USE (HCC): ICD-10-CM

## 2022-10-07 DIAGNOSIS — J44.9 CHRONIC OBSTRUCTIVE PULMONARY DISEASE, UNSPECIFIED COPD TYPE (HCC): Primary | ICD-10-CM

## 2022-10-07 DIAGNOSIS — R53.1 GENERALIZED WEAKNESS: ICD-10-CM

## 2022-10-07 PROCEDURE — 99309 SBSQ NF CARE MODERATE MDM 30: CPT | Performed by: NURSE PRACTITIONER

## 2022-10-07 NOTE — PROGRESS NOTES
[address] [facility]  [address]  [phone]  [place]  Code 31 (STR)      NAME: Nathalie Ramirez  AGE: 80 y o  SEX: female CODE STATUS: CPR    DATE OF ENCOUNTER: 10/7/2022    Assessment and Plan     1  Chronic obstructive pulmonary disease, unspecified COPD type ([facility] )  Assessment & Plan:  · Hx of 60 pack per year smoking history  · Quit 5 years ago  · On Chronic 2 L NC   · Denies any changes to breathing, denies cough  · Continue albuterol prn , continue BREZTRI inhaler      2  Gastroesophageal reflux disease without esophagitis  Assessment & Plan:  Stable   Continue PPI      3  Type 2 diabetes mellitus with other specified complication, unspecified whether long term insulin use (HCC)  Assessment & Plan:    Lab Results   Component Value Date    HGBA1C 6 3 (H) 04/06/2022     Per geriatric guidelines, goal HgA1c is > 7 5 to prevent hypoglycemic event in the older adult with cognitive decline    · Continue Metformin and Januvia  · Accu checks  · Hypoglycemic protocol       4  Benign essential hypertension  Assessment & Plan:  Slightly elevated on exam today- gregor due to pain as she has not received her scheduled pain medication     Continue Losartan and Cardizem   Continue to trend BP        5   Esophageal hiatal hernia  Assessment & Plan:  · CTA with Large Hiatal Hernia with intrathoracic stomach- was distended with fluid/debris  · Pateint's Abdominal pain thought larglely to be related to above  · Required NGT placement while inpatient and was transferred to [facility] for General and Thoracic Surgery consults     · s/p Paraesophageal hernia repair with gastropexy and mesh placement   · Pateitn states she is tolerating diet, trying to stay hydrated, deneis N/V/D  · Continues with Abdominal pain - requesting pain medication during interview    · Patient has hx of chronic pain - on Xtampza ER 13 5 mg BID at home due to chronic back and abdominal pain  · Will cotninue OxyContin 10 mg PO Q 12 hrs at [facility] - ok to retrun back to home med upon d/c from SNF   · OP f/u with Cardiothoracic Sx 10/20/22      6  Generalized weakness  Assessment & Plan:   Multifactorial in setting of advanced age, recent surgery, chronic pain on opioids, multiple co morbidities    Continue PT/OT   Fall Precautions   Ensure adequate nutrition/hydration    Monitor CBC/BMP     following for d/c planning            All medications and routine orders were reviewed and updated as needed  Chief Complaint     STR follow up visit    Past Medical and Surgica History      Past Medical History:   Diagnosis Date    Cardiac disease     COPD [facility]chronic obstructive pulmonary disease) ([facility] )     Diabetes mellitus ([facility] )     GERD (gastroesophageal reflux disease)     Hiatal hernia     Hypertension     PUD (peptic ulcer disease)     Residual ASD (atrial septal defect) following repair      Past Surgical History:   Procedure Laterality Date    ABDOMINAL ADHESION SURGERY N/A 9/26/2022    Procedure: LYSIS ADHESIONS;  Surgeon: Jacquie Santana MD;  [facility]: [facility];  Service: Thoracic    ASD REPAIR      BACK SURGERY      x3    ESOPHAGOGASTRODUODENOSCOPY N/A 9/26/2022    Procedure: ESOPHAGOGASTRODUODENOSCOPY (EGD);   Surgeon: Jacquie Santana MD;  [facility]: [facility];  Service: Thoracic    JOINT REPLACEMENT      bilateral knee surgery    KNEE SURGERY      PARAESOPHAGEAL HERNIA REPAIR N/A 9/26/2022    Procedure: REPAIR HERNIA PARAESOPHAGEAL LAPAROSCOPIC W ROBOTICS, gastropexy, mesh placement;  Surgeon: Jacquie Santana MD;  [facility];  Service: Thoracic    SHOULDER SURGERY       Allergies   Allergen Reactions    Prednisone      The patient reports "they make me goofy "  No true allergic reaction    Psyllium     Tetanus Toxoids           History of Present Illness     Patient is an 80 yr old female admitted to [address] [employer] on 10/4/2022 for short-term rehab following hospitalization for hernia repair  Patient has a past medical history of COPD on home O2, DM type 2, infrarenal AAA, hiatal hernia, GERD over presented to ED with abdominal pain  Patient was admitted on 9/26/2022 and her CT scan confirmed intrathoracic stomach filled with debris  She was treated with IV fluids and on 9/26/2022 had a paraesophageal hernia repair with gastropexy and mesh placement  Patient was transferred to [facility] for short-term rehab following decompensation after her surgery  Patient was seen and examined at bedside in stable condition  Patient's main concern is her abdominal pain and is requesting her scheduled pain medication  Patient denies any other pain or concerns at this time  Patient states she is eating and drinking without any difficulty she denies any nausea vomiting or diarrhea  Patient states she is moving her bowels without difficulty denies any urinary symptoms  Nursing/staff have no concerns at this time  The patient's allergies, past medical, surgical, social and family history were reviewed and unchanged  Review of Systems     Review of Systems   Constitutional: Negative for chills and fever  HENT: Negative for congestion, rhinorrhea and sore throat  Eyes: Negative for pain and visual disturbance  Respiratory: Positive for shortness of breath  Negative for cough and wheezing  Cardiovascular: Negative for chest pain and palpitations  Gastrointestinal: Positive for abdominal pain  Negative for constipation, diarrhea and nausea  Genitourinary: Negative for decreased urine volume, difficulty urinating, dysuria and hematuria  Musculoskeletal: Negative for arthralgias and back pain  Skin: Negative for color change and rash  Neurological: Positive for weakness  Negative for dizziness, syncope, numbness and headaches  Psychiatric/Behavioral: Negative for dysphoric mood and sleep disturbance  The patient is not nervous/anxious            Objective     Vitals: Vitals:    10/07/22 1044   BP: 154/75   Pulse: 65   Resp: 18   Temp: 98 5 °F (36 9 °C)   SpO2: 96%         Physical Exam  Vitals and nursing note reviewed  Constitutional:       General: She is not in acute distress  Appearance: Normal appearance  She is ill-appearing  HENT:      Head: Normocephalic and atraumatic  Nose: No congestion or rhinorrhea  Mouth/Throat:      Mouth: Mucous membranes are moist    Eyes:      General: No scleral icterus  Conjunctiva/sclera: Conjunctivae normal       Pupils: Pupils are equal, round, and reactive to light  Cardiovascular:      Rate and Rhythm: Normal rate and regular rhythm  Pulses: Normal pulses  Heart sounds: Normal heart sounds  No murmur heard  Pulmonary:      Effort: Pulmonary effort is normal  No respiratory distress  Breath sounds: Normal breath sounds  No wheezing, rhonchi or rales  Abdominal:      General: Bowel sounds are normal  There is no distension  Palpations: Abdomen is soft  There is no mass  Tenderness: There is abdominal tenderness  Hernia: No hernia is present  Comments: 3 healing surgical scars    Musculoskeletal:         General: No swelling or tenderness  Lymphadenopathy:      Cervical: No cervical adenopathy  Skin:     General: Skin is warm and dry  Coloration: Skin is not pale  Findings: No rash  Neurological:      General: No focal deficit present  Mental Status: She is alert and oriented to person, place, and time  Mental status is at baseline  Motor: Weakness present  Gait: Gait abnormal    Psychiatric:         Mood and Affect: Mood normal          Behavior: Behavior normal          Pertinent Laboratory/Diagnostic Studies:   Reviewed in facility chart-stable      Current Medications   Medications reviewed and updated see facility [facility] for details        Current Outpatient Medications:     albuterol (PROVENTIL HFA,VENTOLIN HFA) 90 mcg/act inhaler, Inhale 2 puffs every 6 (six) hours as needed for wheezing, Disp: 54 g, Rfl: 3    ascorbic acid (VITAMIN C) 1000 MG tablet, Take 1,000 mg by mouth 2 (two) times a day, Disp: , Rfl:     aspirin 81 mg chewable tablet, Chew 1 tablet (81 mg total) daily, Disp: 30 tablet, Rfl: 0    atorvastatin (LIPITOR) 40 mg tablet, Take 1 tablet (40 mg total) by mouth every evening, Disp: 30 tablet, Rfl: 0    brimonidine tartrate 0 2 % ophthalmic solution, Administer 1 drop into the left eye 2 (two) times a day, Disp: 5 mL, Rfl: 0    Budeson-Glycopyrrol-Formoterol (Breztri Aerosphere) 160-9-4 8 MCG/ACT AERO, Inhale 2 puffs  in the morning and 2 puffs before bedtime  Rinse mouth after use   , Disp: 31 1 g, Rfl: 3    diltiazem (CARDIZEM CD) 180 mg 24 hr capsule, Take 180 mg by mouth daily, Disp: , Rfl:     gabapentin (NEURONTIN) 300 mg capsule, Take 300 mg by mouth 4 (four) times a day, Disp: , Rfl:     losartan (COZAAR) 50 mg tablet, TAKE 1 TABLET BY MOUTH EVERY DAY, Disp: , Rfl:     metFORMIN (GLUCOPHAGE) 1000 MG tablet, 1000 mg QAM and 500 mg QPM, Disp: 1 tablet, Rfl: 0    metFORMIN (GLUCOPHAGE) 500 mg tablet, , Disp: , Rfl:     oxyCODONE ER (Xtampza ER) 13 5 MG C12A, Take 13 5 mg by mouth 2 (two) times a day Max Daily Amount: 27 mg, Disp: 60 capsule, Rfl: 0    pantoprazole (PROTONIX) 40 mg tablet, Take 40 mg by mouth every 12 (twelve) hours, Disp: , Rfl:     rOPINIRole (REQUIP) 0 5 mg tablet, 2-3 tablets PO at HS, Disp: 30 tablet, Rfl: 0    sitaGLIPtin (JANUVIA) 100 mg tablet, Take 1 tablet (100 mg total) by mouth daily, Disp: 30 tablet, Rfl: 0     Plan discussed with Dr Kathryn Boles noted agreement with assessment and plan  Please note:  Voice-recognition software may have been used in the preparation of this document  Occasional wrong word or "sound-alike" substitutions may have occurred due to the inherent limitations of voice recognition software  Interpretation should be guided by context           Dave Garcia Kristi  10/7/2022  10:44 AM

## 2022-10-07 NOTE — ASSESSMENT & PLAN NOTE
Lab Results   Component Value Date    HGBA1C 6 3 (H) 04/06/2022     Per geriatric guidelines, goal HgA1c is > 7 5 to prevent hypoglycemic event in the older adult with cognitive decline    · Continue Metformin and Januvia  · Accu checks  · Hypoglycemic protocol

## 2022-10-07 NOTE — ASSESSMENT & PLAN NOTE
· Hx of 60 pack per year smoking history  · Quit 5 years ago  · On Chronic 2 L NC   · Denies any changes to breathing, denies cough  · Continue albuterol prn , continue BREZTRI inhaler

## 2022-10-07 NOTE — ASSESSMENT & PLAN NOTE
Slightly elevated on exam today- gregor due to pain as she has not received her scheduled pain medication     Continue Losartan and Cardizem   Continue to trend BP

## 2022-10-07 NOTE — ASSESSMENT & PLAN NOTE
 Multifactorial in setting of advanced age, recent surgery, chronic pain on opioids, multiple co morbidities    Continue PT/OT   Fall Precautions   Ensure adequate nutrition/hydration    Monitor CBC/BMP     following for d/c planning

## 2022-10-07 NOTE — ASSESSMENT & PLAN NOTE
· CTA with Large Hiatal Hernia with intrathoracic stomach- was distended with fluid/debris  · Pateint's Abdominal pain thought larglely to be related to above  · Required NGT placement while inpatient and was transferred to [facility] for General and Thoracic Surgery consults     · s/p Paraesophageal hernia repair with gastropexy and mesh placement   · Annetteeitn states she is tolerating diet, trying to stay hydrated, deneis N/V/D  · Continues with Abdominal pain - requesting pain medication during interview    · Patient has hx of chronic pain - on Xtampza ER 13 5 mg BID at home due to chronic back and abdominal pain  · Will cotninue OxyContin 10 mg PO Q 12 hrs at SNF - ok to retrun back to home med upon d/c from SNF   · OP f/u with Cardiothoracic Sx 10/20/22

## 2022-10-12 ENCOUNTER — NURSING HOME VISIT (OUTPATIENT)
Dept: GERIATRICS | Facility: OTHER | Age: 82
End: 2022-10-12
Payer: COMMERCIAL

## 2022-10-12 VITALS
HEART RATE: 64 BPM | DIASTOLIC BLOOD PRESSURE: 65 MMHG | WEIGHT: 149 LBS | RESPIRATION RATE: 18 BRPM | SYSTOLIC BLOOD PRESSURE: 117 MMHG | BODY MASS INDEX: 28.15 KG/M2 | TEMPERATURE: 98.6 F | OXYGEN SATURATION: 96 %

## 2022-10-12 DIAGNOSIS — K44.9 ESOPHAGEAL HIATAL HERNIA: ICD-10-CM

## 2022-10-12 DIAGNOSIS — K21.9 GASTROESOPHAGEAL REFLUX DISEASE WITHOUT ESOPHAGITIS: ICD-10-CM

## 2022-10-12 DIAGNOSIS — G89.29 OTHER CHRONIC PAIN: ICD-10-CM

## 2022-10-12 DIAGNOSIS — J44.9 CHRONIC OBSTRUCTIVE PULMONARY DISEASE, UNSPECIFIED COPD TYPE (HCC): ICD-10-CM

## 2022-10-12 DIAGNOSIS — I10 BENIGN ESSENTIAL HYPERTENSION: ICD-10-CM

## 2022-10-12 DIAGNOSIS — E11.69 TYPE 2 DIABETES MELLITUS WITH OTHER SPECIFIED COMPLICATION, UNSPECIFIED WHETHER LONG TERM INSULIN USE (HCC): ICD-10-CM

## 2022-10-12 DIAGNOSIS — R53.1 GENERALIZED WEAKNESS: Primary | ICD-10-CM

## 2022-10-12 PROCEDURE — 99316 NF DSCHRG MGMT 30 MIN+: CPT | Performed by: NURSE PRACTITIONER

## 2022-10-12 NOTE — PROGRESS NOTES
[facility]  [address]  [phone]  Code 32    NAME: Kimberly Amaya  AGE: 80 y o  SEX: female   CODE STATUS: CPR    DATE OF ADMISSION: 10/4/2022   DATE OF DISCHARGE: 10/14/2022   DISCHARGE DISPOSITION: Stable for discharge to home with family support and home health PT/OT/SN services  Reason for Admission: Patient was admitted to [facility] for rehabilitation after hospitalization for esophageal hernia repair  Past Medical and Surgical History:   Past Medical History:   Diagnosis Date   • Cardiac disease    • COPD (chronic obstructive pulmonary disease) ([facility] [address] )    • Diabetes mellitus ([facility] [address] )    • GERD (gastroesophageal reflux disease)    • Hiatal hernia    • Hypertension    • PUD (peptic ulcer disease)    • Residual ASD (atrial septal defect) following repair       Past Surgical History:   Procedure Laterality Date   • ABDOMINAL ADHESION SURGERY N/A 9/26/2022    Procedure: LYSIS ADHESIONS;  Surgeon: Marco Ward MD;  Location: [facility];  Service: Thoracic   • ASD REPAIR     • BACK SURGERY      x3   • ESOPHAGOGASTRODUODENOSCOPY N/A 9/26/2022    Procedure: ESOPHAGOGASTRODUODENOSCOPY (EGD); Surgeon: Marco Ward MD;  Location: [facility];  Service: Thoracic   • JOINT REPLACEMENT      bilateral knee surgery   • KNEE SURGERY     • PARAESOPHAGEAL HERNIA REPAIR N/A 9/26/2022    Procedure: REPAIR HERNIA PARAESOPHAGEAL LAPAROSCOPIC W ROBOTICS, gastropexy, mesh placement;  Surgeon: Marco Ward MD;  Location: [facility];  Service: Thoracic   • SHOULDER SURGERY         Course of stay:   Patient is an 80 yr old female admitted to [facility] on 10/4/2022 for short-term rehab following hospitalization for hernia repair  Patient has a past medical history of COPD on home O2, DM type 2, infrarenal AAA, hiatal hernia, GERD over presented to ED with abdominal pain    Patient is being seen today for medical management and discharge visit  Patient was admitted on 9/26/2022 and her CT scan confirmed intrathoracic stomach filled with debris  She was treated with IV fluids and on 9/26/2022 had a paraesophageal hernia repair with gastropexy and mesh placement  Patient was transferred to [address] for short-term rehab following decompensation after her surgery  Patient was seen and examined at bedside in stable condition  Patient is able to provide a reliable history  Patient states she is feeling better  She is tolerating a diet denies any nausea vomiting or diarrhea  Patient denies any abdominal pain  Trocar incisional sites are healing well  No signs or symptoms of infection no drainage noted on exam  Patient states she is moving her bowels without difficulty denies any urinary symptoms  Nursing/staff have no concerns at this time  Patient is ambulating with a rolling walker, gait is steady  Physical therapy failed patient is ready for discharge to home  Patient is medically cleared for discharge  Per  patient will discharge to home on Friday with home health services  ROS:  Review of Systems   Constitutional: Negative for chills and fever  HENT: Negative for congestion, rhinorrhea and sore throat  Eyes: Negative for pain and visual disturbance  Respiratory: Negative for cough, shortness of breath and wheezing  Cardiovascular: Negative for chest pain and palpitations  Gastrointestinal: Negative for abdominal pain, constipation, diarrhea and nausea  Genitourinary: Negative for decreased urine volume, difficulty urinating, dysuria and hematuria  Musculoskeletal: Negative for arthralgias and back pain  Skin: Negative for color change and rash  Neurological: Positive for weakness  Negative for dizziness, syncope, numbness and headaches  Psychiatric/Behavioral: Negative for dysphoric mood and sleep disturbance  The patient is not nervous/anxious          PHYSICAL EXAM:  VITALS: Vitals:    10/12/22 0929   BP: 117/65   Pulse: 64   Resp: 18   Temp: 98 6 °F (37 °C)   SpO2: 96%        Physical Exam  Vitals and nursing note reviewed  Constitutional:       General: She is not in acute distress  Appearance: Normal appearance  She is ill-appearing  HENT:      Head: Normocephalic and atraumatic  Nose: No congestion or rhinorrhea  Mouth/Throat:      Mouth: Mucous membranes are moist    Eyes:      General: No scleral icterus  Conjunctiva/sclera: Conjunctivae normal       Pupils: Pupils are equal, round, and reactive to light  Cardiovascular:      Rate and Rhythm: Normal rate and regular rhythm  Pulses: Normal pulses  Heart sounds: Normal heart sounds  No murmur heard  Pulmonary:      Effort: Pulmonary effort is normal  No respiratory distress  Breath sounds: Normal breath sounds  No wheezing, rhonchi or rales  Abdominal:      General: Bowel sounds are normal  There is no distension  Palpations: Abdomen is soft  There is no mass  Tenderness: There is no abdominal tenderness  Hernia: No hernia is present  Comments: 3 healing surgical scars    Musculoskeletal:         General: No swelling or tenderness  Lymphadenopathy:      Cervical: No cervical adenopathy  Skin:     General: Skin is warm and dry  Coloration: Skin is not pale  Findings: No rash  Neurological:      General: No focal deficit present  Mental Status: She is alert and oriented to person, place, and time  Mental status is at baseline  Motor: Weakness present  Gait: Gait abnormal    Psychiatric:         Mood and Affect: Mood normal          Behavior: Behavior normal          Admission Diagnoses:   1   Generalized weakness  Assessment & Plan:  • Multifactorial in setting of advanced age, recent surgery, chronic pain on opioids, multiple co morbidities   • Continue PT/OT  • Fall Precautions  • Ensure adequate nutrition/hydration   •  following for d/c planning - per SSD patient will d/c home on Friday 10/14        2  Esophageal hiatal hernia  Assessment & Plan:  · CTA with Large Hiatal Hernia with intrathoracic stomach- was distended with fluid/debris  · Tant's Abdominal pain thought larglely to be related to above  · Required NGT placement while inpatient and was transferred to [facility] for General and Thoracic Surgery consults     · s/p Paraesophageal hernia repair with gastropexy and mesh placement   · Annetteeitn states she is tolerating diet, trying to stay hydrated, deneis N/V/D  · Continues with Abdominal pain - requesting pain medication during interview    · Patient has hx of chronic pain - on Xtampza ER 13 5 mg BID at home due to chronic back and abdominal pain  · Will cotninue OxyContin 10 mg PO Q 12 hrs at SNF - ok to retrun back to home med upon d/c from SNF   · OP f/u with Cardiothoracic Sx 10/20/22      3  Other chronic pain  Assessment & Plan:  · On Xtampza ER 13 5 mg BID at home   · PDMP reviewed and appropriate   · Has been receiveing Oxycontin ER at SNF  · Continue Lucila Grant at home (Last filled 8/29/22 for 60 tabs by Kinga Clement) admitted to hospital 9/25- should have tablets left at home for at least 5 day supply - requested patient reach out to her pain  for refill         4  Chronic obstructive pulmonary disease, unspecified COPD type ([facility] [address] )  Assessment & Plan:  · Hx of 60 pack per year smoking history  · Quit 5 years ago  · On Chronic 2 L NC   · Denies any changes to breathing, denies cough  · Continue albuterol prn , continue BREZTRI inhaler      5   Type 2 diabetes mellitus with other specified complication, unspecified whether long term insulin use (HCC)  Assessment & Plan:    Lab Results   Component Value Date    HGBA1C 6 3 (H) 04/06/2022     Per geriatric guidelines, goal HgA1c is > 7 5 to prevent hypoglycemic event in the older adult with cognitive decline    · Continue Metformin and Januvia  · Accu checks  · Hypoglycemic protocol       6  Benign essential hypertension  Assessment & Plan:  Controlled and Stable   Continue Losartan and Cardizem           7  Gastroesophageal reflux disease without esophagitis  Assessment & Plan:  Stable   Continue PPI         Follow-up Recommendations:    • Outpatient Follow up with PCP in the next 2 weeks  • Home Health PT/OT/SN services     Labs and testing performed during stay:    Reviewed in chart    Discharge Medications: See discharge medication list which was reviewed and signed  THERE HAVE BEEN NO MEDICATION CHANGES AT SNF- PATIENT SHOULD CONTINUE WITH HER PCP AND SPECIALISTS FOR REFILLS       Current Outpatient Medications:   •  albuterol (PROVENTIL HFA,VENTOLIN HFA) 90 mcg/act inhaler, Inhale 2 puffs every 6 (six) hours as needed for wheezing, Disp: 54 g, Rfl: 3  •  ascorbic acid (VITAMIN C) 1000 MG tablet, Take 1,000 mg by mouth 2 (two) times a day, Disp: , Rfl:   •  aspirin 81 mg chewable tablet, Chew 1 tablet (81 mg total) daily, Disp: 30 tablet, Rfl: 0  •  atorvastatin (LIPITOR) 40 mg tablet, Take 1 tablet (40 mg total) by mouth every evening, Disp: 30 tablet, Rfl: 0  •  brimonidine tartrate 0 2 % ophthalmic solution, Administer 1 drop into the left eye 2 (two) times a day, Disp: 5 mL, Rfl: 0  •  Budeson-Glycopyrrol-Formoterol (Breztri Aerosphere) 160-9-4 8 MCG/ACT AERO, Inhale 2 puffs  in the morning and 2 puffs before bedtime  Rinse mouth after use   , Disp: 31 1 g, Rfl: 3  •  diltiazem (CARDIZEM CD) 180 mg 24 hr capsule, Take 180 mg by mouth daily, Disp: , Rfl:   •  gabapentin (NEURONTIN) 300 mg capsule, Take 300 mg by mouth 4 (four) times a day, Disp: , Rfl:   •  losartan (COZAAR) 50 mg tablet, TAKE 1 TABLET BY MOUTH EVERY DAY, Disp: , Rfl:   •  metFORMIN (GLUCOPHAGE) 1000 MG tablet, 1000 mg QAM and 500 mg QPM, Disp: 1 tablet, Rfl: 0  •  oxyCODONE ER (Xtampza ER) 13 5 MG C12A, Take 13 5 mg by mouth 2 (two) times a day Max Daily Amount: 27 mg, Disp: 60 capsule, Rfl: 0  •  pantoprazole (PROTONIX) 40 mg tablet, Take 40 mg by mouth every 12 (twelve) hours, Disp: , Rfl:   •  rOPINIRole (REQUIP) 0 5 mg tablet, 2-3 tablets PO at HS, Disp: 30 tablet, Rfl: 0  •  sitaGLIPtin (JANUVIA) 100 mg tablet, Take 1 tablet (100 mg total) by mouth daily, Disp: 30 tablet, Rfl: 0     Discussion with patient/family and further instructions:  -Fall precautions  -Aspiration precautions  -Bleeding precautions  -Monitor for signs/symptoms of infection  -Medication list was reviewed and updated    Status at time of discharge: Stable    Plan discussed with Dr Lauren Danielle noted agreement with assessment and plan  Billing based on time  Time spent on unit, 40 minutes  Time spent counseling pt on debility/condition, 30 minutes  Please note:  Voice-recognition software may have been used in the preparation of this document  Occasional wrong word or "sound-alike" substitutions may have occurred due to the inherent limitations of voice recognition software  Interpretation should be guided by marley Berry  10/12/2022

## 2022-10-12 NOTE — LETTER
October 13, 2022     Cornelius DO Hermelindo  [address] [place] [address]  [address]  [facility] [address]    Patient: Jm Simeon   YOB: 1940   Date of Visit: 10/12/2022       Dear Dr Therese Burrell:      Jm Postal was a patient of mine during a short term rehab stay at [address] [place] [address]  Patient is being discharged home  Below is my discharge summary  Please feel free to call or [place]inton Text me with any questions  Sincerely,     ROMANA Szymanski        CC: No Recipients  ROMANA Szymanski  10/13/2022  7:10 PM  Sign when Signing Visit  [facility]  [address]  [phone]  Code 31    NAME: Jm Postal  AGE: 80 y o  SEX: female   CODE STATUS: CPR    DATE OF ADMISSION: 10/4/2022   DATE OF DISCHARGE: 10/14/2022   DISCHARGE DISPOSITION: Stable for discharge to home with family support and home health PT/OT/SN services  Reason for Admission: Patient was admitted to [facility] for rehabilitation after hospitalization for esophageal hernia repair  Past Medical and Surgical History:   Past Medical History:   Diagnosis Date   • Cardiac disease    • COPD (chronic obstructive pulmonary disease) ([facility] [address] )    • Diabetes mellitus ([facility] [address] )    • GERD (gastroesophageal reflux disease)    • Hiatal hernia    • Hypertension    • PUD (peptic ulcer disease)    • Residual ASD (atrial septal defect) following repair       Past Surgical History:   Procedure Laterality Date   • ABDOMINAL ADHESION SURGERY N/A 9/26/2022    Procedure: LYSIS ADHESIONS;  Surgeon: Jenna Nguyen MD;  Location: [facility];  Service: Thoracic   • ASD REPAIR     • BACK SURGERY      x3   • ESOPHAGOGASTRODUODENOSCOPY N/A 9/26/2022    Procedure: ESOPHAGOGASTRODUODENOSCOPY (EGD);   Surgeon: Jenna Nguyen MD;  Location: [facility];  Service: Thoracic   • JOINT REPLACEMENT      bilateral knee surgery   • KNEE SURGERY     • PARAESOPHAGEAL HERNIA REPAIR N/A 9/26/2022    Procedure: REPAIR HERNIA PARAESOPHAGEAL LAPAROSCOPIC W ROBOTICS, gastropexy, mesh placement;  Surgeon: Neri Chavez MD;  Location: [facility];  Service: Thoracic   • SHOULDER SURGERY         Course of stay:   Patient is an 80 yr old female admitted to [address] on 10/4/2022 for short-term rehab following hospitalization for hernia repair  Patient has a past medical history of COPD on home O2, DM type 2, infrarenal AAA, hiatal hernia, GERD over presented to ED with abdominal pain  Patient is being seen today for medical management and discharge visit  Patient was admitted on 9/26/2022 and her CT scan confirmed intrathoracic stomach filled with debris  She was treated with IV fluids and on 9/26/2022 had a paraesophageal hernia repair with gastropexy and mesh placement  Patient was transferred to [address] for short-term rehab following decompensation after her surgery  Patient was seen and examined at bedside in stable condition  Patient is able to provide a reliable history  Patient states she is feeling better  She is tolerating a diet denies any nausea vomiting or diarrhea  Patient denies any abdominal pain  Trocar incisional sites are healing well  No signs or symptoms of infection no drainage noted on exam  Patient states she is moving her bowels without difficulty denies any urinary symptoms  Nursing/staff have no concerns at this time  Patient is ambulating with a rolling walker, gait is steady  Physical therapy failed patient is ready for discharge to home  Patient is medically cleared for discharge  Per  patient will discharge to home on Friday with home health services  ROS:  Review of Systems   Constitutional: Negative for chills and fever  HENT: Negative for congestion, rhinorrhea and sore throat  Eyes: Negative for pain and visual disturbance  Respiratory: Negative for cough, shortness of breath and wheezing  Cardiovascular: Negative for chest pain and palpitations  Gastrointestinal: Negative for abdominal pain, constipation, diarrhea and nausea  Genitourinary: Negative for decreased urine volume, difficulty urinating, dysuria and hematuria  Musculoskeletal: Negative for arthralgias and back pain  Skin: Negative for color change and rash  Neurological: Positive for weakness  Negative for dizziness, syncope, numbness and headaches  Psychiatric/Behavioral: Negative for dysphoric mood and sleep disturbance  The patient is not nervous/anxious  PHYSICAL EXAM:  VITALS:   Vitals:    10/12/22 0929   BP: 117/65   Pulse: 64   Resp: 18   Temp: 98 6 °F (37 °C)   SpO2: 96%        Physical Exam  Vitals and nursing note reviewed  Constitutional:       General: She is not in acute distress  Appearance: Normal appearance  She is ill-appearing  HENT:      Head: Normocephalic and atraumatic  Nose: No congestion or rhinorrhea  Mouth/Throat:      Mouth: Mucous membranes are moist    Eyes:      General: No scleral icterus  Conjunctiva/sclera: Conjunctivae normal       Pupils: Pupils are equal, round, and reactive to light  Cardiovascular:      Rate and Rhythm: Normal rate and regular rhythm  Pulses: Normal pulses  Heart sounds: Normal heart sounds  No murmur heard  Pulmonary:      Effort: Pulmonary effort is normal  No respiratory distress  Breath sounds: Normal breath sounds  No wheezing, rhonchi or rales  Abdominal:      General: Bowel sounds are normal  There is no distension  Palpations: Abdomen is soft  There is no mass  Tenderness: There is no abdominal tenderness  Hernia: No hernia is present  Comments: 3 healing surgical scars    Musculoskeletal:         General: No swelling or tenderness  Lymphadenopathy:      Cervical: No cervical adenopathy  Skin:     General: Skin is warm and dry  Coloration: Skin is not pale  Findings: No rash  Neurological:      General: No focal deficit present  Mental Status: She is alert and oriented to person, place, and time  Mental status is at baseline  Motor: Weakness present  Gait: Gait abnormal    Psychiatric:         Mood and Affect: Mood normal          Behavior: Behavior normal          Admission Diagnoses:   1  Generalized weakness  Assessment & Plan:  • Multifactorial in setting of advanced age, recent surgery, chronic pain on opioids, multiple co morbidities   • Continue PT/OT  • Fall Precautions  • Ensure adequate nutrition/hydration   •  following for d/c planning - per SSD patient will d/c home on Friday 10/14        2  Esophageal hiatal hernia  Assessment & Plan:  · CTA with Large Hiatal Hernia with intrathoracic stomach- was distended with fluid/debris  · Pateint's Abdominal pain thought larglely to be related to above  · Required NGT placement while inpatient and was transferred to [address] [place] [address] for General and Thoracic Surgery consults     · s/p Paraesophageal hernia repair with gastropexy and mesh placement   · Pateitn states she is tolerating diet, trying to stay hydrated, deneis N/V/D  · Continues with Abdominal pain - requesting pain medication during interview    · Patient has hx of chronic pain - on Xtampza ER 13 5 mg BID at home due to chronic back and abdominal pain  · Will cotninue OxyContin 10 mg PO Q 12 hrs at SNF - ok to retrun back to home med upon d/c from SNF   · OP f/u with Cardiothoracic Sx 10/20/22      3  Other chronic pain  Assessment & Plan:  · On Xtampza ER 13 5 mg BID at home   · PDMP reviewed and appropriate   · Has been receiveing Oxycontin ER at SNF  · Continue Edwar Matters at home (Last filled 8/29/22 for 60 tabs by Aminta Solis) admitted to hospital 9/25- should have tablets left at home for at least 5 day supply - requested patient reach out to her pain  for refill         4   Chronic obstructive pulmonary disease, unspecified COPD type [facility])  Assessment & Plan:  · Hx of 60 pack per year smoking history  · Quit 5 years ago  · On Chronic 2 L NC   · Denies any changes to breathing, denies cough  · Continue albuterol prn , continue BREZTRI inhaler      5  Type 2 diabetes mellitus with other specified complication, unspecified whether long term insulin use (HCC)  Assessment & Plan:    Lab Results   Component Value Date    HGBA1C 6 3 (H) 04/06/2022     Per geriatric guidelines, goal HgA1c is > 7 5 to prevent hypoglycemic event in the older adult with cognitive decline    · Continue Metformin and Januvia  · Accu checks  · Hypoglycemic protocol       6  Benign essential hypertension  Assessment & Plan:  Controlled and Stable   Continue Losartan and Cardizem           7  Gastroesophageal reflux disease without esophagitis  Assessment & Plan:  Stable   Continue PPI         Follow-up Recommendations:    • Outpatient Follow up with PCP in the next 2 weeks  • Home Health PT/OT/SN services     Labs and testing performed during stay:    Reviewed in chart    Discharge Medications: See discharge medication list which was reviewed and signed      THERE HAVE BEEN NO MEDICATION CHANGES AT SNF- PATIENT SHOULD CONTINUE WITH HER PCP AND SPECIALISTS FOR REFILLS       Current Outpatient Medications:   •  albuterol (PROVENTIL HFA,VENTOLIN HFA) 90 mcg/act inhaler, Inhale 2 puffs every 6 (six) hours as needed for wheezing, Disp: 54 g, Rfl: 3  •  ascorbic acid (VITAMIN C) 1000 MG tablet, Take 1,000 mg by mouth 2 (two) times a day, Disp: , Rfl:   •  aspirin 81 mg chewable tablet, Chew 1 tablet (81 mg total) daily, Disp: 30 tablet, Rfl: 0  •  atorvastatin (LIPITOR) 40 mg tablet, Take 1 tablet (40 mg total) by mouth every evening, Disp: 30 tablet, Rfl: 0  •  brimonidine tartrate 0 2 % ophthalmic solution, Administer 1 drop into the left eye 2 (two) times a day, Disp: 5 mL, Rfl: 0  •  Budeson-Glycopyrrol-Formoterol (Breztri Aerosphere) 160-9-4 8 MCG/ACT AERO, Inhale 2 puffs in the morning and 2 puffs before bedtime  Rinse mouth after use   , Disp: 31 1 g, Rfl: 3  •  diltiazem (CARDIZEM CD) 180 mg 24 hr capsule, Take 180 mg by mouth daily, Disp: , Rfl:   •  gabapentin (NEURONTIN) 300 mg capsule, Take 300 mg by mouth 4 (four) times a day, Disp: , Rfl:   •  losartan (COZAAR) 50 mg tablet, TAKE 1 TABLET BY MOUTH EVERY DAY, Disp: , Rfl:   •  metFORMIN (GLUCOPHAGE) 1000 MG tablet, 1000 mg QAM and 500 mg QPM, Disp: 1 tablet, Rfl: 0  •  oxyCODONE ER (Xtampza ER) 13 5 MG C12A, Take 13 5 mg by mouth 2 (two) times a day Max Daily Amount: 27 mg, Disp: 60 capsule, Rfl: 0  •  pantoprazole (PROTONIX) 40 mg tablet, Take 40 mg by mouth every 12 (twelve) hours, Disp: , Rfl:   •  rOPINIRole (REQUIP) 0 5 mg tablet, 2-3 tablets PO at HS, Disp: 30 tablet, Rfl: 0  •  sitaGLIPtin (JANUVIA) 100 mg tablet, Take 1 tablet (100 mg total) by mouth daily, Disp: 30 tablet, Rfl: 0     Discussion with patient/family and further instructions:  -Fall precautions  -Aspiration precautions  -Bleeding precautions  -Monitor for signs/symptoms of infection  -Medication list was reviewed and updated    Status at time of discharge: Stable    Plan discussed with Dr Corey Lozano noted agreement with assessment and plan  Billing based on time  Time spent on unit, 40 minutes  Time spent counseling pt on debility/condition, 30 minutes  Please note:  Voice-recognition software may have been used in the preparation of this document  Occasional wrong word or "sound-alike" substitutions may have occurred due to the inherent limitations of voice recognition software  Interpretation should be guided by context          Ronaldo Berry  10/12/2022

## 2022-10-13 NOTE — ASSESSMENT & PLAN NOTE
· CTA with Large Hiatal Hernia with intrathoracic stomach- was distended with fluid/debris  · Pateint's Abdominal pain thought larglely to be related to above  · Required NGT placement while inpatient and was transferred to [facility] for General and Thoracic Surgery consults     · s/p Paraesophageal hernia repair with gastropexy and mesh placement   · Annetteeitn states she is tolerating diet, trying to stay hydrated, deneis N/V/D  · Continues with Abdominal pain - requesting pain medication during interview    · Patient has hx of chronic pain - on Xtampza ER 13 5 mg BID at home due to chronic back and abdominal pain  · Will cotninue OxyContin 10 mg PO Q 12 hrs at SNF - ok to retrun back to home med upon d/c from SNF   · OP f/u with Cardiothoracic Sx 10/20/22

## 2022-10-13 NOTE — ASSESSMENT & PLAN NOTE
• Multifactorial in setting of advanced age, recent surgery, chronic pain on opioids, multiple co morbidities   • Continue PT/OT  • Fall Precautions  • Ensure adequate nutrition/hydration   •  following for d/c planning - per SSD patient will d/c home on Friday 10/14

## 2022-10-13 NOTE — ASSESSMENT & PLAN NOTE
· On Xtampza ER 13 5 mg BID at home   · PDMP reviewed and appropriate   · Has been receiveing Oxycontin ER at SNF  · Continue Adalgisa Keswick at home (Last filled 8/29/22 for 60 tabs by [facility]) admitted to hospital 9/25- should have tablets left at home for at least 5 day supply - requested patient reach out to her pain  for refill

## 2022-10-20 ENCOUNTER — OFFICE VISIT (OUTPATIENT)
Dept: CARDIAC SURGERY | Facility: CLINIC | Age: 82
End: 2022-10-20

## 2022-10-20 VITALS
WEIGHT: 152.12 LBS | DIASTOLIC BLOOD PRESSURE: 60 MMHG | TEMPERATURE: 97.8 F | BODY MASS INDEX: 28.72 KG/M2 | SYSTOLIC BLOOD PRESSURE: 122 MMHG | RESPIRATION RATE: 16 BRPM | OXYGEN SATURATION: 96 % | HEART RATE: 52 BPM | HEIGHT: 61 IN

## 2022-10-20 DIAGNOSIS — K44.0 PARAESOPHAGEAL HERNIA WITH OBSTRUCTION BUT NO GANGRENE: Primary | ICD-10-CM

## 2022-10-20 PROCEDURE — 99024 POSTOP FOLLOW-UP VISIT: CPT | Performed by: PHYSICIAN ASSISTANT

## 2022-10-20 NOTE — ASSESSMENT & PLAN NOTE
Bridgette Moore is progressing well from a thoracic surgery standpoint  She is not having any limitations while eating and has been released from the SNF  She feels very well and not having any pain  She can continue to advance her diet, and we will see her back for one final check in 5 months  She is in agreement with the plan

## 2022-10-20 NOTE — PROGRESS NOTES
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  Giacomo Kaur is progressing well from a thoracic surgery standpoint  She is not having any limitations while eating and has been released from the SNF  She feels very well and not having any pain  She can continue to advance her diet, and we will see her back for one final check in 5 months  She is in agreement with the plan  Diagnoses and all orders for this visit:    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          Thoracic History      Diagnosis: Paraesophageal Hernia   Procedure: Robotic assisted paraesophageal hernia repair with gastropexy 9/26/22  Pathology: hernia sac revealed mesothelial-lined fibroadipose tissue, consistent with hernia sac      Cancer Staging  No matching staging information was found for the patient  Oncology History    No history exists  Patient ID: Chrissy Gonzáles is a 80 y o  female  ECOG 2     HPI     Ms Belvia Rubinstein is an 81 yo female who presented to ED on 9/25/22 with a large hiatal hernia and associated symptom of severe epigastric pain  She underwent a robotic assisted paraesophageal hernia repair on 9/26/22  Her post op course was complicated by acute respiratory desaturation, with an unremarkable cxr and labs  Aspiration was suspected, but no swallowing disorder was discovered  She was discharged to a SNF on 10/4/22  She returns today and on discussion, she is eating almost everything  She is tolerating chicken, sausage, egg mcmuffin, toast, cereal, oatmeal, and bananas  She is not having any dysphagia, nausea, vomiting, regurgitation  She uses a walker and is on home 02 2L nc essentially around the clock  She uses 3L at night  She was discharged from SNF on 10/7/22 and lives with her son       The following portions of the patient's history were reviewed and updated as appropriate: allergies, current medications, past family history, past medical history, past social history, past surgical history and problem list     Review of Systems      Objective:   Physical Exam  Vitals reviewed  Constitutional:       General: She is not in acute distress  Appearance: She is not ill-appearing  HENT:      Head: Normocephalic and atraumatic  Nose: Nose normal    Eyes:      General: No scleral icterus  Extraocular Movements: Extraocular movements intact  Cardiovascular:      Rate and Rhythm: Normal rate and regular rhythm  Heart sounds: Normal heart sounds  Pulmonary:      Effort: Pulmonary effort is normal  No respiratory distress  Comments: Some b/l wheezing  No crackles  On 2L of oxygen  Abdominal:      General: Abdomen is flat  Bowel sounds are normal  There is no distension  Palpations: Abdomen is soft  Comments: Laparoscopic incisions healing well  Some ecchymoses superior to her umbilicus    Musculoskeletal:      Cervical back: Normal range of motion and neck supple  Neurological:      Mental Status: She is alert  Gait: Gait abnormal       Comments: Uses a walker        Psychiatric:         Mood and Affect: Mood normal          Behavior: Behavior normal      /60   Pulse (!) 52   Temp 97 8 °F (36 6 °C)   Resp 16   Ht 5' 1" (1 549 m)   Wt 69 kg (152 lb 1 9 oz)   SpO2 96%   BMI 28 74 kg/m²

## 2022-11-25 PROBLEM — R05.9 COUGH IN ADULT: Status: RESOLVED | Noted: 2018-03-28 | Resolved: 2022-11-25

## 2022-11-25 PROBLEM — R50.9 FEVER IN ADULT: Status: RESOLVED | Noted: 2018-03-28 | Resolved: 2022-11-25

## 2022-11-25 PROBLEM — J69.0 ASPIRATION PNEUMONIA (HCC): Status: RESOLVED | Noted: 2022-01-31 | Resolved: 2022-11-25

## 2022-11-27 DIAGNOSIS — J44.9 COPD, SEVERE (HCC): ICD-10-CM

## 2022-11-28 ENCOUNTER — DOCUMENTATION (OUTPATIENT)
Dept: CARDIAC SURGERY | Facility: CLINIC | Age: 82
End: 2022-11-28

## 2022-11-28 RX ORDER — BUDESONIDE, GLYCOPYRROLATE, AND FORMOTEROL FUMARATE 160; 9; 4.8 UG/1; UG/1; UG/1
2 AEROSOL, METERED RESPIRATORY (INHALATION) 2 TIMES DAILY
Refills: 3 | OUTPATIENT
Start: 2022-11-28

## 2022-11-28 NOTE — PROGRESS NOTES
Ms Frank Alejo, a patient of Dr Lolly Muniz, did not have any re-admissions within 30 days of her procedure

## 2022-11-30 NOTE — TELEPHONE ENCOUNTER
If patient calls please schedule with Dr Nickolas Goodman next available  Tried calling and line stays busy

## 2022-12-30 ENCOUNTER — OFFICE VISIT (OUTPATIENT)
Dept: PULMONOLOGY | Facility: CLINIC | Age: 82
End: 2022-12-30

## 2022-12-30 VITALS
DIASTOLIC BLOOD PRESSURE: 60 MMHG | HEIGHT: 61 IN | HEART RATE: 77 BPM | OXYGEN SATURATION: 93 % | WEIGHT: 157.8 LBS | TEMPERATURE: 97.3 F | SYSTOLIC BLOOD PRESSURE: 118 MMHG | BODY MASS INDEX: 29.79 KG/M2

## 2022-12-30 DIAGNOSIS — J96.11 CHRONIC HYPOXEMIC RESPIRATORY FAILURE (HCC): Primary | ICD-10-CM

## 2022-12-30 DIAGNOSIS — F17.211 CIGARETTE NICOTINE DEPENDENCE IN REMISSION: ICD-10-CM

## 2022-12-30 DIAGNOSIS — J44.9 COPD, SEVERE (HCC): ICD-10-CM

## 2022-12-30 PROBLEM — R65.10 SIRS (SYSTEMIC INFLAMMATORY RESPONSE SYNDROME) (HCC): Status: RESOLVED | Noted: 2022-09-26 | Resolved: 2022-12-30

## 2022-12-30 PROBLEM — E87.20 LACTIC ACIDOSIS: Status: RESOLVED | Noted: 2022-09-26 | Resolved: 2022-12-30

## 2022-12-30 RX ORDER — DILTIAZEM HYDROCHLORIDE 240 MG/1
240 CAPSULE, COATED, EXTENDED RELEASE ORAL DAILY
COMMUNITY
Start: 2022-12-09 | End: 2022-12-30

## 2022-12-30 RX ORDER — METHOCARBAMOL 500 MG/1
TABLET, FILM COATED ORAL
COMMUNITY
Start: 2022-12-01

## 2022-12-30 RX ORDER — LOSARTAN POTASSIUM 25 MG/1
50 TABLET ORAL DAILY
COMMUNITY
Start: 2022-12-09 | End: 2022-12-30

## 2022-12-30 NOTE — ASSESSMENT & PLAN NOTE
1 ppd x 60 years; quit in 2017  -Remains committed to abstinence    - Does not qualify for screening CAT scan based on age

## 2022-12-30 NOTE — ASSESSMENT & PLAN NOTE
60 pack year, quit 2017  - cont  Breztri  Rinse after use  - Cont  Albuterol as needed  - would benefit from pulmonary rehab but limited by back pain and ankle pain  Declines rehab at this time  - up to date on vaccines

## 2023-02-16 ENCOUNTER — TELEPHONE (OUTPATIENT)
Dept: NEUROLOGY | Facility: CLINIC | Age: 83
End: 2023-02-16

## 2023-02-16 NOTE — TELEPHONE ENCOUNTER
Called pt for reminder of appt with Jewel on 2/21/23, pt's daughter-in law answered and verified appt

## 2023-02-28 ENCOUNTER — APPOINTMENT (EMERGENCY)
Dept: RADIOLOGY | Facility: HOSPITAL | Age: 83
End: 2023-02-28

## 2023-02-28 ENCOUNTER — HOSPITAL ENCOUNTER (INPATIENT)
Facility: HOSPITAL | Age: 83
LOS: 5 days | Discharge: HOME HEALTH | End: 2023-03-06
Attending: EMERGENCY MEDICINE | Admitting: INTERNAL MEDICINE

## 2023-02-28 DIAGNOSIS — R09.02 HYPOXIA: ICD-10-CM

## 2023-02-28 DIAGNOSIS — G89.29 CHRONIC LOW BACK PAIN WITHOUT SCIATICA, UNSPECIFIED BACK PAIN LATERALITY: ICD-10-CM

## 2023-02-28 DIAGNOSIS — R06.02 SOB (SHORTNESS OF BREATH): ICD-10-CM

## 2023-02-28 DIAGNOSIS — J18.9 PNEUMONIA: Primary | ICD-10-CM

## 2023-02-28 DIAGNOSIS — E87.1 HYPONATREMIA: ICD-10-CM

## 2023-02-28 DIAGNOSIS — M54.50 CHRONIC LOW BACK PAIN WITHOUT SCIATICA, UNSPECIFIED BACK PAIN LATERALITY: ICD-10-CM

## 2023-02-28 DIAGNOSIS — J44.1 COPD WITH ACUTE EXACERBATION (HCC): ICD-10-CM

## 2023-02-28 LAB
BASOPHILS # BLD AUTO: 0.06 THOUSANDS/ÂΜL (ref 0–0.1)
BASOPHILS # BLD AUTO: 0.06 THOUSANDS/ÂΜL (ref 0–0.1)
BASOPHILS NFR BLD AUTO: 1 % (ref 0–1)
BASOPHILS NFR BLD AUTO: 1 % (ref 0–1)
EOSINOPHIL # BLD AUTO: 0.07 THOUSAND/ÂΜL (ref 0–0.61)
EOSINOPHIL # BLD AUTO: 0.07 THOUSAND/ÂΜL (ref 0–0.61)
EOSINOPHIL NFR BLD AUTO: 1 % (ref 0–6)
EOSINOPHIL NFR BLD AUTO: 1 % (ref 0–6)
ERYTHROCYTE [DISTWIDTH] IN BLOOD BY AUTOMATED COUNT: 15.1 % (ref 11.6–15.1)
ERYTHROCYTE [DISTWIDTH] IN BLOOD BY AUTOMATED COUNT: 15.1 % (ref 11.6–15.1)
GLUCOSE SERPL-MCNC: 144 MG/DL (ref 65–140)
GLUCOSE SERPL-MCNC: 144 MG/DL (ref 65–140)
HCT VFR BLD AUTO: 40.8 % (ref 34.8–46.1)
HCT VFR BLD AUTO: 40.8 % (ref 34.8–46.1)
HGB BLD-MCNC: 12.5 G/DL (ref 11.5–15.4)
HGB BLD-MCNC: 12.5 G/DL (ref 11.5–15.4)
IMM GRANULOCYTES # BLD AUTO: 0.05 THOUSAND/UL (ref 0–0.2)
IMM GRANULOCYTES # BLD AUTO: 0.05 THOUSAND/UL (ref 0–0.2)
IMM GRANULOCYTES NFR BLD AUTO: 1 % (ref 0–2)
IMM GRANULOCYTES NFR BLD AUTO: 1 % (ref 0–2)
LYMPHOCYTES # BLD AUTO: 1.58 THOUSANDS/ÂΜL (ref 0.6–4.47)
LYMPHOCYTES # BLD AUTO: 1.58 THOUSANDS/ÂΜL (ref 0.6–4.47)
LYMPHOCYTES NFR BLD AUTO: 17 % (ref 14–44)
LYMPHOCYTES NFR BLD AUTO: 17 % (ref 14–44)
MCH RBC QN AUTO: 26.3 PG (ref 26.8–34.3)
MCH RBC QN AUTO: 26.3 PG (ref 26.8–34.3)
MCHC RBC AUTO-ENTMCNC: 30.6 G/DL (ref 31.4–37.4)
MCHC RBC AUTO-ENTMCNC: 30.6 G/DL (ref 31.4–37.4)
MCV RBC AUTO: 86 FL (ref 82–98)
MCV RBC AUTO: 86 FL (ref 82–98)
MONOCYTES # BLD AUTO: 1.32 THOUSAND/ÂΜL (ref 0.17–1.22)
MONOCYTES # BLD AUTO: 1.32 THOUSAND/ÂΜL (ref 0.17–1.22)
MONOCYTES NFR BLD AUTO: 15 % (ref 4–12)
MONOCYTES NFR BLD AUTO: 15 % (ref 4–12)
NEUTROPHILS # BLD AUTO: 6.03 THOUSANDS/ÂΜL (ref 1.85–7.62)
NEUTROPHILS # BLD AUTO: 6.03 THOUSANDS/ÂΜL (ref 1.85–7.62)
NEUTS SEG NFR BLD AUTO: 65 % (ref 43–75)
NEUTS SEG NFR BLD AUTO: 65 % (ref 43–75)
NRBC BLD AUTO-RTO: 0 /100 WBCS
NRBC BLD AUTO-RTO: 0 /100 WBCS
PLATELET # BLD AUTO: 188 THOUSANDS/UL (ref 149–390)
PLATELET # BLD AUTO: 188 THOUSANDS/UL (ref 149–390)
PMV BLD AUTO: 9.7 FL (ref 8.9–12.7)
PMV BLD AUTO: 9.7 FL (ref 8.9–12.7)
RBC # BLD AUTO: 4.76 MILLION/UL (ref 3.81–5.12)
RBC # BLD AUTO: 4.76 MILLION/UL (ref 3.81–5.12)
WBC # BLD AUTO: 9.11 THOUSAND/UL (ref 4.31–10.16)
WBC # BLD AUTO: 9.11 THOUSAND/UL (ref 4.31–10.16)

## 2023-02-28 RX ORDER — METHYLPREDNISOLONE SOD SUCC 125 MG
1 VIAL (EA) INJECTION ONCE
Status: COMPLETED | OUTPATIENT
Start: 2023-02-28 | End: 2023-02-28

## 2023-02-28 RX ORDER — ALBUTEROL SULFATE 2.5 MG/3ML
2 SOLUTION RESPIRATORY (INHALATION) ONCE
Status: COMPLETED | OUTPATIENT
Start: 2023-02-28 | End: 2023-02-28

## 2023-02-28 RX ORDER — ALBUTEROL SULFATE 2.5 MG/3ML
5 SOLUTION RESPIRATORY (INHALATION) ONCE
Status: COMPLETED | OUTPATIENT
Start: 2023-02-28 | End: 2023-03-01

## 2023-02-28 RX ORDER — IPRATROPIUM BROMIDE AND ALBUTEROL SULFATE .5; 3 MG/3ML; MG/3ML
2 SOLUTION RESPIRATORY (INHALATION) ONCE
Status: COMPLETED | OUTPATIENT
Start: 2023-02-28 | End: 2023-02-28

## 2023-02-28 RX ADMIN — CEFTRIAXONE 1000 MG: 1 INJECTION, POWDER, FOR SOLUTION INTRAMUSCULAR; INTRAVENOUS at 23:56

## 2023-03-01 ENCOUNTER — APPOINTMENT (EMERGENCY)
Dept: CT IMAGING | Facility: HOSPITAL | Age: 83
End: 2023-03-01

## 2023-03-01 PROBLEM — J18.9 PNEUMONIA DUE TO INFECTIOUS ORGANISM: Status: ACTIVE | Noted: 2023-03-01

## 2023-03-01 PROBLEM — J96.20 ACUTE ON CHRONIC RESPIRATORY FAILURE (HCC): Status: ACTIVE | Noted: 2023-03-01

## 2023-03-01 PROBLEM — G89.29 CHRONIC BACK PAIN: Status: ACTIVE | Noted: 2023-03-01

## 2023-03-01 PROBLEM — J44.1 COPD EXACERBATION (HCC): Status: ACTIVE | Noted: 2023-03-01

## 2023-03-01 PROBLEM — E87.1 HYPONATREMIA: Status: ACTIVE | Noted: 2023-03-01

## 2023-03-01 PROBLEM — E78.5 HYPERLIPIDEMIA: Status: ACTIVE | Noted: 2023-03-01

## 2023-03-01 PROBLEM — M54.9 CHRONIC BACK PAIN: Status: ACTIVE | Noted: 2023-03-01

## 2023-03-01 PROBLEM — E11.9 TYPE 2 DIABETES MELLITUS (HCC): Status: ACTIVE | Noted: 2023-03-01

## 2023-03-01 PROBLEM — I10 HYPERTENSION: Status: ACTIVE | Noted: 2023-03-01

## 2023-03-01 PROBLEM — K21.9 GERD (GASTROESOPHAGEAL REFLUX DISEASE): Status: ACTIVE | Noted: 2023-03-01

## 2023-03-01 PROBLEM — A41.9 SEPSIS (HCC): Status: ACTIVE | Noted: 2023-03-01

## 2023-03-01 LAB
2HR DELTA HS TROPONIN: 0 NG/L
2HR DELTA HS TROPONIN: 0 NG/L
ALBUMIN SERPL BCP-MCNC: 4.2 G/DL (ref 3.5–5)
ALBUMIN SERPL BCP-MCNC: 4.2 G/DL (ref 3.5–5)
ALP SERPL-CCNC: 92 U/L (ref 34–104)
ALP SERPL-CCNC: 92 U/L (ref 34–104)
ALT SERPL W P-5'-P-CCNC: 12 U/L (ref 7–52)
ALT SERPL W P-5'-P-CCNC: 12 U/L (ref 7–52)
ANION GAP SERPL CALCULATED.3IONS-SCNC: 11 MMOL/L (ref 4–13)
ANION GAP SERPL CALCULATED.3IONS-SCNC: 12 MMOL/L (ref 4–13)
ANION GAP SERPL CALCULATED.3IONS-SCNC: 12 MMOL/L (ref 4–13)
APTT PPP: 27 SECONDS (ref 23–37)
APTT PPP: 27 SECONDS (ref 23–37)
AST SERPL W P-5'-P-CCNC: 16 U/L (ref 13–39)
AST SERPL W P-5'-P-CCNC: 16 U/L (ref 13–39)
ATRIAL RATE: 68 BPM
ATRIAL RATE: 68 BPM
BASE EX.OXY STD BLDV CALC-SCNC: 93.4 % (ref 60–80)
BASE EX.OXY STD BLDV CALC-SCNC: 93.4 % (ref 60–80)
BASE EXCESS BLDV CALC-SCNC: -1.6 MMOL/L
BASE EXCESS BLDV CALC-SCNC: -1.6 MMOL/L
BASOPHILS # BLD MANUAL: 0 THOUSAND/UL (ref 0–0.1)
BASOPHILS NFR MAR MANUAL: 0 % (ref 0–1)
BILIRUB SERPL-MCNC: 0.75 MG/DL (ref 0.2–1)
BILIRUB SERPL-MCNC: 0.75 MG/DL (ref 0.2–1)
BNP SERPL-MCNC: 36 PG/ML (ref 0–100)
BNP SERPL-MCNC: 36 PG/ML (ref 0–100)
BUN SERPL-MCNC: 20 MG/DL (ref 5–25)
BUN SERPL-MCNC: 20 MG/DL (ref 5–25)
BUN SERPL-MCNC: 27 MG/DL (ref 5–25)
BUN SERPL-MCNC: 27 MG/DL (ref 5–25)
BUN SERPL-MCNC: 35 MG/DL (ref 5–25)
BUN SERPL-MCNC: 35 MG/DL (ref 5–25)
CALCIUM SERPL-MCNC: 8.6 MG/DL (ref 8.4–10.2)
CALCIUM SERPL-MCNC: 8.6 MG/DL (ref 8.4–10.2)
CALCIUM SERPL-MCNC: 8.9 MG/DL (ref 8.4–10.2)
CALCIUM SERPL-MCNC: 8.9 MG/DL (ref 8.4–10.2)
CALCIUM SERPL-MCNC: 9.6 MG/DL (ref 8.4–10.2)
CALCIUM SERPL-MCNC: 9.6 MG/DL (ref 8.4–10.2)
CARDIAC TROPONIN I PNL SERPL HS: 4 NG/L
CHLORIDE SERPL-SCNC: 93 MMOL/L (ref 96–108)
CHLORIDE SERPL-SCNC: 93 MMOL/L (ref 96–108)
CHLORIDE SERPL-SCNC: 95 MMOL/L (ref 96–108)
CHLORIDE SERPL-SCNC: 95 MMOL/L (ref 96–108)
CHLORIDE SERPL-SCNC: 96 MMOL/L (ref 96–108)
CHLORIDE SERPL-SCNC: 96 MMOL/L (ref 96–108)
CO2 SERPL-SCNC: 23 MMOL/L (ref 21–32)
CO2 SERPL-SCNC: 25 MMOL/L (ref 21–32)
CO2 SERPL-SCNC: 25 MMOL/L (ref 21–32)
CREAT SERPL-MCNC: 0.91 MG/DL (ref 0.6–1.3)
CREAT SERPL-MCNC: 0.91 MG/DL (ref 0.6–1.3)
CREAT SERPL-MCNC: 1.25 MG/DL (ref 0.6–1.3)
CREAT SERPL-MCNC: 1.25 MG/DL (ref 0.6–1.3)
CREAT SERPL-MCNC: 1.45 MG/DL (ref 0.6–1.3)
CREAT SERPL-MCNC: 1.45 MG/DL (ref 0.6–1.3)
D DIMER PPP FEU-MCNC: 1.23 UG/ML FEU
D DIMER PPP FEU-MCNC: 1.23 UG/ML FEU
EOSINOPHIL # BLD MANUAL: 0 THOUSAND/UL (ref 0–0.4)
EOSINOPHIL NFR BLD MANUAL: 0 % (ref 0–6)
ERYTHROCYTE [DISTWIDTH] IN BLOOD BY AUTOMATED COUNT: 15.3 % (ref 11.6–15.1)
ERYTHROCYTE [DISTWIDTH] IN BLOOD BY AUTOMATED COUNT: 15.3 % (ref 11.6–15.1)
ERYTHROCYTE [DISTWIDTH] IN BLOOD BY AUTOMATED COUNT: 15.4 % (ref 11.6–15.1)
ERYTHROCYTE [DISTWIDTH] IN BLOOD BY AUTOMATED COUNT: 15.4 % (ref 11.6–15.1)
FLUAV RNA RESP QL NAA+PROBE: NEGATIVE
FLUAV RNA RESP QL NAA+PROBE: NEGATIVE
FLUBV RNA RESP QL NAA+PROBE: NEGATIVE
FLUBV RNA RESP QL NAA+PROBE: NEGATIVE
GFR SERPL CREATININE-BSD FRML MDRD: 33 ML/MIN/1.73SQ M
GFR SERPL CREATININE-BSD FRML MDRD: 33 ML/MIN/1.73SQ M
GFR SERPL CREATININE-BSD FRML MDRD: 40 ML/MIN/1.73SQ M
GFR SERPL CREATININE-BSD FRML MDRD: 40 ML/MIN/1.73SQ M
GFR SERPL CREATININE-BSD FRML MDRD: 58 ML/MIN/1.73SQ M
GFR SERPL CREATININE-BSD FRML MDRD: 58 ML/MIN/1.73SQ M
GLUCOSE SERPL-MCNC: 147 MG/DL (ref 65–140)
GLUCOSE SERPL-MCNC: 147 MG/DL (ref 65–140)
GLUCOSE SERPL-MCNC: 315 MG/DL (ref 65–140)
GLUCOSE SERPL-MCNC: 315 MG/DL (ref 65–140)
GLUCOSE SERPL-MCNC: 317 MG/DL (ref 65–140)
GLUCOSE SERPL-MCNC: 317 MG/DL (ref 65–140)
GLUCOSE SERPL-MCNC: 378 MG/DL (ref 65–140)
GLUCOSE SERPL-MCNC: 378 MG/DL (ref 65–140)
GLUCOSE SERPL-MCNC: 406 MG/DL (ref 65–140)
GLUCOSE SERPL-MCNC: 406 MG/DL (ref 65–140)
GLUCOSE SERPL-MCNC: 411 MG/DL (ref 65–140)
GLUCOSE SERPL-MCNC: 411 MG/DL (ref 65–140)
GLUCOSE SERPL-MCNC: 424 MG/DL (ref 65–140)
GLUCOSE SERPL-MCNC: 424 MG/DL (ref 65–140)
GLUCOSE SERPL-MCNC: 436 MG/DL (ref 65–140)
GLUCOSE SERPL-MCNC: 436 MG/DL (ref 65–140)
GLUCOSE SERPL-MCNC: 456 MG/DL (ref 65–140)
GLUCOSE SERPL-MCNC: 456 MG/DL (ref 65–140)
HCO3 BLDV-SCNC: 23.9 MMOL/L (ref 24–30)
HCO3 BLDV-SCNC: 23.9 MMOL/L (ref 24–30)
HCT VFR BLD AUTO: 35.8 % (ref 34.8–46.1)
HCT VFR BLD AUTO: 35.8 % (ref 34.8–46.1)
HCT VFR BLD AUTO: 37.3 % (ref 34.8–46.1)
HCT VFR BLD AUTO: 37.3 % (ref 34.8–46.1)
HGB BLD-MCNC: 11 G/DL (ref 11.5–15.4)
HGB BLD-MCNC: 11 G/DL (ref 11.5–15.4)
HGB BLD-MCNC: 11.3 G/DL (ref 11.5–15.4)
HGB BLD-MCNC: 11.3 G/DL (ref 11.5–15.4)
INR PPP: 1.09 (ref 0.84–1.19)
INR PPP: 1.09 (ref 0.84–1.19)
L PNEUMO1 AG UR QL IA.RAPID: NEGATIVE
L PNEUMO1 AG UR QL IA.RAPID: NEGATIVE
LACTATE SERPL-SCNC: 1.1 MMOL/L (ref 0.5–2)
LACTATE SERPL-SCNC: 1.1 MMOL/L (ref 0.5–2)
LYMPHOCYTES # BLD AUTO: 0.19 THOUSAND/UL (ref 0.6–4.47)
LYMPHOCYTES # BLD AUTO: 0.19 THOUSAND/UL (ref 0.6–4.47)
LYMPHOCYTES # BLD AUTO: 0.27 THOUSAND/UL (ref 0.6–4.47)
LYMPHOCYTES # BLD AUTO: 0.27 THOUSAND/UL (ref 0.6–4.47)
LYMPHOCYTES # BLD AUTO: 3 % (ref 14–44)
LYMPHOCYTES # BLD AUTO: 3 % (ref 14–44)
LYMPHOCYTES # BLD AUTO: 5 % (ref 14–44)
LYMPHOCYTES # BLD AUTO: 5 % (ref 14–44)
MCH RBC QN AUTO: 26.3 PG (ref 26.8–34.3)
MCH RBC QN AUTO: 26.3 PG (ref 26.8–34.3)
MCH RBC QN AUTO: 26.4 PG (ref 26.8–34.3)
MCH RBC QN AUTO: 26.4 PG (ref 26.8–34.3)
MCHC RBC AUTO-ENTMCNC: 30.3 G/DL (ref 31.4–37.4)
MCHC RBC AUTO-ENTMCNC: 30.3 G/DL (ref 31.4–37.4)
MCHC RBC AUTO-ENTMCNC: 30.7 G/DL (ref 31.4–37.4)
MCHC RBC AUTO-ENTMCNC: 30.7 G/DL (ref 31.4–37.4)
MCV RBC AUTO: 86 FL (ref 82–98)
MCV RBC AUTO: 86 FL (ref 82–98)
MCV RBC AUTO: 87 FL (ref 82–98)
MCV RBC AUTO: 87 FL (ref 82–98)
MONOCYTES # BLD AUTO: 0.05 THOUSAND/UL (ref 0–1.22)
MONOCYTES # BLD AUTO: 0.05 THOUSAND/UL (ref 0–1.22)
MONOCYTES # BLD AUTO: 0.13 THOUSAND/UL (ref 0–1.22)
MONOCYTES # BLD AUTO: 0.13 THOUSAND/UL (ref 0–1.22)
MONOCYTES NFR BLD: 1 % (ref 4–12)
MONOCYTES NFR BLD: 1 % (ref 4–12)
MONOCYTES NFR BLD: 2 % (ref 4–12)
MONOCYTES NFR BLD: 2 % (ref 4–12)
NEUTROPHILS # BLD MANUAL: 5.07 THOUSAND/UL (ref 1.85–7.62)
NEUTROPHILS # BLD MANUAL: 5.07 THOUSAND/UL (ref 1.85–7.62)
NEUTROPHILS # BLD MANUAL: 6.14 THOUSAND/UL (ref 1.85–7.62)
NEUTROPHILS # BLD MANUAL: 6.14 THOUSAND/UL (ref 1.85–7.62)
NEUTS BAND NFR BLD MANUAL: 5 % (ref 0–8)
NEUTS BAND NFR BLD MANUAL: 5 % (ref 0–8)
NEUTS SEG NFR BLD AUTO: 90 % (ref 43–75)
NEUTS SEG NFR BLD AUTO: 90 % (ref 43–75)
NEUTS SEG NFR BLD AUTO: 94 % (ref 43–75)
NEUTS SEG NFR BLD AUTO: 94 % (ref 43–75)
O2 CT BLDV-SCNC: 16.5 ML/DL
O2 CT BLDV-SCNC: 16.5 ML/DL
P AXIS: 84 DEGREES
P AXIS: 84 DEGREES
PCO2 BLDV: 43.4 MM HG (ref 42–50)
PCO2 BLDV: 43.4 MM HG (ref 42–50)
PH BLDV: 7.36 [PH] (ref 7.3–7.4)
PH BLDV: 7.36 [PH] (ref 7.3–7.4)
PLATELET # BLD AUTO: 173 THOUSANDS/UL (ref 149–390)
PLATELET # BLD AUTO: 173 THOUSANDS/UL (ref 149–390)
PLATELET # BLD AUTO: 179 THOUSANDS/UL (ref 149–390)
PLATELET # BLD AUTO: 179 THOUSANDS/UL (ref 149–390)
PLATELET # BLD AUTO: 182 THOUSANDS/UL (ref 149–390)
PLATELET # BLD AUTO: 182 THOUSANDS/UL (ref 149–390)
PLATELET BLD QL SMEAR: ADEQUATE
PMV BLD AUTO: 10.2 FL (ref 8.9–12.7)
PMV BLD AUTO: 10.2 FL (ref 8.9–12.7)
PMV BLD AUTO: 10.3 FL (ref 8.9–12.7)
PMV BLD AUTO: 10.3 FL (ref 8.9–12.7)
PMV BLD AUTO: 9.9 FL (ref 8.9–12.7)
PMV BLD AUTO: 9.9 FL (ref 8.9–12.7)
PO2 BLDV: 83.3 MM HG (ref 35–45)
PO2 BLDV: 83.3 MM HG (ref 35–45)
POIKILOCYTOSIS BLD QL SMEAR: PRESENT
POIKILOCYTOSIS BLD QL SMEAR: PRESENT
POTASSIUM SERPL-SCNC: 3.6 MMOL/L (ref 3.5–5.3)
POTASSIUM SERPL-SCNC: 3.6 MMOL/L (ref 3.5–5.3)
POTASSIUM SERPL-SCNC: 4.2 MMOL/L (ref 3.5–5.3)
POTASSIUM SERPL-SCNC: 4.2 MMOL/L (ref 3.5–5.3)
POTASSIUM SERPL-SCNC: 4.4 MMOL/L (ref 3.5–5.3)
POTASSIUM SERPL-SCNC: 4.4 MMOL/L (ref 3.5–5.3)
PR INTERVAL: 144 MS
PR INTERVAL: 144 MS
PROCALCITONIN SERPL-MCNC: 0.07 NG/ML
PROCALCITONIN SERPL-MCNC: 0.07 NG/ML
PROT SERPL-MCNC: 7.4 G/DL (ref 6.4–8.4)
PROT SERPL-MCNC: 7.4 G/DL (ref 6.4–8.4)
PROTHROMBIN TIME: 14.3 SECONDS (ref 11.6–14.5)
PROTHROMBIN TIME: 14.3 SECONDS (ref 11.6–14.5)
QRS AXIS: 253 DEGREES
QRS AXIS: 253 DEGREES
QRSD INTERVAL: 128 MS
QRSD INTERVAL: 128 MS
QT INTERVAL: 434 MS
QT INTERVAL: 434 MS
QTC INTERVAL: 461 MS
QTC INTERVAL: 461 MS
RBC # BLD AUTO: 4.17 MILLION/UL (ref 3.81–5.12)
RBC # BLD AUTO: 4.17 MILLION/UL (ref 3.81–5.12)
RBC # BLD AUTO: 4.3 MILLION/UL (ref 3.81–5.12)
RBC # BLD AUTO: 4.3 MILLION/UL (ref 3.81–5.12)
RBC MORPH BLD: NORMAL
RBC MORPH BLD: NORMAL
RBC MORPH BLD: PRESENT
RBC MORPH BLD: PRESENT
RSV RNA RESP QL NAA+PROBE: NEGATIVE
RSV RNA RESP QL NAA+PROBE: NEGATIVE
S PNEUM AG UR QL: NEGATIVE
S PNEUM AG UR QL: NEGATIVE
SARS-COV-2 RNA RESP QL NAA+PROBE: NEGATIVE
SARS-COV-2 RNA RESP QL NAA+PROBE: NEGATIVE
SODIUM SERPL-SCNC: 127 MMOL/L (ref 135–147)
SODIUM SERPL-SCNC: 127 MMOL/L (ref 135–147)
SODIUM SERPL-SCNC: 130 MMOL/L (ref 135–147)
SODIUM SERPL-SCNC: 130 MMOL/L (ref 135–147)
SODIUM SERPL-SCNC: 132 MMOL/L (ref 135–147)
SODIUM SERPL-SCNC: 132 MMOL/L (ref 135–147)
T WAVE AXIS: 65 DEGREES
T WAVE AXIS: 65 DEGREES
VENTRICULAR RATE: 68 BPM
VENTRICULAR RATE: 68 BPM
WBC # BLD AUTO: 5.39 THOUSAND/UL (ref 4.31–10.16)
WBC # BLD AUTO: 5.39 THOUSAND/UL (ref 4.31–10.16)
WBC # BLD AUTO: 6.46 THOUSAND/UL (ref 4.31–10.16)
WBC # BLD AUTO: 6.46 THOUSAND/UL (ref 4.31–10.16)

## 2023-03-01 RX ORDER — ROPINIROLE 0.25 MG/1
0.5 TABLET, FILM COATED ORAL
Status: DISCONTINUED | OUTPATIENT
Start: 2023-03-01 | End: 2023-03-06 | Stop reason: HOSPADM

## 2023-03-01 RX ORDER — ASPIRIN 81 MG/1
81 TABLET, CHEWABLE ORAL DAILY
COMMUNITY

## 2023-03-01 RX ORDER — DOCUSATE SODIUM 100 MG/1
100 CAPSULE, LIQUID FILLED ORAL 2 TIMES DAILY
Status: DISCONTINUED | OUTPATIENT
Start: 2023-03-01 | End: 2023-03-06 | Stop reason: HOSPADM

## 2023-03-01 RX ORDER — GUAIFENESIN 600 MG/1
600 TABLET, EXTENDED RELEASE ORAL 2 TIMES DAILY
Status: DISCONTINUED | OUTPATIENT
Start: 2023-03-01 | End: 2023-03-03

## 2023-03-01 RX ORDER — OXYCODONE HCL 10 MG/1
10 TABLET, FILM COATED, EXTENDED RELEASE ORAL EVERY 12 HOURS SCHEDULED
Status: DISCONTINUED | OUTPATIENT
Start: 2023-03-01 | End: 2023-03-06 | Stop reason: HOSPADM

## 2023-03-01 RX ORDER — PANTOPRAZOLE SODIUM 40 MG/1
40 TABLET, DELAYED RELEASE ORAL DAILY
Status: DISCONTINUED | OUTPATIENT
Start: 2023-03-01 | End: 2023-03-06 | Stop reason: HOSPADM

## 2023-03-01 RX ORDER — POLYETHYLENE GLYCOL 3350 17 G/17G
17 POWDER, FOR SOLUTION ORAL DAILY
Status: DISCONTINUED | OUTPATIENT
Start: 2023-03-01 | End: 2023-03-06 | Stop reason: HOSPADM

## 2023-03-01 RX ORDER — ATORVASTATIN CALCIUM 40 MG/1
40 TABLET, FILM COATED ORAL DAILY
COMMUNITY

## 2023-03-01 RX ORDER — BRIMONIDINE TARTRATE 2 MG/ML
1 SOLUTION/ DROPS OPHTHALMIC 2 TIMES DAILY
Status: DISCONTINUED | OUTPATIENT
Start: 2023-03-01 | End: 2023-03-06 | Stop reason: HOSPADM

## 2023-03-01 RX ORDER — ONDANSETRON 2 MG/ML
4 INJECTION INTRAMUSCULAR; INTRAVENOUS EVERY 6 HOURS PRN
Status: DISCONTINUED | OUTPATIENT
Start: 2023-03-01 | End: 2023-03-06 | Stop reason: HOSPADM

## 2023-03-01 RX ORDER — LEVALBUTEROL 1.25 MG/.5ML
1.25 SOLUTION, CONCENTRATE RESPIRATORY (INHALATION)
Status: DISCONTINUED | OUTPATIENT
Start: 2023-03-01 | End: 2023-03-04

## 2023-03-01 RX ORDER — ATORVASTATIN CALCIUM 40 MG/1
40 TABLET, FILM COATED ORAL DAILY
Status: DISCONTINUED | OUTPATIENT
Start: 2023-03-01 | End: 2023-03-06 | Stop reason: HOSPADM

## 2023-03-01 RX ORDER — BRIMONIDINE TARTRATE 2 MG/ML
1 SOLUTION/ DROPS OPHTHALMIC 2 TIMES DAILY
COMMUNITY

## 2023-03-01 RX ORDER — OXYCODONE HCL 10 MG/1
13.5 TABLET, FILM COATED, EXTENDED RELEASE ORAL EVERY 12 HOURS SCHEDULED
COMMUNITY

## 2023-03-01 RX ORDER — METHOCARBAMOL 500 MG/1
500 TABLET, FILM COATED ORAL 4 TIMES DAILY
Status: DISCONTINUED | OUTPATIENT
Start: 2023-03-01 | End: 2023-03-06 | Stop reason: HOSPADM

## 2023-03-01 RX ORDER — ENOXAPARIN SODIUM 100 MG/ML
40 INJECTION SUBCUTANEOUS DAILY
Status: DISCONTINUED | OUTPATIENT
Start: 2023-03-01 | End: 2023-03-06 | Stop reason: HOSPADM

## 2023-03-01 RX ORDER — INSULIN LISPRO 100 [IU]/ML
1-5 INJECTION, SOLUTION INTRAVENOUS; SUBCUTANEOUS
Status: DISCONTINUED | OUTPATIENT
Start: 2023-03-01 | End: 2023-03-01

## 2023-03-01 RX ORDER — ALBUTEROL SULFATE 90 UG/1
2 AEROSOL, METERED RESPIRATORY (INHALATION) EVERY 6 HOURS PRN
Status: DISCONTINUED | OUTPATIENT
Start: 2023-03-01 | End: 2023-03-06 | Stop reason: HOSPADM

## 2023-03-01 RX ORDER — GABAPENTIN 300 MG/1
300 CAPSULE ORAL 4 TIMES DAILY
COMMUNITY

## 2023-03-01 RX ORDER — ALBUTEROL SULFATE 2.5 MG/3ML
2.5 SOLUTION RESPIRATORY (INHALATION) EVERY 4 HOURS PRN
Status: DISCONTINUED | OUTPATIENT
Start: 2023-03-01 | End: 2023-03-06 | Stop reason: HOSPADM

## 2023-03-01 RX ORDER — MULTIVIT WITH MINERALS/LUTEIN
1000 TABLET ORAL 2 TIMES DAILY
COMMUNITY

## 2023-03-01 RX ORDER — BENZONATATE 100 MG/1
200 CAPSULE ORAL ONCE
Status: COMPLETED | OUTPATIENT
Start: 2023-03-01 | End: 2023-03-01

## 2023-03-01 RX ORDER — DILTIAZEM HYDROCHLORIDE 180 MG/1
180 CAPSULE, COATED, EXTENDED RELEASE ORAL DAILY
Status: DISCONTINUED | OUTPATIENT
Start: 2023-03-01 | End: 2023-03-06 | Stop reason: HOSPADM

## 2023-03-01 RX ORDER — ASPIRIN 81 MG/1
81 TABLET, CHEWABLE ORAL DAILY
Status: DISCONTINUED | OUTPATIENT
Start: 2023-03-01 | End: 2023-03-04

## 2023-03-01 RX ORDER — METHYLPREDNISOLONE SODIUM SUCCINATE 125 MG/2ML
80 INJECTION, POWDER, LYOPHILIZED, FOR SOLUTION INTRAMUSCULAR; INTRAVENOUS DAILY
Status: DISCONTINUED | OUTPATIENT
Start: 2023-03-01 | End: 2023-03-01

## 2023-03-01 RX ORDER — METHYLPREDNISOLONE SODIUM SUCCINATE 40 MG/ML
40 INJECTION, POWDER, LYOPHILIZED, FOR SOLUTION INTRAMUSCULAR; INTRAVENOUS 2 TIMES DAILY
Status: DISCONTINUED | OUTPATIENT
Start: 2023-03-01 | End: 2023-03-02

## 2023-03-01 RX ORDER — ALBUTEROL SULFATE 90 UG/1
2 AEROSOL, METERED RESPIRATORY (INHALATION) EVERY 6 HOURS PRN
COMMUNITY

## 2023-03-01 RX ORDER — DILTIAZEM HYDROCHLORIDE 180 MG/1
180 CAPSULE, COATED, EXTENDED RELEASE ORAL DAILY
COMMUNITY

## 2023-03-01 RX ORDER — PANTOPRAZOLE SODIUM 40 MG/1
40 TABLET, DELAYED RELEASE ORAL DAILY
COMMUNITY

## 2023-03-01 RX ORDER — BUDESONIDE AND FORMOTEROL FUMARATE DIHYDRATE 160; 4.5 UG/1; UG/1
2 AEROSOL RESPIRATORY (INHALATION) 2 TIMES DAILY
Status: DISCONTINUED | OUTPATIENT
Start: 2023-03-01 | End: 2023-03-06 | Stop reason: HOSPADM

## 2023-03-01 RX ORDER — LOSARTAN POTASSIUM 50 MG/1
50 TABLET ORAL DAILY
Status: DISCONTINUED | OUTPATIENT
Start: 2023-03-01 | End: 2023-03-06 | Stop reason: HOSPADM

## 2023-03-01 RX ORDER — LOSARTAN POTASSIUM 50 MG/1
50 TABLET ORAL DAILY
COMMUNITY

## 2023-03-01 RX ORDER — GABAPENTIN 300 MG/1
300 CAPSULE ORAL 4 TIMES DAILY
Status: DISCONTINUED | OUTPATIENT
Start: 2023-03-01 | End: 2023-03-06 | Stop reason: HOSPADM

## 2023-03-01 RX ORDER — METHOCARBAMOL 500 MG/1
500 TABLET, FILM COATED ORAL 4 TIMES DAILY
COMMUNITY

## 2023-03-01 RX ORDER — ROPINIROLE 0.5 MG/1
0.5 TABLET, FILM COATED ORAL
COMMUNITY

## 2023-03-01 RX ORDER — IPRATROPIUM BROMIDE AND ALBUTEROL SULFATE 2.5; .5 MG/3ML; MG/3ML
3 SOLUTION RESPIRATORY (INHALATION)
Status: DISCONTINUED | OUTPATIENT
Start: 2023-03-01 | End: 2023-03-01

## 2023-03-01 RX ORDER — ASCORBIC ACID 500 MG
1000 TABLET ORAL 2 TIMES DAILY
Status: DISCONTINUED | OUTPATIENT
Start: 2023-03-01 | End: 2023-03-06 | Stop reason: HOSPADM

## 2023-03-01 RX ADMIN — BRIMONIDINE TARTRATE 1 DROP: 2 SOLUTION/ DROPS OPHTHALMIC at 09:44

## 2023-03-01 RX ADMIN — BUDESONIDE AND FORMOTEROL FUMARATE DIHYDRATE 2 PUFF: 160; 4.5 AEROSOL RESPIRATORY (INHALATION) at 09:43

## 2023-03-01 RX ADMIN — ROPINIROLE 0.5 MG: 0.25 TABLET, FILM COATED ORAL at 21:27

## 2023-03-01 RX ADMIN — BRIMONIDINE TARTRATE 1 DROP: 2 SOLUTION/ DROPS OPHTHALMIC at 21:36

## 2023-03-01 RX ADMIN — OXYCODONE HYDROCHLORIDE AND ACETAMINOPHEN 1000 MG: 500 TABLET ORAL at 09:02

## 2023-03-01 RX ADMIN — BUDESONIDE AND FORMOTEROL FUMARATE DIHYDRATE 2 PUFF: 160; 4.5 AEROSOL RESPIRATORY (INHALATION) at 18:08

## 2023-03-01 RX ADMIN — INSULIN LISPRO 5 UNITS: 100 INJECTION, SOLUTION INTRAVENOUS; SUBCUTANEOUS at 21:41

## 2023-03-01 RX ADMIN — SODIUM CHLORIDE 12 UNITS/HR: 9 INJECTION, SOLUTION INTRAVENOUS at 23:01

## 2023-03-01 RX ADMIN — OXYCODONE HYDROCHLORIDE 10 MG: 10 TABLET, FILM COATED, EXTENDED RELEASE ORAL at 09:01

## 2023-03-01 RX ADMIN — GABAPENTIN 300 MG: 300 CAPSULE ORAL at 09:01

## 2023-03-01 RX ADMIN — IPRATROPIUM BROMIDE 0.5 MG: 0.5 SOLUTION RESPIRATORY (INHALATION) at 00:16

## 2023-03-01 RX ADMIN — INSULIN LISPRO 3 UNITS: 100 INJECTION, SOLUTION INTRAVENOUS; SUBCUTANEOUS at 08:02

## 2023-03-01 RX ADMIN — INSULIN LISPRO 5 UNITS: 100 INJECTION, SOLUTION INTRAVENOUS; SUBCUTANEOUS at 11:42

## 2023-03-01 RX ADMIN — METHOCARBAMOL 500 MG: 500 TABLET ORAL at 12:19

## 2023-03-01 RX ADMIN — DOCUSATE SODIUM 100 MG: 100 CAPSULE, LIQUID FILLED ORAL at 09:01

## 2023-03-01 RX ADMIN — GABAPENTIN 300 MG: 300 CAPSULE ORAL at 21:27

## 2023-03-01 RX ADMIN — LOSARTAN POTASSIUM 50 MG: 50 TABLET, FILM COATED ORAL at 09:02

## 2023-03-01 RX ADMIN — METHOCARBAMOL 500 MG: 500 TABLET ORAL at 21:27

## 2023-03-01 RX ADMIN — INSULIN LISPRO 4 UNITS: 100 INJECTION, SOLUTION INTRAVENOUS; SUBCUTANEOUS at 17:10

## 2023-03-01 RX ADMIN — LEVALBUTEROL HYDROCHLORIDE 1.25 MG: 1.25 SOLUTION, CONCENTRATE RESPIRATORY (INHALATION) at 19:24

## 2023-03-01 RX ADMIN — IOHEXOL 55 ML: 350 INJECTION, SOLUTION INTRAVENOUS at 01:55

## 2023-03-01 RX ADMIN — METHYLPREDNISOLONE SODIUM SUCCINATE 40 MG: 40 INJECTION, POWDER, FOR SOLUTION INTRAMUSCULAR; INTRAVENOUS at 09:42

## 2023-03-01 RX ADMIN — DOCUSATE SODIUM 100 MG: 100 CAPSULE, LIQUID FILLED ORAL at 18:07

## 2023-03-01 RX ADMIN — GABAPENTIN 300 MG: 300 CAPSULE ORAL at 18:07

## 2023-03-01 RX ADMIN — METHOCARBAMOL 500 MG: 500 TABLET ORAL at 09:02

## 2023-03-01 RX ADMIN — OXYCODONE HYDROCHLORIDE 10 MG: 10 TABLET, FILM COATED, EXTENDED RELEASE ORAL at 21:26

## 2023-03-01 RX ADMIN — AZITHROMYCIN DIHYDRATE 500 MG: 500 INJECTION, POWDER, LYOPHILIZED, FOR SOLUTION INTRAVENOUS at 00:24

## 2023-03-01 RX ADMIN — ENOXAPARIN SODIUM 40 MG: 40 INJECTION SUBCUTANEOUS at 09:33

## 2023-03-01 RX ADMIN — PANTOPRAZOLE SODIUM 40 MG: 40 TABLET, DELAYED RELEASE ORAL at 09:01

## 2023-03-01 RX ADMIN — DILTIAZEM HYDROCHLORIDE 180 MG: 180 CAPSULE, COATED, EXTENDED RELEASE ORAL at 09:01

## 2023-03-01 RX ADMIN — GABAPENTIN 300 MG: 300 CAPSULE ORAL at 12:19

## 2023-03-01 RX ADMIN — LEVALBUTEROL HYDROCHLORIDE 1.25 MG: 1.25 SOLUTION, CONCENTRATE RESPIRATORY (INHALATION) at 08:23

## 2023-03-01 RX ADMIN — IPRATROPIUM BROMIDE 0.5 MG: 0.5 SOLUTION RESPIRATORY (INHALATION) at 19:24

## 2023-03-01 RX ADMIN — OXYCODONE HYDROCHLORIDE AND ACETAMINOPHEN 1000 MG: 500 TABLET ORAL at 21:27

## 2023-03-01 RX ADMIN — METHOCARBAMOL 500 MG: 500 TABLET ORAL at 18:07

## 2023-03-01 RX ADMIN — GUAIFENESIN 600 MG: 600 TABLET ORAL at 09:02

## 2023-03-01 RX ADMIN — BENZONATATE 200 MG: 100 CAPSULE ORAL at 05:27

## 2023-03-01 RX ADMIN — GUAIFENESIN 600 MG: 600 TABLET ORAL at 18:07

## 2023-03-01 RX ADMIN — ALBUTEROL SULFATE 5 MG: 2.5 SOLUTION RESPIRATORY (INHALATION) at 00:16

## 2023-03-01 RX ADMIN — METHYLPREDNISOLONE SODIUM SUCCINATE 40 MG: 40 INJECTION, POWDER, FOR SOLUTION INTRAMUSCULAR; INTRAVENOUS at 21:27

## 2023-03-01 RX ADMIN — IPRATROPIUM BROMIDE 0.5 MG: 0.5 SOLUTION RESPIRATORY (INHALATION) at 08:23

## 2023-03-01 RX ADMIN — ATORVASTATIN CALCIUM 40 MG: 40 TABLET, FILM COATED ORAL at 09:01

## 2023-03-01 RX ADMIN — ASPIRIN 81 MG: 81 TABLET, CHEWABLE ORAL at 09:01

## 2023-03-01 NOTE — ED PROCEDURE NOTE
PROCEDURE  CriticalCare Time  Performed by: Cris Navarro MD  Authorized by: Cris Navarro MD     Critical care provider statement:     Critical care time (minutes):  35    Critical care time was exclusive of:  Separately billable procedures and treating other patients    Critical care was necessary to treat or prevent imminent or life-threatening deterioration of the following conditions:  Respiratory failure (hypoxia)    Critical care was time spent personally by me on the following activities:  Obtaining history from patient or surrogate, development of treatment plan with patient or surrogate, evaluation of patient's response to treatment, examination of patient, ordering and performing treatments and interventions, ordering and review of laboratory studies, ordering and review of radiographic studies, re-evaluation of patient's condition, review of old charts and ventilator management    I assumed direction of critical care for this patient from another provider in my specialty: morales Navarro MD  03/01/23 8441

## 2023-03-01 NOTE — ED NOTES
Pt transported to CT on 6L NC  Pt tolerated being off bipap  Pt is currently on 5L NC & sating 93% & states she feels better without the mask on       Kelley Marquez  03/01/23 9167

## 2023-03-01 NOTE — ASSESSMENT & PLAN NOTE
· Most likely secondary to CAP  Recent Labs     02/28/23  2344   SODIUM 132*     Corrected Na: 134  Monitor with BMP

## 2023-03-01 NOTE — ASSESSMENT & PLAN NOTE
Reason for Consult: Alcohol abuse and suicidal ideation    Consulting attending: Dr. Duke    Identifying Data: 4-year-old male    Chief Complaint: I do know what happened    History Of Present Illness  Tony is a 34 year old male presenting with alcohol abuse.    This is a 34-year-old white single male known to me from previous admissions    He also sees Dr. Persaud at the [facility].    Patient has a history of bipolar disorder    Patient has been feeling very down depressed    He says he was feeling very paranoid about people    He says he drank yesterday about a pint of vodka.    He also told his sister that he was going to kill himself and cut his throat and had a knife in his hand    Patient left the house at which time police were called in and they found him at a park with a knife    Patient does not remember any of that    He does have blackouts    He has had history of withdrawals in the past    Patient says that Dr. Persaud started him on Geodon 18 has not helped him he is only on 40 mg at night    He also is taking lithium for his bipolar disorder    Patient is very anxious depressed seems hopeless helpless    Past psychiatric history:    Patient has had multiple inpatient admissions in the past.  He has had 1 suicidal attempt in the past    Current Facility-Administered Medications   Medication Dose Route Frequency Provider Last Rate Last Admin   • folic acid (FOLATE) tablet 1 mg  1 mg Oral Daily Ivory Duke MD       • thiamine (VITAMIN B1) tablet 100 mg  100 mg Oral Daily Ivory Duke MD       • ondansetron (ZOFRAN ODT) disintegrating tablet 4 mg  4 mg Oral Q12H PRN Ivory Duke MD        Or   • ondansetron (ZOFRAN) injection 4 mg  4 mg Intravenous Q12H PRN Ivory Duke MD       • sodium chloride 0.9 % flush bag 25 mL  25 mL Intravenous PRN Ivory Duke MD       • sodium chloride (PF) 0.9 % injection 2 mL  2 mL Intracatheter 2 times  Continue Losartan and Cardizem per day Ivory Duke MD   2 mL at 09/18/21 0851   • Potassium Standard Replacement Protocol   Does not apply See Admin Instructions Ivory Duke MD       • Magnesium Standard Replacement Protocol   Does not apply See Admin Instructions Ivory Duke MD       • acetaminophen (TYLENOL) tablet 650 mg  650 mg Oral Q4H PRN Ivory Duke MD       • docusate sodium-sennosides (SENOKOT S) 50-8.6 MG 2 tablet  2 tablet Oral Daily PRN Ivory Duke MD       • bisacodyl (DULCOLAX) suppository 10 mg  10 mg Rectal Daily PRN Ivory Duke MD       • aluminum-magnesium hydroxide-simethicone (MAALOX) 200-200-20 MG/5ML suspension 30 mL  30 mL Oral Q4H PRN Ivory Duke MD       • sodium chloride 0.9 % flush bag 25 mL  25 mL Intravenous PRN Ivory Duke MD       • enoxaparin (LOVENOX) injection 40 mg  40 mg Subcutaneous Daily Ivory Duke MD       • gabapentin (NEURONTIN) capsule 300 mg  300 mg Oral 3 times per day Ivory Duke MD   300 mg at 09/18/21 0525   • LORazepam (ATIVAN) injection 2 mg  2 mg Intravenous Q1H PRN Ivory Duke MD        Or   • LORazepam (ATIVAN) injection 3 mg  3 mg Intravenous Q1H PRN Ivory Duke MD        Or   • LORazepam (ATIVAN) injection 4 mg  4 mg Intravenous Q1H PRN Ivory Duke MD       • LORazepam (ATIVAN) tablet 2 mg  2 mg Oral Q1H PRN Ivory Duke MD       • sodium chloride 0.9% infusion   Intravenous Continuous Ivory Duke  mL/hr at 09/18/21 0210 New Bag at 09/18/21 0210        Medications Prior to Admission   Medication Sig Dispense Refill   • buPROPion XL (WELLBUTRIN XL) 300 MG 24 hr tablet Take 1 tablet by mouth daily. 30 tablet 0   • lithium (ESKALITH) 450 MG CR tablet Take 1 tablet by mouth 2 times daily. 60 tablet 0   • ziprasidone (Geodon) 40 MG capsule Take 1 capsule by mouth daily after dinner. 30 capsule 0   • busPIRone (BUSPAR) 10 MG tablet Take 2 tablets by mouth 2  times daily. 120 tablet 0   • traZODone (DESYREL) 100 MG tablet Take 1-2 tablets by mouth nightly. (Patient taking differently: Take 200 mg by mouth nightly. ) 60 tablet 0   • pantoprazole (PROTONIX) 40 MG tablet Take 1 tablet by mouth daily (before breakfast). Do not start before September 22, 2020.          Past Medical History  Past Medical History:   Diagnosis Date   • History of inguinal hernia repair    • Hypertension    • Psychiatric diagnosis         Surgical History  Past Surgical History:   Procedure Laterality Date   • Hernia repair          Social History   Social History     Tobacco Use   • Smoking status: Current Every Day Smoker     Packs/day: 1.00   • Smokeless tobacco: Current User   Vaping Use   • Vaping Use: never used   Substance Use Topics   • Alcohol use: Yes     Comment: unknown amount   • Drug use: Yes     Types: Marijuana     Comment: daily         Family History    Family History   Problem Relation Age of Onset   • Depression Father    • Alcohol Abuse Father    • Anxiety disorder Sister    • Alcohol Abuse Paternal Uncle    • Alcohol Abuse Paternal Grandfather           Allergies  ALLERGIES:  Patient has no known allergies.     Review Of systems:   Constitutional: negative, denied fever, chills, sweats, fatigue  HEENT: negative, denied visual changes, double vision, blurring, hearing, any ear pain, nasal congestion or sore throat.  Cardiovascular:  negative, denied chest pain, palpitations, syncope, tachycardia  Respiratory:  negative, denied SOB, sputum production, hemoptysis, wheezing  Genitourinary:  negative, denied dysuria, discharge, any lesions  GI: negative, denied N/V, diarrhea, constipation, abdominal pain  Muskuloskeletal: negative, denied any kind of pain, back, joint or muscle pain, denied decreased ROM  Neurological:  negative, denies problems with balance, confusion, numbness or tingling  Endocrine:  denied, any kind of excessive thirst, polyuria or cold/heat intolerance        Last Recorded Vitals  Blood pressure 120/82, pulse 89, temperature 97.9 °F (36.6 °C), resp. rate 12, height 5' 10\" (1.778 m), weight 120.2 kg (264 lb 15.9 oz), SpO2 96 %.       Laboratory Data     Recent Labs   Lab 09/18/21  0617 09/17/21  2328   WBC 8.7 11.3*   HGB 13.8 15.9    384       Recent Labs   Lab 09/17/21  2328   CO2 25   BUN 13   CREATININE 1.27*   GLUCOSE 97   CALCIUM 9.8       No results found    Invalid input(s): CBMZ     No results found    Invalid input(s): TTLCHOL, HDLCHOLSTRL, LDLCA    Mental status exam: Alert oriented into 3 good eye contact speech is clear coherent mood is depressed affect is tearful admits to having no recollection of his saying that he wanted to kill himself no homicidal ideation denies auditory visual hallucination but does have paranoia judgment insight are poor      Diagnosis:      Axis I: Alcohol abuse    Bipolar disorder    Paranoia   Axis II: Deferred      Assessment and Plan:   Safely detox patient    Transfer patient to psych for further care    We will continue lithium 450 mg twice daily and add Geodon 40 mg twice daily as he was on a very low dose    Thanks for consult

## 2023-03-01 NOTE — ED PROVIDER NOTES
History  Chief Complaint   Patient presents with   • Shortness of Breath     Pt called EMS for SOB  COPD hx  89% on room air, EMS had CPAP on arrival, pt is confused  Her family was unable to give a hx       27-year-old female with past medical history of hypertension, diabetes, COPD, on 3 L nasal cannula as needed, presents today with suspected COPD exacerbation  Patient states she has been feeling unwell with cough and congestion for the past few weeks, with increasing shortness of breath over the past few days  Patient reports worsening shortness of breath and tachypnea today prompting her to call EMS  On EMS arrival, patient room air saturation 89% but with severe tachypnea requiring CPAP  Patient received 2 DuoNeb treatments and Solumedrol en route  EMS reports that patient is somewhat confused and agitated  Patient reports nausea but denies chest pain, abdominal pain, vomiting, urinary symptoms  Patient placed on BiPAP by respiratory on arrival to ED  Shortness of Breath  Associated symptoms: cough and wheezing    Associated symptoms: no abdominal pain, no chest pain, no ear pain, no fever, no rash, no sore throat and no vomiting        Prior to Admission Medications   Prescriptions Last Dose Informant Patient Reported? Taking? Ascorbic Acid (vitamin C) 1000 MG tablet   Yes Yes   Sig: Take 1,000 mg by mouth 2 (two) times a day   Budeson-Glycopyrrol-Formoterol 160-9-4 8 MCG/ACT AERO   Yes Yes   Sig: Inhale 2 puffs Rinse mouth after use     albuterol (PROVENTIL HFA,VENTOLIN HFA) 90 mcg/act inhaler   Yes Yes   Sig: Inhale 2 puffs every 6 (six) hours as needed for wheezing   aspirin 81 mg chewable tablet   Yes Yes   Sig: Chew 81 mg daily   atorvastatin (LIPITOR) 40 mg tablet   Yes Yes   Sig: Take 40 mg by mouth daily   brimonidine tartrate 0 2 % ophthalmic solution   Yes Yes   Sig: Administer 1 drop into the left eye 2 (two) times a day   diltiazem (CARDIZEM CD) 180 mg 24 hr capsule   Yes Yes   Sig: Take 180 mg by mouth daily   gabapentin (NEURONTIN) 300 mg capsule   Yes Yes   Sig: Take 300 mg by mouth 4 (four) times a day   losartan (COZAAR) 50 mg tablet   Yes Yes   Sig: Take 50 mg by mouth daily   metFORMIN (GLUCOPHAGE) 500 mg tablet   Yes Yes   Sig: Take 1,000 mg by mouth daily with breakfast   metFORMIN (GLUCOPHAGE) 500 mg tablet   Yes Yes   Sig: Take 500 mg by mouth every evening   methocarbamol (ROBAXIN) 500 mg tablet   Yes Yes   Sig: Take 500 mg by mouth 4 (four) times a day   oxyCODONE (OxyCONTIN) 10 mg 12 hr tablet   Yes Yes   Sig: Take 13 5 mg by mouth every 12 (twelve) hours   pantoprazole (PROTONIX) 40 mg tablet   Yes Yes   Sig: Take 40 mg by mouth daily   rOPINIRole (REQUIP) 0 5 mg tablet   Yes Yes   Sig: Take 0 5 mg by mouth daily at bedtime Take 3 tablets PO before bed   sitaGLIPtin (JANUVIA) 100 mg tablet   Yes Yes   Sig: Take 100 mg by mouth daily      Facility-Administered Medications: None       History reviewed  No pertinent past medical history  History reviewed  No pertinent surgical history  History reviewed  No pertinent family history  I have reviewed and agree with the history as documented  E-Cigarette/Vaping     E-Cigarette/Vaping Substances           Review of Systems   Constitutional: Negative for chills and fever  HENT: Positive for congestion  Negative for ear pain and sore throat  Eyes: Negative for pain and visual disturbance  Respiratory: Positive for cough, shortness of breath and wheezing  Cardiovascular: Negative for chest pain, palpitations and leg swelling  Gastrointestinal: Positive for nausea  Negative for abdominal pain and vomiting  Genitourinary: Negative for dysuria and hematuria  Musculoskeletal: Negative for arthralgias and back pain  Skin: Negative for color change and rash  Neurological: Negative for seizures and syncope  Psychiatric/Behavioral: Positive for agitation and confusion     All other systems reviewed and are negative  Physical Exam  ED Triage Vitals   Temperature Pulse Respirations Blood Pressure SpO2   02/28/23 2358 02/28/23 2321 02/28/23 2321 02/28/23 2325 02/28/23 2324   (!) 96 5 °F (35 8 °C) 72 (!) 40 143/75 98 %      Temp Source Heart Rate Source Patient Position - Orthostatic VS BP Location FiO2 (%)   02/28/23 2358 02/28/23 2321 02/28/23 2325 02/28/23 2321 02/28/23 2333   Axillary Monitor Sitting Right arm 40      Pain Score       --                    Orthostatic Vital Signs  Vitals:    03/01/23 0215 03/01/23 0230 03/01/23 0300 03/01/23 0400   BP: 109/62 113/56 108/55 124/59   Pulse: 85 75 71 81   Patient Position - Orthostatic VS: Sitting Sitting         Physical Exam  Vitals and nursing note reviewed  Constitutional:       General: She is in acute distress (CPAP in place for EMS, severe tachypnea and increased work of breathing)  Appearance: She is well-developed  She is ill-appearing  She is not toxic-appearing or diaphoretic  HENT:      Head: Normocephalic and atraumatic  Mouth/Throat:      Mouth: Mucous membranes are moist    Eyes:      Conjunctiva/sclera: Conjunctivae normal    Cardiovascular:      Rate and Rhythm: Normal rate and regular rhythm  Heart sounds: No murmur heard  Pulmonary:      Effort: Tachypnea, accessory muscle usage and respiratory distress present  Breath sounds: Wheezing (coarse breath sounds bilaterally throughout lung fields) and rhonchi present  Chest:      Chest wall: No tenderness  Abdominal:      Palpations: Abdomen is soft  Tenderness: There is no abdominal tenderness  Musculoskeletal:         General: No swelling  Cervical back: Neck supple  Right lower leg: No tenderness  No edema  Left lower leg: No tenderness  No edema  Skin:     General: Skin is warm and dry  Capillary Refill: Capillary refill takes less than 2 seconds  Neurological:      General: No focal deficit present  Mental Status: She is alert   She is disoriented and confused  Motor: Motor function is intact        Comments: Non-focal neurological exam; oriented to self and place; confused to year    Psychiatric:         Mood and Affect: Mood normal          ED Medications  Medications   albuterol (PROVENTIL HFA,VENTOLIN HFA) inhaler 2 puff (has no administration in time range)   ascorbic acid (VITAMIN C) tablet 1,000 mg (has no administration in time range)   aspirin chewable tablet 81 mg (has no administration in time range)   atorvastatin (LIPITOR) tablet 40 mg (has no administration in time range)   brimonidine tartrate 0 2 % ophthalmic solution 1 drop (has no administration in time range)   budesonide-formoterol (SYMBICORT) 160-4 5 mcg/act inhaler 2 puff (has no administration in time range)   diltiazem (CARDIZEM CD) 24 hr capsule 180 mg (has no administration in time range)   gabapentin (NEURONTIN) capsule 300 mg (has no administration in time range)   losartan (COZAAR) tablet 50 mg (has no administration in time range)   methocarbamol (ROBAXIN) tablet 500 mg (has no administration in time range)   oxyCODONE (OxyCONTIN) 12 hr tablet 10 mg (has no administration in time range)   pantoprazole (PROTONIX) EC tablet 40 mg (has no administration in time range)   rOPINIRole (REQUIP) tablet 0 5 mg (has no administration in time range)   ondansetron (ZOFRAN) injection 4 mg (has no administration in time range)   docusate sodium (COLACE) capsule 100 mg (has no administration in time range)   polyethylene glycol (MIRALAX) packet 17 g (has no administration in time range)   guaiFENesin (MUCINEX) 12 hr tablet 600 mg (has no administration in time range)   benzonatate (TESSALON PERLES) capsule 200 mg (has no administration in time range)   ceftriaxone (ROCEPHIN) 1 g/50 mL in dextrose IVPB (has no administration in time range)     And   azithromycin (ZITHROMAX) 500 mg in sodium chloride 0 9% 250mL IVPB 500 mg (has no administration in time range) ipratropium-albuterol (DUO-NEB) 0 5-2 5 mg/3 mL inhalation solution 3 mL (has no administration in time range)   methylPREDNISolone sodium succinate (Solu-MEDROL) injection 80 mg (has no administration in time range)   enoxaparin (LOVENOX) subcutaneous injection 40 mg (has no administration in time range)   insulin lispro (HumaLOG) 100 units/mL subcutaneous injection 1-5 Units (has no administration in time range)   albuterol (FOR EMS ONLY) (2 5 mg/3 mL) 0 083 % inhalation solution 5 mg (0 mg Does not apply Given to EMS 2/28/23 2350)   ipratropium-albuterol (FOR EMS ONLY) (DUO-NEB) 0 5-2 5 mg/3 mL inhalation solution 6 mL (0 mL Does not apply Given to EMS 2/28/23 2351)   methylPREDNISolone sodium succinate (FOR EMS ONLY) (Solu-MEDROL) 125 MG injection 125 mg (0 mg Does not apply Given to EMS 2/28/23 2350)   ceftriaxone (ROCEPHIN) 1 g/50 mL in dextrose IVPB (0 mg Intravenous Stopped 3/1/23 0024)   azithromycin (ZITHROMAX) 500 mg in sodium chloride 0 9% 250mL IVPB 500 mg (0 mg Intravenous Stopped 3/1/23 0130)   albuterol inhalation solution 5 mg (5 mg Nebulization Given 3/1/23 0016)   ipratropium (ATROVENT) 0 02 % inhalation solution 0 5 mg (0 5 mg Nebulization Given 3/1/23 0016)   iohexol (OMNIPAQUE) 350 MG/ML injection (SINGLE-DOSE) 55 mL (55 mL Intravenous Given 3/1/23 0155)       Diagnostic Studies  Results Reviewed     Procedure Component Value Units Date/Time    Strep Pneumoniae, Urine [[id number]]     Lab Status: No result Specimen: Urine, Other     Legionella antigen, Urine [[id number]]     Lab Status: No result Specimen: Urine, Other     Procalcitonin [[id number]]     Lab Status: No result Specimen: Blood     CBC and differential [[id number]]     Lab Status: No result Specimen: Blood     Basic metabolic panel [[id number]]     Lab Status: No result Specimen: Blood     Sputum culture and Gram stain [[id number]]     Lab Status: No result Specimen: Expectorated Sputum     Platelet count [[id number]]     Lab Status: No result Specimen: Blood     HS Troponin I 2hr [[id number]]  (Normal) Collected: 03/01/23 0300    Lab Status: Final result Specimen: Blood Updated: 03/01/23 0346     hs TnI 2hr 4 ng/L      Delta 2hr hsTnI 0 ng/L     D-Dimer [[id number]]  (Abnormal) Collected: 03/01/23 0022    Lab Status: Final result Specimen: Blood from Arm, Right Updated: 03/01/23 0057     D-Dimer, Quant 1 23 ug/ml FEU     Narrative: In the evaluation for possible pulmonary embolism, in the appropriate (Well's Score of 4 or less) patient, the age adjusted d-dimer cutoff for this patient can be calculated as:    Age x 0 01 (in ug/mL) for Age-adjusted D-dimer exclusion threshold for a patient over 50 years  Protime-INR [[id number]]  (Normal) Collected: 03/01/23 0022    Lab Status: Final result Specimen: Blood from Arm, Right Updated: 03/01/23 0048     Protime 14 3 seconds      INR 1 09    APTT [[id number]]  (Normal) Collected: 03/01/23 0022    Lab Status: Final result Specimen: Blood from Arm, Right Updated: 03/01/23 0048     PTT 27 seconds     FLU/RSV/COVID - if FLU/RSV clinically relevant [[id number]]  (Normal) Collected: 02/28/23 2344    Lab Status: Final result Specimen: Nares from Nose Updated: 03/01/23 0043     SARS-CoV-2 Negative     INFLUENZA A PCR Negative     INFLUENZA B PCR Negative     RSV PCR Negative    Narrative:      FOR PEDIATRIC PATIENTS - copy/paste COVID Guidelines URL to browser: [email or web address]    SARS-CoV-2 assay is a Nucleic Acid Amplification assay intended for the  qualitative detection of nucleic acid from SARS-CoV-2 in nasopharyngeal  swabs  Results are for the presumptive identification of SARS-CoV-2 RNA  Positive results are indicative of infection with SARS-CoV-2, the virus  causing COVID-19, but do not rule out bacterial infection or co-infection  with other viruses   Laboratories within the [place] and its  territories are required to report all positive results to the appropriate  public health authorities  Negative results do not preclude SARS-CoV-2  infection and should not be used as the sole basis for treatment or other  patient management decisions  Negative results must be combined with  clinical observations, patient history, and epidemiological information  This test has not been FDA cleared or approved  This test has been authorized by FDA under an Emergency Use Authorization  (EUA)  This test is only authorized for the duration of time the  declaration that circumstances exist justifying the authorization of the  emergency use of an in vitro diagnostic tests for detection of SARS-CoV-2  virus and/or diagnosis of COVID-19 infection under section 564(b)(1) of  the Act, 21 U  S C  867FDY-3(P)(3), unless the authorization is terminated  or revoked sooner  The test has been validated but independent review by FDA  and CLIA is pending  Test performed using [employer]: This RT-PCR assay targets N2,  a region unique to SARS-CoV-2  A conserved region in the E-gene was chosen  for pan-Sarbecovirus detection which includes SARS-CoV-2  According to CMS-2020-01-R, this platform meets the definition of high-throughput technology      B-Type Natriuretic Peptide(BNP) [[id number]]  (Normal) Collected: 02/28/23 2344    Lab Status: Final result Specimen: Blood from Arm, Right Updated: 03/01/23 0042     BNP 36 pg/mL     Blood gas, venous [[id number]]  (Abnormal) Collected: 03/01/23 0022    Lab Status: Final result Specimen: Blood from Arm, Right Updated: 03/01/23 0040     pH, Skip 7 359     pCO2, Skip 43 4 mm Hg      pO2, Skip 83 3 mm Hg      HCO3, Skip 23 9 mmol/L      Base Excess, Skip -1 6 mmol/L      O2 Content, Skip 16 5 ml/dL      O2 HGB, VENOUS 93 4 %     HS Troponin I 4hr [[id number]]     Lab Status: No result Specimen: Blood     HS Troponin 0hr (reflex protocol) [[id number]]  (Normal) Collected: 02/28/23 2344    Lab Status: Final result Specimen: Blood from Arm, Right Updated: 03/01/23 0028     hs TnI 0hr 4 ng/L     Lactic acid [[id number]]  (Normal) Collected: 02/28/23 2344    Lab Status: Final result Specimen: Blood from Arm, Right Updated: 03/01/23 0021     LACTIC ACID 1 1 mmol/L     Narrative:      Result may be elevated if tourniquet was used during collection      Comprehensive metabolic panel [[id number]]  (Abnormal) Collected: 02/28/23 2344    Lab Status: Final result Specimen: Blood from Arm, Right Updated: 03/01/23 0018     Sodium 132 mmol/L      Potassium 4 2 mmol/L      Chloride 96 mmol/L      CO2 25 mmol/L      ANION GAP 11 mmol/L      BUN 20 mg/dL      Creatinine 0 91 mg/dL      Glucose 147 mg/dL      Calcium 9 6 mg/dL      AST 16 U/L      ALT 12 U/L      Alkaline Phosphatase 92 U/L      Total Protein 7 4 g/dL      Albumin 4 2 g/dL      Total Bilirubin 0 75 mg/dL      eGFR 58 ml/min/1 73sq m     Narrative:      [place] guidelines for Chronic Kidney Disease (CKD):   •  Stage 1 with normal or high GFR (GFR > 90 mL/min/1 73 square meters)  •  Stage 2 Mild CKD (GFR = 60-89 mL/min/1 73 square meters)  •  Stage 3A Moderate CKD (GFR = 45-59 mL/min/1 73 square meters)  •  Stage 3B Moderate CKD (GFR = 30-44 mL/min/1 73 square meters)  •  Stage 4 Severe CKD (GFR = 15-29 mL/min/1 73 square meters)  •  Stage 5 End Stage CKD (GFR <15 mL/min/1 73 square meters)  Note: GFR calculation is accurate only with a steady state creatinine    CBC and differential [[id number]]  (Abnormal) Collected: 02/28/23 2344    Lab Status: Final result Specimen: Blood from Arm, Right Updated: 02/28/23 2357     WBC 9 11 Thousand/uL      RBC 4 76 Million/uL      Hemoglobin 12 5 g/dL      Hematocrit 40 8 %      MCV 86 fL      MCH 26 3 pg      MCHC 30 6 g/dL      RDW 15 1 %      MPV 9 7 fL      Platelets 778 Thousands/uL      nRBC 0 /100 WBCs      Neutrophils Relative 65 %      Immat GRANS % 1 %      Lymphocytes Relative 17 %      Monocytes Relative 15 % Eosinophils Relative 1 %      Basophils Relative 1 %      Neutrophils Absolute 6 03 Thousands/µL      Immature Grans Absolute 0 05 Thousand/uL      Lymphocytes Absolute 1 58 Thousands/µL      Monocytes Absolute 1 32 Thousand/µL      Eosinophils Absolute 0 07 Thousand/µL      Basophils Absolute 0 06 Thousands/µL     Blood culture #1 [[id number]] Collected: 02/28/23 2344    Lab Status: In process Specimen: Blood from Arm, Right Updated: 02/28/23 2352    Blood culture #2 [[id number]] Collected: 02/28/23 2344    Lab Status: In process Specimen: Blood from Arm, Right Updated: 02/28/23 2352    Fingerstick Glucose (POCT) [[id number]]  (Abnormal) Collected: 02/28/23 2337    Lab Status: Final result Updated: 02/28/23 2349     POC Glucose 144 mg/dl                  CTA ED chest PE study   Final Result by Yancy Tolbert MD (03/01 9396)      No intraluminal filling defect to suggest an acute pulmonary embolus  Patchy groundglass opacities noted at the posterior left upper lobe suspicious for an infectious or inflammatory process  A few scattered tree-in-bud opacities are also noted at the right upper lobe which may be secondary to an infectious or    inflammatory bronchiolitis  Enlargement of the main pulmonary arteries suggestive of pulmonary artery hypertension  Mild to moderate cardiomegaly              Workstation performed: [id number]         XR chest 1 view portable   ED Interpretation by Anurag Somers MD (03/01 0254)   Patchy left upper lobe infiltrates            Procedures  ECG 12 Lead Documentation Only    Date/Time: 2/28/2023 11:31 PM  Performed by: Anurag Somers MD  Authorized by: Anurag Somers MD     Indications / Diagnosis:  SOB  ECG reviewed by me, the ED Provider: yes    Patient location:  ED  Interpretation:     Interpretation: abnormal    Quality:     Tracing quality:  Limited by artifact  Rate:     ECG rate:  68    ECG rate assessment: normal    Rhythm:     Rhythm: sinus rhythm Ectopy:     Ectopy: none    QRS:     QRS axis:  Normal    QRS intervals:  Normal  Conduction:     Conduction: abnormal      Abnormal conduction: complete RBBB    ST segments:     ST segments:  Normal  T waves:     T waves: non-specific    Comments:      No STEMI          ED Course  ED Course as of 03/01/23 0514   Tue Feb 28, 2023   2334 Patient on BiPAP 16/6 at 40% FiO2, tachypnea and work of breathing improved    Wed Mar 01, 2023   0058 Tachypnea improved  RR 20 on minimal BiPAP setting  Still with coarse breath sounds throughout  0200 Patient weaned to 6 L NC    0309 Patient still saturating well with improvement in work of breathing on the nasal cannula              HEART Risk Score    Flowsheet Row Most Recent Value   Heart Score Risk Calculator    History 1 Filed at: 03/01/2023 0506   ECG 1 Filed at: 03/01/2023 4695   Age 2 Filed at: 03/01/2023 1919   Risk Factors 2 Filed at: 03/01/2023 0506   Troponin 0 Filed at: 03/01/2023 0506   HEART Score 6 Filed at: 03/01/2023 4142              PERC Rule for PE    Flowsheet Row Most Recent Value   PERC Rule for PE    Age >=50 1 Filed at: 03/01/2023 0507   HR >=100 --   O2 Sat on room air < 95% --   History of PE or DVT --   Recent trauma or surgery --   Hemoptysis --   Exogenous estrogen --   Unilateral leg swelling --   PERC Rule for PE Results 1 Filed at: 03/01/2023 0507           Initial Sepsis Screening     Row Name 03/01/23 0319                Is the patient's history suggestive of a new or worsening infection? Yes (Proceed)  -KF        Suspected source of infection pneumonia  -KF        Indicate SIRS criteria Tachypnea > 20 resp per min  -KF        Are two or more of the above signs & symptoms of infection both present and new to the patient?  No  -KF              User Key  (r) = Recorded By, (t) = Taken By, (c) = Cosigned By    [address] Name Provider Radha Coffman MD Resident                    Edgar Salas' Criteria for PE    Flowsheet Row Most Recent Value   Wells' Criteria for PE    Clinical signs and symptoms of DVT 0 Filed at: 03/01/2023 0507   PE is primary diagnosis or equally likely 3 Filed at: 03/01/2023 0507   HR >100 0 Filed at: 03/01/2023 0507   Immobilization at least 3 days or Surgery in the previous 4 weeks 0 Filed at: 03/01/2023 0507   Previous, objectively diagnosed PE or DVT 0 Filed at: 03/01/2023 0507   Hemoptysis 0 Filed at: 03/01/2023 0507   Malignancy with treatment within 6 months or palliative 0 Filed at: 03/01/2023 2217   Wells' Criteria Total 3 Filed at: 03/01/2023 0507            Medical Decision Making  70-year-old female with past medical history of hypertension, diabetes, COPD, on 3 L nasal cannula as needed, presents today with suspected COPD exacerbation as she has been having cough and congestion for the past few weeks, with increasing shortness of breath over the past few days  Patient placed on BiPAP on arrival to ED for increased work of breathing  Workup remarkable for pneumonia  Patient was treated for suspected pneumonia/COPD exacerbation on arrival with Rocephin and Azithromycin  Patient was weaned to nasal cannula and has been tolerating well  Patient is high risk per CURB65 score  Patient will require admission for further management of pneumonia and increased oxygen requirement  COPD with acute exacerbation ([facility] [address] ): acute illness or injury  Hyponatremia: acute illness or injury  Hypoxia: acute illness or injury  Pneumonia: acute illness or injury  SOB (shortness of breath): acute illness or injury  Amount and/or Complexity of Data Reviewed  Labs: ordered  Radiology: ordered and independent interpretation performed  Risk  Prescription drug management  Decision regarding hospitalization              Disposition  Final diagnoses:   Pneumonia   COPD with acute exacerbation ([facility] [address] )   SOB (shortness of breath)   Hypoxia   Hyponatremia     Time reflects when diagnosis was documented in both MDM as applicable and the Disposition within this note     Time User Action Codes Description Comment    3/1/2023  4:34 AM Yu Servant Add [J18 9] Pneumonia     3/1/2023  4:34 AM Yu Servant Add [J44 1] COPD with acute exacerbation ([address] )     3/1/2023  4:34 AM Yu Servant Add [R06 02] SOB (shortness of breath)     3/1/2023  4:34 AM Yu Servant Add [R09 02] Hypoxia     3/1/2023  4:35 AM Uy Servant Add [E87 1] Hyponatremia       ED Disposition     ED Disposition   Admit    Condition   Stable    Date/Time   Wed Mar 1, 2023  4:34 AM    Comment   Case was discussed with MAR and the patient's admission status was agreed to be Admission Status: inpatient status to the service of Dr Qian Bowles   Follow-up Information    None         Patient's Medications   Discharge Prescriptions    No medications on file     No discharge procedures on file  PDMP Review     None           ED Provider  Attending physically available and evaluated Jaylen Dixon I managed the patient along with the ED Attending      Electronically Signed by         Nya Su MD  03/01/23 9278

## 2023-03-01 NOTE — ASSESSMENT & PLAN NOTE
· Sepsis Criteria: RR: 40, Temp: 35 8C, Source Left upper lobe PNA  · Initially required BiPAP, weaned off   · Blood cultures pending  · Started on Rocephin and Azithromycin

## 2023-03-01 NOTE — ASSESSMENT & PLAN NOTE
No results found for: HGBA1C    Recent Labs     02/28/23  2337   POCGLU 144*       Blood Sugar Average: Last 72 hrs:  (P) 144   Home regimen: Metformin 1000 mg in AM and 500 mg in PM  Holding oral Medications  SSI Level 2  Accuchecks 4x with meals and before bedtime  Hypoglycemia protocol

## 2023-03-01 NOTE — ASSESSMENT & PLAN NOTE
· Secondary to CAP  · Required BIPAP in ED, weaned off to 5L NC, uses 3L NC prn at home  · Reports not using her rescue inhaler  · Home inhalers: Bretzi for maintenance, albuterol for rescue    Plan:  · Continue Ceftriaxone and Azithromycin  · Duonebs q6h  · Solumedrol 80mg daily  · Mucinex and Tessalon Perles for symptoms  · F/u Procal  · Blood cultures pending  · F/u sputum cultures, urine legionella, urine strep pneumonia  · Incentive spirometry  · Respiratory protocol

## 2023-03-01 NOTE — RESPIRATORY THERAPY NOTE
RT Protocol Note  Lynn Romero 80 y o  female MRN: [id number]  Unit/Bed#: ED-08 Encounter: [id number]    Assessment    Principal Problem:    Community acquired pneumonia  Active Problems:    COPD exacerbation ([place] [address] )    Type 2 diabetes mellitus ([place] [address] )    Hypertension    Hyponatremia    Hyperlipidemia    Chronic back pain    Acute on chronic respiratory failure (HCC)    GERD (gastroesophageal reflux disease)    Sepsis ([place] [address] )      Home Pulmonary Medications:  Home Devices/Therapy: (P) Home O2    History reviewed  No pertinent past medical history  Social History     Socioeconomic History    Marital status:      Spouse name: None    Number of children: None    Years of education: None    Highest education level: None   Occupational History    None   Tobacco Use    Smoking status: None    Smokeless tobacco: None   Substance and Sexual Activity    Alcohol use: None    Drug use: None    Sexual activity: None   Other Topics Concern    None   Social History Narrative    None     Social Determinants of Health     Financial Resource Strain: Not on file   Food Insecurity: Not on file   Transportation Needs: Not on file   Physical Activity: Not on file   Stress: Not on file   Social Connections: Not on file   Intimate Partner Violence: Not on file   Housing Stability: Not on file       Subjective         Objective    Physical Exam:        Vitals:  Blood pressure 116/59, pulse 85, temperature (!) 97 1 °F (36 2 °C), temperature source Rectal, resp  rate 22, SpO2 96 %            Imaging and other studies:           Plan    Respiratory Plan: (P) Mild Distress pathway

## 2023-03-01 NOTE — ASSESSMENT & PLAN NOTE
· POA: Tachypnic to 40, SOB, productive cough, wheezing, AMS/agitation  · Required BIPAP in ED, weaned off to 5L NC, uses 3L NC prn at home  · En route required 2 DuoNebs and 125 mg Solumedrol  · CTA PE study showed patchy ground glass opacities in left and right upper lobes, suspicious for PNA, no PE, pulmonary artery hypertension, and cardiomegaly  · Started on Rocephin and Azithromycin in the ED  · Hx of COPD  · Meet criteria for severe CAP, Drip Score 2    Plan:  · Continue Ceftriaxone and Azithromycin  · Duonebs q6h  · Solumedrol 80mg daily  · Mucinex and Tessalon Perles for symptoms  · F/u Procal  · Blood cultures pending  · F/u sputum cultures, urine legionella, urine strep pneumonia  · Incentive spirometry  · Respiratory protocol

## 2023-03-01 NOTE — H&P
[facility]&P- Yehuda Hwang 1940, 80 y o  female MRN: [id number]  Unit/Bed#: ED-08 Encounter: [id number]  Primary Care Provider: Ethel Tyler DO   Date and time admitted to hospital: 2/28/2023 11:17 PM    * Community acquired pneumonia  Assessment & Plan  · POA: Tachypnic to 36, SOB, productive cough, wheezing, AMS/agitation  · Required BIPAP in ED, weaned off to 5L NC, uses 3L NC prn at home  · En route required 2 DuoNebs and 125 mg Solumedrol  · CTA PE study showed patchy ground glass opacities in left and right upper lobes, suspicious for PNA, no PE, pulmonary artery hypertension, and cardiomegaly  · Started on Rocephin and Azithromycin in the ED  · Hx of COPD  · Meet criteria for severe CAP, Drip Score 2    Plan:  · Continue Ceftriaxone and Azithromycin  · Duonebs q6h  · Solumedrol 80mg daily  · Mucinex and Tessalon Perles for symptoms  · F/u Procal  · Blood cultures pending  · F/u sputum cultures, urine legionella, urine strep pneumonia  · Incentive spirometry  · Respiratory protocol     COPD exacerbation (HCC)  Assessment & Plan  · Secondary to CAP  · Required BIPAP in ED, weaned off to 5L NC, uses 3L NC prn at home  · Reports not using her rescue inhaler  · Home inhalers: Bretzi for maintenance, albuterol for rescue    Plan:  · Continue Ceftriaxone and Azithromycin  · Duonebs q6h  · Solumedrol 80mg daily  · Mucinex and Tessalon Perles for symptoms  · F/u Procal  · Blood cultures pending  · F/u sputum cultures, urine legionella, urine strep pneumonia  · Incentive spirometry  · Respiratory protocol     Sepsis ([facility] )  Assessment & Plan  · Sepsis Criteria: RR: 40, Temp: 35 8C, Source Left upper lobe PNA  · Initially required BiPAP, weaned off   · Blood cultures pending  · Started on Rocephin and Azithromycin     GERD (gastroesophageal reflux disease)  Assessment & Plan  Continue Protonix    Acute on chronic respiratory failure ([facility] )  Assessment & Plan  See CAP plan    Chronic back pain  Assessment & Plan  · Chronic back pain with DDD, history of 3x spinal surgeries  · Continue home pain regimen: Oxycodone ER 13 5 mg q12h, Gabapentin 300 mg QID    Hyperlipidemia  Assessment & Plan  Continue Lipitor    Hyponatremia  Assessment & Plan  · Most likely secondary to CAP  Recent Labs     02/28/23  2344   SODIUM 132*     Corrected Na: 134  Monitor with BMP    Hypertension  Assessment & Plan  Continue Losartan and Cardizem     Type 2 diabetes mellitus ([address] )  Assessment & Plan  No results found for: HGBA1C    Recent Labs     02/28/23  2337   POCGLU 144*       Blood Sugar Average: Last 72 hrs:  (P) 144   Home regimen: Metformin 1000 mg in AM and 500 mg in PM  Holding oral Medications  SSI Level 2  Accuchecks 4x with meals and before bedtime  Hypoglycemia protocol    VTE Pharmacologic Prophylaxis: VTE Score: 7 High Risk (Score >/= 5) - Pharmacological DVT Prophylaxis Ordered: enoxaparin (Lovenox)  Sequential Compression Devices Ordered  Code Status: Level 3 - DNAR and DNI , discussed with patient  Discussion with family: Updated  (son) via phone  Anticipated Length of Stay: Patient will be admitted on an inpatient basis with an anticipated length of stay of greater than 2 midnights secondary to COPD exacerbation 2/2 PNA  Chief Complaint: cough, SOB    History of Present Illness:  Carolin Constantino is a 80 y o  female with a PMH of COPD on 3 L NC prn, HTN, diabetes, chronic back pain with chronic opioid dependence who presents with cough and SOB in setting of COPD exacerbation  Patient reports worsening productive cough with dark yellow sputum for the last week with associated shortness of breath, wheezing, congestion, rhinorrhea  Denies any sick contacts  She lives at home with her son  Denies any fever, chills, appetite changes, or chest pain  Denies using rescue inhaler or duonebs   Patient reports worsening shortness of breath and tachypnea prompting her to call EMS  On EMS arrival, patient room air saturation 89% but with severe tachypnea requiring CPAP  Patient received 2 DuoNeb treatments and Solumedrol en route  EMS reports that patient is somewhat confused and agitated  On arrival patient was tachypnic to 36 and placed on BiPAP by respiratory on arrival to ED  IN ED, she was given an additional Duoneb, Ceftriaxone, Azithromycin  She was weaned down to 5L O2 NC  Lab significant for hyponatremia and elevated d-dimer  CTA PE study showed patchy ground glass opacities in left and right upper lobes, suspicious for PNA, no PE, pulmonary artery hypertension, and cardiomegaly  Review of Systems:  Review of Systems   Constitutional: Negative for appetite change, chills and fever  HENT: Positive for congestion and rhinorrhea  Negative for ear pain and sore throat  Eyes: Negative for pain and visual disturbance  Respiratory: Positive for cough, shortness of breath and wheezing  Cardiovascular: Negative for chest pain and palpitations  Gastrointestinal: Positive for nausea  Negative for abdominal pain, constipation, diarrhea and vomiting  Genitourinary: Negative for dysuria and hematuria  Musculoskeletal: Positive for arthralgias (right ankle arthritis) and back pain  Skin: Negative for color change and rash  Neurological: Negative for dizziness and headaches  All other systems reviewed and are negative        Past Medical and Surgical History:   COPD  Back pain   DJD (degenerative joint disease)   DM type 2 (diabetes mellitus, type 2) ([facility])   GERD (gastroesophageal reflux disease)   Iron deficiency anemia due to chronic blood loss   Former tobacco user    ASD REPAIR   BACK SURGERY   lower back-herniated disc   FOOT SURGERY Right   also had bunion removed   HYSTERECTOMY   supracervical  Bladder suspension   REPLACEMENT TOTAL KNEE BILATERAL   ROTATOR CUFF REPAIR Left   TONSILLECTOMY     Meds/Allergies:  albuterol (VENTOLIN HFA) 90 mcg/actuation inhaler Inhale 2 puffs every 4 (four) hours as needed for wheezing  ascorbic acid, vitamin C, (vitamin C) 1000 MG tablet Take 1 tablet (1,000 mg total) by mouth daily  atorvastatin (LIPITOR) 40 MG tablet Take 1 tablet (40 mg total) by mouth nightly  blood sugar diagnostic (glucose blood) strips (generic) Free style lite test strips Tests TID   budesonide-glycopyr-formoterol (BREZTRI AEROSPHERE) 160-9-4 8 mcg/actuation HFAA Inhale 2 puffs 2 (two) times a day  calcium carbonate-vitamin D3 600 mg-5 mcg (200 unit) per tablet Take 1 tablet by mouth daily  diltiazem (CARDIZEM CD) 240 MG 24 hr capsule TAKE 1 CAPSULE BY MOUTH EVERY DAY   ferrous sulfate 325 mg tablet TAKE 1 TABLET BY MOUTH EVERY DAY WITH BREAKFAST   gabapentin (NEURONTIN) 300 MG capsule TAKE 1 CAPSULE BY MOUTH FOUR TIMES A DAY   hydrOXYzine HCL (ATARAX) 25 MG tablet Take 1 tablet (25 mg total) by mouth every 6 (six) hours as needed for itching  JANUVIA 100 mg tablet TAKE 1 TABLET BY MOUTH EVERY DAY   losartan (COZAAR) 25 MG tablet TAKE 2 TABLETS BY MOUTH EVERY DAY   metFORMIN (GLUCOPHAGE) 500 MG tablet TAKE 2 TABS BY MOUTH IN IN THE MORNING AND 1 TAB BY MOUTH IN IN THE EVENING   methocarbamoL (ROBAXIN) 500 MG tablet TAKE 1 TABLET BY MOUTH THREE TIMES A DAY AS NEEDED FOR MUSCLE SPASM   ONETOUCH ULTRA TEST strips (generic) Use as directed three times daily   oxymetazoline (AFRIN) 0 05 % nasal spray 2 sprays into each nostril every 12 (twelve) hours as needed for congestion  pantoprazole (PROTONIX) 40 MG tablet TAKE 1 TABLET BY MOUTH TWICE A DAY   rOPINIRole (REQUIP) 0 5 MG tablet TAKE 1 TABLET BY MOUTH AT BEDTIME   Saccharomyces boulardii (FLORASTOR) 250 mg capsule Take 1 capsule (250 mg total) by mouth 2 (two) times a day  XTAMPZA ER 9 mg CSpT     I have reviewed home medications with patient personally      Allergies: Not on File   Allergies   Prednisone- "goofy feeling"  Psyllium Husk (With Sugar)   Tetanus And Diphtheria Toxoids Social History:  Marital Status:    Patient Pre-hospital Living Situation: Home  Patient Pre-hospital Level of Mobility: walks with walker  Patient Pre-hospital Diet Restrictions: None  Substance Use History:   Social History     Substance and Sexual Activity   Alcohol Use None     Social History     Tobacco Use   Smoking Status Not on file   Smokeless Tobacco Not on file     Social History     Substance and Sexual Activity   Drug Use Not on file       Family History:  Family History   Problem Relation Age of Onset   Lung cancer Mother   Anuerysm Father   Breast cancer Neg Hx   Ovarian cancer Neg Hx   Uterine cancer Neg Hx   Colon cancer Neg Hx     Physical Exam:     Vitals:   Blood Pressure: 116/59 (03/01/23 0530)  Pulse: 85 (03/01/23 0530)  Temperature: (!) 97 1 °F (36 2 °C) (03/01/23 0247)  Temp Source: Rectal (03/01/23 0247)  Respirations: 22 (03/01/23 0530)  SpO2: 96 % (03/01/23 0530)    Physical Exam  Vitals and nursing note reviewed  Constitutional:       General: She is in acute distress  Appearance: She is well-developed  She is ill-appearing  HENT:      Head: Normocephalic and atraumatic  Nose: No congestion  Mouth/Throat:      Mouth: Mucous membranes are moist       Pharynx: Oropharynx is clear  Eyes:      Conjunctiva/sclera: Conjunctivae normal    Cardiovascular:      Rate and Rhythm: Normal rate and regular rhythm  Heart sounds: No murmur heard  Pulmonary:      Effort: Tachypnea, accessory muscle usage and respiratory distress present  Breath sounds: No stridor  Wheezing and rhonchi present  No rales  Comments: 94% with 5L NC  Abdominal:      General: There is no distension  Palpations: Abdomen is soft  Tenderness: There is no abdominal tenderness  There is no guarding  Musculoskeletal:         General: No swelling  Cervical back: Neck supple  Skin:     General: Skin is warm and dry  Coloration: Skin is pale     Neurological: Mental Status: She is alert  Additional Data:     Lab Results:  Results from last 7 days   Lab Units 02/28/23  2344   WBC Thousand/uL 9 11   HEMOGLOBIN g/dL 12 5   HEMATOCRIT % 40 8   PLATELETS Thousands/uL 188   NEUTROS PCT % 65   LYMPHS PCT % 17   MONOS PCT % 15*   EOS PCT % 1     Results from last 7 days   Lab Units 02/28/23  2344   SODIUM mmol/L 132*   POTASSIUM mmol/L 4 2   CHLORIDE mmol/L 96   CO2 mmol/L 25   BUN mg/dL 20   CREATININE mg/dL 0 91   ANION GAP mmol/L 11   CALCIUM mg/dL 9 6   ALBUMIN g/dL 4 2   TOTAL BILIRUBIN mg/dL 0 75   ALK PHOS U/L 92   ALT U/L 12   AST U/L 16   GLUCOSE RANDOM mg/dL 147*     Results from last 7 days   Lab Units 03/01/23  0022   INR  1 09     Results from last 7 days   Lab Units 02/28/23  2337   POC GLUCOSE mg/dl 144*         Results from last 7 days   Lab Units 02/28/23  2344   LACTIC ACID mmol/L 1 1       Lines/Drains:  Invasive Devices     Peripheral Intravenous Line  Duration           Peripheral IV 02/28/23 Left Wrist <1 day    Peripheral IV 03/01/23 Right Forearm <1 day                    Imaging: Reviewed radiology reports from this admission including: chest xray and chest CT scan  CTA ED chest PE study   Final Result by Truman Hernandez MD (03/01 0019)      No intraluminal filling defect to suggest an acute pulmonary embolus  Patchy groundglass opacities noted at the posterior left upper lobe suspicious for an infectious or inflammatory process  A few scattered tree-in-bud opacities are also noted at the right upper lobe which may be secondary to an infectious or    inflammatory bronchiolitis  Enlargement of the main pulmonary arteries suggestive of pulmonary artery hypertension  Mild to moderate cardiomegaly              Workstation performed: [id number]         XR chest 1 view portable   ED Interpretation by Arline Iqbal MD (03/01 0254)   Patchy left upper lobe infiltrates          EKG and Other Studies Reviewed on Admission:   · EKG: NSR  HR 68, with RBBB  ** Please Note: This note has been constructed using a voice recognition system   **

## 2023-03-01 NOTE — ED ATTENDING ATTESTATION
2/28/2023  IGraciela MD, saw and evaluated the patient  I have discussed the patient with the resident/non-physician practitioner and agree with the resident's/non-physician practitioner's findings, Plan of Care, and MDM as documented in the resident's/non-physician practitioner's note, except where noted  All available labs and Radiology studies were reviewed  I was present for key portions of any procedure(s) performed by the resident/non-physician practitioner and I was immediately available to provide assistance  At this point I agree with the current assessment done in the Emergency Department  I have conducted an independent evaluation of this patient a history and physical is as follows: Patient is a 80year old female with a few days of worsening sob and cough  Pulse ox was 89% on RA in the field and was placed on CPAP  Patient was given duonebs and solumedrol in the field  No fever  No chest pain  No N/V  No smoking  Has h/o COPD  Is on home oxygen prn  Patient needed our urgent attention  Was last seen in this ED on 9/25/22 for abdominal pain  [facility] website checked on this patient and last Rx filled was on 2/10/23 for xtampza for 30 day supply  NCAT  Moist mucous membranes  Tachypnea  Diffuse rhonci and wheezes  No rales  Heart regular without murmur  Abdomen soft and nontender  Good bowel sounds  No rash noted  No edema  No focal deficits  DDX including but not limited to: pneumonia, pleural effusion, CHF, PE, PTX, ACS, MI, asthma exacerbation, COPD exacerbation, COVID 19, anemia, metabolic abnormality, renal failure  Will check labs, EKG, CXR and patient placed on Bipap and albuterol and IV abx and patient will need admission       ED Course         Critical Care Time  Procedures

## 2023-03-01 NOTE — ASSESSMENT & PLAN NOTE
· Chronic back pain with DDD, history of 3x spinal surgeries  · Continue home pain regimen: Oxycodone ER 13 5 mg q12h, Gabapentin 300 mg QID

## 2023-03-02 LAB
EST. AVERAGE GLUCOSE BLD GHB EST-MCNC: 151 MG/DL
EST. AVERAGE GLUCOSE BLD GHB EST-MCNC: 151 MG/DL
EST. AVERAGE GLUCOSE BLD GHB EST-MCNC: 154 MG/DL
EST. AVERAGE GLUCOSE BLD GHB EST-MCNC: 154 MG/DL
GLUCOSE SERPL-MCNC: 105 MG/DL (ref 65–140)
GLUCOSE SERPL-MCNC: 105 MG/DL (ref 65–140)
GLUCOSE SERPL-MCNC: 143 MG/DL (ref 65–140)
GLUCOSE SERPL-MCNC: 143 MG/DL (ref 65–140)
GLUCOSE SERPL-MCNC: 178 MG/DL (ref 65–140)
GLUCOSE SERPL-MCNC: 178 MG/DL (ref 65–140)
GLUCOSE SERPL-MCNC: 227 MG/DL (ref 65–140)
GLUCOSE SERPL-MCNC: 227 MG/DL (ref 65–140)
GLUCOSE SERPL-MCNC: 256 MG/DL (ref 65–140)
GLUCOSE SERPL-MCNC: 256 MG/DL (ref 65–140)
GLUCOSE SERPL-MCNC: 263 MG/DL (ref 65–140)
GLUCOSE SERPL-MCNC: 263 MG/DL (ref 65–140)
GLUCOSE SERPL-MCNC: 275 MG/DL (ref 65–140)
GLUCOSE SERPL-MCNC: 275 MG/DL (ref 65–140)
GLUCOSE SERPL-MCNC: 282 MG/DL (ref 65–140)
GLUCOSE SERPL-MCNC: 282 MG/DL (ref 65–140)
GLUCOSE SERPL-MCNC: 286 MG/DL (ref 65–140)
GLUCOSE SERPL-MCNC: 286 MG/DL (ref 65–140)
GLUCOSE SERPL-MCNC: 320 MG/DL (ref 65–140)
GLUCOSE SERPL-MCNC: 320 MG/DL (ref 65–140)
GLUCOSE SERPL-MCNC: 343 MG/DL (ref 65–140)
GLUCOSE SERPL-MCNC: 343 MG/DL (ref 65–140)
GLUCOSE SERPL-MCNC: 409 MG/DL (ref 65–140)
GLUCOSE SERPL-MCNC: 409 MG/DL (ref 65–140)
GLUCOSE SERPL-MCNC: 99 MG/DL (ref 65–140)
GLUCOSE SERPL-MCNC: 99 MG/DL (ref 65–140)
HBA1C MFR BLD: 6.9 %
HBA1C MFR BLD: 6.9 %
HBA1C MFR BLD: 7 %
HBA1C MFR BLD: 7 %
PROCALCITONIN SERPL-MCNC: 0.17 NG/ML
PROCALCITONIN SERPL-MCNC: 0.17 NG/ML

## 2023-03-02 RX ORDER — SODIUM CHLORIDE FOR INHALATION 3 %
4 VIAL, NEBULIZER (ML) INHALATION
Status: DISCONTINUED | OUTPATIENT
Start: 2023-03-02 | End: 2023-03-04

## 2023-03-02 RX ORDER — AZITHROMYCIN 250 MG/1
500 TABLET, FILM COATED ORAL EVERY 24 HOURS
Status: DISCONTINUED | OUTPATIENT
Start: 2023-03-03 | End: 2023-03-02

## 2023-03-02 RX ORDER — FUROSEMIDE 10 MG/ML
20 INJECTION INTRAMUSCULAR; INTRAVENOUS ONCE
Status: COMPLETED | OUTPATIENT
Start: 2023-03-02 | End: 2023-03-02

## 2023-03-02 RX ORDER — PREDNISONE 20 MG/1
40 TABLET ORAL DAILY
Status: COMPLETED | OUTPATIENT
Start: 2023-03-03 | End: 2023-03-05

## 2023-03-02 RX ADMIN — BRIMONIDINE TARTRATE 1 DROP: 2 SOLUTION/ DROPS OPHTHALMIC at 09:39

## 2023-03-02 RX ADMIN — IPRATROPIUM BROMIDE 0.5 MG: 0.5 SOLUTION RESPIRATORY (INHALATION) at 19:27

## 2023-03-02 RX ADMIN — GUAIFENESIN 600 MG: 600 TABLET ORAL at 17:44

## 2023-03-02 RX ADMIN — LEVALBUTEROL HYDROCHLORIDE 1.25 MG: 1.25 SOLUTION, CONCENTRATE RESPIRATORY (INHALATION) at 19:27

## 2023-03-02 RX ADMIN — DILTIAZEM HYDROCHLORIDE 180 MG: 180 CAPSULE, COATED, EXTENDED RELEASE ORAL at 09:30

## 2023-03-02 RX ADMIN — BRIMONIDINE TARTRATE 1 DROP: 2 SOLUTION/ DROPS OPHTHALMIC at 21:20

## 2023-03-02 RX ADMIN — METHYLPREDNISOLONE SODIUM SUCCINATE 40 MG: 40 INJECTION, POWDER, FOR SOLUTION INTRAMUSCULAR; INTRAVENOUS at 09:35

## 2023-03-02 RX ADMIN — OXYCODONE HYDROCHLORIDE AND ACETAMINOPHEN 1000 MG: 500 TABLET ORAL at 21:15

## 2023-03-02 RX ADMIN — POLYETHYLENE GLYCOL 3350 17 G: 17 POWDER, FOR SOLUTION ORAL at 09:31

## 2023-03-02 RX ADMIN — GABAPENTIN 300 MG: 300 CAPSULE ORAL at 09:29

## 2023-03-02 RX ADMIN — LOSARTAN POTASSIUM 50 MG: 50 TABLET, FILM COATED ORAL at 09:30

## 2023-03-02 RX ADMIN — IPRATROPIUM BROMIDE 0.5 MG: 0.5 SOLUTION RESPIRATORY (INHALATION) at 13:59

## 2023-03-02 RX ADMIN — ASPIRIN 81 MG: 81 TABLET, CHEWABLE ORAL at 09:29

## 2023-03-02 RX ADMIN — DOCUSATE SODIUM 100 MG: 100 CAPSULE, LIQUID FILLED ORAL at 09:29

## 2023-03-02 RX ADMIN — GABAPENTIN 300 MG: 300 CAPSULE ORAL at 17:44

## 2023-03-02 RX ADMIN — SODIUM CHLORIDE SOLN NEBU 3% 4 ML: 3 NEBU SOLN at 19:27

## 2023-03-02 RX ADMIN — IPRATROPIUM BROMIDE 0.5 MG: 0.5 SOLUTION RESPIRATORY (INHALATION) at 07:40

## 2023-03-02 RX ADMIN — GUAIFENESIN 600 MG: 600 TABLET ORAL at 09:30

## 2023-03-02 RX ADMIN — DOCUSATE SODIUM 100 MG: 100 CAPSULE, LIQUID FILLED ORAL at 17:44

## 2023-03-02 RX ADMIN — ENOXAPARIN SODIUM 40 MG: 40 INJECTION SUBCUTANEOUS at 09:31

## 2023-03-02 RX ADMIN — GABAPENTIN 300 MG: 300 CAPSULE ORAL at 12:38

## 2023-03-02 RX ADMIN — METHOCARBAMOL 500 MG: 500 TABLET ORAL at 12:38

## 2023-03-02 RX ADMIN — ALBUTEROL SULFATE 2 PUFF: 90 AEROSOL, METERED RESPIRATORY (INHALATION) at 17:45

## 2023-03-02 RX ADMIN — SODIUM CHLORIDE 10 UNITS/HR: 9 INJECTION, SOLUTION INTRAVENOUS at 13:50

## 2023-03-02 RX ADMIN — ROPINIROLE 0.5 MG: 0.25 TABLET, FILM COATED ORAL at 21:15

## 2023-03-02 RX ADMIN — GABAPENTIN 300 MG: 300 CAPSULE ORAL at 21:15

## 2023-03-02 RX ADMIN — LEVALBUTEROL HYDROCHLORIDE 1.25 MG: 1.25 SOLUTION, CONCENTRATE RESPIRATORY (INHALATION) at 02:36

## 2023-03-02 RX ADMIN — CEFTRIAXONE 1000 MG: 1 INJECTION, POWDER, FOR SOLUTION INTRAMUSCULAR; INTRAVENOUS at 23:33

## 2023-03-02 RX ADMIN — OXYCODONE HYDROCHLORIDE 10 MG: 10 TABLET, FILM COATED, EXTENDED RELEASE ORAL at 09:30

## 2023-03-02 RX ADMIN — BUDESONIDE AND FORMOTEROL FUMARATE DIHYDRATE 2 PUFF: 160; 4.5 AEROSOL RESPIRATORY (INHALATION) at 09:32

## 2023-03-02 RX ADMIN — METHOCARBAMOL 500 MG: 500 TABLET ORAL at 17:44

## 2023-03-02 RX ADMIN — ATORVASTATIN CALCIUM 40 MG: 40 TABLET, FILM COATED ORAL at 09:30

## 2023-03-02 RX ADMIN — METHOCARBAMOL 500 MG: 500 TABLET ORAL at 21:15

## 2023-03-02 RX ADMIN — METHOCARBAMOL 500 MG: 500 TABLET ORAL at 09:30

## 2023-03-02 RX ADMIN — AZITHROMYCIN 500 MG: 500 INJECTION, POWDER, LYOPHILIZED, FOR SOLUTION INTRAVENOUS at 00:45

## 2023-03-02 RX ADMIN — OXYCODONE HYDROCHLORIDE AND ACETAMINOPHEN 1000 MG: 500 TABLET ORAL at 09:28

## 2023-03-02 RX ADMIN — SODIUM CHLORIDE 1 UNITS/HR: 9 INJECTION, SOLUTION INTRAVENOUS at 17:23

## 2023-03-02 RX ADMIN — OXYCODONE HYDROCHLORIDE 10 MG: 10 TABLET, FILM COATED, EXTENDED RELEASE ORAL at 21:15

## 2023-03-02 RX ADMIN — FUROSEMIDE 20 MG: 10 INJECTION, SOLUTION INTRAMUSCULAR; INTRAVENOUS at 14:26

## 2023-03-02 RX ADMIN — IPRATROPIUM BROMIDE 0.5 MG: 0.5 SOLUTION RESPIRATORY (INHALATION) at 02:36

## 2023-03-02 RX ADMIN — LEVALBUTEROL HYDROCHLORIDE 1.25 MG: 1.25 SOLUTION, CONCENTRATE RESPIRATORY (INHALATION) at 07:40

## 2023-03-02 RX ADMIN — SODIUM CHLORIDE 10 UNITS/HR: 9 INJECTION, SOLUTION INTRAVENOUS at 15:43

## 2023-03-02 RX ADMIN — CEFTRIAXONE 1000 MG: 1 INJECTION, POWDER, FOR SOLUTION INTRAMUSCULAR; INTRAVENOUS at 00:06

## 2023-03-02 RX ADMIN — BUDESONIDE AND FORMOTEROL FUMARATE DIHYDRATE 2 PUFF: 160; 4.5 AEROSOL RESPIRATORY (INHALATION) at 17:45

## 2023-03-02 RX ADMIN — PANTOPRAZOLE SODIUM 40 MG: 40 TABLET, DELAYED RELEASE ORAL at 09:29

## 2023-03-02 RX ADMIN — LEVALBUTEROL HYDROCHLORIDE 1.25 MG: 1.25 SOLUTION, CONCENTRATE RESPIRATORY (INHALATION) at 13:59

## 2023-03-02 NOTE — UTILIZATION REVIEW
Initial Clinical Review    Admission: Date/Time/Statement:   Admission Orders (From admission, onward)     Ordered        03/01/23 0434  INPATIENT ADMISSION  Once                      Orders Placed This Encounter   Procedures   • INPATIENT ADMISSION     Standing Status:   Standing     Number of Occurrences:   1     Order Specific Question:   Level of Care     Answer:   Med Surg [16]     Order Specific Question:   Estimated length of stay     Answer:   More than 2 Midnights     Order Specific Question:   Certification     Answer:   I certify that inpatient services are medically necessary for this patient for a duration of greater than two midnights  See H&P and MD Progress Notes for additional information about the patient's course of treatment  ED Arrival Information     Expected   -    Arrival   2/28/2023 23:17    Acuity   Emergent            Means of arrival   Ambulance    Escorted by   [facility]    Service   Hospitalist    Admission type   Emergency            Arrival complaint   RESP DISTRSESS           Chief Complaint   Patient presents with   • Shortness of Breath     Pt called EMS for SOB  COPD hx  89% on room air, EMS had CPAP on arrival, pt is confused  Her family was unable to give a hx  Initial Presentation: 80 y o  female with a PMH of COPD on 3 L NC prn, HTN, diabetes, chronic back pain with chronic opioid dependence who presents with cough and SOB in setting of COPD exacerbation  Patient reports worsening productive cough with dark yellow sputum for the last week with associated shortness of breath, wheezing, congestion, rhinorrhea  Patient reports worsening shortness of breath and tachypnea prompting her to call EMS  On EMS arrival, patient room air saturation 89% but with severe tachypnea requiring CPAP  Patient received 2 DuoNeb treatments and Solumedrol en route  EMS reports that patient is somewhat confused and agitated   On arrival patient was tachypnic to 36 and placed on BiPAP by respiratory on arrival to ED  She was weaned down to 5L O2 NC  Plan: Inpatient admission for evaluation and treatment of pneumonia, COPD, sepsis, GERD, HLD, hyponatremia, HTN, DM: IV ceftriaxone and azithromycin, DuoNebs q 6 hrs, IV Solu medrol 80 mg daily, Mucinex and Tessalon Perles, blood cultures pending, follow urine culture, urine legionella and strep pneumonia, continue Protonix, Lipitor, losartan, Cardizem, hold metformin and start SSI  Date: 3/2   Day 2:     Internal medicine: continue IV ceftriaxone and azithromycin, DuoNebs q 6 hrs, add 3% Nacl to help with retained secretions, IV solu medrol bid  Mucinex and Tessalon Perles for symptoms  Blood culutres negative  Sputum cultures, urine legionella and urine strep pneumonia negative  Pt currently on O2 4L with baseline of 3L NC  Insulin gtt started, wean off w/ continue Basal / Bolus       ED Triage Vitals   Temperature Pulse Respirations Blood Pressure SpO2   02/28/23 2358 02/28/23 2321 02/28/23 2321 02/28/23 2325 02/28/23 2324   (!) 96 5 °F (35 8 °C) 72 (!) 40 143/75 98 %      Temp Source Heart Rate Source Patient Position - Orthostatic VS BP Location FiO2 (%)   02/28/23 2358 02/28/23 2321 02/28/23 2325 02/28/23 2321 02/28/23 2333   Axillary Monitor Sitting Right arm 40      Pain Score       03/01/23 1221       No Pain          Wt Readings from Last 1 Encounters:   03/02/23 71 5 kg (157 lb 10 1 oz)     Additional Vital Signs:     Date/Time Temp Pulse Resp BP MAP (mmHg) SpO2 FiO2 (%) Calculated FIO2 (%) - Nasal Cannula Nasal Cannula O2 Flow Rate (L/min) O2 Device   03/02/23 0740 -- -- -- -- -- 97 % -- 36 4 L/min Nasal cannula   03/02/23 07:04:42 98 3 °F (36 8 °C) 62 -- 121/57 78 95 % -- -- -- --   03/02/23 0237 -- -- -- -- -- 95 % -- 40 5 L/min Nasal cannula   03/01/23 22:36:46 98 2 °F (36 8 °C) 79 -- 102/52 69 96 % -- -- -- --   03/01/23 1926 -- -- -- -- -- 93 % -- 40 5 L/min Nasal cannula   03/01/23 1500 97 9 °F (36 6 °C) 63 18 103/51 68 96 % -- 40 5 L/min Nasal cannula   03/01/23 13:19:23 -- 63 18 130/56 81 -- -- 40 5 L/min Nasal cannula   03/01/23 1138 -- 66 -- 109/59 78 97 % -- -- -- None (Room air)   03/01/23 0946 -- 73 -- 129/60 -- 96 % -- -- -- --   03/01/23 0823 -- -- -- -- -- 95 % -- -- -- --   03/01/23 0730 -- 66 18 125/60 86 95 % -- -- -- --   03/01/23 0630 -- 64 22 100/52 73 96 % -- 40 5 L/min None (Room air)   03/01/23 0600 -- 68 22 109/59 80 96 % -- 40 5 L/min Nasal cannula   03/01/23 0530 -- 85 22 116/59 82 96 % -- 40 5 L/min Nasal cannula   03/01/23 0400 -- 81 24 Abnormal  124/59 85 94 % -- 40 5 L/min Nasal cannula   03/01/23 0300 -- 71 24 Abnormal  108/55 78 92 % -- -- -- --   03/01/23 0247 97 1 °F (36 2 °C) Abnormal  -- -- -- -- -- -- -- -- --   03/01/23 0230 -- 75 25 Abnormal  113/56 80 93 % -- 40 5 L/min Nasal cannula   03/01/23 0215 -- 85 24 Abnormal  109/62 78 93 % -- 40 5 L/min Nasal cannula   03/01/23 0030 -- 67 28 Abnormal  108/60 78 96 % -- -- -- --   02/28/23 2358 96 5 °F (35 8 °C) Abnormal  -- -- -- -- -- -- -- -- --   02/28/23 2333 -- -- -- -- -- 95 % 40 -- -- BiPAP   02/28/23 2327 -- -- -- -- -- -- -- -- -- BiPAP   02/28/23 2325 -- -- -- 143/75 -- -- -- -- -- --   02/28/23 2324 -- -- -- -- -- 98 % -- -- -- BiPAP     Pertinent Labs/Diagnostic Test Results:   CTA ED chest PE study   Final Result by Minerva Andrade MD (03/01 0662)      No intraluminal filling defect to suggest an acute pulmonary embolus  Patchy groundglass opacities noted at the posterior left upper lobe suspicious for an infectious or inflammatory process  A few scattered tree-in-bud opacities are also noted at the right upper lobe which may be secondary to an infectious or    inflammatory bronchiolitis  Enlargement of the main pulmonary arteries suggestive of pulmonary artery hypertension  Mild to moderate cardiomegaly              Workstation performed: [id number]         XR chest 1 view portable   ED Interpretation by Princess Crystal MD (03/01 5758)   Patchy left upper lobe infiltrates      Final Result by Sunny Mejia MD (03/01 0931)      Left upper lobe pneumonia                    Workstation performed: [id number]           2/28 EKG:  Normal sinus rhythm  Right bundle branch block  Abnormal ECG  No previous ECGs available    Results from last 7 days   Lab Units 02/28/23  2344   SARS-COV-2  Negative     Results from last 7 days   Lab Units 03/01/23  1327 03/01/23  0529 02/28/23  2344   WBC Thousand/uL 5 39 6 46 9 11   HEMOGLOBIN g/dL 11 0* 11 3* 12 5   HEMATOCRIT % 35 8 37 3 40 8   PLATELETS Thousands/uL 182 179  173 188   NEUTROS ABS Thousands/µL  --   --  6 03   BANDS PCT %  --  5  --          Results from last 7 days   Lab Units 03/01/23  1327 03/01/23  0529 02/28/23  2344   SODIUM mmol/L 127* 130* 132*   POTASSIUM mmol/L 4 4 3 6 4 2   CHLORIDE mmol/L 93* 95* 96   CO2 mmol/L 23 23 25   ANION GAP mmol/L 11 12 11   BUN mg/dL 35* 27* 20   CREATININE mg/dL 1 45* 1 25 0 91   EGFR ml/min/1 73sq m 33 40 58   CALCIUM mg/dL 8 9 8 6 9 6     Results from last 7 days   Lab Units 02/28/23  2344   AST U/L 16   ALT U/L 12   ALK PHOS U/L 92   TOTAL PROTEIN g/dL 7 4   ALBUMIN g/dL 4 2   TOTAL BILIRUBIN mg/dL 0 75     Results from last 7 days   Lab Units 03/02/23  0917 03/02/23  0703 03/02/23  0504 03/02/23  0256 03/02/23  0054 03/02/23  0000 03/01/23  2302 03/01/23  2239 03/01/23  2110 03/01/23  1618 03/01/23  1126 03/01/23  0709   POC GLUCOSE mg/dl 343* 263* 143* 227* 320* 409* 456* 436* 424* 378* 406* 317*     Results from last 7 days   Lab Units 03/01/23  1327 03/01/23  0529 02/28/23  2344   GLUCOSE RANDOM mg/dL 411* 315* 147*         Results from last 7 days   Lab Units 03/01/23  0529   HEMOGLOBIN A1C % 6 9*   EAG mg/dl 151           Results from last 7 days   Lab Units 03/01/23  0022   PH GAMALIEL  7 359   PCO2 GAMALIEL mm Hg 43 4   PO2 GAMALIEL mm Hg 83 3*   HCO3 GAMALIEL mmol/L 23 9*   BASE EXC GAMALIEL mmol/L -1 6   O2 CONTENT GAMALIEL ml/dL 16 5   O2 HGB, VENOUS % 93 4* Results from last 7 days   Lab Units 03/01/23  0300 02/28/23  2344   HS TNI 0HR ng/L  --  4   HS TNI 2HR ng/L 4  --    HSTNI D2 ng/L 0  --      Results from last 7 days   Lab Units 03/01/23  0022   D-DIMER QUANTITATIVE ug/ml FEU 1 23*     Results from last 7 days   Lab Units 03/01/23  0022   PROTIME seconds 14 3   INR  1 09   PTT seconds 27         Results from last 7 days   Lab Units 03/02/23  0509 03/01/23  0529   PROCALCITONIN ng/ml 0 17 0 07     Results from last 7 days   Lab Units 02/28/23  2344   LACTIC ACID mmol/L 1 1             Results from last 7 days   Lab Units 02/28/23  2344   BNP pg/mL 36           Results from last 7 days   Lab Units 03/01/23  1332 02/28/23  2344   STREP PNEUMONIAE ANTIGEN, URINE  Negative  --    LEGIONELLA URINARY ANTIGEN  Negative  --    INFLUENZA A PCR   --  Negative   INFLUENZA B PCR   --  Negative   RSV PCR   --  Negative           Results from last 7 days   Lab Units 02/28/23  2344   BLOOD CULTURE  No Growth at 24 hrs  No Growth at 24 hrs                 ED Treatment:   Medication Administration from 02/28/2023 2316 to 03/01/2023 1216       Date/Time Order Dose Route Action     02/28/2023 2350 EST albuterol (FOR EMS ONLY) (2 5 mg/3 mL) 0 083 % inhalation solution 5 mg 0 mg Does not apply Given to EMS     02/28/2023 2351 EST ipratropium-albuterol (FOR EMS ONLY) (DUO-NEB) 0 5-2 5 mg/3 mL inhalation solution 6 mL 0 mL Does not apply Given to EMS     02/28/2023 2350 EST methylPREDNISolone sodium succinate (FOR EMS ONLY) (Solu-MEDROL) 125 MG injection 125 mg 0 mg Does not apply Given to EMS     02/28/2023 2356 EST ceftriaxone (ROCEPHIN) 1 g/50 mL in dextrose IVPB 1,000 mg Intravenous New Bag     03/01/2023 0024 EST azithromycin (ZITHROMAX) 500 mg in sodium chloride 0 9% 250mL IVPB 500 mg 500 mg Intravenous New Bag     03/01/2023 0016 EST albuterol inhalation solution 5 mg 5 mg Nebulization Given     03/01/2023 0016 EST ipratropium (ATROVENT) 0 02 % inhalation solution 0 5 mg 0 5 mg Nebulization Given     03/01/2023 0155 EST iohexol (OMNIPAQUE) 350 MG/ML injection (SINGLE-DOSE) 55 mL 55 mL Intravenous Given     03/01/2023 0902 EST ascorbic acid (VITAMIN C) tablet 1,000 mg 1,000 mg Oral Given     03/01/2023 0901 EST aspirin chewable tablet 81 mg 81 mg Oral Given     03/01/2023 0901 EST atorvastatin (LIPITOR) tablet 40 mg 40 mg Oral Given     03/01/2023 0944 EST brimonidine tartrate 0 2 % ophthalmic solution 1 drop 1 drop Left Eye Given     03/01/2023 0943 EST budesonide-formoterol (SYMBICORT) 160-4 5 mcg/act inhaler 2 puff 2 puff Inhalation Given     03/01/2023 0901 EST diltiazem (CARDIZEM CD) 24 hr capsule 180 mg 180 mg Oral Given     03/01/2023 0901 EST gabapentin (NEURONTIN) capsule 300 mg 300 mg Oral Given     03/01/2023 0902 EST losartan (COZAAR) tablet 50 mg 50 mg Oral Given     03/01/2023 0902 EST methocarbamol (ROBAXIN) tablet 500 mg 500 mg Oral Given     03/01/2023 0901 EST oxyCODONE (OxyCONTIN) 12 hr tablet 10 mg 10 mg Oral Given     03/01/2023 0901 EST pantoprazole (PROTONIX) EC tablet 40 mg 40 mg Oral Given     03/01/2023 0901 EST docusate sodium (COLACE) capsule 100 mg 100 mg Oral Given     03/01/2023 0902 EST guaiFENesin (MUCINEX) 12 hr tablet 600 mg 600 mg Oral Given     03/01/2023 0527 EST benzonatate (TESSALON PERLES) capsule 200 mg 200 mg Oral Given     03/01/2023 0933 EST enoxaparin (LOVENOX) subcutaneous injection 40 mg 40 mg Subcutaneous Given     03/01/2023 1142 EST insulin lispro (HumaLOG) 100 units/mL subcutaneous injection 1-5 Units 5 Units Subcutaneous Given     03/01/2023 0802 EST insulin lispro (HumaLOG) 100 units/mL subcutaneous injection 1-5 Units 3 Units Subcutaneous Given     03/01/2023 0942 EST methylPREDNISolone sodium succinate (Solu-MEDROL) injection 40 mg 40 mg Intravenous Given     03/01/2023 0823 EST ipratropium (ATROVENT) 0 02 % inhalation solution 0 5 mg 0 5 mg Nebulization Given     03/01/2023 0823 EST levalbuterol (XOPENEX) inhalation solution 1 25 mg 1 25 mg Nebulization Given        Past Medical History:   Diagnosis Date   • Arthritis    • COPD (chronic obstructive pulmonary disease) (HCC)    • Diabetes mellitus (HCC)    • Hypertension      Present on Admission:  **None**      Admitting Diagnosis: Hyponatremia [E87 1]  Pneumonia [J18 9]  SOB (shortness of breath) [R06 02]  Hypoxia [R09 02]  COPD with acute exacerbation (HCC) [J44 1]  Age/Sex: 80 y o  female  Admission Orders:  Scheduled Medications:  vitamin C, 1,000 mg, Oral, BID  aspirin, 81 mg, Oral, Daily  atorvastatin, 40 mg, Oral, Daily  brimonidine tartrate, 1 drop, Left Eye, BID  budesonide-formoterol, 2 puff, Inhalation, BID  cefTRIAXone, 1,000 mg, Intravenous, Q24H  diltiazem, 180 mg, Oral, Daily  docusate sodium, 100 mg, Oral, BID  enoxaparin, 40 mg, Subcutaneous, Daily  gabapentin, 300 mg, Oral, 4x Daily  guaiFENesin, 600 mg, Oral, BID  ipratropium, 0 5 mg, Nebulization, Q6H  levalbuterol, 1 25 mg, Nebulization, Q6H  losartan, 50 mg, Oral, Daily  methocarbamol, 500 mg, Oral, 4x Daily  methylPREDNISolone sodium succinate, 40 mg, Intravenous, BID  oxyCODONE, 10 mg, Oral, Q12H CARRIE  pantoprazole, 40 mg, Oral, Daily  polyethylene glycol, 17 g, Oral, Daily  rOPINIRole, 0 5 mg, Oral, HS      Continuous IV Infusions:  insulin regular (HumuLIN R,NovoLIN R) infusion, 0 3-21 Units/hr, Intravenous, Titrated      PRN Meds:  albuterol, 2 puff, Inhalation, Q6H PRN  albuterol, 2 5 mg, Nebulization, Q4H PRN  ondansetron, 4 mg, Intravenous, Q6H PRN        None    Network Utilization Review Department  ATTENTION: Please call with any questions or concerns to [phone] and carefully listen to the prompts so that you are directed to the right person  All voicemails are confidential   [facility] all requests for admission clinical reviews, approved or denied determinations and any other requests to dedicated fax number below belonging to the campus where the patient is receiving treatment   List of dedicated fax numbers for the Facilities:  FACILITY NAME UR FAX NUMBER   ADMISSION DENIALS (Administrative/Medical Necessity) [phone]   [address] (Maternity/NICU/Pediatrics) [phone]   [address] Theresa [address] 77 [phone]   [address] [address] [address] [facility] 28 [phone]   [address] [facility] [address]15 [facility] [phone]

## 2023-03-02 NOTE — ASSESSMENT & PLAN NOTE
· POA: Tachypnic to 40, SOB, productive cough, wheezing, AMS/agitation  · Required BIPAP in ED, weaned off to 5L NC, uses 3L NC prn at home  · En route required 2 DuoNebs and 125 mg Solumedrol  · CTA PE study showed patchy ground glass opacities in left and right upper lobes, suspicious for PNA, no PE, pulmonary artery hypertension, and cardiomegaly  · Started on Rocephin and Azithromycin in the ED  · Hx of COPD  · Meet criteria for severe CAP, Drip Score 2  · At 4 L, baseline is 3 L    Plan:  · Continue Ceftriaxone and Azithromycin, dced jose  · Duonebs q6h, add 3% Nacl to help with retained secretions  · Solumedrol BID , 1 dose today, tomorrow she will take prednisone 40 daily for 5 days    · Mucinex and Tessalon Perles for symptoms  · F/u Procal neg x 2, however patient for some pneumonia on imaging, continue ceftriaxone  · Blood cultures neg  · F/u sputum cultures, urine legionella, urine strep pneumonia -neg  · Incentive spirometry  · Respiratory protocol

## 2023-03-02 NOTE — ASSESSMENT & PLAN NOTE
· Secondary to CAP  · Required BIPAP in ED, weaned off to 5L NC, uses 3L NC prn at home  · Reports not using her rescue inhaler  · Home inhalers: Bretzi for maintenance, albuterol for rescue    Plan:  · See above

## 2023-03-02 NOTE — ASSESSMENT & PLAN NOTE
Lab Results   Component Value Date    HGBA1C 7 0 (H) 03/02/2023       Recent Labs     03/02/23  1058 03/02/23  1349 03/02/23  1503 03/02/23  1722   POCGLU 286* 275* 282* 105       Blood Sugar Average: Last 72 hrs:  (P) 044 [id number]   Home regimen: Metformin 1000 mg in AM and 500 mg in PM  Holding oral Medications  Accuchecks 4x with meals and before bedtime  Hypoglycemia protocol  Sugars >500, insulin gtt started, wean off w/ continue Basal / Bolus

## 2023-03-02 NOTE — ASSESSMENT & PLAN NOTE
· Most likely secondary to CAP  Recent Labs     02/28/23  2344 03/01/23  0529 03/01/23  1327   SODIUM 132* 130* 127*     Corrected Na: 134  Monitor with BMP

## 2023-03-02 NOTE — PROGRESS NOTES
[facility]  Progress Note - Carolin Constantino 1940, 80 y o  female MRN: [id number]  Unit/Bed#: S -01 Encounter: [id number]  Primary Care Provider: Javon Keene DO   Date and time admitted to hospital: 2/28/2023 11:17 PM    * Community acquired pneumonia  Assessment & Plan  · POA: Tachypnic to 36, SOB, productive cough, wheezing, AMS/agitation  · Required BIPAP in ED, weaned off to 5L NC, uses 3L NC prn at home  · En route required 2 DuoNebs and 125 mg Solumedrol  · CTA PE study showed patchy ground glass opacities in left and right upper lobes, suspicious for PNA, no PE, pulmonary artery hypertension, and cardiomegaly  · Started on Rocephin and Azithromycin in the ED  · Hx of COPD  · Meet criteria for severe CAP, Drip Score 2  · At 4 L, baseline is 3 L    Plan:  · Continue Ceftriaxone and Azithromycin, dced jose  · Duonebs q6h, add 3% Nacl to help with retained secretions  · Solumedrol BID , 1 dose today, tomorrow she will take prednisone 40 daily for 5 days    · Mucinex and Tessalon Perles for symptoms  · F/u Procal neg x 2, however patient for some pneumonia on imaging, continue ceftriaxone  · Blood cultures neg  · F/u sputum cultures, urine legionella, urine strep pneumonia -neg  · Incentive spirometry  · Respiratory protocol     Type 2 diabetes mellitus (HCC)  Assessment & Plan  Lab Results   Component Value Date    HGBA1C 7 0 (H) 03/02/2023       Recent Labs     03/02/23  1058 03/02/23  1349 03/02/23  1503 03/02/23  1722   POCGLU 286* 275* 282* 105       Blood Sugar Average: Last 72 hrs:  (P) 831 [id number]   Home regimen: Metformin 1000 mg in AM and 500 mg in PM  Holding oral Medications  Accuchecks 4x with meals and before bedtime  Hypoglycemia protocol  Sugars >500, insulin gtt started, wean off w/ continue Basal / Bolus     COPD exacerbation (HCC)  Assessment & Plan  · Secondary to CAP  · Required BIPAP in ED, weaned off to 5L NC, uses 3L NC prn at home  · Reports not using her rescue inhaler  · Home inhalers: Bretzi for maintenance, albuterol for rescue    Plan:  · See above    Sepsis ([address] )  Assessment & Plan  · Sepsis Criteria: RR: 40, Temp: 35 8C, Source Left upper lobe PNA  · Initially required BiPAP, weaned off   · Blood cultures pending  · Started on Rocephin and Azithromycin     GERD (gastroesophageal reflux disease)  Assessment & Plan  Continue Protonix    Acute on chronic respiratory failure (HCC)  Assessment & Plan  See CAP plan      Chronic back pain  Assessment & Plan  · Chronic back pain with DDD, history of 3x spinal surgeries  · Continue home pain regimen: Oxycodone ER 13 5 mg q12h, Gabapentin 300 mg QID    Hyperlipidemia  Assessment & Plan  Continue Lipitor    Hyponatremia  Assessment & Plan  · Most likely secondary to CAP  Recent Labs     23  2344 23  0529 23  1327   SODIUM 132* 130* 127*     Corrected Na: 134  Monitor with BMP    Hypertension  Assessment & Plan  Continue Losartan and Cardizem       VTE Pharmacologic Prophylaxis: VTE Score: 7 High Risk (Score >/= 5) - Pharmacological DVT Prophylaxis Ordered: enoxaparin (Lovenox)  Sequential Compression Devices Ordered  Patient Centered Rounds: I performed bedside rounds with nursing staff today  Discussions with Specialists or Other Care Team Provider: one    Education and Discussions with Family / Patient: Updated  (son) via phone  Current Length of Stay: 1 day(s)  Current Patient Status: Inpatient   Discharge Plan: Anticipate discharge in 48-72 hrs to discharge location to be determined pending rehab evaluations  Code Status: Level 3 - DNAR and DNI    Subjective:   Pt is worried that her sugars are so high on the sertriods  She feels she is not good enough to go home       Objective:     Vitals:   Temp (24hrs), Av 4 °F (36 9 °C), Min:98 2 °F (36 8 °C), Max:98 6 °F (37 °C)    Temp:  [98 2 °F (36 8 °C)-98 6 °F (37 °C)] 98 6 °F (37 °C)  HR:  [62-79] 72  BP: (102-130)/(52-61) 130/61  SpO2:  [93 %-97 %] 93 %  There is no height or weight on file to calculate BMI  Input and Output Summary (last 24 hours):   No intake or output data in the 24 hours ending 03/02/23 1800    Physical Exam:   Physical Exam  Vitals and nursing note reviewed  Constitutional:       Appearance: She is well-developed  Comments: Pt is anxious more than baseline   B/L rhonchi, she does sound wet   No stridor    HENT:      Head: Normocephalic and atraumatic  Eyes:      Conjunctiva/sclera: Conjunctivae normal    Cardiovascular:      Rate and Rhythm: Normal rate and regular rhythm  Heart sounds: No murmur heard  Pulmonary:      Effort: Pulmonary effort is normal       Breath sounds: Rhonchi present  Abdominal:      Palpations: Abdomen is soft  Tenderness: There is no abdominal tenderness  Musculoskeletal:         General: No swelling  Cervical back: Neck supple  Skin:     General: Skin is warm and dry  Neurological:      Mental Status: She is alert  Additional Data:     Labs:  Results from last 7 days   Lab Units 03/01/23  1327 03/01/23 0529 02/28/23  2344 02/28/23  2344   WBC Thousand/uL 5 39 6 46  --  9 11   HEMOGLOBIN g/dL 11 0* 11 3*  --  12 5   HEMATOCRIT % 35 8 37 3  --  40 8   PLATELETS Thousands/uL 182 179  173  --  188   BANDS PCT %  --  5  --   --    NEUTROS PCT %  --   --   --  65   LYMPHS PCT %  --   --   --  17   LYMPHO PCT % 5* 3*   < >  --    MONOS PCT %  --   --   --  15*   MONO PCT % 1* 2*   < >  --    EOS PCT % 0 0   < > 1    < > = values in this interval not displayed       Results from last 7 days   Lab Units 03/01/23  1327 03/01/23 0529 02/28/23  2344   SODIUM mmol/L 127*   < > 132*   POTASSIUM mmol/L 4 4   < > 4 2   CHLORIDE mmol/L 93*   < > 96   CO2 mmol/L 23   < > 25   BUN mg/dL 35*   < > 20   CREATININE mg/dL 1 45*   < > 0 91   ANION GAP mmol/L 11   < > 11   CALCIUM mg/dL 8 9   < > 9 6   ALBUMIN g/dL  --   --  4 2   TOTAL BILIRUBIN mg/dL  --   --  0 75   ALK PHOS U/L  --   --  92   ALT U/L  --   --  12   AST U/L  --   --  16   GLUCOSE RANDOM mg/dL 411*   < > 147*    < > = values in this interval not displayed  Results from last 7 days   Lab Units 03/01/23  0022   INR  1 09     Results from last 7 days   Lab Units 03/02/23  1722 03/02/23  1503 03/02/23  1349 03/02/23  1058 03/02/23  0917 03/02/23  0703 03/02/23  0504 03/02/23  0256 03/02/23  0054 03/02/23  0000 03/01/23  2302 03/01/23  2239   POC GLUCOSE mg/dl 105 282* 275* 286* 343* 263* 143* 227* 320* 409* 456* 436*     Results from last 7 days   Lab Units 03/02/23  0509 03/01/23  0529   HEMOGLOBIN A1C % 7 0* 6 9*     Results from last 7 days   Lab Units 03/02/23  0509 03/01/23  0529 02/28/23  2344   LACTIC ACID mmol/L  --   --  1 1   PROCALCITONIN ng/ml 0 17 0 07  --        Lines/Drains:  Invasive Devices     Peripheral Intravenous Line  Duration           Peripheral IV 02/28/23 Left Wrist 1 day    Peripheral IV 03/02/23 Dorsal (posterior); Left Forearm <1 day                      Imaging: Reviewed radiology reports from this admission including: All imaging this admission    Recent Cultures (last 7 days):   Results from last 7 days   Lab Units 03/01/23  1332 02/28/23  2344   BLOOD CULTURE   --  No Growth at 24 hrs  No Growth at 24 hrs     LEGIONELLA URINARY ANTIGEN  Negative  --        Last 24 Hours Medication List:   Current Facility-Administered Medications   Medication Dose Route Frequency Provider Last Rate   • albuterol  2 puff Inhalation Q6H PRN Suzanne Sevilla DO     • albuterol  2 5 mg Nebulization Q4H PRN Natty Wilkins MD     • vitamin C  1,000 mg Oral BID Suzanne Sevilla, DO     • aspirin  81 mg Oral Daily Miriam Garvin DO     • atorvastatin  40 mg Oral Daily Miriam Garvin DO     • brimonidine tartrate  1 drop Left Eye BID Suzanne Sevilla DO     • budesonide-formoterol  2 puff Inhalation BID Suzanne Pitch, DO • cefTRIAXone  1,000 mg Intravenous Q24H [facility], DO 1,000 mg (03/02/23 0006)   • diltiazem  180 mg Oral Daily [facility], DO     • docusate sodium  100 mg Oral BID [facility], DO     • enoxaparin  40 mg Subcutaneous Daily Miriam Garvin, DO     • gabapentin  300 mg Oral 4x Daily Miriam Garvin, DO     • guaiFENesin  600 mg Oral BID [facility], DO     • insulin regular (HumuLIN R,NovoLIN R) infusion  0 3-21 Units/hr Intravenous Titrated Herminio June MD 1 Units/hr (03/02/23 1723)   • ipratropium  0 5 mg Nebulization Q6H Shravan Pierre MD     • levalbuterol  1 25 mg Nebulization Q6H Shravan Pierre MD     • losartan  50 mg Oral Daily Miriam Garvin, DO     • methocarbamol  500 mg Oral 4x Daily Miriam Garvin DO     • ondansetron  4 mg Intravenous Q6H PRN [facility], DO     • oxyCODONE  10 mg Oral Q12H [address] Miriam Garvin, DO     • pantoprazole  40 mg Oral Daily Miriam Garvin DO     • phenol  1 spray Mouth/Throat Q2H PRN Matilda Graf MD     • polyethylene glycol  17 g Oral Daily Miriam Garvin DO     • [START ON 3/3/2023] predniSONE  40 mg Oral Daily Matilda Graf MD     • rOPINIRole  0 5 mg Oral HS Miriam Mcconnell DO     • sodium chloride  4 mL Nebulization Q6H Matilda Graf MD          Today, Patient Was Seen By: Matilda Graf MD    **Please Note: This note may have been constructed using a voice recognition system  **

## 2023-03-02 NOTE — PROGRESS NOTES
The azithromycin has / have been converted to Oral per [facility] IV-to-PO Auto-Conversion Protocol for Adults as approved by the Pharmacy and Therapeutics Committee  The patient met all eligible criteria:  3 Age = 25years old   2) Received at least one dose of the IV form   3) Receiving at least one other scheduled oral/enteral medication   4) Tolerating an oral/enteral diet   and did not have any exclusions:   1) Critical care patient   2) Active GI bleed (IF assessing H2RAs or PPIs)   3) Continuous tube feeding (IF assessing cipro, doxycycline, levofloxacin, minocycline, rifampin, or voriconazole)   4) Receiving PO vancomycin (IF assessing metronidazole)   5) Persistent nausea and/or vomiting   6) Ileus or gastrointestinal obstruction   7) Tarah/nasogastric tube set for continuous suction   8) Specific order not to automatically convert to PO (in the order's comments or if discussed in the most recent Infectious Disease or primary team's progress notes)

## 2023-03-03 LAB
ANION GAP SERPL CALCULATED.3IONS-SCNC: 6 MMOL/L (ref 4–13)
ANION GAP SERPL CALCULATED.3IONS-SCNC: 6 MMOL/L (ref 4–13)
BUN SERPL-MCNC: 47 MG/DL (ref 5–25)
BUN SERPL-MCNC: 47 MG/DL (ref 5–25)
CALCIUM SERPL-MCNC: 8.9 MG/DL (ref 8.4–10.2)
CALCIUM SERPL-MCNC: 8.9 MG/DL (ref 8.4–10.2)
CHLORIDE SERPL-SCNC: 96 MMOL/L (ref 96–108)
CHLORIDE SERPL-SCNC: 96 MMOL/L (ref 96–108)
CO2 SERPL-SCNC: 27 MMOL/L (ref 21–32)
CO2 SERPL-SCNC: 27 MMOL/L (ref 21–32)
CREAT SERPL-MCNC: 1.27 MG/DL (ref 0.6–1.3)
CREAT SERPL-MCNC: 1.27 MG/DL (ref 0.6–1.3)
ERYTHROCYTE [DISTWIDTH] IN BLOOD BY AUTOMATED COUNT: 15.3 % (ref 11.6–15.1)
ERYTHROCYTE [DISTWIDTH] IN BLOOD BY AUTOMATED COUNT: 15.3 % (ref 11.6–15.1)
GFR SERPL CREATININE-BSD FRML MDRD: 39 ML/MIN/1.73SQ M
GFR SERPL CREATININE-BSD FRML MDRD: 39 ML/MIN/1.73SQ M
GLUCOSE SERPL-MCNC: 125 MG/DL (ref 65–140)
GLUCOSE SERPL-MCNC: 125 MG/DL (ref 65–140)
GLUCOSE SERPL-MCNC: 145 MG/DL (ref 65–140)
GLUCOSE SERPL-MCNC: 166 MG/DL (ref 65–140)
GLUCOSE SERPL-MCNC: 166 MG/DL (ref 65–140)
GLUCOSE SERPL-MCNC: 179 MG/DL (ref 65–140)
GLUCOSE SERPL-MCNC: 179 MG/DL (ref 65–140)
GLUCOSE SERPL-MCNC: 184 MG/DL (ref 65–140)
GLUCOSE SERPL-MCNC: 184 MG/DL (ref 65–140)
GLUCOSE SERPL-MCNC: 185 MG/DL (ref 65–140)
GLUCOSE SERPL-MCNC: 185 MG/DL (ref 65–140)
GLUCOSE SERPL-MCNC: 194 MG/DL (ref 65–140)
GLUCOSE SERPL-MCNC: 194 MG/DL (ref 65–140)
GLUCOSE SERPL-MCNC: 195 MG/DL (ref 65–140)
GLUCOSE SERPL-MCNC: 195 MG/DL (ref 65–140)
GLUCOSE SERPL-MCNC: 227 MG/DL (ref 65–140)
GLUCOSE SERPL-MCNC: 227 MG/DL (ref 65–140)
GLUCOSE SERPL-MCNC: 228 MG/DL (ref 65–140)
GLUCOSE SERPL-MCNC: 228 MG/DL (ref 65–140)
GLUCOSE SERPL-MCNC: 244 MG/DL (ref 65–140)
GLUCOSE SERPL-MCNC: 244 MG/DL (ref 65–140)
GLUCOSE SERPL-MCNC: 271 MG/DL (ref 65–140)
GLUCOSE SERPL-MCNC: 271 MG/DL (ref 65–140)
GLUCOSE SERPL-MCNC: 94 MG/DL (ref 65–140)
GLUCOSE SERPL-MCNC: 94 MG/DL (ref 65–140)
HCT VFR BLD AUTO: 34.2 % (ref 34.8–46.1)
HCT VFR BLD AUTO: 34.2 % (ref 34.8–46.1)
HGB BLD-MCNC: 10.7 G/DL (ref 11.5–15.4)
HGB BLD-MCNC: 10.7 G/DL (ref 11.5–15.4)
MCH RBC QN AUTO: 26.6 PG (ref 26.8–34.3)
MCH RBC QN AUTO: 26.6 PG (ref 26.8–34.3)
MCHC RBC AUTO-ENTMCNC: 31.3 G/DL (ref 31.4–37.4)
MCHC RBC AUTO-ENTMCNC: 31.3 G/DL (ref 31.4–37.4)
MCV RBC AUTO: 85 FL (ref 82–98)
MCV RBC AUTO: 85 FL (ref 82–98)
PLATELET # BLD AUTO: 183 THOUSANDS/UL (ref 149–390)
PLATELET # BLD AUTO: 183 THOUSANDS/UL (ref 149–390)
PMV BLD AUTO: 10.2 FL (ref 8.9–12.7)
PMV BLD AUTO: 10.2 FL (ref 8.9–12.7)
POTASSIUM SERPL-SCNC: 4.4 MMOL/L (ref 3.5–5.3)
POTASSIUM SERPL-SCNC: 4.4 MMOL/L (ref 3.5–5.3)
RBC # BLD AUTO: 4.02 MILLION/UL (ref 3.81–5.12)
RBC # BLD AUTO: 4.02 MILLION/UL (ref 3.81–5.12)
SODIUM SERPL-SCNC: 129 MMOL/L (ref 135–147)
SODIUM SERPL-SCNC: 129 MMOL/L (ref 135–147)
WBC # BLD AUTO: 14.59 THOUSAND/UL (ref 4.31–10.16)
WBC # BLD AUTO: 14.59 THOUSAND/UL (ref 4.31–10.16)

## 2023-03-03 RX ORDER — FUROSEMIDE 10 MG/ML
20 INJECTION INTRAMUSCULAR; INTRAVENOUS ONCE
Status: COMPLETED | OUTPATIENT
Start: 2023-03-03 | End: 2023-03-03

## 2023-03-03 RX ORDER — ECHINACEA PURPUREA EXTRACT 125 MG
1 TABLET ORAL
Status: DISCONTINUED | OUTPATIENT
Start: 2023-03-03 | End: 2023-03-06 | Stop reason: HOSPADM

## 2023-03-03 RX ORDER — FLUTICASONE PROPIONATE 50 MCG
1 SPRAY, SUSPENSION (ML) NASAL DAILY
Status: DISCONTINUED | OUTPATIENT
Start: 2023-03-04 | End: 2023-03-06 | Stop reason: HOSPADM

## 2023-03-03 RX ORDER — GUAIFENESIN 600 MG/1
1200 TABLET, EXTENDED RELEASE ORAL 2 TIMES DAILY
Status: DISCONTINUED | OUTPATIENT
Start: 2023-03-03 | End: 2023-03-06 | Stop reason: HOSPADM

## 2023-03-03 RX ADMIN — LEVALBUTEROL HYDROCHLORIDE 1.25 MG: 1.25 SOLUTION, CONCENTRATE RESPIRATORY (INHALATION) at 19:33

## 2023-03-03 RX ADMIN — SODIUM CHLORIDE SOLN NEBU 3% 4 ML: 3 NEBU SOLN at 15:27

## 2023-03-03 RX ADMIN — METHOCARBAMOL 500 MG: 500 TABLET ORAL at 08:12

## 2023-03-03 RX ADMIN — OXYCODONE HYDROCHLORIDE 10 MG: 10 TABLET, FILM COATED, EXTENDED RELEASE ORAL at 21:31

## 2023-03-03 RX ADMIN — FUROSEMIDE 20 MG: 10 INJECTION, SOLUTION INTRAMUSCULAR; INTRAVENOUS at 11:14

## 2023-03-03 RX ADMIN — ENOXAPARIN SODIUM 40 MG: 40 INJECTION SUBCUTANEOUS at 08:12

## 2023-03-03 RX ADMIN — SODIUM CHLORIDE SOLN NEBU 3% 4 ML: 3 NEBU SOLN at 19:33

## 2023-03-03 RX ADMIN — DILTIAZEM HYDROCHLORIDE 180 MG: 180 CAPSULE, COATED, EXTENDED RELEASE ORAL at 08:12

## 2023-03-03 RX ADMIN — OXYCODONE HYDROCHLORIDE AND ACETAMINOPHEN 1000 MG: 500 TABLET ORAL at 21:31

## 2023-03-03 RX ADMIN — GUAIFENESIN 600 MG: 600 TABLET ORAL at 08:12

## 2023-03-03 RX ADMIN — LEVALBUTEROL HYDROCHLORIDE 1.25 MG: 1.25 SOLUTION, CONCENTRATE RESPIRATORY (INHALATION) at 08:45

## 2023-03-03 RX ADMIN — METHOCARBAMOL 500 MG: 500 TABLET ORAL at 13:30

## 2023-03-03 RX ADMIN — BRIMONIDINE TARTRATE 1 DROP: 2 SOLUTION/ DROPS OPHTHALMIC at 08:12

## 2023-03-03 RX ADMIN — OXYCODONE HYDROCHLORIDE 10 MG: 10 TABLET, FILM COATED, EXTENDED RELEASE ORAL at 08:12

## 2023-03-03 RX ADMIN — IPRATROPIUM BROMIDE 0.5 MG: 0.5 SOLUTION RESPIRATORY (INHALATION) at 15:15

## 2023-03-03 RX ADMIN — ASPIRIN 81 MG: 81 TABLET, CHEWABLE ORAL at 08:12

## 2023-03-03 RX ADMIN — IPRATROPIUM BROMIDE 0.5 MG: 0.5 SOLUTION RESPIRATORY (INHALATION) at 08:45

## 2023-03-03 RX ADMIN — PREDNISONE 40 MG: 20 TABLET ORAL at 08:12

## 2023-03-03 RX ADMIN — PANTOPRAZOLE SODIUM 40 MG: 40 TABLET, DELAYED RELEASE ORAL at 08:12

## 2023-03-03 RX ADMIN — GABAPENTIN 300 MG: 300 CAPSULE ORAL at 17:07

## 2023-03-03 RX ADMIN — ROPINIROLE 0.5 MG: 0.25 TABLET, FILM COATED ORAL at 21:31

## 2023-03-03 RX ADMIN — METHOCARBAMOL 500 MG: 500 TABLET ORAL at 21:31

## 2023-03-03 RX ADMIN — LEVALBUTEROL HYDROCHLORIDE 1.25 MG: 1.25 SOLUTION, CONCENTRATE RESPIRATORY (INHALATION) at 02:09

## 2023-03-03 RX ADMIN — LOSARTAN POTASSIUM 50 MG: 50 TABLET, FILM COATED ORAL at 08:12

## 2023-03-03 RX ADMIN — SODIUM CHLORIDE SOLN NEBU 3% 4 ML: 3 NEBU SOLN at 08:45

## 2023-03-03 RX ADMIN — BUDESONIDE AND FORMOTEROL FUMARATE DIHYDRATE 2 PUFF: 160; 4.5 AEROSOL RESPIRATORY (INHALATION) at 08:12

## 2023-03-03 RX ADMIN — OXYCODONE HYDROCHLORIDE AND ACETAMINOPHEN 1000 MG: 500 TABLET ORAL at 08:12

## 2023-03-03 RX ADMIN — METHOCARBAMOL 500 MG: 500 TABLET ORAL at 17:07

## 2023-03-03 RX ADMIN — BUDESONIDE AND FORMOTEROL FUMARATE DIHYDRATE 2 PUFF: 160; 4.5 AEROSOL RESPIRATORY (INHALATION) at 17:08

## 2023-03-03 RX ADMIN — IPRATROPIUM BROMIDE 0.5 MG: 0.5 SOLUTION RESPIRATORY (INHALATION) at 19:32

## 2023-03-03 RX ADMIN — ALBUTEROL SULFATE 2 PUFF: 90 AEROSOL, METERED RESPIRATORY (INHALATION) at 16:17

## 2023-03-03 RX ADMIN — GABAPENTIN 300 MG: 300 CAPSULE ORAL at 13:30

## 2023-03-03 RX ADMIN — DOCUSATE SODIUM 100 MG: 100 CAPSULE, LIQUID FILLED ORAL at 17:07

## 2023-03-03 RX ADMIN — ATORVASTATIN CALCIUM 40 MG: 40 TABLET, FILM COATED ORAL at 08:12

## 2023-03-03 RX ADMIN — BRIMONIDINE TARTRATE 1 DROP: 2 SOLUTION/ DROPS OPHTHALMIC at 21:35

## 2023-03-03 RX ADMIN — DOCUSATE SODIUM 100 MG: 100 CAPSULE, LIQUID FILLED ORAL at 08:12

## 2023-03-03 RX ADMIN — LEVALBUTEROL HYDROCHLORIDE 1.25 MG: 1.25 SOLUTION, CONCENTRATE RESPIRATORY (INHALATION) at 15:15

## 2023-03-03 RX ADMIN — IPRATROPIUM BROMIDE 0.5 MG: 0.5 SOLUTION RESPIRATORY (INHALATION) at 02:09

## 2023-03-03 RX ADMIN — GABAPENTIN 300 MG: 300 CAPSULE ORAL at 21:31

## 2023-03-03 RX ADMIN — GABAPENTIN 300 MG: 300 CAPSULE ORAL at 08:12

## 2023-03-03 RX ADMIN — SODIUM CHLORIDE SOLN NEBU 3% 4 ML: 3 NEBU SOLN at 02:09

## 2023-03-03 RX ADMIN — ALBUTEROL SULFATE 2 PUFF: 90 AEROSOL, METERED RESPIRATORY (INHALATION) at 10:16

## 2023-03-03 RX ADMIN — GUAIFENESIN 1200 MG: 600 TABLET ORAL at 17:07

## 2023-03-03 NOTE — OCCUPATIONAL THERAPY NOTE
Occupational Therapy Evaluation     Patient Name: Irlanda Bocanegra  [id number] Date: 3/3/2023  Problem List  Principal Problem:    Community acquired pneumonia  Active Problems:    COPD exacerbation ([place] [address] )    Type 2 diabetes mellitus ([place] [address] )    Hypertension    Hyponatremia    Hyperlipidemia    Chronic back pain    Acute on chronic respiratory failure (HCC)    GERD (gastroesophageal reflux disease)    Sepsis ([facility] )    Past Medical History  Past Medical History:   Diagnosis Date    Arthritis     COPD (chronic obstructive pulmonary disease) ([place] [address] )     Diabetes mellitus ([place] [address] )     Hypertension      Past Surgical History  Past Surgical History:   Procedure Laterality Date    CARDIAC SURGERY      JOINT REPLACEMENT          03/03/23 1258   OT Last Visit   OT Visit Date 03/03/23  (Friday)   Note Type   Note type Evaluation   Pain Assessment   Pain Assessment Tool 0-10   Pain Score No Pain   Restrictions/Precautions   Weight Bearing Precautions Per Order No   Other Precautions Chair Alarm;O2;Fall Risk;Pain  (3L O2 via NC)   Home Living   Type of [address] [place] [address] Two level;Bed/bath upstairs; Other (Comment)  (0 ASH; stairglide to 2nd floor)   Bathroom Shower/Tub Walk-in shower   Bathroom Toilet Standard   Bathroom Accessibility Accessible;Accessible via walker   Home Equipment Walker;Grab bars;Stair glide  (rollator, 2L 1st floor, 3L 2nd floor)   Additional Comments Pt reports living w/ her son, family, pets in [address] w/ stairglide to access   Prior Function   Level of [place] Needs assistance with IADLS   Lives With Son   Receives Help From Family   IADLs Family/Friend/Other provides transportation   Falls in the last 6 months 0  (pt denies)   Vocational Retired   Comments Pt reports use of rollator for functional mobility at baseline on chronic O2 and I w/ ADLs   Lifestyle   Autonomy Pt reports I w/ ADLs, pet care using rollator for functional mobility   Able to manage laundry   Reciprocal Relationships Pt reports living w/ son, grandkids and son's father in law   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   Intrinsic Gratification Pt reports enjoying collecting carnival glass, helping her son w/ his coin collection, and her pets (1 dog, 3 cats)   General   Additional Pertinent History recent DC home from rehab on 10/14/2022   Family/Caregiver Present No   Subjective   Subjective "I am concerned about my sons father in law coming home from the nursing home and he sundowns"   ADL   Where Assessed Edge of bed  (vs OOB In chair post eval)   Eating Assistance 7  Independent  (seated at EOB eating lunch)   Eating Deficit Setup   Grooming Assistance 6  Modified Independent  (seated at EOB)   Grooming Deficit Setup   UB Bathing Assistance   (anticipate mod I seated after set- up based on fxal obs skills, clinical judgement)   LB Bathing Assistance   (anticipate min A based on fxal obs skills, clinical judgement)   UB Dressing Assistance 6  Modified independent   [address] [facility] 5  Supervision/Setup  (thread LE into pants seated at EOB)   [address]; Increased time to complete   Toileting Assistance    (denied need to void)   Additional Comments on chronic O2   Bed Mobility   Supine to Sit Unable to assess   Sit to Supine Unable to assess   Additional Comments Pt seated at EOB eating lunch upon arrival and OOB In chair post eval w/ needs met, call bell in reach and chair alarm activated   Transfers   Sit to Stand 5  Supervision   Additional items Assist x 1   Stand to Sit 5  Supervision   Additional items Assist x 1   Additional Comments Pt performed sit <> stand 2 X w/ S   Functional Mobility   Functional Mobility 5  Supervision   Additional Comments engaged in functional mobility household distances w/ S   Additional items Rolling walker   Balance   Static Sitting Good   Static Standing Fair   Ambulatory Fair   Activity Tolerance   Activity Tolerance Patient limited by fatigue   Medical Staff Made Aware care coordination w/ PT, RJ due to decreased activity tolerance, regression from baseline  Spoke w/ Satnam SHELLEY   Nurse Made Aware per RN appropriate to see pt   RUE Assessment   RUE Assessment WFL   RUE Strength   RUE Overall Strength Within Functional Limits - able to perform ADL tasks with strength   LUE Assessment   LUE Assessment WFL   LUE Strength   LUE Overall Strength Within Functional Limits - able to perform ADL tasks with strength   Hand Function   Gross Motor Coordination Functional   Fine Motor Coordination Functional   Sensation   Light Touch No apparent deficits   Cognition   Overall Cognitive Status WFL   Arousal/Participation Alert; Cooperative   Attention Attends with cues to redirect   Orientation Level Oriented X4   Memory Within functional limits   Following Commands Follows multistep commands without difficulty   Comments Identified pt by full name and birthdate  Alert and oriented  Able to communicate wants / needs   Assessment   Limitation Decreased ADL status; Decreased endurance;Decreased self-care trans;Decreased high-level ADLs   Assessment Pt is an 81 yo female [facility]/28/2023 w/ cough / SOB  Diagnosed w/ community acquired pneumonia/ COPD exacerbation  Significant PMH impacting her occupational performance includes chronic respiratory failure, COPD, DM, HTN, chronic back pain  Pt w/ active OT Orders and activity orders  Pt reports I w/ ADLs using AD and stair glide to access 2nd floor  Upon eval, pt alert and oriented  Able to follow directions, communicate wants/needs  Pt required mod I to complete grooming/ UBD, S LBD, S sit <> stand and use of RW for functional mobility w/ S  Pt presents w/ decreased activity tolerance, decreased endurance, decreased standing tolerance, decreased standing balance impacting her I w/ dressing, bathing, oral hygiene, functional mobility, functional transfers, activity engagement, clothing mgmt   Pt completing ADL at / near baseline level of I w/ decreased endurance  Recommend DC home w/ Home OT when medically stable for discharge from acute care  Will continue to follow 2-3 X / week  Goals   Patient Goals Pt stated that she would like to go home   Plan   Treatment Interventions Functional transfer training; Endurance training;Patient/family training;Equipment evaluation/education; Compensatory technique education;Continued evaluation; Activityengagement; Energy conservation   Goal Expiration Date 03/13/23   OT Frequency 2-3x/wk   Recommendation   OT Discharge Recommendation Home with home health rehabilitation   AM-[facility] Daily Activity Inpatient   Lower Body Dressing 3   Bathing 3   Toileting 4   Upper Body Dressing 4   Grooming 4   Eating 4   Daily Activity Raw Score 22   Daily Activity Standardized Score (Calc for Raw Score >=11) 47  1   AM-[facility] Applied Cognition Inpatient   Following a Speech/Presentation 4   Understanding Ordinary Conversation 4   Taking Medications 3   Remembering Where Things Are Placed or Put Away 4   Remembering List of 4-5 Errands 3   Taking Care of Complicated Tasks 3   Applied Cognition Raw Score 21   Applied Cognition Standardized Score 44 3   Barthel Index   Feeding 10   Bathing 0   Grooming Score 0   Dressing Score 5   Bladder Score 10   Bowels Score 10   Toilet Use Score 5   Transfers (Bed/Chair) Score 10   Mobility (Level Surface) Score 0   Stairs Score 0   Barthel Index Score 50   Modified [place] Scale   Modified Oleg Scale 3        The patient's raw score on the AM-PAC Daily Activity Inpatient Short Form is 22  A raw score of greater than or equal to 19 suggests the patient may benefit from discharge to home  Please refer to the recommendation of the Occupational Therapist for safe discharge planning      Pt goals to be met by 3/13/2023 to max I w/ ADLs and improve engagement to care for her pets includes:    -Pt will demonstrate good attention and understanding EC tech to max I w/ ADLs and improve engagement    -Pt will complete bed mobility supine <> sit w/ mod I to max I w/ ADLs     -Pt will complete functional transfers to bed, chair, and toilet using AD, DME as needed w/ mod I to max I w/ ADLs    -Pt will consistently engage in functional mobility using AD household distances w/ mod I to max I and improve engagement to return home    -Pt will complete UBD w/ mod I    -Pt will complete LBD w/ mod I to max I and improve engagement    -Pt will demonstrate improved activity and sitting tolerance OOB In chair for all meals    Pat Vargas, OTR/L

## 2023-03-03 NOTE — PHYSICAL THERAPY NOTE
PHYSICAL THERAPY EVALUATION NOTE    Patient Name: Vito Cutler  TGMMM'G Date: 3/3/2023  AGE:   80 y o  Mrn:   [id number]  ADMIT DX:  Hyponatremia [E87 1]  Pneumonia [J18 9]  SOB (shortness of breath) [R06 02]  Hypoxia [R09 02]  COPD with acute exacerbation (HCC) [J44 1]    Past Medical History:   Diagnosis Date    Arthritis     COPD (chronic obstructive pulmonary disease) ([facility] )     Diabetes mellitus ([facility] )     Hypertension      Length Of Stay: 2  PHYSICAL THERAPY EVALUATION :    03/03/23 1257   PT Last Visit   PT Visit Date 03/03/23   Pain Assessment   Pain Assessment Tool 0-10   Pain Score No Pain   Restrictions/Precautions   Other Precautions Chair Alarm;O2;Fall Risk   Home Living   Type of [address] [place] [address] Two level;Bed/bath upstairs; Other (Comment)  (no ASH  stairglide to 2nd floor)   Additional Comments lives w/ family  ambulates w/ walker  independent w/ ADLs  receives assistance w/ IADLs  no falls in last 6 months  uses 2 to 3L oxygen via nasal cannula as needed  General   Additional Pertinent History 3/3/23 at 11:10 glucose was 228   Family/Caregiver Present No   Cognition   Arousal/Participation Alert   Attention Attends with cues to redirect   Orientation Level Oriented to person; Other (Comment)  (pt was identified w/ full name, birth date)   Following Commands Follows all commands and directions without difficulty   Subjective   Subjective pt seen sitting on edge of bed  pt agreed to PT eval  denied pain or dizziness  RLE Assessment   RLE Assessment WFL  (3+ to 4-/5)   LLE Assessment   LLE Assessment WFL  (3+ to 4-/5)   Light Touch   RLE Light Touch Grossly intact   LLE Light Touch Grossly intact   Transfers   Sit to Stand 5  Supervision   Additional items Increased time required   Stand to Sit 5  Supervision   Additional items Increased time required   Ambulation/Elevation   Gait pattern Foward flexed; Short stride; Excessively slow   Gait Assistance 5  Supervision   Additional items Verbal cues  (for walker positioning, breathing technique)   Assistive Device Rolling walker   Distance 20 feet  seated rest break  60 feet  (additional ambulation was not possible due to fatigue)   Balance   Static Sitting Good   Static Standing Fair  (w/ roller walker)   Ambulatory Fair -  (w/ roller walker)   Activity Tolerance   Activity Tolerance Patient limited by fatigue   Nurse Made Aware spoke to Pat Dougherty OT   Assessment   Problem List Decreased strength;Decreased endurance; Impaired balance;Decreased mobility; Decreased safety awareness   Assessment Pt presents with cough and SOB in setting of COPD exacerbation  Dx: CAP, DM, COPD exacerbation, sepsis, acute on chronic respiratory failure, chronic back pain, HTN, and hyponatremia  order placed for PT eval and tx, w/ activity order of up and out of bed as tolerated  pt presents w/ comorbidities of COPD, HTN, DM, DJD, anemia, bilateral TKA, left rotator cuff repair, and chronic back pain and surgery and personal factors of advanced age and mobilizing w/ assistive device  pt presents w/ weakness, decreased endurance, impaired balance, gait deviations, decreased safety awareness and fall risk  these impairments are evident in findings from physical examination (weakness), mobility assessment (need for standby assist w/ all phases of mobility when usually mobilizing independently, tolerance to only 60 feet of ambulation and need for cueing for mobility technique), and Barthel Index: 50/100  pt needed input for task focus and mobility technique  pt is at risk for falls due to physical and safety awareness deficits   pt's clinical presentation is unstable/unpredictable (evident in need for assist w/ all phases of mobility when usually mobilizing independently, tolerance to only 60 feet of ambulation, need for supplemental oxygen in order to maintain oxygen saturation and need for input for mobility technique/safety)  pt needs inpatient PT tx to improve mobility deficits and progress mobility training as appropriate  discharge recommendation is for home w/ family support and home PT to reduce fall risk and maximize level of functional independence  Goals   Patient Goals go home   STG Expiration Date 03/13/23   Short Term Goal #1 pt will: Increase bilateral LE strength 1/2 grade to facilitate independent mobility, Perform bed mobility independently to increase level of independence, Perform all transfers independently to improve independence, Ambulate 150 ft  with roller walker independently w/o LOB to improve functional independence, Increase ambulatory balance 1 grade to decrease risk for falls, Complete exercise program independently to increase strength and endurance, Tolerate 3 hr OOB to faciliate upright tolerance, Improve Barthel Index score to 70 or greater to facilitate independence and Complete Timed Up and Go or Comfortable Gait Speed to further assess mobility and monitor progress   PT Treatment Day 0   Plan   Treatment/Interventions Functional transfer training;LE strengthening/ROM; Therapeutic exercise; Endurance training;Patient/family training;Equipment eval/education;Gait training;Bed mobility   PT Frequency 3-5x/wk   Recommendation   PT Discharge Recommendation Home with home health rehabilitation   AM-PAC Basic Mobility Inpatient   Turning in Flat Bed Without Bedrails 4   Lying on Back to Sitting on Edge of Flat Bed Without Bedrails 4   Moving Bed to Chair 3   Standing Up From Chair Using Arms 3   Walk in Room 3   Climb 3-5 Stairs With Railing 1   Basic Mobility Inpatient Raw Score 18   Basic Mobility Standardized Score 41 05   Highest Level Of Mobility   JH-HLM Goal 6: Walk 10 steps or more   JH-HLM Achieved 7: Walk 25 feet or more   Barthel Index   Feeding 10   Bathing 0   Grooming Score 0   Dressing Score 5   Bladder Score 10   Bowels Score 10   Toilet Use Score 5   Transfers (Bed/Chair) Score 10   Mobility (Level Surface) Score 0   Stairs Score 0   Barthel Index Score 50   End of Consult   Patient Position at End of Consult Bed/Chair alarm activated; All needs within reach; Bedside chair     The patient's AM-PAC Basic Mobility Inpatient Short Form Raw Score is 18  A Raw score of greater than 16 suggests the patient may benefit from discharge to home  Please also refer to the recommendation of the Physical Therapist for safe discharge planning  Skilled PT recommended while in hospital and upon DC to progress pt toward treatment goals       Clarisa Li, PT

## 2023-03-03 NOTE — CASE MANAGEMENT
Case Management Assessment & Discharge Planning Note    Patient name Edwige Esparza  Location S /S [id number] MRN [id number]  : 1940 Date 3/3/2023       Current Admission Date: 2023  Current Admission Diagnosis:Community acquired pneumonia   Patient Active Problem List    Diagnosis Date Noted   • Community acquired pneumonia 2023   • COPD exacerbation ([place] [address] ) 2023   • Type 2 diabetes mellitus ([place] [address] ) 2023   • Hypertension 2023   • Hyponatremia 2023   • Hyperlipidemia 2023   • Chronic back pain 2023   • Acute on chronic respiratory failure ([place] [address] ) 2023   • GERD (gastroesophageal reflux disease) 2023   • Sepsis ([place] [address] ) 2023      LOS (days): 2  Geometric Mean LOS (GMLOS) (days): 5 00  Days to GMLOS:2 6     OBJECTIVE:    Risk of Unplanned Readmission Score: 18 31         Current admission status: Inpatient       Preferred Pharmacy:   [facility] TO E-PRESCRIBE  No address on file      Primary Care Provider: Richard Blue DO    Primary Insurance: [facility]  Secondary Insurance:     ASSESSMENT:  Aniket Franklin Proxies    There are no active Health Care Proxies on file  Patient Information  Admitted from[de-identified] Home  Mental Status: Alert  During Assessment patient was accompanied by: Not accompanied during assessment  Assessment information provided by[de-identified] Patient  Primary Caregiver: Self  Support Systems: Self, Son, Family members  [place] of Residence: [address] [place] [address] do you live in?: [place] entry access options   Select all that apply : No steps to enter home  Type of Current Residence: 2 story home  Upon entering residence, is there a bedroom on the main floor (no further steps)?: No  A bedroom is located on the following floor levels of residence (select all that apply):: 2nd Floor  Upon entering residence, is there a bathroom on the main floor (no further steps)?: No  Indicate which floors of current residence have a bathroom (select all the apply):: 2nd Floor  Number of steps to 2nd floor from main floor: One Flight (stair glide access)  In the last 12 months, was there a time when you were not able to pay the mortgage or rent on time?: No  In the last 12 months, was there a time when you did not have a steady place to sleep or slept in a shelter (including now)?: No  Homeless/housing insecurity resource given?: N/A  Living Arrangements: Other (Comment), Lives w/ Son (and grddtr)    Activities of Daily Living Prior to Admission  Functional Status: Independent  Completes ADLs independently?: Yes  Ambulates independently?: Yes  Does patient use assisted devices?: Yes  Assisted Devices (DME) used: Stair Chair/Glide, Walker, Shower Chair, Home Oxygen concentrator, Portable Oxygen tanks  DME Company Name (respiratory supplies): [employer]  O2 Rate(s): 2-3L  Does patient currently own DME?: Yes  What DME does the patient currently own?: Home Oxygen concentrator, Shower Chair, Walker, Stair Chair/Glide, Portable Oxygen tanks  Does patient have a history of Outpatient Therapy (PT/OT)?: No  Does the patient have a history of Short-Term Rehab?: Yes (Addie Ruiz and [place])  Does patient have a history of HHC?: Yes Lanlayne Arambula)  [facility] ?: No         Patient Information Continued  Within the past 12 months, you worried that your food would run out before you got the money to buy more : Never true  Within the past 12 months, the food you bought just didn't last and you didn't have money to get more : Never true  Food insecurity resource given?: N/A         Means of Transportation  Means of Transport to Appts[de-identified] Family transport  In the past 12 months, has lack of transportation kept you from medical appointments or from getting medications?: No  In the past 12 months, has lack of transportation kept you from meetings, work, or from getting things needed for daily living?: No  Was application for public transport provided?: N/A        DISCHARGE DETAILS:    Discharge planning discussed with[de-identified] patient and pt's son  Freedom of Choice: Yes  Comments - Freedom of Choice: CM met with pt at bedside re: assessment and dcp  Pt lives with her son and grddtr in two story home, 0 ASH, stair glide access to second floor bed and bath  Pt independent with ADLS, currently on 2-0K O2 chronically, has a RW located on each level of home, and her son provides transport to appointments  PT/OT [facility] - pt relayed preference for [facility] as had previously - referral sent via [employer], awaiting response  pt's son to provide pt transport home when ready and will bring pt's portable oxygen tank    CM contacted family/caregiver?: Yes (Pt called son during meeting )  Were Treatment Team discharge recommendations reviewed with patient/caregiver?: Yes  Did patient/caregiver verbalize understanding of patient care needs?: Yes  Were patient/caregiver advised of the risks associated with not following Treatment Team discharge recommendations?: Yes    Contacts  Patient Contacts: Cristy Ortega (son)  Relationship to Patient[de-identified] Family  Contact Method: Phone  Phone Number: [phone] (M)  Reason/Outcome: Discharge Planning, Referral, Continuity of Care, Emergency [address]         Is the patient interested in [facility] at discharge?: Yes  [address] requested[de-identified] Nursing, Occupational Therapy, Physical [address] Name[de-identified] [address] Provider[de-identified] PCP  Home Health Services Needed[de-identified] Strengthening/Theraputic Exercises to Improve Function, Evaluate Functional Status and Safety, Gait/ADL Training  Homebound Criteria Met[de-identified] Requires the Assistance of Another Person for Safe Ambulation or to Leave the Home, Uses an Assist Device (i e  cane, walker, etc)  Supporting Clincal Findings[de-identified] Limited Endurance, Fatigues Easliy in [employer], Requires Oxygen    DME Referral Provided  Referral made for DME?: No    Other Referral/Resources/Interventions Provided:  Interventions: [facility]  Referral Comments: Referral sent to [facility] via aidin, awaiting response      Would you like to participate in our [address] service program?  : No - Declined    Treatment Team Recommendation: Home with [address] [place] [address]  Discharge Destination Plan[de-identified] [facility] : Family

## 2023-03-03 NOTE — PLAN OF CARE
Problem: OCCUPATIONAL THERAPY ADULT  Goal: Performs self-care activities at highest level of function for planned discharge setting  See evaluation for individualized goals  Description: Treatment Interventions: Functional transfer training, Endurance training, Patient/family training, Equipment evaluation/education, Compensatory technique education, Continued evaluation, Activityengagement, Energy conservation          See flowsheet documentation for full assessment, interventions and recommendations  Note: Limitation: Decreased ADL status, Decreased endurance, Decreased self-care trans, Decreased high-level ADLs     Assessment: Pt is an 81 yo female [facility]/28/2023 w/ cough / SOB  Diagnosed w/ community acquired pneumonia/ COPD exacerbation  Significant PMH impacting her occupational performance includes chronic respiratory failure, COPD, DM, HTN, chronic back pain  Pt w/ active OT Orders and activity orders  Pt reports I w/ ADLs using AD and stair glide to access 2nd floor  Upon eval, pt alert and oriented  Able to follow directions, communicate wants/needs  Pt required mod I to complete grooming/ UBD, S LBD, S sit <> stand and use of RW for functional mobility w/ S  Pt presents w/ decreased activity tolerance, decreased endurance, decreased standing tolerance, decreased standing balance impacting her I w/ dressing, bathing, oral hygiene, functional mobility, functional transfers, activity engagement, clothing mgmt  Pt completing ADL at / near baseline level of I w/ decreased endurance  Recommend DC home w/ Home OT when medically stable for discharge from acute care  Will continue to follow 2-3 X / week       OT Discharge Recommendation: Home with home health rehabilitation

## 2023-03-03 NOTE — PLAN OF CARE
Problem: PHYSICAL THERAPY ADULT  Goal: Performs mobility at highest level of function for planned discharge setting  See evaluation for individualized goals  Description: Treatment/Interventions: Functional transfer training, LE strengthening/ROM, Therapeutic exercise, Endurance training, Patient/family training, Equipment eval/education, Gait training, Bed mobility          See flowsheet documentation for full assessment, interventions and recommendations  Note:    Problem List: Decreased strength, Decreased endurance, Impaired balance, Decreased mobility, Decreased safety awareness  Assessment: Pt presents with cough and SOB in setting of COPD exacerbation  Dx: CAP, DM, COPD exacerbation, sepsis, acute on chronic respiratory failure, chronic back pain, HTN, and hyponatremia  order placed for PT eval and tx, w/ activity order of up and out of bed as tolerated  pt presents w/ comorbidities of COPD, HTN, DM, DJD, anemia, bilateral TKA, left rotator cuff repair, and chronic back pain and surgery and personal factors of advanced age and mobilizing w/ assistive device  pt presents w/ weakness, decreased endurance, impaired balance, gait deviations, decreased safety awareness and fall risk  these impairments are evident in findings from physical examination (weakness), mobility assessment (need for standby assist w/ all phases of mobility when usually mobilizing independently, tolerance to only 60 feet of ambulation and need for cueing for mobility technique), and Barthel Index: 50/100  pt needed input for task focus and mobility technique  pt is at risk for falls due to physical and safety awareness deficits  pt's clinical presentation is unstable/unpredictable (evident in need for assist w/ all phases of mobility when usually mobilizing independently, tolerance to only 60 feet of ambulation, need for supplemental oxygen in order to maintain oxygen saturation and need for input for mobility technique/safety)   pt needs inpatient PT tx to improve mobility deficits and progress mobility training as appropriate  discharge recommendation is for home w/ family support and home PT to reduce fall risk and maximize level of functional independence  PT Discharge Recommendation: Home with home health rehabilitation    See flowsheet documentation for full assessment

## 2023-03-03 NOTE — PROGRESS NOTES
-- Patient:  -- MRN: [id number]  -- Aidin Request ID: [id number]  -- Level of care reserved: [facility]  -- Partner Reserved: [address]  [facility] , [facility], [address] [facility] [phone]  -- Clinical needs requested:  -- Geography searched: [id number]  -- Start of Service:  -- Request sent: 2:48pm EST on 3/3/2023 by Hien Issa  -- Partner reserved: 3:22pm EST on 3/3/2023 by Lyubov Cooley  -- Choice list shared: 3:21pm EST on 3/3/2023 by Lyubov Cooley

## 2023-03-03 NOTE — CASE MANAGEMENT
Case Management Discharge Planning Note    Patient name Cristiana Melvin  Location S /S [address] 322-01 MRN [id number]  : 1940 Date 3/3/2023       Current Admission Date: 2023  Current Admission Diagnosis:Community acquired pneumonia   Patient Active Problem List    Diagnosis Date Noted   • Community acquired pneumonia 2023   • COPD exacerbation ([place] [address] ) 2023   • Type 2 diabetes mellitus ([place] [address] ) 2023   • Hypertension 2023   • Hyponatremia 2023   • Hyperlipidemia 2023   • Chronic back pain 2023   • Acute on chronic respiratory failure ([place] [address] ) 2023   • GERD (gastroesophageal reflux disease) 2023   • Sepsis ([place] [address] ) 2023      LOS (days): 2  Geometric Mean LOS (GMLOS) (days): 5 00  Days to GMLOS:2 6     OBJECTIVE:  Risk of Unplanned Readmission Score: 18 31         Current admission status: Inpatient   [facility]   [id number] [address] [place] [address]  Phone: [phone] Fax: [phone]    Primary Care Provider: Nora Almanzar DO    Primary Insurance: [employer] RE[employer]  Secondary Insurance:     DISCHARGE DETAILS:    Discharge planning discussed with[de-identified] patient and pt's son  Freedom of Choice: Yes  Comments - Freedom of Choice: Izell Fat confirmed for care      Requested  [address]         Is the patient interested in [facility] at discharge?: Yes  [address] Rajesh Lopez requested[de-identified] Nursing, Occupational Therapy, Physical [address] Name[de-identified]  [address] Provider[de-identified] PCP  Home Health Services Needed[de-identified] Strengthening/Theraputic Exercises to Improve Function, Evaluate Functional Status and Safety, Gait/ADL Training  Homebound Criteria Met[de-identified] Requires the Assistance of Another Person for Safe Ambulation or to Leave the Home, Uses an Assist Device (i e  cane, walker, etc)  Supporting Clincal Findings[de-identified] Limited Endurance, Fatigues Easliy in [employer], Requires Oxygen    Other Referral/Resources/Interventions Provided:  Interventions: [facility]  Referral Comments: Michael Rizo reserved in aidin      Treatment Team Recommendation: Home with [address] [place] [address]  Discharge Destination Plan[de-identified] Home with [address]ri[address] at Discharge : Family

## 2023-03-04 LAB
ANION GAP SERPL CALCULATED.3IONS-SCNC: 12 MMOL/L (ref 4–13)
ANION GAP SERPL CALCULATED.3IONS-SCNC: 12 MMOL/L (ref 4–13)
BUN SERPL-MCNC: 39 MG/DL (ref 5–25)
BUN SERPL-MCNC: 39 MG/DL (ref 5–25)
CALCIUM SERPL-MCNC: 9.3 MG/DL (ref 8.4–10.2)
CALCIUM SERPL-MCNC: 9.3 MG/DL (ref 8.4–10.2)
CHLORIDE SERPL-SCNC: 96 MMOL/L (ref 96–108)
CHLORIDE SERPL-SCNC: 96 MMOL/L (ref 96–108)
CO2 SERPL-SCNC: 24 MMOL/L (ref 21–32)
CO2 SERPL-SCNC: 24 MMOL/L (ref 21–32)
CREAT SERPL-MCNC: 1.02 MG/DL (ref 0.6–1.3)
CREAT SERPL-MCNC: 1.02 MG/DL (ref 0.6–1.3)
ERYTHROCYTE [DISTWIDTH] IN BLOOD BY AUTOMATED COUNT: 15.6 % (ref 11.6–15.1)
ERYTHROCYTE [DISTWIDTH] IN BLOOD BY AUTOMATED COUNT: 15.6 % (ref 11.6–15.1)
GFR SERPL CREATININE-BSD FRML MDRD: 51 ML/MIN/1.73SQ M
GFR SERPL CREATININE-BSD FRML MDRD: 51 ML/MIN/1.73SQ M
GLUCOSE SERPL-MCNC: 105 MG/DL (ref 65–140)
GLUCOSE SERPL-MCNC: 105 MG/DL (ref 65–140)
GLUCOSE SERPL-MCNC: 123 MG/DL (ref 65–140)
GLUCOSE SERPL-MCNC: 123 MG/DL (ref 65–140)
GLUCOSE SERPL-MCNC: 161 MG/DL (ref 65–140)
GLUCOSE SERPL-MCNC: 161 MG/DL (ref 65–140)
GLUCOSE SERPL-MCNC: 164 MG/DL (ref 65–140)
GLUCOSE SERPL-MCNC: 164 MG/DL (ref 65–140)
GLUCOSE SERPL-MCNC: 206 MG/DL (ref 65–140)
GLUCOSE SERPL-MCNC: 206 MG/DL (ref 65–140)
GLUCOSE SERPL-MCNC: 246 MG/DL (ref 65–140)
GLUCOSE SERPL-MCNC: 246 MG/DL (ref 65–140)
GLUCOSE SERPL-MCNC: 288 MG/DL (ref 65–140)
GLUCOSE SERPL-MCNC: 288 MG/DL (ref 65–140)
GLUCOSE SERPL-MCNC: 364 MG/DL (ref 65–140)
GLUCOSE SERPL-MCNC: 364 MG/DL (ref 65–140)
HCT VFR BLD AUTO: 34.7 % (ref 34.8–46.1)
HCT VFR BLD AUTO: 34.7 % (ref 34.8–46.1)
HGB BLD-MCNC: 11 G/DL (ref 11.5–15.4)
HGB BLD-MCNC: 11 G/DL (ref 11.5–15.4)
MCH RBC QN AUTO: 26.8 PG (ref 26.8–34.3)
MCH RBC QN AUTO: 26.8 PG (ref 26.8–34.3)
MCHC RBC AUTO-ENTMCNC: 31.7 G/DL (ref 31.4–37.4)
MCHC RBC AUTO-ENTMCNC: 31.7 G/DL (ref 31.4–37.4)
MCV RBC AUTO: 84 FL (ref 82–98)
MCV RBC AUTO: 84 FL (ref 82–98)
PLATELET # BLD AUTO: 168 THOUSANDS/UL (ref 149–390)
PLATELET # BLD AUTO: 168 THOUSANDS/UL (ref 149–390)
PMV BLD AUTO: 9.7 FL (ref 8.9–12.7)
PMV BLD AUTO: 9.7 FL (ref 8.9–12.7)
POTASSIUM SERPL-SCNC: 4.9 MMOL/L (ref 3.5–5.3)
POTASSIUM SERPL-SCNC: 4.9 MMOL/L (ref 3.5–5.3)
RBC # BLD AUTO: 4.11 MILLION/UL (ref 3.81–5.12)
RBC # BLD AUTO: 4.11 MILLION/UL (ref 3.81–5.12)
SODIUM SERPL-SCNC: 132 MMOL/L (ref 135–147)
SODIUM SERPL-SCNC: 132 MMOL/L (ref 135–147)
WBC # BLD AUTO: 8.65 THOUSAND/UL (ref 4.31–10.16)
WBC # BLD AUTO: 8.65 THOUSAND/UL (ref 4.31–10.16)

## 2023-03-04 RX ORDER — INSULIN GLARGINE 100 [IU]/ML
5 INJECTION, SOLUTION SUBCUTANEOUS
Status: DISCONTINUED | OUTPATIENT
Start: 2023-03-04 | End: 2023-03-04

## 2023-03-04 RX ORDER — INSULIN LISPRO 100 [IU]/ML
3 INJECTION, SOLUTION INTRAVENOUS; SUBCUTANEOUS
Status: DISCONTINUED | OUTPATIENT
Start: 2023-03-04 | End: 2023-03-04

## 2023-03-04 RX ORDER — CEFDINIR 300 MG/1
300 CAPSULE ORAL EVERY 12 HOURS SCHEDULED
Status: DISCONTINUED | OUTPATIENT
Start: 2023-03-05 | End: 2023-03-06 | Stop reason: HOSPADM

## 2023-03-04 RX ORDER — INSULIN LISPRO 100 [IU]/ML
3 INJECTION, SOLUTION INTRAVENOUS; SUBCUTANEOUS
Status: DISCONTINUED | OUTPATIENT
Start: 2023-03-04 | End: 2023-03-05

## 2023-03-04 RX ORDER — INSULIN LISPRO 100 [IU]/ML
2 INJECTION, SOLUTION INTRAVENOUS; SUBCUTANEOUS
Status: DISCONTINUED | OUTPATIENT
Start: 2023-03-04 | End: 2023-03-04

## 2023-03-04 RX ORDER — INSULIN LISPRO 100 [IU]/ML
2-12 INJECTION, SOLUTION INTRAVENOUS; SUBCUTANEOUS
Status: DISCONTINUED | OUTPATIENT
Start: 2023-03-04 | End: 2023-03-05

## 2023-03-04 RX ORDER — LEVALBUTEROL 1.25 MG/.5ML
1.25 SOLUTION, CONCENTRATE RESPIRATORY (INHALATION)
Status: DISCONTINUED | OUTPATIENT
Start: 2023-03-04 | End: 2023-03-06 | Stop reason: HOSPADM

## 2023-03-04 RX ORDER — INSULIN GLARGINE 100 [IU]/ML
5 INJECTION, SOLUTION SUBCUTANEOUS EVERY MORNING
Status: DISCONTINUED | OUTPATIENT
Start: 2023-03-04 | End: 2023-03-05

## 2023-03-04 RX ORDER — ASPIRIN 81 MG/1
81 TABLET ORAL DAILY
Status: DISCONTINUED | OUTPATIENT
Start: 2023-03-05 | End: 2023-03-06 | Stop reason: HOSPADM

## 2023-03-04 RX ORDER — SODIUM CHLORIDE FOR INHALATION 3 %
4 VIAL, NEBULIZER (ML) INHALATION
Status: DISCONTINUED | OUTPATIENT
Start: 2023-03-04 | End: 2023-03-06 | Stop reason: HOSPADM

## 2023-03-04 RX ADMIN — INSULIN LISPRO 2 UNITS: 100 INJECTION, SOLUTION INTRAVENOUS; SUBCUTANEOUS at 10:16

## 2023-03-04 RX ADMIN — METHOCARBAMOL 500 MG: 500 TABLET ORAL at 17:23

## 2023-03-04 RX ADMIN — DOCUSATE SODIUM 100 MG: 100 CAPSULE, LIQUID FILLED ORAL at 17:23

## 2023-03-04 RX ADMIN — SODIUM CHLORIDE SOLN NEBU 3% 4 ML: 3 NEBU SOLN at 20:55

## 2023-03-04 RX ADMIN — ALBUTEROL SULFATE 2 PUFF: 90 AEROSOL, METERED RESPIRATORY (INHALATION) at 09:13

## 2023-03-04 RX ADMIN — FLUTICASONE PROPIONATE 1 SPRAY: 50 SPRAY, METERED NASAL at 09:08

## 2023-03-04 RX ADMIN — GUAIFENESIN 1200 MG: 600 TABLET ORAL at 17:23

## 2023-03-04 RX ADMIN — INSULIN LISPRO 3 UNITS: 100 INJECTION, SOLUTION INTRAVENOUS; SUBCUTANEOUS at 17:24

## 2023-03-04 RX ADMIN — GUAIFENESIN 1200 MG: 600 TABLET ORAL at 09:02

## 2023-03-04 RX ADMIN — ROPINIROLE 0.5 MG: 0.25 TABLET, FILM COATED ORAL at 21:40

## 2023-03-04 RX ADMIN — IPRATROPIUM BROMIDE 0.5 MG: 0.5 SOLUTION RESPIRATORY (INHALATION) at 08:03

## 2023-03-04 RX ADMIN — SODIUM CHLORIDE SOLN NEBU 3% 4 ML: 3 NEBU SOLN at 08:03

## 2023-03-04 RX ADMIN — IPRATROPIUM BROMIDE 0.5 MG: 0.5 SOLUTION RESPIRATORY (INHALATION) at 20:55

## 2023-03-04 RX ADMIN — OXYCODONE HYDROCHLORIDE 10 MG: 10 TABLET, FILM COATED, EXTENDED RELEASE ORAL at 21:40

## 2023-03-04 RX ADMIN — ASPIRIN 81 MG: 81 TABLET, CHEWABLE ORAL at 09:02

## 2023-03-04 RX ADMIN — ATORVASTATIN CALCIUM 40 MG: 40 TABLET, FILM COATED ORAL at 09:02

## 2023-03-04 RX ADMIN — GABAPENTIN 300 MG: 300 CAPSULE ORAL at 09:01

## 2023-03-04 RX ADMIN — METHOCARBAMOL 500 MG: 500 TABLET ORAL at 12:26

## 2023-03-04 RX ADMIN — GABAPENTIN 300 MG: 300 CAPSULE ORAL at 17:24

## 2023-03-04 RX ADMIN — OXYCODONE HYDROCHLORIDE 10 MG: 10 TABLET, FILM COATED, EXTENDED RELEASE ORAL at 09:02

## 2023-03-04 RX ADMIN — BUDESONIDE AND FORMOTEROL FUMARATE DIHYDRATE 2 PUFF: 160; 4.5 AEROSOL RESPIRATORY (INHALATION) at 17:24

## 2023-03-04 RX ADMIN — SODIUM CHLORIDE SOLN NEBU 3% 4 ML: 3 NEBU SOLN at 14:10

## 2023-03-04 RX ADMIN — METHOCARBAMOL 500 MG: 500 TABLET ORAL at 21:40

## 2023-03-04 RX ADMIN — LEVALBUTEROL HYDROCHLORIDE 1.25 MG: 1.25 SOLUTION, CONCENTRATE RESPIRATORY (INHALATION) at 20:55

## 2023-03-04 RX ADMIN — INSULIN LISPRO 4 UNITS: 100 INJECTION, SOLUTION INTRAVENOUS; SUBCUTANEOUS at 17:25

## 2023-03-04 RX ADMIN — OXYCODONE HYDROCHLORIDE AND ACETAMINOPHEN 1000 MG: 500 TABLET ORAL at 09:02

## 2023-03-04 RX ADMIN — BUDESONIDE AND FORMOTEROL FUMARATE DIHYDRATE 2 PUFF: 160; 4.5 AEROSOL RESPIRATORY (INHALATION) at 09:09

## 2023-03-04 RX ADMIN — DILTIAZEM HYDROCHLORIDE 180 MG: 180 CAPSULE, COATED, EXTENDED RELEASE ORAL at 09:02

## 2023-03-04 RX ADMIN — GABAPENTIN 300 MG: 300 CAPSULE ORAL at 21:40

## 2023-03-04 RX ADMIN — BRIMONIDINE TARTRATE 1 DROP: 2 SOLUTION/ DROPS OPHTHALMIC at 09:08

## 2023-03-04 RX ADMIN — DOCUSATE SODIUM 100 MG: 100 CAPSULE, LIQUID FILLED ORAL at 09:02

## 2023-03-04 RX ADMIN — LOSARTAN POTASSIUM 50 MG: 50 TABLET, FILM COATED ORAL at 09:02

## 2023-03-04 RX ADMIN — PANTOPRAZOLE SODIUM 40 MG: 40 TABLET, DELAYED RELEASE ORAL at 09:02

## 2023-03-04 RX ADMIN — OXYCODONE HYDROCHLORIDE AND ACETAMINOPHEN 1000 MG: 500 TABLET ORAL at 21:40

## 2023-03-04 RX ADMIN — INSULIN GLARGINE 5 UNITS: 100 INJECTION, SOLUTION SUBCUTANEOUS at 12:31

## 2023-03-04 RX ADMIN — LEVALBUTEROL HYDROCHLORIDE 1.25 MG: 1.25 SOLUTION, CONCENTRATE RESPIRATORY (INHALATION) at 14:09

## 2023-03-04 RX ADMIN — ENOXAPARIN SODIUM 40 MG: 40 INJECTION SUBCUTANEOUS at 09:09

## 2023-03-04 RX ADMIN — PREDNISONE 40 MG: 20 TABLET ORAL at 09:02

## 2023-03-04 RX ADMIN — GABAPENTIN 300 MG: 300 CAPSULE ORAL at 12:26

## 2023-03-04 RX ADMIN — BRIMONIDINE TARTRATE 1 DROP: 2 SOLUTION/ DROPS OPHTHALMIC at 21:41

## 2023-03-04 RX ADMIN — IPRATROPIUM BROMIDE 0.5 MG: 0.5 SOLUTION RESPIRATORY (INHALATION) at 14:10

## 2023-03-04 RX ADMIN — METHOCARBAMOL 500 MG: 500 TABLET ORAL at 09:02

## 2023-03-04 RX ADMIN — INSULIN LISPRO 3 UNITS: 100 INJECTION, SOLUTION INTRAVENOUS; SUBCUTANEOUS at 12:33

## 2023-03-04 RX ADMIN — LEVALBUTEROL HYDROCHLORIDE 1.25 MG: 1.25 SOLUTION, CONCENTRATE RESPIRATORY (INHALATION) at 08:03

## 2023-03-04 RX ADMIN — CEFTRIAXONE 1000 MG: 1 INJECTION, POWDER, FOR SOLUTION INTRAMUSCULAR; INTRAVENOUS at 00:14

## 2023-03-04 RX ADMIN — INSULIN LISPRO 6 UNITS: 100 INJECTION, SOLUTION INTRAVENOUS; SUBCUTANEOUS at 12:32

## 2023-03-04 RX ADMIN — INSULIN LISPRO 2 UNITS: 100 INJECTION, SOLUTION INTRAVENOUS; SUBCUTANEOUS at 10:17

## 2023-03-04 NOTE — ASSESSMENT & PLAN NOTE
· Most likely secondary to CAP  Recent Labs     03/01/23  0529 03/01/23  1327 03/03/23  0626   SODIUM 130* 127* 129*     Corrected Na: 134  Monitor with BMP

## 2023-03-04 NOTE — ASSESSMENT & PLAN NOTE
Lab Results   Component Value Date    HGBA1C 7 0 (H) 03/02/2023       Recent Labs     03/04/23  0259 03/04/23  0523 03/04/23  0737 03/04/23  1107   POCGLU 206* 105 164* 288*       Blood Sugar Average: Last 72 hrs:  (P) 838 [id number]   Home regimen: Metformin 1000 mg in AM and 500 mg in PM  With steroid induced hyperglycemia  Weaned off insulin gtt 3/4/23, start basal bolus regimen and ISS  Will shorten course of steroids given her clinical improvement  Will keep additional day or so to get her blood sugars under better control    Patient does not want to go home on insulin temporarily which I think is reasonable

## 2023-03-04 NOTE — ASSESSMENT & PLAN NOTE
Lab Results   Component Value Date    HGBA1C 7 0 (H) 03/02/2023       Recent Labs     03/03/23  1300 03/03/23  1547 03/03/23  1702 03/03/23  1854   POCGLU 145* 125 145* 271*       Blood Sugar Average: Last 72 hrs:  (P) 416 [id number]   Home regimen: Metformin 1000 mg in AM and 500 mg in PM  Holding oral Medications  Accuchecks 4x with meals and before bedtime  Hypoglycemia protocol  Sugars >500, insulin gtt started, will attempt to wean off w/ continue Basal / Bolus alberto

## 2023-03-04 NOTE — PROGRESS NOTES
[facility]  Progress Note - James Husbands 1940, 80 y o  female MRN: [id number]  Unit/Bed#: S -01 Encounter: [id number]  Primary Care Provider: Nathaniel Mendoza, DO   Date and time admitted to hospital: 2/28/2023 11:17 PM    * Community acquired pneumonia  Assessment & Plan  · POA: Tachypnic to 36, SOB, productive cough, wheezing, AMS/agitation  · Required BIPAP in ED, weaned off to 5L NC, uses 3L NC prn at home  · En route required 2 DuoNebs and 125 mg Solumedrol  · CTA PE study showed patchy ground glass opacities in left and right upper lobes, suspicious for PNA, no PE, pulmonary artery hypertension, and cardiomegaly  · Hx of COPD  · Meet criteria for severe CAP, Drip Score 2  · At 3 L, baseline is 3 L    Plan:  · Continue Ceftriaxone and Azithromycin, dced jose  · Duonebs q6h, 3% Nacl to help with retained secretions  · prednisone 40 daily for 5 days    · Mucinex and Tessalon Perles for symptoms  · Blood cultures neg  · F/u sputum cultures, urine legionella, urine strep pneumonia -neg  · Incentive spirometry  · Respiratory protocol     Type 2 diabetes mellitus [facility])  Assessment & Plan  Lab Results   Component Value Date    HGBA1C 7 0 (H) 03/02/2023       Recent Labs     03/03/23  1300 03/03/23  1547 03/03/23  1702 03/03/23  1854   POCGLU 145* 125 145* 271*       Blood Sugar Average: Last 72 hrs:  (P) 028 [id number]   Home regimen: Metformin 1000 mg in AM and 500 mg in PM  Holding oral Medications  Accuchecks 4x with meals and before bedtime  Hypoglycemia protocol  Sugars >500, insulin gtt started, will attempt to wean off w/ continue Basal / Bolus alberto    COPD exacerbation (HCC)  Assessment & Plan  · Secondary to CAP  · Required BIPAP in ED, weaned off to 5L NC, uses 3L NC prn at home  · Reports not using her rescue inhaler  · Home inhalers: Bretzi for maintenance, albuterol for rescue    Plan:  · See above    Sepsis ([facility] [address] )  Assessment & Plan  · Sepsis Criteria: RR: 40, Temp: 35 8C, Source Left upper lobe PNA  · Initially required BiPAP, weaned off   · See CAP    GERD (gastroesophageal reflux disease)  Assessment & Plan  Continue Protonix    chronic respiratory failure ([address] ), acute component resolved  Assessment & Plan  See CAP plan  Acute component resolved w  steriods    Hyperlipidemia  Assessment & Plan  Continue Lipitor    Hyponatremia  Assessment & Plan  · Most likely secondary to CAP  Recent Labs     23  0529 23  1327 23  0626   SODIUM 130* 127* 129*     Corrected Na: 134  Monitor with BMP    Hypertension  Assessment & Plan  Continue Losartan and Cardizem           VTE Pharmacologic Prophylaxis: VTE Score: 7 High Risk (Score >/= 5) - Pharmacological DVT Prophylaxis Ordered: enoxaparin (Lovenox)  Sequential Compression Devices Ordered  Patient Centered Rounds: I performed bedside rounds with nursing staff today  Discussions with Specialists or Other Care Team Provider: None    Education and Discussions with Family / Patient: Updated  (son) via phone  Current Length of Stay: 2 day(s)  Current Patient Status: Inpatient   Discharge Plan: Anticipate discharge in 48-72 hrs to home with home services  Code Status: Level 3 - DNAR and DNI    Subjective:   Pt still feels like she is wheezing  Bring up secretions a little bit more  Objective:     Vitals:   Temp (24hrs), Av 7 °F (36 5 °C), Min:97 5 °F (36 4 °C), Max:97 8 °F (36 6 °C)    Temp:  [97 5 °F (36 4 °C)-97 8 °F (36 6 °C)] 97 5 °F (36 4 °C)  HR:  [75-83] 75  Resp:  [18] 18  BP: (123-155)/(53-79) 142/79  SpO2:  [93 %-97 %] 93 %  There is no height or weight on file to calculate BMI  Input and Output Summary (last 24 hours):   No intake or output data in the 24 hours ending 23 190    Physical Exam:   Physical Exam  Vitals and nursing note reviewed  Constitutional:       General: She is not in acute distress  Appearance: She is well-developed     HENT:      Head: Normocephalic and atraumatic  Eyes:      Conjunctiva/sclera: Conjunctivae normal    Cardiovascular:      Rate and Rhythm: Normal rate and regular rhythm  Heart sounds: No murmur heard  Pulmonary:      Effort: Pulmonary effort is normal       Breath sounds: Rhonchi and rales present  Chest:      Chest wall: No tenderness  Abdominal:      General: There is no distension  Palpations: Abdomen is soft  Tenderness: There is no abdominal tenderness  There is no guarding  Musculoskeletal:         General: No swelling  Cervical back: Neck supple  Skin:     General: Skin is warm and dry  Capillary Refill: Capillary refill takes less than 2 seconds  Neurological:      Mental Status: She is alert  Psychiatric:         Mood and Affect: Mood normal         Additional Data:     Labs:  Results from last 7 days   Lab Units 03/03/23  0525 03/01/23  1327 03/01/23  0529 02/28/23  2344 02/28/23  2344   WBC Thousand/uL 14 59* 5 39 6 46  --  9 11   HEMOGLOBIN g/dL 10 7* 11 0* 11 3*  --  12 5   HEMATOCRIT % 34 2* 35 8 37 3  --  40 8   PLATELETS Thousands/uL 183 182 179  173  --  188   BANDS PCT %  --   --  5  --   --    NEUTROS PCT %  --   --   --   --  65   LYMPHS PCT %  --   --   --   --  17   LYMPHO PCT %  --  5* 3*   < >  --    MONOS PCT %  --   --   --   --  15*   MONO PCT %  --  1* 2*   < >  --    EOS PCT %  --  0 0   < > 1    < > = values in this interval not displayed       Results from last 7 days   Lab Units 03/03/23  0626 03/01/23  0529 02/28/23  2344   SODIUM mmol/L 129*   < > 132*   POTASSIUM mmol/L 4 4   < > 4 2   CHLORIDE mmol/L 96   < > 96   CO2 mmol/L 27   < > 25   BUN mg/dL 47*   < > 20   CREATININE mg/dL 1 27   < > 0 91   ANION GAP mmol/L 6   < > 11   CALCIUM mg/dL 8 9   < > 9 6   ALBUMIN g/dL  --   --  4 2   TOTAL BILIRUBIN mg/dL  --   --  0 75   ALK PHOS U/L  --   --  92   ALT U/L  --   --  12   AST U/L  --   --  16   GLUCOSE RANDOM mg/dL 194*   < > 147*    < > = values in this interval not displayed  Results from last 7 days   Lab Units 03/01/23  0022   INR  1 09     Results from last 7 days   Lab Units 03/03/23  1854 03/03/23  1702 03/03/23  1547 03/03/23  1300 03/03/23  1110 03/03/23  0813 03/03/23  0716 03/03/23  0458 03/03/23  0256 03/03/23  0108 03/02/23  2307 03/02/23  2113   POC GLUCOSE mg/dl 271* 145* 125 145* 228* 166* 184* 185* 94 227* 256* 99     Results from last 7 days   Lab Units 03/02/23  0509 03/01/23  0529   HEMOGLOBIN A1C % 7 0* 6 9*     Results from last 7 days   Lab Units 03/02/23  0509 03/01/23  0529 02/28/23  2344   LACTIC ACID mmol/L  --   --  1 1   PROCALCITONIN ng/ml 0 17 0 07  --        Lines/Drains:  Invasive Devices     Peripheral Intravenous Line  Duration           Peripheral IV 02/28/23 Left Wrist 2 days    Peripheral IV 03/02/23 Dorsal (posterior); Left Forearm 1 day                      Imaging: No pertinent imaging reviewed  Recent Cultures (last 7 days):   Results from last 7 days   Lab Units 03/01/23  1332 02/28/23  2344   BLOOD CULTURE   --  No Growth at 48 hrs  No Growth at 48 hrs     LEGIONELLA URINARY ANTIGEN  Negative  --        Last 24 Hours Medication List:   Current Facility-Administered Medications   Medication Dose Route Frequency Provider Last Rate   • albuterol  2 puff Inhalation Q6H PRN Severa Blanch, DO     • albuterol  2 5 mg Nebulization Q4H PRN Anju Romero MD     • vitamin C  1,000 mg Oral BID Severa Blanch, DO     • aspirin  81 mg Oral Daily Miriam Garvin DO     • atorvastatin  40 mg Oral Daily Miriam Garvin DO     • brimonidine tartrate  1 drop Left Eye BID Severa Blanch, DO     • budesonide-formoterol  2 puff Inhalation BID Severa Blanch, DO     • cefTRIAXone  1,000 mg Intravenous Q24H Severa Blanch, DO 1,000 mg (03/02/23 2333)   • diltiazem  180 mg Oral Daily Miriam Garvin DO     • docusate sodium  100 mg Oral BID Severa Blanch, DO • enoxaparin  40 mg Subcutaneous Daily Valla Confer, DO     • [START ON 3/4/2023] fluticasone  1 spray Each Nare Daily Justo Rosario MD     • gabapentin  300 mg Oral 4x Daily Miriam Abrams DO     • guaiFENesin  1,200 mg Oral BID Adilene Nolasco MD     • insulin regular (HumuLIN R,NovoLIN R) infusion  0 3-21 Units/hr Intravenous Titrated Herminio June MD 7 Units/hr (03/03/23 3024)   • ipratropium  0 5 mg Nebulization Q6H Adilene Nolsaco MD     • levalbuterol  1 25 mg Nebulization Q6H Adilene Nolasco MD     • losartan  50 mg Oral Daily Miriam Garvin DO     • methocarbamol  500 mg Oral 4x Daily Miriam Garvin DO     • ondansetron  4 mg Intravenous Q6H PRN Rolan Lopez, DO     • oxyCODONE  10 mg Oral Q12H [facility] Miriam Garvin DO     • pantoprazole  40 mg Oral Daily Miriam Garvin DO     • phenol  1 spray Mouth/Throat Q2H PRN Justo Rosario MD     • polyethylene glycol  17 g Oral Daily Miriam Garvin DO     • predniSONE  40 mg Oral Daily Justo Rosario MD     • rOPINIRole  0 5 mg Oral HS Miriam Abrams DO     • sodium chloride  1 spray Each Nare Q1H PRN Adilene Nolasco MD     • sodium chloride  4 mL Nebulization Q6H Justo Rosario MD          Today, Patient Was Seen By: Justo Rosario MD    **Please Note: This note may have been constructed using a voice recognition system  **

## 2023-03-04 NOTE — ASSESSMENT & PLAN NOTE
· POA: Tachypnic to 40, SOB, productive cough, wheezing, AMS/agitation  · Required BIPAP in ED, weaned off to 5L NC, uses 3L NC prn at home  · En route required 2 DuoNebs and 125 mg Solumedrol  · CTA PE study showed patchy ground glass opacities in left and right upper lobes, suspicious for PNA, no PE, pulmonary artery hypertension, and cardiomegaly  · Hx of COPD  · Meet criteria for severe CAP, Drip Score 2  · At 3 L, baseline is 3 L    Plan:  · Continue Ceftriaxone and Azithromycin, dced jose  · Duonebs q6h, 3% Nacl to help with retained secretions  · prednisone 40 daily for 5 days    · Mucinex and Tessalon Perles for symptoms  · Blood cultures neg  · F/u sputum cultures, urine legionella, urine strep pneumonia -neg  · Incentive spirometry  · Respiratory protocol

## 2023-03-04 NOTE — ASSESSMENT & PLAN NOTE
· Sepsis Criteria: RR: 40, Temp: 35 8C, Source Left upper lobe PNA  · Initially required BiPAP, weaned off   · See CAP

## 2023-03-04 NOTE — ASSESSMENT & PLAN NOTE
· Secondary to CAP  · Required BIPAP in ED, weaned off to 5L NC, uses 3L NC prn at home  · Reports not using her rescue inhaler  · Home inhalers: Bretzi for maintenance, albuterol for rescue  · See above plan  · Prednisone 40mg x3 days D2/3

## 2023-03-04 NOTE — PROGRESS NOTES
[facility]  Progress Note - Fabien Garcia 1940, 80 y o  female MRN: [id number]  Unit/Bed#: S -01 Encounter: [id number]  Primary Care Provider: Lyubov Lomas DO   Date and time admitted to hospital: 2/28/2023 11:17 PM    * Community acquired pneumonia  Assessment & Plan  · POA: Tachypnic to 36, SOB, productive cough, wheezing, AMS/agitation  · Required BIPAP in ED, weaned off to 5L NC, uses 3L NC prn at home  · En route required 2 DuoNebs and 125 mg Solumedrol  · CTA PE study showed patchy ground glass opacities in left and right upper lobes, suspicious for PNA, no PE, pulmonary artery hypertension, and cardiomegaly  · Hx of COPD  · Meet criteria for severe CAP, Drip Score 2  · At 3 L, baseline is 3 L    Plan:  · S/p Rocephin D3, start cefdinir 3/5 to complete 5 day course  · Duonebs q6h, 3% Nacl to help with retained secretions  · prednisone 40 daily x3 days  · Symptoms improving, continue symptomatic supports with breathing treatments as ordered    Sepsis ([facility] )  Assessment & Plan  · Sepsis Criteria: RR: 40, Temp: 35 8C, Source Left upper lobe PNA  · Initially required BiPAP, weaned off   · See CAP    chronic respiratory failure ([facility] [address] ), acute component resolved  Assessment & Plan  · Currently on 3L NC, baseline saturating mid 90  · Continue to monitor  · ISS/flutter valve    Type 2 diabetes mellitus ([facility] [address] )  Assessment & Plan  Lab Results   Component Value Date    HGBA1C 7 0 (H) 03/02/2023       Recent Labs     03/04/23  0259 03/04/23  0523 03/04/23  0737 03/04/23  1107   POCGLU 206* 105 164* 288*       Blood Sugar Average: Last 72 hrs:  (P) 829 [id number]   Home regimen: Metformin 1000 mg in AM and 500 mg in PM  With steroid induced hyperglycemia  Weaned off insulin gtt 3/4/23, start basal bolus regimen and ISS  Will shorten course of steroids given her clinical improvement  Will keep additional day or so to get her blood sugars under better control    Patient does not want to go home on insulin temporarily which I think is reasonable    COPD exacerbation (HCC)  Assessment & Plan  · Secondary to CAP  · Required BIPAP in ED, weaned off to 5L NC, uses 3L NC prn at home  · Reports not using her rescue inhaler  · Home inhalers: Bretzi for maintenance, albuterol for rescue  · See above plan  · Prednisone 40mg x3 days D2/3        VTE Pharmacologic Prophylaxis: VTE Score: 7 High Risk (Score >/= 5) - Pharmacological DVT Prophylaxis Ordered: enoxaparin (Lovenox)  Sequential Compression Devices Ordered  Patient Centered Rounds: I performed bedside rounds with nursing staff today  Discussions with Specialists or Other Care Team Provider: None    Education and Discussions with Family / Patient: Attempted to update  (son) via phone  Unable to contact  Total Time Spent on Date of Encounter in care of patient: 35 minutes This time was spent on one or more of the following: performing physical exam; counseling and coordination of care; obtaining or reviewing history; documenting in the medical record; reviewing/ordering tests, medications or procedures; communicating with other healthcare professionals and discussing with patient's family/caregivers  Current Length of Stay: 3 day(s)  Current Patient Status: Inpatient   Certification Statement: The patient will continue to require additional inpatient hospital stay due to further management of steroid-induced hyperglycemia  Discharge Plan: Anticipate discharge in 24-48 hrs to home  Code Status: Level 3 - DNAR and DNI    Subjective:   Patient reporting improvement in her wheezing  She denies any SOB  Afebrile        Objective:     Vitals:   Temp (24hrs), Av 8 °F (36 6 °C), Min:97 5 °F (36 4 °C), Max:98 1 °F (36 7 °C)    Temp:  [97 5 °F (36 4 °C)-98 1 °F (36 7 °C)] 97 5 °F (36 4 °C)  HR:  [70-92] 70  Resp:  [16-18] 16  BP: (140-158)/(75-84) 151/75  SpO2:  [93 %-97 %] 94 %  There is no height or weight on file to calculate BMI  Input and Output Summary (last 24 hours): Intake/Output Summary (Last 24 hours) at 3/4/2023 1531  Last data filed at 3/4/2023 1113  Gross per 24 hour   Intake --   Output 1700 ml   Net -1700 ml       Physical Exam:   Physical Exam  Vitals reviewed  Constitutional:       Appearance: She is ill-appearing  She is not toxic-appearing  Cardiovascular:      Rate and Rhythm: Normal rate and regular rhythm  Pulmonary:      Breath sounds: Wheezing present  No rhonchi or rales  Abdominal:      General: There is no distension  Tenderness: There is no abdominal tenderness  There is no guarding  Musculoskeletal:         General: No swelling  Skin:     General: Skin is warm and dry  Neurological:      Mental Status: Mental status is at baseline  Additional Data:     Labs:  Results from last 7 days   Lab Units 03/04/23  0530 03/03/23  0525 03/01/23  1327 03/01/23  0529 02/28/23  2344 02/28/23  2344   WBC Thousand/uL 8 65   < > 5 39 6 46  --  9 11   HEMOGLOBIN g/dL 11 0*   < > 11 0* 11 3*  --  12 5   HEMATOCRIT % 34 7*   < > 35 8 37 3  --  40 8   PLATELETS Thousands/uL 168   < > 182 179  173  --  188   BANDS PCT %  --   --   --  5  --   --    NEUTROS PCT %  --   --   --   --   --  65   LYMPHS PCT %  --   --   --   --   --  17   LYMPHO PCT %  --   --  5* 3*   < >  --    MONOS PCT %  --   --   --   --   --  15*   MONO PCT %  --   --  1* 2*   < >  --    EOS PCT %  --   --  0 0   < > 1    < > = values in this interval not displayed       Results from last 7 days   Lab Units 03/04/23  0530 03/01/23  0529 02/28/23  2344   SODIUM mmol/L 132*   < > 132*   POTASSIUM mmol/L 4 9   < > 4 2   CHLORIDE mmol/L 96   < > 96   CO2 mmol/L 24   < > 25   BUN mg/dL 39*   < > 20   CREATININE mg/dL 1 02   < > 0 91   ANION GAP mmol/L 12   < > 11   CALCIUM mg/dL 9 3   < > 9 6   ALBUMIN g/dL  --   --  4 2   TOTAL BILIRUBIN mg/dL  --   --  0 75   ALK PHOS U/L  --   --  92   ALT U/L  --   --  12   AST U/L  -- --  16   GLUCOSE RANDOM mg/dL 123   < > 147*    < > = values in this interval not displayed  Results from last 7 days   Lab Units 03/01/23  0022   INR  1 09     Results from last 7 days   Lab Units 03/04/23  1107 03/04/23  0737 03/04/23  0523 03/04/23  0259 03/04/23  0100 03/03/23  2303 03/03/23  2245 03/03/23  2128 03/03/23  1854 03/03/23  1702 03/03/23  1547 03/03/23  1300   POC GLUCOSE mg/dl 288* 164* 105 206* 161* 179* 195* 244* 271* 145* 125 145*     Results from last 7 days   Lab Units 03/02/23  0509 03/01/23  0529   HEMOGLOBIN A1C % 7 0* 6 9*     Results from last 7 days   Lab Units 03/02/23  0509 03/01/23  0529 02/28/23  2344   LACTIC ACID mmol/L  --   --  1 1   PROCALCITONIN ng/ml 0 17 0 07  --        Lines/Drains:  Invasive Devices     Peripheral Intravenous Line  Duration           Peripheral IV 03/02/23 Dorsal (posterior); Left Forearm 2 days    Peripheral IV 03/04/23 Right;Ventral (anterior) Forearm <1 day                Imaging: No pertinent imaging reviewed  Recent Cultures (last 7 days):   Results from last 7 days   Lab Units 03/01/23  1332 02/28/23  2344   BLOOD CULTURE   --  No Growth at 72 hrs  No Growth at 72 hrs     LEGIONELLA URINARY ANTIGEN  Negative  --        Last 24 Hours Medication List:   Current Facility-Administered Medications   Medication Dose Route Frequency Provider Last Rate   • albuterol  2 puff Inhalation Q6H PRN Caroline Roche DO     • albuterol  2 5 mg Nebulization Q4H PRN Ifeoma Martin MD     • vitamin C  1,000 mg Oral BID Caroline Roche DO     • [START ON 3/5/2023] aspirin  81 mg Oral Daily Ifeoma Martin MD     • atorvastatin  40 mg Oral Daily Miriam Garvin DO     • brimonidine tartrate  1 drop Left Eye BID Caroline Roche DO     • budesonide-formoterol  2 puff Inhalation BID Jing Garvin DO     • [START ON 3/5/2023] cefdinir  300 mg Oral Q12H [facility] [address], MD     • diltiazem  180 mg Oral Daily Miriam Taniya Davenport, DO     • docusate sodium  100 mg Oral BID Sada Loera, DO     • enoxaparin  40 mg Subcutaneous Daily Miriam Garvin DO     • fluticasone  1 spray Each Nare Daily Tejal Martinez MD     • gabapentin  300 mg Oral 4x Daily Miriam Davenport, DO     • guaiFENesin  1,200 mg Oral BID Birdie Dotson MD     • insulin glargine  5 Units Subcutaneous QAM Birdie Dotson MD     • insulin lispro  2-12 Units Subcutaneous TID AC Ermalene Guard, DO     • insulin lispro  3 Units Subcutaneous TID With Meals Birdie Dotson MD     • ipratropium  0 5 mg Nebulization TID Birdie Dotson MD     • levalbuterol  1 25 mg Nebulization TID Birdie Dotson MD     • losartan  50 mg Oral Daily Miriam Garvin, DO     • methocarbamol  500 mg Oral 4x Daily Miriam Garvin DO     • ondansetron  4 mg Intravenous Q6H PRN Sada Loera DO     • oxyCODONE  10 mg Oral Q12H [address] Miriam Garvin, DO     • pantoprazole  40 mg Oral Daily Miriam Garvin DO     • phenol  1 spray Mouth/Throat Q2H PRN Tejal Martinez MD     • polyethylene glycol  17 g Oral Daily Miriam Garvin DO     • predniSONE  40 mg Oral Daily Birdie Dotson MD     • rOPINIRole  0 5 mg Oral HS Miriam Davenport DO     • sodium chloride  1 spray Each Nare Q1H PRN Birdie Dotson MD     • sodium chloride  4 mL Nebulization TID Birdie Dotson MD          Today, Patient Was Seen By: Birdie Dotson MD    **Please Note: This note may have been constructed using a voice recognition system  **

## 2023-03-04 NOTE — ASSESSMENT & PLAN NOTE
· POA: Tachypnic to 40, SOB, productive cough, wheezing, AMS/agitation  · Required BIPAP in ED, weaned off to 5L NC, uses 3L NC prn at home  · En route required 2 DuoNebs and 125 mg Solumedrol  · CTA PE study showed patchy ground glass opacities in left and right upper lobes, suspicious for PNA, no PE, pulmonary artery hypertension, and cardiomegaly  · Hx of COPD  · Meet criteria for severe CAP, Drip Score 2  · At 3 L, baseline is 3 L    Plan:  · S/p Rocephin D3, start cefdinir 3/5 to complete 5 day course  · Duonebs q6h, 3% Nacl to help with retained secretions  · prednisone 40 daily x3 days  · Symptoms improving, continue symptomatic supports with breathing treatments as ordered

## 2023-03-04 NOTE — PLAN OF CARE
Problem: MOBILITY - ADULT  Goal: Maintain or return to baseline ADL function  Description: INTERVENTIONS:  -  Assess patient's ability to carry out ADLs; assess patient's baseline for ADL function and identify physical deficits which impact ability to perform ADLs (bathing, care of mouth/teeth, toileting, grooming, dressing, etc )  - Assess/evaluate cause of self-care deficits   - Assess range of motion  - Assess patient's mobility; develop plan if impaired  - Assess patient's need for assistive devices and provide as appropriate  - Encourage maximum independence but intervene and supervise when necessary  - Involve family in performance of ADLs  - Assess for home care needs following discharge   - Consider OT consult to assist with ADL evaluation and planning for discharge  - Provide patient education as appropriate  Outcome: Progressing  Goal: Maintains/Returns to pre admission functional level  Description: INTERVENTIONS:  - Perform BMAT or MOVE assessment daily    - Set and communicate daily mobility goal to care team and patient/family/caregiver  - Collaborate with rehabilitation services on mobility goals if consulted  - Perform Range of Motion 3 times a day  - Reposition patient every 2 hours    - Dangle patient 3 times a day  - Stand patient 3 times a day  - Ambulate patient 3 times a day  - Out of bed to chair 3 times a day   - Out of bed for meals 3 times a day  - Out of bed for toileting  - Record patient progress and toleration of activity level   Outcome: Progressing     Problem: PAIN - ADULT  Goal: Verbalizes/displays adequate comfort level or baseline comfort level  Description: Interventions:  - Encourage patient to monitor pain and request assistance  - Assess pain using appropriate pain scale  - Administer analgesics based on type and severity of pain and evaluate response  - Implement non-pharmacological measures as appropriate and evaluate response  - Consider cultural and social influences on pain and pain management  - Notify physician/advanced practitioner if interventions unsuccessful or patient reports new pain  Outcome: Progressing     Problem: INFECTION - ADULT  Goal: Absence or prevention of progression during hospitalization  Description: INTERVENTIONS:  - Assess and monitor for signs and symptoms of infection  - Monitor lab/diagnostic results  - Monitor all insertion sites, i e  indwelling lines, tubes, and drains  - Monitor endotracheal if appropriate and nasal secretions for changes in amount and color  - [place] appropriate cooling/warming therapies per order  - Administer medications as ordered  - Instruct and encourage patient and family to use good hand hygiene technique  - Identify and instruct in appropriate isolation precautions for identified infection/condition  Outcome: Progressing  Goal: Absence of fever/infection during neutropenic period  Description: INTERVENTIONS:  - Monitor WBC    Outcome: Progressing     Problem: DISCHARGE PLANNING  Goal: Discharge to home or other facility with appropriate resources  Description: INTERVENTIONS:  - Identify barriers to discharge w/patient and caregiver  - Arrange for needed discharge resources and transportation as appropriate  - Identify discharge learning needs (meds, wound care, etc )  - Arrange for interpretive services to assist at discharge as needed  - Refer to Case Management Department for coordinating discharge planning if the patient needs post-hospital services based on physician/advanced practitioner order or complex needs related to functional status, cognitive ability, or social support system  Outcome: Progressing     Problem: Knowledge Deficit  Goal: Patient/family/caregiver demonstrates understanding of disease process, treatment plan, medications, and discharge instructions  Description: Complete learning assessment and assess knowledge base    Interventions:  - Provide teaching at level of understanding  - Provide teaching via preferred learning methods  Outcome: Progressing

## 2023-03-05 LAB
ANION GAP SERPL CALCULATED.3IONS-SCNC: 5 MMOL/L (ref 4–13)
BUN SERPL-MCNC: 30 MG/DL (ref 5–25)
CALCIUM SERPL-MCNC: 8.8 MG/DL (ref 8.4–10.2)
CHLORIDE SERPL-SCNC: 96 MMOL/L (ref 96–108)
CO2 SERPL-SCNC: 31 MMOL/L (ref 21–32)
CREAT SERPL-MCNC: 0.94 MG/DL (ref 0.6–1.3)
GFR SERPL CREATININE-BSD FRML MDRD: 56 ML/MIN/1.73SQ M
GLUCOSE SERPL-MCNC: 107 MG/DL (ref 65–140)
GLUCOSE SERPL-MCNC: 237 MG/DL (ref 65–140)
GLUCOSE SERPL-MCNC: 261 MG/DL (ref 65–140)
GLUCOSE SERPL-MCNC: 337 MG/DL (ref 65–140)
GLUCOSE SERPL-MCNC: 360 MG/DL (ref 65–140)
GLUCOSE SERPL-MCNC: 370 MG/DL (ref 65–140)
POTASSIUM SERPL-SCNC: 4.5 MMOL/L (ref 3.5–5.3)
SODIUM SERPL-SCNC: 132 MMOL/L (ref 135–147)

## 2023-03-05 RX ORDER — INSULIN LISPRO 100 [IU]/ML
1-5 INJECTION, SOLUTION INTRAVENOUS; SUBCUTANEOUS
Status: DISCONTINUED | OUTPATIENT
Start: 2023-03-06 | End: 2023-03-06 | Stop reason: HOSPADM

## 2023-03-05 RX ORDER — INSULIN GLARGINE 100 [IU]/ML
10 INJECTION, SOLUTION SUBCUTANEOUS ONCE
Status: COMPLETED | OUTPATIENT
Start: 2023-03-05 | End: 2023-03-05

## 2023-03-05 RX ORDER — INSULIN LISPRO 100 [IU]/ML
3 INJECTION, SOLUTION INTRAVENOUS; SUBCUTANEOUS
Status: DISCONTINUED | OUTPATIENT
Start: 2023-03-05 | End: 2023-03-06 | Stop reason: HOSPADM

## 2023-03-05 RX ORDER — INSULIN LISPRO 100 [IU]/ML
5 INJECTION, SOLUTION INTRAVENOUS; SUBCUTANEOUS
Status: DISCONTINUED | OUTPATIENT
Start: 2023-03-05 | End: 2023-03-05

## 2023-03-05 RX ORDER — INSULIN LISPRO 100 [IU]/ML
7 INJECTION, SOLUTION INTRAVENOUS; SUBCUTANEOUS ONCE
Status: COMPLETED | OUTPATIENT
Start: 2023-03-05 | End: 2023-03-05

## 2023-03-05 RX ADMIN — OXYCODONE HYDROCHLORIDE AND ACETAMINOPHEN 1000 MG: 500 TABLET ORAL at 21:31

## 2023-03-05 RX ADMIN — SODIUM CHLORIDE SOLN NEBU 3% 4 ML: 3 NEBU SOLN at 07:58

## 2023-03-05 RX ADMIN — GABAPENTIN 300 MG: 300 CAPSULE ORAL at 21:31

## 2023-03-05 RX ADMIN — OXYCODONE HYDROCHLORIDE 10 MG: 10 TABLET, FILM COATED, EXTENDED RELEASE ORAL at 08:36

## 2023-03-05 RX ADMIN — PREDNISONE 40 MG: 20 TABLET ORAL at 08:40

## 2023-03-05 RX ADMIN — LOSARTAN POTASSIUM 50 MG: 50 TABLET, FILM COATED ORAL at 08:36

## 2023-03-05 RX ADMIN — GABAPENTIN 300 MG: 300 CAPSULE ORAL at 17:31

## 2023-03-05 RX ADMIN — BUDESONIDE AND FORMOTEROL FUMARATE DIHYDRATE 2 PUFF: 160; 4.5 AEROSOL RESPIRATORY (INHALATION) at 09:39

## 2023-03-05 RX ADMIN — ENOXAPARIN SODIUM 40 MG: 40 INJECTION SUBCUTANEOUS at 08:36

## 2023-03-05 RX ADMIN — DILTIAZEM HYDROCHLORIDE 180 MG: 180 CAPSULE, COATED, EXTENDED RELEASE ORAL at 08:36

## 2023-03-05 RX ADMIN — PANTOPRAZOLE SODIUM 40 MG: 40 TABLET, DELAYED RELEASE ORAL at 08:36

## 2023-03-05 RX ADMIN — CEFDINIR 300 MG: 300 CAPSULE ORAL at 21:31

## 2023-03-05 RX ADMIN — METHOCARBAMOL 500 MG: 500 TABLET ORAL at 12:38

## 2023-03-05 RX ADMIN — METHOCARBAMOL 500 MG: 500 TABLET ORAL at 08:36

## 2023-03-05 RX ADMIN — DOCUSATE SODIUM 100 MG: 100 CAPSULE, LIQUID FILLED ORAL at 08:36

## 2023-03-05 RX ADMIN — INSULIN LISPRO 3 UNITS: 100 INJECTION, SOLUTION INTRAVENOUS; SUBCUTANEOUS at 17:31

## 2023-03-05 RX ADMIN — IPRATROPIUM BROMIDE 0.5 MG: 0.5 SOLUTION RESPIRATORY (INHALATION) at 14:17

## 2023-03-05 RX ADMIN — DOCUSATE SODIUM 100 MG: 100 CAPSULE, LIQUID FILLED ORAL at 17:31

## 2023-03-05 RX ADMIN — BRIMONIDINE TARTRATE 1 DROP: 2 SOLUTION/ DROPS OPHTHALMIC at 21:31

## 2023-03-05 RX ADMIN — ATORVASTATIN CALCIUM 40 MG: 40 TABLET, FILM COATED ORAL at 08:36

## 2023-03-05 RX ADMIN — INSULIN LISPRO 6 UNITS: 100 INJECTION, SOLUTION INTRAVENOUS; SUBCUTANEOUS at 17:31

## 2023-03-05 RX ADMIN — INSULIN LISPRO 8 UNITS: 100 INJECTION, SOLUTION INTRAVENOUS; SUBCUTANEOUS at 08:46

## 2023-03-05 RX ADMIN — INSULIN LISPRO 3 UNITS: 100 INJECTION, SOLUTION INTRAVENOUS; SUBCUTANEOUS at 08:46

## 2023-03-05 RX ADMIN — GUAIFENESIN 1200 MG: 600 TABLET ORAL at 08:37

## 2023-03-05 RX ADMIN — GABAPENTIN 300 MG: 300 CAPSULE ORAL at 12:38

## 2023-03-05 RX ADMIN — METHOCARBAMOL 500 MG: 500 TABLET ORAL at 17:31

## 2023-03-05 RX ADMIN — OXYCODONE HYDROCHLORIDE AND ACETAMINOPHEN 1000 MG: 500 TABLET ORAL at 08:36

## 2023-03-05 RX ADMIN — LEVALBUTEROL HYDROCHLORIDE 1.25 MG: 1.25 SOLUTION, CONCENTRATE RESPIRATORY (INHALATION) at 14:17

## 2023-03-05 RX ADMIN — ASPIRIN 81 MG: 81 TABLET, COATED ORAL at 08:36

## 2023-03-05 RX ADMIN — LEVALBUTEROL HYDROCHLORIDE 1.25 MG: 1.25 SOLUTION, CONCENTRATE RESPIRATORY (INHALATION) at 07:59

## 2023-03-05 RX ADMIN — FLUTICASONE PROPIONATE 1 SPRAY: 50 SPRAY, METERED NASAL at 09:40

## 2023-03-05 RX ADMIN — INSULIN GLARGINE 5 UNITS: 100 INJECTION, SOLUTION SUBCUTANEOUS at 10:45

## 2023-03-05 RX ADMIN — IPRATROPIUM BROMIDE 0.5 MG: 0.5 SOLUTION RESPIRATORY (INHALATION) at 07:58

## 2023-03-05 RX ADMIN — BRIMONIDINE TARTRATE 1 DROP: 2 SOLUTION/ DROPS OPHTHALMIC at 09:39

## 2023-03-05 RX ADMIN — GUAIFENESIN 1200 MG: 600 TABLET ORAL at 17:31

## 2023-03-05 RX ADMIN — ROPINIROLE 0.5 MG: 0.25 TABLET, FILM COATED ORAL at 21:31

## 2023-03-05 RX ADMIN — IPRATROPIUM BROMIDE 0.5 MG: 0.5 SOLUTION RESPIRATORY (INHALATION) at 19:59

## 2023-03-05 RX ADMIN — SODIUM CHLORIDE SOLN NEBU 3% 4 ML: 3 NEBU SOLN at 19:59

## 2023-03-05 RX ADMIN — INSULIN LISPRO 5 UNITS: 100 INJECTION, SOLUTION INTRAVENOUS; SUBCUTANEOUS at 12:38

## 2023-03-05 RX ADMIN — INSULIN LISPRO 7 UNITS: 100 INJECTION, SOLUTION INTRAVENOUS; SUBCUTANEOUS at 23:10

## 2023-03-05 RX ADMIN — METHOCARBAMOL 500 MG: 500 TABLET ORAL at 21:31

## 2023-03-05 RX ADMIN — SODIUM CHLORIDE SOLN NEBU 3% 4 ML: 3 NEBU SOLN at 14:17

## 2023-03-05 RX ADMIN — OXYCODONE HYDROCHLORIDE 10 MG: 10 TABLET, FILM COATED, EXTENDED RELEASE ORAL at 21:31

## 2023-03-05 RX ADMIN — CEFDINIR 300 MG: 300 CAPSULE ORAL at 08:37

## 2023-03-05 RX ADMIN — LEVALBUTEROL HYDROCHLORIDE 1.25 MG: 1.25 SOLUTION, CONCENTRATE RESPIRATORY (INHALATION) at 19:59

## 2023-03-05 RX ADMIN — BUDESONIDE AND FORMOTEROL FUMARATE DIHYDRATE 2 PUFF: 160; 4.5 AEROSOL RESPIRATORY (INHALATION) at 17:33

## 2023-03-05 RX ADMIN — INSULIN GLARGINE 10 UNITS: 100 INJECTION, SOLUTION SUBCUTANEOUS at 10:44

## 2023-03-05 RX ADMIN — GABAPENTIN 300 MG: 300 CAPSULE ORAL at 08:36

## 2023-03-05 NOTE — ASSESSMENT & PLAN NOTE
· Secondary to CAP  · Required BIPAP in ED, weaned off to 5L NC, uses 3L NC prn at home  · Reports not using her rescue inhaler  · Home inhalers: Bretzi for maintenance, albuterol for rescue  · See above plan  · See above

## 2023-03-05 NOTE — PROGRESS NOTES
[facility]  Progress Note - Cristiana Melvin 1940, 80 y o  female MRN: [id number]  Unit/Bed#: S -Chris Encounter: [id number]  Primary Care Provider: Nroa Almanzar DO   Date and time admitted to hospital: 2/28/2023 11:17 PM    * Community acquired pneumonia  Assessment & Plan  · POA: Tachypnic to 36, SOB, productive cough, wheezing, AMS/agitation  · Required BIPAP in ED, weaned off to 5L NC, uses 3L NC prn at home  · En route required 2 DuoNebs and 125 mg Solumedrol  · CTA PE study showed patchy ground glass opacities in left and right upper lobes, suspicious for PNA, no PE, pulmonary artery hypertension, and cardiomegaly  · Hx of COPD  · Meet criteria for severe CAP, Drip Score 2  · At 3 L, baseline is 3 L, exam improved, Rhonchi still present    Plan:  · S/p Rocephin D3, start cefdinir 4/5 to complete 5 day course  · Duonebs q6h, 3% Nacl to help with retained secretions  · prednisone 40 daily x3 days, additional 2 days? · Symptoms improving, continue symptomatic supports with breathing treatments as ordered    Sepsis ([facility] [address] )  Assessment & Plan  · Sepsis Criteria: RR: 40, Temp: 35 8C, Source Left upper lobe PNA  · Initially required BiPAP, weaned off   · See CAP    Type 2 diabetes mellitus [facility])  Assessment & Plan  Lab Results   Component Value Date    HGBA1C 7 0 (H) 03/02/2023       Recent Labs     03/04/23  1549 03/04/23  2020 03/05/23  0719 03/05/23  1059   POCGLU 246* 364* 337* 107       Blood Sugar Average: Last 72 hrs:  (P) 215 [id number]   Home regimen: Metformin 1000 mg in AM and 500 mg in PM  With steroid induced hyperglycemia  Weaned off insulin gtt 3/4/23, start basal bolus regimen and ISS  Will shorten course of steroids given her clinical improvement  Will keep additional day or so to get her blood sugars under better control    Patient does not want to go home on insulin temporarily which I think is reasonable  Lantus 5 am , add 10 units for sugars in 300s, Humalog 5 units TID    COPD exacerbation (HCC)  Assessment & Plan  · Secondary to CAP  · Required BIPAP in ED, weaned off to 5L NC, uses 3L NC prn at home  · Reports not using her rescue inhaler  · Home inhalers: Bretzi for maintenance, albuterol for rescue  · See above plan  · See above    GERD (gastroesophageal reflux disease)  Assessment & Plan  Continue Protonix    chronic respiratory failure ([address] ), acute component resolved  Assessment & Plan  · Currently on 3L NC, baseline saturating mid 90  · Continue to monitor  · ISS/flutter valve    Chronic back pain  Assessment & Plan  · Chronic back pain with DDD, history of 3x spinal surgeries  · Continue home pain regimen: Oxycodone ER 13 5 mg q12h, Gabapentin 300 mg QID    Hyperlipidemia  Assessment & Plan  Continue Lipitor    Hyponatremia  Assessment & Plan  · Most likely secondary to CAP  Recent Labs     23  0626 23  0530 23  0547   SODIUM 129* 132* 132*     Corrected Na: 134  Monitor with BMP    Hypertension  Assessment & Plan  Continue Losartan and Cardizem         VTE Pharmacologic Prophylaxis: VTE Score: 7 High Risk (Score >/= 5) - Pharmacological DVT Prophylaxis Ordered: enoxaparin (Lovenox)  Sequential Compression Devices Ordered  Patient Centered Rounds: I performed bedside rounds with nursing staff today  Discussions with Specialists or Other Care Team Provider: None    Education and Discussions with Family / Patient: Attempted to update  (son) via phone  Unable to contact  Current Length of Stay: 4 day(s)  Current Patient Status: Inpatient   Discharge Plan: Anticipate discharge in 24-48 hrs to home with home services  Code Status: Level 3 - DNAR and DNI    Subjective:   Patient feels like she is still wheezing but improved   Unable to bring up all secreations still     Objective:     Vitals:   Temp (24hrs), Av 5 °F (36 4 °C), Min:97 4 °F (36 3 °C), Max:97 7 °F (36 5 °C)    Temp:  [97 4 °F (36 3 °C)-97 7 °F (36 5 °C)] 97 4 °F (36 3 °C)  HR:  [70-73] 72  Resp:  [17-20] 20  BP: (150-156)/(75-90) 150/80  SpO2:  [94 %-97 %] 97 %  There is no height or weight on file to calculate BMI  Input and Output Summary (last 24 hours):   No intake or output data in the 24 hours ending 03/05/23 1331    Physical Exam:   Physical Exam  Vitals and nursing note reviewed  Constitutional:       General: She is not in acute distress  Appearance: She is well-developed  She is not ill-appearing, toxic-appearing or diaphoretic  Comments: Pt doing much better off of solumedrol - not as jittery   HENT:      Head: Normocephalic and atraumatic  Eyes:      Conjunctiva/sclera: Conjunctivae normal    Cardiovascular:      Rate and Rhythm: Normal rate and regular rhythm  Heart sounds: No murmur heard  Pulmonary:      Effort: Pulmonary effort is normal  No respiratory distress  Breath sounds: Rhonchi present  Abdominal:      Palpations: Abdomen is soft  Tenderness: There is no abdominal tenderness  Musculoskeletal:         General: No swelling  Cervical back: Neck supple  Skin:     General: Skin is warm and dry  Capillary Refill: Capillary refill takes less than 2 seconds  Neurological:      Mental Status: She is alert     Psychiatric:         Mood and Affect: Mood normal          Behavior: Behavior normal           Additional Data:     Labs:  Results from last 7 days   Lab Units 03/04/23  0530 03/03/23  0525 03/01/23  1327 03/01/23  0529 02/28/23  2344 02/28/23  2344   WBC Thousand/uL 8 65   < > 5 39 6 46  --  9 11   HEMOGLOBIN g/dL 11 0*   < > 11 0* 11 3*  --  12 5   HEMATOCRIT % 34 7*   < > 35 8 37 3  --  40 8   PLATELETS Thousands/uL 168   < > 182 179  173  --  188   BANDS PCT %  --   --   --  5  --   --    NEUTROS PCT %  --   --   --   --   --  65   LYMPHS PCT %  --   --   --   --   --  17   LYMPHO PCT %  --   --  5* 3*   < >  --    MONOS PCT %  --   --   --   --   --  15*   MONO PCT %  --   --  1* 2*   < >  --    EOS PCT %  --   --  0 0   < > 1    < > = values in this interval not displayed  Results from last 7 days   Lab Units 03/05/23  0547 03/01/23  0529 02/28/23  2344   SODIUM mmol/L 132*   < > 132*   POTASSIUM mmol/L 4 5   < > 4 2   CHLORIDE mmol/L 96   < > 96   CO2 mmol/L 31   < > 25   BUN mg/dL 30*   < > 20   CREATININE mg/dL 0 94   < > 0 91   ANION GAP mmol/L 5   < > 11   CALCIUM mg/dL 8 8   < > 9 6   ALBUMIN g/dL  --   --  4 2   TOTAL BILIRUBIN mg/dL  --   --  0 75   ALK PHOS U/L  --   --  92   ALT U/L  --   --  12   AST U/L  --   --  16   GLUCOSE RANDOM mg/dL 360*   < > 147*    < > = values in this interval not displayed  Results from last 7 days   Lab Units 03/01/23  0022   INR  1 09     Results from last 7 days   Lab Units 03/05/23  1059 03/05/23  0719 03/04/23  2020 03/04/23  1549 03/04/23  1107 03/04/23  0737 03/04/23  0523 03/04/23  0259 03/04/23  0100 03/03/23  2303 03/03/23  2245 03/03/23  2128   POC GLUCOSE mg/dl 107 337* 364* 246* 288* 164* 105 206* 161* 179* 195* 244*     Results from last 7 days   Lab Units 03/02/23  0509 03/01/23  0529   HEMOGLOBIN A1C % 7 0* 6 9*     Results from last 7 days   Lab Units 03/02/23  0509 03/01/23  0529 02/28/23  2344   LACTIC ACID mmol/L  --   --  1 1   PROCALCITONIN ng/ml 0 17 0 07  --        Lines/Drains:  Invasive Devices     Peripheral Intravenous Line  Duration           Peripheral IV 03/02/23 Dorsal (posterior); Left Forearm 3 days    Peripheral IV 03/04/23 Right;Ventral (anterior) Forearm 1 day                      Imaging: No pertinent imaging reviewed  Recent Cultures (last 7 days):   Results from last 7 days   Lab Units 03/01/23  1332 02/28/23  2344   BLOOD CULTURE   --  No Growth After 4 Days  No Growth After 4 Days     LEGIONELLA URINARY ANTIGEN  Negative  --        Last 24 Hours Medication List:   Current Facility-Administered Medications   Medication Dose Route Frequency Provider Last Rate   • albuterol  2 puff Inhalation Q6H PRN Emilio Greenfield Lord Montalvo, DO     • albuterol  2 5 mg Nebulization Q4H PRN Jessica Church MD     • vitamin C  1,000 mg Oral BID Андрей Martínez, DO     • aspirin  81 mg Oral Daily Jessica Church MD     • atorvastatin  40 mg Oral Daily Miriam Garvin, DO     • brimonidine tartrate  1 drop Left Eye BID Андрей Martínez, DO     • budesonide-formoterol  2 puff Inhalation BID Андрей Martínez DO     • cefdinir  300 mg Oral Q12H [address] Jessica Church MD     • diltiazem  180 mg Oral Daily Miriam Garvin, DO     • docusate sodium  100 mg Oral BID Андрей Martínez, DO     • enoxaparin  40 mg Subcutaneous Daily Miriam Garvin DO     • fluticasone  1 spray Each Nare Daily Kem Sullivan MD     • gabapentin  300 mg Oral 4x Daily Miriam Montalvo, DO     • guaiFENesin  1,200 mg Oral BID Jessica Church MD     • insulin glargine  5 Units Subcutaneous QAM Jessica Church MD     • insulin lispro  2-12 Units Subcutaneous TID AC Be Bradshaw, DO     • insulin lispro  5 Units Subcutaneous TID With Meals Jessica Church MD     • ipratropium  0 5 mg Nebulization TID Jessica Church MD     • levalbuterol  1 25 mg Nebulization TID Jessica Church MD     • losartan  50 mg Oral Daily Miriam Garvin, DO     • methocarbamol  500 mg Oral 4x Daily Miriam Garvin DO     • ondansetron  4 mg Intravenous Q6H PRN Андрей Martínez DO     • oxyCODONE  10 mg Oral Q12H [address] Miriam Garvin DO     • pantoprazole  40 mg Oral Daily Miriam Garvin DO     • phenol  1 spray Mouth/Throat Q2H PRN Kem Sullivan MD     • polyethylene glycol  17 g Oral Daily Miriam Garvin DO     • rOPINIRole  0 5 mg Oral HS Miriam Montalvo DO     • sodium chloride  1 spray Each Nare Q1H PRN Jessica Church MD     • sodium chloride  4 mL Nebulization TID Jessica Church MD          Today, Patient Was Seen By: Kem Sullivan MD    **Please Note: This note may have been constructed using a voice recognition system  **

## 2023-03-05 NOTE — ASSESSMENT & PLAN NOTE
· POA: Tachypnic to 40, SOB, productive cough, wheezing, AMS/agitation  · Required BIPAP in ED, weaned off to 5L NC, uses 3L NC prn at home  · En route required 2 DuoNebs and 125 mg Solumedrol  · CTA PE study showed patchy ground glass opacities in left and right upper lobes, suspicious for PNA, no PE, pulmonary artery hypertension, and cardiomegaly  · Hx of COPD  · Meet criteria for severe CAP, Drip Score 2  · At 3 L, baseline is 3 L, exam improved, Rhonchi still present    Plan:  · S/p Rocephin D3, start cefdinir 4/5 to complete 5 day course  · Duonebs q6h, 3% Nacl to help with retained secretions  · prednisone 40 daily x3 days, additional 2 days?   · Symptoms improving, continue symptomatic supports with breathing treatments as ordered

## 2023-03-05 NOTE — ASSESSMENT & PLAN NOTE
· Most likely secondary to CAP  Recent Labs     03/03/23  0626 03/04/23  0530 03/05/23  0547   SODIUM 129* 132* 132*     Corrected Na: 134  Monitor with BMP

## 2023-03-05 NOTE — PLAN OF CARE
Problem: MOBILITY - ADULT  Goal: Maintain or return to baseline ADL function  Description: INTERVENTIONS:  -  Assess patient's ability to carry out ADLs; assess patient's baseline for ADL function and identify physical deficits which impact ability to perform ADLs (bathing, care of mouth/teeth, toileting, grooming, dressing, etc )  - Assess/evaluate cause of self-care deficits   - Assess range of motion  - Assess patient's mobility; develop plan if impaired  - Assess patient's need for assistive devices and provide as appropriate  - Encourage maximum independence but intervene and supervise when necessary  - Involve family in performance of ADLs  - Assess for home care needs following discharge   - Consider OT consult to assist with ADL evaluation and planning for discharge  - Provide patient education as appropriate  Outcome: Progressing  Goal: Maintains/Returns to pre admission functional level  Description: INTERVENTIONS:  - Perform BMAT or MOVE assessment daily    - Set and communicate daily mobility goal to care team and patient/family/caregiver  - Collaborate with rehabilitation services on mobility goals if consulted  - Perform Range of Motion 3 times a day  - Reposition patient every 2 hours    - Dangle patient 3 times a day  - Stand patient 3 times a day  - Ambulate patient 3 times a day  - Out of bed to chair 3 times a day   - Out of bed for meals 3 times a day  - Out of bed for toileting  - Record patient progress and toleration of activity level   Outcome: Progressing     Problem: PAIN - ADULT  Goal: Verbalizes/displays adequate comfort level or baseline comfort level  Description: Interventions:  - Encourage patient to monitor pain and request assistance  - Assess pain using appropriate pain scale  - Administer analgesics based on type and severity of pain and evaluate response  - Implement non-pharmacological measures as appropriate and evaluate response  - Consider cultural and social influences on pain and pain management  - Notify physician/advanced practitioner if interventions unsuccessful or patient reports new pain  Outcome: Progressing     Problem: INFECTION - ADULT  Goal: Absence or prevention of progression during hospitalization  Description: INTERVENTIONS:  - Assess and monitor for signs and symptoms of infection  - Monitor lab/diagnostic results  - Monitor all insertion sites, i e  indwelling lines, tubes, and drains  - Monitor endotracheal if appropriate and nasal secretions for changes in amount and color  - [place] appropriate cooling/warming therapies per order  - Administer medications as ordered  - Instruct and encourage patient and family to use good hand hygiene technique  - Identify and instruct in appropriate isolation precautions for identified infection/condition  Outcome: Progressing  Goal: Absence of fever/infection during neutropenic period  Description: INTERVENTIONS:  - Monitor WBC    Outcome: Progressing     Problem: DISCHARGE PLANNING  Goal: Discharge to home or other facility with appropriate resources  Description: INTERVENTIONS:  - Identify barriers to discharge w/patient and caregiver  - Arrange for needed discharge resources and transportation as appropriate  - Identify discharge learning needs (meds, wound care, etc )  - Arrange for interpretive services to assist at discharge as needed  - Refer to Case Management Department for coordinating discharge planning if the patient needs post-hospital services based on physician/advanced practitioner order or complex needs related to functional status, cognitive ability, or social support system  Outcome: Progressing     Problem: Knowledge Deficit  Goal: Patient/family/caregiver demonstrates understanding of disease process, treatment plan, medications, and discharge instructions  Description: Complete learning assessment and assess knowledge base    Interventions:  - Provide teaching at level of understanding  - Provide teaching via preferred learning methods  Outcome: Progressing

## 2023-03-05 NOTE — ASSESSMENT & PLAN NOTE
Lab Results   Component Value Date    HGBA1C 7 0 (H) 03/02/2023       Recent Labs     03/04/23  1549 03/04/23  2020 03/05/23  0719 03/05/23  1059   POCGLU 246* 364* 337* 107       Blood Sugar Average: Last 72 hrs:  (P) 215 [id number]   Home regimen: Metformin 1000 mg in AM and 500 mg in PM  With steroid induced hyperglycemia  Weaned off insulin gtt 3/4/23, start basal bolus regimen and ISS  Will shorten course of steroids given her clinical improvement  Will keep additional day or so to get her blood sugars under better control    Patient does not want to go home on insulin temporarily which I think is reasonable  Lantus 5 am , add 10 units for sugars in 300s, Humalog 5 units TID

## 2023-03-06 VITALS
SYSTOLIC BLOOD PRESSURE: 151 MMHG | OXYGEN SATURATION: 95 % | HEART RATE: 85 BPM | TEMPERATURE: 98 F | RESPIRATION RATE: 18 BRPM | WEIGHT: 158.73 LBS | DIASTOLIC BLOOD PRESSURE: 82 MMHG

## 2023-03-06 LAB
BACTERIA BLD CULT: NORMAL
BACTERIA BLD CULT: NORMAL
GLUCOSE SERPL-MCNC: 161 MG/DL (ref 65–140)
GLUCOSE SERPL-MCNC: 168 MG/DL (ref 65–140)
GLUCOSE SERPL-MCNC: 334 MG/DL (ref 65–140)

## 2023-03-06 RX ORDER — GUAIFENESIN 1200 MG/1
1200 TABLET, EXTENDED RELEASE ORAL 2 TIMES DAILY
Qty: 30 TABLET | Refills: 0 | Status: SHIPPED | OUTPATIENT
Start: 2023-03-06 | End: 2023-03-21

## 2023-03-06 RX ADMIN — LOSARTAN POTASSIUM 50 MG: 50 TABLET, FILM COATED ORAL at 09:49

## 2023-03-06 RX ADMIN — BUDESONIDE AND FORMOTEROL FUMARATE DIHYDRATE 2 PUFF: 160; 4.5 AEROSOL RESPIRATORY (INHALATION) at 09:57

## 2023-03-06 RX ADMIN — INSULIN LISPRO 4 UNITS: 100 INJECTION, SOLUTION INTRAVENOUS; SUBCUTANEOUS at 09:08

## 2023-03-06 RX ADMIN — DOCUSATE SODIUM 100 MG: 100 CAPSULE, LIQUID FILLED ORAL at 09:50

## 2023-03-06 RX ADMIN — OXYCODONE HYDROCHLORIDE 10 MG: 10 TABLET, FILM COATED, EXTENDED RELEASE ORAL at 09:50

## 2023-03-06 RX ADMIN — GABAPENTIN 300 MG: 300 CAPSULE ORAL at 13:22

## 2023-03-06 RX ADMIN — INSULIN LISPRO 3 UNITS: 100 INJECTION, SOLUTION INTRAVENOUS; SUBCUTANEOUS at 09:09

## 2023-03-06 RX ADMIN — LEVALBUTEROL HYDROCHLORIDE 1.25 MG: 1.25 SOLUTION, CONCENTRATE RESPIRATORY (INHALATION) at 07:22

## 2023-03-06 RX ADMIN — GABAPENTIN 300 MG: 300 CAPSULE ORAL at 09:50

## 2023-03-06 RX ADMIN — INSULIN LISPRO 3 UNITS: 100 INJECTION, SOLUTION INTRAVENOUS; SUBCUTANEOUS at 12:25

## 2023-03-06 RX ADMIN — METHOCARBAMOL 500 MG: 500 TABLET ORAL at 09:49

## 2023-03-06 RX ADMIN — BRIMONIDINE TARTRATE 1 DROP: 2 SOLUTION/ DROPS OPHTHALMIC at 10:00

## 2023-03-06 RX ADMIN — METHOCARBAMOL 500 MG: 500 TABLET ORAL at 13:22

## 2023-03-06 RX ADMIN — ASPIRIN 81 MG: 81 TABLET, COATED ORAL at 09:50

## 2023-03-06 RX ADMIN — PANTOPRAZOLE SODIUM 40 MG: 40 TABLET, DELAYED RELEASE ORAL at 09:50

## 2023-03-06 RX ADMIN — CEFDINIR 300 MG: 300 CAPSULE ORAL at 09:50

## 2023-03-06 RX ADMIN — IPRATROPIUM BROMIDE 0.5 MG: 0.5 SOLUTION RESPIRATORY (INHALATION) at 07:22

## 2023-03-06 RX ADMIN — ENOXAPARIN SODIUM 40 MG: 40 INJECTION SUBCUTANEOUS at 09:50

## 2023-03-06 RX ADMIN — SODIUM CHLORIDE SOLN NEBU 3% 4 ML: 3 NEBU SOLN at 07:21

## 2023-03-06 RX ADMIN — GUAIFENESIN 1200 MG: 600 TABLET ORAL at 09:57

## 2023-03-06 RX ADMIN — OXYCODONE HYDROCHLORIDE AND ACETAMINOPHEN 1000 MG: 500 TABLET ORAL at 09:49

## 2023-03-06 RX ADMIN — INSULIN LISPRO 1 UNITS: 100 INJECTION, SOLUTION INTRAVENOUS; SUBCUTANEOUS at 12:26

## 2023-03-06 RX ADMIN — FLUTICASONE PROPIONATE 1 SPRAY: 50 SPRAY, METERED NASAL at 09:57

## 2023-03-06 RX ADMIN — DILTIAZEM HYDROCHLORIDE 180 MG: 180 CAPSULE, COATED, EXTENDED RELEASE ORAL at 09:50

## 2023-03-06 RX ADMIN — ATORVASTATIN CALCIUM 40 MG: 40 TABLET, FILM COATED ORAL at 09:50

## 2023-03-06 NOTE — PLAN OF CARE
Problem: MOBILITY - ADULT  Goal: Maintain or return to baseline ADL function  Description: INTERVENTIONS:  -  Assess patient's ability to carry out ADLs; assess patient's baseline for ADL function and identify physical deficits which impact ability to perform ADLs (bathing, care of mouth/teeth, toileting, grooming, dressing, etc )  - Assess/evaluate cause of self-care deficits   - Assess range of motion  - Assess patient's mobility; develop plan if impaired  - Assess patient's need for assistive devices and provide as appropriate  - Encourage maximum independence but intervene and supervise when necessary  - Involve family in performance of ADLs  - Assess for home care needs following discharge   - Consider OT consult to assist with ADL evaluation and planning for discharge  - Provide patient education as appropriate  3/6/2023 1412 by Mare Cintron RN  Outcome: Adequate for Discharge  3/6/2023 1411 by Mare Cintron RN  Outcome: Adequate for Discharge  3/6/2023 1217 by Mare Cintron RN  Outcome: Progressing  Goal: Maintains/Returns to pre admission functional level  Description: INTERVENTIONS:  - Perform BMAT or MOVE assessment daily    - Set and communicate daily mobility goal to care team and patient/family/caregiver     - Collaborate with rehabilitation services on mobility goals if consulted  - Perform Range of Motion  - Reposition patient every  - Dangle patient  - Stand patient  - Ambulate patient   - Out of bed to chair  - Out of bed for meals   - Out of bed for toileting  - Record patient progress and toleration of activity level   3/6/2023 1412 by Mare Cintron RN  Outcome: Adequate for Discharge  3/6/2023 1411 by Mare Cintron RN  Outcome: Adequate for Discharge  3/6/2023 1217 by Mare Cintron RN  Outcome: Progressing     Problem: PAIN - ADULT  Goal: Verbalizes/displays adequate comfort level or baseline comfort level  Description: Interventions:  - Encourage patient to monitor pain and request assistance  - Assess pain using appropriate pain scale  - Administer analgesics based on type and severity of pain and evaluate response  - Implement non-pharmacological measures as appropriate and evaluate response  - Consider cultural and social influences on pain and pain management  - Notify physician/advanced practitioner if interventions unsuccessful or patient reports new pain  3/6/2023 1412 by Katelynn Emerson RN  Outcome: Adequate for Discharge  3/6/2023 1411 by Katelynn Emerson RN  Outcome: Adequate for Discharge  3/6/2023 1217 by Katelynn Emerson RN  Outcome: Progressing     Problem: INFECTION - ADULT  Goal: Absence or prevention of progression during hospitalization  Description: INTERVENTIONS:  - Assess and monitor for signs and symptoms of infection  - Monitor lab/diagnostic results  - Monitor all insertion sites, i e  indwelling lines, tubes, and drains  - Monitor endotracheal if appropriate and nasal secretions for changes in amount and color  - [place] appropriate cooling/warming therapies per order  - Administer medications as ordered  - Instruct and encourage patient and family to use good hand hygiene technique  - Identify and instruct in appropriate isolation precautions for identified infection/condition  3/6/2023 1412 by Katelynn Emerson RN  Outcome: Adequate for Discharge  3/6/2023 1411 by Katelynn Emerson RN  Outcome: Adequate for Discharge  3/6/2023 1217 by Katelynn Emerson RN  Outcome: Progressing  Goal: Absence of fever/infection during neutropenic period  Description: INTERVENTIONS:  - Monitor WBC    3/6/2023 1412 by Katelynn Emerson RN  Outcome: Adequate for Discharge  3/6/2023 1411 by Katelynn Emerson RN  Outcome: Adequate for Discharge  3/6/2023 1217 by Katelynn Emerson RN  Outcome: Progressing     Problem: SAFETY ADULT  Goal: Maintain or return to baseline ADL function  Description: INTERVENTIONS:  -  Assess patient's ability to carry out ADLs; assess patient's baseline for ADL function and identify physical deficits which impact ability to perform ADLs (bathing, care of mouth/teeth, toileting, grooming, dressing, etc )  - Assess/evaluate cause of self-care deficits   - Assess range of motion  - Assess patient's mobility; develop plan if impaired  - Assess patient's need for assistive devices and provide as appropriate  - Encourage maximum independence but intervene and supervise when necessary  - Involve family in performance of ADLs  - Assess for home care needs following discharge   - Consider OT consult to assist with ADL evaluation and planning for discharge  - Provide patient education as appropriate  3/6/2023 1412 by Emily Quinn RN  Outcome: Adequate for Discharge  3/6/2023 1411 by Emily Quinn RN  Outcome: Adequate for Discharge  3/6/2023 1217 by Emily Quinn RN  Outcome: Progressing  Goal: Maintains/Returns to pre admission functional level  Description: INTERVENTIONS:  - Perform BMAT or MOVE assessment daily    - Set and communicate daily mobility goal to care team and patient/family/caregiver     - Collaborate with rehabilitation services on mobility goals if consulted  - Perform Range of Motion  - Reposition patient   - Dangle patient   - Stand patient   - Ambulate patient   - Out of bed to chair   - Out of bed for meals   - Out of bed for toileting  - Record patient progress and toleration of activity level   3/6/2023 1412 by Emily Quinn RN  Outcome: Adequate for Discharge  3/6/2023 1411 by Emily Quinn RN  Outcome: Adequate for Discharge  3/6/2023 1217 by Emily Quinn RN  Outcome: Progressing  Goal: Patient will remain free of falls  Description: INTERVENTIONS:  -  Assess patient's ability to carry out ADLs; assess patient's baseline for ADL function and identify physical deficits which impact ability to perform ADLs (bathing, care of mouth/teeth, toileting, grooming, dressing, etc )  - Assess/evaluate cause of self-care deficits   - Assess range of motion  - Assess patient's mobility; develop plan if impaired  - Assess patient's need for assistive devices and provide as appropriate  - Encourage maximum independence but intervene and supervise when necessary  - Involve family in performance of ADLs  - Assess for home care needs following discharge   - Consider OT consult to assist with ADL evaluation and planning for discharge  - Provide patient education as appropriate  3/6/2023 1412 by Laureen Kathleen RN  Outcome: Adequate for Discharge  3/6/2023 1411 by Laureen Kathleen RN  Outcome: Adequate for Discharge  3/6/2023 1217 by Laureen Kathleen RN  Outcome: Progressing     Problem: DISCHARGE PLANNING  Goal: Discharge to home or other facility with appropriate resources  Description: INTERVENTIONS:  - Identify barriers to discharge w/patient and caregiver  - Arrange for needed discharge resources and transportation as appropriate  - Identify discharge learning needs (meds, wound care, etc )  - Arrange for interpretive services to assist at discharge as needed  - Refer to Case Management Department for coordinating discharge planning if the patient needs post-hospital services based on physician/advanced practitioner order or complex needs related to functional status, cognitive ability, or social support system  3/6/2023 1412 by Laureen Kathleen RN  Outcome: Adequate for Discharge  3/6/2023 1411 by Laureen Kathleen RN  Outcome: Adequate for Discharge  3/6/2023 1217 by Laureen Kathleen RN  Outcome: Progressing     Problem: Knowledge Deficit  Goal: Patient/family/caregiver demonstrates understanding of disease process, treatment plan, medications, and discharge instructions  Description: Complete learning assessment and assess knowledge base    Interventions:  - Provide teaching at level of understanding  - Provide teaching via preferred learning methods  3/6/2023 1412 by Laureen Kathleen RN  Outcome: Adequate for Discharge  3/6/2023 1411 by Musa Gonzales RN  Outcome: Adequate for Discharge  3/6/2023 1217 by Musa Gonzales RN  Outcome: Progressing

## 2023-03-06 NOTE — DISCHARGE INSTR - AVS FIRST PAGE
Dear Gilberto Pino,     It was our pleasure to care for you here at [facility]  It is our hope that we were always able to exceed the expected standards for your care during your stay  You were hospitalized due to COPD exacerbation  You were cared for on the 3rd floor by Kem Sullivan MD under the service of Jessica Church MD with the [facility] who covers for your primary care physician (PCP), James Yousif DO, while you were hospitalized  If you have any questions or concerns related to this hospitalization, you may contact us at [phone]  For follow up as well as any medication refills, we recommend that you follow up with your primary care physician  A registered nurse will reach out to you by phone within a few days after your discharge to answer any additional questions that you may have after going home  However, at this time we provide for you here, the most important instructions / recommendations at discharge:     Notable Medication Adjustments -   Mucinex as needed for cough with congestion   Testing Required after Discharge -   Please check blood glucose as needed, if >300 please see PCP  Important follow up information -   Follow up with PCP in 1 week  Other Instructions -   None  Please review this entire after visit summary as additional general instructions including medication list, appointments, activity, diet, any pertinent wound care, and other additional recommendations from your care team that may be provided for you        Sincerely,     Kem Sullivan MD

## 2023-03-06 NOTE — CASE MANAGEMENT
Case Management Discharge Planning Note    Patient name Romayne Poag  Location [address] 322-01 MRN [id number]  : 1940 Date 3/6/2023       Current Admission Date: 2023  Current Admission Diagnosis:Community acquired pneumonia   Patient Active Problem List    Diagnosis Date Noted   • Community acquired pneumonia 2023   • [facility] exacerbation ([facility] ) 2023   • Type 2 diabetes mellitus ([facility] ) 2023   • Hypertension 2023   • Hyponatremia 2023   • Hyperlipidemia 2023   • Chronic back pain 2023   • chronic respiratory failure ([place] [address] ), acute component resolved 2023   • GERD (gastroesophageal reflux disease) 2023   • Sepsis ([facility] ) 2023      LOS (days): 5  Geometric Mean LOS (GMLOS) (days): 5 00  Days to GMLOS:-0 3     OBJECTIVE:  Risk of Unplanned Readmission Score: 18 06         Current admission status: Inpatient   Preferred Pharmacy:   [id number] [address] [place] [address]  Phone: [phone] Fax: [phone]    Primary Care Provider: Moody Bailey DO    Primary Insurance: [facility]  Secondary Insurance:     DISCHARGE DETAILS:    Discharge planning discussed with[de-identified] patient sleeping, so spoke with son, Fouzia Flor, by phone  Freedom of Choice: Yes (re: home care)  Comments - Freedom of Choice: [facility] is preferred agency and able to accept  CM contacted family/caregiver?: Yes (patient's son, Fouzia Flor, by phone)  Were Treatment Team discharge recommendations reviewed with patient/caregiver?: Yes  Did patient/caregiver verbalize understanding of patient care needs?: Yes  Were patient/caregiver advised of the risks associated with not following Treatment Team discharge recommendations?: Yes    Contacts  Patient Contacts: Fouzia Flor son  Relationship to Patient[de-identified] Family  Contact Method: Phone  Phone Number: [phone]  Reason/Outcome: Discharge Planning, Referral, Continuity of Care, Emergency [address]         Is the patient interested in [facility] at discharge?: Yes  [address] requested[de-identified] Nursing, Occupational Therapy, Physical [address] Name[de-identified] [address] Provider[de-identified] PCP  Home Health Services Needed[de-identified] Evaluate Functional Status and Safety, Gait/ADL Training, Strengthening/Theraputic Exercises to Improve Function, COPD Management  Oxygen LPM Ordered (if applicable based on home health services needed):: 2 LPM  Homebound Criteria Met[de-identified] Requires the Assistance of Another Person for Safe Ambulation or to Leave the Home, Uses an Assist Device (i e  cane, walker, etc)  Supporting Clincal Findings[de-identified] Fatigues Eas[place] in [employer], Limited Endurance, Requires Oxygen, Dyspnea with Exertion    DME Referral Provided  Referral made for DME?: No    Other Referral/Resources/Interventions Provided:  Interventions: [facility]  Referral Comments: Per MD, patient stable for d/c today  Attempted to meet with patient at bedside to discuss same and review IMM, but patient sleeping  Call made to patient's son, Ayana Juarez, to discuss same  Johnnie aware of anticipated d/c for today and reports he will be able to pick-up patient around 2:00pm  Confirmed that he has portable o2 for patient he can bring for pick-up  Son aware that home care services have been confirmed with [facility], and he confirmed agreement with same  IMM reviewed and copy left at bedside for patient's records - son declined any concerns about discharge for today and reports he's able transport her home around 2:00pm - MD and RN aware of same  [facility] informed of d/c for today via [facility] - will forward AVS once complete      Would you like to participate in our [address] service program?  : No - Declined    Treatment Team Recommendation: Home with [address] [employer]  Discharge Destination Plan[de-identified] Home with [address] [facility])  Transport at Discharge : Family                             IMM Given (Date):: 23  IMM Given to[de-identified] Family (reviewed with son, Suzanne Elmore, by phone)  IMM reviewed with patient's son, Suzanne Elmore, son agrees with discharge determination

## 2023-03-06 NOTE — CASE MANAGEMENT
Case Management Progress Note    Patient name Lynn Romero  Location [address] 322-01 MRN [id number]  : 1940 Date 3/6/2023       LOS (days): 5  Geometric Mean LOS (GMLOS) (days): 5 00  Days to GMLOS:-0 4        OBJECTIVE:        Current admission status: Inpatient  Preferred Pharmacy:   [id number]- [place]  [place] [address]  Phone: [phone] Fax: [phone]    Primary Care Provider: Bing Dye DO    Primary Insurance: [facility] RE[facility]  Secondary Insurance:     PROGRESS NOTE:    AVS and home care order forwarded to [facility] for their records - aware of d/c for today

## 2023-03-06 NOTE — PLAN OF CARE
Problem: MOBILITY - ADULT  Goal: Maintain or return to baseline ADL function  Description: INTERVENTIONS:  -  Assess patient's ability to carry out ADLs; assess patient's baseline for ADL function and identify physical deficits which impact ability to perform ADLs (bathing, care of mouth/teeth, toileting, grooming, dressing, etc )  - Assess/evaluate cause of self-care deficits   - Assess range of motion  - Assess patient's mobility; develop plan if impaired  - Assess patient's need for assistive devices and provide as appropriate  - Encourage maximum independence but intervene and supervise when necessary  - Involve family in performance of ADLs  - Assess for home care needs following discharge   - Consider OT consult to assist with ADL evaluation and planning for discharge  - Provide patient education as appropriate  3/6/2023 1411 by Samina La RN  Outcome: Adequate for Discharge  3/6/2023 1217 by Samina La RN  Outcome: Progressing  Goal: Maintains/Returns to pre admission functional level  Description: INTERVENTIONS:  - Perform BMAT or MOVE assessment daily    - Set and communicate daily mobility goal to care team and patient/family/caregiver  - Collaborate with rehabilitation services on mobility goals if consulted  - Perform Range of Motion 2 times a day  - Reposition patient every 2 hours    - Dangle patient   - Stand patient   - Ambulate patient   - Out of bed to chair   - Out of bed for meals   - Out of bed for toileting  - Record patient progress and toleration of activity level   3/6/2023 1411 by Samina La RN  Outcome: Adequate for Discharge  3/6/2023 1217 by Samina La RN  Outcome: Progressing     Problem: PAIN - ADULT  Goal: Verbalizes/displays adequate comfort level or baseline comfort level  Description: Interventions:  - Encourage patient to monitor pain and request assistance  - Assess pain using appropriate pain scale  - Administer analgesics based on type and severity of pain and evaluate response  - Implement non-pharmacological measures as appropriate and evaluate response  - Consider cultural and social influences on pain and pain management  - Notify physician/advanced practitioner if interventions unsuccessful or patient reports new pain  3/6/2023 1411 by Efrem Mcdermott RN  Outcome: Adequate for Discharge  3/6/2023 1217 by Efrem Mcdermott RN  Outcome: Progressing     Problem: INFECTION - ADULT  Goal: Absence or prevention of progression during hospitalization  Description: INTERVENTIONS:  - Assess and monitor for signs and symptoms of infection  - Monitor lab/diagnostic results  - Monitor all insertion sites, i e  indwelling lines, tubes, and drains  - Monitor endotracheal if appropriate and nasal secretions for changes in amount and color  - [place] appropriate cooling/warming therapies per order  - Administer medications as ordered  - Instruct and encourage patient and family to use good hand hygiene technique  - Identify and instruct in appropriate isolation precautions for identified infection/condition  3/6/2023 1411 by Efrem Mcdermott RN  Outcome: Adequate for Discharge  3/6/2023 1217 by Efrem Mcdermott RN  Outcome: Progressing  Goal: Absence of fever/infection during neutropenic period  Description: INTERVENTIONS:  - Monitor WBC    3/6/2023 1411 by Efrem Mcdermott RN  Outcome: Adequate for Discharge  3/6/2023 1217 by Efrem Mcdermott RN  Outcome: Progressing     Problem: SAFETY ADULT  Goal: Maintain or return to baseline ADL function  Description: INTERVENTIONS:  -  Assess patient's ability to carry out ADLs; assess patient's baseline for ADL function and identify physical deficits which impact ability to perform ADLs (bathing, care of mouth/teeth, toileting, grooming, dressing, etc )  - Assess/evaluate cause of self-care deficits   - Assess range of motion  - Assess patient's mobility; develop plan if impaired  - Assess patient's need for assistive devices and provide as appropriate  - Encourage maximum independence but intervene and supervise when necessary  - Involve family in performance of ADLs  - Assess for home care needs following discharge   - Consider OT consult to assist with ADL evaluation and planning for discharge  - Provide patient education as appropriate  3/6/2023 1411 by Johnny Pacheco RN  Outcome: Adequate for Discharge  3/6/2023 1217 by Johnny Pacheco RN  Outcome: Progressing  Goal: Maintains/Returns to pre admission functional level  Description: INTERVENTIONS:  - Perform BMAT or MOVE assessment daily    - Set and communicate daily mobility goal to care team and patient/family/caregiver     - Collaborate with rehabilitation services on mobility goals if consulted  - Perform Range of Motion  - Reposition patient every   - Dangle patient   - Stand patient   - Ambulate patient   - Out of bed to chair   - Out of bed for meals   - Out of bed for toileting  - Record patient progress and toleration of activity level   3/6/2023 1411 by Johnny Pacheco RN  Outcome: Adequate for Discharge  3/6/2023 1217 by Johnny Pacheco RN  Outcome: Progressing  Goal: Patient will remain free of falls  Description: INTERVENTIONS:  -  Assess patient's ability to carry out ADLs; assess patient's baseline for ADL function and identify physical deficits which impact ability to perform ADLs (bathing, care of mouth/teeth, toileting, grooming, dressing, etc )  - Assess/evaluate cause of self-care deficits   - Assess range of motion  - Assess patient's mobility; develop plan if impaired  - Assess patient's need for assistive devices and provide as appropriate  - Encourage maximum independence but intervene and supervise when necessary  - Involve family in performance of ADLs  - Assess for home care needs following discharge   - Consider OT consult to assist with ADL evaluation and planning for discharge  - Provide patient education as appropriate  3/6/2023 1411 by Dheeraj Walker RN  Outcome: Adequate for Discharge  3/6/2023 1217 by Dheeraj Walker RN  Outcome: Progressing     Problem: DISCHARGE PLANNING  Goal: Discharge to home or other facility with appropriate resources  Description: INTERVENTIONS:  - Identify barriers to discharge w/patient and caregiver  - Arrange for needed discharge resources and transportation as appropriate  - Identify discharge learning needs (meds, wound care, etc )  - Arrange for interpretive services to assist at discharge as needed  - Refer to Case Management Department for coordinating discharge planning if the patient needs post-hospital services based on physician/advanced practitioner order or complex needs related to functional status, cognitive ability, or social support system  3/6/2023 1411 by Dheeraj Walker RN  Outcome: Adequate for Discharge  3/6/2023 1217 by Dheeraj Walker RN  Outcome: Progressing     Problem: Knowledge Deficit  Goal: Patient/family/caregiver demonstrates understanding of disease process, treatment plan, medications, and discharge instructions  Description: Complete learning assessment and assess knowledge base    Interventions:  - Provide teaching at level of understanding  - Provide teaching via preferred learning methods  3/6/2023 1411 by Dheeraj Walker RN  Outcome: Adequate for Discharge  3/6/2023 1217 by Dheeraj Walker RN  Outcome: Progressing

## 2023-03-07 PROBLEM — A41.9 SEPSIS (HCC): Status: RESOLVED | Noted: 2023-03-01 | Resolved: 2023-03-07

## 2023-03-07 NOTE — UTILIZATION REVIEW
NOTIFICATION OF ADMISSION DISCHARGE   This is a Notification of Discharge from [facility]  Please be advised that this patient has been discharge from our facility  Below you will find the admission and discharge date and time including the patient’s disposition  UTILIZATION REVIEW CONTACT:  Violet Diana MA  Utilization   Network Utilization Review Department  Phone: [phone] x carefully listen to the prompts  All voicemails are confidential   Email: [email or web address]     ADMISSION INFORMATION  PRESENTATION DATE: 2/28/2023 11:17 PM  OBERVATION ADMISSION DATE:   INPATIENT ADMISSION DATE: 3/1/23  4:34 AM   DISCHARGE DATE: 3/6/2023  4:15 PM   DISPOSITION:Home with Home Health Care    IMPORTANT INFORMATION:  Send all requests for admission clinical reviews, approved or denied determinations and any other requests to dedicated fax number below belonging to the campus where the patient is receiving treatment   List of dedicated fax numbers:  [address] DENIALS (Administrative/Medical Necessity) [phone]   [address] (Maternity/NICU/Pediatrics) [phone]   [facility] [phone]   [place] [address] [phone]   [address] [phone]   [address] [facility] [phone]   90 [facility] [phone]   [address] [place] [address] 119 [phone]   [facility]  [phone]   [facility] [facility] [phone]   412 [facility] 850 [facility] [phone]

## 2023-03-08 ENCOUNTER — HOSPITAL ENCOUNTER (INPATIENT)
Facility: HOSPITAL | Age: 83
LOS: 2 days | Discharge: HOME HEALTH | End: 2023-03-10
Attending: EMERGENCY MEDICINE | Admitting: HOSPITALIST

## 2023-03-08 ENCOUNTER — APPOINTMENT (EMERGENCY)
Dept: CT IMAGING | Facility: HOSPITAL | Age: 83
End: 2023-03-08

## 2023-03-08 ENCOUNTER — APPOINTMENT (EMERGENCY)
Dept: RADIOLOGY | Facility: HOSPITAL | Age: 83
End: 2023-03-08

## 2023-03-08 DIAGNOSIS — J96.20: Primary | ICD-10-CM

## 2023-03-08 DIAGNOSIS — R11.0 NAUSEA: ICD-10-CM

## 2023-03-08 DIAGNOSIS — Z87.01 HX OF BACTERIAL PNEUMONIA: ICD-10-CM

## 2023-03-08 DIAGNOSIS — J44.1 COPD EXACERBATION (HCC): ICD-10-CM

## 2023-03-08 DIAGNOSIS — J44.1 COPD WITH ACUTE EXACERBATION (HCC): ICD-10-CM

## 2023-03-08 LAB
2HR DELTA HS TROPONIN: -9 NG/L
4HR DELTA HS TROPONIN: -3 NG/L
ALBUMIN SERPL BCP-MCNC: 3.8 G/DL (ref 3.5–5)
ALP SERPL-CCNC: 74 U/L (ref 34–104)
ALT SERPL W P-5'-P-CCNC: 18 U/L (ref 7–52)
ANION GAP SERPL CALCULATED.3IONS-SCNC: 6 MMOL/L (ref 4–13)
APTT PPP: 23 SECONDS (ref 23–37)
AST SERPL W P-5'-P-CCNC: 15 U/L (ref 13–39)
BASE EX.OXY STD BLDV CALC-SCNC: 77.6 % (ref 60–80)
BASE EXCESS BLDV CALC-SCNC: 1.6 MMOL/L
BASOPHILS # BLD AUTO: 0.02 THOUSANDS/ÂΜL (ref 0–0.1)
BASOPHILS NFR BLD AUTO: 0 % (ref 0–1)
BILIRUB SERPL-MCNC: 0.61 MG/DL (ref 0.2–1)
BNP SERPL-MCNC: 34 PG/ML (ref 0–100)
BUN SERPL-MCNC: 28 MG/DL (ref 5–25)
CALCIUM SERPL-MCNC: 9 MG/DL (ref 8.4–10.2)
CARDIAC TROPONIN I PNL SERPL HS: 13 NG/L
CARDIAC TROPONIN I PNL SERPL HS: 16 NG/L
CARDIAC TROPONIN I PNL SERPL HS: 7 NG/L
CHLORIDE SERPL-SCNC: 97 MMOL/L (ref 96–108)
CO2 SERPL-SCNC: 30 MMOL/L (ref 21–32)
CREAT SERPL-MCNC: 0.91 MG/DL (ref 0.6–1.3)
D DIMER PPP FEU-MCNC: 1.31 UG/ML FEU
EOSINOPHIL # BLD AUTO: 0.1 THOUSAND/ÂΜL (ref 0–0.61)
EOSINOPHIL NFR BLD AUTO: 1 % (ref 0–6)
ERYTHROCYTE [DISTWIDTH] IN BLOOD BY AUTOMATED COUNT: 15.5 % (ref 11.6–15.1)
FLUAV RNA RESP QL NAA+PROBE: NEGATIVE
FLUBV RNA RESP QL NAA+PROBE: NEGATIVE
GFR SERPL CREATININE-BSD FRML MDRD: 58 ML/MIN/1.73SQ M
GLUCOSE SERPL-MCNC: 148 MG/DL (ref 65–140)
HCO3 BLDV-SCNC: 27.4 MMOL/L (ref 24–30)
HCT VFR BLD AUTO: 42 % (ref 34.8–46.1)
HGB BLD-MCNC: 12.9 G/DL (ref 11.5–15.4)
IMM GRANULOCYTES # BLD AUTO: 0.24 THOUSAND/UL (ref 0–0.2)
IMM GRANULOCYTES NFR BLD AUTO: 2 % (ref 0–2)
INR PPP: 0.91 (ref 0.84–1.19)
LACTATE SERPL-SCNC: 1.5 MMOL/L (ref 0.5–2)
LYMPHOCYTES # BLD AUTO: 0.81 THOUSANDS/ÂΜL (ref 0.6–4.47)
LYMPHOCYTES NFR BLD AUTO: 6 % (ref 14–44)
MCH RBC QN AUTO: 26.4 PG (ref 26.8–34.3)
MCHC RBC AUTO-ENTMCNC: 30.7 G/DL (ref 31.4–37.4)
MCV RBC AUTO: 86 FL (ref 82–98)
MONOCYTES # BLD AUTO: 0.78 THOUSAND/ÂΜL (ref 0.17–1.22)
MONOCYTES NFR BLD AUTO: 6 % (ref 4–12)
NEUTROPHILS # BLD AUTO: 11.12 THOUSANDS/ÂΜL (ref 1.85–7.62)
NEUTS SEG NFR BLD AUTO: 85 % (ref 43–75)
NRBC BLD AUTO-RTO: 0 /100 WBCS
O2 CT BLDV-SCNC: 15.3 ML/DL
PCO2 BLDV: 47.7 MM HG (ref 42–50)
PH BLDV: 7.38 [PH] (ref 7.3–7.4)
PLATELET # BLD AUTO: 244 THOUSANDS/UL (ref 149–390)
PMV BLD AUTO: 9.6 FL (ref 8.9–12.7)
PO2 BLDV: 43.8 MM HG (ref 35–45)
POTASSIUM SERPL-SCNC: 4.5 MMOL/L (ref 3.5–5.3)
PROCALCITONIN SERPL-MCNC: <0.05 NG/ML
PROT SERPL-MCNC: 6.8 G/DL (ref 6.4–8.4)
PROTHROMBIN TIME: 12.5 SECONDS (ref 11.6–14.5)
RBC # BLD AUTO: 4.88 MILLION/UL (ref 3.81–5.12)
RSV RNA RESP QL NAA+PROBE: NEGATIVE
SARS-COV-2 RNA RESP QL NAA+PROBE: NEGATIVE
SODIUM SERPL-SCNC: 133 MMOL/L (ref 135–147)
WBC # BLD AUTO: 13.07 THOUSAND/UL (ref 4.31–10.16)

## 2023-03-08 RX ORDER — METHYLPREDNISOLONE SODIUM SUCCINATE 40 MG/ML
40 INJECTION, POWDER, LYOPHILIZED, FOR SOLUTION INTRAMUSCULAR; INTRAVENOUS ONCE
Status: COMPLETED | OUTPATIENT
Start: 2023-03-08 | End: 2023-03-08

## 2023-03-08 RX ORDER — METOCLOPRAMIDE HYDROCHLORIDE 5 MG/ML
5 INJECTION INTRAMUSCULAR; INTRAVENOUS ONCE
Status: COMPLETED | OUTPATIENT
Start: 2023-03-08 | End: 2023-03-08

## 2023-03-08 RX ORDER — ALBUTEROL SULFATE 2.5 MG/3ML
1 SOLUTION RESPIRATORY (INHALATION) ONCE
Status: COMPLETED | OUTPATIENT
Start: 2023-03-08 | End: 2023-03-08

## 2023-03-08 RX ORDER — ALBUTEROL SULFATE 2.5 MG/3ML
5 SOLUTION RESPIRATORY (INHALATION) ONCE
Status: COMPLETED | OUTPATIENT
Start: 2023-03-08 | End: 2023-03-08

## 2023-03-08 RX ORDER — SODIUM CHLORIDE FOR INHALATION 0.9 %
12 VIAL, NEBULIZER (ML) INHALATION ONCE
Status: COMPLETED | OUTPATIENT
Start: 2023-03-08 | End: 2023-03-08

## 2023-03-08 RX ADMIN — IPRATROPIUM BROMIDE 0.5 MG: 0.5 SOLUTION RESPIRATORY (INHALATION) at 21:23

## 2023-03-08 RX ADMIN — METHYLPREDNISOLONE SODIUM SUCCINATE 40 MG: 40 INJECTION, POWDER, FOR SOLUTION INTRAMUSCULAR; INTRAVENOUS at 21:02

## 2023-03-08 RX ADMIN — CEFTRIAXONE 1000 MG: 1 INJECTION, POWDER, FOR SOLUTION INTRAMUSCULAR; INTRAVENOUS at 21:02

## 2023-03-08 RX ADMIN — IOHEXOL 85 ML: 350 INJECTION, SOLUTION INTRAVENOUS at 20:42

## 2023-03-08 RX ADMIN — METOCLOPRAMIDE 5 MG: 5 INJECTION, SOLUTION INTRAMUSCULAR; INTRAVENOUS at 18:04

## 2023-03-08 RX ADMIN — IPRATROPIUM BROMIDE 0.5 MG: 0.5 SOLUTION RESPIRATORY (INHALATION) at 17:50

## 2023-03-08 RX ADMIN — ISODIUM CHLORIDE 12 ML: 0.03 SOLUTION RESPIRATORY (INHALATION) at 21:23

## 2023-03-08 RX ADMIN — ALBUTEROL SULFATE 10 MG: 2.5 SOLUTION RESPIRATORY (INHALATION) at 21:22

## 2023-03-08 RX ADMIN — ALBUTEROL SULFATE 5 MG: 2.5 SOLUTION RESPIRATORY (INHALATION) at 17:50

## 2023-03-08 NOTE — DISCHARGE SUMMARY
[facility]  Discharge- Vito Fra 1940, 80 y o  female MRN: [id number]  Unit/Bed#: S -01 Encounter: [id number]  Primary Care Provider: Patty Cali DO   Date and time admitted to hospital: 2/28/2023 11:17 PM    * Community acquired pneumonia  Assessment & Plan  · POA: Tachypnic to 36, SOB, productive cough, wheezing, AMS/agitation  · Required BIPAP in ED, weaned off to 5L NC, uses 3L NC prn at home  · En route required 2 DuoNebs and 125 mg Solumedrol  · CTA PE study showed patchy ground glass opacities in left and right upper lobes, suspicious for PNA, no PE, pulmonary artery hypertension, and cardiomegaly  · Hx of COPD  · Meet criteria for severe CAP, Drip Score 2  · At 3 L, baseline is 3 L    Plan:  · S/p abx for 5 days and prednisone   · Baseline 02  · No longer wheezing  · Dc on home meds    Sepsis (HCC)-resolved as of 3/7/2023  Assessment & Plan  · Sepsis Criteria: RR: 40, Temp: 35 8C, Source Left upper lobe PNA  · Initially required BiPAP, weaned off   · No longer meet criteria  · See CAP    GERD (gastroesophageal reflux disease)  Assessment & Plan  Continue Protonix    chronic respiratory failure ([address] ), acute component resolved  Assessment & Plan  · Currently on 3L NC      Hyperlipidemia  Assessment & Plan  Continue Lipitor    Hyponatremia  Assessment & Plan  · Most likely secondary to CAP  Recent Labs     03/05/23  0547   SODIUM 132*           Hypertension  Assessment & Plan  Continue Losartan and Cardizem         Medical Problems     Resolved Problems  Date Reviewed: 3/5/2023          Resolved    Sepsis ([facility] [address] ) 3/7/2023     Resolved by  Vinicio Dowd MD        Discharging Resident: Vinicio Dowd MD  Discharging Attending: No att  providers found  PCP: Patty Cali DO  Admission Date:   Admission Orders (From admission, onward)     Ordered        03/01/23 0434  INPATIENT ADMISSION  Once                      Discharge Date: 3/6/23    Consultations During Hospital Stay:  · None    Procedures Performed:   · None    Significant Findings / Test Results:   CTA ED chest PE study   Final Result by Shelley Nobles MD (03/01 9653)      No intraluminal filling defect to suggest an acute pulmonary embolus  Patchy groundglass opacities noted at the posterior left upper lobe suspicious for an infectious or inflammatory process  A few scattered tree-in-bud opacities are also noted at the right upper lobe which may be secondary to an infectious or    inflammatory bronchiolitis  Enlargement of the main pulmonary arteries suggestive of pulmonary artery hypertension  Mild to moderate cardiomegaly  Workstation performed: [id number]         XR chest 1 view portable   ED Interpretation by Félix Muir MD (03/01 0254)   Patchy left upper lobe infiltrates      Final Result by Abigail Ochoa MD (03/01 0941)      Left upper lobe pneumonia  Workstation performed: [id number]         ·     Incidental Findings:   · None    Test Results Pending at Discharge (will require follow up): · None     Outpatient Tests Requested:  · Blood sugar check     Complications:  None    Reason for Admission: None    Hospital Course:   Parviz Aponte is a 80 y o  female patient who originally presented to the hospital on 2/28/2023 due to COPD, chronic hypoxic respiratory failure on 3L prn chronically, GERD, DM2, HLD, HTN, who presented to the ED with cough with productive sputum and worsening shortness of breath  Patient found to have potentially bilateral pneumonia and COPD exacerb  Initially needed BIPAP though now weaned off  Iv solumedrol elevated her sugars requiting IV insulin and patient was very jitery  Improved above resp and non respiratory issues on prednisone 40 x 3 days  Discharged off insulin on room air 3 L  Please see above list of diagnoses and related plan for additional information       Condition at Discharge: stable    Discharge Day Visit / Exam:   Subjective:    Vitals: Blood Pressure: 151/82 (03/06/23 0950)  Pulse: 85 (03/05/23 2128)  Temperature: 98 °F (36 7 °C) (03/06/23 0734)  Temp Source: Oral (03/05/23 2128)  Respirations: 18 (03/06/23 0734)  Weight - Scale: 72 kg (158 lb 11 7 oz) (03/06/23 0545)  SpO2: 95 % (03/06/23 0722)  Exam:   Physical Exam  Vitals and nursing note reviewed  Constitutional:       General: She is not in acute distress  Appearance: She is well-developed  Comments: No gross rhonchi, no rales   HENT:      Head: Normocephalic and atraumatic  Eyes:      Conjunctiva/sclera: Conjunctivae normal    Cardiovascular:      Rate and Rhythm: Normal rate and regular rhythm  Heart sounds: No murmur heard  Pulmonary:      Effort: Pulmonary effort is normal  No respiratory distress  Breath sounds: No wheezing or rales  Abdominal:      General: There is no distension  Palpations: Abdomen is soft  Tenderness: There is no abdominal tenderness  There is no guarding  Musculoskeletal:         General: No swelling  Cervical back: Neck supple  Skin:     General: Skin is warm and dry  Capillary Refill: Capillary refill takes less than 2 seconds  Neurological:      Mental Status: She is alert  Psychiatric:         Mood and Affect: Mood normal           Discussion with Family: pt discharged  Discharge instructions/Information to patient and family:   See after visit summary for information provided to patient and family  Provisions for Follow-Up Care:  See after visit summary for information related to follow-up care and any pertinent home health orders  Disposition:   Home    Planned Readmission: None    Discharge Medications:  See after visit summary for reconciled discharge medications provided to patient and/or family        **Please Note: This note may have been constructed using a voice recognition system**

## 2023-03-08 NOTE — ASSESSMENT & PLAN NOTE
· Sepsis Criteria: RR: 40, Temp: 35 8C, Source Left upper lobe PNA  · Initially required BiPAP, weaned off   · No longer meet criteria  · See CAP

## 2023-03-08 NOTE — ASSESSMENT & PLAN NOTE
· POA: Tachypnic to 40, SOB, productive cough, wheezing, AMS/agitation  · Required BIPAP in ED, weaned off to 5L NC, uses 3L NC prn at home  · En route required 2 DuoNebs and 125 mg Solumedrol  · CTA PE study showed patchy ground glass opacities in left and right upper lobes, suspicious for PNA, no PE, pulmonary artery hypertension, and cardiomegaly  · Hx of COPD  · Meet criteria for severe CAP, Drip Score 2  · At 3 L, baseline is 3 L    Plan:  · S/p abx for 5 days and prednisone   · Baseline 02  · No longer wheezing  · Dc on home meds

## 2023-03-09 PROBLEM — R00.0 TACHYCARDIA: Status: ACTIVE | Noted: 2023-03-09

## 2023-03-09 LAB
ANION GAP SERPL CALCULATED.3IONS-SCNC: 8 MMOL/L (ref 4–13)
ARTERIAL PATENCY WRIST A: YES
ATRIAL RATE: 109 BPM
ATRIAL RATE: 94 BPM
BASE EXCESS BLDA CALC-SCNC: 2.5 MMOL/L
BUN SERPL-MCNC: 31 MG/DL (ref 5–25)
CALCIUM SERPL-MCNC: 8.3 MG/DL (ref 8.4–10.2)
CHLORIDE SERPL-SCNC: 98 MMOL/L (ref 96–108)
CO2 SERPL-SCNC: 26 MMOL/L (ref 21–32)
CREAT SERPL-MCNC: 0.85 MG/DL (ref 0.6–1.3)
ERYTHROCYTE [DISTWIDTH] IN BLOOD BY AUTOMATED COUNT: 15.5 % (ref 11.6–15.1)
GFR SERPL CREATININE-BSD FRML MDRD: 64 ML/MIN/1.73SQ M
GLUCOSE SERPL-MCNC: 212 MG/DL (ref 65–140)
GLUCOSE SERPL-MCNC: 223 MG/DL (ref 65–140)
GLUCOSE SERPL-MCNC: 227 MG/DL (ref 65–140)
GLUCOSE SERPL-MCNC: 249 MG/DL (ref 65–140)
GLUCOSE SERPL-MCNC: 309 MG/DL (ref 65–140)
HCO3 BLDA-SCNC: 26.7 MMOL/L (ref 22–28)
HCT VFR BLD AUTO: 36.5 % (ref 34.8–46.1)
HGB BLD-MCNC: 11.5 G/DL (ref 11.5–15.4)
MCH RBC QN AUTO: 26.4 PG (ref 26.8–34.3)
MCHC RBC AUTO-ENTMCNC: 31.5 G/DL (ref 31.4–37.4)
MCV RBC AUTO: 84 FL (ref 82–98)
NASAL CANNULA: 3
O2 CT BLDA-SCNC: 17.8 ML/DL (ref 16–23)
OXYHGB MFR BLDA: 95.8 % (ref 94–97)
P AXIS: 81 DEGREES
P AXIS: 81 DEGREES
PCO2 BLDA: 39.6 MM HG (ref 36–44)
PH BLDA: 7.45 [PH] (ref 7.35–7.45)
PLATELET # BLD AUTO: 208 THOUSANDS/UL (ref 149–390)
PMV BLD AUTO: 9.4 FL (ref 8.9–12.7)
PO2 BLDA: 79.2 MM HG (ref 75–129)
POTASSIUM SERPL-SCNC: 4.3 MMOL/L (ref 3.5–5.3)
PR INTERVAL: 136 MS
PR INTERVAL: 142 MS
QRS AXIS: 230 DEGREES
QRS AXIS: 247 DEGREES
QRSD INTERVAL: 126 MS
QRSD INTERVAL: 132 MS
QT INTERVAL: 362 MS
QT INTERVAL: 384 MS
QTC INTERVAL: 480 MS
QTC INTERVAL: 487 MS
RBC # BLD AUTO: 4.35 MILLION/UL (ref 3.81–5.12)
SODIUM SERPL-SCNC: 132 MMOL/L (ref 135–147)
SPECIMEN SOURCE: NORMAL
T WAVE AXIS: 51 DEGREES
T WAVE AXIS: 67 DEGREES
VENTRICULAR RATE: 109 BPM
VENTRICULAR RATE: 94 BPM
WBC # BLD AUTO: 9.77 THOUSAND/UL (ref 4.31–10.16)

## 2023-03-09 RX ORDER — BUDESONIDE 0.5 MG/2ML
0.5 INHALANT ORAL
Status: DISCONTINUED | OUTPATIENT
Start: 2023-03-09 | End: 2023-03-10 | Stop reason: HOSPADM

## 2023-03-09 RX ORDER — ASCORBIC ACID 500 MG
1000 TABLET ORAL 2 TIMES DAILY
Status: DISCONTINUED | OUTPATIENT
Start: 2023-03-09 | End: 2023-03-10 | Stop reason: HOSPADM

## 2023-03-09 RX ORDER — DILTIAZEM HYDROCHLORIDE 180 MG/1
180 CAPSULE, COATED, EXTENDED RELEASE ORAL DAILY
Status: DISCONTINUED | OUTPATIENT
Start: 2023-03-09 | End: 2023-03-10 | Stop reason: HOSPADM

## 2023-03-09 RX ORDER — BRIMONIDINE TARTRATE 2 MG/ML
1 SOLUTION/ DROPS OPHTHALMIC 2 TIMES DAILY
Status: DISCONTINUED | OUTPATIENT
Start: 2023-03-09 | End: 2023-03-10 | Stop reason: HOSPADM

## 2023-03-09 RX ORDER — FORMOTEROL FUMARATE 20 UG/2ML
20 SOLUTION RESPIRATORY (INHALATION)
Status: DISCONTINUED | OUTPATIENT
Start: 2023-03-09 | End: 2023-03-10 | Stop reason: HOSPADM

## 2023-03-09 RX ORDER — ENOXAPARIN SODIUM 100 MG/ML
40 INJECTION SUBCUTANEOUS DAILY
Status: DISCONTINUED | OUTPATIENT
Start: 2023-03-09 | End: 2023-03-10 | Stop reason: HOSPADM

## 2023-03-09 RX ORDER — INSULIN LISPRO 100 [IU]/ML
1-5 INJECTION, SOLUTION INTRAVENOUS; SUBCUTANEOUS
Status: DISCONTINUED | OUTPATIENT
Start: 2023-03-09 | End: 2023-03-10 | Stop reason: HOSPADM

## 2023-03-09 RX ORDER — METHYLPREDNISOLONE SODIUM SUCCINATE 40 MG/ML
40 INJECTION, POWDER, LYOPHILIZED, FOR SOLUTION INTRAMUSCULAR; INTRAVENOUS EVERY 8 HOURS
Status: DISCONTINUED | OUTPATIENT
Start: 2023-03-09 | End: 2023-03-09

## 2023-03-09 RX ORDER — METHOCARBAMOL 500 MG/1
500 TABLET, FILM COATED ORAL EVERY 6 HOURS PRN
Status: DISCONTINUED | OUTPATIENT
Start: 2023-03-09 | End: 2023-03-10 | Stop reason: HOSPADM

## 2023-03-09 RX ORDER — OXYCODONE HCL 10 MG/1
10 TABLET, FILM COATED, EXTENDED RELEASE ORAL EVERY 12 HOURS PRN
Status: DISCONTINUED | OUTPATIENT
Start: 2023-03-09 | End: 2023-03-10 | Stop reason: HOSPADM

## 2023-03-09 RX ORDER — METHYLPREDNISOLONE SODIUM SUCCINATE 40 MG/ML
40 INJECTION, POWDER, LYOPHILIZED, FOR SOLUTION INTRAMUSCULAR; INTRAVENOUS EVERY 12 HOURS SCHEDULED
Status: DISCONTINUED | OUTPATIENT
Start: 2023-03-09 | End: 2023-03-10 | Stop reason: HOSPADM

## 2023-03-09 RX ORDER — ROPINIROLE 0.25 MG/1
0.5 TABLET, FILM COATED ORAL
Status: DISCONTINUED | OUTPATIENT
Start: 2023-03-09 | End: 2023-03-10 | Stop reason: HOSPADM

## 2023-03-09 RX ORDER — LEVALBUTEROL INHALATION SOLUTION 1.25 MG/3ML
1.25 SOLUTION RESPIRATORY (INHALATION)
Status: DISCONTINUED | OUTPATIENT
Start: 2023-03-10 | End: 2023-03-10 | Stop reason: HOSPADM

## 2023-03-09 RX ORDER — PANTOPRAZOLE SODIUM 40 MG/1
40 TABLET, DELAYED RELEASE ORAL
Status: DISCONTINUED | OUTPATIENT
Start: 2023-03-09 | End: 2023-03-10 | Stop reason: HOSPADM

## 2023-03-09 RX ORDER — ATORVASTATIN CALCIUM 40 MG/1
40 TABLET, FILM COATED ORAL
Status: DISCONTINUED | OUTPATIENT
Start: 2023-03-09 | End: 2023-03-10 | Stop reason: HOSPADM

## 2023-03-09 RX ORDER — GABAPENTIN 300 MG/1
300 CAPSULE ORAL 4 TIMES DAILY
Status: DISCONTINUED | OUTPATIENT
Start: 2023-03-09 | End: 2023-03-10 | Stop reason: HOSPADM

## 2023-03-09 RX ORDER — GUAIFENESIN 600 MG/1
1200 TABLET, EXTENDED RELEASE ORAL 2 TIMES DAILY
Status: DISCONTINUED | OUTPATIENT
Start: 2023-03-09 | End: 2023-03-10 | Stop reason: HOSPADM

## 2023-03-09 RX ORDER — LOSARTAN POTASSIUM 50 MG/1
50 TABLET ORAL DAILY
Status: DISCONTINUED | OUTPATIENT
Start: 2023-03-09 | End: 2023-03-10 | Stop reason: HOSPADM

## 2023-03-09 RX ORDER — INSULIN GLARGINE 100 [IU]/ML
5 INJECTION, SOLUTION SUBCUTANEOUS EVERY MORNING
Status: DISCONTINUED | OUTPATIENT
Start: 2023-03-09 | End: 2023-03-10 | Stop reason: HOSPADM

## 2023-03-09 RX ADMIN — OXYCODONE HYDROCHLORIDE AND ACETAMINOPHEN 1000 MG: 500 TABLET ORAL at 17:25

## 2023-03-09 RX ADMIN — INSULIN LISPRO 3 UNITS: 100 INJECTION, SOLUTION INTRAVENOUS; SUBCUTANEOUS at 12:52

## 2023-03-09 RX ADMIN — LEVALBUTEROL HYDROCHLORIDE 1.25 MG: 1.25 SOLUTION RESPIRATORY (INHALATION) at 13:11

## 2023-03-09 RX ADMIN — OXYCODONE HYDROCHLORIDE AND ACETAMINOPHEN 1000 MG: 500 TABLET ORAL at 08:55

## 2023-03-09 RX ADMIN — ENOXAPARIN SODIUM 40 MG: 40 INJECTION SUBCUTANEOUS at 08:56

## 2023-03-09 RX ADMIN — INSULIN GLARGINE 5 UNITS: 100 INJECTION, SOLUTION SUBCUTANEOUS at 08:56

## 2023-03-09 RX ADMIN — PANTOPRAZOLE SODIUM 40 MG: 40 TABLET, DELAYED RELEASE ORAL at 06:04

## 2023-03-09 RX ADMIN — FORMOTEROL FUMARATE DIHYDRATE 20 MCG: 20 SOLUTION RESPIRATORY (INHALATION) at 19:39

## 2023-03-09 RX ADMIN — GABAPENTIN 300 MG: 300 CAPSULE ORAL at 08:56

## 2023-03-09 RX ADMIN — ATORVASTATIN CALCIUM 40 MG: 40 TABLET, FILM COATED ORAL at 17:30

## 2023-03-09 RX ADMIN — IPRATROPIUM BROMIDE 0.5 MG: 0.5 SOLUTION RESPIRATORY (INHALATION) at 19:39

## 2023-03-09 RX ADMIN — GUAIFENESIN 1200 MG: 600 TABLET ORAL at 08:55

## 2023-03-09 RX ADMIN — DILTIAZEM HYDROCHLORIDE 180 MG: 180 CAPSULE, COATED, EXTENDED RELEASE ORAL at 00:57

## 2023-03-09 RX ADMIN — DILTIAZEM HYDROCHLORIDE 180 MG: 180 CAPSULE, COATED, EXTENDED RELEASE ORAL at 21:25

## 2023-03-09 RX ADMIN — METHYLPREDNISOLONE SODIUM SUCCINATE 40 MG: 40 INJECTION, POWDER, FOR SOLUTION INTRAMUSCULAR; INTRAVENOUS at 21:32

## 2023-03-09 RX ADMIN — OXYCODONE HYDROCHLORIDE 10 MG: 10 TABLET, FILM COATED, EXTENDED RELEASE ORAL at 21:26

## 2023-03-09 RX ADMIN — BUDESONIDE 0.5 MG: 0.5 INHALANT ORAL at 19:39

## 2023-03-09 RX ADMIN — ROPINIROLE 0.5 MG: 0.25 TABLET, FILM COATED ORAL at 21:33

## 2023-03-09 RX ADMIN — GABAPENTIN 300 MG: 300 CAPSULE ORAL at 21:25

## 2023-03-09 RX ADMIN — INSULIN LISPRO 2 UNITS: 100 INJECTION, SOLUTION INTRAVENOUS; SUBCUTANEOUS at 17:29

## 2023-03-09 RX ADMIN — INSULIN LISPRO 2 UNITS: 100 INJECTION, SOLUTION INTRAVENOUS; SUBCUTANEOUS at 08:56

## 2023-03-09 RX ADMIN — IPRATROPIUM BROMIDE 0.5 MG: 0.5 SOLUTION RESPIRATORY (INHALATION) at 07:20

## 2023-03-09 RX ADMIN — BUDESONIDE 0.5 MG: 0.5 INHALANT ORAL at 13:27

## 2023-03-09 RX ADMIN — BRIMONIDINE TARTRATE 1 DROP: 2 SOLUTION/ DROPS OPHTHALMIC at 10:05

## 2023-03-09 RX ADMIN — ROPINIROLE 0.5 MG: 0.25 TABLET, FILM COATED ORAL at 00:56

## 2023-03-09 RX ADMIN — LOSARTAN POTASSIUM 50 MG: 50 TABLET, FILM COATED ORAL at 08:56

## 2023-03-09 RX ADMIN — METHYLPREDNISOLONE SODIUM SUCCINATE 40 MG: 40 INJECTION, POWDER, FOR SOLUTION INTRAMUSCULAR; INTRAVENOUS at 05:28

## 2023-03-09 RX ADMIN — IPRATROPIUM BROMIDE 0.5 MG: 0.5 SOLUTION RESPIRATORY (INHALATION) at 13:11

## 2023-03-09 RX ADMIN — BRIMONIDINE TARTRATE 1 DROP: 2 SOLUTION/ DROPS OPHTHALMIC at 21:30

## 2023-03-09 RX ADMIN — GUAIFENESIN 1200 MG: 600 TABLET ORAL at 17:25

## 2023-03-09 RX ADMIN — LEVALBUTEROL HYDROCHLORIDE 1.25 MG: 1.25 SOLUTION RESPIRATORY (INHALATION) at 07:20

## 2023-03-09 RX ADMIN — FORMOTEROL FUMARATE DIHYDRATE 20 MCG: 20 SOLUTION RESPIRATORY (INHALATION) at 13:27

## 2023-03-09 RX ADMIN — GABAPENTIN 300 MG: 300 CAPSULE ORAL at 17:25

## 2023-03-09 NOTE — H&P
[facility]  [facility] 1940, 80 [facility]  [facility] MRN: [id number]  Unit/Bed#: W -01 Encounter: [id number]  Primary Care Provider: Zane Duverney, DO   Date and time admitted to hospital: 3/8/2023  5:33 PM    * COPD exacerbation [facility])  Assessment & Plan  Patient reports progressively worsening dyspnea starting 1 day ago with increased respiratory rate into the 40s  She also complains of yellow sputum production  Patient has baseline of 3 L O2 at home but was requiring up to 6 L on arrival   She was recently discharged on 3/6/2023 for pneumonia for which she completed course of ceftriaxone, azithromycin, and prednisone  She reported feeling better and had no shortness of breath on prior discharge  She received Xopenex and Atrovent along with Solu-Medrol in the emergency department  Patient is alert and oriented x3 but is mildly confused and does not answer all questions appropriately  CT PE: No acute pulmonary embolus identified  Mild scattered tree-in-bud nodules in the posterior right upper lobe are nonspecific but may be infectious or inflammatory and is similar to CT from 9/25/2022  Patchy consolidation left upper lobe has significantly improved  Enlargement of main pulmonary artery suggestive of pulmonary artery hypertension  Likely COPD exacerbation, less likely recurrent pneumonia procalcitonin is negative and received recent antibiotics  Plan:  · Xopenex/Atrovent 3 times daily  · Solu-Medrol 40 mg 3 times daily  · Titrate oxygen as needed  · Mucinex  · Follow-up sputum culture  · Follow-up ABG        Tachycardia  Assessment & Plan  Patient was tachycardic from 110-130  Patient denies any chest pain  CT PE showed no signs of pulmonary embolism  Patient states she has not taken her oral antihypertensive medications this morning  Patient is also received nebulizer treatments which may be contributing to her tachycardia      Plan:  · EKG  · Will give Cardizem this evening  · Continue losartan in the morning    Chronic back pain  Assessment & Plan  Chronic back pain with degenerative disc disease and multiple spinal surgeries  Plan:  · Continue oxycodone 10 mg every 12 hours  · Continue gabapentin 300 mg 4 times a day    Type II diabetes mellitus (HCC)  Assessment & Plan  Lab Results   Component Value Date    HGBA1C 7 0 (H) 03/02/2023       Recent Labs     03/06/23  0147 03/06/23  0735 03/06/23  1111   POCGLU 168* 334* 161*     Home medications include metformin 1000 mg in the morning and 500 mg in the evening  Plan:  · Hold home antihyperglycemic  · sliding scale insulin  · Glucose checks with meals and before bedtime  · Hypoglycemia protocol  · 5 units Lantus a m  given steroid use      GERD (gastroesophageal reflux disease)  Assessment & Plan  Continue pantoprazole    Hypertension  Assessment & Plan  Patient with history of hypertension taking Cardizem 180 mg and losartan 50 mg daily  She reports not taking them day of admission  Plan:  · Continue Cardizem   · Continue losartan    VTE Pharmacologic Prophylaxis: VTE Score: 5 High Risk (Score >/= 5) - Pharmacological DVT Prophylaxis Ordered: enoxaparin (Lovenox)  Sequential Compression Devices Ordered  Code Status: Level 3 - DNAR and DNI patient  Discussion with family: Attempted to update  (son) via phone  Unable to contact  Anticipated Length of Stay: Patient will be admitted on an inpatient basis with an anticipated length of stay of greater than 2 midnights secondary to COPD exacerbation  Chief Complaint: Shortness of breath    History of Present Illness:  Jaylen Dixon is a 80 y o  female with a PMH of COPD on 3 L NC, hypertension, diabetes, chronic back pain who presents with worsening shortness of breath and productive cough of yellow sputum    Patient reports her symptoms started 1 day ago and have progressively worsened prompting her to present to the emergency department for evaluation  She was recently discharged on 3/6/2023 after hospitalization for pneumonia where she was treated with ceftriaxone and azithromycin  She reports feeling fine on day of discharge and her dyspnea worsened since then  She reports not using home albuterol inhaler prior to coming to emergency department  Patient did answer my questions mostly appropriately but did seem mildly confused  She was able to answer all orientation questions appropriately  At the time of my evaluation she was no longer complaining of shortness of breath  She denied any chest pain, abdominal pain, palpitations, headache, fever, chills, lightheadedness, or dizziness  In the emergency department she was initially requiring 6 L nasal cannula and was tachypneic into the 40's  She was also tachycardic from 110-130  She received albuterol and Atrovent as well as Solu-Medrol with improvement in her shortness of breath  CT PE showed no signs of pulmonary embolus  Review of Systems:  Review of Systems   Constitutional: Negative for activity change, chills and fever  HENT: Negative for congestion and sore throat  Eyes: Negative for visual disturbance  Respiratory: Positive for cough, shortness of breath and wheezing  Cardiovascular: Negative for chest pain and leg swelling  Gastrointestinal: Negative for abdominal distention, abdominal pain, nausea and vomiting  Genitourinary: Negative for difficulty urinating and dysuria  Musculoskeletal: Positive for back pain  Skin: Negative  Neurological: Negative for dizziness and light-headedness  Hematological: Negative  Psychiatric/Behavioral: Negative          Past Medical and Surgical History:   Past Medical History:   Diagnosis Date   • Arthritis    • Cardiac disease    • COPD (chronic obstructive pulmonary disease) ([place] [address] )    • Diabetes mellitus ([place] [address] )    • GERD (gastroesophageal reflux disease)    • Hiatal hernia    • Hypertension    • PUD (peptic ulcer disease)    • Residual ASD (atrial septal defect) following repair        Past Surgical History:   Procedure Laterality Date   • ABDOMINAL ADHESION SURGERY N/A 9/26/2022    Procedure: LYSIS ADHESIONS;  Surgeon: Isabel Sevilla MD;  Location: [facility];  Service: Thoracic   • ASD REPAIR     • BACK SURGERY      x3   • CARDIAC SURGERY     • ESOPHAGOGASTRODUODENOSCOPY N/A 9/26/2022    Procedure: ESOPHAGOGASTRODUODENOSCOPY (EGD); Surgeon: Isabel Sevilla MD;  Location: [facility];  Service: Thoracic   • JOINT REPLACEMENT      bilateral knee surgery   • JOINT REPLACEMENT     • KNEE SURGERY     • PARAESOPHAGEAL HERNIA REPAIR N/A 9/26/2022    Procedure: REPAIR HERNIA PARAESOPHAGEAL LAPAROSCOPIC W ROBOTICS, gastropexy, mesh placement;  Surgeon: Isabel Sevilla MD;  Location: [facility];  Service: Thoracic   • SHOULDER SURGERY         Meds/Allergies:  Prior to Admission medications    Medication Sig Start Date End Date Taking?  Authorizing Provider   albuterol (PROVENTIL HFA,VENTOLIN HFA) 90 mcg/act inhaler Inhale 2 puffs every 6 (six) hours as needed for wheezing 9/20/21  Yes Arlen Grant, DO   albuterol (PROVENTIL HFA,VENTOLIN HFA) 90 mcg/act inhaler Inhale 2 puffs every 6 (six) hours as needed for wheezing   Yes Historical Provider, MD   ascorbic acid (VITAMIN C) 1000 MG tablet Take 1,000 mg by mouth 2 (two) times a day   Yes Historical Provider, MD   Ascorbic Acid (vitamin C) 1000 MG tablet Take 1,000 mg by mouth 2 (two) times a day   Yes Historical Provider, MD   atorvastatin (LIPITOR) 40 mg tablet Take 40 mg by mouth daily   Yes Historical Provider, MD   brimonidine tartrate 0 2 % ophthalmic solution Administer 1 drop into the left eye 2 (two) times a day 4/22/22  Yes Nancy Carreon,    brimonidine tartrate 0 2 % ophthalmic solution Administer 1 drop into the left eye 2 (two) times a day   Yes Historical Provider, MD   Budeson-Glycopyrrol-Formoterol [facility]) 160-9-4 8 MCG/ACT AERO Inhale 2 puffs  in the morning and 2 puffs before bedtime  Rinse mouth after use    5/16/22  Yes Arlen Grant,    diltiazem (CARDIZEM CD) 180 mg 24 hr capsule Take 180 mg by mouth daily   Yes Historical Provider, MD   gabapentin (NEURONTIN) 300 mg capsule Take 300 mg by mouth 4 (four) times a day 3/23/20  Yes Historical Provider, MD   gabapentin (NEURONTIN) 300 mg capsule Take 300 mg by mouth 4 (four) times a day   Yes Historical Provider, MD   losartan (COZAAR) 50 mg tablet TAKE 1 TABLET BY MOUTH EVERY DAY 7/3/21  Yes Historical Provider, MD   metFORMIN (GLUCOPHAGE) 1000 MG tablet 1000 mg QAM and 500 mg QPM 4/13/20  Yes ROMANA Duran   methocarbamol (ROBAXIN) 500 mg tablet TAKE 1 TABLET BY MOUTH THREE TIMES A DAY AS NEEDED FOR MUSCLE SPASM 12/1/22  Yes Historical Provider, MD   aspirin 81 mg chewable tablet Chew 1 tablet (81 mg total) daily  Patient not taking: Reported on 3/8/2023 4/23/22   Usha Miller DO   aspirin 81 mg chewable tablet Chew 81 mg daily  Patient not taking: Reported on 3/8/2023    Historical Provider, MD   atorvastatin (LIPITOR) 40 mg tablet Take 1 tablet (40 mg total) by mouth every evening 4/22/22 12/30/22  Usha Miller DO   Budeson-Glycopyrrol-Formoterol 160-9-4 8 MCG/ACT AERO Inhale 2 puffs Rinse mouth after use      Historical Provider, MD   diltiazem (CARDIZEM CD) 180 mg 24 hr capsule Take 180 mg by mouth daily    Historical Provider, MD   guaiFENesin 1200 MG TB12 Take 1 tablet (1,200 mg total) by mouth 2 (two) times a day for 15 days 3/6/23 3/21/23  Kaleigh Becker MD   losartan (COZAAR) 50 mg tablet Take 50 mg by mouth daily    Historical Provider, MD   metFORMIN (GLUCOPHAGE) 500 mg tablet TAKE 2 TABS BY MOUTH IN IN THE MORNING AND 1 TAB BY MOUTH IN IN THE EVENING 12/11/22   Historical Provider, MD   metFORMIN (GLUCOPHAGE) 500 mg tablet Take 1,000 mg by mouth daily with breakfast    Historical Provider, MD   metFORMIN (GLUCOPHAGE) 500 mg tablet Take 500 mg by mouth every evening    Historical Provider, MD   methocarbamol (ROBAXIN) 500 mg tablet Take 500 mg by mouth 4 (four) times a day    Historical Provider, MD   oxyCODONE (OxyCONTIN) 10 mg 12 hr tablet Take 13 5 mg by mouth every 12 (twelve) hours    Historical Provider, MD   oxyCODONE ER (Xtampza ER) 13 5 MG C12A Take 13 5 mg by mouth 2 (two) times a day Max Daily Amount: 27 mg 5/16/22   Arlen Grant DO   pantoprazole (PROTONIX) 40 mg tablet Take 40 mg by mouth every 12 (twelve) hours    Historical Provider, MD   pantoprazole (PROTONIX) 40 mg tablet Take 40 mg by mouth daily    Historical Provider, MD   rOPINIRole (REQUIP) 0 5 mg tablet 2-3 tablets PO at HS 4/13/20   ROMANA Kearns   rOPINIRole (REQUIP) 0 5 mg tablet Take 0 5 mg by mouth daily at bedtime Take 3 tablets PO before bed    Historical Provider, MD   sitaGLIPtin (JANUVIA) 100 mg tablet Take 1 tablet (100 mg total) by mouth daily 4/13/20   ROMANA Kearns   sitaGLIPtin (JANUVIA) 100 mg tablet Take 100 mg by mouth daily    Historical Provider, MD CARBAJAL have reviewed home medications using recent Epic encounter  Allergies: Allergies   Allergen Reactions   • Metamucil [Fiber] Lightheadedness   • Prednisone      The patient reports "they make me goofy "  No true allergic reaction   • Prednisone Lightheadedness   • Psyllium    • Tetanus Toxoids        Social History:  Marital Status:     Occupation:   Patient Pre-hospital Living Situation: Home  Patient Pre-hospital Level of Mobility: walks with walker  Patient Pre-hospital Diet Restrictions: None  Substance Use History:   Social History     Substance and Sexual Activity   Alcohol Use Yes   • Alcohol/week: 1 0 standard drink   • Types: 1 Glasses of wine per week     Social History     Tobacco Use   Smoking Status Former   • Packs/day: 1 00   • Years: 60 00   • Pack years: 60 00   • Types: Cigarettes   • Start date: 26   • Quit date: 2/1/2020   • Years since quitting: 3 1 Smokeless Tobacco Never   Tobacco Comments    stopped smoking 2 days ago     Social History     Substance and Sexual Activity   Drug Use Never       Family History:  Family History   Problem Relation Age of Onset   • Cancer Mother    • Asthma Father        Physical Exam:     Vitals:   Blood Pressure: 120/71 (03/08/23 2355)  Pulse: (!) 123 (03/09/23 0053)  Temperature: 97 6 °F (36 4 °C) (03/08/23 2355)  Temp Source: Oral (03/08/23 1743)  Respirations: 20 (03/09/23 0053)  Weight - Scale: 74 kg (163 lb 2 3 oz) (03/09/23 0001)  SpO2: 98 % (03/09/23 0053)    Physical Exam  Constitutional:       General: She is awake  Appearance: Normal appearance  Interventions: Nasal cannula in place  Comments: Not in acute distress  HENT:      Head: Normocephalic and atraumatic  Nose: No congestion or rhinorrhea  Mouth/Throat:      Mouth: Mucous membranes are dry  Eyes:      General: No scleral icterus  Pupils: Pupils are equal, round, and reactive to light  Cardiovascular:      Rate and Rhythm: Regular rhythm  Tachycardia present  Pulses: Normal pulses  Heart sounds: Normal heart sounds  No murmur heard  No friction rub  No gallop  Pulmonary:      Effort: Tachypnea present  Breath sounds: Wheezing present  Abdominal:      Palpations: Abdomen is soft  Tenderness: There is no abdominal tenderness  Musculoskeletal:      Cervical back: Normal range of motion  Right lower leg: No edema  Left lower leg: No edema  Lymphadenopathy:      Cervical: No cervical adenopathy  Skin:     General: Skin is warm and dry  Neurological:      General: No focal deficit present  Mental Status: She is alert  Comments: Oriented to person, place, and time but appears mildly confused  Psychiatric:         Mood and Affect: Mood is anxious  Behavior: Behavior is cooperative            Additional Data:     Lab Results:  Results from last 7 days   Lab Units 03/08/23  1801   WBC Thousand/uL 13 07*   HEMOGLOBIN g/dL 12 9   HEMATOCRIT % 42 0   PLATELETS Thousands/uL 244   NEUTROS PCT % 85*   LYMPHS PCT % 6*   MONOS PCT % 6   EOS PCT % 1     Results from last 7 days   Lab Units 03/08/23  1801   SODIUM mmol/L 133*   POTASSIUM mmol/L 4 5   CHLORIDE mmol/L 97   CO2 mmol/L 30   BUN mg/dL 28*   CREATININE mg/dL 0 91   ANION GAP mmol/L 6   CALCIUM mg/dL 9 0   ALBUMIN g/dL 3 8   TOTAL BILIRUBIN mg/dL 0 61   ALK PHOS U/L 74   ALT U/L 18   AST U/L 15   GLUCOSE RANDOM mg/dL 148*     Results from last 7 days   Lab Units 03/08/23  1801   INR  0 91     Results from last 7 days   Lab Units 03/06/23  1111 03/06/23  0735 03/06/23  0147 03/05/23  2129 03/05/23  1618 03/05/23  1501 03/05/23  1059 03/05/23  0719 03/04/23  2020 03/04/23  1549 03/04/23  1107 03/04/23  0737   POC GLUCOSE mg/dl 161* 334* 168* 370* 261* 237* 107 337* 364* 246* 288* 164*     Results from last 7 days   Lab Units 03/02/23  0509   HEMOGLOBIN A1C % 7 0*     Results from last 7 days   Lab Units 03/08/23  1801 03/02/23  0509   LACTIC ACID mmol/L 1 5  --    PROCALCITONIN ng/ml <0 05 0 17       Lines/Drains:  Invasive Devices     Peripheral Intravenous Line  Duration           Peripheral IV 09/29/22 Distal;Right;Upper;Ventral (anterior) Arm 160 days    Peripheral IV 03/08/23 Left;Ventral (anterior) Forearm <1 day                    Imaging: Reviewed radiology reports from this admission including: CT PE and chest xray  CTA ED chest PE Study   Final Result by Lamar Leone DO (03/08 2130)      No acute pulmonary embolus identified  Mild scattered tree-in-bud nodules in the posterior right upper lobe are nonspecific, but similar dating back to CT of 9/25/2022, and may be infectious or inflammatory in nature  Previously seen patchy consolidation in the left upper lobe has    significantly improved    Recommend 12 month follow-up noncontrast CT of the chest       Enlargement of the main pulmonary artery suggestive of pulmonary artery hypertension  The study was marked in [facility] for immediate notification  Workstation performed: [id number]         XR chest 1 view portable   ED Interpretation by Ariel Capellan MD (03/08 2024)   Lungs clear  Infiltrate previously viewed in left upper lung not apparent          EKG and Other Studies Reviewed on Admission:   · EKG: Sinus Tachycardia    ** Please Note: This note has been constructed using a voice recognition system   **

## 2023-03-09 NOTE — PLAN OF CARE
Problem: MOBILITY - ADULT  Goal: Maintain or return to baseline ADL function  Description: INTERVENTIONS:  -  Assess patient's ability to carry out ADLs; assess patient's baseline for ADL function and identify physical deficits which impact ability to perform ADLs (bathing, care of mouth/teeth, toileting, grooming, dressing, etc )  - Assess/evaluate cause of self-care deficits   - Assess range of motion  - Assess patient's mobility; develop plan if impaired  - Assess patient's need for assistive devices and provide as appropriate  - Encourage maximum independence but intervene and supervise when necessary  - Involve family in performance of ADLs  - Assess for home care needs following discharge   - Consider OT consult to assist with ADL evaluation and planning for discharge  - Provide patient education as appropriate  Outcome: Progressing  Goal: Maintains/Returns to pre admission functional level  Description: INTERVENTIONS:  - Perform BMAT or MOVE assessment daily    - Set and communicate daily mobility goal to care team and patient/family/caregiver  - Collaborate with rehabilitation services on mobility goals if consulted  - Perform Range of Motion  times a day  - Reposition patient every  hours    - Dangle patient  times a day  - Stand patient  times a day  - Ambulate patient  times a day  - Out of bed to chair  times a day   - Out of bed for marcos times a day  - Out of bed for toileting  - Record patient progress and toleration of activity level   Outcome: Progressing     Problem: Prexisting or High Potential for Compromised Skin Integrity  Goal: Skin integrity is maintained or improved  Description: INTERVENTIONS:  - Identify patients at risk for skin breakdown  - Assess and monitor skin integrity  - Assess and monitor nutrition and hydration status  - Monitor labs   - Assess for incontinence   - Turn and reposition patient  - Assist with mobility/ambulation  - Relieve pressure over bony prominences  - Avoid friction and shearing  - Provide appropriate hygiene as needed including keeping skin clean and dry  - Evaluate need for skin moisturizer/barrier cream  - Collaborate with interdisciplinary team   - Patient/family teaching  - Consider wound care consult   Outcome: Progressing

## 2023-03-09 NOTE — UTILIZATION REVIEW
Initial Clinical Review    Admission: Date/Time/Statement:   Admission Orders (From admission, onward)     Ordered        03/08/23 2253  [facility],[address]  Once                      Orders Placed This Encounter   Procedures   • INPATIENT ADMISSION     Standing Status:   Standing     Number of Occurrences:   1     Order Specific Question:   Level of Care     Answer:   Med Surg [16]     Order Specific Question:   Estimated length of stay     Answer:   More than 2 Midnights     Order Specific Question:   Certification     Answer:   I certify that inpatient services are medically necessary for this patient for a duration of greater than two midnights  See H&P and MD Progress Notes for additional information about the patient's course of treatment  ED Arrival Information     Expected   -    Arrival   3/8/2023 17:33    Acuity   Emergent            Means of arrival   Ambulance    Escorted by   [facility]    Service   Hospitalist    Admission type   Emergency            Arrival complaint   -           Chief Complaint   Patient presents with   • Shortness of Breath     Pt recently dx with penumonia  Pt to ED today with increased shortness of breath  Pt on 3L nasal cannula at baseline  Pt appears tachypnic during triage  Pt denies pain  Initial Presentation: 80 y o  female PMH of COPD on 3 L NC,HTN,  DM, chronic back pain, d/c from hospital 3/6/23 after dx with PNA who presents to ED from home via EMS with worsening shortness of breath and productive cough of yellow sputum that started 1 day ago   Pt did not use home Albuterol prior to coming to ED  On exam, pt answered all orientation questions appropriately but seemed mildly confused  Pt received Albuterol neb en route by EMS   On exam, pt tachypneic into 40's, requiring 6 L O2 , pt tachycardic  Wheezing on exam    Labs WBC 13 07, procal WNL   CTA chest neg for PE, shows improved patchy infiltrate TED   Is suggestive of pulm artery hypertension     Pt received Neb txs, IV Solumedrol in ED  Pt admitted as Inpatient with COPD exacerbation   PLan - Solumedrol Q8h IV, Xopenex/atrovent neb TID   Titrate supplemental O2 as able, Mucinex  F/U sputum cx and ABG   Give cardizem this evening as didn't take this am , continue Losartan in am tomorrow  Start  5 units Lantus a m  given steroid use, accucheks, SSI  Date:3/9   Day 2:    Pt with RLL rhonchi, no significant wheezing at this time   Pt on O2 @1 L with sats 94-96 % at rest   Per nsg, pt unable to stand independently   Plan - continue IV steroids, neb tx   CBC,  CMP in am  PT/OT evals placed today   Continues on home Cardizem and losartan    No tachycardia this am        ED Triage Vitals   Temperature Pulse Respirations Blood Pressure SpO2   03/08/23 1743 03/08/23 1737 03/08/23 1737 03/08/23 1737 03/08/23 1737   99 1 °F (37 3 °C) 103 (!) 58 145/66 91 %on 4 L O2      Temp Source Heart Rate Source Patient Position - Orthostatic VS BP Location FiO2 (%)   03/08/23 1743 -- -- 03/08/23 1737 --   Oral   Left arm       Pain Score       03/08/23 1737       No Pain          Wt Readings from Last 1 Encounters:   03/09/23 74 kg (163 lb 2 3 oz)     Additional Vital Signs:   Date/Time Temp Pulse Resp BP MAP (mmHg) SpO2 Calculated FIO2 (%) - Nasal Cannula Nasal Cannula O2 Flow Rate (L/min)   03/09/23 08:44:58 -- 88 16 129/98 108 94 % -- --   03/09/23 0723 -- -- -- -- -- 96 % 24 1 L/min   03/09/23 0053 -- 123 Abnormal  20 -- -- 98 % 32 3 L/min   03/08/23 23:55:08 97 6 °F (36 4 °C) 115 Abnormal  18 120/71 87 97 % -- --   03/08/23 2200 -- 133 Abnormal  40 Abnormal  157/75 108 97 % -- --   03/08/23 2123 -- -- -- -- -- -- 32 3 L/min   03/08/23 2110 -- 124 Abnormal  40 Abnormal  152/86 109 96 % -- --   03/08/23 1945 -- 121 Abnormal  42 Abnormal  176/75 Abnormal  108 95 % 36 4 L/min   03/08/23 1915 -- 112 Abnormal  -- 160/77 110 98 % -- --   03/08/23 1900 -- 110 Abnormal  40 Abnormal  164/76 109 98 % -- --   03/08/23 1815 -- 91 40 Abnormal  156/86 113 97 % -- --     Pertinent Labs/Diagnostic Test Results:   CTA ED chest PE Study   Final Result by Jac Tipton DO (03/08 2130)      No acute pulmonary embolus identified  Mild scattered tree-in-bud nodules in the posterior right upper lobe are nonspecific, but similar dating back to CT of 9/25/2022, and may be infectious or inflammatory in nature  Previously seen patchy consolidation in the left upper lobe has    significantly improved  Recommend 12 month follow-up noncontrast CT of the chest       Enlargement of the main pulmonary artery suggestive of pulmonary artery hypertension  The study was marked in [facility] for immediate notification  Workstation performed: [id number]         XR chest 1 view portable   ED Interpretation by Joyce Gomez MD (03/08 2024)   Lungs clear  Infiltrate previously viewed in left upper lung not apparent      Final Result by Christi Warren MD (03/09 1029)      Significant improvement in left upper lobe consolidation  See subsequent CT              Workstation performed: [id number]           Results from last 7 days   Lab Units 03/08/23  1823   SARS-COV-2  Negative     Results from last 7 days   Lab Units 03/09/23  0600 03/08/23  1801 03/04/23  0530 03/03/23  0525   WBC Thousand/uL 9 77 13 07* 8 65 14 59*   HEMOGLOBIN g/dL 11 5 12 9 11 0* 10 7*   HEMATOCRIT % 36 5 42 0 34 7* 34 2*   PLATELETS Thousands/uL 208 244 168 183   NEUTROS ABS Thousands/µL  --  11 12*  --   --          Results from last 7 days   Lab Units 03/09/23  0600 03/08/23  1801 03/05/23  0547 03/04/23  0530 03/03/23  0626   SODIUM mmol/L 132* 133* 132* 132* 129*   POTASSIUM mmol/L 4 3 4 5 4 5 4 9 4 4   CHLORIDE mmol/L 98 97 96 96 96   CO2 mmol/L 26 30 31 24 27   ANION GAP mmol/L 8 6 5 12 6   BUN mg/dL 31* 28* 30* 39* 47*   CREATININE mg/dL 0 85 0 91 0 94 1 02 1 27   EGFR ml/min/1 73sq m 64 58 56 51 39   CALCIUM mg/dL 8 3* 9 0 8 8 9 3 8 9     Results from last 7 days   Lab Units 03/08/23  1801   AST U/L 15   ALT U/L 18   ALK PHOS U/L 74   TOTAL PROTEIN g/dL 6 8   ALBUMIN g/dL 3 8   TOTAL BILIRUBIN mg/dL 0 61     Results from last 7 days   Lab Units 03/09/23  1151 03/09/23  0802 03/06/23  1111 03/06/23  0735 03/06/23  0147 03/05/23  2129 03/05/23  1618 03/05/23  1501 03/05/23  1059 03/05/23  0719 03/04/23  2020 03/04/23  1549   POC GLUCOSE mg/dl 309* 227* 161* 334* 168* 370* 261* 237* 107 337* 364* 246*     Results from last 7 days   Lab Units 03/09/23  0600 03/08/23  1801 03/05/23  0547 03/04/23  0530 03/03/23  0626   GLUCOSE RANDOM mg/dL 249* 148* 360* 123 194*             BETA-HYDROXYBUTYRATE   Date Value Ref Range Status   09/26/2022 0 8 (H) <0 6 mmol/L Final      Results from last 7 days   Lab Units 03/09/23  0042   PH ART  7 446   PCO2 ART mm Hg 39 6   PO2 ART mm Hg 79 2   HCO3 ART mmol/L 26 7   BASE EXC ART mmol/L 2 5   O2 CONTENT ART mL/dL 17 8   O2 HGB, ARTERIAL % 95 8   ABG SOURCE  Radial, Left     Results from last 7 days   Lab Units 03/08/23  1801   PH GAMALIEL  7 377   PCO2 GAMALIEL mm Hg 47 7   PO2 AGMALIEL mm Hg 43 8   HCO3 GAMALIEL mmol/L 27 4   BASE EXC GAMALIEL mmol/L 1 6   O2 CONTENT GAMALIEL ml/dL 15 3   O2 HGB, VENOUS % 77 6             Results from last 7 days   Lab Units 03/08/23  2243 03/08/23  2104 03/08/23  1801   HS TNI 0HR ng/L  --   --  16   HS TNI 2HR ng/L  --  7  --    HSTNI D2 ng/L  --  -9  --    HS TNI 4HR ng/L 13  --   --    HSTNI D4 ng/L -3  --   --      Results from last 7 days   Lab Units 03/08/23  1801   D-DIMER QUANTITATIVE ug/ml FEU 1 31*     Results from last 7 days   Lab Units 03/08/23  1801   PROTIME seconds 12 5   INR  0 91   PTT seconds 23         Results from last 7 days   Lab Units 03/08/23  1801   PROCALCITONIN ng/ml <0 05     Results from last 7 days   Lab Units 03/08/23  1801   LACTIC ACID mmol/L 1 5             Results from last 7 days   Lab Units 03/08/23  1801   BNP pg/mL 34                                 Results from last 7 days   Lab Units 03/08/23  1823 INFLUENZA A PCR  Negative   INFLUENZA B PCR  Negative   RSV PCR  Negative                             Results from last 7 days   Lab Units 03/08/23  1812 03/08/23  1801   BLOOD CULTURE  Received in Microbiology Lab  Culture in Progress  Received in Microbiology Lab  Culture in Progress                 ED Treatment:   Medication Administration from 03/08/2023 1733 to 03/08/2023 2343       Date/Time Order Dose Route Action     03/08/2023 1742 EST albuterol (FOR EMS ONLY) (2 5 mg/3 mL) 0 083 % inhalation solution 2 5 mg 0 mg Does not apply Given to EMS     03/08/2023 1750 EST albuterol inhalation solution 5 mg 5 mg Nebulization Given     03/08/2023 1750 EST ipratropium (ATROVENT) 0 02 % inhalation solution 0 5 mg 0 5 mg Nebulization Given     03/08/2023 1804 EST metoclopramide (REGLAN) injection 5 mg 5 mg Intravenous Given     03/08/2023 2102 EST methylPREDNISolone sodium succinate (Solu-MEDROL) injection 40 mg 40 mg Intravenous Given     03/08/2023 2115 EST ceftriaxone (ROCEPHIN) 1 g/50 mL in dextrose IVPB 0 mg Intravenous Stopped     03/08/2023 2102 EST ceftriaxone (ROCEPHIN) 1 g/50 mL in dextrose IVPB 1,000 mg Intravenous New Bag     03/08/2023 2123 EST ipratropium (ATROVENT) 0 02 % inhalation solution 0 5 mg 0 5 mg Nebulization Given     03/08/2023 2123 EST sodium chloride 0 9 % inhalation solution 12 mL 12 mL Nebulization Given     03/08/2023 2122 EST albuterol inhalation solution 10 mg 10 mg Nebulization Given     03/08/2023 2042 EST iohexol (OMNIPAQUE) 350 MG/ML injection (MULTI-DOSE) 85 mL 85 mL Intravenous Given        Past Medical History:   Diagnosis Date   • Arthritis    • Cardiac disease    • COPD (chronic obstructive pulmonary disease) ([facility] )    • Diabetes mellitus ([facility] )    • GERD (gastroesophageal reflux disease)    • Hiatal hernia    • Hypertension    • PUD (peptic ulcer disease)    • Residual ASD (atrial septal defect) following repair      Present on Admission:  • Type II diabetes mellitus ([facility] )  • GERD (gastroesophageal reflux disease)  • Hypertension  • COPD exacerbation (HCC)  • Chronic back pain      Admitting Diagnosis: SOB (shortness of breath) [R06 02]  COPD with acute exacerbation (HCC) [J44 1]  Respiratory failure, acute-on-chronic (HCC) [J96 20]  Hx of bacterial pneumonia [Z87 01]  Age/Sex: 80 y o  female  Admission Orders:  Scheduled Medications:  ascorbic acid, 1,000 mg, Oral, BID  atorvastatin, 40 mg, Oral, Daily With Dinner  brimonidine tartrate, 1 drop, Left Eye, BID  budesonide, 0 5 mg, Nebulization, Q12H  diltiazem, 180 mg, Oral, Daily  enoxaparin, 40 mg, Subcutaneous, Daily  formoterol, 20 mcg, Nebulization, Q12H  gabapentin, 300 mg, Oral, 4x Daily  guaiFENesin, 1,200 mg, Oral, BID  insulin glargine, 5 Units, Subcutaneous, QAM  insulin lispro, 1-5 Units, Subcutaneous, TID AC  ipratropium, 0 5 mg, Nebulization, TID  [START ON 3/10/2023] levalbuterol, 1 25 mg, Nebulization, TID-received x 1 3/9 am  losartan, 50 mg, Oral, Daily  methylPREDNISolone sodium succinate, 40 mg, Intravenous, Q12H CARRIE  pantoprazole, 40 mg, Oral, Early Morning  rOPINIRole, 0 5 mg, Oral, HS    methylPREDNISolone sodium succinate (Solu-MEDROL) injection 40 mg  Dose: 40 mg  Freq: Every 8 hours Route: IV  Start: 03/09/23 0600 End: 03/09/23 0919  Continuous IV Infusions:     PRN Meds:  methocarbamol, 500 mg, Oral, Q6H PRN  oxyCODONE, 10 mg, Oral, Q12H PRN      OOB as william   SCD's   poct glucose        Network Utilization Review Department  ATTENTION: Please call with any questions or concerns to [phone] and carefully listen to the prompts so that you are directed to the right person  All voicemails are confidential   Favian Brown all requests for admission clinical reviews, approved or denied determinations and any other requests to dedicated fax number below belonging to the campus where the patient is receiving treatment   List of dedicated fax numbers for the Facilities:  FACILITY NAME UR FAX NUMBER   ADMISSION DENIALS (Administrative/Medical Necessity) [phone]   [address] (Maternity/NICU/Pediatrics) Rosette Diggs [address] Theresa Johnson  [phone]   [address] [place] [address] [facility] [address] [facility] 134 5 [facility] [phone]

## 2023-03-09 NOTE — ASSESSMENT & PLAN NOTE
Lab Results   Component Value Date    HGBA1C 7 0 (H) 03/02/2023       Recent Labs     03/06/23  0147 03/06/23  0735 03/06/23  1111   POCGLU 168* 334* 161*     Home medications include metformin 1000 mg in the morning and 500 mg in the evening      Plan:  · Hold home antihyperglycemic  · sliding scale insulin  · Glucose checks with meals and before bedtime  · Hypoglycemia protocol  · 5 units Lantus a m  given steroid use

## 2023-03-09 NOTE — ASSESSMENT & PLAN NOTE
Patient with history of hypertension taking Cardizem 180 mg and losartan 50 mg daily  She reports not taking them day of admission       Plan:  · Continue Cardizem   · Continue losartan

## 2023-03-09 NOTE — CASE MANAGEMENT
Case Management Assessment & Discharge Planning Note    Patient name Roel PARDO MS 26/W -68 MRN [id number]  : 1940 Date 3/9/2023       Current Admission Date: 3/8/2023  Current Admission Diagnosis:COPD exacerbation [facility])   Patient Active Problem List    Diagnosis Date Noted   • Tachycardia 2023   • Community acquired pneumonia 2023   • [facility] exacerbation ([facility] ) 2023   • Type 2 diabetes mellitus (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 2022   • Unintentional weight loss 2022   • BMI 31 0-31 9,adult 2022   • Retinal artery occlusion, branch, left 2022   • Chronic pain 2022   • COVID-19 2022   • Backache 2022   • Onychomycosis 2021   • Pain in toe 2021   • Instability of foot joint 2021   • 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   • ASD (atrial septal defect) 2020   • Exertional dyspnea 2020   • Acquired pes planus of right foot 10/28/2019   • Microscopic hematuria 2019   • Urge incontinence 2019   • Ankle pain 2019   • Primary localized osteoarthrosis of ankle and foot 2019   • History of iron deficiency anemia 2018   • Chronic fatigue 2017   • Chronic hypoxemic respiratory failure ([facility] ) 2017   • GERD (gastroesophageal reflux disease) 2017   • Hyponatremia 2017   • Cigarette nicotine dependence in remission 2017   • Controlled substance agreement signed 2016   • Depression 2016   • Diabetes mellitus ([facility] ) 03/04/2016   • Hypertension 03/04/2016   • GI bleed 07/23/2015   • DDD (degenerative disc disease), lumbosacral 05/13/2015   • Arthropathy 04/15/2014   • Anemia 05/30/2013   • COPD, severe ([facility] ) 12/19/2012   • Benign essential hypertension 12/19/2012   • Hyperlipidemia 12/19/2012   • Disc disorder of lumbar region 12/19/2012   • Type II diabetes mellitus ([place] [address] ) 12/19/2012      LOS (days): 1  Geometric Mean LOS (GMLOS) (days): 2 20  Days to GMLOS:1 5     OBJECTIVE:  PATIENT READMITTED TO HOSPITAL  Risk of Unplanned Readmission Score: 29 88         Current admission status: Inpatient       Preferred Pharmacy:   [facility]/pharmacy #[id number]- [address] [place] [address]  Phone: [phone] Fax: [phone]    [address] [place] [address]  Phone: [phone] Fax: [phone]    Primary Care Provider: Sammy Clemens DO    Primary Insurance:   Secondary Insurance:     ASSESSMENT:  Aniket Franklin Proxies    There are no active Health Care Proxies on file         Readmission Root Cause  30 Day Readmission: Yes  Who directed you to return to the hospital?: Family  Did you understand whom to contact if you had questions or problems?: Yes  Did you get your prescriptions before you left the hospital?: Yes  Were you able to get your prescriptions filled when you left the hospital?: Yes  Did you take your medications as prescribed?: Yes  Were you able to get to your follow-up appointments?:  (N/A)  During previous admission, was a post-acute recommendation made?: Yes  What post-acute resources were offered?: Amanda Mcfadden  Patient was readmitted due to: COPD exacerbation  Action Plan: PT/OT eval    Patient Information  Admitted from[de-identified] Home  Mental Status: Other (Comment) (lethargic)  During Assessment patient was accompanied by: Not accompanied during assessment  Assessment information provided by[de-identified] Son  Primary Caregiver: Self  Support Systems: Son, Family members  [place] of Residence: [address] [facility] [address] do you live in?: [place] entry access options   Select all that apply : No steps to enter home  Type of Current Residence: 2 story home  Upon entering residence, is there a bedroom on the main floor (no further steps)?: No  A bedroom is located on the following floor levels of residence (select all that apply):: 2nd Floor  Upon entering residence, is there a bathroom on the main floor (no further steps)?: No  Indicate which floors of current residence have a bathroom (select all the apply):: 2nd Floor  Number of steps to 2nd floor from main floor: One Flight (chair lift)  In the last 12 months, was there a time when you were not able to pay the mortgage or rent on time?: No  In the last 12 months, how many places have you lived?: 1  In the last 12 months, was there a time when you did not have a steady place to sleep or slept in a shelter (including now)?: No  Homeless/housing insecurity resource given?: N/A  Living Arrangements: Other (Comment) (Lives w/ son)    Activities of Daily Living Prior to Admission  Functional Status: Independent  Completes ADLs independently?: Yes  Ambulates independently?: Yes  Does patient use assisted devices?: Yes  Assisted Devices (DME) used: Home Oxygen concentrator, Portable Oxygen tanks, Stair Chair/Glide, [address] Name (respiratory supplies): [employer]  O2 Rate(s): 1L  Does patient currently own DME?: Yes  What DME does the patient currently own?: Walker, Portable Oxygen tanks, Home Oxygen concentrator, Stair Chair/Glide, Shower Chair  Does patient have a history of Outpatient Therapy (PT/OT)?: No  Does the patient have a history of Short-Term Rehab?: Yes  Does patient have a history of HHC?: Yes  Does patient currently have Amanda ?: Yes    Current Home Health Care  Type of Current Home Care Services: Home OT, Home PT, Nurse visit  Current Home Health Agency[de-identified] [facility] Provider[de-identified] PCP    Patient Information Continued  Income Source: Pension/skilled nursing  Within the past 12 months, you worried that your food would run out before you got the money to buy more : Never true  Within the past 12 months, the food you bought just didn't last and you didn't have money to get more : Never true  Food insecurity resource given?: N/A    PHQ 2/9 Screening   Reviewed PHQ 2/9 Depression Screening Score?: No    Means of Transportation  Means of Transport to Appts[de-identified] Family transport  In the past 12 months, has lack of transportation kept you from medical appointments or from getting medications?: No  In the past 12 months, has lack of transportation kept you from meetings, work, or from getting things needed for daily living?: No  Was application for public transport provided?: N/A        DISCHARGE DETAILS:    Discharge planning discussed with[de-identified] Rodolfo barajas over phone  Freedom of Choice: Yes     Contacts  Patient Contacts: karl Younger  Relationship to Patient[de-identified] Family  Contact Method: Phone  Phone Number: [phone]  Reason/Outcome: Discharge Planning, Referral, Continuity of Care, Emergency Contact    Would you like to participate in our [address] service program?  : No - Declined        Additional Comments: CM attempted to meet with patient at bedside to complete assessment- patient stating she does not feel up to it and said CM can speak with her son  Call made to karl Jaramillo  CM confirmed with Rodolfo Jaramillo that nothing significant has changed since last admission  Son aware that PT/OT eval is pending, and CM will follow up after they see patient to discuss recommendations

## 2023-03-09 NOTE — ASSESSMENT & PLAN NOTE
Patient was tachycardic from 110-130  Patient denies any chest pain  CT PE showed no signs of pulmonary embolism  Patient states she has not taken her oral antihypertensive medications this morning  Patient is also received nebulizer treatments which may be contributing to her tachycardia      Plan:  · Continue Cardizem this evening  · Losartan continued

## 2023-03-09 NOTE — ASSESSMENT & PLAN NOTE
Patient reports progressively worsening dyspnea starting 1 day ago with increased respiratory rate into the 40s  She also complains of yellow sputum production  Patient has baseline of 3 L O2 at home but was requiring up to 6 L on arrival   She was recently discharged on 3/6/2023 for pneumonia for which she completed course of ceftriaxone, azithromycin, and prednisone  She reported feeling better and had no shortness of breath on prior discharge  She received Xopenex and Atrovent along with Solu-Medrol in the emergency department  Patient is alert and oriented x3 but is mildly confused and does not answer all questions appropriately  CT PE: No acute pulmonary embolus identified  Mild scattered tree-in-bud nodules in the posterior right upper lobe are nonspecific but may be infectious or inflammatory and is similar to CT from 9/25/2022  Patchy consolidation left upper lobe has significantly improved  Enlargement of main pulmonary artery suggestive of pulmonary artery hypertension  Likely COPD exacerbation, less likely recurrent pneumonia procalcitonin is negative and received recent antibiotics   ABG normal, pCO2 39 6    Plan:  · Xopenex/Atrovent 3 times daily  · Solu-Medrol 40 mg 2 times daily  · Pulmicort BID  · Titrate oxygen as needed, currently 94% on 1 L NC  · Continue mucinex  · Follow-up sputum culture

## 2023-03-09 NOTE — ASSESSMENT & PLAN NOTE
Patient reports progressively worsening dyspnea starting 1 day ago with increased respiratory rate into the 40s  She also complains of yellow sputum production  Patient has baseline of 3 L O2 at home but was requiring up to 6 L on arrival   She was recently discharged on 3/6/2023 for pneumonia for which she completed course of ceftriaxone, azithromycin, and prednisone  She reported feeling better and had no shortness of breath on prior discharge  She received Xopenex and Atrovent along with Solu-Medrol in the emergency department  Patient is alert and oriented x3 but is mildly confused and does not answer all questions appropriately  CT PE: No acute pulmonary embolus identified  Mild scattered tree-in-bud nodules in the posterior right upper lobe are nonspecific but may be infectious or inflammatory and is similar to CT from 9/25/2022  Patchy consolidation left upper lobe has significantly improved  Enlargement of main pulmonary artery suggestive of pulmonary artery hypertension  Likely COPD exacerbation, less likely recurrent pneumonia procalcitonin is negative and received recent antibiotics       Plan:  · Xopenex/Atrovent 3 times daily  · Solu-Medrol 40 mg 3 times daily  · Titrate oxygen as needed  · Mucinex  · Follow-up sputum culture  · Follow-up ABG

## 2023-03-09 NOTE — ASSESSMENT & PLAN NOTE
Chronic back pain with degenerative disc disease and multiple spinal surgeries      Plan:  · Continue oxycodone 10 mg every 12 hours  · Continue gabapentin 300 mg 4 times a day

## 2023-03-09 NOTE — ED NOTES
Resp called for heart neb      Lourdes Browne, [address] [facility]  03/08/23 2119

## 2023-03-09 NOTE — ASSESSMENT & PLAN NOTE
Lab Results   Component Value Date    HGBA1C 7 0 (H) 03/02/2023       Recent Labs     03/09/23  0802 03/09/23  1151   POCGLU 227* 309*     Home medications include metformin 1000 mg in the morning and 500 mg in the evening      Plan:  · Hold home antihyperglycemic  · sliding scale insulin  · Glucose checks with meals and before bedtime  · Hypoglycemia protocol  · 5 units Lantus a m  given steroid use  (P) 268

## 2023-03-09 NOTE — ASSESSMENT & PLAN NOTE
Patient was tachycardic from 110-130  Patient denies any chest pain  CT PE showed no signs of pulmonary embolism  Patient states she has not taken her oral antihypertensive medications this morning  Patient is also received nebulizer treatments which may be contributing to her tachycardia      Plan:  · EKG  · Will give Cardizem this evening  · Continue losartan in the morning

## 2023-03-10 VITALS
BODY MASS INDEX: 30.83 KG/M2 | HEART RATE: 96 BPM | WEIGHT: 163.14 LBS | SYSTOLIC BLOOD PRESSURE: 150 MMHG | OXYGEN SATURATION: 95 % | DIASTOLIC BLOOD PRESSURE: 74 MMHG | RESPIRATION RATE: 32 BRPM | TEMPERATURE: 99.2 F

## 2023-03-10 LAB
ALBUMIN SERPL BCP-MCNC: 3.1 G/DL (ref 3.5–5)
ALP SERPL-CCNC: 69 U/L (ref 34–104)
ALT SERPL W P-5'-P-CCNC: 14 U/L (ref 7–52)
ANION GAP SERPL CALCULATED.3IONS-SCNC: 8 MMOL/L (ref 4–13)
AST SERPL W P-5'-P-CCNC: 10 U/L (ref 13–39)
BASOPHILS # BLD MANUAL: 0 THOUSAND/UL (ref 0–0.1)
BASOPHILS NFR MAR MANUAL: 0 % (ref 0–1)
BILIRUB SERPL-MCNC: 0.32 MG/DL (ref 0.2–1)
BUN SERPL-MCNC: 28 MG/DL (ref 5–25)
CALCIUM ALBUM COR SERPL-MCNC: 8.8 MG/DL (ref 8.3–10.1)
CALCIUM SERPL-MCNC: 8.1 MG/DL (ref 8.4–10.2)
CHLORIDE SERPL-SCNC: 97 MMOL/L (ref 96–108)
CO2 SERPL-SCNC: 24 MMOL/L (ref 21–32)
CREAT SERPL-MCNC: 0.96 MG/DL (ref 0.6–1.3)
EOSINOPHIL # BLD MANUAL: 0 THOUSAND/UL (ref 0–0.4)
EOSINOPHIL NFR BLD MANUAL: 0 % (ref 0–6)
ERYTHROCYTE [DISTWIDTH] IN BLOOD BY AUTOMATED COUNT: 15.7 % (ref 11.6–15.1)
GFR SERPL CREATININE-BSD FRML MDRD: 55 ML/MIN/1.73SQ M
GLUCOSE SERPL-MCNC: 348 MG/DL (ref 65–140)
GLUCOSE SERPL-MCNC: 391 MG/DL (ref 65–140)
GLUCOSE SERPL-MCNC: 408 MG/DL (ref 65–140)
HCT VFR BLD AUTO: 32.8 % (ref 34.8–46.1)
HGB BLD-MCNC: 10.5 G/DL (ref 11.5–15.4)
LYMPHOCYTES # BLD AUTO: 0.16 THOUSAND/UL (ref 0.6–4.47)
LYMPHOCYTES # BLD AUTO: 2 % (ref 14–44)
MCH RBC QN AUTO: 26.6 PG (ref 26.8–34.3)
MCHC RBC AUTO-ENTMCNC: 32 G/DL (ref 31.4–37.4)
MCV RBC AUTO: 83 FL (ref 82–98)
METAMYELOCYTES NFR BLD MANUAL: 1 % (ref 0–1)
MONOCYTES # BLD AUTO: 0.16 THOUSAND/UL (ref 0–1.22)
MONOCYTES NFR BLD: 2 % (ref 4–12)
NEUTROPHILS # BLD MANUAL: 7.58 THOUSAND/UL (ref 1.85–7.62)
NEUTS SEG NFR BLD AUTO: 95 % (ref 43–75)
PLATELET # BLD AUTO: 201 THOUSANDS/UL (ref 149–390)
PLATELET BLD QL SMEAR: ADEQUATE
PMV BLD AUTO: 8.9 FL (ref 8.9–12.7)
POTASSIUM SERPL-SCNC: 4.6 MMOL/L (ref 3.5–5.3)
PROT SERPL-MCNC: 5.6 G/DL (ref 6.4–8.4)
RBC # BLD AUTO: 3.94 MILLION/UL (ref 3.81–5.12)
RBC MORPH BLD: NORMAL
SODIUM SERPL-SCNC: 129 MMOL/L (ref 135–147)
WBC # BLD AUTO: 7.98 THOUSAND/UL (ref 4.31–10.16)

## 2023-03-10 RX ORDER — ONDANSETRON 4 MG/1
4 TABLET, FILM COATED ORAL EVERY 8 HOURS PRN
Qty: 20 TABLET | Refills: 0 | Status: SHIPPED | OUTPATIENT
Start: 2023-03-10

## 2023-03-10 RX ORDER — PREDNISONE 20 MG/1
40 TABLET ORAL DAILY
Qty: 4 TABLET | Refills: 0 | Status: SHIPPED | OUTPATIENT
Start: 2023-03-11 | End: 2023-03-13

## 2023-03-10 RX ADMIN — ENOXAPARIN SODIUM 40 MG: 40 INJECTION SUBCUTANEOUS at 08:16

## 2023-03-10 RX ADMIN — GUAIFENESIN 1200 MG: 600 TABLET ORAL at 08:15

## 2023-03-10 RX ADMIN — BUDESONIDE 0.5 MG: 0.5 INHALANT ORAL at 07:24

## 2023-03-10 RX ADMIN — GABAPENTIN 300 MG: 300 CAPSULE ORAL at 08:15

## 2023-03-10 RX ADMIN — INSULIN GLARGINE 5 UNITS: 100 INJECTION, SOLUTION SUBCUTANEOUS at 08:16

## 2023-03-10 RX ADMIN — LEVALBUTEROL HYDROCHLORIDE 1.25 MG: 1.25 SOLUTION RESPIRATORY (INHALATION) at 07:24

## 2023-03-10 RX ADMIN — LOSARTAN POTASSIUM 50 MG: 50 TABLET, FILM COATED ORAL at 08:15

## 2023-03-10 RX ADMIN — OXYCODONE HYDROCHLORIDE 10 MG: 10 TABLET, FILM COATED, EXTENDED RELEASE ORAL at 08:15

## 2023-03-10 RX ADMIN — IPRATROPIUM BROMIDE 0.5 MG: 0.5 SOLUTION RESPIRATORY (INHALATION) at 07:24

## 2023-03-10 RX ADMIN — GABAPENTIN 300 MG: 300 CAPSULE ORAL at 11:54

## 2023-03-10 RX ADMIN — PANTOPRAZOLE SODIUM 40 MG: 40 TABLET, DELAYED RELEASE ORAL at 06:08

## 2023-03-10 RX ADMIN — BRIMONIDINE TARTRATE 1 DROP: 2 SOLUTION/ DROPS OPHTHALMIC at 08:19

## 2023-03-10 RX ADMIN — OXYCODONE HYDROCHLORIDE AND ACETAMINOPHEN 1000 MG: 500 TABLET ORAL at 08:15

## 2023-03-10 RX ADMIN — INSULIN LISPRO 5 UNITS: 100 INJECTION, SOLUTION INTRAVENOUS; SUBCUTANEOUS at 11:54

## 2023-03-10 RX ADMIN — METHYLPREDNISOLONE SODIUM SUCCINATE 40 MG: 40 INJECTION, POWDER, FOR SOLUTION INTRAMUSCULAR; INTRAVENOUS at 08:16

## 2023-03-10 RX ADMIN — INSULIN LISPRO 4 UNITS: 100 INJECTION, SOLUTION INTRAVENOUS; SUBCUTANEOUS at 07:55

## 2023-03-10 RX ADMIN — IPRATROPIUM BROMIDE 0.5 MG: 0.5 SOLUTION RESPIRATORY (INHALATION) at 14:03

## 2023-03-10 RX ADMIN — LEVALBUTEROL HYDROCHLORIDE 1.25 MG: 1.25 SOLUTION RESPIRATORY (INHALATION) at 14:04

## 2023-03-10 RX ADMIN — FORMOTEROL FUMARATE DIHYDRATE 20 MCG: 20 SOLUTION RESPIRATORY (INHALATION) at 07:24

## 2023-03-10 NOTE — CASE MANAGEMENT
Case Management Discharge Planning Note    Patient name Jaycob Fernandez  Location W MS 26/W -87 MRN [id number]  : 1940 Date 3/10/2023       Current Admission Date: 3/8/2023  Current Admission Diagnosis:COPD exacerbation [facility])   Patient Active Problem List    Diagnosis Date Noted   • Tachycardia 2023   • Community acquired pneumonia 2023   • COPD exacerbation ([facility] ) 2023   • Type 2 diabetes mellitus ([facility] ) 2023   • Hypertension 2023   • Hyponatremia 2023   • Hyperlipidemia 2023   • Chronic back pain 2023   • chronic respiratory failure ([facility] ), acute component resolved 2023   • GERD (gastroesophageal reflux disease) 2023   • Esophageal hiatal hernia 10/06/2022   • Generalized weakness 10/06/2022   • Abdominal pain 2022   • Low blood pressure reading 2022   • 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 2022   • Unintentional weight loss 2022   • BMI 31 0-31 9,adult 2022   • Retinal artery occlusion, branch, left 2022   • Chronic pain 2022   • COVID-19 2022   • Backache 2022   • Onychomycosis 2021   • Pain in toe 2021   • Instability of foot joint 2021   • 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   • ASD (atrial septal defect) 2020   • Exertional dyspnea 2020   • Acquired pes planus of right foot 10/28/2019   • Microscopic hematuria 2019   • Urge incontinence 2019   • Ankle pain 2019   • Primary localized osteoarthrosis of ankle and foot 2019   • History of iron deficiency anemia 2018   • Chronic fatigue 2017   • Chronic hypoxemic respiratory failure ([address] ) 2017   • GERD (gastroesophageal reflux disease) 2017   • Hyponatremia 2017   • Cigarette nicotine dependence in remission 2017   • Controlled substance agreement signed 2016   • Depression 2016   • Diabetes mellitus ([facility] ) 03/04/2016   • Hypertension 03/04/2016   • GI bleed 07/23/2015   • DDD (degenerative disc disease), lumbosacral 05/13/2015   • Arthropathy 04/15/2014   • Anemia 05/30/2013   • COPD, severe ([facility] ) 12/19/2012   • Benign essential hypertension 12/19/2012   • Hyperlipidemia 12/19/2012   • Disc disorder of lumbar region 12/19/2012   • Type II diabetes mellitus ([facility] ) 12/19/2012      LOS (days): 2  Geometric Mean LOS (GMLOS) (days): 2 20  Days to GMLOS:0 6     OBJECTIVE:  Risk of Unplanned Readmission Score: 32 53         Current admission status: Inpatient   Preferred Pharmacy:   [place], [address] [place] [address]  Phone: [phone] Fax: [phone]    [address] [place] [address]  Phone: [phone] Fax: [phone]    Primary Care Provider: Patty Cali DO    Primary Insurance: [facility]  Secondary Insurance:     DISCHARGE DETAILS:    Discharge planning discussed with[de-identified] patient at bedside  Freedom of Choice: Yes  Comments - Freedom of Choice: [address]  CM contacted family/caregiver?: Yes  Were Treatment Team discharge recommendations reviewed with patient/caregiver?: Yes  Did patient/caregiver verbalize understanding of patient care needs?: Yes  Were patient/caregiver advised of the risks associated with not following Treatment Team discharge recommendations?: Yes    Contacts  Patient Contacts: Ekaterina Ellis, son  Relationship to Patient[de-identified] Family  Contact Method: Phone  Phone Number: [phone]  Reason/Outcome: Discharge Planning, Referral, Continuity of Care, Emergency [address]         Is the patient interested in [facility] at discharge?: Yes  [address] requested[de-identified] Occupational Therapy, Physical Therapy, [address] Name[de-identified] [address] Provider[de-identified] PCP  Home Health Services Needed[de-identified] Oxygen Via Nasal Cannula, Gait/ADL Training, Evaluate Functional Status and Safety, Strengthening/Theraputic Exercises to Improve Function  Homebound Criteria Met[de-identified] Requires the Assistance of Another Person for Safe Ambulation or to Leave the Home, Uses an Assist Device (i e  cane, walker, etc)  Supporting Clincal Findings[de-identified] Fatigues Easliy in [employer], Limited Endurance    DME Referral Provided  Referral made for DME?: No    Other Referral/Resources/Interventions Provided:  Interventions: [facility]  Referral Comments: Patient cleared by PT to return home with [facility]  CM met with patient at bedside to discuss this  Patient agreeable to [facility]- referral sent in [address] [place] [address] for [address]  Patient stating she has all home equipment needed, wears O2 at baseline  Call made to son Gris Tapia to discuss same, son also agreeable  CM to upload [facility] order and AVS to [address] [place] [address] referral when able  No further CM needs anticipated at this time      Would you like to participate in our [address] service program?  : No - Declined    Treatment Team Recommendation: Home with [employer]  Discharge Destination Plan[de-identified] Home with [address] [employer]      IMM Given (Date):: 03/10/23  IMM Given to[de-identified] Patient (provided at bedside)

## 2023-03-10 NOTE — PHYSICAL THERAPY NOTE
PHYSICAL THERAPY EVALUATION  NAME:  Ramonita Tao  DATE: 03/10/23    AGE:   80 y o  Mrn:   [id number]  Principal problem: Principal Problem:    COPD exacerbation ([address] )  Active Problems:    Hypertension    GERD (gastroesophageal reflux disease)    Type II diabetes mellitus (HCC)    Chronic back pain    Tachycardia      Vitals:    03/09/23 2212 03/10/23 0744 03/10/23 0809 03/10/23 0925   BP: 123/66 150/74     BP Location:       Pulse: 77 67  96   Resp: 18 18 (!) 32    Temp: 99 2 °F (37 3 °C)      TempSrc:       SpO2: 96% 97%  95%   Weight:           Length Of Stay: 2  Performed at least 2 patient identifiers during session: Name and Birthday  PHYSICAL THERAPY EVALUATION :    03/10/23 0918   PT Last Visit   PT Visit Date 03/10/23   Note Type   Note type Evaluation   Pain Assessment   Pain Assessment Tool 0-10   Pain Score No Pain   Restrictions/Precautions   Other Precautions Bed Alarm; Chair Alarm; Impulsive; Fall Risk  (NC O2, but pt removed O2 and impulsively amb to bathroom w/o it  Masimo)   Home Living   Type of Home House   Home Layout Two level  (0 ASH, stair glide to 2nd floor)   Home Equipment Walker;Stair glide  (RW on each level; stairglide to second floor  No ASH )   Prior Function   Level of [place] Independent with functional mobility; Needs assistance with ADLs   Lives With Family   Falls in the last 6 months 0   Comments Pt reports occasionally getting out in community, amb in stores, ect   General   Family/Caregiver Present No   Cognition   Overall Cognitive Status WFL   Arousal/Participation Alert   Orientation Level Oriented to person;Oriented to place;Oriented to situation   Strength RLE   R Hip Flexion 4-/5   R Knee Extension 4/5   R Ankle Dorsiflexion 4/5   Strength LLE   L Hip Flexion 4-/5   L Knee Extension 4-/5   L Ankle Dorsiflexion 4/5   Light Touch   RLE Light Touch Grossly intact   LLE Light Touch Grossly intact   Bed Mobility   Supine to Sit 6  Modified independent   Additional items Assist x 1; Impulsive   Transfers   Sit to Stand 6  Modified independent   Additional items Impulsive   Stand to Sit 6  Modified independent   Additional items Increased time required;Verbal cues   Ambulation/Elevation   Gait pattern Excessively slow   Gait Assistance 5  Supervision   Additional items Assist x 1;Verbal cues   Assistive Device Rolling walker   Distance 20'x2   Stair Management Assistance Not tested  (Pt has no ASH and stairglide in home)   Balance   Static Sitting Good   Static Standing Fair +   Ambulatory Fair   Endurance Deficit   Endurance Deficit Yes   Endurance Deficit Description limited amb distance and standing tolerance  Despite Pt self removing O2, pt's Spo2 remainded > 90%   Activity Tolerance   Activity Tolerance Patient limited by fatigue   Medical Staff Made Aware spoke to case mangement   Nurse Made Aware spoke to nursing and PCA     Assessment:   Pt is a 80 y o  female seen for PT evaluation s/p admit to [facility] 3/8[facility]2023 w/ COPD exacerbation ([facility] )  Order placed for PT  Prior to admission: Pt lived w/ family in 2 story home w/ need for performing stairs (no ASH and stairglide to second floor)  Upon evaluation: Pt Did not require physical A for bed mobility, transfers, nor gait while using rolling walker but only amb to/from bathroom and demonstrated +impulsivity  Pt's clinical presentation is currently unstable/unpredictable given the functional mobility deficits above, especially (but not limited to) decreased endurance and gait deviations, coupled with fall risks including impulsivity, impaired balance and decreased safety awareness, and combined with medical complications of abnormal H&H, tachycardia, increased RR, and abnormal sodium values  During this admission, pt would benefit from continued skilled inpatient PT in the acute care setting in order to address deficits as defined above to maximize function and mobility        Recommendations:   •  From a PT standpoint, recommend continued use of rolling walker for mobility and encourage use as directed w/ O2 line management  • Based on patient's [facility] Level of Mobility scores today, patient currently has a goal of -[facility] Levels: 6: WALK 10 STEPS OR MORE, to be completed with nursing  • At this time recommend pt use rolling walker for performing tasks of OOB to chair/commode  Prognosis Fair   Problem List Decreased strength;Decreased range of motion; Impaired balance;Decreased endurance;Decreased mobility; Decreased cognition; Impaired judgement;Decreased safety awareness; Obesity  (gait deviations)   Assessment Pt will: Perform bed mobility tasks with modified I to prepare for transfers and reposition in bed  Perform transfers with consistent modified I to improve ease of transfers  Perform ambulation with rolling walker for at least 50' with S to increase Indep in home environment and progress as able to limited community distances  Barriers to Discharge Comments Co-morbidities affecting pt's physical performance at time of assessment include: Arthritis, Cardiac disease, COPD, Diabetes mellitus, Hypertension, Knee surgery; Shoulder surgery; Back surgery; Joint replacement  Personal factors affecting pt at time of IE include: advanced age, past experience and inability to navigate community distances  Goals   Patient Goals to go home today   STG Expiration Date 03/20/23   PT Treatment Day 0   Plan   Treatment/Interventions Functional transfer training;LE strengthening/ROM; Therapeutic exercise; Endurance training;Cognitive reorientation;Patient/family training;Equipment eval/education; Bed mobility;Gait training;Spoke to case management;Spoke to nursing   PT Frequency 2-3x/wk   Recommendation   PT Discharge Recommendation Home with home health rehabilitation   [place] walker   AM-PAC Basic Mobility Inpatient   Turning in Flat Bed Without Bedrails 4   Lying on Back to Sitting on Edge of Flat Bed Without Bedrails 4   Moving Bed to Chair 3   Standing Up From Chair Using Arms 3   Walk in Room 3   Climb 3-5 Stairs With Railing 1   Basic Mobility Inpatient Raw Score 18   Basic Mobility Standardized Score 41 05   Highest Level Of Mobility   JH-HLM Goal 6: Walk 10 steps or more   JH-HLM Achieved 7: Walk 25 feet or more   End of Consult   Patient Position at End of Consult All needs within reach;Bed/Chair alarm activated; Bedside chair   (Please find full objective findings from PT assessment regarding body systems outlined above)  The patient's -[facility] Basic Mobility Inpatient Short Form Raw Score is 18  A Raw score of greater than 16 suggests the patient may benefit from discharge to home, which coincide with CURRENT above PT recommendations  However please refer to therapist recommendation for discharge planning given other factors that may influence destination  Adapted from [employer] [facility] “6-Clicks” Basic Mobility and Daily Activity Scores With Discharge Destination  Physical Therapy, 2021;101:1-9  DOI: [id number]    Portions of the record may have been created with voice recognition software  Occasional wrong word or "sound a like" substitutions may have occurred due to the inherent limitations of voice recognition software    Read the chart carefully and recognize, using context, where substitutions have occurred

## 2023-03-10 NOTE — ED PROVIDER NOTES
History  Chief Complaint   Patient presents with   • Shortness of Breath     Pt recently dx with penumonia  Pt to ED today with increased shortness of breath  Pt on 3L nasal cannula at baseline  Pt appears tachypnic during triage  Pt denies pain  Patient is an 26-year-old female who presents to the emergency department via EMS from home with progressive dyspnea  She was discharged from the hospital yesterday after treatment for pneumonia and acknowledged comfort with her breathing on her typical 3 to 4 L of supplemental O2  She has had progressive worsening of her dyspnea since  She has not had any breathing treatments today  She denies awareness of having had fever at home nor any sputum production with cough  She denies having chest pain and feels quite fatigued overall  She has not appreciated leg swelling or cramping  Prior to Admission Medications   Prescriptions Last Dose Informant Patient Reported? Taking? Ascorbic Acid (vitamin C) 1000 MG tablet 3/7/2023  Yes Yes   Sig: Take 1,000 mg by mouth 2 (two) times a day   Budeson-Glycopyrrol-Formoterol (Breztri Aerosphere) 160-9-4 8 MCG/ACT AERO 3/7/2023  No Yes   Sig: Inhale 2 puffs  in the morning and 2 puffs before bedtime  Rinse mouth after use      Budeson-Glycopyrrol-Formoterol 160-9-4 8 MCG/ACT AERO   Yes No   Sig: Inhale 2 puffs Rinse mouth after use     albuterol (PROVENTIL HFA,VENTOLIN HFA) 90 mcg/act inhaler 3/7/2023  No Yes   Sig: Inhale 2 puffs every 6 (six) hours as needed for wheezing   albuterol (PROVENTIL HFA,VENTOLIN HFA) 90 mcg/act inhaler 3/7/2023  Yes Yes   Sig: Inhale 2 puffs every 6 (six) hours as needed for wheezing   ascorbic acid (VITAMIN C) 1000 MG tablet 3/7/2023  Yes Yes   Sig: Take 1,000 mg by mouth 2 (two) times a day   aspirin 81 mg chewable tablet Not Taking  No No   Sig: Chew 1 tablet (81 mg total) daily   aspirin 81 mg chewable tablet Not Taking  Yes No   Sig: Chew 81 mg daily   Patient not taking: Reported on 3/8/2023   atorvastatin (LIPITOR) 40 mg tablet   No No   Sig: Take 1 tablet (40 mg total) by mouth every evening   atorvastatin (LIPITOR) 40 mg tablet 3/7/2023  Yes Yes   Sig: Take 40 mg by mouth daily   brimonidine tartrate 0 2 % ophthalmic solution 3/7/2023  No Yes   Sig: Administer 1 drop into the left eye 2 (two) times a day   brimonidine tartrate 0 2 % ophthalmic solution 3/7/2023  Yes Yes   Sig: Administer 1 drop into the left eye 2 (two) times a day   diltiazem (CARDIZEM CD) 180 mg 24 hr capsule   Yes No   Sig: Take 180 mg by mouth daily   diltiazem (CARDIZEM CD) 180 mg 24 hr capsule 3/7/2023  Yes Yes   Sig: Take 180 mg by mouth daily   gabapentin (NEURONTIN) 300 mg capsule 3/7/2023  Yes Yes   Sig: Take 300 mg by mouth 4 (four) times a day   gabapentin (NEURONTIN) 300 mg capsule 3/7/2023  Yes Yes   Sig: Take 300 mg by mouth 4 (four) times a day   guaiFENesin 1200 MG TB12   No No   Sig: Take 1 tablet (1,200 mg total) by mouth 2 (two) times a day for 15 days   losartan (COZAAR) 50 mg tablet 3/7/2023  Yes Yes   Sig: TAKE 1 TABLET BY MOUTH EVERY DAY   losartan (COZAAR) 50 mg tablet   Yes No   Sig: Take 50 mg by mouth daily   metFORMIN (GLUCOPHAGE) 1000 MG tablet 3/7/2023  No Yes   Si mg QAM and 500 mg QPM   metFORMIN (GLUCOPHAGE) 500 mg tablet   Yes No   Sig: TAKE 2 TABS BY MOUTH IN IN THE MORNING AND 1 TAB BY MOUTH IN IN THE EVENING   metFORMIN (GLUCOPHAGE) 500 mg tablet   Yes No   Sig: Take 1,000 mg by mouth daily with breakfast   metFORMIN (GLUCOPHAGE) 500 mg tablet   Yes No   Sig: Take 500 mg by mouth every evening   methocarbamol (ROBAXIN) 500 mg tablet 3/7/2023  Yes Yes   Sig: TAKE 1 TABLET BY MOUTH THREE TIMES A DAY AS NEEDED FOR MUSCLE SPASM   methocarbamol (ROBAXIN) 500 mg tablet   Yes No   Sig: Take 500 mg by mouth 4 (four) times a day   oxyCODONE (OxyCONTIN) 10 mg 12 hr tablet   Yes No   Sig: Take 13 5 mg by mouth every 12 (twelve) hours   oxyCODONE ER (Xtampza ER) 13 5 MG C12A   No No   Sig: Take 13 5 mg by mouth 2 (two) times a day Max Daily Amount: 27 mg   pantoprazole (PROTONIX) 40 mg tablet   Yes No   Sig: Take 40 mg by mouth every 12 (twelve) hours   pantoprazole (PROTONIX) 40 mg tablet   Yes No   Sig: Take 40 mg by mouth daily   rOPINIRole (REQUIP) 0 5 mg tablet   No No   Si-3 tablets PO at HS   rOPINIRole (REQUIP) 0 5 mg tablet   Yes No   Sig: Take 0 5 mg by mouth daily at bedtime Take 3 tablets PO before bed   sitaGLIPtin (JANUVIA) 100 mg tablet   No No   Sig: Take 1 tablet (100 mg total) by mouth daily   sitaGLIPtin (JANUVIA) 100 mg tablet   Yes No   Sig: Take 100 mg by mouth daily      Facility-Administered Medications: None       Past Medical History:   Diagnosis Date   • Arthritis    • Cardiac disease    • COPD (chronic obstructive pulmonary disease) (HCC)    • Diabetes mellitus (HCC)    • GERD (gastroesophageal reflux disease)    • Hiatal hernia    • Hypertension    • PUD (peptic ulcer disease)    • Residual ASD (atrial septal defect) following repair        Past Surgical History:   Procedure Laterality Date   • ABDOMINAL ADHESION SURGERY N/A 2022    Procedure: LYSIS ADHESIONS;  Surgeon: Isabel Sevilla MD;  [facility]: [facility];  Service: Thoracic   • ASD REPAIR     • BACK SURGERY      x3   • CARDIAC SURGERY     • ESOPHAGOGASTRODUODENOSCOPY N/A 2022    Procedure: ESOPHAGOGASTRODUODENOSCOPY (EGD);   Surgeon: Isabel Sevilla MD;  Location: [facility];  Service: Thoracic   • JOINT REPLACEMENT      bilateral knee surgery   • JOINT REPLACEMENT     • KNEE SURGERY     • PARAESOPHAGEAL HERNIA REPAIR N/A 2022    Procedure: REPAIR HERNIA PARAESOPHAGEAL LAPAROSCOPIC W ROBOTICS, gastropexy, mesh placement;  Surgeon: Isabel Sevilla MD;  Location: [facility];  Service: Thoracic   • SHOULDER SURGERY         Family History   Problem Relation Age of Onset   • Cancer Mother    • Asthma Father      I have reviewed and agree with the history as documented  E-Cigarette/Vaping     E-Cigarette/Vaping Substances     Social History     Tobacco Use   • Smoking status: Former     Packs/day: 1 00     Years: 60 00     Pack years: 60 00     Types: Cigarettes     Start date: 26     Quit date: 2/1/2020     Years since quitting: 3 1   • Smokeless tobacco: Never   • Tobacco comments:     stopped smoking 2 days ago   Substance Use Topics   • Alcohol use: Yes     Alcohol/week: 1 0 standard drink     Types: 1 Glasses of wine per week   • Drug use: Never       Review of Systems   All other systems reviewed and are negative  Physical Exam  Physical Exam  Vitals and nursing note reviewed  Constitutional:       General: She is in acute distress  Appearance: She is well-developed  She is ill-appearing  Comments: Tachypneic with mild accessory muscle use  Requiring 6 L of supplemental O2 to keep sats in the low 90s  HENT:      Mouth/Throat:      Mouth: Mucous membranes are moist    Eyes:      Extraocular Movements: Extraocular movements intact  Cardiovascular:      Rate and Rhythm: Normal rate and regular rhythm  Pulmonary:      Effort: Tachypnea and accessory muscle usage present  Breath sounds: Decreased breath sounds and wheezing present  Abdominal:      Palpations: Abdomen is soft  Tenderness: There is no abdominal tenderness  Musculoskeletal:      Right lower leg: No tenderness  No edema  Left lower leg: No tenderness  No edema  Skin:     General: Skin is warm and dry  Neurological:      Mental Status: She is alert           Vital Signs  ED Triage Vitals   Temperature Pulse Respirations Blood Pressure SpO2   03/08/23 1743 03/08/23 1737 03/08/23 1737 03/08/23 1737 03/08/23 1737   99 1 °F (37 3 °C) 103 (!) 58 145/66 91 %      Temp Source Heart Rate Source Patient Position - Orthostatic VS BP Location FiO2 (%)   03/08/23 1743 -- 03/09/23 1507 03/08/23 1737 --   Oral  Lying Left arm       Pain Score       03/08/23 1737       No Pain           Vitals:    03/09/23 2127 03/09/23 2212 03/10/23 0744 03/10/23 0925   BP: 136/74 123/66 150/74    Pulse: 78 77 67 96   Patient Position - Orthostatic VS:             Visual Acuity      ED Medications  Medications   albuterol (FOR EMS ONLY) (2 5 mg/3 mL) 0 083 % inhalation solution 2 5 mg (0 mg Does not apply Given to EMS 3/8/23 1742)   albuterol inhalation solution 5 mg (5 mg Nebulization Given 3/8/23 1750)     And   ipratropium (ATROVENT) 0 02 % inhalation solution 0 5 mg (0 5 mg Nebulization Given 3/8/23 1750)   metoclopramide (REGLAN) injection 5 mg (5 mg Intravenous Given 3/8/23 1804)   methylPREDNISolone sodium succinate (Solu-MEDROL) injection 40 mg (40 mg Intravenous Given 3/8/23 2102)   ceftriaxone (ROCEPHIN) 1 g/50 mL in dextrose IVPB (0 mg Intravenous Stopped 3/8/23 2115)   ipratropium (ATROVENT) 0 02 % inhalation solution 0 5 mg (0 5 mg Nebulization Given 3/8/23 2123)   sodium chloride 0 9 % inhalation solution 12 mL (12 mL Nebulization Given 3/8/23 2123)   albuterol inhalation solution 10 mg (10 mg Nebulization Given 3/8/23 2122)   iohexol (OMNIPAQUE) 350 MG/ML injection (MULTI-DOSE) 85 mL (85 mL Intravenous Given 3/8/23 2042)       Diagnostic Studies  Results Reviewed     Procedure Component Value Units Date/Time    Blood culture #1 [[id number]] Collected: 03/08/23 1812    Lab Status: Preliminary result Specimen: Blood from Hand, Left Updated: 03/11/23 0001     Blood Culture No Growth at 48 hrs  Blood culture #2 [[id number]] Collected: 03/08/23 1801    Lab Status: Preliminary result Specimen: Blood from Arm, Left Updated: 03/11/23 0001     Blood Culture No Growth at 48 hrs      HS Troponin I 4hr [[id number]]  (Normal) Collected: 03/08/23 2243    Lab Status: Final result Specimen: Blood from Arm, Left Updated: 03/08/23 2325     hs TnI 4hr 13 ng/L      Delta 4hr hsTnI -3 ng/L     HS Troponin I 2hr [[id number]]  (Normal) Collected: 03/08/23 2104    Lab Status: Final result Specimen: Blood from Arm, Left Updated: 03/08/23 2134     hs TnI 2hr 7 ng/L      Delta 2hr hsTnI -9 ng/L     B-Type Natriuretic Peptide(BNP) [[id number]]  (Normal) Collected: 03/08/23 1801    Lab Status: Final result Specimen: Blood from Arm, Left Updated: 03/08/23 1912     BNP 34 pg/mL     FLU/RSV/COVID - if FLU/RSV clinically relevant [[id number]]  (Normal) Collected: 03/08/23 1823    Lab Status: Final result Specimen: Nares from Nose Updated: 03/08/23 1906     SARS-CoV-2 Negative     INFLUENZA A PCR Negative     INFLUENZA B PCR Negative     RSV PCR Negative    Narrative:      FOR PEDIATRIC PATIENTS - copy/paste COVID Guidelines URL to browser: [email or web address]    SARS-CoV-2 assay is a Nucleic Acid Amplification assay intended for the  qualitative detection of nucleic acid from SARS-CoV-2 in nasopharyngeal  swabs  Results are for the presumptive identification of SARS-CoV-2 RNA  Positive results are indicative of infection with SARS-CoV-2, the virus  causing COVID-19, but do not rule out bacterial infection or co-infection  with other viruses  Laboratories within the [place] and its  territories are required to report all positive results to the appropriate  public health authorities  Negative results do not preclude SARS-CoV-2  infection and should not be used as the sole basis for treatment or other  patient management decisions  Negative results must be combined with  clinical observations, patient history, and epidemiological information  This test has not been FDA cleared or approved  This test has been authorized by FDA under an Emergency Use Authorization  (EUA)  This test is only authorized for the duration of time the  declaration that circumstances exist justifying the authorization of the  emergency use of an in vitro diagnostic tests for detection of SARS-CoV-2  virus and/or diagnosis of COVID-19 infection under section 564(b)(1) of  the Act, 21 U  S C  360bbb-3(b)(1), unless the authorization is terminated  or revoked sooner  The test has been validated but independent review by FDA  and CLIA is pending  Test performed using [employer] GeneXpert: This RT-PCR assay targets N2,  a region unique to SARS-CoV-2  A conserved region in the E-gene was chosen  for pan-Sarbecovirus detection which includes SARS-CoV-2  According to CMS-2020-01-R, this platform meets the definition of high-throughput technology  Procalcitonin [[id number]]  (Normal) Collected: 03/08/23 1801    Lab Status: Final result Specimen: Blood from Arm, Left Updated: 03/08/23 1839     Procalcitonin <0 05 ng/ml     HS Troponin 0hr (reflex protocol) [[id number]]  (Normal) Collected: 03/08/23 1801    Lab Status: Final result Specimen: Blood from Arm, Left Updated: 03/08/23 1838     hs TnI 0hr 16 ng/L     D-dimer, quantitative [[id number]]  (Abnormal) Collected: 03/08/23 1801    Lab Status: Final result Specimen: Blood from Arm, Left Updated: 03/08/23 1830     D-Dimer, Quant 1 31 ug/ml FEU     Narrative: In the evaluation for possible pulmonary embolism, in the appropriate (Well's Score of 4 or less) patient, the age adjusted d-dimer cutoff for this patient can be calculated as:    Age x 0 01 (in ug/mL) for Age-adjusted D-dimer exclusion threshold for a patient over 50 years  Lactic acid [[id number]]  (Normal) Collected: 03/08/23 1801    Lab Status: Final result Specimen: Blood from Arm, Left Updated: 03/08/23 1828     LACTIC ACID 1 5 mmol/L     Narrative:      Result may be elevated if tourniquet was used during collection      Comprehensive metabolic panel [[id number]]  (Abnormal) Collected: 03/08/23 1801    Lab Status: Final result Specimen: Blood from Arm, Left Updated: 03/08/23 1828     Sodium 133 mmol/L      Potassium 4 5 mmol/L      Chloride 97 mmol/L      CO2 30 mmol/L      ANION GAP 6 mmol/L      BUN 28 mg/dL      Creatinine 0 91 mg/dL      Glucose 148 mg/dL      Calcium 9 0 mg/dL      AST 15 U/L      ALT 18 U/L      Alkaline Phosphatase 74 U/L      Total Protein 6 8 g/dL      Albumin 3 8 g/dL      Total Bilirubin 0 61 mg/dL      eGFR 58 ml/min/1 73sq m     Narrative:      [place] guidelines for Chronic Kidney Disease (CKD):   •  Stage 1 with normal or high GFR (GFR > 90 mL/min/1 73 square meters)  •  Stage 2 Mild CKD (GFR = 60-89 mL/min/1 73 square meters)  •  Stage 3A Moderate CKD (GFR = 45-59 mL/min/1 73 square meters)  •  Stage 3B Moderate CKD (GFR = 30-44 mL/min/1 73 square meters)  •  Stage 4 Severe CKD (GFR = 15-29 mL/min/1 73 square meters)  •  Stage 5 End Stage CKD (GFR <15 mL/min/1 73 square meters)  Note: GFR calculation is accurate only with a steady state creatinine    Protime-INR [[id number]]  (Normal) Collected: 03/08/23 1801    Lab Status: Final result Specimen: Blood from Arm, Left Updated: 03/08/23 1826     Protime 12 5 seconds      INR 0 91    APTT [[id number]]  (Normal) Collected: 03/08/23 1801    Lab Status: Final result Specimen: Blood from Arm, Left Updated: 03/08/23 1826     PTT 23 seconds     CBC and differential [[id number]]  (Abnormal) Collected: 03/08/23 1801    Lab Status: Final result Specimen: Blood from Arm, Left Updated: 03/08/23 1813     WBC 13 07 Thousand/uL      RBC 4 88 Million/uL      Hemoglobin 12 9 g/dL      Hematocrit 42 0 %      MCV 86 fL      MCH 26 4 pg      MCHC 30 7 g/dL      RDW 15 5 %      MPV 9 6 fL      Platelets 291 Thousands/uL      nRBC 0 /100 WBCs      Neutrophils Relative 85 %      Immat GRANS % 2 %      Lymphocytes Relative 6 %      Monocytes Relative 6 %      Eosinophils Relative 1 %      Basophils Relative 0 %      Neutrophils Absolute 11 12 Thousands/µL      Immature Grans Absolute 0 24 Thousand/uL      Lymphocytes Absolute 0 81 Thousands/µL      Monocytes Absolute 0 78 Thousand/µL      Eosinophils Absolute 0 10 Thousand/µL      Basophils Absolute 0 02 Thousands/µL     Blood gas, venous [[id number]] Collected: 03/08/23 1801    Lab Status: Final result Specimen: Blood from Arm, Left Updated: 03/08/23 1809     pH, Skip 7 377     pCO2, Skip 47 7 mm Hg      pO2, Skip 43 8 mm Hg      HCO3, Skip 27 4 mmol/L      Base Excess, Skip 1 6 mmol/L      O2 Content, Skip 15 3 ml/dL      O2 HGB, VENOUS 77 6 %                  CTA ED chest PE Study   Final Result by Suzie Wen DO (03/08 2130)      No acute pulmonary embolus identified  Mild scattered tree-in-bud nodules in the posterior right upper lobe are nonspecific, but similar dating back to CT of 9/25/2022, and may be infectious or inflammatory in nature  Previously seen patchy consolidation in the left upper lobe has    significantly improved  Recommend 12 month follow-up noncontrast CT of the chest       Enlargement of the main pulmonary artery suggestive of pulmonary artery hypertension  The study was marked in [facility] for immediate notification  Workstation performed: [id number]         XR chest 1 view portable   ED Interpretation by Reji Sharp MD (03/08 2024)   Lungs clear  Infiltrate previously viewed in left upper lung not apparent      Final Result by Julio C Lopze MD (03/09 1029)      Significant improvement in left upper lobe consolidation  See subsequent CT              Workstation performed: [id number]                    Procedures  ECG 12 Lead Documentation Only    Date/Time: 3/10/2023 12:25 AM  Performed by: Reji Sharp MD  Authorized by: Reji Sharp MD     ECG reviewed by me, the ED Provider: yes    Patient location:  ED  Previous ECG:     Previous ECG:  Compared to current    Comparison ECG info:  9/28/2022  Rate:     ECG rate:  94    ECG rate assessment: normal    Rhythm:     Rhythm: sinus rhythm    Ectopy:     Ectopy: PAC    QRS:     QRS axis:  Left    QRS intervals:  Normal  Conduction:     Conduction: abnormal      Abnormal conduction: complete RBBB      CriticalCare Time    Date/Time: 3/11/2023 10:56 PM  Performed by: Susan Mendoza MD  Authorized by: Susan Mendoza MD     Critical care provider statement:     Critical care time (minutes):  30    Critical care was necessary to treat or prevent imminent or life-threatening deterioration of the following conditions:  Respiratory failure    Critical care was time spent personally by me on the following activities:  Obtaining history from patient or surrogate, examination of patient, ordering and performing treatments and interventions, ordering and review of laboratory studies, ordering and review of radiographic studies, re-evaluation of patient's condition, evaluation of patient's response to treatment, review of old charts, development of treatment plan with patient or surrogate and discussions with consultants             ED Course  ED Course as of 03/11/23 2259   Wed Mar 08, 2023   1754 Discharged yesterday from hospital stay which started on February 28 for pneumonia  She required additional O2 support as well as BiPAP use during admission and was discharged following completion of courses of antibiotics and steroids on her home O2 of 3 L  Today with gradual worsening of dyspnea &cough productive of clear sputum   2035 O2 decreased from 6L to 4L maintaining at 95%  She is still quite tachypneic though with decreased accessory muscle use  She indicates that her breathing feels better than on arrival   She was assisted onto bedpan  Still with diffuse inspiratory and expiratory wheezes  Informed her that no infiltrate appreciated on x-ray/no definite evidence of pneumonia  We will be pursuing CT scan to assess for possible blood clot or infiltrate not apparent on x-ray  Potentially dyspnea is related to bronchospasm alone  Additional neb being ordered    Covering with steroids given inflammation and with ceftriaxone and consideration of not yet identified pneumonia   2250 Dutton neb starting at time of my last eval  RR 26   Minimal accessory muscle use  Looks & reports feeling comfortable  Diffuse wheeze  Satting mid 90s on 4 L supplemental O2  Reviewed findings/ admission plan  Discussed case w/ [facility] resident  Admitting to [facility] Pugh Criteria for PE    Flowsheet Row Most Recent Value   Jovi Criteria for PE    Clinical signs and symptoms of DVT 0 Filed at: 03/08/2023 1753   PE is primary diagnosis or equally likely 0 Filed at: 03/08/2023 1753   HR >100 1 5 Filed at: 03/08/2023 1753   Immobilization at least 3 days or Surgery in the previous 4 weeks 1 5 Filed at: 03/08/2023 1753   Previous, objectively diagnosed PE or DVT 0 Filed at: 03/08/2023 1753   Hemoptysis 0 Filed at: 03/08/2023 1753   Malignancy with treatment within 6 months or palliative 0 Filed at: 03/08/2023 1753   Jovi Criteria Total 3 Filed at: 03/08/2023 1753                Medical Decision Making  COPD with acute exacerbation [facility]): acute illness or injury  Amount and/or Complexity of Data Reviewed  Labs: ordered  Decision-making details documented in ED Course  Radiology: ordered and independent interpretation performed  Decision-making details documented in ED Course  Risk  Prescription drug management  Decision regarding hospitalization            Disposition  Final diagnoses:   COPD with acute exacerbation ([facility] )   Respiratory failure, acute-on-chronic (HCC)   Hx of bacterial pneumonia     Time reflects when diagnosis was documented in both MDM as applicable and the Disposition within this note     Time User Action Codes Description Comment    3/8/2023 10:52 PM Suki Salvia A Add [J44 1] COPD with acute exacerbation ([facility] )     3/8/2023 10:52 PM Suki Salvia A Add [J96 20] Respiratory failure, acute-on-chronic ([facility] )     3/8/2023 10:52 PM Suki Salvia A Modify [J44 1] COPD with acute exacerbation ([facility] )     3/8/2023 10:52 PM Suki Salvia Modify [W60 62] Respiratory failure, acute-on-chronic ([facility] )     3/8/2023 10:52 PM Carlin PEREZ Add [Z87 01] Hx of bacterial pneumonia     3/10/2023  3:03 PM VazquezChanning arce Add [J44 1] COPD exacerbation ([facility] )     3/10/2023  3:23 PM Christine Gómez Add [R11 0] Nausea       ED Disposition     ED Disposition   Admit    Condition   Stable    Date/Time   Wed Mar 8, 2023 10:53 PM    Comment   Case was discussed with [facility] resident and the patient's admission status was agreed to be Admission Status: inpatient status to the service of Dr Hilary Denver   Follow-up Information     Follow up With Specialties Details Why Contact Info    [address] [place] [address] Follow up [facility] will be in touch for return of care  [address]            Discharge Medication List as of 3/10/2023  3:14 PM      START taking these medications    Details   predniSONE 20 mg tablet Take 2 tablets (40 mg total) by mouth daily for 2 days Do not start before March 11, 2023 , Starting Sat 3/11/2023, Until Mon 3/13/2023, Normal         CONTINUE these medications which have NOT CHANGED    Details   !! albuterol (PROVENTIL HFA,VENTOLIN HFA) 90 mcg/act inhaler Inhale 2 puffs every 6 (six) hours as needed for wheezing, Starting Mon 9/20/2021, Normal      !! albuterol (PROVENTIL HFA,VENTOLIN HFA) 90 mcg/act inhaler Inhale 2 puffs every 6 (six) hours as needed for wheezing, Historical Med      !! ascorbic acid (VITAMIN C) 1000 MG tablet Take 1,000 mg by mouth 2 (two) times a day, Historical Med      !!  Ascorbic Acid (vitamin C) 1000 MG tablet Take 1,000 mg by mouth 2 (two) times a day, Historical Med      atorvastatin (LIPITOR) 40 mg tablet Take 40 mg by mouth daily, Historical Med      !! brimonidine tartrate 0 2 % ophthalmic solution Administer 1 drop into the left eye 2 (two) times a day, Starting Fri 4/22/2022, Normal      !! brimonidine tartrate 0 2 % ophthalmic solution Administer 1 drop into the left eye 2 (two) times a day, Historical Med      !! Budeson-Glycopyrrol-Formoterol (Breztri Aerosphere) 160-9-4 8 MCG/ACT AERO Inhale 2 puffs  in the morning and 2 puffs before bedtime  Rinse mouth after use   , Starting Mon 5/16/2022, Normal      !! diltiazem (CARDIZEM CD) 180 mg 24 hr capsule Take 180 mg by mouth daily, Historical Med      !! gabapentin (NEURONTIN) 300 mg capsule Take 300 mg by mouth 4 (four) times a day, Starting Mon 3/23/2020, Historical Med      !! gabapentin (NEURONTIN) 300 mg capsule Take 300 mg by mouth 4 (four) times a day, Historical Med      !! losartan (COZAAR) 50 mg tablet TAKE 1 TABLET BY MOUTH EVERY DAY, Historical Med      !! metFORMIN (GLUCOPHAGE) 1000 MG tablet 1000 mg QAM and 500 mg QPM, No Print      !! methocarbamol (ROBAXIN) 500 mg tablet TAKE 1 TABLET BY MOUTH THREE TIMES A DAY AS NEEDED FOR MUSCLE SPASM, Historical Med      !! aspirin 81 mg chewable tablet Chew 1 tablet (81 mg total) daily, Starting Sat 4/23/2022, Normal      !! aspirin 81 mg chewable tablet Chew 81 mg daily, Historical Med      !! Budeson-Glycopyrrol-Formoterol 160-9-4 8 MCG/ACT AERO Inhale 2 puffs Rinse mouth after use , Historical Med      !! diltiazem (CARDIZEM CD) 180 mg 24 hr capsule Take 180 mg by mouth daily, Historical Med      guaiFENesin 1200 MG TB12 Take 1 tablet (1,200 mg total) by mouth 2 (two) times a day for 15 days, Starting Mon 3/6/2023, Until Tue 3/21/2023, Normal      !! losartan (COZAAR) 50 mg tablet Take 50 mg by mouth daily, Historical Med      !! metFORMIN (GLUCOPHAGE) 500 mg tablet TAKE 2 TABS BY MOUTH IN IN THE MORNING AND 1 TAB BY MOUTH IN IN THE EVENING, Historical Med      !! metFORMIN (GLUCOPHAGE) 500 mg tablet Take 1,000 mg by mouth daily with breakfast, Historical Med      !! metFORMIN (GLUCOPHAGE) 500 mg tablet Take 500 mg by mouth every evening, Historical Med      !! methocarbamol (ROBAXIN) 500 mg tablet Take 500 mg by mouth 4 (four) times a day, Historical Med      oxyCODONE (OxyCONTIN) 10 mg 12 hr tablet Take 13 5 mg by mouth every 12 (twelve) hours, Historical Med      oxyCODONE ER (Xtampza ER) 13 5 MG C12A Take 13 5 mg by mouth 2 (two) times a day Max Daily Amount: 27 mg, Starting Mon 5/16/2022, No Print      !! pantoprazole (PROTONIX) 40 mg tablet Take 40 mg by mouth every 12 (twelve) hours, Historical Med      !! pantoprazole (PROTONIX) 40 mg tablet Take 40 mg by mouth daily, Historical Med      !! rOPINIRole (REQUIP) 0 5 mg tablet 2-3 tablets PO at HS, No Print      !! rOPINIRole (REQUIP) 0 5 mg tablet Take 0 5 mg by mouth daily at bedtime Take 3 tablets PO before bed, Historical Med      !! sitaGLIPtin (JANUVIA) 100 mg tablet Take 1 tablet (100 mg total) by mouth daily, Starting Mon 4/13/2020, No Print      !! sitaGLIPtin (JANUVIA) 100 mg tablet Take 100 mg by mouth daily, Historical Med       !! - Potential duplicate medications found  Please discuss with provider                PDMP Review       Value Time User    PDMP Reviewed  Yes 2/28/2023 11:25 PM Cristina Khan MD          ED Provider  Electronically Signed by           Álvaro Schuster MD  03/11/23 [phone]

## 2023-03-10 NOTE — PLAN OF CARE
Problem: PHYSICAL THERAPY ADULT  Goal: 1Performs mobility at highest level of function for planned discharge setting  See evaluation for individualized goals  Description: Treatment/Interventions: Functional transfer training, LE strengthening/ROM, Therapeutic exercise, Endurance training, Cognitive reorientation, Patient/family training, Equipment eval/education, Bed mobility, Gait training, Spoke to case management, Spoke to nursing  Equipment Recommended: Rosana Li       See flowsheet documentation for full assessment, interventions and recommendations  Note: Prognosis: Fair  Problem List: Decreased strength, Decreased range of motion, Impaired balance, Decreased endurance, Decreased mobility, Decreased cognition, Impaired judgement, Decreased safety awareness, Obesity (gait deviations)  Assessment: Pt will: Perform bed mobility tasks with modified I to prepare for transfers and reposition in bed  Perform transfers with consistent modified I to improve ease of transfers  Perform ambulation with rolling walker for at least 50' with S to increase Indep in home environment and progress as able to limited community distances  Barriers to Discharge Comments: Co-morbidities affecting pt's physical performance at time of assessment include: Arthritis, Cardiac disease, COPD, Diabetes mellitus, Hypertension, Knee surgery; Shoulder surgery; Back surgery; Joint replacement  Personal factors affecting pt at time of IE include: advanced age, past experience and inability to navigate community distances  PT Discharge Recommendation: Home with home health rehabilitation    See flowsheet documentation for full assessment

## 2023-03-12 LAB
BACTERIA BLD CULT: NORMAL
BACTERIA BLD CULT: NORMAL

## 2023-03-13 NOTE — UTILIZATION REVIEW
NOTIFICATION OF ADMISSION DISCHARGE   This is a Notification of Discharge from [address]  Please be advised that this patient has been discharge from our facility  Below you will find the admission and discharge date and time including the patient’s disposition  UTILIZATION REVIEW CONTACT:  Phil Hendrix  Utilization   Network Utilization Review Department  Phone: [phone] x carefully listen to the prompts  All voicemails are confidential   Email: [email or web address]     ADMISSION INFORMATION  PRESENTATION DATE: 3/8/2023  5:33 PM  OBERVATION ADMISSION DATE:   INPATIENT ADMISSION DATE: 3/8/23 10:53 PM   DISCHARGE DATE: 3/10/2023  5:10 PM   DISPOSITION:Home with Home Health Care    IMPORTANT INFORMATION:  Send all requests for admission clinical reviews, approved or denied determinations and any other requests to dedicated fax number below belonging to the campus where the patient is receiving treatment   List of dedicated fax numbers:  [address] DENIALS (Administrative/Medical Necessity) [phone]   [address] (Maternity/NICU/Pediatrics) [phone]   [facility] [phone]   [place] [address] [phone]   [address] [phone]   220 [facility] [phone]   [address] [facility] [phone]   [address] [place] [address] 119 [phone]   [facility]  [phone] 405[address] [facility] [phone]   [address] [facility] [address] [facility] [phone]

## 2023-03-14 LAB
BACTERIA BLD CULT: NORMAL
BACTERIA BLD CULT: NORMAL

## 2023-04-06 ENCOUNTER — TELEPHONE (OUTPATIENT)
Dept: CARDIAC SURGERY | Facility: CLINIC | Age: 83
End: 2023-04-06

## 2023-04-16 ENCOUNTER — APPOINTMENT (EMERGENCY)
Dept: RADIOLOGY | Facility: HOSPITAL | Age: 83
End: 2023-04-16

## 2023-04-16 ENCOUNTER — APPOINTMENT (EMERGENCY)
Dept: CT IMAGING | Facility: HOSPITAL | Age: 83
End: 2023-04-16

## 2023-04-16 ENCOUNTER — HOSPITAL ENCOUNTER (EMERGENCY)
Facility: HOSPITAL | Age: 83
Discharge: HOME | End: 2023-04-16
Attending: EMERGENCY MEDICINE | Admitting: EMERGENCY MEDICINE

## 2023-04-16 VITALS
WEIGHT: 159 LBS | OXYGEN SATURATION: 96 % | RESPIRATION RATE: 20 BRPM | TEMPERATURE: 98.2 F | DIASTOLIC BLOOD PRESSURE: 74 MMHG | BODY MASS INDEX: 30.04 KG/M2 | HEART RATE: 76 BPM | SYSTOLIC BLOOD PRESSURE: 147 MMHG

## 2023-04-16 DIAGNOSIS — J40 BRONCHITIS: ICD-10-CM

## 2023-04-16 DIAGNOSIS — J44.1 COPD EXACERBATION (HCC): Primary | ICD-10-CM

## 2023-04-16 LAB
2HR DELTA HS TROPONIN: -1 NG/L
ALBUMIN SERPL BCP-MCNC: 3.8 G/DL (ref 3.5–5)
ALP SERPL-CCNC: 63 U/L (ref 34–104)
ALT SERPL W P-5'-P-CCNC: 10 U/L (ref 7–52)
ANION GAP SERPL CALCULATED.3IONS-SCNC: 8 MMOL/L (ref 4–13)
AST SERPL W P-5'-P-CCNC: 13 U/L (ref 13–39)
ATRIAL RATE: 53 BPM
BASE EX.OXY STD BLDV CALC-SCNC: 71.1 % (ref 60–80)
BASE EXCESS BLDV CALC-SCNC: 1.8 MMOL/L
BASOPHILS # BLD AUTO: 0.06 THOUSANDS/ΜL (ref 0–0.1)
BASOPHILS NFR BLD AUTO: 1 % (ref 0–1)
BILIRUB SERPL-MCNC: 0.62 MG/DL (ref 0.2–1)
BUN SERPL-MCNC: 14 MG/DL (ref 5–25)
CALCIUM SERPL-MCNC: 9.7 MG/DL (ref 8.4–10.2)
CARDIAC TROPONIN I PNL SERPL HS: 6 NG/L
CARDIAC TROPONIN I PNL SERPL HS: 7 NG/L
CHLORIDE SERPL-SCNC: 102 MMOL/L (ref 96–108)
CO2 SERPL-SCNC: 27 MMOL/L (ref 21–32)
CREAT SERPL-MCNC: 0.91 MG/DL (ref 0.6–1.3)
D DIMER PPP FEU-MCNC: 2.21 UG/ML FEU
EOSINOPHIL # BLD AUTO: 0.13 THOUSAND/ΜL (ref 0–0.61)
EOSINOPHIL NFR BLD AUTO: 2 % (ref 0–6)
ERYTHROCYTE [DISTWIDTH] IN BLOOD BY AUTOMATED COUNT: 16.7 % (ref 11.6–15.1)
GFR SERPL CREATININE-BSD FRML MDRD: 58 ML/MIN/1.73SQ M
GLUCOSE SERPL-MCNC: 102 MG/DL (ref 65–140)
HCO3 BLDV-SCNC: 25.2 MMOL/L (ref 24–30)
HCT VFR BLD AUTO: 35.9 % (ref 34.8–46.1)
HGB BLD-MCNC: 11.2 G/DL (ref 11.5–15.4)
IMM GRANULOCYTES # BLD AUTO: 0.03 THOUSAND/UL (ref 0–0.2)
IMM GRANULOCYTES NFR BLD AUTO: 0 % (ref 0–2)
LIPASE SERPL-CCNC: 11 U/L (ref 11–82)
LYMPHOCYTES # BLD AUTO: 1.38 THOUSANDS/ΜL (ref 0.6–4.47)
LYMPHOCYTES NFR BLD AUTO: 21 % (ref 14–44)
MCH RBC QN AUTO: 26.9 PG (ref 26.8–34.3)
MCHC RBC AUTO-ENTMCNC: 31.2 G/DL (ref 31.4–37.4)
MCV RBC AUTO: 86 FL (ref 82–98)
MONOCYTES # BLD AUTO: 0.83 THOUSAND/ΜL (ref 0.17–1.22)
MONOCYTES NFR BLD AUTO: 12 % (ref 4–12)
NEUTROPHILS # BLD AUTO: 4.29 THOUSANDS/ΜL (ref 1.85–7.62)
NEUTS SEG NFR BLD AUTO: 64 % (ref 43–75)
NRBC BLD AUTO-RTO: 0 /100 WBCS
O2 CT BLDV-SCNC: 12 ML/DL
P AXIS: 46 DEGREES
PCO2 BLDV: 35.4 MM HG (ref 42–50)
PH BLDV: 7.47 [PH] (ref 7.3–7.4)
PLATELET # BLD AUTO: 262 THOUSANDS/UL (ref 149–390)
PMV BLD AUTO: 9.8 FL (ref 8.9–12.7)
PO2 BLDV: 34.9 MM HG (ref 35–45)
POTASSIUM SERPL-SCNC: 4.2 MMOL/L (ref 3.5–5.3)
PR INTERVAL: 132 MS
PROT SERPL-MCNC: 6.7 G/DL (ref 6.4–8.4)
QRS AXIS: -72 DEGREES
QRSD INTERVAL: 134 MS
QT INTERVAL: 440 MS
QTC INTERVAL: 412 MS
RBC # BLD AUTO: 4.16 MILLION/UL (ref 3.81–5.12)
SODIUM SERPL-SCNC: 137 MMOL/L (ref 135–147)
T WAVE AXIS: 40 DEGREES
VENTRICULAR RATE: 53 BPM
WBC # BLD AUTO: 6.72 THOUSAND/UL (ref 4.31–10.16)

## 2023-04-16 RX ORDER — METHYLPREDNISOLONE 4 MG/1
TABLET ORAL
Qty: 21 TABLET | Refills: 0 | Status: SHIPPED | OUTPATIENT
Start: 2023-04-16

## 2023-04-16 RX ORDER — AZITHROMYCIN 500 MG/1
500 TABLET, FILM COATED ORAL DAILY
Qty: 7 TABLET | Refills: 0 | Status: SHIPPED | OUTPATIENT
Start: 2023-04-16 | End: 2023-04-23

## 2023-04-16 RX ORDER — AZITHROMYCIN 250 MG/1
500 TABLET, FILM COATED ORAL ONCE
Status: COMPLETED | OUTPATIENT
Start: 2023-04-16 | End: 2023-04-16

## 2023-04-16 RX ORDER — METHYLPREDNISOLONE SODIUM SUCCINATE 125 MG/2ML
60 INJECTION, POWDER, LYOPHILIZED, FOR SOLUTION INTRAMUSCULAR; INTRAVENOUS ONCE
Status: COMPLETED | OUTPATIENT
Start: 2023-04-16 | End: 2023-04-16

## 2023-04-16 RX ORDER — IPRATROPIUM BROMIDE AND ALBUTEROL SULFATE 2.5; .5 MG/3ML; MG/3ML
3 SOLUTION RESPIRATORY (INHALATION)
Status: DISCONTINUED | OUTPATIENT
Start: 2023-04-16 | End: 2023-04-16 | Stop reason: HOSPADM

## 2023-04-16 RX ADMIN — IPRATROPIUM BROMIDE AND ALBUTEROL SULFATE 3 ML: 2.5; .5 SOLUTION RESPIRATORY (INHALATION) at 13:04

## 2023-04-16 RX ADMIN — IOHEXOL 85 ML: 350 INJECTION, SOLUTION INTRAVENOUS at 12:21

## 2023-04-16 RX ADMIN — METHYLPREDNISOLONE SODIUM SUCCINATE 60 MG: 125 INJECTION, POWDER, FOR SOLUTION INTRAMUSCULAR; INTRAVENOUS at 10:18

## 2023-04-16 RX ADMIN — IPRATROPIUM BROMIDE AND ALBUTEROL SULFATE 3 ML: 2.5; .5 SOLUTION RESPIRATORY (INHALATION) at 10:17

## 2023-04-16 RX ADMIN — AZITHROMYCIN MONOHYDRATE 500 MG: 250 TABLET ORAL at 13:42

## 2023-04-16 NOTE — ED PROVIDER NOTES
History  Chief Complaint   Patient presents with   • Shortness of Breath     Worsening SOB x1 hr, hx copd, chronically on oxygen     79 yo F with PMHx COPD, DM, GERD, PUD presents for evaluation of sudden onset of SOB about 1 hour PTA while at rest  Pt reports a mild try cough and associated chest tightness, however denies associated Sx fever/chills, asymmetric LE swelling/pain, HA, abdominal pain, N/V/D, dizziness/lightheadedness, vision changes or any other Sx  No other concerns  Prior to Admission Medications   Prescriptions Last Dose Informant Patient Reported? Taking? Ascorbic Acid (vitamin C) 1000 MG tablet   Yes No   Sig: Take 1,000 mg by mouth 2 (two) times a day   Budeson-Glycopyrrol-Formoterol (Breztri Aerosphere) 160-9-4 8 MCG/ACT AERO   No No   Sig: Inhale 2 puffs  in the morning and 2 puffs before bedtime  Rinse mouth after use      Budeson-Glycopyrrol-Formoterol 160-9-4 8 MCG/ACT AERO   Yes No   Sig: Inhale 2 puffs Rinse mouth after use     albuterol (PROVENTIL HFA,VENTOLIN HFA) 90 mcg/act inhaler   No No   Sig: Inhale 2 puffs every 6 (six) hours as needed for wheezing   albuterol (PROVENTIL HFA,VENTOLIN HFA) 90 mcg/act inhaler   Yes No   Sig: Inhale 2 puffs every 6 (six) hours as needed for wheezing   ascorbic acid (VITAMIN C) 1000 MG tablet   Yes No   Sig: Take 1,000 mg by mouth 2 (two) times a day   aspirin 81 mg chewable tablet   No No   Sig: Chew 1 tablet (81 mg total) daily   aspirin 81 mg chewable tablet   Yes No   Sig: Chew 81 mg daily   Patient not taking: Reported on 3/8/2023   atorvastatin (LIPITOR) 40 mg tablet   No No   Sig: Take 1 tablet (40 mg total) by mouth every evening   atorvastatin (LIPITOR) 40 mg tablet   Yes No   Sig: Take 40 mg by mouth daily   brimonidine tartrate 0 2 % ophthalmic solution   No No   Sig: Administer 1 drop into the left eye 2 (two) times a day   brimonidine tartrate 0 2 % ophthalmic solution   Yes No   Sig: Administer 1 drop into the left eye 2 (two) times a day   diltiazem (CARDIZEM CD) 180 mg 24 hr capsule   Yes No   Sig: Take 180 mg by mouth daily   diltiazem (CARDIZEM CD) 180 mg 24 hr capsule   Yes No   Sig: Take 180 mg by mouth daily   gabapentin (NEURONTIN) 300 mg capsule   Yes No   Sig: Take 300 mg by mouth 4 (four) times a day   gabapentin (NEURONTIN) 300 mg capsule   Yes No   Sig: Take 300 mg by mouth 4 (four) times a day   losartan (COZAAR) 50 mg tablet   Yes No   Sig: TAKE 1 TABLET BY MOUTH EVERY DAY   losartan (COZAAR) 50 mg tablet   Yes No   Sig: Take 50 mg by mouth daily   metFORMIN (GLUCOPHAGE) 1000 MG tablet   No No   Si mg QAM and 500 mg QPM   metFORMIN (GLUCOPHAGE) 500 mg tablet   Yes No   Sig: TAKE 2 TABS BY MOUTH IN IN THE MORNING AND 1 TAB BY MOUTH IN IN THE EVENING   metFORMIN (GLUCOPHAGE) 500 mg tablet   Yes No   Sig: Take 1,000 mg by mouth daily with breakfast   metFORMIN (GLUCOPHAGE) 500 mg tablet   Yes No   Sig: Take 500 mg by mouth every evening   methocarbamol (ROBAXIN) 500 mg tablet   Yes No   Sig: TAKE 1 TABLET BY MOUTH THREE TIMES A DAY AS NEEDED FOR MUSCLE SPASM   methocarbamol (ROBAXIN) 500 mg tablet   Yes No   Sig: Take 500 mg by mouth 4 (four) times a day   ondansetron (ZOFRAN) 4 mg tablet   No No   Sig: Take 1 tablet (4 mg total) by mouth every 8 (eight) hours as needed for nausea or vomiting for up to 10 doses   oxyCODONE (OxyCONTIN) 10 mg 12 hr tablet   Yes No   Sig: Take 13 5 mg by mouth every 12 (twelve) hours   oxyCODONE ER (Xtampza ER) 13 5 MG C12A   No No   Sig: Take 13 5 mg by mouth 2 (two) times a day Max Daily Amount: 27 mg   pantoprazole (PROTONIX) 40 mg tablet   Yes No   Sig: Take 40 mg by mouth every 12 (twelve) hours   pantoprazole (PROTONIX) 40 mg tablet   Yes No   Sig: Take 40 mg by mouth daily   rOPINIRole (REQUIP) 0 5 mg tablet   No No   Si-3 tablets PO at HS   rOPINIRole (REQUIP) 0 5 mg tablet   Yes No   Sig: Take 0 5 mg by mouth daily at bedtime Take 3 tablets PO before bed   sitaGLIPtin (JANUVIA) 100 mg tablet   No No   Sig: Take 1 tablet (100 mg total) by mouth daily   sitaGLIPtin (JANUVIA) 100 mg tablet   Yes No   Sig: Take 100 mg by mouth daily      Facility-Administered Medications: None       Past Medical History:   Diagnosis Date   • Arthritis    • Cardiac disease    • COPD (chronic obstructive pulmonary disease) (HCC)    • Diabetes mellitus (HCC)    • GERD (gastroesophageal reflux disease)    • Hiatal hernia    • Hypertension    • PUD (peptic ulcer disease)    • Residual ASD (atrial septal defect) following repair        Past Surgical History:   Procedure Laterality Date   • ABDOMINAL ADHESION SURGERY N/A 9/26/2022    Procedure: LYSIS ADHESIONS;  Surgeon: Caterina Curran MD;  Location: [facility];  Service: Thoracic   • ASD REPAIR     • BACK SURGERY      x3   • CARDIAC SURGERY     • ESOPHAGOGASTRODUODENOSCOPY N/A 9/26/2022    Procedure: ESOPHAGOGASTRODUODENOSCOPY (EGD); Surgeon: Caterina Curran MD;  Location: [facility];  Service: Thoracic   • JOINT REPLACEMENT      bilateral knee surgery   • JOINT REPLACEMENT     • KNEE SURGERY     • PARAESOPHAGEAL HERNIA REPAIR N/A 9/26/2022    Procedure: REPAIR HERNIA PARAESOPHAGEAL LAPAROSCOPIC W ROBOTICS, gastropexy, mesh placement;  Surgeon: Caterina Curran MD;  Location: [facility];  Service: Thoracic   • SHOULDER SURGERY         Family History   Problem Relation Age of Onset   • Cancer Mother    • Asthma Father      I have reviewed and agree with the history as documented  E-Cigarette/Vaping     E-Cigarette/Vaping Substances     Social History     Tobacco Use   • Smoking status: Former     Packs/day: 1 00     Years: 60 00     Pack years: 60 00     Types: Cigarettes     Start date: 26     Quit date: 2/1/2020     Years since quitting: 3 2   • Smokeless tobacco: Never   • Tobacco comments:     stopped smoking 2 days ago   Substance Use Topics   • Alcohol use:  Yes     Alcohol/week: 1 0 standard drink     Types: 1 Glasses of wine per week   • Drug use: Never        Review of Systems   Constitutional: Negative for chills and fever  HENT: Negative for ear pain and sore throat  Eyes: Negative for pain and visual disturbance  Respiratory: Positive for chest tightness and shortness of breath  Negative for cough  Cardiovascular: Positive for chest pain  Negative for palpitations  Gastrointestinal: Negative for abdominal pain, diarrhea, nausea and vomiting  Genitourinary: Negative for dysuria and hematuria  Musculoskeletal: Negative for arthralgias and back pain  Skin: Negative for color change and rash  Neurological: Negative for seizures and syncope  All other systems reviewed and are negative  Physical Exam  ED Triage Vitals [04/16/23 0940]   Temperature Pulse Respirations Blood Pressure SpO2   98 2 °F (36 8 °C) 62 (!) 28 131/98 93 %      Temp Source Heart Rate Source Patient Position - Orthostatic VS BP Location FiO2 (%)   Oral Monitor Sitting -- --      Pain Score       --             Orthostatic Vital Signs  Vitals:    04/16/23 0940 04/16/23 1130 04/16/23 1200 04/16/23 1300   BP: 131/98 139/69 136/60 147/74   Pulse: 62 72 62 76   Patient Position - Orthostatic VS: Sitting Lying         Physical Exam  Vitals and nursing note reviewed  Constitutional:       General: She is not in acute distress  Appearance: She is well-developed  She is not ill-appearing, toxic-appearing or diaphoretic  HENT:      Head: Normocephalic and atraumatic  Mouth/Throat:      Mouth: Mucous membranes are moist    Eyes:      Extraocular Movements: Extraocular movements intact  Conjunctiva/sclera: Conjunctivae normal    Cardiovascular:      Rate and Rhythm: Normal rate and regular rhythm  Heart sounds: No murmur heard  Pulmonary:      Effort: Pulmonary effort is normal  No respiratory distress  Breath sounds: Examination of the right-middle field reveals decreased breath sounds and wheezing   Examination of the left-middle field reveals decreased breath sounds  Examination of the right-lower field reveals decreased breath sounds and wheezing  Examination of the left-lower field reveals decreased breath sounds and wheezing  Decreased breath sounds and wheezing present  Comments: Very scant expiratory wheezing in the bases BILAT  Chest:      Chest wall: No tenderness  Abdominal:      Palpations: Abdomen is soft  Tenderness: There is no abdominal tenderness  Musculoskeletal:         General: No swelling  Normal range of motion  Cervical back: Neck supple  Right lower leg: No tenderness  No edema  Left lower leg: No tenderness  No edema  Skin:     General: Skin is warm and dry  Capillary Refill: Capillary refill takes less than 2 seconds  Neurological:      Mental Status: She is alert and oriented to person, place, and time     Psychiatric:         Mood and Affect: Mood normal          Behavior: Behavior normal          ED Medications  Medications   methylPREDNISolone sodium succinate (Solu-MEDROL) injection 60 mg (60 mg Intravenous Given 4/16/23 1018)   iohexol (OMNIPAQUE) 350 MG/ML injection (SINGLE-DOSE) 85 mL (85 mL Intravenous Given 4/16/23 1221)   azithromycin (ZITHROMAX) tablet 500 mg (500 mg Oral Given 4/16/23 1342)       Diagnostic Studies  Results Reviewed     Procedure Component Value Units Date/Time    HS Troponin I 2hr [[id number]]  (Normal) Collected: 04/16/23 1203    Lab Status: Final result Specimen: Blood from Arm, Right Updated: 04/16/23 1233     hs TnI 2hr 6 ng/L      Delta 2hr hsTnI -1 ng/L     HS Troponin 0hr (reflex protocol) [[id number]]  (Normal) Collected: 04/16/23 0949    Lab Status: Final result Specimen: Blood from Arm, Right Updated: 04/16/23 1052     hs TnI 0hr 7 ng/L     Comprehensive metabolic panel [[id number]] Collected: 04/16/23 0949    Lab Status: Final result Specimen: Blood from Arm, Right Updated: 04/16/23 1043     Sodium 137 mmol/L      Potassium 4 2 mmol/L      Chloride 102 mmol/L      CO2 27 mmol/L      ANION GAP 8 mmol/L      BUN 14 mg/dL      Creatinine 0 91 mg/dL      Glucose 102 mg/dL      Calcium 9 7 mg/dL      AST 13 U/L      ALT 10 U/L      Alkaline Phosphatase 63 U/L      Total Protein 6 7 g/dL      Albumin 3 8 g/dL      Total Bilirubin 0 62 mg/dL      eGFR 58 ml/min/1 73sq m     Narrative:      [facility] guidelines for Chronic Kidney Disease (CKD):   •  Stage 1 with normal or high GFR (GFR > 90 mL/min/1 73 square meters)  •  Stage 2 Mild CKD (GFR = 60-89 mL/min/1 73 square meters)  •  Stage 3A Moderate CKD (GFR = 45-59 mL/min/1 73 square meters)  •  Stage 3B Moderate CKD (GFR = 30-44 mL/min/1 73 square meters)  •  Stage 4 Severe CKD (GFR = 15-29 mL/min/1 73 square meters)  •  Stage 5 End Stage CKD (GFR <15 mL/min/1 73 square meters)  Note: GFR calculation is accurate only with a steady state creatinine    Lipase [[id number]]  (Normal) Collected: 04/16/23 0949    Lab Status: Final result Specimen: Blood from Arm, Right Updated: 04/16/23 1043     Lipase 11 u/L     D-Dimer [[id number]]  (Abnormal) Collected: 04/16/23 0949    Lab Status: Final result Specimen: Blood from Arm, Right Updated: 04/16/23 1043     D-Dimer, Quant 2 21 ug/ml FEU     Narrative: In the evaluation for possible pulmonary embolism, in the appropriate (Well's Score of 4 or less) patient, the age adjusted d-dimer cutoff for this patient can be calculated as:    Age x 0 01 (in ug/mL) for Age-adjusted D-dimer exclusion threshold for a patient over 50 years      Blood gas, venous [[id number]]  (Abnormal) Collected: 04/16/23 1036    Lab Status: Final result Specimen: Blood from Arm, Right Updated: 04/16/23 1042     pH, Skip 7 470     pCO2, Skip 35 4 mm Hg      pO2, Skip 34 9 mm Hg      HCO3, Skip 25 2 mmol/L      Base Excess, Skip 1 8 mmol/L      O2 Content, Skip 12 0 ml/dL      O2 HGB, VENOUS 71 1 %     CBC and differential [[id number]]  (Abnormal) Collected: 04/16/23 6646    Lab Status: Final result Specimen: Blood from Arm, Right Updated: 04/16/23 1022     WBC 6 72 Thousand/uL      RBC 4 16 Million/uL      Hemoglobin 11 2 g/dL      Hematocrit 35 9 %      MCV 86 fL      MCH 26 9 pg      MCHC 31 2 g/dL      RDW 16 7 %      MPV 9 8 fL      Platelets 126 Thousands/uL      nRBC 0 /100 WBCs      Neutrophils Relative 64 %      Immat GRANS % 0 %      Lymphocytes Relative 21 %      Monocytes Relative 12 %      Eosinophils Relative 2 %      Basophils Relative 1 %      Neutrophils Absolute 4 29 Thousands/µL      Immature Grans Absolute 0 03 Thousand/uL      Lymphocytes Absolute 1 38 Thousands/µL      Monocytes Absolute 0 83 Thousand/µL      Eosinophils Absolute 0 13 Thousand/µL      Basophils Absolute 0 06 Thousands/µL                  CTA ED chest PE Study   Final Result by Toro Thomas MD (04/16 1309)      1  No evidence of pulmonary embolism  2   Minimal clearing of peripheral tree-in-bud opacities and centrilobular nodules in the right upper lobe posteriorly and medially  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  3   Slight distention of the esophagus with mild debris  Question related to reflux or esophageal motility disorder  Workstation performed: [id number]         XR chest 1 view portable   Final Result by Merry Simeon MD (04/17 0805)   No acute cardiopulmonary findings                     Workstation performed: [id number]               Procedures  ECG 12 Lead Documentation Only    Date/Time: 4/16/2023 9:50 AM  Performed by: Linda Mazariegos MD  Authorized by: Linda Mazariegos MD     Indications / Diagnosis:  Sob  ECG reviewed by me, the ED Provider: yes    Patient location:  ED  Previous ECG:     Previous ECG:  Compared to current    Comparison ECG info:  3/9/2023    Similarity:  No change    Comparison to cardiac monitor: Yes    Interpretation:     Interpretation: non-specific    Rate:     ECG rate:  53    ECG rate assessment: bradycardic Rhythm:     Rhythm: sinus rhythm    Ectopy:     Ectopy: none    QRS:     QRS axis:  Right  Conduction:     Conduction: abnormal      Abnormal conduction: incomplete RBBB    ST segments:     ST segments:  Normal  T waves:     T waves: non-specific and flattening            ED Course  ED Course as of 04/23/23 2356   Sun Apr 16, 2023   1029 Hemoglobin(!): 11 2  Improved from 10 5 1 month ago   1052 D-Dimer, Quant(!): 2 21  CTA chest PE study will be ordered at this time                     [facility] Rule for PE    Flowsheet Row Most Recent Value   PERC Rule for PE    Age >=50 1 Filed at: 04/16/2023 1109   HR >=100 0 Filed at: 04/16/2023 1109   O2 Sat on room air < 95% 0 Filed at: 04/16/2023 1109   History of PE or DVT 0 Filed at: 04/16/2023 1109   Recent trauma or surgery 0 Filed at: 04/16/2023 1109   Hemoptysis 0 Filed at: 04/16/2023 1109   Exogenous estrogen 0 Filed at: 04/16/2023 1109   Unilateral leg swelling 0 Filed at: 04/16/2023 1109   PERC Rule for PE Results 1 Filed at: 04/16/2023 1109                  Wells' Criteria for PE    Flowsheet Row Most Recent Value   Wells' Criteria for PE    Clinical signs and symptoms of DVT 0 Filed at: 04/16/2023 1109   PE is primary diagnosis or equally likely 0 Filed at: 04/16/2023 1109   HR >100 0 Filed at: 04/16/2023 1109   Immobilization at least 3 days or Surgery in the previous 4 weeks 0 Filed at: 04/16/2023 1109   Previous, objectively diagnosed PE or DVT 0 Filed at: 04/16/2023 1109   Hemoptysis 0 Filed at: 04/16/2023 1109   Malignancy with treatment within 6 months or palliative 0 Filed at: 04/16/2023 1109   Wells' Criteria Total 0 Filed at: 04/16/2023 1109            Medical Decision Making  Pt remained stable throughout ED course  Pt remained on baseline NC O2 without overt hypoxia  Despite report of SOB and mild tachypnea on initial presentation, after 60 mg solumedrol, pt did not require any other treatments and reported feeling improved  VBG nonacute    EKG revealed baseline RAD with bradycardia and nonspecific changes noted on previous EKG  Given increased risk factors including smoking history but in low risk of acute PE, d dimer was obtained and was found to be elevated for which CTA chest PE study was done which showed inflammatory changes consistent with bronchitis without evidence of acute PE  No redemonstration of pneumonia for which pt was admitted recently  Troponin x2 WNL  Pt was given Rx Azithromycin and d/rudy with strict RTER precautions  Amount and/or Complexity of Data Reviewed  Labs: ordered  Decision-making details documented in ED Course  Radiology: ordered  Risk  Prescription drug management  Disposition  Final diagnoses:   COPD exacerbation ([facility] [address] )   Bronchitis     Time reflects when diagnosis was documented in both MDM as applicable and the Disposition within this note     Time User Action Codes Description Comment    4/16/2023  2:03 PM Lavella Alvarez Add [J44 1] COPD exacerbation ([facility] [address] )     4/16/2023  2:03 PM Lavella Alvarez Add [J40] Bronchitis       ED Disposition     ED Disposition   Discharge    Condition   Stable    Date/Time   Sun Apr 16, 2023  2:03 PM    [address] discharge to home/self care  Follow-up Information     Follow up With Specialties Details Why Contact Info Additional Information    Uriel Ware DO Family Medicine Call in 2 days  [address] [facility] Emergency Department Emergency Medicine Go to  As needed, If symptoms worsen such as difficulty breathing, high fevers, increased sputum with cough, chest pain or any other new or worsening concerns   [address] Emergency Department, [address], [place], [place], [address]          Discharge Medication List as of 4/16/2023  2:06 PM      START taking these medications    Details   azithromycin (ZITHROMAX) 500 MG tablet Take 1 tablet (500 mg total) by mouth daily for 7 days, Starting Sun 4/16/2023, Until Sun 4/23/2023, Normal      methylPREDNISolone 4 MG tablet therapy pack Use as directed on package, Normal         CONTINUE these medications which have NOT CHANGED    Details   !! albuterol (PROVENTIL HFA,VENTOLIN HFA) 90 mcg/act inhaler Inhale 2 puffs every 6 (six) hours as needed for wheezing, Starting Mon 9/20/2021, Normal      !! albuterol (PROVENTIL HFA,VENTOLIN HFA) 90 mcg/act inhaler Inhale 2 puffs every 6 (six) hours as needed for wheezing, Historical Med      !! ascorbic acid (VITAMIN C) 1000 MG tablet Take 1,000 mg by mouth 2 (two) times a day, Historical Med      !! Ascorbic Acid (vitamin C) 1000 MG tablet Take 1,000 mg by mouth 2 (two) times a day, Historical Med      !! aspirin 81 mg chewable tablet Chew 1 tablet (81 mg total) daily, Starting Sat 4/23/2022, Normal      !! aspirin 81 mg chewable tablet Chew 81 mg daily, Historical Med      atorvastatin (LIPITOR) 40 mg tablet Take 40 mg by mouth daily, Historical Med      !! brimonidine tartrate 0 2 % ophthalmic solution Administer 1 drop into the left eye 2 (two) times a day, Starting Fri 4/22/2022, Normal      !! brimonidine tartrate 0 2 % ophthalmic solution Administer 1 drop into the left eye 2 (two) times a day, Historical Med      !! Budeson-Glycopyrrol-Formoterol (Breztri Aerosphere) 160-9-4 8 MCG/ACT AERO Inhale 2 puffs  in the morning and 2 puffs before bedtime  Rinse mouth after use   , Starting Mon 5/16/2022, Normal      !!  Budeson-Glycopyrrol-Formoterol 160-9-4 8 MCG/ACT AERO Inhale 2 puffs Rinse mouth after use , Historical Med      !! diltiazem (CARDIZEM CD) 180 mg 24 hr capsule Take 180 mg by mouth daily, Historical Med      !! diltiazem (CARDIZEM CD) 180 mg 24 hr capsule Take 180 mg by mouth daily, Historical Med      !! gabapentin (NEURONTIN) 300 mg capsule Take 300 mg by mouth 4 (four) times a day, Starting Mon 3/23/2020, Historical Med      !! gabapentin (NEURONTIN) 300 mg capsule Take 300 mg by mouth 4 (four) times a day, Historical Med      !! losartan (COZAAR) 50 mg tablet TAKE 1 TABLET BY MOUTH EVERY DAY, Historical Med      !! losartan (COZAAR) 50 mg tablet Take 50 mg by mouth daily, Historical Med      !! metFORMIN (GLUCOPHAGE) 1000 MG tablet 1000 mg QAM and 500 mg QPM, No Print      !! metFORMIN (GLUCOPHAGE) 500 mg tablet TAKE 2 TABS BY MOUTH IN IN THE MORNING AND 1 TAB BY MOUTH IN IN THE EVENING, Historical Med      !! metFORMIN (GLUCOPHAGE) 500 mg tablet Take 1,000 mg by mouth daily with breakfast, Historical Med      !! metFORMIN (GLUCOPHAGE) 500 mg tablet Take 500 mg by mouth every evening, Historical Med      !! methocarbamol (ROBAXIN) 500 mg tablet TAKE 1 TABLET BY MOUTH THREE TIMES A DAY AS NEEDED FOR MUSCLE SPASM, Historical Med      !! methocarbamol (ROBAXIN) 500 mg tablet Take 500 mg by mouth 4 (four) times a day, Historical Med      ondansetron (ZOFRAN) 4 mg tablet Take 1 tablet (4 mg total) by mouth every 8 (eight) hours as needed for nausea or vomiting for up to 10 doses, Starting Fri 3/10/2023, Normal      oxyCODONE (OxyCONTIN) 10 mg 12 hr tablet Take 13 5 mg by mouth every 12 (twelve) hours, Historical Med      oxyCODONE ER (Xtampza ER) 13 5 MG C12A Take 13 5 mg by mouth 2 (two) times a day Max Daily Amount: 27 mg, Starting Mon 5/16/2022, No Print      !! pantoprazole (PROTONIX) 40 mg tablet Take 40 mg by mouth every 12 (twelve) hours, Historical Med      !! pantoprazole (PROTONIX) 40 mg tablet Take 40 mg by mouth daily, Historical Med      !! rOPINIRole (REQUIP) 0 5 mg tablet 2-3 tablets PO at HS, No Print      !! rOPINIRole (REQUIP) 0 5 mg tablet Take 0 5 mg by mouth daily at bedtime Take 3 tablets PO before bed, Historical Med      !! sitaGLIPtin (JANUVIA) 100 mg tablet Take 1 tablet (100 mg total) by mouth daily, Starting Mon 4/13/2020, No Print      !! sitaGLIPtin (Midge Cast) 100 mg tablet Take 100 mg by mouth daily, Historical Med       !! - Potential duplicate medications found  Please discuss with provider  No discharge procedures on file  PDMP Review       Value Time User    PDMP Reviewed  Yes 2/28/2023 11:25 PM Clary Choi MD           ED Provider  Attending physically available and evaluated Reina Su  I managed the patient along with the ED Attending      Electronically Signed by         Rajeev Riggins MD  04/23/23 1975

## 2023-04-16 NOTE — DISCHARGE INSTRUCTIONS
Please use your albuterol inhaler every 4 hours as needed, if you need your albuterol inhaler more often than this, please present to the emergency department

## 2023-04-16 NOTE — ED ATTENDING ATTESTATION
4/16/2023  Jess CARBAJAL, DO, saw and evaluated the patient  I have discussed the patient with the resident/non-physician practitioner and agree with the resident's/non-physician practitioner's findings, Plan of Care, and MDM as documented in the resident's/non-physician practitioner's note, except where noted  All available labs and Radiology studies were reviewed  I was present for key portions of any procedure(s) performed by the resident/non-physician practitioner and I was immediately available to provide assistance  At this point I agree with the current assessment done in the Emergency Department  I have conducted an independent evaluation of this patient a history and physical is as follows:    80-year-old female coming into the ED for evaluation of shortness of breath starting about 1 hour prior to arrival   No other associated symptoms  She has a history of COPD on oxygen 2 to 3 L chronically  PE:  The patient is well appearing, non-toxic, in NAD  Head: normocephalic, atraumatic  HEENT: mucous membranes moist   Lungs: CTA b/l, mild tachypnea, but no resp distress  Heart: RRR  No M/R/G  Abdomen: NT, ND, no R/R/G  Neuro: CN2-12 intact, GCS 15  Normal strength and sensation, normal speech  Cap refill < 2 sec, skin warm and dry  No rashes or lesions  Symptoms improved w/ nebs and steroids  D-dimer elevated, CTA PE study performed and neg for PE, shows only minimal tree in bud opacities  Treat for COPD exacerbation         ED Course         Critical Care Time  Procedures

## 2023-05-03 PROBLEM — J18.9 COMMUNITY ACQUIRED PNEUMONIA: Status: RESOLVED | Noted: 2023-03-01 | Resolved: 2023-05-03

## 2023-05-17 ENCOUNTER — TELEPHONE (OUTPATIENT)
Dept: CARDIAC SURGERY | Facility: CLINIC | Age: 83
End: 2023-05-17

## 2023-05-17 NOTE — TELEPHONE ENCOUNTER
Patient called into the office to r s her appt with Dr Maritza Zaidi   Patient is scheduled for June 1st 2023 @ 4pm in [facility]

## 2023-06-01 ENCOUNTER — OFFICE VISIT (OUTPATIENT)
Dept: CARDIAC SURGERY | Facility: CLINIC | Age: 83
End: 2023-06-01

## 2023-06-01 VITALS
OXYGEN SATURATION: 94 % | RESPIRATION RATE: 17 BRPM | HEIGHT: 61 IN | HEART RATE: 73 BPM | TEMPERATURE: 98.2 F | WEIGHT: 160.05 LBS | SYSTOLIC BLOOD PRESSURE: 110 MMHG | DIASTOLIC BLOOD PRESSURE: 72 MMHG | BODY MASS INDEX: 30.22 KG/M2

## 2023-06-01 DIAGNOSIS — K44.0 PARAESOPHAGEAL HERNIA WITH OBSTRUCTION BUT NO GANGRENE: Primary | ICD-10-CM

## 2023-06-01 NOTE — ASSESSMENT & PLAN NOTE
Josias Elizabeth is doing well from a thoracic surgery standpoint  She has no limitation with any foods and no negative symptoms related to eating  Therefore, she will only need to return to our office on as needed basis  She is in agreement with the plan  She was instructed to call the office in the future if she develops any issues

## 2023-06-01 NOTE — PROGRESS NOTES
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          Thoracic History   Cancer Staging   No matching staging information was found for the patient  Oncology History    No history exists  Patient ID: Deana Cuevas is a 80 y o  female  ECOG 2   HPI     Ms Lorrie Stubbs is an 81 yo female who underwent a robotic assisted paraesophageal hernia repair on 9/6/22  She was last seen on 10/20/22 at which point was eating almost everything without any difficulty  She was using a walker and home 02 around the clock, 2-3L  She returns today, 8 months later  On discussion, she is doing very well  She is eating all food consistencies without any limitation  She denies fever, chills, cough, shortness of breath, chest pain, nausea, vomiting, weight loss, or regurgitation  She only uses oxygen now to sleep  She is upset about her son that just passed away and subsequently having her granddaughter move out  The following portions of the patient's history were reviewed and updated as appropriate: allergies, current medications, past family history, past medical history, past social history, past surgical history and problem list     Review of Systems      Objective:   Physical Exam  Vitals reviewed  Constitutional:       General: She is not in acute distress  Appearance: She is not ill-appearing  HENT:      Head: Normocephalic and atraumatic  Nose: Nose normal    Eyes:      General: No scleral icterus       Extraocular Movements: Extraocular "movements intact  Comments: + glasses   Cardiovascular:      Rate and Rhythm: Normal rate and regular rhythm  Heart sounds: Normal heart sounds  Pulmonary:      Effort: Pulmonary effort is normal  No respiratory distress  Breath sounds: Normal breath sounds  Abdominal:      General: Abdomen is flat  Bowel sounds are normal  There is no distension  Palpations: Abdomen is soft  Musculoskeletal:      Cervical back: Normal range of motion and neck supple  Neurological:      Mental Status: She is alert  Gait: Gait abnormal (walks with a walker )        Comments:      Psychiatric:         Mood and Affect: Mood normal          Behavior: Behavior normal      /72 (BP Location: Left arm, Patient Position: Sitting, Cuff Size: Standard)   Pulse 73   Temp 98 2 °F (36 8 °C) (Temporal)   Resp 17   Ht 5' 1\" (1 549 m)   Wt 72 6 kg (160 lb 0 9 oz)   SpO2 94%   BMI 30 24 kg/m²        "

## 2023-06-14 ENCOUNTER — APPOINTMENT (OUTPATIENT)
Dept: RADIOLOGY | Facility: AMBULARY SURGERY CENTER | Age: 83
End: 2023-06-14
Attending: SURGERY
Payer: COMMERCIAL

## 2023-06-14 ENCOUNTER — OFFICE VISIT (OUTPATIENT)
Dept: OBGYN CLINIC | Facility: CLINIC | Age: 83
End: 2023-06-14
Payer: COMMERCIAL

## 2023-06-14 VITALS — DIASTOLIC BLOOD PRESSURE: 71 MMHG | HEART RATE: 86 BPM | SYSTOLIC BLOOD PRESSURE: 120 MMHG

## 2023-06-14 DIAGNOSIS — R22.32 MASS OF FINGER OF LEFT HAND: ICD-10-CM

## 2023-06-14 DIAGNOSIS — J96.21 ACUTE ON CHRONIC RESPIRATORY FAILURE WITH HYPOXIA (HCC): ICD-10-CM

## 2023-06-14 DIAGNOSIS — R22.32 MASS OF FINGER OF LEFT HAND: Primary | ICD-10-CM

## 2023-06-14 DIAGNOSIS — Z01.812 PRE-PROCEDURE LAB EXAM: ICD-10-CM

## 2023-06-14 DIAGNOSIS — E11.65 TYPE 2 DIABETES MELLITUS WITH HYPERGLYCEMIA, WITHOUT LONG-TERM CURRENT USE OF INSULIN (HCC): ICD-10-CM

## 2023-06-14 PROCEDURE — 99205 OFFICE O/P NEW HI 60 MIN: CPT | Performed by: SURGERY

## 2023-06-14 PROCEDURE — 73140 X-RAY EXAM OF FINGER(S): CPT

## 2023-06-14 RX ORDER — CHLORHEXIDINE GLUCONATE 0.12 MG/ML
15 RINSE ORAL ONCE
OUTPATIENT
Start: 2023-06-14 | End: 2023-06-14

## 2023-06-14 NOTE — H&P
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     X-rays were performed in the office and reviewed  The etiology of above diagnosis was discussed along with treatment options  She did have this \"lanced\" once by her PCP, and the mass has since returned  We discussed with surgical intervention, it is possible that the mass can reoccur  Left small finger mass excision was discussed at length including risks and benefits  Risks of surgery consist of but not limited to bleeding, infection, stiffness, pain, reoccurrence, injury to surrounding structures, need for further surgery, etc  She elected to proceed with a left small finger mass excision and informed surgical consent was signed  Post operative instructions/expectations were reviewed and provided in AVS  Lab work and EKG will be obtained prior to surgical intervention  Surgery will be planed for a Friday in [facility]  Follow up in the office 10-14 days post op for suture removal      The patient verbalized understanding of exam findings and treatment plan  We engaged in the shared decision-making process and treatment options were discussed at length with the patient  Surgical and conservative management discussed today along with risks and benefits  Diagnoses and all orders for this visit:    Mass of finger of left hand  -     XR finger left fifth digit-pinkie; Future      Follow Up:  No follow-ups on file  To Do Next Visit:  Sutures out      General Discussions:  Ganglion Cysts: The anatomy and physiology of the ganglion was discussed with the patient today in the office  Fluid leaking out of the joint surface typically creates a small sac, which can enlarge and cause pain or limitation of motion    Treatment " options include observation, aspiration, or surgical incision were discussed with the patient today  Observation typically lead to resolution and approximately 10% of patients, aspiration results in resolution of approximately 50% of patients, and surgical excision leads to resolution in approximately 97% of patients  After discussion with the patient today, the patient voiced understanding of all treatment options  Operative Discussions:  Standard Consent: The risks and benefits of the procedure were explained to the patient, which include, but are not limited to: Bleeding, infection, recurrence, pain, scar, damage to tendons, damage to nerves, and damage to blood vessels, failure to give desired results and complications related to anesthesia  These risks, along with alternative conservative treatment options, and postoperative protocols were voiced back and understood by the patient  All questions were answered to the patient's satisfaction  The patient agrees to comply with a standard postoperative protocol, and is willing to proceed  Education was provided via written and auditory forms  There were no barriers to learning  Written handouts regarding wound care, incision and scar care, and general preoperative information was provided to the patient  Prior to surgery, the patient may be requested to stop all anti-inflammatory medications  Prophylactic aspirin, Plavix, and Coumadin may be allowed to be continued  Medications including vitamin E , ginkgo, and fish oil are requested to be stopped approximately one week prior to surgery  Hypertensive medications and beta blockers, if taken, should be continued  ____________________________________________________________________________________________________________________________________________      CHIEF COMPLAINT:  Chief Complaint   Patient presents with   • Left Hand - Pain     Lt hand pinky finger lump x few months   Had drained a few "weeks ago at [facility] office but came back  No injury  SUBJECTIVE:  Jacqueline Miramontes is a 80y o  year old LHD female who presents to the office for a left small finger mass  She notes a mass to the volar aspect of her left small finger distal phalanx  She states that this has been present for aprox  1 5 months  She was seen by her PCP aprox  1 month ago, at which time she reports her PCP \"lanced\" the mass  She notes that the mass consisted of white jelly like fluid  She notes that the mass has since returned  She notes pain to this area with use or if it is touched  She was seen by Dr Miller Later at [facility] and signed surgical consent to undergo mass excision  She notes due to her insurance she was unable to proceed with surgery with [facility]  She would like to have the mass removed as it is painful for her         Pain/symptom timing:  Worse during the day when active  Pain/symptom context:  Worse with activites and work  Pain/symptom modifying factors:  Rest makes better, activities make worse  Pain/symptom associated signs/symptoms: none    Prior treatment   · NSAIDsNo   · Injections No   · Bracing/Orthotics No    Physical Therapy No     I have personally reviewed all the relevant PMH, PSH, SH, FH, Medications and allergies      PAST MEDICAL HISTORY:  Past Medical History:   Diagnosis Date   • Arthritis    • Cardiac disease    • COPD (chronic obstructive pulmonary disease) ([facility] )    • Diabetes mellitus ([facility] )    • GERD (gastroesophageal reflux disease)    • Hiatal hernia    • Hypertension    • PUD (peptic ulcer disease)    • Residual ASD (atrial septal defect) following repair        PAST SURGICAL HISTORY:  Past Surgical History:   Procedure Laterality Date   • ABDOMINAL ADHESION SURGERY N/A 9/26/2022    Procedure: LYSIS ADHESIONS;  Surgeon: Danny Cain MD;  [facility]: [facility];  Service: Thoracic   • ASD REPAIR     • BACK SURGERY      x3   • CARDIAC SURGERY     • ESOPHAGOGASTRODUODENOSCOPY N/A 9/26/2022    " Procedure: ESOPHAGOGASTRODUODENOSCOPY (EGD); Surgeon: Meagan Owens MD;  Location: [facility];  Service: Thoracic   • JOINT REPLACEMENT      bilateral knee surgery   • JOINT REPLACEMENT     • KNEE SURGERY     • PARAESOPHAGEAL HERNIA REPAIR N/A 9/26/2022    Procedure: REPAIR HERNIA PARAESOPHAGEAL LAPAROSCOPIC W ROBOTICS, gastropexy, mesh placement;  Surgeon: Meagan Owens MD;  Location: [facility];  Service: Thoracic   • SHOULDER SURGERY         FAMILY HISTORY:  Family History   Problem Relation Age of Onset   • Cancer Mother    • Asthma Father        SOCIAL HISTORY:  Social History     Tobacco Use   • Smoking status: Former     Packs/day: 1 00     Years: 60 00     Total pack years: 60 00     Types: Cigarettes     Start date: 26     Quit date: 2/1/2020     Years since quitting: 3 3   • Smokeless tobacco: Never   • Tobacco comments:     stopped smoking 2 days ago   Substance Use Topics   • Alcohol use:  Yes     Alcohol/week: 1 0 standard drink of alcohol     Types: 1 Glasses of wine per week   • Drug use: Never       MEDICATIONS:    Current Outpatient Medications:   •  albuterol (PROVENTIL HFA,VENTOLIN HFA) 90 mcg/act inhaler, Inhale 2 puffs every 6 (six) hours as needed for wheezing, Disp: 54 g, Rfl: 3  •  albuterol (PROVENTIL HFA,VENTOLIN HFA) 90 mcg/act inhaler, Inhale 2 puffs every 6 (six) hours as needed for wheezing, Disp: , Rfl:   •  ascorbic acid (VITAMIN C) 1000 MG tablet, Take 1,000 mg by mouth 2 (two) times a day, Disp: , Rfl:   •  Ascorbic Acid (vitamin C) 1000 MG tablet, Take 1,000 mg by mouth 2 (two) times a day, Disp: , Rfl:   •  aspirin 81 mg chewable tablet, Chew 1 tablet (81 mg total) daily, Disp: 30 tablet, Rfl: 0  •  aspirin 81 mg chewable tablet, Chew 81 mg daily (Patient not taking: Reported on 3/8/2023), Disp: , Rfl:   •  atorvastatin (LIPITOR) 40 mg tablet, Take 1 tablet (40 mg total) by mouth every evening, Disp: 30 tablet, Rfl: 0  •  atorvastatin (LIPITOR) 40 mg tablet, Take 40 mg by mouth daily, Disp: , Rfl:   •  brimonidine tartrate 0 2 % ophthalmic solution, Administer 1 drop into the left eye 2 (two) times a day, Disp: 5 mL, Rfl: 0  •  brimonidine tartrate 0 2 % ophthalmic solution, Administer 1 drop into the left eye 2 (two) times a day, Disp: , Rfl:   •  Budeson-Glycopyrrol-Formoterol (Breztri Aerosphere) 160-9-4 8 MCG/ACT AERO, Inhale 2 puffs  in the morning and 2 puffs before bedtime  Rinse mouth after use   , Disp: 31 1 g, Rfl: 3  •  Budeson-Glycopyrrol-Formoterol 160-9-4 8 MCG/ACT AERO, Inhale 2 puffs Rinse mouth after use , Disp: , Rfl:   •  diltiazem (CARDIZEM CD) 180 mg 24 hr capsule, Take 180 mg by mouth daily, Disp: , Rfl:   •  diltiazem (CARDIZEM CD) 180 mg 24 hr capsule, Take 180 mg by mouth daily, Disp: , Rfl:   •  gabapentin (NEURONTIN) 300 mg capsule, Take 300 mg by mouth 4 (four) times a day, Disp: , Rfl:   •  gabapentin (NEURONTIN) 300 mg capsule, Take 300 mg by mouth 4 (four) times a day, Disp: , Rfl:   •  losartan (COZAAR) 50 mg tablet, TAKE 1 TABLET BY MOUTH EVERY DAY, Disp: , Rfl:   •  losartan (COZAAR) 50 mg tablet, Take 50 mg by mouth daily, Disp: , Rfl:   •  metFORMIN (GLUCOPHAGE) 1000 MG tablet, 1000 mg QAM and 500 mg QPM, Disp: 1 tablet, Rfl: 0  •  metFORMIN (GLUCOPHAGE) 500 mg tablet, TAKE 2 TABS BY MOUTH IN IN THE MORNING AND 1 TAB BY MOUTH IN IN THE EVENING, Disp: , Rfl:   •  metFORMIN (GLUCOPHAGE) 500 mg tablet, Take 1,000 mg by mouth daily with breakfast, Disp: , Rfl:   •  metFORMIN (GLUCOPHAGE) 500 mg tablet, Take 500 mg by mouth every evening, Disp: , Rfl:   •  methocarbamol (ROBAXIN) 500 mg tablet, TAKE 1 TABLET BY MOUTH THREE TIMES A DAY AS NEEDED FOR MUSCLE SPASM (Patient not taking: Reported on 6/1/2023), Disp: , Rfl:   •  methocarbamol (ROBAXIN) 500 mg tablet, Take 500 mg by mouth 4 (four) times a day (Patient not taking: Reported on 6/1/2023), Disp: , Rfl:   •  methylPREDNISolone 4 MG tablet therapy pack, Use as directed on package, Disp: 21 "tablet, Rfl: 0  •  ondansetron (ZOFRAN) 4 mg tablet, Take 1 tablet (4 mg total) by mouth every 8 (eight) hours as needed for nausea or vomiting for up to 10 doses, Disp: 20 tablet, Rfl: 0  •  oxyCODONE (OxyCONTIN) 10 mg 12 hr tablet, Take 13 5 mg by mouth every 12 (twelve) hours (Patient not taking: Reported on 6/1/2023), Disp: , Rfl:   •  oxyCODONE ER (Xtampza ER) 13 5 MG C12A, Take 13 5 mg by mouth 2 (two) times a day Max Daily Amount: 27 mg (Patient not taking: Reported on 6/1/2023), Disp: 60 capsule, Rfl: 0  •  pantoprazole (PROTONIX) 40 mg tablet, Take 40 mg by mouth every 12 (twelve) hours, Disp: , Rfl:   •  pantoprazole (PROTONIX) 40 mg tablet, Take 40 mg by mouth daily, Disp: , Rfl:   •  rOPINIRole (REQUIP) 0 5 mg tablet, 2-3 tablets PO at HS, Disp: 30 tablet, Rfl: 0  •  rOPINIRole (REQUIP) 0 5 mg tablet, Take 0 5 mg by mouth daily at bedtime Take 3 tablets PO before bed, Disp: , Rfl:   •  sitaGLIPtin (JANUVIA) 100 mg tablet, Take 1 tablet (100 mg total) by mouth daily, Disp: 30 tablet, Rfl: 0  •  sitaGLIPtin (JANUVIA) 100 mg tablet, Take 100 mg by mouth daily, Disp: , Rfl:     ALLERGIES:  Allergies   Allergen Reactions   • Metamucil [Fiber] Lightheadedness   • Prednisone      The patient reports \"they make me goofy \"  No true allergic reaction   • Prednisone Lightheadedness   • Psyllium    • Tetanus Toxoids            REVIEW OF SYSTEMS:  Review of Systems    VITALS:  Vitals:    06/14/23 1423   BP: 120/71   Pulse: 86       LABS:  HgA1c:   Lab Results   Component Value Date    HGBA1C 7 0 (H) 03/02/2023     BMP:   Lab Results   Component Value Date    BUN 14 04/16/2023    CALCIUM 9 7 04/16/2023     04/16/2023    CO2 27 04/16/2023    CREATININE 0 91 04/16/2023    GLUCOSE 170 (H) 05/19/2017    K 4 2 04/16/2023     07/14/2015       _____________________________________________________  PHYSICAL EXAMINATION:  General: well developed and well nourished, alert, oriented times 3 and appears " comfortable  Psychiatric: Normal  HEENT: Normocephalic, Atraumatic Trachea Midline, No torticollis  Pulmonary: No audible wheezing or respiratory distress   Abdomen/GI: Non tender, non distended   Cardiovascular: No pitting edema, 2+ radial pulse   Skin: No Erythema, No Lacerations, No Ulcerations  Neurovascular: Sensation Intact to the Median, Ulnar, Radial Nerve, Motor Intact to the Median, Ulnar, Radial Nerve and Pulses Intact  Musculoskeletal: Normal, except as noted in detailed exam and in HPI        MUSCULOSKELETAL EXAMINATION:    Left small finger:     No erythema, ecchymosis or edema  Volar distal phalanx mass measuring 1 x 1 CM   Mass is tender to palpation   Full extension   Brisk capillary refill     ___________________________________________________  STUDIES REVIEWED:  I have personally reviewed AP lateral and oblique radiographs of left small finger   which demonstrate diffuse osteopenia and arthritic changes , no calcification in area of mass            PROCEDURES PERFORMED:  Procedures  No Procedures performed today    _____________________________________________________      Travis Fiore    I,:  Samantha Miles am acting as a scribe while in the presence of the attending physician :       I,:  Rossy Steward MD personally performed the services described in this documentation    as scribed in my presence :

## 2023-06-14 NOTE — PROGRESS NOTES
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     X-rays were performed in the office and reviewed  The etiology of above diagnosis was discussed along with treatment options  She did have this \"lanced\" once by her PCP, and the mass has since returned  We discussed with surgical intervention, it is possible that the mass can reoccur  Left small finger mass excision was discussed at length including risks and benefits  Risks of surgery consist of but not limited to bleeding, infection, stiffness, pain, reoccurrence, injury to surrounding structures, need for further surgery, etc  She elected to proceed with a left small finger mass excision and informed surgical consent was signed  Post operative instructions/expectations were reviewed and provided in AVS  Lab work and EKG will be obtained prior to surgical intervention  Surgery will be planed for a Friday in [place]  Follow up in the office 10-14 days post op for suture removal      The patient verbalized understanding of exam findings and treatment plan  We engaged in the shared decision-making process and treatment options were discussed at length with the patient  Surgical and conservative management discussed today along with risks and benefits  Diagnoses and all orders for this visit:    Mass of finger of left hand  -     XR finger left fifth digit-pinkie; Future      Follow Up:  No follow-ups on file  To Do Next Visit:  Sutures out      General Discussions:  Ganglion Cysts: The anatomy and physiology of the ganglion was discussed with the patient today in the office  Fluid leaking out of the joint surface typically creates a small sac, which can enlarge and cause pain or limitation of motion    Treatment " options include observation, aspiration, or surgical incision were discussed with the patient today  Observation typically lead to resolution and approximately 10% of patients, aspiration results in resolution of approximately 50% of patients, and surgical excision leads to resolution in approximately 97% of patients  After discussion with the patient today, the patient voiced understanding of all treatment options  Operative Discussions:  Standard Consent: The risks and benefits of the procedure were explained to the patient, which include, but are not limited to: Bleeding, infection, recurrence, pain, scar, damage to tendons, damage to nerves, and damage to blood vessels, failure to give desired results and complications related to anesthesia  These risks, along with alternative conservative treatment options, and postoperative protocols were voiced back and understood by the patient  All questions were answered to the patient's satisfaction  The patient agrees to comply with a standard postoperative protocol, and is willing to proceed  Education was provided via written and auditory forms  There were no barriers to learning  Written handouts regarding wound care, incision and scar care, and general preoperative information was provided to the patient  Prior to surgery, the patient may be requested to stop all anti-inflammatory medications  Prophylactic aspirin, Plavix, and Coumadin may be allowed to be continued  Medications including vitamin E , ginkgo, and fish oil are requested to be stopped approximately one week prior to surgery  Hypertensive medications and beta blockers, if taken, should be continued  ____________________________________________________________________________________________________________________________________________      CHIEF COMPLAINT:  Chief Complaint   Patient presents with   • Left Hand - Pain     Lt hand pinky finger lump x few months   Had drained a few "weeks ago at [facility] office but came back  No injury  SUBJECTIVE:  Donavon Encinas is a 80y o  year old LHD female who presents to the office for a left small finger mass  She notes a mass to the volar aspect of her left small finger distal phalanx  She states that this has been present for aprox  1 5 months  She was seen by her PCP aprox  1 month ago, at which time she reports her PCP \"lanced\" the mass  She notes that the mass consisted of white jelly like fluid  She notes that the mass has since returned  She notes pain to this area with use or if it is touched  She was seen by Dr Hunter Zhang at [facility] and signed surgical consent to undergo mass excision  She notes due to her insurance she was unable to proceed with surgery with [facility]  She would like to have the mass removed as it is painful for her         Pain/symptom timing:  Worse during the day when active  Pain/symptom context:  Worse with activites and work  Pain/symptom modifying factors:  Rest makes better, activities make worse  Pain/symptom associated signs/symptoms: none    Prior treatment   · NSAIDsNo   · Injections No   · Bracing/Orthotics No    Physical Therapy No     I have personally reviewed all the relevant PMH, PSH, SH, FH, Medications and allergies      PAST MEDICAL HISTORY:  Past Medical History:   Diagnosis Date   • Arthritis    • Cardiac disease    • COPD (chronic obstructive pulmonary disease) ([facility] [address] )    • Diabetes mellitus ([facility] [address] )    • GERD (gastroesophageal reflux disease)    • Hiatal hernia    • Hypertension    • PUD (peptic ulcer disease)    • Residual ASD (atrial septal defect) following repair        PAST SURGICAL HISTORY:  Past Surgical History:   Procedure Laterality Date   • ABDOMINAL ADHESION SURGERY N/A 9/26/2022    Procedure: LYSIS ADHESIONS;  Surgeon: Zeenat Gutierres MD;  [facility]: [facility];  Service: Thoracic   • ASD REPAIR     • BACK SURGERY      x3   • CARDIAC SURGERY     • ESOPHAGOGASTRODUODENOSCOPY N/A 9/26/2022    " Procedure: ESOPHAGOGASTRODUODENOSCOPY (EGD); Surgeon: Margarita Spain MD;  Location: [facility];  Service: Thoracic   • JOINT REPLACEMENT      bilateral knee surgery   • JOINT REPLACEMENT     • KNEE SURGERY     • PARAESOPHAGEAL HERNIA REPAIR N/A 9/26/2022    Procedure: REPAIR HERNIA PARAESOPHAGEAL LAPAROSCOPIC W ROBOTICS, gastropexy, mesh placement;  Surgeon: Margarita Spain MD;  Location: [facility];  Service: Thoracic   • SHOULDER SURGERY         FAMILY HISTORY:  Family History   Problem Relation Age of Onset   • Cancer Mother    • Asthma Father        SOCIAL HISTORY:  Social History     Tobacco Use   • Smoking status: Former     Packs/day: 1 00     Years: 60 00     Total pack years: 60 00     Types: Cigarettes     Start date: 26     Quit date: 2/1/2020     Years since quitting: 3 3   • Smokeless tobacco: Never   • Tobacco comments:     stopped smoking 2 days ago   Substance Use Topics   • Alcohol use:  Yes     Alcohol/week: 1 0 standard drink of alcohol     Types: 1 Glasses of wine per week   • Drug use: Never       MEDICATIONS:    Current Outpatient Medications:   •  albuterol (PROVENTIL HFA,VENTOLIN HFA) 90 mcg/act inhaler, Inhale 2 puffs every 6 (six) hours as needed for wheezing, Disp: 54 g, Rfl: 3  •  albuterol (PROVENTIL HFA,VENTOLIN HFA) 90 mcg/act inhaler, Inhale 2 puffs every 6 (six) hours as needed for wheezing, Disp: , Rfl:   •  ascorbic acid (VITAMIN C) 1000 MG tablet, Take 1,000 mg by mouth 2 (two) times a day, Disp: , Rfl:   •  Ascorbic Acid (vitamin C) 1000 MG tablet, Take 1,000 mg by mouth 2 (two) times a day, Disp: , Rfl:   •  aspirin 81 mg chewable tablet, Chew 1 tablet (81 mg total) daily, Disp: 30 tablet, Rfl: 0  •  aspirin 81 mg chewable tablet, Chew 81 mg daily (Patient not taking: Reported on 3/8/2023), Disp: , Rfl:   •  atorvastatin (LIPITOR) 40 mg tablet, Take 1 tablet (40 mg total) by mouth every evening, Disp: 30 tablet, Rfl: 0  •  atorvastatin (LIPITOR) 40 mg tablet, Take 40 mg by mouth daily, Disp: , Rfl:   •  brimonidine tartrate 0 2 % ophthalmic solution, Administer 1 drop into the left eye 2 (two) times a day, Disp: 5 mL, Rfl: 0  •  brimonidine tartrate 0 2 % ophthalmic solution, Administer 1 drop into the left eye 2 (two) times a day, Disp: , Rfl:   •  Budeson-Glycopyrrol-Formoterol (Breztri Aerosphere) 160-9-4 8 MCG/ACT AERO, Inhale 2 puffs  in the morning and 2 puffs before bedtime  Rinse mouth after use   , Disp: 31 1 g, Rfl: 3  •  Budeson-Glycopyrrol-Formoterol 160-9-4 8 MCG/ACT AERO, Inhale 2 puffs Rinse mouth after use , Disp: , Rfl:   •  diltiazem (CARDIZEM CD) 180 mg 24 hr capsule, Take 180 mg by mouth daily, Disp: , Rfl:   •  diltiazem (CARDIZEM CD) 180 mg 24 hr capsule, Take 180 mg by mouth daily, Disp: , Rfl:   •  gabapentin (NEURONTIN) 300 mg capsule, Take 300 mg by mouth 4 (four) times a day, Disp: , Rfl:   •  gabapentin (NEURONTIN) 300 mg capsule, Take 300 mg by mouth 4 (four) times a day, Disp: , Rfl:   •  losartan (COZAAR) 50 mg tablet, TAKE 1 TABLET BY MOUTH EVERY DAY, Disp: , Rfl:   •  losartan (COZAAR) 50 mg tablet, Take 50 mg by mouth daily, Disp: , Rfl:   •  metFORMIN (GLUCOPHAGE) 1000 MG tablet, 1000 mg QAM and 500 mg QPM, Disp: 1 tablet, Rfl: 0  •  metFORMIN (GLUCOPHAGE) 500 mg tablet, TAKE 2 TABS BY MOUTH IN IN THE MORNING AND 1 TAB BY MOUTH IN IN THE EVENING, Disp: , Rfl:   •  metFORMIN (GLUCOPHAGE) 500 mg tablet, Take 1,000 mg by mouth daily with breakfast, Disp: , Rfl:   •  metFORMIN (GLUCOPHAGE) 500 mg tablet, Take 500 mg by mouth every evening, Disp: , Rfl:   •  methocarbamol (ROBAXIN) 500 mg tablet, TAKE 1 TABLET BY MOUTH THREE TIMES A DAY AS NEEDED FOR MUSCLE SPASM (Patient not taking: Reported on 6/1/2023), Disp: , Rfl:   •  methocarbamol (ROBAXIN) 500 mg tablet, Take 500 mg by mouth 4 (four) times a day (Patient not taking: Reported on 6/1/2023), Disp: , Rfl:   •  methylPREDNISolone 4 MG tablet therapy pack, Use as directed on package, Disp: 21 "tablet, Rfl: 0  •  ondansetron (ZOFRAN) 4 mg tablet, Take 1 tablet (4 mg total) by mouth every 8 (eight) hours as needed for nausea or vomiting for up to 10 doses, Disp: 20 tablet, Rfl: 0  •  oxyCODONE (OxyCONTIN) 10 mg 12 hr tablet, Take 13 5 mg by mouth every 12 (twelve) hours (Patient not taking: Reported on 6/1/2023), Disp: , Rfl:   •  oxyCODONE ER (Xtampza ER) 13 5 MG C12A, Take 13 5 mg by mouth 2 (two) times a day Max Daily Amount: 27 mg (Patient not taking: Reported on 6/1/2023), Disp: 60 capsule, Rfl: 0  •  pantoprazole (PROTONIX) 40 mg tablet, Take 40 mg by mouth every 12 (twelve) hours, Disp: , Rfl:   •  pantoprazole (PROTONIX) 40 mg tablet, Take 40 mg by mouth daily, Disp: , Rfl:   •  rOPINIRole (REQUIP) 0 5 mg tablet, 2-3 tablets PO at HS, Disp: 30 tablet, Rfl: 0  •  rOPINIRole (REQUIP) 0 5 mg tablet, Take 0 5 mg by mouth daily at bedtime Take 3 tablets PO before bed, Disp: , Rfl:   •  sitaGLIPtin (JANUVIA) 100 mg tablet, Take 1 tablet (100 mg total) by mouth daily, Disp: 30 tablet, Rfl: 0  •  sitaGLIPtin (JANUVIA) 100 mg tablet, Take 100 mg by mouth daily, Disp: , Rfl:     ALLERGIES:  Allergies   Allergen Reactions   • Metamucil [Fiber] Lightheadedness   • Prednisone      The patient reports \"they make me goofy \"  No true allergic reaction   • Prednisone Lightheadedness   • Psyllium    • Tetanus Toxoids            REVIEW OF SYSTEMS:  Review of Systems    VITALS:  Vitals:    06/14/23 1423   BP: 120/71   Pulse: 86       LABS:  HgA1c:   Lab Results   Component Value Date    HGBA1C 7 0 (H) 03/02/2023     BMP:   Lab Results   Component Value Date    BUN 14 04/16/2023    CALCIUM 9 7 04/16/2023     04/16/2023    CO2 27 04/16/2023    CREATININE 0 91 04/16/2023    GLUCOSE 170 (H) 05/19/2017    K 4 2 04/16/2023     07/14/2015       _____________________________________________________  PHYSICAL EXAMINATION:  General: well developed and well nourished, alert, oriented times 3 and appears " comfortable  Psychiatric: Normal  HEENT: Normocephalic, Atraumatic Trachea Midline, No torticollis  Pulmonary: No audible wheezing or respiratory distress   Abdomen/GI: Non tender, non distended   Cardiovascular: No pitting edema, 2+ radial pulse   Skin: No Erythema, No Lacerations, No Ulcerations  Neurovascular: Sensation Intact to the Median, Ulnar, Radial Nerve, Motor Intact to the Median, Ulnar, Radial Nerve and Pulses Intact  Musculoskeletal: Normal, except as noted in detailed exam and in HPI        MUSCULOSKELETAL EXAMINATION:    Left small finger:     No erythema, ecchymosis or edema  Volar distal phalanx mass measuring 1 x 1 CM   Mass is tender to palpation   Full extension   Brisk capillary refill     ___________________________________________________  STUDIES REVIEWED:  I have personally reviewed AP lateral and oblique radiographs of left small finger   which demonstrate diffuse osteopenia and arthritic changes , no calcification in area of mass            PROCEDURES PERFORMED:  Procedures  No Procedures performed today    _____________________________________________________      [place]    I,:  Dre Miles am acting as a scribe while in the presence of the attending physician :       I,:  Varsha Mendoza MD personally performed the services described in this documentation    as scribed in my presence :

## 2023-07-18 RX ORDER — MORPHINE SULFATE 15 MG/1
15 TABLET ORAL 2 TIMES DAILY
COMMUNITY

## 2023-07-18 NOTE — PRE-PROCEDURE INSTRUCTIONS
Pre-Surgery Instructions:   Medication Instructions   • albuterol (PROVENTIL HFA,VENTOLIN HFA) 90 mcg/act inhaler Uses PRN- OK to take day of surgery   • ascorbic acid (VITAMIN C) 1000 MG tablet hold starting today 7/18   • atorvastatin (LIPITOR) 40 mg tablet Take night before surgery   • brimonidine tartrate 0.2 % ophthalmic solution use normal schedule   • brimonidine tartrate 0.2 % ophthalmic solution duplicate   • Budeson-Glycopyrrol-Formoterol (Breztri Aerosphere) 160-9-4.8 MCG/ACT AERO Take normal schedule   • diltiazem (CARDIZEM CD) 180 mg 24 hr capsule Take day of surgery. • gabapentin (NEURONTIN) 300 mg capsule Take day of surgery. • losartan (COZAAR) 50 mg tablet Hold day of surgery. • metFORMIN (GLUCOPHAGE) 500 mg tablet Hold day of surgery. • morphine (MSIR) 15 mg tablet Take day of surgery. • pantoprazole (PROTONIX) 40 mg tablet Take day of surgery. • rOPINIRole (REQUIP) 0.5 mg tablet Take night before surgery   • sitaGLIPtin (JANUVIA) 100 mg tablet Hold day of surgery. Medication instructions for day surgery reviewed. Please use only a sip of water to take your instructed medications. Avoid all over the counter vitamins, supplements and NSAIDS for one week prior to surgery per anesthesia guidelines. Tylenol is ok to take as needed. You will receive a call one business day prior to surgery with an arrival time and hospital directions. If your surgery is scheduled on a Monday, the hospital will be calling you on the Friday prior to your surgery. If you have not heard from anyone by 8pm, please call the hospital supervisor through the hospital  at [phone]. Pasty Osorio [phone]). Do not eat or drink anything after midnight the night before your surgery, including candy, mints, lifesavers, or chewing gum. Do not drink alcohol 24hrs before your surgery. Try not to smoke at least 24hrs before your surgery.        Follow the pre surgery showering instructions as listed in the [facility] Surgical Experience Booklet” or otherwise provided by your surgeon's office. Do not shave the surgical area 24 hours before surgery. Do not apply any lotions, creams, including makeup, cologne, deodorant, or perfumes after showering on the day of your surgery. No contact lenses, eye make-up, or artificial eyelashes. Remove nail polish, including gel polish, and any artificial, gel, or acrylic nails if possible. Remove all jewelry including rings and body piercing jewelry. Wear causal clothing that is easy to take on and off. Consider your type of surgery. Keep any valuables, jewelry, piercings at home. Please bring any specially ordered equipment (sling, braces) if indicated. Arrange for a responsible person to drive you to and from the hospital on the day of your surgery. Visitor Guidelines discussed. Call the surgeon's office with any new illnesses, exposures, or additional questions prior to surgery. Please reference your [facility] Surgical Experience Booklet” for additional information to prepare for your upcoming surgery.

## 2023-07-20 NOTE — H&P
ASSESSMENT/PLAN:       80 y.o. 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 The etiology of above diagnosis was discussed along with treatment options. She did have this "lanced" once by her PCP, and the mass has since returned. We discussed with surgical intervention, it is possible that the mass can reoccur. Left small finger mass excision was discussed at length including risks and benefits. Risks of surgery consist of but not limited to bleeding, infection, stiffness, pain, reoccurrence, injury to surrounding structures, need for further surgery, etc. She elected to proceed with a left small finger mass excision and informed surgical consent was signed. Post operative instructions/expectations were reviewed and provided in AVS. Lab work and EKG will be obtained prior to surgical intervention. Surgery will be planed for a Friday in [facility]. Follow up in the office 10-14 days post op for suture removal.      The patient verbalized understanding of exam findings and treatment plan. We engaged in the shared decision-making process and treatment options were discussed at length with the patient. Surgical and conservative management discussed today along with risks and benefits.     Diagnoses and all orders for this visit:     Mass of finger of left hand  -     XR finger left fifth digit-pinkie; Future        Follow Up:  No follow-ups on file.     To Do Next Visit:  Sutures out        General Discussions:  Ganglion Cysts: The anatomy and physiology of the ganglion was discussed with the patient today in the office. Fluid leaking out of the joint surface typically creates a small sac, which can enlarge and cause pain or limitation of motion. Treatment options include observation, aspiration, or surgical incision were discussed with the patient today.   Observation typically lead to resolution and approximately 10% of patients, aspiration results in resolution of approximately 50% of patients, and surgical excision leads to resolution in approximately 97% of patients. After discussion with the patient today, the patient voiced understanding of all treatment options.     Operative Discussions:  Standard Consent: The risks and benefits of the procedure were explained to the patient, which include, but are not limited to: Bleeding, infection, recurrence, pain, scar, damage to tendons, damage to nerves, and damage to blood vessels, failure to give desired results and complications related to anesthesia. These risks, along with alternative conservative treatment options, and postoperative protocols were voiced back and understood by the patient. All questions were answered to the patient's satisfaction. The patient agrees to comply with a standard postoperative protocol, and is willing to proceed. Education was provided via written and auditory forms. There were no barriers to learning. Written handouts regarding wound care, incision and scar care, and general preoperative information was provided to the patient. Prior to surgery, the patient may be requested to stop all anti-inflammatory medications. Prophylactic aspirin, Plavix, and Coumadin may be allowed to be continued. Medications including vitamin E., ginkgo, and fish oil are requested to be stopped approximately one week prior to surgery. Hypertensive medications and beta blockers, if taken, should be continued.        ____________________________________________________________________________________________________________________________________________        CHIEF COMPLAINT:       Chief Complaint   Patient presents with   • Left Hand - Pain       Lt hand pinky finger lump x few months. Had drained a few weeks ago at [facility] office but came back.  No injury.          SUBJECTIVE:  Kota Ayala is a 80y.o. year old LHD female who presents to the office for a left small finger mass. She notes a mass to the volar aspect of her left small finger distal phalanx. She states that this has been present for aprox. 1.5 months. She was seen by her PCP aprox. 1 month ago, at which time she reports her PCP "lanced" the mass. She notes that the mass consisted of white jelly like fluid. She notes that the mass has since returned. She notes pain to this area with use or if it is touched. She was seen by Dr. Selma Teixeira at [facility] and signed surgical consent to undergo mass excision. She notes due to her insurance she was unable to proceed with surgery with [facility]. She would like to have the mass removed as it is painful for her. Pain/symptom timing:  Worse during the day when active  Pain/symptom context:  Worse with activites and work  Pain/symptom modifying factors:  Rest makes better, activities make worse  Pain/symptom associated signs/symptoms: none     Prior treatment   • NSAIDsNo   • Injections No   • Bracing/Orthotics No    Physical Therapy No      I have personally reviewed all the relevant PMH, PSH, SH, FH, Medications and allergies        PAST MEDICAL HISTORY:  Past Medical History:   Diagnosis Date   • Arthritis     • Cardiac disease     • COPD (chronic obstructive pulmonary disease) ([address])     • Diabetes mellitus ([address])     • GERD (gastroesophageal reflux disease)     • Hiatal hernia     • Hypertension     • PUD (peptic ulcer disease)     • Residual ASD (atrial septal defect) following repair           PAST SURGICAL HISTORY:        Past Surgical History:   Procedure Laterality Date   • ABDOMINAL ADHESION SURGERY N/A 9/26/2022     Procedure: LYSIS ADHESIONS;  Surgeon: Marisol Arias MD;  Location: [facility];  Service: Thoracic   • ASD REPAIR       • BACK SURGERY         x3   • CARDIAC SURGERY       • ESOPHAGOGASTRODUODENOSCOPY N/A 9/26/2022     Procedure: ESOPHAGOGASTRODUODENOSCOPY (EGD);   Surgeon: Marisol Arias MD;  Location: [facility];  Service: Thoracic   • JOINT REPLACEMENT         bilateral knee surgery   • JOINT REPLACEMENT       • KNEE SURGERY       • PARAESOPHAGEAL HERNIA REPAIR N/A 9/26/2022     Procedure: REPAIR HERNIA PARAESOPHAGEAL LAPAROSCOPIC W ROBOTICS, gastropexy, mesh placement;  Surgeon: Jose Eduardo Elizabeth MD;  Location: [facility];  Service: Thoracic   • SHOULDER SURGERY             FAMILY HISTORY:        Family History   Problem Relation Age of Onset   • Cancer Mother     • Asthma Father           SOCIAL HISTORY:  Social History      Tobacco Use   • Smoking status: Former       Packs/day: 1.00       Years: 60.00       Total pack years: 60.00       Types: Cigarettes       Start date: 26       Quit date: 2/1/2020       Years since quitting: 3.3   • Smokeless tobacco: Never   • Tobacco comments:       stopped smoking 2 days ago   Substance Use Topics   • Alcohol use:  Yes       Alcohol/week: 1.0 standard drink of alcohol       Types: 1 Glasses of wine per week   • Drug use: Never         MEDICATIONS:     Current Outpatient Medications:   •  albuterol (PROVENTIL HFA,VENTOLIN HFA) 90 mcg/act inhaler, Inhale 2 puffs every 6 (six) hours as needed for wheezing, Disp: 54 g, Rfl: 3  •  albuterol (PROVENTIL HFA,VENTOLIN HFA) 90 mcg/act inhaler, Inhale 2 puffs every 6 (six) hours as needed for wheezing, Disp: , Rfl:   •  ascorbic acid (VITAMIN C) 1000 MG tablet, Take 1,000 mg by mouth 2 (two) times a day, Disp: , Rfl:   •  Ascorbic Acid (vitamin C) 1000 MG tablet, Take 1,000 mg by mouth 2 (two) times a day, Disp: , Rfl:   •  aspirin 81 mg chewable tablet, Chew 1 tablet (81 mg total) daily, Disp: 30 tablet, Rfl: 0  •  aspirin 81 mg chewable tablet, Chew 81 mg daily (Patient not taking: Reported on 3/8/2023), Disp: , Rfl:   •  atorvastatin (LIPITOR) 40 mg tablet, Take 1 tablet (40 mg total) by mouth every evening, Disp: 30 tablet, Rfl: 0  •  atorvastatin (LIPITOR) 40 mg tablet, Take 40 mg by mouth daily, Disp: , Rfl:   •  brimonidine tartrate 0.2 % ophthalmic solution, Administer 1 drop into the left eye 2 (two) times a day, Disp: 5 mL, Rfl: 0  •  brimonidine tartrate 0.2 % ophthalmic solution, Administer 1 drop into the left eye 2 (two) times a day, Disp: , Rfl:   •  Budeson-Glycopyrrol-Formoterol (Breztri Aerosphere) 160-9-4.8 MCG/ACT AERO, Inhale 2 puffs  in the morning and 2 puffs before bedtime. Rinse mouth after use. ., Disp: 31.1 g, Rfl: 3  •  Budeson-Glycopyrrol-Formoterol 160-9-4.8 MCG/ACT AERO, Inhale 2 puffs Rinse mouth after use., Disp: , Rfl:   •  diltiazem (CARDIZEM CD) 180 mg 24 hr capsule, Take 180 mg by mouth daily, Disp: , Rfl:   •  diltiazem (CARDIZEM CD) 180 mg 24 hr capsule, Take 180 mg by mouth daily, Disp: , Rfl:   •  gabapentin (NEURONTIN) 300 mg capsule, Take 300 mg by mouth 4 (four) times a day, Disp: , Rfl:   •  gabapentin (NEURONTIN) 300 mg capsule, Take 300 mg by mouth 4 (four) times a day, Disp: , Rfl:   •  losartan (COZAAR) 50 mg tablet, TAKE 1 TABLET BY MOUTH EVERY DAY, Disp: , Rfl:   •  losartan (COZAAR) 50 mg tablet, Take 50 mg by mouth daily, Disp: , Rfl:   •  metFORMIN (GLUCOPHAGE) 1000 MG tablet, 1000 mg QAM and 500 mg QPM, Disp: 1 tablet, Rfl: 0  •  metFORMIN (GLUCOPHAGE) 500 mg tablet, TAKE 2 TABS BY MOUTH IN IN THE MORNING AND 1 TAB BY MOUTH IN IN THE EVENING, Disp: , Rfl:   •  metFORMIN (GLUCOPHAGE) 500 mg tablet, Take 1,000 mg by mouth daily with breakfast, Disp: , Rfl:   •  metFORMIN (GLUCOPHAGE) 500 mg tablet, Take 500 mg by mouth every evening, Disp: , Rfl:   •  methocarbamol (ROBAXIN) 500 mg tablet, TAKE 1 TABLET BY MOUTH THREE TIMES A DAY AS NEEDED FOR MUSCLE SPASM (Patient not taking: Reported on 6/1/2023), Disp: , Rfl:   •  methocarbamol (ROBAXIN) 500 mg tablet, Take 500 mg by mouth 4 (four) times a day (Patient not taking: Reported on 6/1/2023), Disp: , Rfl:   •  methylPREDNISolone 4 MG tablet therapy pack, Use as directed on package, Disp: 21 tablet, Rfl: 0  •  ondansetron (ZOFRAN) 4 mg tablet, Take 1 tablet (4 mg total) by mouth every 8 (eight) hours as needed for nausea or vomiting for up to 10 doses, Disp: 20 tablet, Rfl: 0  •  oxyCODONE (OxyCONTIN) 10 mg 12 hr tablet, Take 13.5 mg by mouth every 12 (twelve) hours (Patient not taking: Reported on 6/1/2023), Disp: , Rfl:   •  oxyCODONE ER (Xtampza ER) 13.5 MG C12A, Take 13.5 mg by mouth 2 (two) times a day Max Daily Amount: 27 mg (Patient not taking: Reported on 6/1/2023), Disp: 60 capsule, Rfl: 0  •  pantoprazole (PROTONIX) 40 mg tablet, Take 40 mg by mouth every 12 (twelve) hours, Disp: , Rfl:   •  pantoprazole (PROTONIX) 40 mg tablet, Take 40 mg by mouth daily, Disp: , Rfl:   •  rOPINIRole (REQUIP) 0.5 mg tablet, 2-3 tablets PO at HS, Disp: 30 tablet, Rfl: 0  •  rOPINIRole (REQUIP) 0.5 mg tablet, Take 0.5 mg by mouth daily at bedtime Take 3 tablets PO before bed, Disp: , Rfl:   •  sitaGLIPtin (JANUVIA) 100 mg tablet, Take 1 tablet (100 mg total) by mouth daily, Disp: 30 tablet, Rfl: 0  •  sitaGLIPtin (JANUVIA) 100 mg tablet, Take 100 mg by mouth daily, Disp: , Rfl:      ALLERGIES:        Allergies   Allergen Reactions   • Metamucil [Fiber] Lightheadedness   • Prednisone         The patient reports "they make me goofy "  No true allergic reaction   • Prednisone Lightheadedness   • Psyllium     • Tetanus Toxoids                 REVIEW OF SYSTEMS:  Review of Systems     VITALS:      Vitals:     06/14/23 1423   BP: 120/71   Pulse: 86         LABS:  HgA1c:         Lab Results   Component Value Date     HGBA1C 7.0 (H) 03/02/2023      BMP:         Lab Results   Component Value Date     BUN 14 04/16/2023     CALCIUM 9.7 04/16/2023      04/16/2023     CO2 27 04/16/2023     CREATININE 0.91 04/16/2023     GLUCOSE 170 (H) 05/19/2017     K 4.2 04/16/2023      07/14/2015         _____________________________________________________  PHYSICAL EXAMINATION:  General: well developed and well nourished, alert, oriented times 3 and appears comfortable  Psychiatric: Normal  HEENT: Normocephalic, Atraumatic Trachea Midline, No torticollis  Pulmonary: No audible wheezing or respiratory distress   Abdomen/GI: Non tender, non distended   Cardiovascular: No pitting edema, 2+ radial pulse   Skin: No Erythema, No Lacerations, No Ulcerations  Neurovascular: Sensation Intact to the Median, Ulnar, Radial Nerve, Motor Intact to the Median, Ulnar, Radial Nerve and Pulses Intact  Musculoskeletal: Normal, except as noted in detailed exam and in HPI.        MUSCULOSKELETAL EXAMINATION:     Left small finger:      No erythema, ecchymosis or edema  Volar distal phalanx mass measuring 1 x 1 CM   Mass is tender to palpation   Full extension   Brisk capillary refill      ___________________________________________________  STUDIES REVIEWED:  I have personally reviewed AP lateral and oblique radiographs of left small finger   which demonstrate diffuse osteopenia and arthritic changes , no calcification in area of mass

## 2023-07-21 ENCOUNTER — ANESTHESIA (OUTPATIENT)
Dept: PERIOP | Facility: HOSPITAL | Age: 83
End: 2023-07-21
Payer: COMMERCIAL

## 2023-07-21 ENCOUNTER — HOSPITAL ENCOUNTER (OUTPATIENT)
Facility: HOSPITAL | Age: 83
Setting detail: OUTPATIENT SURGERY
Discharge: HOME | End: 2023-07-21
Attending: SURGERY | Admitting: SURGERY
Payer: COMMERCIAL

## 2023-07-21 ENCOUNTER — ANESTHESIA EVENT (OUTPATIENT)
Dept: PERIOP | Facility: HOSPITAL | Age: 83
End: 2023-07-21
Payer: COMMERCIAL

## 2023-07-21 VITALS
DIASTOLIC BLOOD PRESSURE: 65 MMHG | HEIGHT: 61 IN | TEMPERATURE: 96.9 F | HEART RATE: 50 BPM | SYSTOLIC BLOOD PRESSURE: 143 MMHG | WEIGHT: 152 LBS | BODY MASS INDEX: 28.7 KG/M2 | RESPIRATION RATE: 19 BRPM | OXYGEN SATURATION: 94 %

## 2023-07-21 DIAGNOSIS — E11.65 TYPE 2 DIABETES MELLITUS WITH HYPERGLYCEMIA, WITHOUT LONG-TERM CURRENT USE OF INSULIN (HCC): ICD-10-CM

## 2023-07-21 DIAGNOSIS — R22.32 MASS OF FINGER OF LEFT HAND: ICD-10-CM

## 2023-07-21 DIAGNOSIS — J96.21 ACUTE ON CHRONIC RESPIRATORY FAILURE WITH HYPOXIA (HCC): ICD-10-CM

## 2023-07-21 LAB
GLUCOSE SERPL-MCNC: 106 MG/DL (ref 65–140)
GLUCOSE SERPL-MCNC: 123 MG/DL (ref 65–140)

## 2023-07-21 PROCEDURE — 88304 TISSUE EXAM BY PATHOLOGIST: CPT | Performed by: PATHOLOGY

## 2023-07-21 PROCEDURE — NC001 PR NO CHARGE: Performed by: SURGERY

## 2023-07-21 PROCEDURE — 26160 REMOVE TENDON SHEATH LESION: CPT | Performed by: SURGERY

## 2023-07-21 PROCEDURE — 82948 REAGENT STRIP/BLOOD GLUCOSE: CPT

## 2023-07-21 RX ORDER — ONDANSETRON 2 MG/ML
4 INJECTION INTRAMUSCULAR; INTRAVENOUS ONCE AS NEEDED
Status: DISCONTINUED | OUTPATIENT
Start: 2023-07-21 | End: 2023-07-21 | Stop reason: HOSPADM

## 2023-07-21 RX ORDER — OXYCODONE HYDROCHLORIDE 5 MG/1
10 TABLET ORAL EVERY 4 HOURS PRN
Status: DISCONTINUED | OUTPATIENT
Start: 2023-07-21 | End: 2023-07-21 | Stop reason: HOSPADM

## 2023-07-21 RX ORDER — GLYCOPYRROLATE 0.2 MG/ML
INJECTION INTRAMUSCULAR; INTRAVENOUS AS NEEDED
Status: DISCONTINUED | OUTPATIENT
Start: 2023-07-21 | End: 2023-07-21

## 2023-07-21 RX ORDER — SODIUM CHLORIDE, SODIUM LACTATE, POTASSIUM CHLORIDE, CALCIUM CHLORIDE 600; 310; 30; 20 MG/100ML; MG/100ML; MG/100ML; MG/100ML
125 INJECTION, SOLUTION INTRAVENOUS CONTINUOUS
Status: DISCONTINUED | OUTPATIENT
Start: 2023-07-21 | End: 2023-07-21 | Stop reason: HOSPADM

## 2023-07-21 RX ORDER — OXYCODONE HYDROCHLORIDE 5 MG/1
5 TABLET ORAL EVERY 4 HOURS PRN
Status: DISCONTINUED | OUTPATIENT
Start: 2023-07-21 | End: 2023-07-21 | Stop reason: HOSPADM

## 2023-07-21 RX ORDER — FENTANYL CITRATE 50 UG/ML
INJECTION, SOLUTION INTRAMUSCULAR; INTRAVENOUS AS NEEDED
Status: DISCONTINUED | OUTPATIENT
Start: 2023-07-21 | End: 2023-07-21

## 2023-07-21 RX ORDER — ONDANSETRON 2 MG/ML
INJECTION INTRAMUSCULAR; INTRAVENOUS AS NEEDED
Status: DISCONTINUED | OUTPATIENT
Start: 2023-07-21 | End: 2023-07-21

## 2023-07-21 RX ORDER — PROPOFOL 10 MG/ML
INJECTION, EMULSION INTRAVENOUS CONTINUOUS PRN
Status: DISCONTINUED | OUTPATIENT
Start: 2023-07-21 | End: 2023-07-21

## 2023-07-21 RX ORDER — FENTANYL CITRATE/PF 50 MCG/ML
50 SYRINGE (ML) INJECTION
Status: DISCONTINUED | OUTPATIENT
Start: 2023-07-21 | End: 2023-07-21 | Stop reason: HOSPADM

## 2023-07-21 RX ORDER — LIDOCAINE HYDROCHLORIDE 10 MG/ML
INJECTION, SOLUTION EPIDURAL; INFILTRATION; INTRACAUDAL; PERINEURAL AS NEEDED
Status: DISCONTINUED | OUTPATIENT
Start: 2023-07-21 | End: 2023-07-21

## 2023-07-21 RX ORDER — CEFAZOLIN SODIUM 2 G/50ML
2000 SOLUTION INTRAVENOUS ONCE
Status: COMPLETED | OUTPATIENT
Start: 2023-07-21 | End: 2023-07-21

## 2023-07-21 RX ORDER — CHLORHEXIDINE GLUCONATE 0.12 MG/ML
15 RINSE ORAL ONCE
Status: COMPLETED | OUTPATIENT
Start: 2023-07-21 | End: 2023-07-21

## 2023-07-21 RX ORDER — MAGNESIUM HYDROXIDE 1200 MG/15ML
LIQUID ORAL AS NEEDED
Status: DISCONTINUED | OUTPATIENT
Start: 2023-07-21 | End: 2023-07-21 | Stop reason: HOSPADM

## 2023-07-21 RX ORDER — PROPOFOL 10 MG/ML
INJECTION, EMULSION INTRAVENOUS AS NEEDED
Status: DISCONTINUED | OUTPATIENT
Start: 2023-07-21 | End: 2023-07-21

## 2023-07-21 RX ADMIN — OXYCODONE HYDROCHLORIDE 10 MG: 5 TABLET ORAL at 14:40

## 2023-07-21 RX ADMIN — CHLORHEXIDINE GLUCONATE 15 ML: 1.2 SOLUTION ORAL at 11:47

## 2023-07-21 RX ADMIN — CEFAZOLIN SODIUM 2000 MG: 2 SOLUTION INTRAVENOUS at 12:40

## 2023-07-21 RX ADMIN — FENTANYL CITRATE 50 MCG: 50 INJECTION, SOLUTION INTRAMUSCULAR; INTRAVENOUS at 12:38

## 2023-07-21 RX ADMIN — LIDOCAINE HYDROCHLORIDE 50 MG: 10 INJECTION, SOLUTION EPIDURAL; INFILTRATION; INTRACAUDAL; PERINEURAL at 12:38

## 2023-07-21 RX ADMIN — PROPOFOL 40 MG: 10 INJECTION, EMULSION INTRAVENOUS at 12:38

## 2023-07-21 RX ADMIN — FENTANYL CITRATE 25 MCG: 50 INJECTION, SOLUTION INTRAMUSCULAR; INTRAVENOUS at 12:44

## 2023-07-21 RX ADMIN — ONDANSETRON 4 MG: 2 INJECTION INTRAMUSCULAR; INTRAVENOUS at 12:38

## 2023-07-21 RX ADMIN — PROPOFOL 40 MCG/KG/MIN: 10 INJECTION, EMULSION INTRAVENOUS at 12:38

## 2023-07-21 RX ADMIN — SODIUM CHLORIDE, SODIUM LACTATE, POTASSIUM CHLORIDE, AND CALCIUM CHLORIDE 125 ML/HR: .6; .31; .03; .02 INJECTION, SOLUTION INTRAVENOUS at 12:22

## 2023-07-21 RX ADMIN — GLYCOPYRROLATE 0.2 MG: 0.2 INJECTION, SOLUTION INTRAMUSCULAR; INTRAVENOUS at 12:44

## 2023-07-21 NOTE — ANESTHESIA POSTPROCEDURE EVALUATION
Post-Op Assessment Note    CV Status:  Stable  Pain Score: 0    Pain management: adequate     Mental Status:  Awake and alert   Hydration Status:  Stable and euvolemic   PONV Controlled:  None   Airway Patency:  Patent and adequate      Post Op Vitals Reviewed: Yes      Staff: CRNA         No notable events documented.     BP   139/70   Temp   97.1F   Pulse 61   Resp   15   SpO2   96%

## 2023-07-21 NOTE — ANESTHESIA PREPROCEDURE EVALUATION
Procedure:  EXCISION GANGLION CYST (Left: Finger)    81yo F presenting with large incarcerate hiatal hernia planned to undergo laparoscopy repair.      - denies any chest pain, palpitations, shortness of breath, syncope, lightheadedness, seizures   - denies any recent infectious symptoms such as fevers, chills, cough   - denies taking any anticoagulation medications or any issues with bleeding, bruising, clotting    ECHO (04/21/22): •  Left Ventricle: Left ventricular cavity size is normal. Wall thickness is mildly increased. The left ventricular ejection fraction is 60%. Systolic function is normal. Wall motion is normal. Diastolic function is mildly abnormal, consistent with grade I (abnormal) relaxation. •  Aortic Valve: There is mild stenosis. •  Mitral Valve: There is moderate annular calcification. There is mild regurgitation. •  Tricuspid Valve: There is mild regurgitation. •  Pulmonary Artery: The estimated pulmonary artery systolic pressure is 40.9 mmHg. The pulmonary artery systolic pressure is mildly increased.       Relevant Problems   ANESTHESIA (within normal limits)      CARDIO   (+) Benign essential hypertension   (+) Exertional dyspnea   (+) Hyperlipidemia   (+) Hypertension      ENDO   (+) Type 2 diabetes mellitus (HCC)   (+) Type II diabetes mellitus (HCC)      GI/HEPATIC   (+) Esophageal hiatal hernia   (+) GERD (gastroesophageal reflux disease)   (+) GI bleed   (+) 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      /RENAL (within normal limits)      GYN (within normal limits)      HEMATOLOGY   (+) Anemia      MUSCULOSKELETAL   (+) Backache   (+) Chronic back pain   (+) DDD (degenerative disc disease), lumbosacral   (+) Esophageal hiatal hernia   (+) 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   (+) Primary localized osteoarthrosis of ankle and foot      NEURO/PSYCH   (+) Chronic back pain   (+) Chronic pain   (+) Depression      PULMONARY   (+) COPD exacerbation (HCC) (+) COPD, severe ([address])   (+) Chronic hypoxemic respiratory failure (HCC)   (+) Exertional dyspnea   (+) chronic respiratory failure (HCC), acute component resolved      Lab Results   Component Value Date    WBC 6.72 04/16/2023    HGB 11.2 (L) 04/16/2023    HCT 35.9 04/16/2023    MCV 86 04/16/2023     04/16/2023     Lab Results   Component Value Date     07/14/2015    SODIUM 137 04/16/2023    K 4.2 04/16/2023     04/16/2023    CO2 27 04/16/2023    ANIONGAP 14 07/21/2015    AGAP 8 04/16/2023    BUN 14 04/16/2023    CREATININE 0.91 04/16/2023    GLUC 102 04/16/2023    GLUF 185 (H) 04/21/2022    CALCIUM 9.7 04/16/2023    AST 13 04/16/2023    ALT 10 04/16/2023    ALKPHOS 63 04/16/2023    PROT 6.6 07/13/2015    TP 6.7 04/16/2023    BILITOT 0.3 07/13/2015    TBILI 0.62 04/16/2023    EGFR 58 04/16/2023     Lab Results   Component Value Date    INR 0.91 03/08/2023    INR 1.09 03/01/2023    INR 0.99 09/25/2022    PROTIME 12.5 03/08/2023    PROTIME 14.3 03/01/2023    PROTIME 13.3 09/25/2022     Lab Results   Component Value Date    PTT 23 03/08/2023       Physical Exam    Airway    Mallampati score: II  TM Distance: >3 FB  Neck ROM: full     Dental       Cardiovascular  Rhythm: regular, Rate: normal, Cardiovascular exam normal    Pulmonary  Pulmonary exam normal Breath sounds clear to auscultation,     Other Findings        Anesthesia Plan  ASA Score- 3     Anesthesia Type- IV sedation with anesthesia with ASA Monitors. Additional Monitors:   Airway Plan:           Plan Factors-Exercise tolerance (METS): >4 METS. Chart reviewed. EKG reviewed. Imaging results reviewed. Existing labs reviewed. Patient summary reviewed. Patient is not a current smoker. Patient did not smoke on day of surgery. Obstructive sleep apnea risk education given perioperatively. Induction- intravenous. Postoperative Plan- Plan for postoperative opioid use.      Informed Consent- Anesthetic plan and risks discussed with patient. I personally reviewed this patient with the CRNA. Discussed and agreed on the Anesthesia Plan with the CRNA. Marina Broderick

## 2023-07-21 NOTE — DISCHARGE INSTR - AVS FIRST PAGE
Post Operative Instructions    You have had surgery on your arm today, please read and follow the information below:  Elevate your hand above your elbow during the next 24-48 hours to help with swelling. Place your hand and arm over your head with motion at your shoulder three times a day. Do not apply any cream/ointment/oil to your incisions including antibiotics. Do not soak your hands in standing water (dishwater, tubs, Jacuzzi's, pools, etc.) until given permission (typically 2-3 weeks after injury)    Call the office if you notice any:  Increased numbness or tingling of your hand or fingers that is not relieved with elevation. Increasing pain that is not controlled with medication. Difficulty chewing, breathing, swallowing. Chest pains or shortness of breath. Fever over 101.4 degrees. Bandage: Please keep bandages clean and dry. Remove bandage after 5 days. Once bandages are removed you may wash hands with soap and water. Short showers are okay as well, but please avoid soaking the hand as described above (ie no pools, baths, dirty dish water, hot tubs, ocean/lake water, etc). Sutures will be removed in the office at your first follow up visit, please do not remove them yourself. Please do NOT put any topical agents on the surgical wound including neosporin, peroxide, tea tree oil, vitamin E, etc. as these can delay wound healing. Motion: Move fingers into a fist 5 times a day, DO NOT move any splinted fingers. Weight bearing status: Avoid heavy lifting (>5 pounds) with the extremity that was operated on until follow up appointment. Normal activities of daily living are OK. Ice: Ice for 10 minutes every hour as needed for swelling x 24 hours. Sling: No sling necessary.     Pain medication:   Naproxen 220 mg two times a day (this is over the counter!)  *do not take this medication if you were told by your doctor that you cannot take anti-inflammatories or NSAIDS  Tylenol Extended Release 650 mg every 8 hours (this is over the counter!)   Please continue to take your regularly prescribed Morphine for severe pain. Please note no additional pain medications were prescribed, if you require additional medications please contact your pain management provider. Follow-up Appointment: 10-14 days with Dr Maldonado Strauss        Please call the office if you have any questions or concerns regarding your post-operative care.

## 2023-07-21 NOTE — OP NOTE
OPERATIVE REPORT  PATIENT NAME: Zoran Vanegas  :  1940  MRN: [id number]  Pt Location: [facility]    SURGERY DATE: 23    Surgeon(s) and Role:     * Trevin Hugo MD - Primary    Pre-Op Diagnosis:  Mass of finger of left hand [R22.32]  Type 2 diabetes mellitus with hyperglycemia, without long-term current use of insulin (HCC) [E11.65]  Acute on chronic respiratory failure with hypoxia (HCC) [J96.21]    Post-Op Diagnosis Codes:     * Mass of finger of left hand [R22.32]     * Type 2 diabetes mellitus with hyperglycemia, without long-term current use of insulin (HCC) [E11.65]     * Acute on chronic respiratory failure with hypoxia (HCC) [J96.21]    Procedure(s):  EXCISION GANGLION CYST (Left)    Specimen(s):  Order Name Source Comment Collection Info Order Time   TISSUE EXAM Ganglion Cyst  Collected By: Trevin Hugo MD 2023 12:52 PM     Release to patient through Mychart   Immediate            Estimated Blood Loss:   Minimal    Anesthesia Type:   Conscious Sedation     IMPLANTS:  * No implants in log *    PERIOPERATIVE ANTIBIOTICS:    cefazolin, 2 grams    Tourniquet Time: 7 minutes  at 250  mmHg          Operative Indications: The patient has a history of left small finger volar pad mass that was recalcitrant to conservative management. The decision was made to bring the patient to the operating room for left small finger mass excision risks of the procedure were explained which include, but are not limited to bleeding; infection; damage to nerves, arteries,veins, tendons; scar; pain; need for reoperation; failure to give desired result; and risks of anaesthesia. All questions were answered to satisfaction and they were willing to proceed.          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 Local anaesthesia and sedation were provided. A well padded tourniquet was applied to the extremity, set at 250 mmHg. The  left upper extremity was then prepped and drapped in a normal, sterile, orthopedic fashion. A curvilinear incision was made from ulnar distal to the radial proximal, overlying the volar finger pad. Full-thickness skin flaps were elevated under loupe magnification. Provisional neurovascular structures were protected. Mass was identified as being a multilobulated cyst.  Was carefully dissected from surrounding structures including the adipose tissue of the fat pad the neurovascular bundles and the flexor tendon sheath. Once the mass was fully dissected out it was removed en bloc. A second smaller mass was noted to be coming off the area of the a 5 pulley. The second mass was again elevated and traced slightly more radially and proximally. Also excised en bloc and careful to protect the superficial vascular structures and the radial neurovascular bundle. The specimens were passed off for pathologic surgical evaluation. A window was made in the A5 pulley as well as the distal flexor tendon sheath to prevent recurrence and this was cauterized. The wound was copiously irrigated  At the completion of the procedure, hemostasis was obtained with cautery and direct pressure. The wounds were copiously irrigated with sterile solution. The wounds were closed with Prolene. Sterile dressings were applied, including Xeroform, Gauze and Finger Dressing. Please note, all sponge, needle, and instrument counts were correct prior to closure. Loupe magnification was utilized. The patient tolerated the procedure well. I was present for the entire procedure.     Patient Disposition:  PACU     SIGNATURE: Trevin Hugo MD  DATE: 07/21/23  TIME: 1:24 PM

## 2023-07-25 PROCEDURE — 88304 TISSUE EXAM BY PATHOLOGIST: CPT | Performed by: PATHOLOGY

## 2023-08-02 ENCOUNTER — OFFICE VISIT (OUTPATIENT)
Dept: OBGYN CLINIC | Facility: CLINIC | Age: 83
End: 2023-08-02

## 2023-08-02 DIAGNOSIS — R22.32 MASS OF FINGER OF LEFT HAND: Primary | ICD-10-CM

## 2023-08-02 PROCEDURE — 99024 POSTOP FOLLOW-UP VISIT: CPT | Performed by: SURGERY

## 2023-08-02 NOTE — PROGRESS NOTES
Assessment/Plan:  Patient ID: Tiera Gonzales 80 y.o. female   Surgery: Excision Ganglion Cyst - Left  Date of Surgery: 7/21/2023      Sutures taken out today. The was a pocket of fluid being produced by the tendon although the sac/cyst is out. There was gelatinous fluid that came out. Instructed to put pressure and massage the incision to flatten it out with lotion. Do not submerge but can wash with warm water, soap and pat dry. If it gets red, hot or doesn't feel well to call immediately. At this time the finger does not look infected. Follow Up:  2  week(s)    To Do Next Visit:  Re-evaluate the swelling      CHIEF COMPLAINT:  Chief Complaint   Patient presents with   • Follow-up     Patient is here to get her sutures out today          SUBJECTIVE:  Tiera Gonzales is a 80y.o. year old female who presents for follow up after Excision Ganglion Cyst - Left. She has sutures intact to the left small finger. Today patient has No Complaints, just some tightness across the sutures.        PHYSICAL EXAMINATION:  General: well developed and well nourished, alert, oriented times 3 and appears comfortable  Psychiatric: Normal     MUSCULOSKELETAL EXAMINATION:  Incision: Clean, dry, intact, healed and suture(s) intact   Surgery Site: normal, no evidence of infection  and swelling present  Range of Motion: As expected and full composite fist possible  Neurovascular status: Neuro intact, good cap refill, cap refill intact and numbness note in the tip of the small finger  Activity Restrictions: No restrictions  Done today: Sutures out      STUDIES REVIEWED:  No Studies to review      PROCEDURES PERFORMED:  Procedures  No Procedures performed today    Scribe Attestation    I,:  Roxana Rivas am acting as a scribe while in the presence of the attending physician.:       I,:  Zena Ureña MD personally performed the services described in this documentation    as scribed in my presence.:

## 2023-08-02 NOTE — PATIENT INSTRUCTIONS
Sutures taken out today. The was a pocket of fluid being produced by the tendon although the sac/cyst is out. There was gelatinous fluid that came out. Instructed to put pressure and massage the incision to flatten it out with lotion. Do not submerge but can wash with warm water, soap and pat dry. If it gets red, hot or doesn't feel well to call immediately. At this time the finger does not look infected.

## 2023-08-17 ENCOUNTER — TELEPHONE (OUTPATIENT)
Dept: OBGYN CLINIC | Facility: CLINIC | Age: 83
End: 2023-08-17

## 2023-08-17 ENCOUNTER — OFFICE VISIT (OUTPATIENT)
Dept: OBGYN CLINIC | Facility: CLINIC | Age: 83
End: 2023-08-17

## 2023-08-17 DIAGNOSIS — R22.32 MASS OF FINGER OF LEFT HAND: Primary | ICD-10-CM

## 2023-08-17 DIAGNOSIS — L08.9 ABRASION OF RIGHT LITTLE FINGER WITH INFECTION: ICD-10-CM

## 2023-08-17 DIAGNOSIS — S60.416A ABRASION OF RIGHT LITTLE FINGER WITH INFECTION: ICD-10-CM

## 2023-08-17 PROCEDURE — 99024 POSTOP FOLLOW-UP VISIT: CPT | Performed by: PHYSICIAN ASSISTANT

## 2023-08-17 RX ORDER — SULFAMETHOXAZOLE AND TRIMETHOPRIM 800; 160 MG/1; MG/1
1 TABLET ORAL EVERY 12 HOURS SCHEDULED
Qty: 10 TABLET | Refills: 0 | Status: SHIPPED | OUTPATIENT
Start: 2023-08-17 | End: 2023-08-22

## 2023-08-17 NOTE — PROGRESS NOTES
Assessment/Plan:  Patient ID: Deborah Nuñez 80 y.o. female   Surgery: Excision Ganglion Cyst - Left  Date of Surgery: 7/21/2023    Small finger pad is erythematous with some serous drainage from the wound site  Advised BID betadine paints and patient was sent 5 days of keflex to begin today   She should avoid standing water and dirty environments  Call with worsening, follow up in one week       Follow Up:  1  week(s)    To Do Next Visit:         CHIEF COMPLAINT:  Chief Complaint   Patient presents with   • Follow-up     Patient is still having a lot of pain in her finger          SUBJECTIVE:  Deborah Nuñez is a 80y.o. year old female who presents for follow up after Excision Ganglion Cyst - Left. Today patient has pain and redness in the pad of the left small finger where the mass was removed. She notes pain and some drainage from the wound periodically. No fever or chills.        PHYSICAL EXAMINATION:  General: well developed and well nourished, alert, oriented times 3 and appears comfortable  Psychiatric: Normal    MUSCULOSKELETAL EXAMINATION:  Incision: serous drainage noted, erythema over the entire distal finger pad  Surgery Site: see above  Range of Motion: full composite fist possible  Neurovascular status: Neuro intact, good cap refill  Activity Restrictions: No restrictions         STUDIES REVIEWED:  No Studies to review      PROCEDURES PERFORMED:  Procedures  No Procedures performed today        Ranjit Chang

## 2023-08-17 NOTE — TELEPHONE ENCOUNTER
Dr Rosita Lopez would like patient to follow up in 1 week      PO LT HAND GANGLION CYST - SX 7/21-10/19

## 2023-08-24 ENCOUNTER — TELEPHONE (OUTPATIENT)
Dept: OBGYN CLINIC | Facility: CLINIC | Age: 83
End: 2023-08-24

## 2023-08-24 ENCOUNTER — OFFICE VISIT (OUTPATIENT)
Dept: OBGYN CLINIC | Facility: CLINIC | Age: 83
End: 2023-08-24

## 2023-08-24 ENCOUNTER — TELEPHONE (OUTPATIENT)
Dept: OBGYN CLINIC | Facility: MEDICAL CENTER | Age: 83
End: 2023-08-24

## 2023-08-24 VITALS
SYSTOLIC BLOOD PRESSURE: 109 MMHG | DIASTOLIC BLOOD PRESSURE: 64 MMHG | HEART RATE: 71 BPM | BODY MASS INDEX: 28.7 KG/M2 | HEIGHT: 61 IN | WEIGHT: 152 LBS

## 2023-08-24 DIAGNOSIS — R22.32 MASS OF FINGER OF LEFT HAND: Primary | ICD-10-CM

## 2023-08-24 PROCEDURE — 99024 POSTOP FOLLOW-UP VISIT: CPT | Performed by: SURGERY

## 2023-08-24 NOTE — PROGRESS NOTES
Assessment/Plan:  Patient ID: Jessica Holguin 80 y.o. female   Surgery: Excision Ganglion Cyst - Left  Date of Surgery: 7/21/2023    Discussed that I am not sure why the cyst continues to fill up with fluid. Suspect this may be coming from the tendon sheath which may require releases of some A5 and possibly distal A4. The other possibility is that this fluid is coming from the joint in which case a fusion may be her best option. I believe an MRI is the best study to evaluate this mass and its origin and will guide the definitive treatment plan. MRI was ordered today, will follow up after this is done  Provided with gel sleeves for finger tip for protection     Follow Up: After Testing    To Do Next Visit:         CHIEF COMPLAINT:  Chief Complaint   Patient presents with   • Follow-up     Patient is still in a lot of pain. SUBJECTIVE:  Jessica Holguin is a 80y.o. year old female who presents for follow up after Excision Ganglion Cyst - Left. Today patient has pain in the left small finger and continues to note some serous fluid from the wound. No fever or chills. She has completed abx.        PHYSICAL EXAMINATION:  General: well developed and well nourished, alert, oriented times 3 and appears comfortable  Psychiatric: Normal    MUSCULOSKELETAL EXAMINATION:  Incision: Clean, dry, intact  Surgery Site: slight erythema at the pad of the finger, wound is intact  Range of Motion: As expected  Neurovascular status: Neuro intact, good cap refill  Activity Restrictions: No restrictions      STUDIES REVIEWED:  No Studies to review      PROCEDURES PERFORMED:  Procedures  No Procedures performed today    Scribe Attestation    I,:  Sherri Mcdaniel PA-C am acting as a scribe while in the presence of the attending physician.:       I,:  Daniela Mcgarry MD personally performed the services described in this documentation    as scribed in my presence.:

## 2023-08-24 NOTE — TELEPHONE ENCOUNTER
Called an spoke with patients son and daughter in law asking them to have kelby call in and change her appt it needs to be done with in the next 7 days. I did speak with someone from central scheduling before this appt was made and made them aware that this is a STAT order. We do not need the T3 that was a mistake on the order.

## 2023-08-24 NOTE — TELEPHONE ENCOUNTER
Caller: PT    Doctor: Bridger Gray    Reason for call: The patient was calling to let you know that she has a 9/11 appt for her STAT Mri. She is going to call them back due to it being STAT to see if she can get in sooner. She stated she had an appointment tomorrow with the hairdresser at 2 pm. She will try to get in tomorrow am . I said maybe she can get in on Saturday    Call back#: [phone]    I do see this note on the 9/11 appt:     per pt it is not a STAT. Heber rizzo

## 2023-08-25 ENCOUNTER — HOSPITAL ENCOUNTER (OUTPATIENT)
Dept: MRI IMAGING | Facility: HOSPITAL | Age: 83
End: 2023-08-25
Payer: COMMERCIAL

## 2023-08-25 DIAGNOSIS — R22.32 MASS OF FINGER OF LEFT HAND: ICD-10-CM

## 2023-08-25 PROCEDURE — 73218 MRI UPPER EXTREMITY W/O DYE: CPT

## 2023-08-25 PROCEDURE — G1004 CDSM NDSC: HCPCS

## 2023-09-20 ENCOUNTER — OFFICE VISIT (OUTPATIENT)
Dept: OBGYN CLINIC | Facility: CLINIC | Age: 83
End: 2023-09-20

## 2023-09-20 DIAGNOSIS — R22.32 MASS OF FINGER OF LEFT HAND: Primary | ICD-10-CM

## 2023-09-20 PROCEDURE — 99024 POSTOP FOLLOW-UP VISIT: CPT | Performed by: SURGERY

## 2023-09-20 NOTE — PATIENT INSTRUCTIONS
Discussed that the flexor tendon is making access fluid and the only way to get rid of it, then taking cysts out and releasing a couple pulleys or to fuse the finger in extension to keep the flexor from working.

## 2023-09-20 NOTE — PROGRESS NOTES
ASSESSMENT/PLAN:      57-year-old female here to review MRI of the left finger-small noting flexor tenosynovitis. On exam patient has some numbness from the PIP joint to the tip of the finger. Discussed that the flexor tendon is making excess fluid and the only way to get rid of it, then taking cysts out and releasing a couple pulleys or to fuse the finger in extension to keep the flexor from working. At this time, the patient wants to just watch the finger. We will follow up as needed. The patient verbalized understanding of exam findings and treatment plan. We engaged in the shared decision-making process and treatment options were discussed at length with the patient. Surgical and conservative management discussed today along with risks and benefits. Diagnoses and all orders for this visit:    Mass of finger of left hand        Follow Up:  Return if symptoms worsen or fail to improve. To Do Next Visit:  Re-evaluation of current issue    ____________________________________________________________________________________________________________________________________________      CHIEF COMPLAINT:  Chief Complaint   Patient presents with   • Follow-up     Mri review       SUBJECTIVE:  Lawrence Webster is a 80y.o. year old RHD female who presents today to review an MRI of the left small finger due to continued filling of fluid into the cyst that was excised on 7/21/23. She reports that she has numbness along the radial aspect of the small finger from the PIP joint to the tip. She has decrease flexion at the PIP and DIP joints. I have personally reviewed all the relevant PMH, PSH, SH, FH, Medications and allergies.      PAST MEDICAL HISTORY:  Past Medical History:   Diagnosis Date   • Arthritis    • Cardiac disease    • COPD (chronic obstructive pulmonary disease) ([address])    • COVID-19 2021    was in hospital for sx and was dx with covid   • Diabetes mellitus ([address])    • GERD (gastroesophageal reflux disease)    • Hiatal hernia    • Hypertension    • PUD (peptic ulcer disease)    • Residual ASD (atrial septal defect) following repair        PAST SURGICAL HISTORY:  Past Surgical History:   Procedure Laterality Date   • ABDOMINAL ADHESION SURGERY N/A 09/26/2022    Procedure: LYSIS ADHESIONS;  Surgeon: Vik Horton MD;  Location: [facility];  Service: Thoracic   • ASD REPAIR     • BACK SURGERY      x3   • CARDIAC SURGERY      Atrial septic defect   • ESOPHAGOGASTRODUODENOSCOPY N/A 09/26/2022    Procedure: ESOPHAGOGASTRODUODENOSCOPY (EGD); Surgeon: Vik Horton MD;  Location: [facility];  Service: Thoracic   • JOINT REPLACEMENT      bilateral knee surgery   • JOINT REPLACEMENT     • KNEE SURGERY     • PARAESOPHAGEAL HERNIA REPAIR N/A 09/26/2022    Procedure: REPAIR HERNIA PARAESOPHAGEAL LAPAROSCOPIC W ROBOTICS, gastropexy, mesh placement;  Surgeon: Vik Horton MD;  Location: [facility];  Service: Thoracic   • MN EXCISION GANGLION WRIST DORSAL/VOLAR PRIMARY Left 7/21/2023    Procedure: EXCISION GANGLION CYST;  Surgeon: Lynford Holstein, MD;  Location: [facility];  Service: Orthopedics   • SHOULDER SURGERY         FAMILY HISTORY:  Family History   Problem Relation Age of Onset   • Cancer Mother    • Asthma Father        SOCIAL HISTORY:  Social History     Tobacco Use   • Smoking status: Former     Packs/day: 1.00     Years: 60.00     Total pack years: 60.00     Types: Cigarettes     Start date: 26     Quit date: 2/1/2020     Years since quitting: 3.6   • Smokeless tobacco: Never   • Tobacco comments:     stopped smoking 2 days ago   Vaping Use   • Vaping Use: Never used   Substance Use Topics   • Alcohol use:  Yes     Alcohol/week: 1.0 standard drink of alcohol     Types: 1 Glasses of wine per week   • Drug use: Never       MEDICATIONS:    Current Outpatient Medications:   •  gabapentin (NEURONTIN) 300 mg capsule, Take 300 mg by mouth 4 (four) times a day, Disp: , Rfl:   •  metFORMIN (GLUCOPHAGE) 1000 MG tablet, 1000 mg QAM and 500 mg QPM, Disp: 1 tablet, Rfl: 0  •  metFORMIN (GLUCOPHAGE) 500 mg tablet, TAKE 2 TABS BY MOUTH IN IN THE MORNING AND 1 TAB BY MOUTH IN IN THE EVENING, Disp: , Rfl:   •  morphine (MSIR) 15 mg tablet, Take 15 mg by mouth 2 (two) times a day Every 12 hrs., Disp: , Rfl:   •  ondansetron (ZOFRAN) 4 mg tablet, Take 1 tablet (4 mg total) by mouth every 8 (eight) hours as needed for nausea or vomiting for up to 10 doses, Disp: 20 tablet, Rfl: 0  •  pantoprazole (PROTONIX) 40 mg tablet, Take 40 mg by mouth every 12 (twelve) hours, Disp: , Rfl:   •  pantoprazole (PROTONIX) 40 mg tablet, Take 40 mg by mouth daily, Disp: , Rfl:   •  rOPINIRole (REQUIP) 0.5 mg tablet, 2-3 tablets PO at HS, Disp: 30 tablet, Rfl: 0  •  albuterol (PROVENTIL HFA,VENTOLIN HFA) 90 mcg/act inhaler, Inhale 2 puffs every 6 (six) hours as needed for wheezing, Disp: 54 g, Rfl: 3  •  albuterol (PROVENTIL HFA,VENTOLIN HFA) 90 mcg/act inhaler, Inhale 2 puffs every 6 (six) hours as needed for wheezing, Disp: , Rfl:   •  ascorbic acid (VITAMIN C) 1000 MG tablet, Take 1,000 mg by mouth 2 (two) times a day, Disp: , Rfl:   •  Ascorbic Acid (vitamin C) 1000 MG tablet, Take 1,000 mg by mouth 2 (two) times a day (Patient not taking: Reported on 7/21/2023), Disp: , Rfl:   •  aspirin 81 mg chewable tablet, Chew 1 tablet (81 mg total) daily, Disp: 30 tablet, Rfl: 0  •  aspirin 81 mg chewable tablet, Chew 81 mg daily (Patient not taking: Reported on 8/24/2023), Disp: , Rfl:   •  atorvastatin (LIPITOR) 40 mg tablet, Take 1 tablet (40 mg total) by mouth every evening, Disp: 30 tablet, Rfl: 0  •  atorvastatin (LIPITOR) 40 mg tablet, Take 40 mg by mouth daily (Patient not taking: Reported on 7/21/2023), Disp: , Rfl:   •  brimonidine tartrate 0.2 % ophthalmic solution, Administer 1 drop into the left eye 2 (two) times a day, Disp: 5 mL, Rfl: 0  •  brimonidine tartrate 0.2 % ophthalmic solution, Administer 1 drop into the left eye 2 (two) times a day (Patient not taking: Reported on 7/21/2023), Disp: , Rfl:   •  Budeson-Glycopyrrol-Formoterol (Breztri Aerosphere) 160-9-4.8 MCG/ACT AERO, Inhale 2 puffs  in the morning and 2 puffs before bedtime. Rinse mouth after use. ., Disp: 31.1 g, Rfl: 3  •  Budeson-Glycopyrrol-Formoterol 160-9-4.8 MCG/ACT AERO, Inhale 2 puffs Rinse mouth after use.  (Patient not taking: Reported on 8/24/2023), Disp: , Rfl:   •  diltiazem (CARDIZEM CD) 180 mg 24 hr capsule, Take 180 mg by mouth daily (Patient not taking: Reported on 7/21/2023), Disp: , Rfl:   •  diltiazem (CARDIZEM CD) 180 mg 24 hr capsule, Take 180 mg by mouth daily, Disp: , Rfl:   •  gabapentin (NEURONTIN) 300 mg capsule, Take 300 mg by mouth 4 (four) times a day (Patient not taking: Reported on 7/21/2023), Disp: , Rfl:   •  losartan (COZAAR) 50 mg tablet, TAKE 1 TABLET BY MOUTH EVERY DAY (Patient not taking: Reported on 8/24/2023), Disp: , Rfl:   •  losartan (COZAAR) 50 mg tablet, Take 50 mg by mouth daily (Patient not taking: Reported on 8/24/2023), Disp: , Rfl:   •  metFORMIN (GLUCOPHAGE) 500 mg tablet, Take 1,000 mg by mouth daily with breakfast (Patient not taking: Reported on 8/24/2023), Disp: , Rfl:   •  metFORMIN (GLUCOPHAGE) 500 mg tablet, Take 500 mg by mouth every evening (Patient not taking: Reported on 8/2/2023), Disp: , Rfl:   •  methocarbamol (ROBAXIN) 500 mg tablet, TAKE 1 TABLET BY MOUTH THREE TIMES A DAY AS NEEDED FOR MUSCLE SPASM (Patient not taking: Reported on 6/1/2023), Disp: , Rfl:   •  methocarbamol (ROBAXIN) 500 mg tablet, Take 500 mg by mouth 4 (four) times a day (Patient not taking: Reported on 6/1/2023), Disp: , Rfl:   •  methylPREDNISolone 4 MG tablet therapy pack, Use as directed on package (Patient not taking: Reported on 7/18/2023), Disp: 21 tablet, Rfl: 0  •  oxyCODONE (OxyCONTIN) 10 mg 12 hr tablet, Take 13.5 mg by mouth every 12 (twelve) hours (Patient not taking: Reported on 6/1/2023), Disp: , Rfl:   •  oxyCODONE ER [facility]) 13.5 MG C12A, Take 13.5 mg by mouth 2 (two) times a day Max Daily Amount: 27 mg (Patient not taking: Reported on 6/1/2023), Disp: 60 capsule, Rfl: 0  •  rOPINIRole (REQUIP) 0.5 mg tablet, Take 0.5 mg by mouth daily at bedtime Take 3 tablets PO before bed (Patient not taking: Reported on 7/21/2023), Disp: , Rfl:   •  sitaGLIPtin (JANUVIA) 100 mg tablet, Take 1 tablet (100 mg total) by mouth daily (Patient not taking: Reported on 8/17/2023), Disp: 30 tablet, Rfl: 0  •  sitaGLIPtin (JANUVIA) 100 mg tablet, Take 100 mg by mouth daily (Patient not taking: Reported on 7/21/2023), Disp: , Rfl:     ALLERGIES:  Allergies   Allergen Reactions   • Metamucil [Fiber] Lightheadedness   • Prednisone      The patient reports "they make me goofy "  No true allergic reaction   • Prednisone Lightheadedness   • Psyllium    • Tetanus Toxoids        REVIEW OF SYSTEMS:  Review of Systems   Constitutional: Negative for chills, fever and unexpected weight change. HENT: Negative for hearing loss, nosebleeds and sore throat. Eyes: Negative for pain, redness and visual disturbance. Respiratory: Negative for cough, shortness of breath and wheezing. Cardiovascular: Negative for chest pain, palpitations and leg swelling. Gastrointestinal: Negative for abdominal pain and nausea. Genitourinary: Negative for dyspareunia, dysuria and frequency. Musculoskeletal: Positive for arthralgias. Skin: Negative for rash and wound. Neurological: Negative for dizziness, numbness and headaches. Psychiatric/Behavioral: Negative for decreased concentration and suicidal ideas. The patient is not nervous/anxious. VITALS:  There were no vitals filed for this visit.     LABS:  HgA1c:   Lab Results   Component Value Date    HGBA1C 6.8 (H) 08/21/2023     BMP:   Lab Results   Component Value Date    GLUCOSE 170 (H) 05/19/2017    CALCIUM 9.7 04/16/2023     07/14/2015    K 4.2 04/16/2023    CO2 27 04/16/2023     04/16/2023    BUN 14 04/16/2023    CREATININE 0.91 04/16/2023       _____________________________________________________  PHYSICAL EXAMINATION:  General: well developed and well nourished, alert, oriented times 3 and appears comfortable  Psychiatric: Normal  HEENT: Normocephalic, Atraumatic Trachea Midline, No torticollis  Pulmonary: No audible wheezing or respiratory distress   Cardiovascular: No pitting edema, 2+ radial pulse   Abdominal/GI: abdomen non tender, non distended   Skin: No masses, erythema, lacerations, fluctation, ulcerations  Neurovascular: Sensation Intact to the Median, Ulnar, Radial Nerve, Motor Intact to the Median, Ulnar, Radial Nerve and Pulses Intact  Musculoskeletal: Normal, except as noted in detailed exam and in HPI. MUSCULOSKELETAL EXAMINATION:    Left small finger:  Cyst like formation along the flexor at the middle phalanx and proximal.   Numbness noted on the radial side of the small finger from the PIP joint to the tip  + 2 DPC   Brisk capillary refill     ___________________________________________________  STUDIES REVIEWED:  I have personally reviewed MRI of finger- Left reviewed from 8/25/2023  which demonstrate Rounded cystic-appearing mass along the palmar aspect of the distal fifth finger at the level of the proximal half of the distal phalanx. This may reflect a cystic ganglion. Other etiologies including giant cell tumor of the tendon sheath not fully   excluded. This can be further evaluated with ultrasound or contrast-enhanced MRI study.     Minimal stenosing tenosynovitis of the fifth flexor tendon.     Moderately advanced arthropathy of the MCP and DIP joints as well as the thumb CMC joint.         PROCEDURES PERFORMED:  Procedures  No Procedures performed today    _____________________________________________________      Clare Banegas    I,:  Kaelyn Gustafson am acting as a scribe while in the presence of the attending physician.:       I,:  Sara Waller, MD personally performed the services described in this documentation    as scribed in my presence.: HERIBERTO Gutierrez

## 2023-12-27 ENCOUNTER — HOSPITAL ENCOUNTER (EMERGENCY)
Facility: HOSPITAL | Age: 83
Discharge: HOME | End: 2023-12-27
Attending: STUDENT IN AN ORGANIZED HEALTH CARE EDUCATION/TRAINING PROGRAM
Payer: COMMERCIAL

## 2023-12-27 ENCOUNTER — APPOINTMENT (EMERGENCY)
Dept: RADIOLOGY | Facility: HOSPITAL | Age: 83
End: 2023-12-27
Payer: COMMERCIAL

## 2023-12-27 VITALS
RESPIRATION RATE: 22 BRPM | TEMPERATURE: 98.1 F | DIASTOLIC BLOOD PRESSURE: 60 MMHG | SYSTOLIC BLOOD PRESSURE: 131 MMHG | HEART RATE: 63 BPM | OXYGEN SATURATION: 96 %

## 2023-12-27 DIAGNOSIS — I45.10 RIGHT BUNDLE BRANCH BLOCK: Primary | ICD-10-CM

## 2023-12-27 DIAGNOSIS — R19.7 DIARRHEA: ICD-10-CM

## 2023-12-27 DIAGNOSIS — R53.1 GENERALIZED WEAKNESS: ICD-10-CM

## 2023-12-27 LAB
2HR DELTA HS TROPONIN: -1 NG/L
ALBUMIN SERPL BCP-MCNC: 3.6 G/DL (ref 3.5–5)
ALP SERPL-CCNC: 86 U/L (ref 34–104)
ALT SERPL W P-5'-P-CCNC: 10 U/L (ref 7–52)
ANION GAP SERPL CALCULATED.3IONS-SCNC: 5 MMOL/L
AST SERPL W P-5'-P-CCNC: 18 U/L (ref 13–39)
BASOPHILS # BLD AUTO: 0.05 THOUSANDS/ÂΜL (ref 0–0.1)
BASOPHILS NFR BLD AUTO: 1 % (ref 0–1)
BILIRUB SERPL-MCNC: 0.34 MG/DL (ref 0.2–1)
BUN SERPL-MCNC: 22 MG/DL (ref 5–25)
CALCIUM SERPL-MCNC: 8.7 MG/DL (ref 8.4–10.2)
CARDIAC TROPONIN I PNL SERPL HS: 4 NG/L
CARDIAC TROPONIN I PNL SERPL HS: 5 NG/L
CHLORIDE SERPL-SCNC: 102 MMOL/L (ref 96–108)
CO2 SERPL-SCNC: 26 MMOL/L (ref 21–32)
CREAT SERPL-MCNC: 0.97 MG/DL (ref 0.6–1.3)
EOSINOPHIL # BLD AUTO: 0.16 THOUSAND/ÂΜL (ref 0–0.61)
EOSINOPHIL NFR BLD AUTO: 2 % (ref 0–6)
ERYTHROCYTE [DISTWIDTH] IN BLOOD BY AUTOMATED COUNT: 17.1 % (ref 11.6–15.1)
FLUAV RNA RESP QL NAA+PROBE: NEGATIVE
FLUBV RNA RESP QL NAA+PROBE: NEGATIVE
GFR SERPL CREATININE-BSD FRML MDRD: 54 ML/MIN/1.73SQ M
GLUCOSE SERPL-MCNC: 198 MG/DL (ref 65–140)
HCT VFR BLD AUTO: 35.5 % (ref 34.8–46.1)
HGB BLD-MCNC: 10.6 G/DL (ref 11.5–15.4)
IMM GRANULOCYTES # BLD AUTO: 0.06 THOUSAND/UL (ref 0–0.2)
IMM GRANULOCYTES NFR BLD AUTO: 1 % (ref 0–2)
LYMPHOCYTES # BLD AUTO: 1.19 THOUSANDS/ÂΜL (ref 0.6–4.47)
LYMPHOCYTES NFR BLD AUTO: 18 % (ref 14–44)
MCH RBC QN AUTO: 24.5 PG (ref 26.8–34.3)
MCHC RBC AUTO-ENTMCNC: 29.9 G/DL (ref 31.4–37.4)
MCV RBC AUTO: 82 FL (ref 82–98)
MONOCYTES # BLD AUTO: 0.79 THOUSAND/ÂΜL (ref 0.17–1.22)
MONOCYTES NFR BLD AUTO: 12 % (ref 4–12)
NEUTROPHILS # BLD AUTO: 4.44 THOUSANDS/ÂΜL (ref 1.85–7.62)
NEUTS SEG NFR BLD AUTO: 66 % (ref 43–75)
NRBC BLD AUTO-RTO: 0 /100 WBCS
PLATELET # BLD AUTO: 270 THOUSANDS/UL (ref 149–390)
PMV BLD AUTO: 9.3 FL (ref 8.9–12.7)
POTASSIUM SERPL-SCNC: 6.3 MMOL/L (ref 3.5–5.3)
PROT SERPL-MCNC: 6.4 G/DL (ref 6.4–8.4)
RBC # BLD AUTO: 4.33 MILLION/UL (ref 3.81–5.12)
RSV RNA RESP QL NAA+PROBE: NEGATIVE
SARS-COV-2 RNA RESP QL NAA+PROBE: NEGATIVE
SODIUM SERPL-SCNC: 133 MMOL/L (ref 135–147)
WBC # BLD AUTO: 6.69 THOUSAND/UL (ref 4.31–10.16)

## 2023-12-27 PROCEDURE — 84484 ASSAY OF TROPONIN QUANT: CPT | Performed by: STUDENT IN AN ORGANIZED HEALTH CARE EDUCATION/TRAINING PROGRAM

## 2023-12-27 PROCEDURE — 36415 COLL VENOUS BLD VENIPUNCTURE: CPT

## 2023-12-27 PROCEDURE — 71045 X-RAY EXAM CHEST 1 VIEW: CPT

## 2023-12-27 PROCEDURE — 85025 COMPLETE CBC W/AUTO DIFF WBC: CPT | Performed by: STUDENT IN AN ORGANIZED HEALTH CARE EDUCATION/TRAINING PROGRAM

## 2023-12-27 PROCEDURE — 80053 COMPREHEN METABOLIC PANEL: CPT | Performed by: STUDENT IN AN ORGANIZED HEALTH CARE EDUCATION/TRAINING PROGRAM

## 2023-12-27 PROCEDURE — 0241U HB NFCT DS VIR RESP RNA 4 TRGT: CPT | Performed by: STUDENT IN AN ORGANIZED HEALTH CARE EDUCATION/TRAINING PROGRAM

## 2023-12-27 PROCEDURE — 99285 EMERGENCY DEPT VISIT HI MDM: CPT | Performed by: STUDENT IN AN ORGANIZED HEALTH CARE EDUCATION/TRAINING PROGRAM

## 2023-12-27 PROCEDURE — 99285 EMERGENCY DEPT VISIT HI MDM: CPT

## 2023-12-27 PROCEDURE — 93005 ELECTROCARDIOGRAM TRACING: CPT

## 2023-12-28 LAB
ATRIAL RATE: 67 BPM
P AXIS: 45 DEGREES
PR INTERVAL: 120 MS
QRS AXIS: -85 DEGREES
QRSD INTERVAL: 134 MS
QT INTERVAL: 422 MS
QTC INTERVAL: 445 MS
T WAVE AXIS: 41 DEGREES
VENTRICULAR RATE: 67 BPM

## 2023-12-28 NOTE — ED ATTENDING ATTESTATION
12/27/2023  I, Tramaine Rivera DO, saw and evaluated the patient. I have discussed the patient with the resident/non-physician practitioner and agree with the resident's/non-physician practitioner's findings, Plan of Care, and MDM as documented in the resident's/non-physician practitioner's note, except where noted. All available labs and Radiology studies were reviewed.  I was present for key portions of any procedure(s) performed by the resident/non-physician practitioner and I was immediately available to provide assistance.       At this point I agree with the current assessment done in the Emergency Department.  I have conducted an independent evaluation of this patient a history and physical is as follows:    83-year-old female presents to the ED for evaluation of generalized weakness.  Symptoms started few days ago and have been gradually worsening since onset.  No known provoking or palliative factors.  Has had intermittent shortness of breath and intermittently wears oxygen as needed.  On my exam, is resting comfortably no acute distress, normal heart sounds, normal lung sounds, abdomen soft nontender nondistended, moving all extremities with no gross motor deficits.  Labs show no leukocytosis, stable hemoglobin when compared to priors, hemolyzed specimen resulting in abnormal hyperkalemia, initial troponin of 5, negative for COVID/flu/RSV  normal renal function, no acute LFT abnormalities,.  Chest x-ray shows no acute cardiopulmonary pathology as interpreted by myself.  See resident documentation for rectal exam, this was found to be negative.  Repeat troponin of 4 with delta of -1.  Patient was ambulated without any difficulty.  Results discussed.  Patient was offered observation versus discharge with close outpatient follow-up.  Patient prefers to be discharged.  I think this is a reasonable plan for her.  Stable for discharge and agreeable to the plan.  Strict return ED precautions given    ED Course          Critical Care Time  Procedures

## 2023-12-28 NOTE — ED PROVIDER NOTES
History  Chief Complaint   Patient presents with    Weakness - Generalized     Pt reports being weak and cold x4 days. Denies cp/sob. Pt reports having oxygen @ home for PRN.      83-year-old female presenting to the ED with a complaint of generalized weakness for the past 3 days.  Patient states that she has been having diarrhea that has been dark in color as well as chills at home.  Patient does note that she had family over [place] who were sick with flulike symptoms.  Patient also notes that she ate out at [employer] for [employer] and unsure if what she ate could be causing her diarrhea.  Patient states that she had a formed bowel movement today and is feeling better starting today but was still worried given the diarrhea.  Patient is denying any nausea or vomiting.  Patient denies any fevers.  Patient denies chest pain, palpitations, lightheadedness, dizziness, blurred vision, shortness of breath, difficulty breathing, urinary incontinence, urinary retention, numbness or tingling in the arms and legs.  Patient is on home oxygen but has not been requiring any increase in her home oxygen.  Patient has not noted any increased leg swelling or unintended weight loss.        Prior to Admission Medications   Prescriptions Last Dose Informant Patient Reported? Taking?   Ascorbic Acid (vitamin C) 1000 MG tablet   Yes No   Sig: Take 1,000 mg by mouth 2 (two) times a day   Patient not taking: Reported on 7/21/2023   Budeson-Glycopyrrol-Formoterol (Breztri Aerosphere) 160-9-4.8 MCG/ACT AERO   No No   Sig: Inhale 2 puffs  in the morning and 2 puffs before bedtime. Rinse mouth after use..   Budeson-Glycopyrrol-Formoterol 160-9-4.8 MCG/ACT AERO   Yes No   Sig: Inhale 2 puffs Rinse mouth after use.   Patient not taking: Reported on 8/24/2023   albuterol (PROVENTIL HFA,VENTOLIN HFA) 90 mcg/act inhaler   No No   Sig: Inhale 2 puffs every 6 (six) hours as needed for wheezing   albuterol (PROVENTIL HFA,VENTOLIN HFA) 90 mcg/act  inhaler   Yes No   Sig: Inhale 2 puffs every 6 (six) hours as needed for wheezing   ascorbic acid (VITAMIN C) 1000 MG tablet  Self Yes No   Sig: Take 1,000 mg by mouth 2 (two) times a day   aspirin 81 mg chewable tablet   No No   Sig: Chew 1 tablet (81 mg total) daily   aspirin 81 mg chewable tablet   Yes No   Sig: Chew 81 mg daily   Patient not taking: Reported on 2023   atorvastatin (LIPITOR) 40 mg tablet   No No   Sig: Take 1 tablet (40 mg total) by mouth every evening   atorvastatin (LIPITOR) 40 mg tablet   Yes No   Sig: Take 40 mg by mouth daily   Patient not taking: Reported on 2023   brimonidine tartrate 0.2 % ophthalmic solution   No No   Sig: Administer 1 drop into the left eye 2 (two) times a day   brimonidine tartrate 0.2 % ophthalmic solution   Yes No   Sig: Administer 1 drop into the left eye 2 (two) times a day   Patient not taking: Reported on 2023   diltiazem (CARDIZEM CD) 180 mg 24 hr capsule  Self Yes No   Sig: Take 180 mg by mouth daily   Patient not taking: Reported on 2023   diltiazem (CARDIZEM CD) 180 mg 24 hr capsule   Yes No   Sig: Take 180 mg by mouth daily   gabapentin (NEURONTIN) 300 mg capsule  Self Yes No   Sig: Take 300 mg by mouth 4 (four) times a day   gabapentin (NEURONTIN) 300 mg capsule   Yes No   Sig: Take 300 mg by mouth 4 (four) times a day   Patient not taking: Reported on 2023   losartan (COZAAR) 50 mg tablet  Self Yes No   Sig: TAKE 1 TABLET BY MOUTH EVERY DAY   Patient not taking: Reported on 2023   losartan (COZAAR) 50 mg tablet   Yes No   Sig: Take 50 mg by mouth daily   Patient not taking: Reported on 2023   metFORMIN (GLUCOPHAGE) 1000 MG tablet  Self No No   Si mg QAM and 500 mg QPM   metFORMIN (GLUCOPHAGE) 500 mg tablet   Yes No   Sig: TAKE 2 TABS BY MOUTH IN IN THE MORNING AND 1 TAB BY MOUTH IN IN THE EVENING   metFORMIN (GLUCOPHAGE) 500 mg tablet   Yes No   Sig: Take 1,000 mg by mouth daily with breakfast   Patient not taking:  Reported on 2023   metFORMIN (GLUCOPHAGE) 500 mg tablet   Yes No   Sig: Take 500 mg by mouth every evening   Patient not taking: Reported on 2023   methocarbamol (ROBAXIN) 500 mg tablet   Yes No   Sig: TAKE 1 TABLET BY MOUTH THREE TIMES A DAY AS NEEDED FOR MUSCLE SPASM   Patient not taking: Reported on 2023   methocarbamol (ROBAXIN) 500 mg tablet   Yes No   Sig: Take 500 mg by mouth 4 (four) times a day   Patient not taking: Reported on 2023   methylPREDNISolone 4 MG tablet therapy pack   No No   Sig: Use as directed on package   Patient not taking: Reported on 2023   morphine (MSIR) 15 mg tablet   Yes No   Sig: Take 15 mg by mouth 2 (two) times a day Every 12 hrs.   ondansetron (ZOFRAN) 4 mg tablet   No No   Sig: Take 1 tablet (4 mg total) by mouth every 8 (eight) hours as needed for nausea or vomiting for up to 10 doses   oxyCODONE (OxyCONTIN) 10 mg 12 hr tablet   Yes No   Sig: Take 13.5 mg by mouth every 12 (twelve) hours   Patient not taking: Reported on 2023   oxyCODONE ER (Xtampza ER) 13.5 MG C12A   No No   Sig: Take 13.5 mg by mouth 2 (two) times a day Max Daily Amount: 27 mg   Patient not taking: Reported on 2023   pantoprazole (PROTONIX) 40 mg tablet  Self Yes No   Sig: Take 40 mg by mouth every 12 (twelve) hours   pantoprazole (PROTONIX) 40 mg tablet   Yes No   Sig: Take 40 mg by mouth daily   rOPINIRole (REQUIP) 0.5 mg tablet  Self No No   Si-3 tablets PO at HS   rOPINIRole (REQUIP) 0.5 mg tablet   Yes No   Sig: Take 0.5 mg by mouth daily at bedtime Take 3 tablets PO before bed   Patient not taking: Reported on 2023   sitaGLIPtin (JANUVIA) 100 mg tablet  Self No No   Sig: Take 1 tablet (100 mg total) by mouth daily   Patient not taking: Reported on 2023   sitaGLIPtin (JANUVIA) 100 mg tablet   Yes No   Sig: Take 100 mg by mouth daily   Patient not taking: Reported on 2023      Facility-Administered Medications: None       Past Medical History:   Diagnosis  Date    Arthritis     Cardiac disease     COPD (chronic obstructive pulmonary disease) (HCC)     COVID-19 2021    was in hospital for sx and was dx with covid    Diabetes mellitus (HCC)     GERD (gastroesophageal reflux disease)     Hiatal hernia     Hypertension     PUD (peptic ulcer disease)     Residual ASD (atrial septal defect) following repair        Past Surgical History:   Procedure Laterality Date    ABDOMINAL ADHESION SURGERY N/A 09/26/2022    Procedure: LYSIS ADHESIONS;  Surgeon: Petty Johnson MD;  Location: [facility];  Service: Thoracic    ASD REPAIR      BACK SURGERY      x3    CARDIAC SURGERY      Atrial septic defect    ESOPHAGOGASTRODUODENOSCOPY N/A 09/26/2022    Procedure: ESOPHAGOGASTRODUODENOSCOPY (EGD);  Surgeon: Petty Johnson MD;  Location: [facility];  Service: Thoracic    JOINT REPLACEMENT      bilateral knee surgery    JOINT REPLACEMENT      KNEE SURGERY      PARAESOPHAGEAL HERNIA REPAIR N/A 09/26/2022    Procedure: REPAIR HERNIA PARAESOPHAGEAL LAPAROSCOPIC W ROBOTICS, gastropexy, mesh placement;  Surgeon: Petty Johnson MD;  Location: [facility];  Service: Thoracic    MT EXCISION GANGLION WRIST DORSAL/VOLAR PRIMARY Left 7/21/2023    Procedure: EXCISION GANGLION CYST;  Surgeon: Jaja Pantoja MD;  Location: [facility];  Service: Orthopedics    SHOULDER SURGERY         Family History   Problem Relation Age of Onset    Cancer Mother     Asthma Father      I have reviewed and agree with the history as documented.    E-Cigarette/Vaping    E-Cigarette Use Never User      E-Cigarette/Vaping Substances    Nicotine No     THC No     CBD No     Flavoring No     Other No     Unknown No      Social History     Tobacco Use    Smoking status: Former     Current packs/day: 0.00     Average packs/day: 1 pack/day for 64.1 years (64.1 ttl pk-yrs)     Types: Cigarettes     Start date: 1956     Quit date: 2/1/2020     Years since quitting: 3.9    Smokeless tobacco: Never    Tobacco  comments:     stopped smoking 2 days ago   Vaping Use    Vaping status: Never Used   Substance Use Topics    Alcohol use: Yes     Alcohol/week: 1.0 standard drink of alcohol     Types: 1 Glasses of wine per week    Drug use: Never        Review of Systems   Constitutional:  Positive for activity change and fatigue.   HENT:  Negative for sinus pain, sneezing and trouble swallowing.    Respiratory:  Negative for chest tightness.    Cardiovascular:  Negative for chest pain and palpitations.   Gastrointestinal:  Positive for diarrhea. Negative for abdominal pain, nausea and vomiting.   Genitourinary:  Negative for difficulty urinating.   Musculoskeletal:  Positive for myalgias. Negative for arthralgias.   Neurological:  Negative for dizziness, weakness, light-headedness, numbness and headaches.   Psychiatric/Behavioral: Negative.         Physical Exam  ED Triage Vitals [12/27/23 1809]   Temperature Pulse Respirations Blood Pressure SpO2   98.1 °F (36.7 °C) 65 18 139/67 94 %      Temp Source Heart Rate Source Patient Position - Orthostatic VS BP Location FiO2 (%)   Oral Monitor Sitting Right arm --      Pain Score       --             Orthostatic Vital Signs  Vitals:    12/27/23 1809 12/27/23 1900   BP: 139/67 131/60   Pulse: 65 63   Patient Position - Orthostatic VS: Sitting        Physical Exam  Constitutional:       Appearance: Normal appearance.   HENT:      Head: Normocephalic and atraumatic.      Right Ear: External ear normal.      Left Ear: External ear normal.      Nose: Nose normal.      Mouth/Throat:      Pharynx: Oropharynx is clear.   Eyes:      Extraocular Movements: Extraocular movements intact.   Cardiovascular:      Rate and Rhythm: Normal rate and regular rhythm.      Pulses: Normal pulses.      Heart sounds: Normal heart sounds.   Pulmonary:      Effort: Pulmonary effort is normal.      Breath sounds: Normal breath sounds.   Abdominal:      General: Bowel sounds are normal.      Palpations: Abdomen is  soft.   Musculoskeletal:         General: Normal range of motion.      Cervical back: Normal range of motion.   Skin:     General: Skin is warm.      Capillary Refill: Capillary refill takes less than 2 seconds.   Neurological:      General: No focal deficit present.      Mental Status: She is alert and oriented to person, place, and time.   Psychiatric:         Mood and Affect: Mood normal.         Behavior: Behavior normal.         ED Medications  Medications - No data to display    Diagnostic Studies  Results Reviewed       Procedure Component Value Units Date/Time    HS Troponin I 2hr [[id number]]  (Normal) Collected: 12/27/23 2035    Lab Status: Final result Specimen: Blood from Arm, Right Updated: 12/27/23 2100     hs TnI 2hr 4 ng/L      Delta 2hr hsTnI -1 ng/L     Comprehensive metabolic panel [[id number]]  (Abnormal) Collected: 12/27/23 1916    Lab Status: Final result Specimen: Blood from Arm, Right Updated: 12/27/23 1955     Sodium 133 mmol/L      Potassium 6.3 mmol/L      Chloride 102 mmol/L      CO2 26 mmol/L      ANION GAP 5 mmol/L      BUN 22 mg/dL      Creatinine 0.97 mg/dL      Glucose 198 mg/dL      Calcium 8.7 mg/dL      AST 18 U/L      ALT 10 U/L      Alkaline Phosphatase 86 U/L      Total Protein 6.4 g/dL      Albumin 3.6 g/dL      Total Bilirubin 0.34 mg/dL      eGFR 54 ml/min/1.73sq m     Narrative:      National Kidney Disease Foundation guidelines for Chronic Kidney Disease (CKD):     Stage 1 with normal or high GFR (GFR > 90 mL/min/1.73 square meters)    Stage 2 Mild CKD (GFR = 60-89 mL/min/1.73 square meters)    Stage 3A Moderate CKD (GFR = 45-59 mL/min/1.73 square meters)    Stage 3B Moderate CKD (GFR = 30-44 mL/min/1.73 square meters)    Stage 4 Severe CKD (GFR = 15-29 mL/min/1.73 square meters)    Stage 5 End Stage CKD (GFR <15 mL/min/1.73 square meters)  Note: GFR calculation is accurate only with a steady state creatinine    FLU/RSV/COVID - if FLU/RSV clinically relevant [[id number]]   (Normal) Collected: 12/27/23 1902    Lab Status: Final result Specimen: Nares from Nose Updated: 12/27/23 1952     SARS-CoV-2 Negative     INFLUENZA A PCR Negative     INFLUENZA B PCR Negative     RSV PCR Negative    Narrative:      FOR PEDIATRIC PATIENTS - copy/paste COVID Guidelines URL to browser: [email or web address]    SARS-CoV-2 assay is a Nucleic Acid Amplification assay intended for the  qualitative detection of nucleic acid from SARS-CoV-2 in nasopharyngeal  swabs. Results are for the presumptive identification of SARS-CoV-2 RNA.    Positive results are indicative of infection with SARS-CoV-2, the virus  causing COVID-19, but do not rule out bacterial infection or co-infection  with other viruses. Laboratories within the [place] and its  territories are required to report all positive results to the appropriate  public health authorities. Negative results do not preclude SARS-CoV-2  infection and should not be used as the sole basis for treatment or other  patient management decisions. Negative results must be combined with  clinical observations, patient history, and epidemiological information.  This test has not been FDA cleared or approved.    This test has been authorized by FDA under an Emergency Use Authorization  (EUA). This test is only authorized for the duration of time the  declaration that circumstances exist justifying the authorization of the  emergency use of an in vitro diagnostic tests for detection of SARS-CoV-2  virus and/or diagnosis of COVID-19 infection under section 564(b)(1) of  the Act, 21 U.S.C. 360bbb-3(b)(1), unless the authorization is terminated  or revoked sooner. The test has been validated but independent review by FDA  and CLIA is pending.    Test performed using [employer] GeneXpert: This RT-PCR assay targets N2,  a region unique to SARS-CoV-2. A conserved region in the E-gene was chosen  for pan-Sarbecovirus  detection which includes SARS-CoV-2.    According to CMS-2020-01-R, this platform meets the definition of high-throughput technology.    HS Troponin 0hr (reflex protocol) [[id number]]  (Normal) Collected: 12/27/23 1817    Lab Status: Final result Specimen: Blood from Arm, Right Updated: 12/27/23 1848     hs TnI 0hr 5 ng/L     CBC and differential [[id number]]  (Abnormal) Collected: 12/27/23 1817    Lab Status: Final result Specimen: Blood from Arm, Right Updated: 12/27/23 1831     WBC 6.69 Thousand/uL      RBC 4.33 Million/uL      Hemoglobin 10.6 g/dL      Hematocrit 35.5 %      MCV 82 fL      MCH 24.5 pg      MCHC 29.9 g/dL      RDW 17.1 %      MPV 9.3 fL      Platelets 270 Thousands/uL      nRBC 0 /100 WBCs      Neutrophils Relative 66 %      Immat GRANS % 1 %      Lymphocytes Relative 18 %      Monocytes Relative 12 %      Eosinophils Relative 2 %      Basophils Relative 1 %      Neutrophils Absolute 4.44 Thousands/µL      Immature Grans Absolute 0.06 Thousand/uL      Lymphocytes Absolute 1.19 Thousands/µL      Monocytes Absolute 0.79 Thousand/µL      Eosinophils Absolute 0.16 Thousand/µL      Basophils Absolute 0.05 Thousands/µL                    XR chest 1 view portable   ED Interpretation by Sterling Garcia MD (12/27 1951)   No obvious lobar pneumonias.  Diaphragmatic angles to appear blunted in comparison to previous x-rays.  No obvious infiltrates or edema noted.            Procedures  ECG 12 Lead Documentation Only    Date/Time: 12/28/2023 12:34 AM    Performed by: Sterling Garcia MD  Authorized by: Sterling Garcia MD    Indications / Diagnosis:  Shortness of breath  Patient location:  ED  Previous ECG:     Previous ECG:  Compared to current    Similarity:  No change  Interpretation:     Interpretation: abnormal    Rate:     ECG rate:  67    ECG rate assessment: normal    Rhythm:     Rhythm: sinus rhythm    Ectopy:     Ectopy: none    QRS:     QRS axis:  Normal  Conduction:     Conduction: abnormal       Abnormal conduction: complete RBBB and LAFB    ST segments:     ST segments:  Normal  T waves:     T waves: normal          ED Course  ED Course as of 12/28/23 0043   Wed Dec 27, 2023   1947 History and physical exam completed.  Lab work was already sent prior to provider to the room.  At this time not concerned for ACS versus MI.  Considering likely a viral illness versus gastroenteritis.  Will add on a chest x-ray and continue to wait for labs to result.   2023 Potassium(!!): 6.3  hemolyzed   2105 Stool guaiac test done to assess for blood.  Evaluate results in 3 to 5 minutes.   2105 Delta 2hr hsTnI: -1   2105 hs TnI 2hr: 4  Reassuring             HEART Risk Score      Flowsheet Row Most Recent Value   Heart Score Risk Calculator    History 0 Filed at: 12/28/2023 0036   ECG 1 Filed at: 12/28/2023 0036   Age 2 Filed at: 12/28/2023 0036   Risk Factors 1 Filed at: 12/28/2023 0036   Troponin 0 Filed at: 12/28/2023 0036   HEART Score 4 Filed at: 12/28/2023 0036                            Wells' Criteria for PE      Flowsheet Row Most Recent Value   Wells' Criteria for PE    Clinical signs and symptoms of DVT 0 Filed at: 12/27/2023 2205   PE is primary diagnosis or equally likely 0 Filed at: 12/27/2023 2205   HR >100 0 Filed at: 12/27/2023 2205   Immobilization at least 3 days or Surgery in the previous 4 weeks 0 Filed at: 12/27/2023 2205   Previous, objectively diagnosed PE or DVT 0 Filed at: 12/27/2023 2205   Hemoptysis 0 Filed at: 12/27/2023 2205   Malignancy with treatment within 6 months or palliative 0 Filed at: 12/27/2023 2205   Wells' Criteria Total 0 Filed at: 12/27/2023 2205              Medical Decision Making  83-year-old female presenting to the ED with a complaint of generalized weakness as well as diarrhea for the past 3 days.  Initial concern for GI bleed also considering viral infection, gastroenteritis, gastritis, abnormal presentation for ACS versus MI versus electrolyte abnormality.  Lab work and  imaging were ordered.  CBC was significant for a anemia of a hemoglobin of 10.6 however her past hemoglobins have been between 10.5 and 11.  A stool guaiac test was performed and found to be negative for blood.  Patient's Wells score was 0 and given physical exam with no shortness of breath at this time and no presenting symptoms PE is very low suspicion.  Flu RSV COVID was negative.  Patient was noted to have elevated potassium however the sample was hemolyzed and unlikely true.  Patient's EKG was unchanged from previous and troponins were 4.  At this time unlikely abnormal presentation for ACS versus MI.  Asked x-ray showed no obvious lobar pneumonia as.  Chest x-ray showed no significant infiltrates or other concerns at this time.  Patient was ambulated and felt good enough to go home.  A discussion was had with the patient that there was no emergent treatment needed here in the ED and patient was comfortable with discharge at this time.  Patient states that she will follow-up with her primary care provider outpatient.  Patient was noted to have right bundle branch block on EKG which is her normal.  Her heart score is 4 patient already follows up with cardiologist and will be following with cardiologist.  Patient discharged at this time.    Amount and/or Complexity of Data Reviewed  Labs: ordered. Decision-making details documented in ED Course.  Radiology: ordered and independent interpretation performed.          Disposition  Final diagnoses:   Right bundle branch block   Diarrhea   Generalized weakness     Time reflects when diagnosis was documented in both MDM as applicable and the Disposition within this note       Time User Action Codes Description Comment    12/27/2023  8:36 PM Sterling Metzger [I45.10] Right bundle branch block     12/27/2023  8:36 PM Sterling Metzger [R19.7] Diarrhea     12/27/2023  8:36 PM Sterling Metzger [R53.1] Generalized weakness           ED Disposition       ED  Disposition   Discharge    Condition   Stable    Date/Time   Wed Dec 27, 2023 2211    Comment   Gloria KAREY Acharyale discharge to home/self care.                   Follow-up Information       Follow up With Specialties Details Why Contact Info Additional Information    [facility]   [address] [address] [phone] [facility] [place], [address], [place], [address] [phone]    Christine Charles, [address]  [address]  [facility] [address]  [phone]               Discharge Medication List as of 12/27/2023 10:14 PM        CONTINUE these medications which have NOT CHANGED    Details   !! albuterol (PROVENTIL HFA,VENTOLIN HFA) 90 mcg/act inhaler Inhale 2 puffs every 6 (six) hours as needed for wheezing, Starting Mon 9/20/2021, Normal      !! albuterol (PROVENTIL HFA,VENTOLIN HFA) 90 mcg/act inhaler Inhale 2 puffs every 6 (six) hours as needed for wheezing, Historical Med      !! ascorbic acid (VITAMIN C) 1000 MG tablet Take 1,000 mg by mouth 2 (two) times a day, Historical Med      !! Ascorbic Acid (vitamin C) 1000 MG tablet Take 1,000 mg by mouth 2 (two) times a day, Historical Med      !! aspirin 81 mg chewable tablet Chew 1 tablet (81 mg total) daily, Starting Sat 4/23/2022, Normal      !! aspirin 81 mg chewable tablet Chew 81 mg daily, Historical Med      atorvastatin (LIPITOR) 40 mg tablet Take 40 mg by mouth daily, Historical Med      !! brimonidine tartrate 0.2 % ophthalmic solution Administer 1 drop into the left eye 2 (two) times a day, Starting Fri 4/22/2022, Normal      !! brimonidine tartrate 0.2 % ophthalmic solution Administer 1 drop into the left eye 2 (two) times a day, Historical Med      !! Budeson-Glycopyrrol-Formoterol (Breztri Aerosphere) 160-9-4.8 MCG/ACT AERO Inhale 2 puffs  in the morning and 2 puffs before bedtime. Rinse mouth after use.., Starting Mon 5/16/2022, Normal       !! Budeson-Glycopyrrol-Formoterol 160-9-4.8 MCG/ACT AERO Inhale 2 puffs Rinse mouth after use., Historical Med      !! diltiazem (CARDIZEM CD) 180 mg 24 hr capsule Take 180 mg by mouth daily, Historical Med      !! diltiazem (CARDIZEM CD) 180 mg 24 hr capsule Take 180 mg by mouth daily, Historical Med      !! gabapentin (NEURONTIN) 300 mg capsule Take 300 mg by mouth 4 (four) times a day, Starting Mon 3/23/2020, Historical Med      !! gabapentin (NEURONTIN) 300 mg capsule Take 300 mg by mouth 4 (four) times a day, Historical Med      !! losartan (COZAAR) 50 mg tablet TAKE 1 TABLET BY MOUTH EVERY DAY, Historical Med      !! losartan (COZAAR) 50 mg tablet Take 50 mg by mouth daily, Historical Med      !! metFORMIN (GLUCOPHAGE) 1000 MG tablet 1000 mg QAM and 500 mg QPM, No Print      !! metFORMIN (GLUCOPHAGE) 500 mg tablet TAKE 2 TABS BY MOUTH IN IN THE MORNING AND 1 TAB BY MOUTH IN IN THE EVENING, Historical Med      !! metFORMIN (GLUCOPHAGE) 500 mg tablet Take 1,000 mg by mouth daily with breakfast, Historical Med      !! metFORMIN (GLUCOPHAGE) 500 mg tablet Take 500 mg by mouth every evening, Historical Med      !! methocarbamol (ROBAXIN) 500 mg tablet TAKE 1 TABLET BY MOUTH THREE TIMES A DAY AS NEEDED FOR MUSCLE SPASM, Historical Med      !! methocarbamol (ROBAXIN) 500 mg tablet Take 500 mg by mouth 4 (four) times a day, Historical Med      methylPREDNISolone 4 MG tablet therapy pack Use as directed on package, Normal      morphine (MSIR) 15 mg tablet Take 15 mg by mouth 2 (two) times a day Every 12 hrs., Historical Med      ondansetron (ZOFRAN) 4 mg tablet Take 1 tablet (4 mg total) by mouth every 8 (eight) hours as needed for nausea or vomiting for up to 10 doses, Starting Fri 3/10/2023, Normal      oxyCODONE (OxyCONTIN) 10 mg 12 hr tablet Take 13.5 mg by mouth every 12 (twelve) hours, Historical Med      oxyCODONE ER (Xtampza ER) 13.5 MG C12A Take 13.5 mg by mouth 2 (two) times a day Max Daily Amount: 27  mg, Starting Mon 5/16/2022, No Print      !! pantoprazole (PROTONIX) 40 mg tablet Take 40 mg by mouth every 12 (twelve) hours, Historical Med      !! pantoprazole (PROTONIX) 40 mg tablet Take 40 mg by mouth daily, Historical Med      !! rOPINIRole (REQUIP) 0.5 mg tablet 2-3 tablets PO at HS, No Print      !! rOPINIRole (REQUIP) 0.5 mg tablet Take 0.5 mg by mouth daily at bedtime Take 3 tablets PO before bed, Historical Med      !! sitaGLIPtin (JANUVIA) 100 mg tablet Take 1 tablet (100 mg total) by mouth daily, Starting Mon 4/13/2020, No Print      !! sitaGLIPtin (JANUVIA) 100 mg tablet Take 100 mg by mouth daily, Historical Med       !! - Potential duplicate medications found. Please discuss with provider.            PDMP Review         Value Time User    PDMP Reviewed  Yes 7/21/2023  1:27 PM Lety De León PA-C             ED Provider  Attending physically available and evaluated Gloria Patel. I managed the patient along with the ED Attending.    Electronically Signed by           Sterling Garcia MD  12/28/23 0044

## 2024-01-12 ENCOUNTER — HOSPITAL ENCOUNTER (EMERGENCY)
Facility: HOSPITAL | Age: 84
Discharge: HOME | End: 2024-01-12
Attending: EMERGENCY MEDICINE
Payer: COMMERCIAL

## 2024-01-12 ENCOUNTER — APPOINTMENT (EMERGENCY)
Dept: RADIOLOGY | Facility: HOSPITAL | Age: 84
End: 2024-01-12
Payer: COMMERCIAL

## 2024-01-12 VITALS
TEMPERATURE: 99 F | DIASTOLIC BLOOD PRESSURE: 67 MMHG | RESPIRATION RATE: 22 BRPM | HEART RATE: 64 BPM | SYSTOLIC BLOOD PRESSURE: 154 MMHG | OXYGEN SATURATION: 96 %

## 2024-01-12 DIAGNOSIS — R53.83 FATIGUE: Primary | ICD-10-CM

## 2024-01-12 LAB
ALBUMIN SERPL BCP-MCNC: 4.1 G/DL (ref 3.5–5)
ALP SERPL-CCNC: 99 U/L (ref 34–104)
ALT SERPL W P-5'-P-CCNC: 9 U/L (ref 7–52)
ANION GAP SERPL CALCULATED.3IONS-SCNC: 7 MMOL/L
AST SERPL W P-5'-P-CCNC: 13 U/L (ref 13–39)
BASOPHILS # BLD AUTO: 0.05 THOUSANDS/ÂΜL (ref 0–0.1)
BASOPHILS NFR BLD AUTO: 1 % (ref 0–1)
BILIRUB SERPL-MCNC: 0.51 MG/DL (ref 0.2–1)
BUN SERPL-MCNC: 19 MG/DL (ref 5–25)
CALCIUM SERPL-MCNC: 9.6 MG/DL (ref 8.4–10.2)
CHLORIDE SERPL-SCNC: 98 MMOL/L (ref 96–108)
CO2 SERPL-SCNC: 29 MMOL/L (ref 21–32)
CREAT SERPL-MCNC: 0.94 MG/DL (ref 0.6–1.3)
EOSINOPHIL # BLD AUTO: 0.12 THOUSAND/ÂΜL (ref 0–0.61)
EOSINOPHIL NFR BLD AUTO: 2 % (ref 0–6)
ERYTHROCYTE [DISTWIDTH] IN BLOOD BY AUTOMATED COUNT: 17.5 % (ref 11.6–15.1)
FLUAV RNA RESP QL NAA+PROBE: NEGATIVE
FLUBV RNA RESP QL NAA+PROBE: NEGATIVE
GFR SERPL CREATININE-BSD FRML MDRD: 56 ML/MIN/1.73SQ M
GLUCOSE SERPL-MCNC: 101 MG/DL (ref 65–140)
HCT VFR BLD AUTO: 37.6 % (ref 34.8–46.1)
HGB BLD-MCNC: 11.2 G/DL (ref 11.5–15.4)
IMM GRANULOCYTES # BLD AUTO: 0.03 THOUSAND/UL (ref 0–0.2)
IMM GRANULOCYTES NFR BLD AUTO: 1 % (ref 0–2)
LYMPHOCYTES # BLD AUTO: 0.83 THOUSANDS/ÂΜL (ref 0.6–4.47)
LYMPHOCYTES NFR BLD AUTO: 13 % (ref 14–44)
MAGNESIUM SERPL-MCNC: 1.9 MG/DL (ref 1.9–2.7)
MCH RBC QN AUTO: 24.5 PG (ref 26.8–34.3)
MCHC RBC AUTO-ENTMCNC: 29.8 G/DL (ref 31.4–37.4)
MCV RBC AUTO: 82 FL (ref 82–98)
MONOCYTES # BLD AUTO: 0.93 THOUSAND/ÂΜL (ref 0.17–1.22)
MONOCYTES NFR BLD AUTO: 14 % (ref 4–12)
NEUTROPHILS # BLD AUTO: 4.5 THOUSANDS/ÂΜL (ref 1.85–7.62)
NEUTS SEG NFR BLD AUTO: 69 % (ref 43–75)
NRBC BLD AUTO-RTO: 0 /100 WBCS
PLATELET # BLD AUTO: 211 THOUSANDS/UL (ref 149–390)
PMV BLD AUTO: 10 FL (ref 8.9–12.7)
POTASSIUM SERPL-SCNC: 4.9 MMOL/L (ref 3.5–5.3)
PROT SERPL-MCNC: 7.1 G/DL (ref 6.4–8.4)
RBC # BLD AUTO: 4.58 MILLION/UL (ref 3.81–5.12)
RSV RNA RESP QL NAA+PROBE: NEGATIVE
SARS-COV-2 RNA RESP QL NAA+PROBE: NEGATIVE
SODIUM SERPL-SCNC: 134 MMOL/L (ref 135–147)
WBC # BLD AUTO: 6.46 THOUSAND/UL (ref 4.31–10.16)

## 2024-01-12 PROCEDURE — 0241U HB NFCT DS VIR RESP RNA 4 TRGT: CPT

## 2024-01-12 PROCEDURE — 94640 AIRWAY INHALATION TREATMENT: CPT

## 2024-01-12 PROCEDURE — 96361 HYDRATE IV INFUSION ADD-ON: CPT

## 2024-01-12 PROCEDURE — 71045 X-RAY EXAM CHEST 1 VIEW: CPT

## 2024-01-12 PROCEDURE — 99284 EMERGENCY DEPT VISIT MOD MDM: CPT | Performed by: EMERGENCY MEDICINE

## 2024-01-12 PROCEDURE — 83735 ASSAY OF MAGNESIUM: CPT

## 2024-01-12 PROCEDURE — 36415 COLL VENOUS BLD VENIPUNCTURE: CPT

## 2024-01-12 PROCEDURE — 80053 COMPREHEN METABOLIC PANEL: CPT

## 2024-01-12 PROCEDURE — 96360 HYDRATION IV INFUSION INIT: CPT

## 2024-01-12 PROCEDURE — 85025 COMPLETE CBC W/AUTO DIFF WBC: CPT

## 2024-01-12 PROCEDURE — 99284 EMERGENCY DEPT VISIT MOD MDM: CPT

## 2024-01-12 RX ORDER — IPRATROPIUM BROMIDE AND ALBUTEROL SULFATE 2.5; .5 MG/3ML; MG/3ML
3 SOLUTION RESPIRATORY (INHALATION)
Status: DISCONTINUED | OUTPATIENT
Start: 2024-01-12 | End: 2024-01-12 | Stop reason: HOSPADM

## 2024-01-12 RX ORDER — ACETAMINOPHEN 325 MG/1
975 TABLET ORAL ONCE
Status: COMPLETED | OUTPATIENT
Start: 2024-01-12 | End: 2024-01-12

## 2024-01-12 RX ADMIN — SODIUM CHLORIDE 500 ML: 0.9 INJECTION, SOLUTION INTRAVENOUS at 18:46

## 2024-01-12 RX ADMIN — ACETAMINOPHEN 975 MG: 325 TABLET, FILM COATED ORAL at 18:34

## 2024-01-12 RX ADMIN — IPRATROPIUM BROMIDE AND ALBUTEROL SULFATE 3 ML: .5; 3 SOLUTION RESPIRATORY (INHALATION) at 18:45

## 2024-01-12 NOTE — ED PROVIDER NOTES
"History  Chief Complaint   Patient presents with    Weakness - Generalized     Pt arrives via EMS from home. Reports waking up this morning feeling weak. Denies CP/SOB, N/V/D, or pain. Wears 2L NC PRN and reports no increased use.      Patient is an 83-year-old female with COPD that presents to the emergency department with generalized fatigue.  She reports that yesterday getting a shingles vaccination and today awoke feeling very tired and \"worn out\".  She denies chest pain, abdominal pain, nausea, vomiting, headache, weakness in any 1 particular part of her body, or difficulty walking, talking, or speaking.  She states that she has been so tired she does not want to make food for herself and feels very hungry right now.  She denies any worsening of her baseline shortness of breath.  She denies any diarrhea, blood in her stool, urgency, frequency, dysuria, or abnormal vaginal discharge or bleeding.  She denies any other recent illnesses or injuries.        Prior to Admission Medications   Prescriptions Last Dose Informant Patient Reported? Taking?   Ascorbic Acid (vitamin C) 1000 MG tablet   Yes No   Sig: Take 1,000 mg by mouth 2 (two) times a day   Patient not taking: Reported on 7/21/2023   Budeson-Glycopyrrol-Formoterol (Breztri Aerosphere) 160-9-4.8 MCG/ACT AERO   No No   Sig: Inhale 2 puffs  in the morning and 2 puffs before bedtime. Rinse mouth after use..   Budeson-Glycopyrrol-Formoterol 160-9-4.8 MCG/ACT AERO   Yes No   Sig: Inhale 2 puffs Rinse mouth after use.   Patient not taking: Reported on 8/24/2023   albuterol (PROVENTIL HFA,VENTOLIN HFA) 90 mcg/act inhaler   No No   Sig: Inhale 2 puffs every 6 (six) hours as needed for wheezing   albuterol (PROVENTIL HFA,VENTOLIN HFA) 90 mcg/act inhaler   Yes No   Sig: Inhale 2 puffs every 6 (six) hours as needed for wheezing   ascorbic acid (VITAMIN C) 1000 MG tablet  Self Yes No   Sig: Take 1,000 mg by mouth 2 (two) times a day   aspirin 81 mg chewable tablet   No " No   Sig: Chew 1 tablet (81 mg total) daily   aspirin 81 mg chewable tablet   Yes No   Sig: Chew 81 mg daily   Patient not taking: Reported on 2023   atorvastatin (LIPITOR) 40 mg tablet   No No   Sig: Take 1 tablet (40 mg total) by mouth every evening   atorvastatin (LIPITOR) 40 mg tablet   Yes No   Sig: Take 40 mg by mouth daily   Patient not taking: Reported on 2023   brimonidine tartrate 0.2 % ophthalmic solution   No No   Sig: Administer 1 drop into the left eye 2 (two) times a day   brimonidine tartrate 0.2 % ophthalmic solution   Yes No   Sig: Administer 1 drop into the left eye 2 (two) times a day   Patient not taking: Reported on 2023   diltiazem (CARDIZEM CD) 180 mg 24 hr capsule  Self Yes No   Sig: Take 180 mg by mouth daily   Patient not taking: Reported on 2023   diltiazem (CARDIZEM CD) 180 mg 24 hr capsule   Yes No   Sig: Take 180 mg by mouth daily   gabapentin (NEURONTIN) 300 mg capsule  Self Yes No   Sig: Take 300 mg by mouth 4 (four) times a day   gabapentin (NEURONTIN) 300 mg capsule   Yes No   Sig: Take 300 mg by mouth 4 (four) times a day   Patient not taking: Reported on 2023   losartan (COZAAR) 50 mg tablet  Self Yes No   Sig: TAKE 1 TABLET BY MOUTH EVERY DAY   Patient not taking: Reported on 2023   losartan (COZAAR) 50 mg tablet   Yes No   Sig: Take 50 mg by mouth daily   Patient not taking: Reported on 2023   metFORMIN (GLUCOPHAGE) 1000 MG tablet  Self No No   Si mg QAM and 500 mg QPM   metFORMIN (GLUCOPHAGE) 500 mg tablet   Yes No   Sig: TAKE 2 TABS BY MOUTH IN IN THE MORNING AND 1 TAB BY MOUTH IN IN THE EVENING   metFORMIN (GLUCOPHAGE) 500 mg tablet   Yes No   Sig: Take 1,000 mg by mouth daily with breakfast   Patient not taking: Reported on 2023   metFORMIN (GLUCOPHAGE) 500 mg tablet   Yes No   Sig: Take 500 mg by mouth every evening   Patient not taking: Reported on 2023   methocarbamol (ROBAXIN) 500 mg tablet   Yes No   Sig: TAKE 1 TABLET  BY MOUTH THREE TIMES A DAY AS NEEDED FOR MUSCLE SPASM   Patient not taking: Reported on 2023   methocarbamol (ROBAXIN) 500 mg tablet   Yes No   Sig: Take 500 mg by mouth 4 (four) times a day   Patient not taking: Reported on 2023   methylPREDNISolone 4 MG tablet therapy pack   No No   Sig: Use as directed on package   Patient not taking: Reported on 2023   morphine (MSIR) 15 mg tablet   Yes No   Sig: Take 15 mg by mouth 2 (two) times a day Every 12 hrs.   ondansetron (ZOFRAN) 4 mg tablet   No No   Sig: Take 1 tablet (4 mg total) by mouth every 8 (eight) hours as needed for nausea or vomiting for up to 10 doses   oxyCODONE (OxyCONTIN) 10 mg 12 hr tablet   Yes No   Sig: Take 13.5 mg by mouth every 12 (twelve) hours   Patient not taking: Reported on 2023   oxyCODONE ER (Xtampza ER) 13.5 MG C12A   No No   Sig: Take 13.5 mg by mouth 2 (two) times a day Max Daily Amount: 27 mg   Patient not taking: Reported on 2023   pantoprazole (PROTONIX) 40 mg tablet  Self Yes No   Sig: Take 40 mg by mouth every 12 (twelve) hours   pantoprazole (PROTONIX) 40 mg tablet   Yes No   Sig: Take 40 mg by mouth daily   rOPINIRole (REQUIP) 0.5 mg tablet  Self No No   Si-3 tablets PO at HS   rOPINIRole (REQUIP) 0.5 mg tablet   Yes No   Sig: Take 0.5 mg by mouth daily at bedtime Take 3 tablets PO before bed   Patient not taking: Reported on 2023   sitaGLIPtin (JANUVIA) 100 mg tablet  Self No No   Sig: Take 1 tablet (100 mg total) by mouth daily   Patient not taking: Reported on 2023   sitaGLIPtin (JANUVIA) 100 mg tablet   Yes No   Sig: Take 100 mg by mouth daily   Patient not taking: Reported on 2023      Facility-Administered Medications: None       Past Medical History:   Diagnosis Date    Arthritis     Cardiac disease     COPD (chronic obstructive pulmonary disease) ([facility])     COVID-2021    was in hospital for sx and was dx with covid    Diabetes mellitus ([facility])     GERD (gastroesophageal reflux  disease)     Hiatal hernia     Hypertension     PUD (peptic ulcer disease)     Residual ASD (atrial septal defect) following repair        Past Surgical History:   Procedure Laterality Date    ABDOMINAL ADHESION SURGERY N/A 09/26/2022    Procedure: LYSIS ADHESIONS;  Surgeon: Petty Johnson MD;  Location: [facility];  Service: Thoracic    ASD REPAIR      BACK SURGERY      x3    CARDIAC SURGERY      Atrial septic defect    ESOPHAGOGASTRODUODENOSCOPY N/A 09/26/2022    Procedure: ESOPHAGOGASTRODUODENOSCOPY (EGD);  Surgeon: Petty Johnson MD;  Location: [facility];  Service: Thoracic    JOINT REPLACEMENT      bilateral knee surgery    JOINT REPLACEMENT      KNEE SURGERY      PARAESOPHAGEAL HERNIA REPAIR N/A 09/26/2022    Procedure: REPAIR HERNIA PARAESOPHAGEAL LAPAROSCOPIC W ROBOTICS, gastropexy, mesh placement;  Surgeon: Petty Johnson MD;  Location: [facility];  Service: Thoracic    ID EXCISION GANGLION WRIST DORSAL/VOLAR PRIMARY Left 7/21/2023    Procedure: EXCISION GANGLION CYST;  Surgeon: Jaja Pantoja MD;  Location: [facility];  Service: Orthopedics    SHOULDER SURGERY         Family History   Problem Relation Age of Onset    Cancer Mother     Asthma Father      I have reviewed and agree with the history as documented.    E-Cigarette/Vaping    E-Cigarette Use Never User      E-Cigarette/Vaping Substances    Nicotine No     THC No     CBD No     Flavoring No     Other No     Unknown No      Social History     Tobacco Use    Smoking status: Former     Current packs/day: 0.00     Average packs/day: 1 pack/day for 64.1 years (64.1 ttl pk-yrs)     Types: Cigarettes     Start date: 1956     Quit date: 2/1/2020     Years since quitting: 3.9    Smokeless tobacco: Never    Tobacco comments:     stopped smoking 2 days ago   Vaping Use    Vaping status: Never Used   Substance Use Topics    Alcohol use: Yes     Alcohol/week: 1.0 standard drink of alcohol     Types: 1 Glasses of wine per week    Drug  use: Never        Review of Systems   Constitutional:  Positive for fatigue. Negative for chills and fever.   HENT:  Negative for congestion, ear pain and sore throat.    Eyes:  Negative for pain and visual disturbance.   Respiratory:  Negative for cough, chest tightness and shortness of breath.    Cardiovascular:  Negative for chest pain and palpitations.   Gastrointestinal:  Negative for abdominal pain, constipation, diarrhea, nausea and vomiting.   Genitourinary:  Negative for dysuria and hematuria.   Musculoskeletal:  Negative for arthralgias and back pain.   Skin:  Negative for color change and rash.   Neurological:  Positive for weakness (generalized). Negative for dizziness, seizures, syncope, light-headedness and headaches.   All other systems reviewed and are negative.      Physical Exam  ED Triage Vitals   Temperature Pulse Respirations Blood Pressure SpO2   01/12/24 1809 01/12/24 1807 01/12/24 1807 01/12/24 1807 01/12/24 1807   99 °F (37.2 °C) 67 22 142/65 90 %      Temp Source Heart Rate Source Patient Position - Orthostatic VS BP Location FiO2 (%)   01/12/24 1809 01/12/24 1807 -- 01/12/24 2000 --   Oral Monitor  Right arm       Pain Score       01/12/24 1807       No Pain             Orthostatic Vital Signs  Vitals:    01/12/24 1807 01/12/24 2000   BP: 142/65 154/67   Pulse: 67 64       Physical Exam  Vitals and nursing note reviewed.   Constitutional:       General: She is not in acute distress.     Appearance: Normal appearance. She is well-developed.   HENT:      Head: Normocephalic and atraumatic.      Right Ear: External ear normal.      Left Ear: External ear normal.      Mouth/Throat:      Pharynx: Oropharynx is clear. No oropharyngeal exudate or posterior oropharyngeal erythema.   Eyes:      General:         Right eye: No discharge.         Left eye: No discharge.      Extraocular Movements: Extraocular movements intact.      Conjunctiva/sclera: Conjunctivae normal.   Cardiovascular:      Rate  and Rhythm: Normal rate and regular rhythm.      Pulses: Normal pulses.      Heart sounds: Normal heart sounds. No murmur heard.  Pulmonary:      Effort: Pulmonary effort is normal. No respiratory distress.      Breath sounds: Wheezing present. No rhonchi or rales.   Abdominal:      General: Abdomen is flat.      Palpations: Abdomen is soft.      Tenderness: There is no abdominal tenderness. There is no guarding or rebound.   Musculoskeletal:         General: No swelling or deformity. Normal range of motion.      Cervical back: Neck supple. No tenderness.   Skin:     General: Skin is warm and dry.      Capillary Refill: Capillary refill takes less than 2 seconds.   Neurological:      Mental Status: She is alert and oriented to person, place, and time.      Cranial Nerves: No cranial nerve deficit.      Sensory: No sensory deficit.      Motor: No weakness.         ED Medications  Medications   sodium chloride 0.9 % bolus 500 mL (0 mL Intravenous Stopped 1/12/24 2043)   acetaminophen (TYLENOL) tablet 975 mg (975 mg Oral Given 1/12/24 1834)       Diagnostic Studies  Results Reviewed       Procedure Component Value Units Date/Time    FLU/RSV/COVID - if FLU/RSV clinically relevant [[id number]]  (Normal) Collected: 01/12/24 1828    Lab Status: Final result Specimen: Nares from Nose Updated: 01/12/24 1916     SARS-CoV-2 Negative     INFLUENZA A PCR Negative     INFLUENZA B PCR Negative     RSV PCR Negative    Narrative:      FOR PEDIATRIC PATIENTS - copy/paste COVID Guidelines URL to browser: [email or web address]    SARS-CoV-2 assay is a Nucleic Acid Amplification assay intended for the  qualitative detection of nucleic acid from SARS-CoV-2 in nasopharyngeal  swabs. Results are for the presumptive identification of SARS-CoV-2 RNA.    Positive results are indicative of infection with SARS-CoV-2, the virus  causing COVID-19, but do not rule out bacterial infection or  co-infection  with other viruses. Laboratories within the [place] and its  territories are required to report all positive results to the appropriate  public health authorities. Negative results do not preclude SARS-CoV-2  infection and should not be used as the sole basis for treatment or other  patient management decisions. Negative results must be combined with  clinical observations, patient history, and epidemiological information.  This test has not been FDA cleared or approved.    This test has been authorized by FDA under an Emergency Use Authorization  (EUA). This test is only authorized for the duration of time the  declaration that circumstances exist justifying the authorization of the  emergency use of an in vitro diagnostic tests for detection of SARS-CoV-2  virus and/or diagnosis of COVID-19 infection under section 564(b)(1) of  the Act, 21 U.S.C. 360bbb-3(b)(1), unless the authorization is terminated  or revoked sooner. The test has been validated but independent review by FDA  and CLIA is pending.    Test performed using "[employer]" GeneXpert: This RT-PCR assay targets N2,  a region unique to SARS-CoV-2. A conserved region in the E-gene was chosen  for pan-Sarbecovirus detection which includes SARS-CoV-2.    According to CMS-2020-01-R, this platform meets the definition of high-throughput technology.    Comprehensive metabolic panel [[id number]]  (Abnormal) Collected: 01/12/24 1828    Lab Status: Final result Specimen: Blood from Arm, Right Updated: 01/12/24 1851     Sodium 134 mmol/L      Potassium 4.9 mmol/L      Chloride 98 mmol/L      CO2 29 mmol/L      ANION GAP 7 mmol/L      BUN 19 mg/dL      Creatinine 0.94 mg/dL      Glucose 101 mg/dL      Calcium 9.6 mg/dL      AST 13 U/L      ALT 9 U/L      Alkaline Phosphatase 99 U/L      Total Protein 7.1 g/dL      Albumin 4.1 g/dL      Total Bilirubin 0.51 mg/dL      eGFR 56 ml/min/1.73sq m     Narrative:      National Kidney Disease Foundation  guidelines for Chronic Kidney Disease (CKD):     Stage 1 with normal or high GFR (GFR > 90 mL/min/1.73 square meters)    Stage 2 Mild CKD (GFR = 60-89 mL/min/1.73 square meters)    Stage 3A Moderate CKD (GFR = 45-59 mL/min/1.73 square meters)    Stage 3B Moderate CKD (GFR = 30-44 mL/min/1.73 square meters)    Stage 4 Severe CKD (GFR = 15-29 mL/min/1.73 square meters)    Stage 5 End Stage CKD (GFR <15 mL/min/1.73 square meters)  Note: GFR calculation is accurate only with a steady state creatinine    Magnesium [[id number]]  (Normal) Collected: 01/12/24 1828    Lab Status: Final result Specimen: Blood from Arm, Right Updated: 01/12/24 1851     Magnesium 1.9 mg/dL     CBC and differential [[id number]]  (Abnormal) Collected: 01/12/24 1828    Lab Status: Final result Specimen: Blood from Arm, Right Updated: 01/12/24 1840     WBC 6.46 Thousand/uL      RBC 4.58 Million/uL      Hemoglobin 11.2 g/dL      Hematocrit 37.6 %      MCV 82 fL      MCH 24.5 pg      MCHC 29.8 g/dL      RDW 17.5 %      MPV 10.0 fL      Platelets 211 Thousands/uL      nRBC 0 /100 WBCs      Neutrophils Relative 69 %      Immat GRANS % 1 %      Lymphocytes Relative 13 %      Monocytes Relative 14 %      Eosinophils Relative 2 %      Basophils Relative 1 %      Neutrophils Absolute 4.50 Thousands/µL      Immature Grans Absolute 0.03 Thousand/uL      Lymphocytes Absolute 0.83 Thousands/µL      Monocytes Absolute 0.93 Thousand/µL      Eosinophils Absolute 0.12 Thousand/µL      Basophils Absolute 0.05 Thousands/µL                    XR chest portable    (Results Pending)         Procedures  Procedures      ED Course                             SBIRT 22yo+      Flowsheet Row Most Recent Value   Initial Alcohol Screen: US AUDIT-C     1. How often do you have a drink containing alcohol? 0 Filed at: 01/12/2024 1809   2. How many drinks containing alcohol do you have on a typical day you are drinking?  0 Filed at: 01/12/2024 1809   3a. Male UNDER 65: How often do  you have five or more drinks on one occasion? 0 Filed at: 01/12/2024 1809   3b. FEMALE Any Age, or MALE 65+: How often do you have 4 or more drinks on one occassion? 0 Filed at: 01/12/2024 1809   Audit-C Score 0 Filed at: 01/12/2024 1809   ALEXX: How many times in the past year have you...    Used an illegal drug or used a prescription medication for non-medical reasons? Never Filed at: 01/12/2024 1809                  Medical Decision Making  83F with history of COPD and intermittent oxygen use at home presents to the emergency department with fatigue that began upon waking today.  She reports getting her shingles shot yesterday and thinks they are related.  She denies any pain whatsoever throughout her body.  She denies any nausea, vomiting, diarrhea, fevers and is otherwise felt in her usual state of health.  She reports that she is felt too fatigued to get up and make herself food today and is hungry and would like something to eat.  On exam, patient has no abnormal findings other than being slightly hypoxic on arrival.  She is placed on her home 2 L nasal cannula oxygen.  She reports that sometimes she is on 3 L nasal cannula oxygen at home, does not require this at this time.  CBC, CMP, magnesium, COVID/flu/RSV all within normal limits other than mild hyponatremia with sodium of 134 and hemoglobin of 11.2 which is her baseline.  Chest x-ray without any acute cardiopulmonary abnormalities as intermittently interpreted by me-doubt pneumothorax or pneumonia.  With her denying any acute shortness of breath, doubt COPD exacerbation.   Mild wheezing noted in the lungs bilaterally which resolved after DuoNeb administration.  She reports significant improvement of symptoms after administration of 500 cc normal saline and 975 acetaminophen.  She also ate some [employer] that her family brought and she feels much better after that as well.    High suspicion that the patient's symptoms are immune response to shingles  vaccination.  In the absence of concerning findings on physical exam, laboratory evaluation, or imaging patient is appropriate for discharge at this time.  Additionally, patient provided with informational handout that discusses additional reasons to return to the emergency department.  Return precautions are discussed with the patient and they demonstrate understanding of the plan.  Their questions are all answered to their satisfaction and the patient is discharged.        Amount and/or Complexity of Data Reviewed  Labs: ordered.  Radiology: ordered.    Risk  OTC drugs.  Prescription drug management.          Disposition  Final diagnoses:   Fatigue     Time reflects when diagnosis was documented in both MDM as applicable and the Disposition within this note       Time User Action Codes Description Comment    1/12/2024  8:57 PM Ren Dahl Add [R53.83] Fatigue           ED Disposition       ED Disposition   Discharge    Condition   Stable    Date/Time   Fri Jan 12, 2024 2057    Comment   Gloria Patel discharge to home/self care.                   Follow-up Information       Follow up With Specialties Details Why Contact Info Additional Information    Christine Charles, DO Family Medicine Call  If symptoms do not improve [address]  [address]  [facility] [address]  [phone]       [facility] Emergency Department Emergency Medicine  if you develop sudden onset weakness or numbness in any part of your body, confusion, difficulty walking or difficulty talking [facility] [id number]  [phone] [facility] Emergency Department, [address] [place] [address], [address]            Discharge Medication List as of 1/12/2024  8:59 PM        CONTINUE these medications which have NOT CHANGED    Details   !! albuterol (PROVENTIL HFA,VENTOLIN HFA) 90 mcg/act inhaler Inhale 2 puffs every 6 (six) hours as needed for wheezing, Starting Mon  9/20/2021, Normal      !! albuterol (PROVENTIL HFA,VENTOLIN HFA) 90 mcg/act inhaler Inhale 2 puffs every 6 (six) hours as needed for wheezing, Historical Med      !! ascorbic acid (VITAMIN C) 1000 MG tablet Take 1,000 mg by mouth 2 (two) times a day, Historical Med      !! Ascorbic Acid (vitamin C) 1000 MG tablet Take 1,000 mg by mouth 2 (two) times a day, Historical Med      !! aspirin 81 mg chewable tablet Chew 1 tablet (81 mg total) daily, Starting Sat 4/23/2022, Normal      !! aspirin 81 mg chewable tablet Chew 81 mg daily, Historical Med      atorvastatin (LIPITOR) 40 mg tablet Take 40 mg by mouth daily, Historical Med      !! brimonidine tartrate 0.2 % ophthalmic solution Administer 1 drop into the left eye 2 (two) times a day, Starting Fri 4/22/2022, Normal      !! brimonidine tartrate 0.2 % ophthalmic solution Administer 1 drop into the left eye 2 (two) times a day, Historical Med      !! Budeson-Glycopyrrol-Formoterol (Breztri Aerosphere) 160-9-4.8 MCG/ACT AERO Inhale 2 puffs  in the morning and 2 puffs before bedtime. Rinse mouth after use.., Starting Mon 5/16/2022, Normal      !! Budeson-Glycopyrrol-Formoterol 160-9-4.8 MCG/ACT AERO Inhale 2 puffs Rinse mouth after use., Historical Med      !! diltiazem (CARDIZEM CD) 180 mg 24 hr capsule Take 180 mg by mouth daily, Historical Med      !! diltiazem (CARDIZEM CD) 180 mg 24 hr capsule Take 180 mg by mouth daily, Historical Med      !! gabapentin (NEURONTIN) 300 mg capsule Take 300 mg by mouth 4 (four) times a day, Starting Mon 3/23/2020, Historical Med      !! gabapentin (NEURONTIN) 300 mg capsule Take 300 mg by mouth 4 (four) times a day, Historical Med      !! losartan (COZAAR) 50 mg tablet TAKE 1 TABLET BY MOUTH EVERY DAY, Historical Med      !! losartan (COZAAR) 50 mg tablet Take 50 mg by mouth daily, Historical Med      !! metFORMIN (GLUCOPHAGE) 1000 MG tablet 1000 mg QAM and 500 mg QPM, No Print      !! metFORMIN (GLUCOPHAGE) 500 mg tablet TAKE 2 TABS  BY MOUTH IN IN THE MORNING AND 1 TAB BY MOUTH IN IN THE EVENING, Historical Med      !! metFORMIN (GLUCOPHAGE) 500 mg tablet Take 1,000 mg by mouth daily with breakfast, Historical Med      !! metFORMIN (GLUCOPHAGE) 500 mg tablet Take 500 mg by mouth every evening, Historical Med      !! methocarbamol (ROBAXIN) 500 mg tablet TAKE 1 TABLET BY MOUTH THREE TIMES A DAY AS NEEDED FOR MUSCLE SPASM, Historical Med      !! methocarbamol (ROBAXIN) 500 mg tablet Take 500 mg by mouth 4 (four) times a day, Historical Med      methylPREDNISolone 4 MG tablet therapy pack Use as directed on package, Normal      morphine (MSIR) 15 mg tablet Take 15 mg by mouth 2 (two) times a day Every 12 hrs., Historical Med      ondansetron (ZOFRAN) 4 mg tablet Take 1 tablet (4 mg total) by mouth every 8 (eight) hours as needed for nausea or vomiting for up to 10 doses, Starting Fri 3/10/2023, Normal      oxyCODONE (OxyCONTIN) 10 mg 12 hr tablet Take 13.5 mg by mouth every 12 (twelve) hours, Historical Med      oxyCODONE ER (Xtampza ER) 13.5 MG C12A Take 13.5 mg by mouth 2 (two) times a day Max Daily Amount: 27 mg, Starting Mon 5/16/2022, No Print      !! pantoprazole (PROTONIX) 40 mg tablet Take 40 mg by mouth every 12 (twelve) hours, Historical Med      !! pantoprazole (PROTONIX) 40 mg tablet Take 40 mg by mouth daily, Historical Med      !! rOPINIRole (REQUIP) 0.5 mg tablet 2-3 tablets PO at HS, No Print      !! rOPINIRole (REQUIP) 0.5 mg tablet Take 0.5 mg by mouth daily at bedtime Take 3 tablets PO before bed, Historical Med      !! sitaGLIPtin (JANUVIA) 100 mg tablet Take 1 tablet (100 mg total) by mouth daily, Starting Mon 4/13/2020, No Print      !! sitaGLIPtin (JANUVIA) 100 mg tablet Take 100 mg by mouth daily, Historical Med       !! - Potential duplicate medications found. Please discuss with provider.        No discharge procedures on file.    PDMP Review         Value Time User    PDMP Reviewed  Yes 7/21/2023  1:27 PM Lety De León  KRYSTAL             ED Provider  Attending physically available and evaluated Gloria Patel. I managed the patient along with the ED Attending.    Electronically Signed by           Ren Dahl DO  01/12/24 0994

## 2024-01-13 NOTE — ED ATTENDING ATTESTATION
1/12/2024  I, Thang Osman MD, saw and evaluated the patient. I have discussed the patient with the resident/non-physician practitioner and agree with the resident's/non-physician practitioner's findings, Plan of Care, and MDM as documented in the resident's/non-physician practitioner's note, except where noted. All available labs and Radiology studies were reviewed.  I was present for key portions of any procedure(s) performed by the resident/non-physician practitioner and I was immediately available to provide assistance.       At this point I agree with the current assessment done in the Emergency Department.  I have conducted an independent evaluation of this patient a history and physical is as follows:    Generalized weakness, malaise following shingles vaccine yesterday.    Patient hemodynamically stable, feels better after ED management.  Stable on home oxygen.  Additional labs, chest x-ray with no acute abnormalities.  Patient with no indication for hospitalization at this time, potentially side effects from vaccination performed yesterday, return precautions, discharged with family.    Results Reviewed       Procedure Component Value Units Date/Time    FLU/RSV/COVID - if FLU/RSV clinically relevant [[id number]]  (Normal) Collected: 01/12/24 1828    Lab Status: Final result Specimen: Nares from Nose Updated: 01/12/24 1916     SARS-CoV-2 Negative     INFLUENZA A PCR Negative     INFLUENZA B PCR Negative     RSV PCR Negative    Narrative:      FOR PEDIATRIC PATIENTS - copy/paste COVID Guidelines URL to browser: [email or web address]    SARS-CoV-2 assay is a Nucleic Acid Amplification assay intended for the  qualitative detection of nucleic acid from SARS-CoV-2 in nasopharyngeal  swabs. Results are for the presumptive identification of SARS-CoV-2 RNA.    Positive results are indicative of infection with SARS-CoV-2, the virus  causing COVID-19, but do  not rule out bacterial infection or co-infection  with other viruses. Laboratories within the [place] and its  territories are required to report all positive results to the appropriate  public health authorities. Negative results do not preclude SARS-CoV-2  infection and should not be used as the sole basis for treatment or other  patient management decisions. Negative results must be combined with  clinical observations, patient history, and epidemiological information.  This test has not been FDA cleared or approved.    This test has been authorized by FDA under an Emergency Use Authorization  (EUA). This test is only authorized for the duration of time the  declaration that circumstances exist justifying the authorization of the  emergency use of an in vitro diagnostic tests for detection of SARS-CoV-2  virus and/or diagnosis of COVID-19 infection under section 564(b)(1) of  the Act, 21 U.S.C. 360bbb-3(b)(1), unless the authorization is terminated  or revoked sooner. The test has been validated but independent review by FDA  and CLIA is pending.    Test performed using "[employer] GeneXpert: This RT-PCR assay targets N2,  a region unique to SARS-CoV-2. A conserved region in the E-gene was chosen  for pan-Sarbecovirus detection which includes SARS-CoV-2.    According to CMS-2020-01-R, this platform meets the definition of high-throughput technology.    Comprehensive metabolic panel [[id number]]  (Abnormal) Collected: 01/12/24 1828    Lab Status: Final result Specimen: Blood from Arm, Right Updated: 01/12/24 1851     Sodium 134 mmol/L      Potassium 4.9 mmol/L      Chloride 98 mmol/L      CO2 29 mmol/L      ANION GAP 7 mmol/L      BUN 19 mg/dL      Creatinine 0.94 mg/dL      Glucose 101 mg/dL      Calcium 9.6 mg/dL      AST 13 U/L      ALT 9 U/L      Alkaline Phosphatase 99 U/L      Total Protein 7.1 g/dL      Albumin 4.1 g/dL      Total Bilirubin 0.51 mg/dL      eGFR 56 ml/min/1.73sq m     Narrative:       National Kid[employer] guidelines for Chronic Kidney Disease (CKD):     Stage 1 with normal or high GFR (GFR > 90 mL/min/1.73 square meters)    Stage 2 Mild CKD (GFR = 60-89 mL/min/1.73 square meters)    Stage 3A Moderate CKD (GFR = 45-59 mL/min/1.73 square meters)    Stage 3B Moderate CKD (GFR = 30-44 mL/min/1.73 square meters)    Stage 4 Severe CKD (GFR = 15-29 mL/min/1.73 square meters)    Stage 5 End Stage CKD (GFR <15 mL/min/1.73 square meters)  Note: GFR calculation is accurate only with a steady state creatinine    Magnesium [[id number]]  (Normal) Collected: 01/12/24 1828    Lab Status: Final result Specimen: Blood from Arm, Right Updated: 01/12/24 1851     Magnesium 1.9 mg/dL     CBC and differential [[id number]]  (Abnormal) Collected: 01/12/24 1828    Lab Status: Final result Specimen: Blood from Arm, Right Updated: 01/12/24 1840     WBC 6.46 Thousand/uL      RBC 4.58 Million/uL      Hemoglobin 11.2 g/dL      Hematocrit 37.6 %      MCV 82 fL      MCH 24.5 pg      MCHC 29.8 g/dL      RDW 17.5 %      MPV 10.0 fL      Platelets 211 Thousands/uL      nRBC 0 /100 WBCs      Neutrophils Relative 69 %      Immat GRANS % 1 %      Lymphocytes Relative 13 %      Monocytes Relative 14 %      Eosinophils Relative 2 %      Basophils Relative 1 %      Neutrophils Absolute 4.50 Thousands/µL      Immature Grans Absolute 0.03 Thousand/uL      Lymphocytes Absolute 0.83 Thousands/µL      Monocytes Absolute 0.93 Thousand/µL      Eosinophils Absolute 0.12 Thousand/µL      Basophils Absolute 0.05 Thousands/µL           XR chest portable    (Results Pending)         ED Course         Critical Care Time  Procedures

## 2024-01-13 NOTE — ED NOTES
Pt ambulated with walker, with steady gait. No complaints of dizziness.      Donita Boss  01/12/24 2045

## 2024-02-16 ENCOUNTER — TELEPHONE (OUTPATIENT)
Dept: NEUROLOGY | Facility: CLINIC | Age: 84
End: 2024-02-16

## 2024-02-28 ENCOUNTER — CONSULT (OUTPATIENT)
Dept: CARDIOLOGY CLINIC | Facility: CLINIC | Age: 84
End: 2024-02-28
Payer: COMMERCIAL

## 2024-02-28 VITALS
OXYGEN SATURATION: 90 % | BODY MASS INDEX: 30.96 KG/M2 | HEIGHT: 61 IN | DIASTOLIC BLOOD PRESSURE: 68 MMHG | WEIGHT: 164 LBS | HEART RATE: 72 BPM | SYSTOLIC BLOOD PRESSURE: 116 MMHG

## 2024-02-28 DIAGNOSIS — N18.32 STAGE 3B CHRONIC KIDNEY DISEASE (HCC): ICD-10-CM

## 2024-02-28 DIAGNOSIS — Z98.890 S/P ATRIAL SEPTAL DEFECT CLOSURE, SURGICAL: Primary | ICD-10-CM

## 2024-02-28 DIAGNOSIS — Z98.890 HISTORY OF CARDIAC RADIOFREQUENCY ABLATION: ICD-10-CM

## 2024-02-28 DIAGNOSIS — I45.10 RIGHT BUNDLE BRANCH BLOCK: ICD-10-CM

## 2024-02-28 DIAGNOSIS — I73.9 PERIPHERAL VASCULAR DISEASE (HCC): ICD-10-CM

## 2024-02-28 DIAGNOSIS — E11.65 TYPE 2 DIABETES MELLITUS WITH HYPERGLYCEMIA, WITHOUT LONG-TERM CURRENT USE OF INSULIN (HCC): ICD-10-CM

## 2024-02-28 DIAGNOSIS — E11.9 TYPE 2 DIABETES MELLITUS WITHOUT COMPLICATION, WITHOUT LONG-TERM CURRENT USE OF INSULIN (HCC): ICD-10-CM

## 2024-02-28 PROCEDURE — 99204 OFFICE O/P NEW MOD 45 MIN: CPT | Performed by: INTERNAL MEDICINE

## 2024-02-28 RX ORDER — LANOLIN ALCOHOL/MO/W.PET/CERES
1 CREAM (GRAM) TOPICAL
COMMUNITY

## 2024-02-28 RX ORDER — OXYMETAZOLINE HYDROCHLORIDE 0.05 G/100ML
SPRAY NASAL
COMMUNITY

## 2024-02-28 RX ORDER — COLD-HOT PACK
1 EACH MISCELLANEOUS 2 TIMES DAILY
COMMUNITY

## 2024-02-28 RX ORDER — LOSARTAN POTASSIUM 25 MG/1
50 TABLET ORAL DAILY
COMMUNITY
Start: 2023-12-04

## 2024-02-28 RX ORDER — AMOXICILLIN 500 MG/1
4 CAPSULE ORAL AS NEEDED
COMMUNITY

## 2024-02-28 RX ORDER — MORPHINE SULFATE 15 MG/1
TABLET, FILM COATED, EXTENDED RELEASE ORAL
COMMUNITY
Start: 2024-02-14

## 2024-02-28 RX ORDER — PEN NEEDLE, DIABETIC 31 GX5/16"
NEEDLE, DISPOSABLE MISCELLANEOUS
COMMUNITY

## 2024-02-28 RX ORDER — DILTIAZEM HYDROCHLORIDE 240 MG/1
240 CAPSULE, COATED, EXTENDED RELEASE ORAL DAILY
COMMUNITY
Start: 2023-12-16

## 2024-02-28 RX ORDER — LIDOCAINE HYDROCHLORIDE 10 MG/ML
INJECTION, SOLUTION INFILTRATION; PERINEURAL
COMMUNITY
Start: 2023-12-19

## 2024-02-28 RX ORDER — DILTIAZEM HYDROCHLORIDE 240 MG/1
1 CAPSULE, COATED, EXTENDED RELEASE ORAL DAILY
COMMUNITY
Start: 2023-12-16

## 2024-02-28 RX ORDER — SULFAMETHOXAZOLE AND TRIMETHOPRIM 800; 160 MG/1; MG/1
1 TABLET ORAL EVERY 12 HOURS
COMMUNITY

## 2024-02-28 RX ORDER — DEXAMETHASONE SODIUM PHOSPHATE 4 MG/ML
INJECTION, SOLUTION INTRA-ARTICULAR; INTRALESIONAL; INTRAMUSCULAR; INTRAVENOUS; SOFT TISSUE
COMMUNITY
Start: 2023-12-19

## 2024-02-28 NOTE — PROGRESS NOTES
[facility] [place]   [address]    [place] [address]                                            Cardiology Office Consult  Gloria Patel, 83 y.o. female  YOB: 1940  MRN: [id number] Encounter: [id number]      PCP - Christine Charles DO  Referring Provider - Tramaine Rivera DO    Chief Complaint   Patient presents with   • New Patient Visit     Referral by [facility] for RBBB; Open heart surgery in 1965   • Shortness of Breath     With mild exertion - on 3L o2 prn at home       Assessment  Right bundle branch block  Bifascicular block  COPD, severe  Former smoker  Quit before 2020  H/o cardiac ablation (2003 at [facility])  H/o ASD repair (1965 @ age of 25,  @ [facility])  TTE - 1/2022 ([facility]) - LVEF 65%, Gr 1 DD, mild MAC, mild MR, (no mention of atrial septum on report).  S/p paraesophageal hernia repair (9/2022, )    Plan  Right bundle branch block / Bifascicular block  Several available ECGs reviewed and ECG reports from outside hospital drugs also reviewed  She has had a known history of right bundle branch block since at least January 2022, although incomplete right bundle branch block and left anterior fascicle block have previously also been reported on an ECG in September 2018 at [facility]  Currently she appears to have right bundle branch block and left anterior fascicular block on the last ECG  I have counseled her regarding the nature of these abnormalities, unpredictable course, symptoms associated with heart block and potential future need for pacemaker implant.  Currently with absence of symptoms, she mainly needs monitoring for symptoms and annual follow-up ECG  Check echocardiogram    H/o ASD repair, h/o ablation  She had atrial septal repair performed at [facility] in 1965 and additionally had an ablation procedure done about 10 to 15 years ago.    But she herself is unaware about the arrhythmia for which the ablation procedure was  performed  (On review of old office notes, I found cardiac ablation in 2003 listed in her surgical history)  Unfortunately due to the extremely remote nature of these procedures, no real records are available for the same.    At present, she has normal sinus rhythm and she has not had any recent issues with palpitations, dizziness or lightheadedness  Monitor clinically  Check echocardiogram to assess any residual leak or other structural heart abnormality    No results found for this visit on 02/28/24.    Orders Placed This Encounter   Procedures   • Echo complete w/ contrast if indicated       Return in about 1 year (around 2/28/2025), or if symptoms worsen or fail to improve.      History of Present Illness   83 y.o. female comes in as a new patient for consultation regarding abnormalities seen on recent ECG during ED visits in 12/2023 & 1/2024    She reports no active complaints of chest pain, shortness of breath, palpitations or dizziness, near-syncope or syncope.  No known history of coronary artery disease or heart failure.    She does have an extensive cardiac history which in the walls surgical repair of atrial septal defect at [facility] at the age of 25 as well as an ablation procedure for a 9 known arrhythmia 10 to 15 years ago.  Unfortunately not much information is available about the he is prior cardiac problems other than the limited information provided by patient.      Historical Information   Past Medical History:   Diagnosis Date   • Arthritis    • Cardiac disease    • COPD (chronic obstructive pulmonary disease) ([facility])    • COVID-19 2021    was in hospital for sx and was dx with covid   • Diabetes mellitus ([facility])    • GERD (gastroesophageal reflux disease)    • Hiatal hernia    • Hypertension    • PUD (peptic ulcer disease)    • Residual ASD (atrial septal defect) following repair      Past Surgical History:   Procedure Laterality Date   • ABDOMINAL ADHESION SURGERY N/A 09/26/2022     Procedure: LYSIS ADHESIONS;  Surgeon: Petty Johnson MD;  [facility];  Service: Thoracic   • ASD REPAIR     • BACK SURGERY      x3   • CARDIAC SURGERY      Atrial septic defect   • ESOPHAGOGASTRODUODENOSCOPY N/A 09/26/2022    Procedure: ESOPHAGOGASTRODUODENOSCOPY (EGD);  Surgeon: Petty Johnson MD;  [facility];  Service: Thoracic   • JOINT REPLACEMENT      bilateral knee surgery   • JOINT REPLACEMENT     • KNEE SURGERY     • PARAESOPHAGEAL HERNIA REPAIR N/A 09/26/2022    Procedure: REPAIR HERNIA PARAESOPHAGEAL LAPAROSCOPIC W ROBOTICS, gastropexy, mesh placement;  Surgeon: Petty Johnson MD;  [facility];  Service: Thoracic   • KY EXCISION GANGLION WRIST DORSAL/VOLAR PRIMARY Left 7/21/2023    Procedure: EXCISION GANGLION CYST;  Surgeon: Jaja Pantoja MD;  Location: [facility];  Service: Orthopedics   • SHOULDER SURGERY       Family History   Problem Relation Age of Onset   • Cancer Mother    • Asthma Father      Current Outpatient Medications   Medication Instructions   • albuterol (PROVENTIL HFA,VENTOLIN HFA) 90 mcg/act inhaler 2 puffs, Inhalation, Every 6 hours PRN   • albuterol (PROVENTIL HFA,VENTOLIN HFA) 90 mcg/act inhaler 2 puffs, Every 6 hours PRN   • Alcohol Swabs (Alcohol Prep) PADS USE TO TEST BLOOD SUGAR TWICE A DAY AND AS NEEDED   • amoxicillin (AMOXIL) 500 mg capsule 4 capsules, As needed   • ascorbic acid (VITAMIN C) 1,000 mg, Oral, 2 times daily   • aspirin 81 mg, Oral, Daily   • aspirin 81 mg, Daily   • atorvastatin (LIPITOR) 40 mg, Oral, Every evening   • atorvastatin (LIPITOR) 40 mg, Daily   • brimonidine tartrate 0.2 % ophthalmic solution 1 drop, Left Eye, 2 times daily   • brimonidine tartrate 0.2 % ophthalmic solution 1 drop, 2 times daily   • Budeson-Glycopyrrol-Formoterol (Breztri Aerosphere) 160-9-4.8 MCG/ACT AERO 2 puffs, Inhalation, 2 times daily, Rinse mouth after use.   • Budeson-Glycopyrrol-Formoterol 160-9-4.8 MCG/ACT AERO 2 puffs    • dexamethasone (DECADRON) 4 mg/mL Inject 1 mL by intramuscular route.   • diltiazem (CARDIZEM CD) 240 mg 24 hr capsule 1 capsule, Daily   • diltiazem (CARDIZEM CD) 180 mg, Daily   • diltiazem (CARDIZEM CD) 180 mg, Oral, Daily   • diltiazem (CARDIZEM CD) 240 mg, Daily   • ferrous sulfate 325 (65 FE) MG EC tablet 1 tablet, Daily with breakfast   • gabapentin (NEURONTIN) 300 mg, Oral, 4 times daily   • gabapentin (NEURONTIN) 300 mg, 4 times daily   • lidocaine (XYLOCAINE) 1 % Take 0.5 mL by injection route.   • losartan (COZAAR) 50 mg tablet TAKE 1 TABLET BY MOUTH EVERY DAY   • losartan (COZAAR) 50 mg, Daily   • losartan (COZAAR) 50 mg, Oral, Daily   • metFORMIN (GLUCOPHAGE) 1000 MG tablet 1000 mg QAM and 500 mg QPM   • metFORMIN (GLUCOPHAGE) 500 mg tablet TAKE 2 TABS BY MOUTH IN IN THE MORNING AND 1 TAB BY MOUTH IN IN THE EVENING   • metFORMIN (GLUCOPHAGE) 1,000 mg, Daily with breakfast   • metFORMIN (GLUCOPHAGE) 500 mg, Every evening   • methocarbamol (ROBAXIN) 500 mg tablet    • methocarbamol (ROBAXIN) 500 mg, 4 times daily   • methylPREDNISolone 4 MG tablet therapy pack Use as directed on package   • morphine (MS CONTIN) 15 mg 12 hr tablet    • morphine (MSIR) 15 mg, Oral, 2 times daily, Every 12 hrs.    • ondansetron (ZOFRAN) 4 mg, Oral, Every 8 hours PRN   • oxyCODONE (OXYCONTIN) 13.5 mg, Every 12 hours scheduled   • oxygen 3 L/min, Inhalation, Continuous PRN   • oxymetazoline ([employer] Nasal Spray) 0.05 % nasal spray INSTILL 2 SPRAYS INTO EACH NOSTRIL EVERY 12 HOURS AS NEEDED FOR CONGESTION   • pantoprazole (PROTONIX) 40 mg, Oral, Every 12 hours   • pantoprazole (PROTONIX) 40 mg, Daily   • rOPINIRole (REQUIP) 0.5 mg tablet 2-3 tablets PO at HS   • rOPINIRole (REQUIP) 0.5 mg, Daily at bedtime   • Saccharomyces boulardii (Digestive Probiotic) 250 MG CAPS 1 capsule, 2 times daily   • sitaGLIPtin (JANUVIA) 100 mg, Oral, Daily   • sitaGLIPtin (JANUVIA) 100 mg, Daily   • sulfamethoxazole-trimethoprim (BACTRIM DS)  "800-160 mg per tablet 1 tablet, Every 12 hours   • triamcinolone 40 MG/ML SUSP Take 1 mL by injection route.   • vitamin C 1,000 mg, 2 times daily   • Xtampza ER 13.5 mg, Oral, 2 times daily      Allergies   Allergen Reactions   • Metamucil [Fiber] Lightheadedness   • Prednisone      The patient reports \"they make me goofy \"  No true allergic reaction   • Prednisone Lightheadedness   • Psyllium Hives   • Tetanus Toxoids      Social History     Socioeconomic History   • Marital status:      Spouse name: None   • Number of children: None   • Years of education: None   • Highest education level: None   Occupational History   • None   Tobacco Use   • Smoking status: Former     Current packs/day: 0.00     Average packs/day: 1 pack/day for 64.1 years (64.1 ttl pk-yrs)     Types: Cigarettes     Start date:      Quit date: 2020     Years since quittin.0   • Smokeless tobacco: Never   • Tobacco comments:     stopped smoking 2 days ago   Vaping Use   • Vaping status: Never Used   Substance and Sexual Activity   • Alcohol use: Yes     Alcohol/week: 1.0 standard drink of alcohol     Types: 1 Glasses of wine per week   • Drug use: Never   • Sexual activity: Not Currently   Other Topics Concern   • None   Social History Narrative    ** Merged History Encounter **         Drinks one to two cups of coffee a day     Social Determinants of Health     Financial Resource Strain: Not on file   Food Insecurity: No Food Insecurity (3/9/2023)    Hunger Vital Sign    • Worried About Running Out of Food in the Last Year: Never true    • Ran Out of Food in the Last Year: Never true   Transportation Needs: No Transportation Needs (3/9/2023)    PRAPARE - Transportation    • Lack of Transportation (Medical): No    • Lack of Transportation (Non-Medical): No   Physical Activity: Not on file   Stress: Not on file   Social Connections: Not on file   Intimate Partner Violence: Not on file   Housing Stability: Low Risk  (3/9/2023) " "   Housing Stability Vital Sign    • Unable to Pay for Housing in the Last Year: No    • Number of Places Lived in the Last Year: 1    • Unstable Housing in the Last Year: No        Review of Systems      Vitals:  Vitals:    02/28/24 1504   BP: 116/68   BP Location: Left arm   Patient Position: Sitting   Cuff Size: Standard   Pulse: 72   SpO2: 90%   Weight: 74.4 kg (164 lb)   Height: 5' 1\" (1.549 m)     BMI - Body mass index is 30.99 kg/m².  Wt Readings from Last 7 Encounters:   02/28/24 74.4 kg (164 lb)   08/24/23 68.9 kg (152 lb)   07/21/23 68.9 kg (152 lb)   06/01/23 72.6 kg (160 lb 0.9 oz)   04/16/23 72.1 kg (159 lb)   03/09/23 74 kg (163 lb 2.3 oz)   03/06/23 72 kg (158 lb 11.7 oz)       Physical Exam  Vitals and nursing note reviewed.   Constitutional:       General: She is not in acute distress.     Appearance: Normal appearance. She is well-developed. She is obese. She is not ill-appearing.   HENT:      Head: Normocephalic and atraumatic.      Nose: No congestion.   Eyes:      General: No scleral icterus.     Conjunctiva/sclera: Conjunctivae normal.   Neck:      Vascular: No carotid bruit or JVD.   Cardiovascular:      Rate and Rhythm: Normal rate and regular rhythm.      Pulses: Normal pulses.      Heart sounds: Normal heart sounds. No murmur heard.     No friction rub. No gallop.   Pulmonary:      Effort: Pulmonary effort is normal. No respiratory distress.      Breath sounds: Normal breath sounds. No rales.   Abdominal:      General: There is no distension.      Palpations: Abdomen is soft.      Tenderness: There is no abdominal tenderness.   Musculoskeletal:         General: No swelling or tenderness.      Cervical back: Neck supple.      Right lower leg: No edema.      Left lower leg: No edema.   Skin:     General: Skin is warm.   Neurological:      General: No focal deficit present.      Mental Status: She is alert and oriented to person, place, and time. Mental status is at baseline.   Psychiatric:    " "     Mood and Affect: Mood normal.         Behavior: Behavior normal.         Thought Content: Thought content normal.         Labs:  CBC:   Lab Results   Component Value Date    WBC 6.46 2024    RBC 4.58 2024    HGB 11.2 (L) 2024    HCT 37.6 2024    MCV 82 2024     2024    RDW 17.5 (H) 2024       CMP:   Lab Results   Component Value Date     2015    K 4.9 2024    CL 98 2024    CO2 29 2024    ANIONGAP 14 2015    BUN 19 2024    CREATININE 0.94 2024    EGFR 56 2024    GLUCOSE 170 (H) 2017    CALCIUM 9.6 2024    AST 13 2024    ALT 9 2024    ALKPHOS 99 2024    PROT 6.6 2015    BILITOT 0.3 2015       Magnesium:  Lab Results   Component Value Date    MG 1.9 2024       Lipid Profile:   No results found for: \"CHOL\", \"HDL\", \"TRIG\", \"LDLCALC\"    Thyroid Studies: No results found for: \"GAN5WQEXYZMK\", \"T3FREE\", \"FREET4\", \"I2FVQWX\", \"I8MZRRT\"    A1c:  No components found for: \"HGA1C\"    INR:  Lab Results   Component Value Date    INR 0.91 2023    INR 1.09 2023    INR 0.99 2022   5    Cardiac testing:   Results for orders placed during the hospital encounter of 20    Echo complete with contrast if indicated    [facility]  [address]  [place] [address] [phone]    Transthoracic Echocardiogram  2D, M-mode, Doppler, and Color Doppler    Study date:  07-Dec-2020    Patient: XAVIER BELLO  MR number: [id number]  Account number: [id number]  : 1940  Age: 80 years  Gender: Female  Status: Outpatient  Location: [facility]  Height: 61 in  Weight: 179.5 lb  BP: 140/ 90 mmHg    Indications: Respiratory failure.    Diagnoses: Q21.1 - Atrial septal defect    Sonographer:  VIET Walker  Primary Physician:  Christine Charles DO  Referring Physician:  Arlen Grant DO  Group:  [facility]" [facility]  Interpreting Physician:  Travis Balderrama MD    SUMMARY    LEFT VENTRICLE:  Systolic function was normal. Ejection fraction was estimated to be 60 %.  There were no regional wall motion abnormalities.  Wall thickness was mildly increased.  The changes were consistent with concentric remodeling (increased wall thickness with normal wall mass).  Doppler parameters were consistent with abnormal left ventricular relaxation (grade 1 diastolic dysfunction).    LEFT ATRIUM:  The atrium was mildly dilated.    ATRIAL SEPTUM:  There was a patch repair in place (by history) without any diagnostic evidence of residual shunting.    TRICUSPID VALVE:  There was mild regurgitation.  Pulmonary artery systolic pressure was at the upper limits of normal.  Estimated peak PA pressure was 34 mmHg.    HISTORY: PRIOR HISTORY: Chronic respiratory failure, ASD repair 1965, [facility] ablation, Former smoker, COPD, Hypertension, DMt2    PROCEDURE: The study was performed in the [facility]. This was a routine study. The transthoracic approach was used. The study included complete 2D imaging, M-mode, complete spectral Doppler, and color Doppler. The  heart rate was 65 bpm, at the start of the study. Images were obtained from the parasternal, apical, subcostal, and suprasternal notch acoustic windows. Image quality was adequate.    LEFT VENTRICLE: Size was normal. Systolic function was normal. Ejection fraction was estimated to be 60 %. There were no regional wall motion abnormalities. Wall thickness was mildly increased. The changes were consistent with concentric  remodeling (increased wall thickness with normal wall mass). DOPPLER: Doppler parameters were consistent with abnormal left ventricular relaxation (grade 1 diastolic dysfunction).    VENTRICULAR SEPTUM: There was dyssynergic motion. These changes are consistent with a conduction abnormality.    RIGHT VENTRICLE: The size was normal. Systolic  function was normal. Wall thickness was normal.    LEFT ATRIUM: The atrium was mildly dilated.    ATRIAL SEPTUM: There was a patch repair in place (by history) without any diagnostic evidence of residual shunting.    RIGHT ATRIUM: Size was normal.    MITRAL VALVE: There was moderate annular calcification. Valve structure was normal. There was normal leaflet separation. DOPPLER: The transmitral velocity was within the normal range. There was no evidence for stenosis. There was no  significant regurgitation.    AORTIC VALVE: The valve was trileaflet. Leaflets exhibited mildly increased thickness, calcification, and lower normal cuspal separation. DOPPLER: Transaortic velocity was minimally increased. Doppler was suboptimal for accurate  quantification. There was very mild stenosis. There was no significant regurgitation.    TRICUSPID VALVE: The valve structure was normal. There was normal leaflet separation. DOPPLER: The transtricuspid velocity was within the normal range. There was no evidence for stenosis. There was mild regurgitation. Pulmonary artery  systolic pressure was at the upper limits of normal. Estimated peak PA pressure was 34 mmHg.    PULMONIC VALVE: Leaflets exhibited normal thickness, no calcification, and normal cuspal separation. DOPPLER: The transpulmonic velocity was within the normal range. There was mild regurgitation.    PERICARDIUM: There was no pericardial effusion. The pericardium was normal in appearance.    AORTA: The root exhibited normal size.    SYSTEMIC VEINS: IVC: The inferior vena cava was normal in size. Respirophasic changes were normal.    SYSTEM MEASUREMENT TABLES    2D  %FS: 32.18 %  Ao Diam: 3.01 cm  Ao asc: 3.42 cm  EDV(Teich): 82.62 ml  EF(Teich): 60.69 %  ESV(Teich): 32.48 ml  IVSd: 1.08 cm  LA Area: 25.17 cm2  LA Diam: 3.63 cm  LVEDV MOD A4C: 73.79 ml  LVEF MOD A4C: 69.31 %  LVESV MOD A4C: 22.65 ml  LVIDd: 4.29 cm  LVIDs: 2.91 cm  LVLd A4C: 7.14 cm  LVLs A4C: 5.85 cm  LVOT  Diam: 1.9 cm  LVPWd: 1.06 cm  RA Area: 19.48 cm2  RVIDd: 3.46 cm  SV MOD A4C: 51.14 ml  SV(Teich): 50.14 ml    CW  AV Env.Ti: 322.23 ms  AV MaxP.33 mmHg  AV VTI: 35.46 cm  AV Vmax: 1.76 m/s  AV Vmean: 1.1 m/s  AV meanP.7 mmHg  MV PHT: 75.74 ms  MVA By PHT: 2.9 cm2  TR MaxP.33 mmHg  TR Vmax: 2.75 m/s    PW  JODY (VTI): 1.75 cm2  JODY Vmax: 1.73 cm2  AVAI (VTI): 0 cm2/m2  AVAI Vmax: 0 cm2/m2  E' Sept: 0.06 m/s  E/E' Sept: 13.77  LVOT Env.Ti: 340.63 ms  LVOT VTI: 21.82 cm  LVOT Vmax: 1.07 m/s  LVOT Vmean: 0.64 m/s  LVOT maxP.6 mmHg  LVOT meanP.97 mmHg  LVSI Dopp: 34.24 ml/m2  LVSV Dopp: 61.97 ml  MV A Richard: 1.18 m/s  MV Dec Todd: 3.38 m/s2  MV DecT: 251.33 ms  MV E Richard: 0.85 m/s  MV E/A Ratio: 0.72    [facility] Accredited Echocardiography Laboratory    Prepared and electronically signed by    Travis Balderrama MD  Signed 07-Dec-2020 18:48:47    No results found for this or any previous visit.    No results found for this or any previous visit.    No results found for this or any previous visit.      XR chest portable  Narrative: CHEST    INDICATION:   wheezing. Patient reports waking up and feeling weak. Denies chest pain, shortness of breath.    COMPARISON: Chest radiograph 2023. Chest CT 2023.    EXAM PERFORMED/VIEWS:  XR CHEST PORTABLE    FINDINGS:    Heart shadow is enlarged but unchanged from prior exam.    Stable mild blunting of the costophrenic angles. Lungs are otherwise clear. No pneumothorax.    Osseous structures appear within normal limits for patient age.  Impression: No acute disease.    Resident: ABDIAS Ac I, the attending radiologist, have reviewed the images and agree with the final report above.    Workstation performed: [id number]

## 2024-03-13 ENCOUNTER — HOSPITAL ENCOUNTER (OUTPATIENT)
Dept: NON INVASIVE DIAGNOSTICS | Facility: CLINIC | Age: 84
Discharge: HOME | End: 2024-03-13
Payer: COMMERCIAL

## 2024-03-13 VITALS
DIASTOLIC BLOOD PRESSURE: 68 MMHG | HEIGHT: 61 IN | SYSTOLIC BLOOD PRESSURE: 116 MMHG | BODY MASS INDEX: 30.96 KG/M2 | HEART RATE: 68 BPM | WEIGHT: 164 LBS

## 2024-03-13 DIAGNOSIS — Z98.890 HISTORY OF CARDIAC RADIOFREQUENCY ABLATION: ICD-10-CM

## 2024-03-13 DIAGNOSIS — Z98.890 S/P ATRIAL SEPTAL DEFECT CLOSURE, SURGICAL: ICD-10-CM

## 2024-03-13 DIAGNOSIS — I45.10 RIGHT BUNDLE BRANCH BLOCK: ICD-10-CM

## 2024-03-13 LAB
AORTIC ROOT: 3.3 CM
AORTIC VALVE MEAN VELOCITY: 15 M/S
APICAL FOUR CHAMBER EJECTION FRACTION: 65 %
ASCENDING AORTA: 3.5 CM
AV AREA BY CONTINUOUS VTI: 1.7 CM2
AV AREA PEAK VELOCITY: 1.6 CM2
AV LVOT MEAN GRADIENT: 3 MMHG
AV LVOT PEAK GRADIENT: 7 MMHG
AV MEAN GRADIENT: 10 MMHG
AV PEAK GRADIENT: 21 MMHG
AV VALVE AREA: 1.69 CM2
AV VELOCITY RATIO: 0.57
BSA FOR ECHO PROCEDURE: 1.74 M2
DOP CALC AO PEAK VEL: 2.28 M/S
DOP CALC AO VTI: 49.48 CM
DOP CALC LVOT AREA: 2.83 CM2
DOP CALC LVOT CARDIAC INDEX: 3.02 L/MIN/M2
DOP CALC LVOT CARDIAC OUTPUT: 5.25 L/MIN
DOP CALC LVOT DIAMETER: 1.9 CM
DOP CALC LVOT PEAK VEL VTI: 29.44 CM
DOP CALC LVOT PEAK VEL: 1.3 M/S
DOP CALC LVOT STROKE INDEX: 45.4 ML/M2
DOP CALC LVOT STROKE VOLUME: 83.43
E WAVE DECELERATION TIME: 321 MS
E/A RATIO: 0.82
FRACTIONAL SHORTENING: 41 (ref 28–44)
INTERVENTRICULAR SEPTUM IN DIASTOLE (PARASTERNAL SHORT AXIS VIEW): 1.5 CM
INTERVENTRICULAR SEPTUM: 1.5 CM (ref 0.6–1.1)
LAAS-AP2: 26.2 CM2
LAAS-AP4: 27.7 CM2
LEFT ATRIUM SIZE: 4.4 CM
LEFT ATRIUM VOLUME (MOD BIPLANE): 91 ML
LEFT ATRIUM VOLUME INDEX (MOD BIPLANE): 52.3 ML/M2
LEFT INTERNAL DIMENSION IN SYSTOLE: 2.3 CM (ref 2.1–4)
LEFT VENTRICULAR INTERNAL DIMENSION IN DIASTOLE: 3.9 CM (ref 3.5–6)
LEFT VENTRICULAR POSTERIOR WALL IN END DIASTOLE: 1.5 CM
LEFT VENTRICULAR STROKE VOLUME: 47 ML
LVSV (TEICH): 47 ML
MV PEAK A VEL: 1.26 M/S
MV PEAK E VEL: 103 CM/S
RA PRESSURE ESTIMATED: 8 MMHG
RIGHT ATRIUM AREA SYSTOLE A4C: 20.1 CM2
RIGHT VENTRICLE ID DIMENSION: 4.5 CM
RV PSP: 63 MMHG
SINOTUBULAR JUNCTION: 2.7 CM
SL CV LEFT ATRIUM LENGTH A2C: 6.6 CM
SL CV LV EF: 70
SL CV PED ECHO LEFT VENTRICLE DIASTOLIC VOLUME (MOD BIPLANE) 2D: 66 ML
SL CV PED ECHO LEFT VENTRICLE SYSTOLIC VOLUME (MOD BIPLANE) 2D: 19 ML
STJ: 2.7 CM
TR MAX PG: 55 MMHG
TR PEAK VELOCITY: 3.7 M/S
TRICUSPID ANNULAR PLANE SYSTOLIC EXCURSION: 1.7 CM
TRICUSPID VALVE PEAK REGURGITATION VELOCITY: 3.7 M/S

## 2024-03-13 PROCEDURE — 93306 TTE W/DOPPLER COMPLETE: CPT

## 2024-03-13 PROCEDURE — 93306 TTE W/DOPPLER COMPLETE: CPT | Performed by: INTERNAL MEDICINE

## 2024-03-30 ENCOUNTER — HOSPITAL ENCOUNTER (EMERGENCY)
Facility: HOSPITAL | Age: 84
Discharge: HOME | End: 2024-03-30
Attending: EMERGENCY MEDICINE | Admitting: EMERGENCY MEDICINE
Payer: COMMERCIAL

## 2024-03-30 VITALS
HEART RATE: 77 BPM | OXYGEN SATURATION: 91 % | DIASTOLIC BLOOD PRESSURE: 60 MMHG | RESPIRATION RATE: 18 BRPM | TEMPERATURE: 98.2 F | SYSTOLIC BLOOD PRESSURE: 124 MMHG

## 2024-03-30 DIAGNOSIS — R15.9 FECAL INCONTINENCE: Primary | ICD-10-CM

## 2024-03-30 DIAGNOSIS — R53.83 FATIGUE: ICD-10-CM

## 2024-03-30 DIAGNOSIS — R73.9 ELEVATED BLOOD SUGAR LEVEL: ICD-10-CM

## 2024-03-30 LAB
ALBUMIN SERPL BCP-MCNC: 3.8 G/DL (ref 3.5–5)
ALP SERPL-CCNC: 77 U/L (ref 34–104)
ALT SERPL W P-5'-P-CCNC: 13 U/L (ref 7–52)
ANION GAP SERPL CALCULATED.3IONS-SCNC: 6 MMOL/L (ref 4–13)
AST SERPL W P-5'-P-CCNC: 13 U/L (ref 13–39)
BASOPHILS # BLD AUTO: 0.03 THOUSANDS/ÂΜL (ref 0–0.1)
BASOPHILS NFR BLD AUTO: 1 % (ref 0–1)
BILIRUB SERPL-MCNC: 0.42 MG/DL (ref 0.2–1)
BUN SERPL-MCNC: 18 MG/DL (ref 5–25)
CALCIUM SERPL-MCNC: 8.8 MG/DL (ref 8.4–10.2)
CHLORIDE SERPL-SCNC: 99 MMOL/L (ref 96–108)
CO2 SERPL-SCNC: 30 MMOL/L (ref 21–32)
CREAT SERPL-MCNC: 1.04 MG/DL (ref 0.6–1.3)
EOSINOPHIL # BLD AUTO: 0.08 THOUSAND/ÂΜL (ref 0–0.61)
EOSINOPHIL NFR BLD AUTO: 2 % (ref 0–6)
ERYTHROCYTE [DISTWIDTH] IN BLOOD BY AUTOMATED COUNT: 19 % (ref 11.6–15.1)
FLUAV RNA RESP QL NAA+PROBE: NEGATIVE
FLUBV RNA RESP QL NAA+PROBE: NEGATIVE
GFR SERPL CREATININE-BSD FRML MDRD: 49 ML/MIN/1.73SQ M
GLUCOSE SERPL-MCNC: 286 MG/DL (ref 65–140)
HCT VFR BLD AUTO: 33.4 % (ref 34.8–46.1)
HGB BLD-MCNC: 10 G/DL (ref 11.5–15.4)
IMM GRANULOCYTES # BLD AUTO: 0.04 THOUSAND/UL (ref 0–0.2)
IMM GRANULOCYTES NFR BLD AUTO: 1 % (ref 0–2)
LYMPHOCYTES # BLD AUTO: 0.99 THOUSANDS/ÂΜL (ref 0.6–4.47)
LYMPHOCYTES NFR BLD AUTO: 21 % (ref 14–44)
MCH RBC QN AUTO: 24.7 PG (ref 26.8–34.3)
MCHC RBC AUTO-ENTMCNC: 29.9 G/DL (ref 31.4–37.4)
MCV RBC AUTO: 83 FL (ref 82–98)
MONOCYTES # BLD AUTO: 0.67 THOUSAND/ÂΜL (ref 0.17–1.22)
MONOCYTES NFR BLD AUTO: 14 % (ref 4–12)
NEUTROPHILS # BLD AUTO: 2.93 THOUSANDS/ÂΜL (ref 1.85–7.62)
NEUTS SEG NFR BLD AUTO: 61 % (ref 43–75)
NRBC BLD AUTO-RTO: 0 /100 WBCS
PLATELET # BLD AUTO: 170 THOUSANDS/UL (ref 149–390)
PMV BLD AUTO: 8.9 FL (ref 8.9–12.7)
POTASSIUM SERPL-SCNC: 4.3 MMOL/L (ref 3.5–5.3)
PROT SERPL-MCNC: 6.3 G/DL (ref 6.4–8.4)
RBC # BLD AUTO: 4.05 MILLION/UL (ref 3.81–5.12)
RSV RNA RESP QL NAA+PROBE: NEGATIVE
SARS-COV-2 RNA RESP QL NAA+PROBE: NEGATIVE
SODIUM SERPL-SCNC: 135 MMOL/L (ref 135–147)
WBC # BLD AUTO: 4.74 THOUSAND/UL (ref 4.31–10.16)

## 2024-03-30 PROCEDURE — 99285 EMERGENCY DEPT VISIT HI MDM: CPT | Performed by: EMERGENCY MEDICINE

## 2024-03-30 PROCEDURE — 99283 EMERGENCY DEPT VISIT LOW MDM: CPT

## 2024-03-30 PROCEDURE — 96365 THER/PROPH/DIAG IV INF INIT: CPT

## 2024-03-30 PROCEDURE — 80053 COMPREHEN METABOLIC PANEL: CPT

## 2024-03-30 PROCEDURE — 0241U HB NFCT DS VIR RESP RNA 4 TRGT: CPT

## 2024-03-30 PROCEDURE — 85025 COMPLETE CBC W/AUTO DIFF WBC: CPT

## 2024-03-30 PROCEDURE — 36415 COLL VENOUS BLD VENIPUNCTURE: CPT

## 2024-03-30 RX ADMIN — SODIUM CHLORIDE, SODIUM LACTATE, POTASSIUM CHLORIDE, AND CALCIUM CHLORIDE 500 ML: .6; .31; .03; .02 INJECTION, SOLUTION INTRAVENOUS at 15:26

## 2024-03-30 NOTE — ED PROVIDER NOTES
History  Chief Complaint   Patient presents with    Diarrhea     Patient has been having diarrhea for 1 week. No relief with OTC medications. Patient also reports having chills and runny nose.      Ms. Patel is an 84yo female with a past medical history of arthritis, COPD, diabetes, GERD, hiatal hernia, hypertension, PUD, who presents with 1 week of soft stool.   Reports 7 days of soft stools.  Is also having difficulty controlling her bowels.  She reports that every time that she goes to the bathroom she has some stool in her underwear, she does have a bowel movement every time that she sits down.  She reports that this is usually about 4-5 times a day.  Is no urgency associated with the bowel movements, they are simply soft and brown.  She denies any hematochezia or melena.  She denies any recent travel, eating out at any restaurants, or trying any new foods at home.  Her  is not having any difficulties.  She denies any sick exposures. She denies any difficulties controlling her bladder or other neurologic symptoms        Prior to Admission Medications   Prescriptions Last Dose Informant Patient Reported? Taking?   Alcohol Swabs (Alcohol Prep) PADS  Self Yes No   Sig: USE TO TEST BLOOD SUGAR TWICE A DAY AND AS NEEDED   Ascorbic Acid (vitamin C) 1000 MG tablet  Self Yes No   Sig: Take 1,000 mg by mouth 2 (two) times a day   Patient not taking: Reported on 7/21/2023   Budeson-Glycopyrrol-Formoterol (Breztri Aerosphere) 160-9-4.8 MCG/ACT AERO  Self No No   Sig: Inhale 2 puffs  in the morning and 2 puffs before bedtime. Rinse mouth after use..   Budeson-Glycopyrrol-Formoterol 160-9-4.8 MCG/ACT AERO  Self Yes No   Sig: Inhale 2 puffs Rinse mouth after use.   Patient not taking: Reported on 8/24/2023   Saccharomyces boulardii (Digestive Probiotic) 250 MG CAPS  Self Yes No   Sig: Take 1 capsule by mouth 2 (two) times a day   Patient not taking: Reported on 2/28/2024   albuterol (PROVENTIL HFA,VENTOLIN HFA) 90  mcg/act inhaler  Self No No   Sig: Inhale 2 puffs every 6 (six) hours as needed for wheezing   albuterol (PROVENTIL HFA,VENTOLIN HFA) 90 mcg/act inhaler  Self Yes No   Sig: Inhale 2 puffs every 6 (six) hours as needed for wheezing   Patient not taking: Reported on 2/28/2024   amoxicillin (AMOXIL) 500 mg capsule  Self Yes No   Sig: Take 4 capsules by mouth as needed (prior to dental appointment)   Patient not taking: Reported on 2/28/2024   ascorbic acid (VITAMIN C) 1000 MG tablet  Self Yes No   Sig: Take 1,000 mg by mouth 2 (two) times a day   aspirin 81 mg chewable tablet  Self No No   Sig: Chew 1 tablet (81 mg total) daily   aspirin 81 mg chewable tablet  Self Yes No   Sig: Chew 81 mg daily   Patient not taking: Reported on 8/24/2023   atorvastatin (LIPITOR) 40 mg tablet  Self No No   Sig: Take 1 tablet (40 mg total) by mouth every evening   atorvastatin (LIPITOR) 40 mg tablet  Self Yes No   Sig: Take 40 mg by mouth daily   Patient not taking: Reported on 7/21/2023   brimonidine tartrate 0.2 % ophthalmic solution  Self No No   Sig: Administer 1 drop into the left eye 2 (two) times a day   brimonidine tartrate 0.2 % ophthalmic solution  Self Yes No   Sig: Administer 1 drop into the left eye 2 (two) times a day   Patient not taking: Reported on 7/21/2023   dexamethasone (DECADRON) 4 mg/mL  Self Yes No   Sig: Inject 1 mL by intramuscular route.   Patient not taking: Reported on 2/28/2024   diltiazem (CARDIZEM CD) 180 mg 24 hr capsule  Self Yes No   Sig: Take 180 mg by mouth daily   Patient not taking: Reported on 7/21/2023   diltiazem (CARDIZEM CD) 180 mg 24 hr capsule  Self Yes No   Sig: Take 180 mg by mouth daily   diltiazem (CARDIZEM CD) 240 mg 24 hr capsule  Self Yes No   Sig: Take 1 capsule by mouth daily   Patient not taking: Reported on 2/28/2024   diltiazem (CARDIZEM CD) 240 mg 24 hr capsule  Self Yes No   Sig: Take 240 mg by mouth daily   Patient not taking: Reported on 2/28/2024   ferrous sulfate 325 (65  FE) MG EC tablet  Self Yes No   Sig: Take 1 tablet by mouth daily with breakfast   Patient not taking: Reported on 2024   gabapentin (NEURONTIN) 300 mg capsule  Self Yes No   Sig: Take 300 mg by mouth 4 (four) times a day   gabapentin (NEURONTIN) 300 mg capsule  Self Yes No   Sig: Take 300 mg by mouth 4 (four) times a day   Patient not taking: Reported on 2023   lidocaine (XYLOCAINE) 1 %  Self Yes No   Sig: Take 0.5 mL by injection route.   Patient not taking: Reported on 2024   losartan (COZAAR) 25 mg tablet  Self Yes No   Sig: Take 50 mg by mouth daily   losartan (COZAAR) 50 mg tablet  Self Yes No   Sig: TAKE 1 TABLET BY MOUTH EVERY DAY   Patient not taking: Reported on 2023   losartan (COZAAR) 50 mg tablet  Self Yes No   Sig: Take 50 mg by mouth daily   Patient not taking: Reported on 2023   metFORMIN (GLUCOPHAGE) 1000 MG tablet  Self No No   Si mg QAM and 500 mg QPM   metFORMIN (GLUCOPHAGE) 500 mg tablet  Self Yes No   Sig: TAKE 2 TABS BY MOUTH IN IN THE MORNING AND 1 TAB BY MOUTH IN IN THE EVENING   metFORMIN (GLUCOPHAGE) 500 mg tablet  Self Yes No   Sig: Take 1,000 mg by mouth daily with breakfast   Patient not taking: Reported on 2023   metFORMIN (GLUCOPHAGE) 500 mg tablet  Self Yes No   Sig: Take 500 mg by mouth every evening   Patient not taking: Reported on 2023   methocarbamol (ROBAXIN) 500 mg tablet  Self Yes No   methocarbamol (ROBAXIN) 500 mg tablet  Self Yes No   Sig: Take 500 mg by mouth 4 (four) times a day   Patient not taking: Reported on 2023   methylPREDNISolone 4 MG tablet therapy pack  Self No No   Sig: Use as directed on package   Patient not taking: Reported on 2023   morphine (MS CONTIN) 15 mg 12 hr tablet  Self Yes No   Patient not taking: Reported on 2024   morphine (MSIR) 15 mg tablet  Self Yes No   Sig: Take 15 mg by mouth 2 (two) times a day Every 12 hrs.   ondansetron (ZOFRAN) 4 mg tablet  Self No No   Sig: Take 1 tablet (4 mg  total) by mouth every 8 (eight) hours as needed for nausea or vomiting for up to 10 doses   Patient not taking: Reported on 2024   oxyCODONE (OxyCONTIN) 10 mg 12 hr tablet  Self Yes No   Sig: Take 13.5 mg by mouth every 12 (twelve) hours   Patient not taking: Reported on 2023   oxyCODONE ER (Xtampza ER) 13.5 MG C12A  Self No No   Sig: Take 13.5 mg by mouth 2 (two) times a day Max Daily Amount: 27 mg   Patient not taking: Reported on 2023   oxygen gas  Self Yes No   Sig: Inhale 3 L/min continuous as needed   oxymetazoline ([facility] Nasal Spray) 0.05 % nasal spray  Self Yes No   Sig: INSTILL 2 SPRAYS INTO EACH NOSTRIL EVERY 12 HOURS AS NEEDED FOR CONGESTION   Patient not taking: Reported on 2024   pantoprazole (PROTONIX) 40 mg tablet  Self Yes No   Sig: Take 40 mg by mouth every 12 (twelve) hours   pantoprazole (PROTONIX) 40 mg tablet  Self Yes No   Sig: Take 40 mg by mouth daily   Patient not taking: Reported on 2024   rOPINIRole (REQUIP) 0.5 mg tablet  Self No No   Si-3 tablets PO at HS   rOPINIRole (REQUIP) 0.5 mg tablet  Self Yes No   Sig: Take 0.5 mg by mouth daily at bedtime Take 3 tablets PO before bed   Patient not taking: Reported on 2023   sitaGLIPtin (JANUVIA) 100 mg tablet  Self No No   Sig: Take 1 tablet (100 mg total) by mouth daily   Patient not taking: Reported on 2023   sitaGLIPtin (JANUVIA) 100 mg tablet  Self Yes No   Sig: Take 100 mg by mouth daily   Patient not taking: Reported on 2023   sulfamethoxazole-trimethoprim (BACTRIM DS) 800-160 mg per tablet  Self Yes No   Sig: Take 1 tablet by mouth every 12 (twelve) hours   Patient not taking: Reported on 2024   triamcinolone 40 MG/ML SUSP  Self Yes No   Sig: Take 1 mL by injection route.   Patient not taking: Reported on 2024      Facility-Administered Medications: None       Past Medical History:   Diagnosis Date    Arthritis     Cardiac disease     COPD (chronic obstructive pulmonary disease) ([facility])      COVID-19     was in hospital for sx and was dx with covid    Diabetes mellitus (HCC)     GERD (gastroesophageal reflux disease)     Hiatal hernia     Hypertension     PUD (peptic ulcer disease)     Residual ASD (atrial septal defect) following repair        Past Surgical History:   Procedure Laterality Date    ABDOMINAL ADHESION SURGERY N/A 2022    Procedure: LYSIS ADHESIONS;  Surgeon: Petty Johnson MD;  Location: [facility];  Service: Thoracic    ASD REPAIR      BACK SURGERY      x3    CARDIAC SURGERY      Atrial septic defect    ESOPHAGOGASTRODUODENOSCOPY N/A 2022    Procedure: ESOPHAGOGASTRODUODENOSCOPY (EGD);  Surgeon: Petty Johnson MD;  Location: [facility];  Service: Thoracic    JOINT REPLACEMENT      bilateral knee surgery    JOINT REPLACEMENT      KNEE SURGERY      PARAESOPHAGEAL HERNIA REPAIR N/A 2022    Procedure: REPAIR HERNIA PARAESOPHAGEAL LAPAROSCOPIC W ROBOTICS, gastropexy, mesh placement;  Surgeon: Petty Johnson MD;  Location: [facility];  Service: Thoracic    FL EXCISION GANGLION WRIST DORSAL/VOLAR PRIMARY Left 2023    Procedure: EXCISION GANGLION CYST;  Surgeon: Jaja Pantoja MD;  Location: [facility];  Service: Orthopedics    SHOULDER SURGERY         Family History   Problem Relation Age of Onset    Cancer Mother     Asthma Father      I have reviewed and agree with the history as documented.    E-Cigarette/Vaping    E-Cigarette Use Never User      E-Cigarette/Vaping Substances    Nicotine No     THC No     CBD No     Flavoring No     Other No     Unknown No      Social History     Tobacco Use    Smoking status: Former     Current packs/day: 0.00     Average packs/day: 1 pack/day for 64.1 years (64.1 ttl pk-yrs)     Types: Cigarettes     Start date:      Quit date: 2020     Years since quittin.1    Smokeless tobacco: Never    Tobacco comments:     stopped smoking 2 days ago   Vaping Use    Vaping status: Never Used   Substance Use  Topics    Alcohol use: Yes     Alcohol/week: 1.0 standard drink of alcohol     Types: 1 Glasses of wine per week    Drug use: Never        Review of Systems   Constitutional:  Negative for chills and fever.   Eyes:  Negative for pain and visual disturbance.   Respiratory:  Negative for chest tightness and shortness of breath.    Cardiovascular:  Negative for chest pain.   Gastrointestinal:  Positive for diarrhea. Negative for abdominal pain, constipation, nausea and vomiting.   Genitourinary:  Negative for dysuria and hematuria.   Skin:  Negative for rash and wound.   Neurological:  Negative for dizziness, syncope, light-headedness and headaches.   Psychiatric/Behavioral: Negative.         Physical Exam  ED Triage Vitals   Temperature Pulse Respirations Blood Pressure SpO2   03/30/24 1503 03/30/24 1453 03/30/24 1453 03/30/24 1453 03/30/24 1453   98.2 °F (36.8 °C) 77 18 124/60 91 %      Temp Source Heart Rate Source Patient Position - Orthostatic VS BP Location FiO2 (%)   03/30/24 1503 03/30/24 1453 03/30/24 1453 03/30/24 1453 --   Oral Monitor Lying Right arm       Pain Score       --                    Orthostatic Vital Signs  Vitals:    03/30/24 1453   BP: 124/60   Pulse: 77   Patient Position - Orthostatic VS: Lying       Physical Exam  Vitals and nursing note reviewed.   Constitutional:       Appearance: Normal appearance.   HENT:      Head: Normocephalic and atraumatic.      Right Ear: External ear normal.      Left Ear: External ear normal.      Nose: Nose normal.      Mouth/Throat:      Mouth: Mucous membranes are moist.      Pharynx: Oropharynx is clear.   Eyes:      Extraocular Movements: Extraocular movements intact.      Conjunctiva/sclera: Conjunctivae normal.   Cardiovascular:      Rate and Rhythm: Normal rate and regular rhythm.   Pulmonary:      Effort: Pulmonary effort is normal.      Breath sounds: Normal breath sounds.   Abdominal:      General: Abdomen is flat. There is no distension.       Palpations: Abdomen is soft. There is no mass.      Tenderness: There is no abdominal tenderness. There is no guarding.   Musculoskeletal:         General: Normal range of motion.      Cervical back: Normal range of motion and neck supple.   Skin:     General: Skin is warm and dry.   Neurological:      General: No focal deficit present.      Mental Status: She is alert and oriented to person, place, and time.   Psychiatric:         Mood and Affect: Mood normal.         Behavior: Behavior normal.         ED Medications  Medications   lactated ringers bolus 500 mL (0 mL Intravenous Stopped 3/30/24 1626)       Diagnostic Studies  Results Reviewed       Procedure Component Value Units Date/Time    FLU/RSV/COVID - if FLU/RSV clinically relevant [[id number]]  (Normal) Collected: 03/30/24 1525    Lab Status: Final result Specimen: Nares from Nose Updated: 03/30/24 1620     SARS-CoV-2 Negative     INFLUENZA A PCR Negative     INFLUENZA B PCR Negative     RSV PCR Negative    Narrative:      FOR PEDIATRIC PATIENTS - copy/paste COVID Guidelines URL to browser: [email or web address]    SARS-CoV-2 assay is a Nucleic Acid Amplification assay intended for the  qualitative detection of nucleic acid from SARS-CoV-2 in nasopharyngeal  swabs. Results are for the presumptive identification of SARS-CoV-2 RNA.    Positive results are indicative of infection with SARS-CoV-2, the virus  causing COVID-19, but do not rule out bacterial infection or co-infection  with other viruses. Laboratories within the [place] and its  territories are required to report all positive results to the appropriate  public health authorities. Negative results do not preclude SARS-CoV-2  infection and should not be used as the sole basis for treatment or other  patient management decisions. Negative results must be combined with  clinical observations, patient history, and epidemiological  information.  This test has not been FDA cleared or approved.    This test has been authorized by FDA under an Emergency Use Authorization  (EUA). This test is only authorized for the duration of time the  declaration that circumstances exist justifying the authorization of the  emergency use of an in vitro diagnostic tests for detection of SARS-CoV-2  virus and/or diagnosis of COVID-19 infection under section 564(b)(1) of  the Act, 21 U.S.C. 360bbb-3(b)(1), unless the authorization is terminated  or revoked sooner. The test has been validated but independent review by FDA  and CLIA is pending.    Test performed using [employer] GeneXpert: This RT-PCR assay targets N2,  a region unique to SARS-CoV-2. A conserved region in the E-gene was chosen  for pan-Sarbecovirus detection which includes SARS-CoV-2.    According to CMS-2020-01-R, this platform meets the definition of high-throughput technology.    Comprehensive metabolic panel [[id number]]  (Abnormal) Collected: 03/30/24 1525    Lab Status: Final result Specimen: Blood from Arm, Right Updated: 03/30/24 1545     Sodium 135 mmol/L      Potassium 4.3 mmol/L      Chloride 99 mmol/L      CO2 30 mmol/L      ANION GAP 6 mmol/L      BUN 18 mg/dL      Creatinine 1.04 mg/dL      Glucose 286 mg/dL      Calcium 8.8 mg/dL      AST 13 U/L      ALT 13 U/L      Alkaline Phosphatase 77 U/L      Total Protein 6.3 g/dL      Albumin 3.8 g/dL      Total Bilirubin 0.42 mg/dL      eGFR 49 ml/min/1.73sq m     Narrative:      National Kidney Disease Foundation guidelines for Chronic Kidney Disease (CKD):     Stage 1 with normal or high GFR (GFR > 90 mL/min/1.73 square meters)    Stage 2 Mild CKD (GFR = 60-89 mL/min/1.73 square meters)    Stage 3A Moderate CKD (GFR = 45-59 mL/min/1.73 square meters)    Stage 3B Moderate CKD (GFR = 30-44 mL/min/1.73 square meters)    Stage 4 Severe CKD (GFR = 15-29 mL/min/1.73 square meters)    Stage 5 End Stage CKD (GFR <15 mL/min/1.73 square meters)  Note:  GFR calculation is accurate only with a steady state creatinine    CBC and differential [[id number]]  (Abnormal) Collected: 03/30/24 1525    Lab Status: Final result Specimen: Blood from Arm, Right Updated: 03/30/24 1530     WBC 4.74 Thousand/uL      RBC 4.05 Million/uL      Hemoglobin 10.0 g/dL      Hematocrit 33.4 %      MCV 83 fL      MCH 24.7 pg      MCHC 29.9 g/dL      RDW 19.0 %      MPV 8.9 fL      Platelets 170 Thousands/uL      nRBC 0 /100 WBCs      Neutrophils Relative 61 %      Immature Grans % 1 %      Lymphocytes Relative 21 %      Monocytes Relative 14 %      Eosinophils Relative 2 %      Basophils Relative 1 %      Neutrophils Absolute 2.93 Thousands/µL      Absolute Immature Grans 0.04 Thousand/uL      Absolute Lymphocytes 0.99 Thousands/µL      Absolute Monocytes 0.67 Thousand/µL      Eosinophils Absolute 0.08 Thousand/µL      Basophils Absolute 0.03 Thousands/µL                    No orders to display         Procedures  Procedures      ED Course  ED Course as of 03/30/24 1644   Sat Mar 30, 2024   1615 Long discussion with the patient about her diabetes and how that could be making her clinical course worse, including worsening her fatigue which seems to be one of her primary complaints.  She states understanding.  She also states understanding that we recommend that she follow-up with her primary care in regards to improving her glycemic control.   1626 Also had a discussion with the patient about how diarrheal illnesses can take a while to resolve, and taking antidiarrheals can prolong the process because the bowels cannot flush themselves out as they are designed to do.  She states understanding and continues to state that she plans to take the antidiarrheals.  We also recommended follow-up with her primary care in regards to the new softened stools and bowel control issues.  We offered her admission to help with rehydration, getting her blood sugar under control.  She declined.                                        Medical Decision Making  Ms. Patel is an 84yo female with a past medical history of arthritis, COPD, diabetes, GERD, hiatal hernia, hypertension, PUD, who presents with 1 week of soft stool.     DDx including but not limited to: food poisoning, viral illness, colitis, enteritis, metabolic abnormality, IBS, IBD, ileus, bowel obstruction, appendicitis,    See ED course for MDM    Results shared with patient. Return precautions given and patient expresses understanding and is comfortable with discharge.      Amount and/or Complexity of Data Reviewed  Labs: ordered.          Disposition  Final diagnoses:   Fecal incontinence   Fatigue   Elevated blood sugar level     Time reflects when diagnosis was documented in both MDM as applicable and the Disposition within this note       Time User Action Codes Description Comment    3/30/2024  4:29 PM Keyla Ohara Add [R15.9] Fecal incontinence     3/30/2024  4:29 PM Keyla Ohara [R53.83] Fatigue     3/30/2024  4:29 PM Keyla Ohara Add [R73.9] Elevated blood sugar level           ED Disposition       ED Disposition   Discharge    Condition   Stable    Date/Time   Sat Mar 30, 2024  4:28 PM    Comment   Gloria Acharyale discharge to home/self care.                   Follow-up Information       Follow up With Specialties Details Why Contact Info    Christine Charles, DO Family Medicine Schedule an appointment as soon as possible for a visit  follow up with your primary care provider inregards to your soft stools and incontinence issues [address]  [address]  [facility] [facility] [address] [phone]              Patient's Medications   Discharge Prescriptions    No medications on file     No discharge procedures on file.    PDMP Review         Value Time User    PDMP Reviewed  Yes 7/21/2023  1:27 PM Lety De León PA-C             ED Provider  Attending physically available and evaluated Gloria Patel. I managed the patient along with  the ED Attending.    Electronically Signed by           Keyla Ohara MD  03/30/24 8053

## 2024-03-31 NOTE — ED ATTENDING ATTESTATION
3/30/2024  IFlorina MD, saw and evaluated the patient. I have discussed the patient with the resident/non-physician practitioner and agree with the resident's/non-physician practitioner's findings, Plan of Care, and MDM as documented in the resident's/non-physician practitioner's note, except where noted. All available labs and Radiology studies were reviewed.  I was present for key portions of any procedure(s) performed by the resident/non-physician practitioner and I was immediately available to provide assistance.       At this point I agree with the current assessment done in the Emergency Department.  I have conducted an independent evaluation of this patient a history and physical is as follows:    83-year-old presented to the ER with soft stools and fatigue.  Concern for dehydration.  No abdominal pain.  No fevers or chills.  No chest pain.  No short of breath.  No nausea vomiting.  No sick contacts.  No recent travel.  No recent antibiotics.  No urinary symptoms.  No lightheadedness or dizziness.    Will check patient's baseline labs, check electrolytes for dehydration, check CBC to eval for anemia.  Viral swab.  Ambulate patient.    Patient does not want to stay for observation.  Would like to go home.  Understands associated risks.    ED Course         Critical Care Time  Procedures

## 2024-04-02 ENCOUNTER — APPOINTMENT (EMERGENCY)
Dept: CT IMAGING | Facility: HOSPITAL | Age: 84
End: 2024-04-02
Payer: COMMERCIAL

## 2024-04-02 ENCOUNTER — HOSPITAL ENCOUNTER (EMERGENCY)
Facility: HOSPITAL | Age: 84
Discharge: HOME | End: 2024-04-02
Attending: EMERGENCY MEDICINE
Payer: COMMERCIAL

## 2024-04-02 VITALS
OXYGEN SATURATION: 98 % | TEMPERATURE: 98.2 F | RESPIRATION RATE: 16 BRPM | SYSTOLIC BLOOD PRESSURE: 151 MMHG | HEART RATE: 69 BPM | DIASTOLIC BLOOD PRESSURE: 72 MMHG

## 2024-04-02 DIAGNOSIS — R10.9 ABDOMINAL PAIN: ICD-10-CM

## 2024-04-02 DIAGNOSIS — R19.7 DIARRHEA: Primary | ICD-10-CM

## 2024-04-02 LAB
2HR DELTA HS TROPONIN: 0 NG/L
ALBUMIN SERPL BCP-MCNC: 4 G/DL (ref 3.5–5)
ALP SERPL-CCNC: 79 U/L (ref 34–104)
ALT SERPL W P-5'-P-CCNC: 13 U/L (ref 7–52)
ANION GAP SERPL CALCULATED.3IONS-SCNC: 6 MMOL/L (ref 4–13)
AST SERPL W P-5'-P-CCNC: 10 U/L (ref 13–39)
BASOPHILS # BLD AUTO: 0.05 THOUSANDS/ÂΜL (ref 0–0.1)
BASOPHILS NFR BLD AUTO: 1 % (ref 0–1)
BILIRUB SERPL-MCNC: 0.35 MG/DL (ref 0.2–1)
BUN SERPL-MCNC: 16 MG/DL (ref 5–25)
CALCIUM SERPL-MCNC: 9.4 MG/DL (ref 8.4–10.2)
CARDIAC TROPONIN I PNL SERPL HS: 9 NG/L
CARDIAC TROPONIN I PNL SERPL HS: 9 NG/L
CHLORIDE SERPL-SCNC: 98 MMOL/L (ref 96–108)
CO2 SERPL-SCNC: 31 MMOL/L (ref 21–32)
CREAT SERPL-MCNC: 0.9 MG/DL (ref 0.6–1.3)
EOSINOPHIL # BLD AUTO: 0.08 THOUSAND/ÂΜL (ref 0–0.61)
EOSINOPHIL NFR BLD AUTO: 2 % (ref 0–6)
ERYTHROCYTE [DISTWIDTH] IN BLOOD BY AUTOMATED COUNT: 18.8 % (ref 11.6–15.1)
GFR SERPL CREATININE-BSD FRML MDRD: 59 ML/MIN/1.73SQ M
GLUCOSE SERPL-MCNC: 220 MG/DL (ref 65–140)
HCT VFR BLD AUTO: 35.9 % (ref 34.8–46.1)
HGB BLD-MCNC: 10.5 G/DL (ref 11.5–15.4)
IMM GRANULOCYTES # BLD AUTO: 0.04 THOUSAND/UL (ref 0–0.2)
IMM GRANULOCYTES NFR BLD AUTO: 1 % (ref 0–2)
LYMPHOCYTES # BLD AUTO: 1.16 THOUSANDS/ÂΜL (ref 0.6–4.47)
LYMPHOCYTES NFR BLD AUTO: 21 % (ref 14–44)
MCH RBC QN AUTO: 24 PG (ref 26.8–34.3)
MCHC RBC AUTO-ENTMCNC: 29.2 G/DL (ref 31.4–37.4)
MCV RBC AUTO: 82 FL (ref 82–98)
MONOCYTES # BLD AUTO: 0.69 THOUSAND/ÂΜL (ref 0.17–1.22)
MONOCYTES NFR BLD AUTO: 13 % (ref 4–12)
NEUTROPHILS # BLD AUTO: 3.45 THOUSANDS/ÂΜL (ref 1.85–7.62)
NEUTS SEG NFR BLD AUTO: 62 % (ref 43–75)
NRBC BLD AUTO-RTO: 0 /100 WBCS
PLATELET # BLD AUTO: 234 THOUSANDS/UL (ref 149–390)
PMV BLD AUTO: 8.9 FL (ref 8.9–12.7)
POTASSIUM SERPL-SCNC: 4.1 MMOL/L (ref 3.5–5.3)
PROT SERPL-MCNC: 6.6 G/DL (ref 6.4–8.4)
RBC # BLD AUTO: 4.37 MILLION/UL (ref 3.81–5.12)
SODIUM SERPL-SCNC: 135 MMOL/L (ref 135–147)
WBC # BLD AUTO: 5.47 THOUSAND/UL (ref 4.31–10.16)

## 2024-04-02 PROCEDURE — 99285 EMERGENCY DEPT VISIT HI MDM: CPT | Performed by: EMERGENCY MEDICINE

## 2024-04-02 PROCEDURE — 96366 THER/PROPH/DIAG IV INF ADDON: CPT

## 2024-04-02 PROCEDURE — 84484 ASSAY OF TROPONIN QUANT: CPT | Performed by: EMERGENCY MEDICINE

## 2024-04-02 PROCEDURE — 96365 THER/PROPH/DIAG IV INF INIT: CPT

## 2024-04-02 PROCEDURE — 36415 COLL VENOUS BLD VENIPUNCTURE: CPT

## 2024-04-02 PROCEDURE — 74177 CT ABD & PELVIS W/CONTRAST: CPT

## 2024-04-02 PROCEDURE — 99285 EMERGENCY DEPT VISIT HI MDM: CPT

## 2024-04-02 PROCEDURE — 85025 COMPLETE CBC W/AUTO DIFF WBC: CPT | Performed by: EMERGENCY MEDICINE

## 2024-04-02 PROCEDURE — 80053 COMPREHEN METABOLIC PANEL: CPT | Performed by: EMERGENCY MEDICINE

## 2024-04-02 PROCEDURE — 93005 ELECTROCARDIOGRAM TRACING: CPT

## 2024-04-02 RX ORDER — LIDOCAINE 50 MG/G
3 PATCH TOPICAL ONCE
Status: DISCONTINUED | OUTPATIENT
Start: 2024-04-02 | End: 2024-04-02 | Stop reason: HOSPADM

## 2024-04-02 RX ADMIN — IOHEXOL 100 ML: 350 INJECTION, SOLUTION INTRAVENOUS at 14:40

## 2024-04-02 RX ADMIN — SODIUM CHLORIDE, SODIUM LACTATE, POTASSIUM CHLORIDE, AND CALCIUM CHLORIDE 500 ML: .6; .31; .03; .02 INJECTION, SOLUTION INTRAVENOUS at 15:17

## 2024-04-02 RX ADMIN — LIDOCAINE 3 PATCH: 700 PATCH TOPICAL at 15:17

## 2024-04-02 NOTE — ED PROVIDER NOTES
"History  Chief Complaint   Patient presents with    Shortness of Breath     SOB worsening this morning, normally wears o2 did not bring with, 3L NC at baseline, weakness, abd pain     The patient is an 83 year old female who presents to ED with her ex- for evaluation of abdominal pain, diarrhea. The pt states she has been having diarrhea, which she describes as \"soft mushy\" non-bloody brown stools multiple times per day for about 1 week. Diarrhea is accompanied by pain in the LLQ of the abdomen that started today after the diarrhea. She also reports that she is feeling very run down, tired, and weak. She states she has been getting more weak over the past 2 days and that she is not eating much the past couple of days. She reports she was seen in ED yesterday but not given any answer to what's going on. Denies eating any suspicious food, hematochezia, vomiting, fever, chills, dysuria, hematuria, chest pain, SOB, recent injury or fall.  PMHx partial hysterectomy, COPD on 3L NC O2, CABG in 1965, chronic back pain, DM, GERD. Pt declined pain medication at this time.         Prior to Admission Medications   Prescriptions Last Dose Informant Patient Reported? Taking?   Alcohol Swabs (Alcohol Prep) PADS  Self Yes No   Sig: USE TO TEST BLOOD SUGAR TWICE A DAY AND AS NEEDED   Ascorbic Acid (vitamin C) 1000 MG tablet  Self Yes No   Sig: Take 1,000 mg by mouth 2 (two) times a day   Patient not taking: Reported on 7/21/2023   Budeson-Glycopyrrol-Formoterol (Breztri Aerosphere) 160-9-4.8 MCG/ACT AERO  Self No No   Sig: Inhale 2 puffs  in the morning and 2 puffs before bedtime. Rinse mouth after use..   Budeson-Glycopyrrol-Formoterol 160-9-4.8 MCG/ACT AERO  Self Yes No   Sig: Inhale 2 puffs Rinse mouth after use.   Patient not taking: Reported on 8/24/2023   Saccharomyces boulardii (Digestive Probiotic) 250 MG CAPS  Self Yes No   Sig: Take 1 capsule by mouth 2 (two) times a day   Patient not taking: Reported on 2/28/2024 "   albuterol (PROVENTIL HFA,VENTOLIN HFA) 90 mcg/act inhaler  Self No No   Sig: Inhale 2 puffs every 6 (six) hours as needed for wheezing   albuterol (PROVENTIL HFA,VENTOLIN HFA) 90 mcg/act inhaler  Self Yes No   Sig: Inhale 2 puffs every 6 (six) hours as needed for wheezing   Patient not taking: Reported on 2/28/2024   amoxicillin (AMOXIL) 500 mg capsule  Self Yes No   Sig: Take 4 capsules by mouth as needed (prior to dental appointment)   Patient not taking: Reported on 2/28/2024   ascorbic acid (VITAMIN C) 1000 MG tablet  Self Yes No   Sig: Take 1,000 mg by mouth 2 (two) times a day   aspirin 81 mg chewable tablet  Self No No   Sig: Chew 1 tablet (81 mg total) daily   aspirin 81 mg chewable tablet  Self Yes No   Sig: Chew 81 mg daily   Patient not taking: Reported on 8/24/2023   atorvastatin (LIPITOR) 40 mg tablet  Self No No   Sig: Take 1 tablet (40 mg total) by mouth every evening   atorvastatin (LIPITOR) 40 mg tablet  Self Yes No   Sig: Take 40 mg by mouth daily   Patient not taking: Reported on 7/21/2023   brimonidine tartrate 0.2 % ophthalmic solution  Self No No   Sig: Administer 1 drop into the left eye 2 (two) times a day   brimonidine tartrate 0.2 % ophthalmic solution  Self Yes No   Sig: Administer 1 drop into the left eye 2 (two) times a day   Patient not taking: Reported on 7/21/2023   dexamethasone (DECADRON) 4 mg/mL  Self Yes No   Sig: Inject 1 mL by intramuscular route.   Patient not taking: Reported on 2/28/2024   diltiazem (CARDIZEM CD) 180 mg 24 hr capsule  Self Yes No   Sig: Take 180 mg by mouth daily   Patient not taking: Reported on 7/21/2023   diltiazem (CARDIZEM CD) 180 mg 24 hr capsule  Self Yes No   Sig: Take 180 mg by mouth daily   diltiazem (CARDIZEM CD) 240 mg 24 hr capsule  Self Yes No   Sig: Take 1 capsule by mouth daily   Patient not taking: Reported on 2/28/2024   diltiazem (CARDIZEM CD) 240 mg 24 hr capsule  Self Yes No   Sig: Take 240 mg by mouth daily   Patient not taking:  Reported on 2024   ferrous sulfate 325 (65 FE) MG EC tablet  Self Yes No   Sig: Take 1 tablet by mouth daily with breakfast   Patient not taking: Reported on 2024   gabapentin (NEURONTIN) 300 mg capsule  Self Yes No   Sig: Take 300 mg by mouth 4 (four) times a day   gabapentin (NEURONTIN) 300 mg capsule  Self Yes No   Sig: Take 300 mg by mouth 4 (four) times a day   Patient not taking: Reported on 2023   lidocaine (XYLOCAINE) 1 %  Self Yes No   Sig: Take 0.5 mL by injection route.   Patient not taking: Reported on 2024   losartan (COZAAR) 25 mg tablet  Self Yes No   Sig: Take 50 mg by mouth daily   losartan (COZAAR) 50 mg tablet  Self Yes No   Sig: TAKE 1 TABLET BY MOUTH EVERY DAY   Patient not taking: Reported on 2023   losartan (COZAAR) 50 mg tablet  Self Yes No   Sig: Take 50 mg by mouth daily   Patient not taking: Reported on 2023   metFORMIN (GLUCOPHAGE) 1000 MG tablet  Self No No   Si mg QAM and 500 mg QPM   metFORMIN (GLUCOPHAGE) 500 mg tablet  Self Yes No   Sig: TAKE 2 TABS BY MOUTH IN IN THE MORNING AND 1 TAB BY MOUTH IN IN THE EVENING   metFORMIN (GLUCOPHAGE) 500 mg tablet  Self Yes No   Sig: Take 1,000 mg by mouth daily with breakfast   Patient not taking: Reported on 2023   metFORMIN (GLUCOPHAGE) 500 mg tablet  Self Yes No   Sig: Take 500 mg by mouth every evening   Patient not taking: Reported on 2023   methocarbamol (ROBAXIN) 500 mg tablet  Self Yes No   methocarbamol (ROBAXIN) 500 mg tablet  Self Yes No   Sig: Take 500 mg by mouth 4 (four) times a day   Patient not taking: Reported on 2023   methylPREDNISolone 4 MG tablet therapy pack  Self No No   Sig: Use as directed on package   Patient not taking: Reported on 2023   morphine (MS CONTIN) 15 mg 12 hr tablet  Self Yes No   Patient not taking: Reported on 2024   morphine (MSIR) 15 mg tablet  Self Yes No   Sig: Take 15 mg by mouth 2 (two) times a day Every 12 hrs.   ondansetron (ZOFRAN) 4 mg  tablet  Self No No   Sig: Take 1 tablet (4 mg total) by mouth every 8 (eight) hours as needed for nausea or vomiting for up to 10 doses   Patient not taking: Reported on 2024   oxyCODONE (OxyCONTIN) 10 mg 12 hr tablet  Self Yes No   Sig: Take 13.5 mg by mouth every 12 (twelve) hours   Patient not taking: Reported on 2023   oxyCODONE ER (Xtampza ER) 13.5 MG C12A  Self No No   Sig: Take 13.5 mg by mouth 2 (two) times a day Max Daily Amount: 27 mg   Patient not taking: Reported on 2023   oxygen gas  Self Yes No   Sig: Inhale 3 L/min continuous as needed   oxymetazoline ([employer] Nasal Spray) 0.05 % nasal spray  Self Yes No   Sig: INSTILL 2 SPRAYS INTO EACH NOSTRIL EVERY 12 HOURS AS NEEDED FOR CONGESTION   Patient not taking: Reported on 2024   pantoprazole (PROTONIX) 40 mg tablet  Self Yes No   Sig: Take 40 mg by mouth every 12 (twelve) hours   pantoprazole (PROTONIX) 40 mg tablet  Self Yes No   Sig: Take 40 mg by mouth daily   Patient not taking: Reported on 2024   rOPINIRole (REQUIP) 0.5 mg tablet  Self No No   Si-3 tablets PO at HS   rOPINIRole (REQUIP) 0.5 mg tablet  Self Yes No   Sig: Take 0.5 mg by mouth daily at bedtime Take 3 tablets PO before bed   Patient not taking: Reported on 2023   sitaGLIPtin (JANUVIA) 100 mg tablet  Self No No   Sig: Take 1 tablet (100 mg total) by mouth daily   Patient not taking: Reported on 2023   sitaGLIPtin (JANUVIA) 100 mg tablet  Self Yes No   Sig: Take 100 mg by mouth daily   Patient not taking: Reported on 2023   sulfamethoxazole-trimethoprim (BACTRIM DS) 800-160 mg per tablet  Self Yes No   Sig: Take 1 tablet by mouth every 12 (twelve) hours   Patient not taking: Reported on 2024   triamcinolone 40 MG/ML SUSP  Self Yes No   Sig: Take 1 mL by injection route.   Patient not taking: Reported on 2024      Facility-Administered Medications: None       Past Medical History:   Diagnosis Date    Arthritis     Cardiac disease     COPD  (chronic obstructive pulmonary disease) (HCC)     COVID-19     was in hospital for sx and was dx with covid    Diabetes mellitus (HCC)     GERD (gastroesophageal reflux disease)     Hiatal hernia     Hypertension     PUD (peptic ulcer disease)     Residual ASD (atrial septal defect) following repair        Past Surgical History:   Procedure Laterality Date    ABDOMINAL ADHESION SURGERY N/A 2022    Procedure: LYSIS ADHESIONS;  Surgeon: Petty Johnson MD;  Location: [facility];  Service: Thoracic    ASD REPAIR      BACK SURGERY      x3    CARDIAC SURGERY      Atrial septic defect    ESOPHAGOGASTRODUODENOSCOPY N/A 2022    Procedure: ESOPHAGOGASTRODUODENOSCOPY (EGD);  Surgeon: Petty Johnson MD;  Location: [facility];  Service: Thoracic    JOINT REPLACEMENT      bilateral knee surgery    JOINT REPLACEMENT      KNEE SURGERY      PARAESOPHAGEAL HERNIA REPAIR N/A 2022    Procedure: REPAIR HERNIA PARAESOPHAGEAL LAPAROSCOPIC W ROBOTICS, gastropexy, mesh placement;  Surgeon: Petty Johnson MD;  Location: [facility];  Service: Thoracic    NH EXCISION GANGLION WRIST DORSAL/VOLAR PRIMARY Left 2023    Procedure: EXCISION GANGLION CYST;  Surgeon: Jaja Pantoja MD;  Location: [facility];  Service: Orthopedics    SHOULDER SURGERY         Family History   Problem Relation Age of Onset    Cancer Mother     Asthma Father      I have reviewed and agree with the history as documented.    E-Cigarette/Vaping    E-Cigarette Use Never User      E-Cigarette/Vaping Substances    Nicotine No     THC No     CBD No     Flavoring No     Other No     Unknown No      Social History     Tobacco Use    Smoking status: Former     Current packs/day: 0.00     Average packs/day: 1 pack/day for 64.1 years (64.1 ttl pk-yrs)     Types: Cigarettes     Start date:      Quit date: 2020     Years since quittin.1    Smokeless tobacco: Never    Tobacco comments:     stopped smoking 2 days ago   Vaping  Use    Vaping status: Never Used   Substance Use Topics    Alcohol use: Yes     Alcohol/week: 1.0 standard drink of alcohol     Types: 1 Glasses of wine per week    Drug use: Never        Review of Systems   Constitutional:  Positive for appetite change and fatigue. Negative for chills, diaphoresis and fever.   HENT:  Negative for congestion and sore throat.    Respiratory:  Negative for cough and shortness of breath.    Cardiovascular:  Negative for chest pain, palpitations and leg swelling.   Gastrointestinal:  Positive for abdominal pain and diarrhea. Negative for nausea and vomiting.   Genitourinary:  Negative for difficulty urinating and dysuria.   Musculoskeletal:  Negative for arthralgias, back pain and myalgias.   Skin:  Negative for rash and wound.   Neurological:  Positive for weakness. Negative for dizziness, facial asymmetry, light-headedness and numbness.       Physical Exam  ED Triage Vitals   Temperature Pulse Respirations Blood Pressure SpO2   04/02/24 1243 04/02/24 1240 04/02/24 1240 04/02/24 1240 04/02/24 1240   98.2 °F (36.8 °C) 88 (!) 28 145/72 95 %      Temp Source Heart Rate Source Patient Position - Orthostatic VS BP Location FiO2 (%)   04/02/24 1243 04/02/24 1240 04/02/24 1240 04/02/24 1240 --   Oral Monitor Sitting Right arm       Pain Score       04/02/24 1240       5             Orthostatic Vital Signs  Vitals:    04/02/24 1243 04/02/24 1330 04/02/24 1515 04/02/24 1600   BP: 145/72 140/67 164/72 151/72   Pulse: 80 76 75 69   Patient Position - Orthostatic VS: Sitting  Lying        Physical Exam  Vitals and nursing note reviewed.   Constitutional:       Appearance: Normal appearance.   HENT:      Head: Normocephalic and atraumatic.   Cardiovascular:      Rate and Rhythm: Normal rate and regular rhythm.      Pulses: Normal pulses.      Heart sounds: Normal heart sounds.   Pulmonary:      Effort: Pulmonary effort is normal. No respiratory distress.      Breath sounds: Normal breath sounds. No  wheezing or rales.   Chest:      Chest wall: No tenderness.   Abdominal:      General: Abdomen is flat.      Palpations: Abdomen is soft.      Tenderness: There is abdominal tenderness in the left lower quadrant. There is no right CVA tenderness, left CVA tenderness, guarding or rebound.      Hernia: No hernia is present.   Musculoskeletal:         General: No swelling, tenderness, deformity or signs of injury. Normal range of motion.      Right lower leg: No edema.      Left lower leg: No edema.   Skin:     General: Skin is warm and dry.   Neurological:      General: No focal deficit present.      Mental Status: She is alert and oriented to person, place, and time.   Psychiatric:         Mood and Affect: Mood normal.         Behavior: Behavior normal.         Thought Content: Thought content normal.         Judgment: Judgment normal.         ED Medications  Medications   lidocaine (LIDODERM) 5 % patch 3 patch (3 patches Topical Medication Applied 4/2/24 1517)   lactated ringers bolus 500 mL (500 mL Intravenous New Bag 4/2/24 1517)   iohexol (OMNIPAQUE) 350 MG/ML injection (MULTI-DOSE) 100 mL (100 mL Intravenous Given 4/2/24 1440)       Diagnostic Studies  Results Reviewed       Procedure Component Value Units Date/Time    HS Troponin I 2hr [[id number]]  (Normal) Collected: 04/02/24 1547    Lab Status: Final result Specimen: Blood from Arm, Right Updated: 04/02/24 1614     hs TnI 2hr 9 ng/L      Delta 2hr hsTnI 0 ng/L     HS Troponin 0hr (reflex protocol) [[id number]]  (Normal) Collected: 04/02/24 1253    Lab Status: Final result Specimen: Blood from Arm, Right Updated: 04/02/24 1337     hs TnI 0hr 9 ng/L     Comprehensive metabolic panel [[id number]]  (Abnormal) Collected: 04/02/24 1253    Lab Status: Final result Specimen: Blood from Arm, Right Updated: 04/02/24 1333     Sodium 135 mmol/L      Potassium 4.1 mmol/L      Chloride 98 mmol/L      CO2 31 mmol/L      ANION GAP 6 mmol/L      BUN 16 mg/dL      Creatinine  0.90 mg/dL      Glucose 220 mg/dL      Calcium 9.4 mg/dL      AST 10 U/L      ALT 13 U/L      Alkaline Phosphatase 79 U/L      Total Protein 6.6 g/dL      Albumin 4.0 g/dL      Total Bilirubin 0.35 mg/dL      eGFR 59 ml/min/1.73sq m     Narrative:      National Kid[employer] Disease Foundation guidelines for Chronic Kidney Disease (CKD):     Stage 1 with normal or high GFR (GFR > 90 mL/min/1.73 square meters)    Stage 2 Mild CKD (GFR = 60-89 mL/min/1.73 square meters)    Stage 3A Moderate CKD (GFR = 45-59 mL/min/1.73 square meters)    Stage 3B Moderate CKD (GFR = 30-44 mL/min/1.73 square meters)    Stage 4 Severe CKD (GFR = 15-29 mL/min/1.73 square meters)    Stage 5 End Stage CKD (GFR <15 mL/min/1.73 square meters)  Note: GFR calculation is accurate only with a steady state creatinine    CBC and differential [[id number]]  (Abnormal) Collected: 04/02/24 1253    Lab Status: Final result Specimen: Blood from Arm, Right Updated: 04/02/24 1308     WBC 5.47 Thousand/uL      RBC 4.37 Million/uL      Hemoglobin 10.5 g/dL      Hematocrit 35.9 %      MCV 82 fL      MCH 24.0 pg      MCHC 29.2 g/dL      RDW 18.8 %      MPV 8.9 fL      Platelets 234 Thousands/uL      nRBC 0 /100 WBCs      Neutrophils Relative 62 %      Immature Grans % 1 %      Lymphocytes Relative 21 %      Monocytes Relative 13 %      Eosinophils Relative 2 %      Basophils Relative 1 %      Neutrophils Absolute 3.45 Thousands/µL      Absolute Immature Grans 0.04 Thousand/uL      Absolute Lymphocytes 1.16 Thousands/µL      Absolute Monocytes 0.69 Thousand/µL      Eosinophils Absolute 0.08 Thousand/µL      Basophils Absolute 0.05 Thousands/µL                    CT abdomen pelvis with contrast   Final Result by Adriano Hernandez MD (04/02 1633)      Probable cystitis.      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 Follow-up with pancreatic MRI.      The study was marked in EPIC for immediate notification.         Workstation performed: [id number]                Procedures  ECG 12 Lead Documentation Only    Date/Time: 4/2/2024 12:42 PM    Performed by: Sari Garcia MD  Authorized by: Sari Garcia MD    Patient location:  ED  Previous ECG:     Previous ECG:  Compared to current    Comparison ECG info:  12/27/23  Interpretation:     Interpretation: abnormal    Rate:     ECG rate:  77    ECG rate assessment: normal    Rhythm:     Rhythm: sinus rhythm    Ectopy:     Ectopy: PAC    QRS:     QRS intervals:  Wide  Conduction:     Conduction: abnormal      Abnormal conduction: complete RBBB    ST segments:     ST segments:  Normal  T waves:     T waves: normal          ED Course  ED Course as of 04/02/24 1659   Tue Apr 02, 2024   1522 CBC and differential(!)  Anemia stable from prior   1523 Comprehensive metabolic panel(!)  Hyperglycemia consistent with DM history. Electrolytes WNL.    1616 hs TnI 2hr: 9   1616 Delta 2hr hsTnI: 0   1636 CT abdomen pelvis with contrast  IMPRESSION:  Probable cystitis.     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 Follow-up with pancreatic MRI.                                       Medical Decision Making  Ddx: diverticulitis, colitis, partial bowel obstruction    Pt seen in ED yesterday with negative work up for diarrhea.  Pain developed today. Pt declined pain meds. Lidocaine patch applied at site on abdomen.   Labs and troponin ordered by first nurse. Troponin 2 hour unchanged from initial. No chest pain.   CBC, CMP stable      Amount and/or Complexity of Data Reviewed  Independent Historian: friend  Labs: ordered. Decision-making details documented in ED Course.  Radiology: ordered. Decision-making details documented in ED Course.    Risk  Prescription drug management.          Disposition  Final diagnoses:   Diarrhea   Abdominal pain     Time reflects when diagnosis was documented in both MDM as applicable and the Disposition within this note       Time User Action Codes Description Comment    4/2/2024  4:56 PM Sari Garcia  Carmen Add [R19.7] Diarrhea     4/2/2024  4:58 PM Sari Garcia Add [R10.9] Abdominal pain           ED Disposition       ED Disposition   Discharge    Condition   Stable    Date/Time   Tue Apr 2, 2024  4:56 PM    Comment   Gloria Patel discharge to home/self care.                   Follow-up Information       Follow up With Specialties Details Why Contact Info    Christine Charles, DO Family Medicine  Keep your previously scheduled appointment tomorrow with your primary doctor [address] [place] [address].  [address]  [place] [address] [phone]              Patient's Medications   Discharge Prescriptions    No medications on file     No discharge procedures on file.    PDMP Review         Value Time User    PDMP Reviewed  Yes 7/21/2023  1:27 PM Ltey De León PA-C             ED Provider  Attending physically available and evaluated Gloria Patel. I managed the patient along with the ED Attending.    Electronically Signed by           Sari Garcia MD  04/02/24 9879

## 2024-04-03 LAB
ATRIAL RATE: 77 BPM
P AXIS: 118 DEGREES
PR INTERVAL: 136 MS
QRS AXIS: 265 DEGREES
QRSD INTERVAL: 132 MS
QT INTERVAL: 390 MS
QTC INTERVAL: 441 MS
T WAVE AXIS: 27 DEGREES
VENTRICULAR RATE: 77 BPM

## 2024-04-03 PROCEDURE — 93010 ELECTROCARDIOGRAM REPORT: CPT | Performed by: INTERNAL MEDICINE

## 2024-04-08 NOTE — ED ATTENDING ATTESTATION
4/2/2024  IThang MD, saw and evaluated the patient. I have discussed the patient with the resident/non-physician practitioner and agree with the resident's/non-physician practitioner's findings, Plan of Care, and MDM as documented in the resident's/non-physician practitioner's note, except where noted. All available labs and Radiology studies were reviewed.  I was present for key portions of any procedure(s) performed by the resident/non-physician practitioner and I was immediately available to provide assistance.       At this point I agree with the current assessment done in the Emergency Department.  I have conducted an independent evaluation of this patient a history and physical is as follows:see h and p above     ED Course  ED Course as of 04/08/24 1442   Tue Apr 02, 2024   1439 Er md note- pt seen and thoroughly evaluated by er md - case d/w er resident - all studies reviewed by er md- er visit -labs -from yesterday reviewed by er md- 83 yr female with approx 4 days of loose brown stool- no fevers- no n/v- came to er yesterday with neg workup- today with llq pain -- not sharp /flank/colicy pain --  pt denies any sob/ cp/fever/ new cough - pt has copd and is on home 02- 3 liters nc chronically - no gu/gyn comps-  avss- pulse ox 98 % on 3 liters o2 nc -- rrr s1/s2/-mild decreased bs-b / soft abd- pos mild llq tenderness- no peritoneal signs- no ascites- no cva tenderness- no pulsatile abd mass/bruit/ tenderness- no bilateral inguinal/femoral hernia's- equal bilateral radial/dp pulses- trace ble pretibial edema- nt- no asym/ erythema- normal non focal neuro exam    1445 Er md note- pt offered pain medication- refuses at this time   1449 Er md note- 12 lead ecg reviewed and compared to previous 12/27/23- nsr rate of 77 with apc- rbbb- no acute chagnes- no ischemia/ injury    1612 Er md note- pt- re-eval- sound asleep- will not awake pt- ct scan report should be back soon    1654 Er md note- current  labs reviewed and compared by er md         Critical Care Time  Procedures

## 2024-04-10 ENCOUNTER — HOSPITAL ENCOUNTER (EMERGENCY)
Facility: HOSPITAL | Age: 84
Discharge: HOME | End: 2024-04-10
Attending: EMERGENCY MEDICINE
Payer: COMMERCIAL

## 2024-04-10 ENCOUNTER — APPOINTMENT (EMERGENCY)
Dept: RADIOLOGY | Facility: HOSPITAL | Age: 84
End: 2024-04-10
Payer: COMMERCIAL

## 2024-04-10 ENCOUNTER — APPOINTMENT (EMERGENCY)
Dept: CT IMAGING | Facility: HOSPITAL | Age: 84
End: 2024-04-10
Payer: COMMERCIAL

## 2024-04-10 VITALS
RESPIRATION RATE: 22 BRPM | TEMPERATURE: 97.8 F | SYSTOLIC BLOOD PRESSURE: 127 MMHG | OXYGEN SATURATION: 98 % | DIASTOLIC BLOOD PRESSURE: 60 MMHG | HEART RATE: 65 BPM

## 2024-04-10 DIAGNOSIS — R10.9 ABDOMINAL PAIN: ICD-10-CM

## 2024-04-10 DIAGNOSIS — K59.00 CONSTIPATION: Primary | ICD-10-CM

## 2024-04-10 LAB
ALBUMIN SERPL BCP-MCNC: 4.2 G/DL (ref 3.5–5)
ALP SERPL-CCNC: 70 U/L (ref 34–104)
ALT SERPL W P-5'-P-CCNC: 11 U/L (ref 7–52)
ANION GAP SERPL CALCULATED.3IONS-SCNC: 7 MMOL/L (ref 4–13)
AST SERPL W P-5'-P-CCNC: 11 U/L (ref 13–39)
ATRIAL RATE: 75 BPM
BASOPHILS # BLD AUTO: 0.08 THOUSANDS/ÂΜL (ref 0–0.1)
BASOPHILS NFR BLD AUTO: 1 % (ref 0–1)
BILIRUB SERPL-MCNC: 0.53 MG/DL (ref 0.2–1)
BUN SERPL-MCNC: 12 MG/DL (ref 5–25)
CALCIUM SERPL-MCNC: 10.1 MG/DL (ref 8.4–10.2)
CHLORIDE SERPL-SCNC: 96 MMOL/L (ref 96–108)
CO2 SERPL-SCNC: 33 MMOL/L (ref 21–32)
CREAT SERPL-MCNC: 0.82 MG/DL (ref 0.6–1.3)
EOSINOPHIL # BLD AUTO: 0.05 THOUSAND/ÂΜL (ref 0–0.61)
EOSINOPHIL NFR BLD AUTO: 1 % (ref 0–6)
ERYTHROCYTE [DISTWIDTH] IN BLOOD BY AUTOMATED COUNT: 17.5 % (ref 11.6–15.1)
GFR SERPL CREATININE-BSD FRML MDRD: 66 ML/MIN/1.73SQ M
GLUCOSE SERPL-MCNC: 136 MG/DL (ref 65–140)
HCT VFR BLD AUTO: 36.2 % (ref 34.8–46.1)
HGB BLD-MCNC: 10.8 G/DL (ref 11.5–15.4)
IMM GRANULOCYTES # BLD AUTO: 0.05 THOUSAND/UL (ref 0–0.2)
IMM GRANULOCYTES NFR BLD AUTO: 1 % (ref 0–2)
LACTATE SERPL-SCNC: 1.3 MMOL/L (ref 0.5–2)
LIPASE SERPL-CCNC: 6 U/L (ref 11–82)
LYMPHOCYTES # BLD AUTO: 1.15 THOUSANDS/ÂΜL (ref 0.6–4.47)
LYMPHOCYTES NFR BLD AUTO: 18 % (ref 14–44)
MCH RBC QN AUTO: 24.5 PG (ref 26.8–34.3)
MCHC RBC AUTO-ENTMCNC: 29.8 G/DL (ref 31.4–37.4)
MCV RBC AUTO: 82 FL (ref 82–98)
MONOCYTES # BLD AUTO: 0.84 THOUSAND/ÂΜL (ref 0.17–1.22)
MONOCYTES NFR BLD AUTO: 13 % (ref 4–12)
NEUTROPHILS # BLD AUTO: 4.22 THOUSANDS/ÂΜL (ref 1.85–7.62)
NEUTS SEG NFR BLD AUTO: 66 % (ref 43–75)
NRBC BLD AUTO-RTO: 0 /100 WBCS
P AXIS: 90 DEGREES
PLATELET # BLD AUTO: 273 THOUSANDS/UL (ref 149–390)
PMV BLD AUTO: 8.7 FL (ref 8.9–12.7)
POTASSIUM SERPL-SCNC: 4 MMOL/L (ref 3.5–5.3)
PR INTERVAL: 136 MS
PROT SERPL-MCNC: 6.9 G/DL (ref 6.4–8.4)
QRS AXIS: 259 DEGREES
QRSD INTERVAL: 134 MS
QT INTERVAL: 406 MS
QTC INTERVAL: 453 MS
RBC # BLD AUTO: 4.4 MILLION/UL (ref 3.81–5.12)
SODIUM SERPL-SCNC: 136 MMOL/L (ref 135–147)
T WAVE AXIS: 39 DEGREES
VENTRICULAR RATE: 75 BPM
WBC # BLD AUTO: 6.39 THOUSAND/UL (ref 4.31–10.16)

## 2024-04-10 PROCEDURE — 83690 ASSAY OF LIPASE: CPT | Performed by: EMERGENCY MEDICINE

## 2024-04-10 PROCEDURE — 83605 ASSAY OF LACTIC ACID: CPT

## 2024-04-10 PROCEDURE — 99285 EMERGENCY DEPT VISIT HI MDM: CPT

## 2024-04-10 PROCEDURE — 80053 COMPREHEN METABOLIC PANEL: CPT | Performed by: EMERGENCY MEDICINE

## 2024-04-10 PROCEDURE — 71045 X-RAY EXAM CHEST 1 VIEW: CPT

## 2024-04-10 PROCEDURE — 74177 CT ABD & PELVIS W/CONTRAST: CPT

## 2024-04-10 PROCEDURE — 85025 COMPLETE CBC W/AUTO DIFF WBC: CPT | Performed by: EMERGENCY MEDICINE

## 2024-04-10 PROCEDURE — 99285 EMERGENCY DEPT VISIT HI MDM: CPT | Performed by: EMERGENCY MEDICINE

## 2024-04-10 PROCEDURE — 36415 COLL VENOUS BLD VENIPUNCTURE: CPT

## 2024-04-10 PROCEDURE — 96365 THER/PROPH/DIAG IV INF INIT: CPT

## 2024-04-10 PROCEDURE — 93010 ELECTROCARDIOGRAM REPORT: CPT | Performed by: INTERNAL MEDICINE

## 2024-04-10 PROCEDURE — 93005 ELECTROCARDIOGRAM TRACING: CPT

## 2024-04-10 RX ORDER — POLYETHYLENE GLYCOL 3350 17 G/17G
17 POWDER, FOR SOLUTION ORAL DAILY
Qty: 510 G | Refills: 0 | Status: SHIPPED | OUTPATIENT
Start: 2024-04-10 | End: 2024-05-10

## 2024-04-10 RX ORDER — ACETAMINOPHEN 10 MG/ML
1000 INJECTION, SOLUTION INTRAVENOUS ONCE
Status: COMPLETED | OUTPATIENT
Start: 2024-04-10 | End: 2024-04-10

## 2024-04-10 RX ADMIN — IOHEXOL 70 ML: 350 INJECTION, SOLUTION INTRAVENOUS at 17:15

## 2024-04-10 RX ADMIN — ACETAMINOPHEN 1000 MG: 10 INJECTION INTRAVENOUS at 15:30

## 2024-04-10 NOTE — ED ATTENDING ATTESTATION
4/10/2024  I, Christine Osman MD, saw and evaluated the patient. I have discussed the patient with the resident/non-physician practitioner and agree with the resident's/non-physician practitioner's findings, Plan of Care, and MDM as documented in the resident's/non-physician practitioner's note, except where noted. All available labs and Radiology studies were reviewed.  I was present for key portions of any procedure(s) performed by the resident/non-physician practitioner and I was immediately available to provide assistance.       At this point I agree with the current assessment done in the Emergency Department.  I have conducted an independent evaluation of this patient a history and physical is as follows:    83-year-old female presenting for evaluation of 2 weeks of soft brown stools with 1 week of left-sided abdominal pain.  Patient was evaluated for her soft stools in the emergency department 2 weeks ago with unremarkable workup.  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  She was diagnosed with a UTI by her primary care physician yesterday and started on antibiotics.  Denies any dysuria or frequency.  She comes in today for worsening left-sided abdominal pain.  Pain is in the left upper quadrant, occurs with certain movements.  Described as sharp.  No nausea or vomiting.  Pain does not radiate to the chest.  Patient has COPD and wears 3 L of oxygen at baseline.  Denies shortness of breath above her baseline.  She states that the tachypnea that was present in triage is her baseline.  Denies pleuritic nature of her pain.  No fevers or chills.  She has had lack of appetite but denies vomiting.    Physical Exam  Constitutional:       General: She is not in acute distress.     Appearance: She is well-developed. She is not  diaphoretic.   HENT:      Head: Normocephalic and atraumatic.      Right Ear: External ear normal.      Left Ear: External ear normal.      Nose: Nose normal.   Eyes:      Conjunctiva/sclera: Conjunctivae normal.   Cardiovascular:      Rate and Rhythm: Normal rate and regular rhythm.      Pulses: Normal pulses.      Heart sounds: Normal heart sounds. No murmur heard.     No friction rub. No gallop.   Pulmonary:      Effort: Pulmonary effort is normal. No respiratory distress.      Breath sounds: Normal breath sounds. No wheezing or rales.   Abdominal:      General: Bowel sounds are normal. There is no distension.      Palpations: Abdomen is soft.      Tenderness: There is abdominal tenderness (TTP to the LUQ, also reproduced with movement. No peritonitis or guarding). There is no right CVA tenderness, left CVA tenderness or guarding.   Musculoskeletal:         General: No deformity. Normal range of motion.      Cervical back: Normal range of motion and neck supple.      Comments: No calf swelling or tenderness   Skin:     General: Skin is warm and dry.   Neurological:      Mental Status: She is alert and oriented to person, place, and time.      Motor: No abnormal muscle tone.   Psychiatric:         Mood and Affect: Mood normal.         ED Course  ED Course as of 04/10/24 2107   Wed Apr 10, 2024   1636 Nontoxic.  Afebrile and hemodynamically stable.  No leukocytosis on CBC and CMP is unremarkable.  Patient has tenderness to palpation to the left upper quadrant on exam, though pain is most severe with certain movements.  No overlying skin changes.  Lactic acid is within normal limits, doubt mesenteric ischemia.  Last CT scan obtained 1 week ago was performed when the patient had less than 24 hours of symptoms.  Will repeat CT scan here in the emergency department to evaluate for evolving surgical pathology in this patient with history of multiple prior abdominal surgeries.  If CT scan is normal, symptoms may be  musculoskeletal given occurrence of pain mostly with movement.  Patient does not have peritonitis or involuntary guarding on exam.   1638 Chest x-ray with no acute cardiopulmonary abnormalities.   1638 I personally interpreted the patient's EKG which reveals normal rate, normal sinus rhythm, extreme right axis deviation unchanged from prior, right bundle branch block unchanged from prior, no ST segment deviation, doubt cardiogenic cause of the patient's symptoms.  No significant change from recent EKG on April 2. 1835 CT scan of the abdomen pelvis without acute intra-abdominal abnormality to explain the patient's pain.  Her pain is very positional, worse with certain movements and with palpation of the abdominal wall, may be musculoskeletal in etiology but discussed with patient importance of follow-up with gastroenterology for further evaluation if her symptoms persist.  Patient is stable for discharge home with return precautions discussed.         Critical Care Time  Procedures

## 2024-04-10 NOTE — ED PROVIDER NOTES
"History  Chief Complaint   Patient presents with    Abdominal Pain     Pt reports left sided abdominal pain and SOB for the past week. Was seen twice at [facility] in the past week for the same issue. Today reports pain is getting worse and is constant. On 3L of oxygen chronically.      Gloria is an 83 yof with history of severe COPD, on 3L oxygen via [place] at baseline who presents with abdominal pain.  States that she had onset of \"leaking\", non-bloody diarrhea two weeks ago, then had onset of cramping left sided abdominal pain one week ago.  Was seen in the ED twice since onset, was told she had a pancreatic cyst but had \"normal\" labs and imaging, was discharged but continued to have symptoms.  Was diagnosed with a UTI by her PCP and started on abx yesterday. Today, had acute worsening of abdominal pain which is left sided, sharp, non-radiating, and is worse with movement. States diarrhea is at baseline, denies nausea or vomiting, has very poor appetite.  Also has chills.  Has chronic low back pain which is at baseline. Chronic dyspnea is at baseline(contrary to triage note). Denies fever, headache, weakness, neck pain or stiffness, chest pain, cough, vomiting, flank pain, dysuria, hematuria, rash, peripheral edema, or unilateral calf pain or swelling.        Prior to Admission Medications   Prescriptions Last Dose Informant Patient Reported? Taking?   Alcohol Swabs (Alcohol Prep) PADS  Self Yes No   Sig: USE TO TEST BLOOD SUGAR TWICE A DAY AND AS NEEDED   Ascorbic Acid (vitamin C) 1000 MG tablet  Self Yes No   Sig: Take 1,000 mg by mouth 2 (two) times a day   Patient not taking: Reported on 7/21/2023   Budeson-Glycopyrrol-Formoterol (Breztri Aerosphere) 160-9-4.8 MCG/ACT AERO  Self No No   Sig: Inhale 2 puffs  in the morning and 2 puffs before bedtime. Rinse mouth after use..   Budeson-Glycopyrrol-Formoterol 160-9-4.8 MCG/ACT AERO  Self Yes No   Sig: Inhale 2 puffs Rinse mouth after use.   Patient not taking: " Reported on 8/24/2023   Saccharomyces boulardii (Digestive Probiotic) 250 MG CAPS  Self Yes No   Sig: Take 1 capsule by mouth 2 (two) times a day   Patient not taking: Reported on 2/28/2024   albuterol (PROVENTIL HFA,VENTOLIN HFA) 90 mcg/act inhaler  Self No No   Sig: Inhale 2 puffs every 6 (six) hours as needed for wheezing   albuterol (PROVENTIL HFA,VENTOLIN HFA) 90 mcg/act inhaler  Self Yes No   Sig: Inhale 2 puffs every 6 (six) hours as needed for wheezing   Patient not taking: Reported on 2/28/2024   amoxicillin (AMOXIL) 500 mg capsule  Self Yes No   Sig: Take 4 capsules by mouth as needed (prior to dental appointment)   Patient not taking: Reported on 2/28/2024   ascorbic acid (VITAMIN C) 1000 MG tablet  Self Yes No   Sig: Take 1,000 mg by mouth 2 (two) times a day   aspirin 81 mg chewable tablet  Self No No   Sig: Chew 1 tablet (81 mg total) daily   aspirin 81 mg chewable tablet  Self Yes No   Sig: Chew 81 mg daily   Patient not taking: Reported on 8/24/2023   atorvastatin (LIPITOR) 40 mg tablet  Self No No   Sig: Take 1 tablet (40 mg total) by mouth every evening   atorvastatin (LIPITOR) 40 mg tablet  Self Yes No   Sig: Take 40 mg by mouth daily   Patient not taking: Reported on 7/21/2023   brimonidine tartrate 0.2 % ophthalmic solution  Self No No   Sig: Administer 1 drop into the left eye 2 (two) times a day   brimonidine tartrate 0.2 % ophthalmic solution  Self Yes No   Sig: Administer 1 drop into the left eye 2 (two) times a day   Patient not taking: Reported on 7/21/2023   dexamethasone (DECADRON) 4 mg/mL  Self Yes No   Sig: Inject 1 mL by intramuscular route.   Patient not taking: Reported on 2/28/2024   diltiazem (CARDIZEM CD) 180 mg 24 hr capsule  Self Yes No   Sig: Take 180 mg by mouth daily   Patient not taking: Reported on 7/21/2023   diltiazem (CARDIZEM CD) 180 mg 24 hr capsule  Self Yes No   Sig: Take 180 mg by mouth daily   diltiazem (CARDIZEM CD) 240 mg 24 hr capsule  Self Yes No   Sig: Take 1  capsule by mouth daily   Patient not taking: Reported on 2024   diltiazem (CARDIZEM CD) 240 mg 24 hr capsule  Self Yes No   Sig: Take 240 mg by mouth daily   Patient not taking: Reported on 2024   ferrous sulfate 325 (65 FE) MG EC tablet  Self Yes No   Sig: Take 1 tablet by mouth daily with breakfast   Patient not taking: Reported on 2024   gabapentin (NEURONTIN) 300 mg capsule  Self Yes No   Sig: Take 300 mg by mouth 4 (four) times a day   gabapentin (NEURONTIN) 300 mg capsule  Self Yes No   Sig: Take 300 mg by mouth 4 (four) times a day   Patient not taking: Reported on 2023   lidocaine (XYLOCAINE) 1 %  Self Yes No   Sig: Take 0.5 mL by injection route.   Patient not taking: Reported on 2024   losartan (COZAAR) 25 mg tablet  Self Yes No   Sig: Take 50 mg by mouth daily   losartan (COZAAR) 50 mg tablet  Self Yes No   Sig: TAKE 1 TABLET BY MOUTH EVERY DAY   Patient not taking: Reported on 2023   losartan (COZAAR) 50 mg tablet  Self Yes No   Sig: Take 50 mg by mouth daily   Patient not taking: Reported on 2023   metFORMIN (GLUCOPHAGE) 1000 MG tablet  Self No No   Si mg QAM and 500 mg QPM   metFORMIN (GLUCOPHAGE) 500 mg tablet  Self Yes No   Sig: TAKE 2 TABS BY MOUTH IN IN THE MORNING AND 1 TAB BY MOUTH IN IN THE EVENING   metFORMIN (GLUCOPHAGE) 500 mg tablet  Self Yes No   Sig: Take 1,000 mg by mouth daily with breakfast   Patient not taking: Reported on 2023   metFORMIN (GLUCOPHAGE) 500 mg tablet  Self Yes No   Sig: Take 500 mg by mouth every evening   Patient not taking: Reported on 2023   methocarbamol (ROBAXIN) 500 mg tablet  Self Yes No   methocarbamol (ROBAXIN) 500 mg tablet  Self Yes No   Sig: Take 500 mg by mouth 4 (four) times a day   Patient not taking: Reported on 2023   methylPREDNISolone 4 MG tablet therapy pack  Self No No   Sig: Use as directed on package   Patient not taking: Reported on 2023   morphine (MS CONTIN) 15 mg 12 hr tablet  Self  Yes No   Patient not taking: Reported on 2024   morphine (MSIR) 15 mg tablet  Self Yes No   Sig: Take 15 mg by mouth 2 (two) times a day Every 12 hrs.   ondansetron (ZOFRAN) 4 mg tablet  Self No No   Sig: Take 1 tablet (4 mg total) by mouth every 8 (eight) hours as needed for nausea or vomiting for up to 10 doses   Patient not taking: Reported on 2024   oxyCODONE (OxyCONTIN) 10 mg 12 hr tablet  Self Yes No   Sig: Take 13.5 mg by mouth every 12 (twelve) hours   Patient not taking: Reported on 2023   oxyCODONE ER (Xtampza ER) 13.5 MG C12A  Self No No   Sig: Take 13.5 mg by mouth 2 (two) times a day Max Daily Amount: 27 mg   Patient not taking: Reported on 2023   oxygen gas  Self Yes No   Sig: Inhale 3 L/min continuous as needed   oxymetazoline ([employer] Nasal Spray) 0.05 % nasal spray  Self Yes No   Sig: INSTILL 2 SPRAYS INTO EACH NOSTRIL EVERY 12 HOURS AS NEEDED FOR CONGESTION   Patient not taking: Reported on 2024   pantoprazole (PROTONIX) 40 mg tablet  Self Yes No   Sig: Take 40 mg by mouth every 12 (twelve) hours   pantoprazole (PROTONIX) 40 mg tablet  Self Yes No   Sig: Take 40 mg by mouth daily   Patient not taking: Reported on 2024   rOPINIRole (REQUIP) 0.5 mg tablet  Self No No   Si-3 tablets PO at HS   rOPINIRole (REQUIP) 0.5 mg tablet  Self Yes No   Sig: Take 0.5 mg by mouth daily at bedtime Take 3 tablets PO before bed   Patient not taking: Reported on 2023   sitaGLIPtin (JANUVIA) 100 mg tablet  Self No No   Sig: Take 1 tablet (100 mg total) by mouth daily   Patient not taking: Reported on 2023   sitaGLIPtin (JANUVIA) 100 mg tablet  Self Yes No   Sig: Take 100 mg by mouth daily   Patient not taking: Reported on 2023   sulfamethoxazole-trimethoprim (BACTRIM DS) 800-160 mg per tablet  Self Yes No   Sig: Take 1 tablet by mouth every 12 (twelve) hours   Patient not taking: Reported on 2024   triamcinolone 40 MG/ML SUSP  Self Yes No   Sig: Take 1 mL by injection  route.   Patient not taking: Reported on 2/28/2024      Facility-Administered Medications: None       Past Medical History:   Diagnosis Date    Arthritis     Cardiac disease     COPD (chronic obstructive pulmonary disease) (HCC)     COVID-19 2021    was in hospital for sx and was dx with covid    Diabetes mellitus (HCC)     GERD (gastroesophageal reflux disease)     Hiatal hernia     Hypertension     PUD (peptic ulcer disease)     Residual ASD (atrial septal defect) following repair        Past Surgical History:   Procedure Laterality Date    ABDOMINAL ADHESION SURGERY N/A 09/26/2022    Procedure: LYSIS ADHESIONS;  Surgeon: Petty Johnson MD;  Location: [facility];  Service: Thoracic    ASD REPAIR      BACK SURGERY      x3    CARDIAC SURGERY      Atrial septic defect    ESOPHAGOGASTRODUODENOSCOPY N/A 09/26/2022    Procedure: ESOPHAGOGASTRODUODENOSCOPY (EGD);  Surgeon: Petty Johnson MD;  Location: [facility];  Service: Thoracic    JOINT REPLACEMENT      bilateral knee surgery    JOINT REPLACEMENT      KNEE SURGERY      PARAESOPHAGEAL HERNIA REPAIR N/A 09/26/2022    Procedure: REPAIR HERNIA PARAESOPHAGEAL LAPAROSCOPIC W ROBOTICS, gastropexy, mesh placement;  Surgeon: Petty Johnson MD;  Location: [facility];  Service: Thoracic    CA EXCISION GANGLION WRIST DORSAL/VOLAR PRIMARY Left 7/21/2023    Procedure: EXCISION GANGLION CYST;  Surgeon: Jaja Pantoja MD;  Location: [facility];  Service: Orthopedics    SHOULDER SURGERY         Family History   Problem Relation Age of Onset    Cancer Mother     Asthma Father      I have reviewed and agree with the history as documented.    E-Cigarette/Vaping    E-Cigarette Use Never User      E-Cigarette/Vaping Substances    Nicotine No     THC No     CBD No     Flavoring No     Other No     Unknown No      Social History     Tobacco Use    Smoking status: Former     Current packs/day: 0.00     Average packs/day: 1 pack/day for 64.1 years (64.1 ttl pk-yrs)      Types: Cigarettes     Start date:      Quit date: 2020     Years since quittin.2    Smokeless tobacco: Never    Tobacco comments:     stopped smoking 2 days ago   Vaping Use    Vaping status: Never Used   Substance Use Topics    Alcohol use: Yes     Alcohol/week: 1.0 standard drink of alcohol     Types: 1 Glasses of wine per week    Drug use: Never        Review of Systems   Constitutional:  Positive for appetite change and chills. Negative for diaphoresis, fatigue, fever and unexpected weight change.   HENT: Negative.     Eyes: Negative.    Respiratory: Negative.  Negative for cough and shortness of breath.    Cardiovascular: Negative.  Negative for chest pain, palpitations and leg swelling.   Gastrointestinal:  Positive for abdominal pain and diarrhea. Negative for abdominal distention, blood in stool, constipation, nausea and vomiting.   Endocrine: Negative.    Genitourinary: Negative.  Negative for decreased urine volume, difficulty urinating, dysuria, flank pain and hematuria.   Musculoskeletal: Negative.  Negative for arthralgias, back pain, myalgias and neck pain.   Skin: Negative.  Negative for color change, pallor, rash and wound.   Allergic/Immunologic: Negative.    Neurological: Negative.  Negative for dizziness, weakness, light-headedness and headaches.   Hematological: Negative.  Does not bruise/bleed easily.   Psychiatric/Behavioral: Negative.     All other systems reviewed and are negative.      Physical Exam  ED Triage Vitals   Temperature Pulse Respirations Blood Pressure SpO2   04/10/24 1347 04/10/24 1347 04/10/24 1347 04/10/24 1347 04/10/24 1347   97.8 °F (36.6 °C) 81 (!) 32 137/72 97 %      Temp Source Heart Rate Source Patient Position - Orthostatic VS BP Location FiO2 (%)   04/10/24 1347 04/10/24 1601 04/10/24 1601 04/10/24 1601 --   Oral Monitor Lying Left arm       Pain Score       04/10/24 1351       7             Orthostatic Vital Signs  Vitals:    04/10/24 1347 04/10/24 1601    BP: 137/72 127/60   Pulse: 81 65   Patient Position - Orthostatic VS:  Lying       Physical Exam  Vitals and nursing note reviewed.   Constitutional:       General: She is not in acute distress.     Appearance: Normal appearance. She is not ill-appearing or diaphoretic.   HENT:      Head: Normocephalic and atraumatic.      Right Ear: External ear normal.      Left Ear: External ear normal.      Nose: Nose normal.      Mouth/Throat:      Mouth: Mucous membranes are moist.      Pharynx: Oropharynx is clear.   Eyes:      General: No scleral icterus.     Extraocular Movements: Extraocular movements intact.      Conjunctiva/sclera: Conjunctivae normal.   Cardiovascular:      Rate and Rhythm: Normal rate and regular rhythm.      Pulses: Normal pulses.      Heart sounds: Normal heart sounds. No murmur heard.     No friction rub. No gallop.   Pulmonary:      Effort: Pulmonary effort is normal. No respiratory distress.      Breath sounds: Normal breath sounds.   Abdominal:      General: Abdomen is flat. Bowel sounds are normal. There is no distension.      Palpations: Abdomen is soft.      Tenderness: There is abdominal tenderness in the left upper quadrant. There is no right CVA tenderness, left CVA tenderness, guarding or rebound.      Hernia: No hernia is present.      Comments: Yells with light touch of left upper quadrant.   Musculoskeletal:         General: Normal range of motion.      Cervical back: Normal range of motion and neck supple. No tenderness.      Right lower leg: No edema.      Left lower leg: No edema.   Lymphadenopathy:      Cervical: No cervical adenopathy.   Skin:     General: Skin is warm and dry.      Capillary Refill: Capillary refill takes less than 2 seconds.      Coloration: Skin is not pale.      Findings: No rash.   Neurological:      General: No focal deficit present.      Mental Status: She is alert.   Psychiatric:         Mood and Affect: Mood normal.         Behavior: Behavior normal.          ED Medications  Medications   acetaminophen (Ofirmev) injection 1,000 mg (0 mg Intravenous Stopped 4/10/24 1655)   iohexol (OMNIPAQUE) 350 MG/ML injection (MULTI-DOSE) 70 mL (70 mL Intravenous Given 4/10/24 1715)       Diagnostic Studies  Results Reviewed       Procedure Component Value Units Date/Time    Lactic acid, plasma (w/reflex if result > 2.0) [[id number]]  (Normal) Collected: 04/10/24 1530    Lab Status: Final result Specimen: Blood from Arm, Right Updated: 04/10/24 1553     LACTIC ACID 1.3 mmol/L     Narrative:      Result may be elevated if tourniquet was used during collection.    Lipase [[id number]]  (Abnormal) Collected: 04/10/24 1409    Lab Status: Final result Specimen: Blood from Hand, Right Updated: 04/10/24 1427     Lipase 6 u/L     Comprehensive metabolic panel [[id number]]  (Abnormal) Collected: 04/10/24 1409    Lab Status: Final result Specimen: Blood from Hand, Right Updated: 04/10/24 1427     Sodium 136 mmol/L      Potassium 4.0 mmol/L      Chloride 96 mmol/L      CO2 33 mmol/L      ANION GAP 7 mmol/L      BUN 12 mg/dL      Creatinine 0.82 mg/dL      Glucose 136 mg/dL      Calcium 10.1 mg/dL      AST 11 U/L      ALT 11 U/L      Alkaline Phosphatase 70 U/L      Total Protein 6.9 g/dL      Albumin 4.2 g/dL      Total Bilirubin 0.53 mg/dL      eGFR 66 ml/min/1.73sq m     Narrative:      National Kid[employer] Disease Foundation guidelines for Chronic Kidney Disease (CKD):     Stage 1 with normal or high GFR (GFR > 90 mL/min/1.73 square meters)    Stage 2 Mild CKD (GFR = 60-89 mL/min/1.73 square meters)    Stage 3A Moderate CKD (GFR = 45-59 mL/min/1.73 square meters)    Stage 3B Moderate CKD (GFR = 30-44 mL/min/1.73 square meters)    Stage 4 Severe CKD (GFR = 15-29 mL/min/1.73 square meters)    Stage 5 End Stage CKD (GFR <15 mL/min/1.73 square meters)  Note: GFR calculation is accurate only with a steady state creatinine    CBC and differential [[id number]]  (Abnormal) Collected: 04/10/24 1406     Lab Status: Final result Specimen: Blood from Hand, Right Updated: 04/10/24 1417     WBC 6.39 Thousand/uL      RBC 4.40 Million/uL      Hemoglobin 10.8 g/dL      Hematocrit 36.2 %      MCV 82 fL      MCH 24.5 pg      MCHC 29.8 g/dL      RDW 17.5 %      MPV 8.7 fL      Platelets 273 Thousands/uL      nRBC 0 /100 WBCs      Segmented % 66 %      Immature Grans % 1 %      Lymphocytes % 18 %      Monocytes % 13 %      Eosinophils Relative 1 %      Basophils Relative 1 %      Absolute Neutrophils 4.22 Thousands/µL      Absolute Immature Grans 0.05 Thousand/uL      Absolute Lymphocytes 1.15 Thousands/µL      Absolute Monocytes 0.84 Thousand/µL      Eosinophils Absolute 0.05 Thousand/µL      Basophils Absolute 0.08 Thousands/µL                    CT abdomen pelvis with contrast   Final Result by Shahid Rainey MD (04/10 1820)      No evidence of bowel obstruction, colitis or diverticulitis. Findings compatible with constipation.         Workstation performed: [id number]         XR chest 1 view portable   ED Interpretation by Annamarie Henry DO (04/10 1841)   No obvious acute cardiopulmonary findings as per my independent interpretation.      Final Result by Sunita Grubbs MD (04/11 0941)      No acute cardiopulmonary disease.      Resident: ROVERTO SHEIKH I, the attending radiologist, have reviewed the images and agree with the final report above.      Workstation performed: [id number]               Procedures  ECG 12 Lead Documentation Only    Date/Time: 4/10/2024 3:20 PM    Performed by: Annamarie Henry DO  Authorized by: Annamarie Henry DO    Patient location:  ED  Previous ECG:     Previous ECG:  Compared to current    Comparison ECG info:  4/2/24    Similarity:  No change  Interpretation:     Interpretation: non-specific    Rate:     ECG rate:  75    ECG rate assessment: normal    Rhythm:     Rhythm: sinus rhythm    Ectopy:     Ectopy: none    QRS:     QRS axis:  Right    QRS  intervals:  Normal  Conduction:     Conduction: abnormal      Abnormal conduction: complete RBBB    ST segments:     ST segments:  Normal  T waves:     T waves: normal          ED Course  ED Course as of 04/13/24 0943   Wed Apr 10, 2024   1555 LACTIC ACID: 1.3  Normal.  Doubt ischemic bowel.   1831 CT abdomen pelvis with contrast  IMPRESSION:     No evidence of bowel obstruction, colitis or diverticulitis. Findings compatible with constipation     1838 Discussed scan results with patient.  She states that her abdominal pain has improved somewhat with tylenol.  Offered laxative/enema for constipation, states that she is hungry and wants to leave without intervention for constipation.  Discussed using miralax to help with constipation, return precautions and other supportive care measures discussed.  To f/u with PCP in next several days.                                       Medical Decision Making  patient presents with abdominal pain as above, was previously diagnosed with constipation but did not want to take laxatives, also diagnosed yesterday with UTI and started on antibiotics.  Vital signs within normal limits, physical exam as above.  Could represent continued constipation, however concern exists for pancreatitis, intra-abdominal infection, UTI, pyelonephritis, nephrolithiasis.  Will obtain labs and imaging to evaluate further, also give acetaminophen for pain.  See ED course for remainder of discussion.    Amount and/or Complexity of Data Reviewed  External Data Reviewed: labs, radiology, ECG and notes.  Labs: ordered. Decision-making details documented in ED Course.  Radiology: ordered and independent interpretation performed. Decision-making details documented in ED Course.  ECG/medicine tests: ordered and independent interpretation performed.    Risk  Prescription drug management.          Disposition  Final diagnoses:   Abdominal pain   Constipation     Time reflects when diagnosis was documented in both  MDM as applicable and the Disposition within this note       Time User Action Codes Description Comment    4/10/2024  6:40 PM Annamarie Henry Add [R10.9] Abdominal pain     4/10/2024  6:40 PM Annamarie Henry Add [K59.00] Constipation     4/10/2024  6:40 PM Annamarie Henry Modify [R10.9] Abdominal pain     4/10/2024  6:40 PM Annamarie Henry Modify [K59.00] Constipation           ED Disposition       ED Disposition   Discharge    Condition   Stable    Date/Time   Wed Apr 10, 2024  6:40 PM    Comment   Gloria Patel discharge to home/self care.                   Follow-up Information       Follow up With Specialties Details Why Contact Info    Christine Charles, DO Family Medicine In 3 days  [address] [facility] [place].  [address]  [facility] [address] [phone]              Discharge Medication List as of 4/10/2024  6:41 PM        START taking these medications    Details   polyethylene glycol (GLYCOLAX) 17 GM/SCOOP powder Take 17 g by mouth daily, Starting Wed 4/10/2024, Until Fri 5/10/2024, Normal           CONTINUE these medications which have NOT CHANGED    Details   !! albuterol (PROVENTIL HFA,VENTOLIN HFA) 90 mcg/act inhaler Inhale 2 puffs every 6 (six) hours as needed for wheezing, Starting Mon 9/20/2021, Normal      !! albuterol (PROVENTIL HFA,VENTOLIN HFA) 90 mcg/act inhaler Inhale 2 puffs every 6 (six) hours as needed for wheezing, Historical Med      Alcohol Swabs (Alcohol Prep) PADS USE TO TEST BLOOD SUGAR TWICE A DAY AND AS NEEDED, Historical Med      amoxicillin (AMOXIL) 500 mg capsule Take 4 capsules by mouth as needed (prior to dental appointment), Historical Med      !! ascorbic acid (VITAMIN C) 1000 MG tablet Take 1,000 mg by mouth 2 (two) times a day, Historical Med      !! Ascorbic Acid (vitamin C) 1000 MG tablet Take 1,000 mg by mouth 2 (two) times a day, Historical Med      !! aspirin 81 mg chewable tablet Chew 1 tablet (81 mg total) daily, Starting Sat 4/23/2022, Normal      !! aspirin 81 mg chewable  tablet Chew 81 mg daily, Historical Med      atorvastatin (LIPITOR) 40 mg tablet Take 40 mg by mouth daily, Historical Med      !! brimonidine tartrate 0.2 % ophthalmic solution Administer 1 drop into the left eye 2 (two) times a day, Starting Fri 4/22/2022, Normal      !! brimonidine tartrate 0.2 % ophthalmic solution Administer 1 drop into the left eye 2 (two) times a day, Historical Med      !! Budeson-Glycopyrrol-Formoterol (Breztri Aerosphere) 160-9-4.8 MCG/ACT AERO Inhale 2 puffs  in the morning and 2 puffs before bedtime. Rinse mouth after use.., Starting Mon 5/16/2022, Normal      !! Budeson-Glycopyrrol-Formoterol 160-9-4.8 MCG/ACT AERO Inhale 2 puffs Rinse mouth after use., Historical Med      dexamethasone (DECADRON) 4 mg/mL Inject 1 mL by intramuscular route., Historical Med      !! diltiazem (CARDIZEM CD) 180 mg 24 hr capsule Take 180 mg by mouth daily, Historical Med      !! diltiazem (CARDIZEM CD) 180 mg 24 hr capsule Take 180 mg by mouth daily, Historical Med      !! diltiazem (CARDIZEM CD) 240 mg 24 hr capsule Take 1 capsule by mouth daily, Starting Sat 12/16/2023, Historical Med      !! diltiazem (CARDIZEM CD) 240 mg 24 hr capsule Take 240 mg by mouth daily, Starting Sat 12/16/2023, Historical Med      ferrous sulfate 325 (65 FE) MG EC tablet Take 1 tablet by mouth daily with breakfast, Historical Med      !! gabapentin (NEURONTIN) 300 mg capsule Take 300 mg by mouth 4 (four) times a day, Starting Mon 3/23/2020, Historical Med      !! gabapentin (NEURONTIN) 300 mg capsule Take 300 mg by mouth 4 (four) times a day, Historical Med      lidocaine (XYLOCAINE) 1 % Take 0.5 mL by injection route., Historical Med      !! losartan (COZAAR) 25 mg tablet Take 50 mg by mouth daily, Starting Mon 12/4/2023, Historical Med      !! losartan (COZAAR) 50 mg tablet TAKE 1 TABLET BY MOUTH EVERY DAY, Historical Med      !! losartan (COZAAR) 50 mg tablet Take 50 mg by mouth daily, Historical Med      !! metFORMIN  (GLUCOPHAGE) 1000 MG tablet 1000 mg QAM and 500 mg QPM, No Print      !! metFORMIN (GLUCOPHAGE) 500 mg tablet TAKE 2 TABS BY MOUTH IN IN THE MORNING AND 1 TAB BY MOUTH IN IN THE EVENING, Historical Med      !! metFORMIN (GLUCOPHAGE) 500 mg tablet Take 1,000 mg by mouth daily with breakfast, Historical Med      !! metFORMIN (GLUCOPHAGE) 500 mg tablet Take 500 mg by mouth every evening, Historical Med      !! methocarbamol (ROBAXIN) 500 mg tablet Historical Med      !! methocarbamol (ROBAXIN) 500 mg tablet Take 500 mg by mouth 4 (four) times a day, Historical Med      methylPREDNISolone 4 MG tablet therapy pack Use as directed on package, Normal      morphine (MS CONTIN) 15 mg 12 hr tablet Historical Med      morphine (MSIR) 15 mg tablet Take 15 mg by mouth 2 (two) times a day Every 12 hrs., Historical Med      ondansetron (ZOFRAN) 4 mg tablet Take 1 tablet (4 mg total) by mouth every 8 (eight) hours as needed for nausea or vomiting for up to 10 doses, Starting Fri 3/10/2023, Normal      oxyCODONE (OxyCONTIN) 10 mg 12 hr tablet Take 13.5 mg by mouth every 12 (twelve) hours, Historical Med      oxyCODONE ER (Xtampza ER) 13.5 MG C12A Take 13.5 mg by mouth 2 (two) times a day Max Daily Amount: 27 mg, Starting Mon 5/16/2022, No Print      oxygen gas Inhale 3 L/min continuous as needed, Historical Med      oxymetazoline ([facility] Nasal Spray) 0.05 % nasal spray INSTILL 2 SPRAYS INTO EACH NOSTRIL EVERY 12 HOURS AS NEEDED FOR CONGESTION, Historical Med      !! pantoprazole (PROTONIX) 40 mg tablet Take 40 mg by mouth every 12 (twelve) hours, Historical Med      !! pantoprazole (PROTONIX) 40 mg tablet Take 40 mg by mouth daily, Historical Med      !! rOPINIRole (REQUIP) 0.5 mg tablet 2-3 tablets PO at HS, No Print      !! rOPINIRole (REQUIP) 0.5 mg tablet Take 0.5 mg by mouth daily at bedtime Take 3 tablets PO before bed, Historical Med      Saccharomyces boulardii (Digestive Probiotic) 250 MG CAPS Take 1 capsule by mouth 2 (two)  times a day, Historical Med      !! sitaGLIPtin (JANUVIA) 100 mg tablet Take 1 tablet (100 mg total) by mouth daily, Starting Mon 4/13/2020, No Print      !! sitaGLIPtin (JANUVIA) 100 mg tablet Take 100 mg by mouth daily, Historical Med      sulfamethoxazole-trimethoprim (BACTRIM DS) 800-160 mg per tablet Take 1 tablet by mouth every 12 (twelve) hours, Historical Med      triamcinolone 40 MG/ML SUSP Take 1 mL by injection route., Historical Med       !! - Potential duplicate medications found. Please discuss with provider.        No discharge procedures on file.    PDMP Review         Value Time User    PDMP Reviewed  Yes 7/21/2023  1:27 PM Lety De León PA-C             ED Provider  Attending physically available and evaluated Gloria KAREY Acharyale. I managed the patient along with the ED Attending.    Electronically Signed by           Annamarie Henry DO  04/13/24 0943

## 2024-04-24 ENCOUNTER — APPOINTMENT (EMERGENCY)
Dept: RADIOLOGY | Facility: HOSPITAL | Age: 84
DRG: 812 | End: 2024-04-24
Payer: COMMERCIAL

## 2024-04-24 ENCOUNTER — HOSPITAL ENCOUNTER (INPATIENT)
Facility: HOSPITAL | Age: 84
LOS: 2 days | Discharge: HOME HEALTH | DRG: 812 | End: 2024-04-27
Attending: EMERGENCY MEDICINE | Admitting: INTERNAL MEDICINE
Payer: COMMERCIAL

## 2024-04-24 DIAGNOSIS — G89.29 CHRONIC LOW BACK PAIN WITHOUT SCIATICA, UNSPECIFIED BACK PAIN LATERALITY: ICD-10-CM

## 2024-04-24 DIAGNOSIS — R62.7 FAILURE TO THRIVE IN ADULT: Primary | ICD-10-CM

## 2024-04-24 DIAGNOSIS — M54.50 CHRONIC LOW BACK PAIN WITHOUT SCIATICA, UNSPECIFIED BACK PAIN LATERALITY: ICD-10-CM

## 2024-04-24 DIAGNOSIS — R53.1 GENERALIZED WEAKNESS: ICD-10-CM

## 2024-04-24 DIAGNOSIS — K59.00 CONSTIPATION: ICD-10-CM

## 2024-04-24 DIAGNOSIS — E83.42 HYPOMAGNESEMIA: ICD-10-CM

## 2024-04-24 LAB
ALBUMIN SERPL BCP-MCNC: 3.7 G/DL (ref 3.5–5)
ALP SERPL-CCNC: 66 U/L (ref 34–104)
ALT SERPL W P-5'-P-CCNC: 7 U/L (ref 7–52)
ANION GAP SERPL CALCULATED.3IONS-SCNC: 9 MMOL/L (ref 4–13)
AST SERPL W P-5'-P-CCNC: 11 U/L (ref 13–39)
BASOPHILS # BLD AUTO: 0.06 THOUSANDS/ÂΜL (ref 0–0.1)
BASOPHILS NFR BLD AUTO: 1 % (ref 0–1)
BILIRUB SERPL-MCNC: 0.31 MG/DL (ref 0.2–1)
BUN SERPL-MCNC: 11 MG/DL (ref 5–25)
CALCIUM SERPL-MCNC: 9.2 MG/DL (ref 8.4–10.2)
CARDIAC TROPONIN I PNL SERPL HS: 6 NG/L
CHLORIDE SERPL-SCNC: 100 MMOL/L (ref 96–108)
CO2 SERPL-SCNC: 30 MMOL/L (ref 21–32)
CREAT SERPL-MCNC: 0.84 MG/DL (ref 0.6–1.3)
EOSINOPHIL # BLD AUTO: 0.09 THOUSAND/ÂΜL (ref 0–0.61)
EOSINOPHIL NFR BLD AUTO: 1 % (ref 0–6)
ERYTHROCYTE [DISTWIDTH] IN BLOOD BY AUTOMATED COUNT: 17.8 % (ref 11.6–15.1)
GFR SERPL CREATININE-BSD FRML MDRD: 64 ML/MIN/1.73SQ M
GLUCOSE SERPL-MCNC: 123 MG/DL (ref 65–140)
HCT VFR BLD AUTO: 34.5 % (ref 34.8–46.1)
HGB BLD-MCNC: 10.2 G/DL (ref 11.5–15.4)
IMM GRANULOCYTES # BLD AUTO: 0.05 THOUSAND/UL (ref 0–0.2)
IMM GRANULOCYTES NFR BLD AUTO: 1 % (ref 0–2)
LYMPHOCYTES # BLD AUTO: 1.26 THOUSANDS/ÂΜL (ref 0.6–4.47)
LYMPHOCYTES NFR BLD AUTO: 18 % (ref 14–44)
MAGNESIUM SERPL-MCNC: 1.7 MG/DL (ref 1.9–2.7)
MCH RBC QN AUTO: 24.6 PG (ref 26.8–34.3)
MCHC RBC AUTO-ENTMCNC: 29.6 G/DL (ref 31.4–37.4)
MCV RBC AUTO: 83 FL (ref 82–98)
MONOCYTES # BLD AUTO: 1.1 THOUSAND/ÂΜL (ref 0.17–1.22)
MONOCYTES NFR BLD AUTO: 15 % (ref 4–12)
NEUTROPHILS # BLD AUTO: 4.64 THOUSANDS/ÂΜL (ref 1.85–7.62)
NEUTS SEG NFR BLD AUTO: 64 % (ref 43–75)
NRBC BLD AUTO-RTO: 0 /100 WBCS
PHOSPHATE SERPL-MCNC: 3.5 MG/DL (ref 2.3–4.1)
PLATELET # BLD AUTO: 234 THOUSANDS/UL (ref 149–390)
PMV BLD AUTO: 9.6 FL (ref 8.9–12.7)
POTASSIUM SERPL-SCNC: 4.2 MMOL/L (ref 3.5–5.3)
PROT SERPL-MCNC: 6.2 G/DL (ref 6.4–8.4)
RBC # BLD AUTO: 4.15 MILLION/UL (ref 3.81–5.12)
SODIUM SERPL-SCNC: 139 MMOL/L (ref 135–147)
TSH SERPL DL<=0.05 MIU/L-ACNC: 1.47 UIU/ML (ref 0.45–4.5)
WBC # BLD AUTO: 7.2 THOUSAND/UL (ref 4.31–10.16)

## 2024-04-24 PROCEDURE — 84484 ASSAY OF TROPONIN QUANT: CPT | Performed by: EMERGENCY MEDICINE

## 2024-04-24 PROCEDURE — 83036 HEMOGLOBIN GLYCOSYLATED A1C: CPT

## 2024-04-24 PROCEDURE — 99285 EMERGENCY DEPT VISIT HI MDM: CPT | Performed by: EMERGENCY MEDICINE

## 2024-04-24 PROCEDURE — 84100 ASSAY OF PHOSPHORUS: CPT | Performed by: EMERGENCY MEDICINE

## 2024-04-24 PROCEDURE — 83540 ASSAY OF IRON: CPT

## 2024-04-24 PROCEDURE — 74018 RADEX ABDOMEN 1 VIEW: CPT

## 2024-04-24 PROCEDURE — 84443 ASSAY THYROID STIM HORMONE: CPT | Performed by: EMERGENCY MEDICINE

## 2024-04-24 PROCEDURE — 82607 VITAMIN B-12: CPT

## 2024-04-24 PROCEDURE — 82728 ASSAY OF FERRITIN: CPT

## 2024-04-24 PROCEDURE — 83735 ASSAY OF MAGNESIUM: CPT | Performed by: EMERGENCY MEDICINE

## 2024-04-24 PROCEDURE — 80053 COMPREHEN METABOLIC PANEL: CPT | Performed by: EMERGENCY MEDICINE

## 2024-04-24 PROCEDURE — 99284 EMERGENCY DEPT VISIT MOD MDM: CPT

## 2024-04-24 PROCEDURE — 71045 X-RAY EXAM CHEST 1 VIEW: CPT

## 2024-04-24 PROCEDURE — 83550 IRON BINDING TEST: CPT

## 2024-04-24 PROCEDURE — 85025 COMPLETE CBC W/AUTO DIFF WBC: CPT | Performed by: EMERGENCY MEDICINE

## 2024-04-24 PROCEDURE — 36415 COLL VENOUS BLD VENIPUNCTURE: CPT | Performed by: EMERGENCY MEDICINE

## 2024-04-24 PROCEDURE — 82746 ASSAY OF FOLIC ACID SERUM: CPT

## 2024-04-24 PROCEDURE — 93005 ELECTROCARDIOGRAM TRACING: CPT

## 2024-04-24 RX ORDER — LANOLIN ALCOHOL/MO/W.PET/CERES
800 CREAM (GRAM) TOPICAL ONCE
Status: COMPLETED | OUTPATIENT
Start: 2024-04-24 | End: 2024-04-24

## 2024-04-24 RX ADMIN — Medication 800 MG: at 23:48

## 2024-04-24 RX ADMIN — METHYLNALTREXONE BROMIDE 6 MG: 12 INJECTION, SOLUTION SUBCUTANEOUS at 23:48

## 2024-04-25 PROBLEM — G25.81 RESTLESS LEG SYNDROME: Status: ACTIVE | Noted: 2024-04-25

## 2024-04-25 PROBLEM — J44.9 COPD (CHRONIC OBSTRUCTIVE PULMONARY DISEASE) (HCC): Status: ACTIVE | Noted: 2023-03-01

## 2024-04-25 PROBLEM — I10 ESSENTIAL HYPERTENSION: Chronic | Status: ACTIVE | Noted: 2023-03-01

## 2024-04-25 PROBLEM — E66.9 OBESITY (BMI 30-39.9): Status: ACTIVE | Noted: 2024-04-25

## 2024-04-25 PROBLEM — E11.9 DIABETES (HCC): Status: ACTIVE | Noted: 2024-04-25

## 2024-04-25 PROBLEM — K59.00 CONSTIPATION: Status: ACTIVE | Noted: 2024-04-25

## 2024-04-25 PROBLEM — E83.42 HYPOMAGNESEMIA: Status: ACTIVE | Noted: 2024-04-25

## 2024-04-25 PROBLEM — E66.811 CLASS 1 OBESITY IN ADULT: Status: ACTIVE | Noted: 2024-04-25

## 2024-04-25 LAB
2HR DELTA HS TROPONIN: 0 NG/L
4HR DELTA HS TROPONIN: 1 NG/L
ANION GAP SERPL CALCULATED.3IONS-SCNC: 7 MMOL/L (ref 4–13)
ATRIAL RATE: 63 BPM
BUN SERPL-MCNC: 12 MG/DL (ref 5–25)
CALCIUM SERPL-MCNC: 8.9 MG/DL (ref 8.4–10.2)
CARDIAC TROPONIN I PNL SERPL HS: 6 NG/L
CARDIAC TROPONIN I PNL SERPL HS: 7 NG/L
CHLORIDE SERPL-SCNC: 102 MMOL/L (ref 96–108)
CO2 SERPL-SCNC: 29 MMOL/L (ref 21–32)
CREAT SERPL-MCNC: 0.86 MG/DL (ref 0.6–1.3)
DME PARACHUTE DELIVERY DATE REQUESTED: NORMAL
DME PARACHUTE ITEM DESCRIPTION: NORMAL
DME PARACHUTE ORDER STATUS: NORMAL
DME PARACHUTE SUPPLIER NAME: NORMAL
DME PARACHUTE SUPPLIER PHONE: NORMAL
ERYTHROCYTE [DISTWIDTH] IN BLOOD BY AUTOMATED COUNT: 18 % (ref 11.6–15.1)
EST. AVERAGE GLUCOSE BLD GHB EST-MCNC: 194 MG/DL
FERRITIN SERPL-MCNC: 8 NG/ML (ref 11–307)
FLUAV RNA RESP QL NAA+PROBE: NEGATIVE
FLUBV RNA RESP QL NAA+PROBE: NEGATIVE
FOLATE SERPL-MCNC: 15.1 NG/ML
GFR SERPL CREATININE-BSD FRML MDRD: 62 ML/MIN/1.73SQ M
GLUCOSE SERPL-MCNC: 117 MG/DL (ref 65–140)
GLUCOSE SERPL-MCNC: 133 MG/DL (ref 65–140)
GLUCOSE SERPL-MCNC: 179 MG/DL (ref 65–140)
GLUCOSE SERPL-MCNC: 186 MG/DL (ref 65–140)
GLUCOSE SERPL-MCNC: 198 MG/DL (ref 65–140)
HBA1C MFR BLD: 8.4 %
HCT VFR BLD AUTO: 34.6 % (ref 34.8–46.1)
HGB BLD-MCNC: 9.9 G/DL (ref 11.5–15.4)
IRON SATN MFR SERPL: 5 % (ref 15–50)
IRON SERPL-MCNC: 21 UG/DL (ref 50–212)
LIPASE SERPL-CCNC: 23 U/L (ref 11–82)
MAGNESIUM SERPL-MCNC: 1.9 MG/DL (ref 1.9–2.7)
MCH RBC QN AUTO: 24.9 PG (ref 26.8–34.3)
MCHC RBC AUTO-ENTMCNC: 28.6 G/DL (ref 31.4–37.4)
MCV RBC AUTO: 87 FL (ref 82–98)
P AXIS: 78 DEGREES
PLATELET # BLD AUTO: 224 THOUSANDS/UL (ref 149–390)
PMV BLD AUTO: 9.9 FL (ref 8.9–12.7)
POTASSIUM SERPL-SCNC: 4.1 MMOL/L (ref 3.5–5.3)
PR INTERVAL: 168 MS
QRS AXIS: -81 DEGREES
QRSD INTERVAL: 132 MS
QT INTERVAL: 416 MS
QTC INTERVAL: 425 MS
RBC # BLD AUTO: 3.98 MILLION/UL (ref 3.81–5.12)
RSV RNA RESP QL NAA+PROBE: NEGATIVE
SARS-COV-2 RNA RESP QL NAA+PROBE: NEGATIVE
SODIUM SERPL-SCNC: 138 MMOL/L (ref 135–147)
T WAVE AXIS: 37 DEGREES
TIBC SERPL-MCNC: 382 UG/DL (ref 250–450)
UIBC SERPL-MCNC: 361 UG/DL (ref 155–355)
VENTRICULAR RATE: 63 BPM
VIT B12 SERPL-MCNC: 178 PG/ML (ref 180–914)
WBC # BLD AUTO: 5.69 THOUSAND/UL (ref 4.31–10.16)

## 2024-04-25 PROCEDURE — 82948 REAGENT STRIP/BLOOD GLUCOSE: CPT

## 2024-04-25 PROCEDURE — 80048 BASIC METABOLIC PNL TOTAL CA: CPT

## 2024-04-25 PROCEDURE — 83735 ASSAY OF MAGNESIUM: CPT

## 2024-04-25 PROCEDURE — 97163 PT EVAL HIGH COMPLEX 45 MIN: CPT

## 2024-04-25 PROCEDURE — 93010 ELECTROCARDIOGRAM REPORT: CPT | Performed by: INTERNAL MEDICINE

## 2024-04-25 PROCEDURE — 99223 1ST HOSP IP/OBS HIGH 75: CPT | Performed by: INTERNAL MEDICINE

## 2024-04-25 PROCEDURE — 0241U HB NFCT DS VIR RESP RNA 4 TRGT: CPT

## 2024-04-25 PROCEDURE — 97167 OT EVAL HIGH COMPLEX 60 MIN: CPT

## 2024-04-25 PROCEDURE — 84484 ASSAY OF TROPONIN QUANT: CPT | Performed by: EMERGENCY MEDICINE

## 2024-04-25 PROCEDURE — 83690 ASSAY OF LIPASE: CPT

## 2024-04-25 PROCEDURE — 85027 COMPLETE CBC AUTOMATED: CPT

## 2024-04-25 RX ORDER — DILTIAZEM HYDROCHLORIDE 240 MG/1
240 CAPSULE, COATED, EXTENDED RELEASE ORAL DAILY
Status: DISCONTINUED | OUTPATIENT
Start: 2024-04-25 | End: 2024-04-27 | Stop reason: HOSPADM

## 2024-04-25 RX ORDER — INSULIN LISPRO 100 [IU]/ML
1-6 INJECTION, SOLUTION INTRAVENOUS; SUBCUTANEOUS
Status: DISCONTINUED | OUTPATIENT
Start: 2024-04-25 | End: 2024-04-27 | Stop reason: HOSPADM

## 2024-04-25 RX ORDER — ACETAMINOPHEN 325 MG/1
975 TABLET ORAL EVERY 8 HOURS SCHEDULED
Status: DISCONTINUED | OUTPATIENT
Start: 2024-04-25 | End: 2024-04-25

## 2024-04-25 RX ORDER — ENOXAPARIN SODIUM 100 MG/ML
40 INJECTION SUBCUTANEOUS DAILY
Status: DISCONTINUED | OUTPATIENT
Start: 2024-04-25 | End: 2024-04-27 | Stop reason: HOSPADM

## 2024-04-25 RX ORDER — ATORVASTATIN CALCIUM 40 MG/1
40 TABLET, FILM COATED ORAL EVERY EVENING
Status: DISCONTINUED | OUTPATIENT
Start: 2024-04-25 | End: 2024-04-27 | Stop reason: HOSPADM

## 2024-04-25 RX ORDER — ALBUTEROL SULFATE 90 UG/1
2 AEROSOL, METERED RESPIRATORY (INHALATION) EVERY 6 HOURS PRN
Status: DISCONTINUED | OUTPATIENT
Start: 2024-04-25 | End: 2024-04-27 | Stop reason: HOSPADM

## 2024-04-25 RX ORDER — ACETAMINOPHEN 325 MG/1
975 TABLET ORAL EVERY 8 HOURS PRN
Status: DISCONTINUED | OUTPATIENT
Start: 2024-04-25 | End: 2024-04-27 | Stop reason: HOSPADM

## 2024-04-25 RX ORDER — MAGNESIUM CARB/ALUMINUM HYDROX 105-160MG
296 TABLET,CHEWABLE ORAL ONCE
Status: COMPLETED | OUTPATIENT
Start: 2024-04-25 | End: 2024-04-25

## 2024-04-25 RX ORDER — SENNOSIDES 8.6 MG
1 TABLET ORAL DAILY
Status: DISCONTINUED | OUTPATIENT
Start: 2024-04-25 | End: 2024-04-25

## 2024-04-25 RX ORDER — LOSARTAN POTASSIUM 50 MG/1
50 TABLET ORAL DAILY
Status: DISCONTINUED | OUTPATIENT
Start: 2024-04-25 | End: 2024-04-27 | Stop reason: HOSPADM

## 2024-04-25 RX ORDER — BISACODYL 10 MG
10 SUPPOSITORY, RECTAL RECTAL DAILY PRN
Status: DISCONTINUED | OUTPATIENT
Start: 2024-04-25 | End: 2024-04-27 | Stop reason: HOSPADM

## 2024-04-25 RX ORDER — ROPINIROLE 0.25 MG/1
0.5 TABLET, FILM COATED ORAL
Status: DISCONTINUED | OUTPATIENT
Start: 2024-04-25 | End: 2024-04-27 | Stop reason: HOSPADM

## 2024-04-25 RX ORDER — GABAPENTIN 300 MG/1
300 CAPSULE ORAL 4 TIMES DAILY
Status: DISCONTINUED | OUTPATIENT
Start: 2024-04-25 | End: 2024-04-27 | Stop reason: HOSPADM

## 2024-04-25 RX ORDER — CYANOCOBALAMIN 1000 UG/ML
1000 INJECTION, SOLUTION INTRAMUSCULAR; SUBCUTANEOUS
Status: DISCONTINUED | OUTPATIENT
Start: 2024-04-25 | End: 2024-04-27 | Stop reason: HOSPADM

## 2024-04-25 RX ORDER — MORPHINE SULFATE 15 MG/1
7.5 TABLET ORAL 2 TIMES DAILY PRN
Status: DISCONTINUED | OUTPATIENT
Start: 2024-04-25 | End: 2024-04-27 | Stop reason: HOSPADM

## 2024-04-25 RX ORDER — BUDESONIDE AND FORMOTEROL FUMARATE DIHYDRATE 160; 4.5 UG/1; UG/1
2 AEROSOL RESPIRATORY (INHALATION) 2 TIMES DAILY
Status: DISCONTINUED | OUTPATIENT
Start: 2024-04-25 | End: 2024-04-27 | Stop reason: HOSPADM

## 2024-04-25 RX ORDER — INSULIN GLARGINE 100 [IU]/ML
11 INJECTION, SOLUTION SUBCUTANEOUS
Status: DISCONTINUED | OUTPATIENT
Start: 2024-04-25 | End: 2024-04-25

## 2024-04-25 RX ORDER — SENNOSIDES 8.6 MG
2 TABLET ORAL DAILY
Status: DISCONTINUED | OUTPATIENT
Start: 2024-04-25 | End: 2024-04-26

## 2024-04-25 RX ORDER — INSULIN LISPRO 100 [IU]/ML
4 INJECTION, SOLUTION INTRAVENOUS; SUBCUTANEOUS
Status: DISCONTINUED | OUTPATIENT
Start: 2024-04-25 | End: 2024-04-25

## 2024-04-25 RX ORDER — POLYETHYLENE GLYCOL 3350 17 G/17G
17 POWDER, FOR SOLUTION ORAL DAILY
Status: DISCONTINUED | OUTPATIENT
Start: 2024-04-25 | End: 2024-04-26

## 2024-04-25 RX ORDER — ONDANSETRON 2 MG/ML
4 INJECTION INTRAMUSCULAR; INTRAVENOUS EVERY 6 HOURS PRN
Status: DISCONTINUED | OUTPATIENT
Start: 2024-04-25 | End: 2024-04-27 | Stop reason: HOSPADM

## 2024-04-25 RX ORDER — ASPIRIN 81 MG/1
81 TABLET, CHEWABLE ORAL DAILY
Status: DISCONTINUED | OUTPATIENT
Start: 2024-04-25 | End: 2024-04-27 | Stop reason: HOSPADM

## 2024-04-25 RX ORDER — MORPHINE SULFATE 15 MG/1
15 TABLET ORAL 2 TIMES DAILY
Status: DISCONTINUED | OUTPATIENT
Start: 2024-04-25 | End: 2024-04-25

## 2024-04-25 RX ORDER — PANTOPRAZOLE SODIUM 40 MG/1
40 TABLET, DELAYED RELEASE ORAL EVERY 12 HOURS
Status: DISCONTINUED | OUTPATIENT
Start: 2024-04-25 | End: 2024-04-27 | Stop reason: HOSPADM

## 2024-04-25 RX ADMIN — ACETAMINOPHEN 975 MG: 325 TABLET, FILM COATED ORAL at 01:25

## 2024-04-25 RX ADMIN — CYANOCOBALAMIN 1000 MCG: 1000 INJECTION, SOLUTION INTRAMUSCULAR; SUBCUTANEOUS at 08:46

## 2024-04-25 RX ADMIN — METHYLNALTREXONE BROMIDE 6 MG: 12 INJECTION, SOLUTION SUBCUTANEOUS at 22:37

## 2024-04-25 RX ADMIN — ATORVASTATIN CALCIUM 40 MG: 40 TABLET, FILM COATED ORAL at 17:16

## 2024-04-25 RX ADMIN — ROPINIROLE 0.5 MG: 0.25 TABLET, FILM COATED ORAL at 22:37

## 2024-04-25 RX ADMIN — PANTOPRAZOLE SODIUM 40 MG: 40 TABLET, DELAYED RELEASE ORAL at 01:25

## 2024-04-25 RX ADMIN — GABAPENTIN 300 MG: 300 CAPSULE ORAL at 17:15

## 2024-04-25 RX ADMIN — INSULIN LISPRO 1 UNITS: 100 INJECTION, SOLUTION INTRAVENOUS; SUBCUTANEOUS at 22:37

## 2024-04-25 RX ADMIN — GABAPENTIN 300 MG: 300 CAPSULE ORAL at 22:37

## 2024-04-25 RX ADMIN — BUDESONIDE AND FORMOTEROL FUMARATE DIHYDRATE 2 PUFF: 160; 4.5 AEROSOL RESPIRATORY (INHALATION) at 10:15

## 2024-04-25 RX ADMIN — GABAPENTIN 300 MG: 300 CAPSULE ORAL at 13:28

## 2024-04-25 RX ADMIN — ENOXAPARIN SODIUM 40 MG: 40 INJECTION SUBCUTANEOUS at 08:46

## 2024-04-25 RX ADMIN — MORPHINE SULFATE 15 MG: 15 TABLET ORAL at 08:45

## 2024-04-25 RX ADMIN — DILTIAZEM HYDROCHLORIDE 240 MG: 240 CAPSULE, COATED, EXTENDED RELEASE ORAL at 08:45

## 2024-04-25 RX ADMIN — BISACODYL 10 MG: 10 SUPPOSITORY RECTAL at 15:27

## 2024-04-25 RX ADMIN — BUDESONIDE AND FORMOTEROL FUMARATE DIHYDRATE 2 PUFF: 160; 4.5 AEROSOL RESPIRATORY (INHALATION) at 17:21

## 2024-04-25 RX ADMIN — POLYETHYLENE GLYCOL 3350 17 G: 17 POWDER, FOR SOLUTION ORAL at 08:46

## 2024-04-25 RX ADMIN — PANTOPRAZOLE SODIUM 40 MG: 40 TABLET, DELAYED RELEASE ORAL at 13:28

## 2024-04-25 RX ADMIN — INSULIN LISPRO 2 UNITS: 100 INJECTION, SOLUTION INTRAVENOUS; SUBCUTANEOUS at 17:17

## 2024-04-25 RX ADMIN — MAGNESIUM CITRATE 296 ML: 1.75 LIQUID ORAL at 19:31

## 2024-04-25 RX ADMIN — SENNOSIDES 17.2 MG: 8.6 TABLET, FILM COATED ORAL at 08:46

## 2024-04-25 RX ADMIN — ASPIRIN 81 MG CHEWABLE TABLET 81 MG: 81 TABLET CHEWABLE at 08:45

## 2024-04-25 RX ADMIN — LOSARTAN POTASSIUM 50 MG: 50 TABLET, FILM COATED ORAL at 08:46

## 2024-04-25 RX ADMIN — ROPINIROLE 0.5 MG: 0.25 TABLET, FILM COATED ORAL at 01:25

## 2024-04-25 RX ADMIN — IRON SUCROSE 200 MG: 20 INJECTION, SOLUTION INTRAVENOUS at 10:17

## 2024-04-25 RX ADMIN — GABAPENTIN 300 MG: 300 CAPSULE ORAL at 08:46

## 2024-04-25 NOTE — ASSESSMENT & PLAN NOTE
- Home med: morphine 15 mg BID, gabapentin 300 mg QID  - Hx of 3 x spinal surgeries  - No saddle anesthesia, urine/stool incontinence    Plan:  - Continue home morphine and gabapentin; attempt to wean as tolerated  - Tylenol 975 mg q8h prn  - See plan for bowel regimen under constipation

## 2024-04-25 NOTE — ASSESSMENT & PLAN NOTE
Lab Results   Component Value Date    HGBA1C 8.4 (H) 04/24/2024     - Home meds: Metformin 1000 mg qAM and 500 mg qPM, Januvia 100 mg  - Blood glucose 123 on CMP    Plan:(P) 125  - Hold home antihyperglycemic agents  - SSI only algorithm 3  - Carbohydrate level 2 diet  - Hypoglycemia protocol

## 2024-04-25 NOTE — PROGRESS NOTES
[facility]  Progress Note  Name: Gloria Patel I  MRN: [id number]  Unit/Bed#: W -01 I Date of Admission: 4/24/2024   Date of Service: 4/25/2024  Hospital Day: 0    Assessment/Plan   * Generalized weakness  Assessment & Plan  - Generalized weakness, poor appetite over the past month  - Symptoms started after receiving Shingles vaccination  - Lives at home with ex-. Takes care of ADLs independently  - Nonfocal neurological examination  - Ambulates with walker  - TSH 1.470, normal. No history of hypothyroidism  - B12 low   - Iron low    Plan:  - PT/OT consult  - Encourage oral intake  - B12 shot q30 days  - 300mg Venofer x1    Constipation  Assessment & Plan  - Last bowel movement was 4 days ago (4/21). Soft in character  - Normally has bowel movement every other day  - Has been taking morphine 15 mg BID for chronic back pain for years  - Suspect-opioid induced constipation  - XR abdomen - moderate bowel gas and stool burden  - Given 1 dose of Relistor 6 mg subq in the ED    Plan:  - Give 2 more doses of Relistor 6 mg subq  - Bowel regimen with scheduled MiraLAX and Senokot 2 tablets  - Dulcolax suppository prn    Obesity (BMI 30-39.9)  Assessment & Plan  - BMI 30.99    Hypomagnesemia  Assessment & Plan  - Mg upon arrival 1.7  - Given MagOx 800 mg in the ED    Plan:  - Check Mg in the morning  - Replenish as necessary    Restless leg syndrome  Assessment & Plan  - Home: Ropinirole 0.5 mg qhs    Plan:  - Continue home ropinirole    Type 2 diabetes mellitus, without long-term current use of insulin (HCC)  Assessment & Plan  Lab Results   Component Value Date    HGBA1C 8.4 (H) 04/24/2024     - Home meds: Metformin 1000 mg qAM and 500 mg qPM, Januvia 100 mg  - Blood glucose 123 on CMP    Plan:(P) 125  - Hold home antihyperglycemic agents  - SSI only algorithm 3  - Carbohydrate level 2 diet  - Hypoglycemia protocol    Chronic back pain  Assessment & Plan  - Home med: morphine 15 mg  BID, gabapentin 300 mg QID  - Hx of 3 x spinal surgeries  - No saddle anesthesia, urine/stool incontinence    Plan:  - Continue home morphine and gabapentin; attempt to wean as tolerated  - Tylenol 975 mg q8h prn  - See plan for bowel regimen under constipation    Essential hypertension  Assessment & Plan  - Home meds: Losartan 50 mg, diltiazem 240 mg   - Blood pressures have been adequately controlled    Plan:  - Continue home losartan and diltiazem    COPD (chronic obstructive pulmonary disease) (HCC)  Assessment & Plan  - Baseline is 3 L NC  - Has nonproductive chronic cough  - Former smoker  - Not currently in exacerbation  - Home med: Breztri 2 puffs BID   - CXR - wet read, no acute cardiopulmonary abnormality  - Flu/RSV/COVID negative  - Currently on 3 L NC, saturating upper 90%    Plan:  - Continue formulary equivalent Symbicort 2 puffs BID  - Supplemental oxygen as necessary    Hyperlipidemia  Assessment & Plan  - Lipid panel (8/21/2023) - , TG 78, LDL 59, HDL 69  - Home med: atorvastatin 40 mg    Plan:  - Continue home atorvastatin    Iron deficiency anemia  Assessment & Plan  Lab Results   Component Value Date/Time    Hemoglobin 9.9 (L) 04/25/2024 04:56 AM    Hemoglobin 10.2 (L) 04/24/2024 09:52 PM    Hemoglobin 10.8 (L) 04/10/2024 02:09 PM    MCV 87 04/25/2024 04:56 AM    MCV 83 04/24/2024 09:52 PM    MCV 82 04/10/2024 02:09 PM     Lab Results   Component Value Date/Time    IRON 80 07/16/2019 09:18 AM    FERRITIN 87 07/16/2019 09:18 AM    TIBC 296 07/16/2019 09:18 AM      - Hgb at baseline (10-11)  - Previously used to take oral iron tablets. No longer taking due to constipation    Plan:  - Low iron; Venofer while inpatient     Abdominal pain  Assessment & Plan  - Has been having intermittent left-sided abdominal pain for the past month.   - Multiple recent ED visits for the same  - CTAP (4/2/24) - Probable cystitis. 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 Follow-up with pancreatic MRI. Moderate  colonic stool burden. No bowel obstruction. Predominantly distal diverticulosis without diverticulitis  - CTAP (4/10/24) - No evidence of bowel obstruction, colitis or diverticulitis. Findings compatible with constipation.  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 In retrospect this observation can be partially observed on CT of the chest dated 10/2/2018 which further indicates benign/indolent process. Typically no further follow-up is recommended if the patient has reached the age of 80.     Plan:  - Likely due to constipation   - Bowel regimen   - Follow up with GI as outpatient    GERD (gastroesophageal reflux disease)  Assessment & Plan  - Continue home Protonix 40 mg BID               VTE Pharmacologic Prophylaxis: VTE Score: 5 High Risk (Score >/= 5) - Pharmacological DVT Prophylaxis Ordered: enoxaparin (Lovenox). Sequential Compression Devices Ordered.    Mobility:      -HLM Goal NOT achieved. Continue with multidisciplinary rounding and encourage appropriate mobility to improve upon -HLM goals.    Patient Centered Rounds: I performed bedside rounds with nursing staff today.  Discussions with Specialists or Other Care Team Provider: pt/ot    Education and Discussions with Family / Patient: Updated  (son) at bedside.    Current Length of Stay: 0 day(s)  Current Patient Status: Observation   Discharge Plan: Anticipate discharge tomorrow to discharge location to be determined pending rehab evaluations.    Code Status: Level 3 - DNAR and DNI    Subjective:   No acute events overnight.  Patient still complains of being cold.  Denies headache, chest pain, shortness of breath, N/V/D.  States that she has not had a bowel movement in 4 days.    Objective:     Vitals:   Temp (24hrs), Av.1 °F (36.7 °C), Min:98.1 °F  (36.7 °C), Max:98.2 °F (36.8 °C)    Temp:  [98.1 °F (36.7 °C)-98.2 °F (36.8 °C)] 98.1 °F (36.7 °C)  HR:  [61-75] 61  Resp:  [18-22] 18  BP: (122-146)/(69-73) 123/72  SpO2:  [88 %-98 %] 98 %  There is no height or weight on file to calculate BMI.     Input and Output Summary (last 24 hours):     Intake/Output Summary (Last 24 hours) at 4/25/2024 1257  Last data filed at 4/25/2024 0900  Gross per 24 hour   Intake 480 ml   Output --   Net 480 ml       Physical Exam:   Physical Exam  Constitutional:       Appearance: Normal appearance.   HENT:      Head: Normocephalic and atraumatic.      Mouth/Throat:      Mouth: Mucous membranes are moist.   Eyes:      Conjunctiva/sclera: Conjunctivae normal.   Cardiovascular:      Rate and Rhythm: Normal rate and regular rhythm.      Pulses: Normal pulses.      Heart sounds: No murmur heard.  Pulmonary:      Effort: No respiratory distress.      Breath sounds: No wheezing, rhonchi or rales.      Comments: On 3 L NC  Abdominal:      General: There is no distension.      Tenderness: There is no abdominal tenderness. There is no guarding or rebound.      Comments: Hyperactive bowel sounds   Musculoskeletal:         General: No swelling, tenderness or signs of injury. Normal range of motion.      Cervical back: Normal range of motion and neck supple.      Right lower leg: No edema.      Left lower leg: No edema.   Skin:     General: Skin is warm.      Findings: No erythema or rash.   Neurological:      General: No focal deficit present.      Mental Status: She is alert and oriented to person, place, and time.      Comments: Moves all 4 extremities.           Additional Data:     Labs:  Results from last 7 days   Lab Units 04/25/24  0456 04/24/24  2152   WBC Thousand/uL 5.69 7.20   HEMOGLOBIN g/dL 9.9* 10.2*   HEMATOCRIT % 34.6* 34.5*   PLATELETS Thousands/uL 224 234   SEGS PCT %  --  64   LYMPHO PCT %  --  18   MONO PCT %  --  15*   EOS PCT %  --  1     Results from last 7 days   Lab  Units 04/25/24  0456 04/24/24  2152   SODIUM mmol/L 138 139   POTASSIUM mmol/L 4.1 4.2   CHLORIDE mmol/L 102 100   CO2 mmol/L 29 30   BUN mg/dL 12 11   CREATININE mg/dL 0.86 0.84   ANION GAP mmol/L 7 9   CALCIUM mg/dL 8.9 9.2   ALBUMIN g/dL  --  3.7   TOTAL BILIRUBIN mg/dL  --  0.31   ALK PHOS U/L  --  66   ALT U/L  --  7   AST U/L  --  11*   GLUCOSE RANDOM mg/dL 179* 123         Results from last 7 days   Lab Units 04/25/24  1113 04/25/24  0734   POC GLUCOSE mg/dl 117 133     Results from last 7 days   Lab Units 04/24/24  2152   HEMOGLOBIN A1C % 8.4*           Lines/Drains:  Invasive Devices       Peripheral Intravenous Line  Duration             Peripheral IV 04/24/24 Proximal;Right;Ventral (anterior) Forearm <1 day                          Imaging: Reviewed radiology reports from this admission including: chest xray    Recent Cultures (last 7 days):         Last 24 Hours Medication List:   Current Facility-Administered Medications   Medication Dose Route Frequency Provider Last Rate    acetaminophen  975 mg Oral Q8H PRN Shreyan Morataya, DO      albuterol  2 puff Inhalation Q6H PRN [facility]      aspirin  81 mg Oral Daily Shreyan Morataya, DO      atorvastatin  40 mg Oral QPM Shreyan Morataya, DO      bisacodyl  10 mg Rectal Daily PRN Shreyan Morataya, DO      budesonide-formoterol  2 puff Inhalation BID Shreyan Morataya, DO      cyanocobalamin  1,000 mcg Intramuscular Q30 Days Roopa Cordon DO      diltiazem  240 mg Oral Daily Shreyan Morataya, DO      enoxaparin  40 mg Subcutaneous Daily Shreyan Morataya, DO      gabapentin  300 mg Oral 4x Daily Shreyan Morataya, DO      insulin lispro  1-6 Units Subcutaneous 4x Daily (AC & HS) Shreyan Morataya, DO      iron sucrose  200 mg Intravenous Daily Mariella Dumont MD      losartan  50 mg Oral Daily Shreyan Morataya, DO      methylnaltrexone  6 mg Subcutaneous Daily Shreyan Morataya, DO      morphine  7.5 mg Oral BID PRN Whitney Camacho MD      ondansetron  4 mg Intravenous Q6H PRN  Drew Morataya,       pantoprazole  40 mg Oral Q12H Drew Morataya DO      polyethylene glycol  17 g Oral Daily Drew Morataya DO      rOPINIRole  0.5 mg Oral HS Drew Morataya DO      senna  2 tablet Oral Daily Drew Morataya DO          Today, Patient Was Seen By: Roopa Cordon DO    **Please Note: This note may have been constructed using a voice recognition system.**

## 2024-04-25 NOTE — PLAN OF CARE
Problem: PAIN - ADULT  Goal: Verbalizes/displays adequate comfort level or baseline comfort level  Description: Interventions:  - Encourage patient to monitor pain and request assistance  - Assess pain using appropriate pain scale  - Administer analgesics based on type and severity of pain and evaluate response  - Implement non-pharmacological measures as appropriate and evaluate response  - Consider cultural and social influences on pain and pain management  - Notify physician/advanced practitioner if interventions unsuccessful or patient reports new pain  Outcome: Progressing     Problem: INFECTION - ADULT  Goal: Absence or prevention of progression during hospitalization  Description: INTERVENTIONS:  - Assess and monitor for signs and symptoms of infection  - Monitor lab/diagnostic results  - Monitor all insertion sites, i.e. indwelling lines, tubes, and drains  - Monitor endotracheal if appropriate and nasal secretions for changes in amount and color  - [place] appropriate cooling/warming therapies per order  - Administer medications as ordered  - Instruct and encourage patient and family to use good hand hygiene technique  - Identify and instruct in appropriate isolation precautions for identified infection/condition  Outcome: Progressing  Goal: Absence of fever/infection during neutropenic period  Description: INTERVENTIONS:  - Monitor WBC    Outcome: Progressing     Problem: SAFETY ADULT  Goal: Patient will remain free of falls  Description: INTERVENTIONS:  - Educate patient/family on patient safety including physical limitations  - Instruct patient to call for assistance with activity   - Consult OT/PT to assist with strengthening/mobility   - Keep Call bell within reach  - Keep bed low and locked with side rails adjusted as appropriate  - Keep care items and personal belongings within reach  - Initiate and maintain comfort rounds  - Make Fall Risk Sign visible to staff  - Offer Toileting every  Hours,  in advance of need  - Initiate/Maintain larm  - Obtain necessary fall risk management equipment:   - Apply yellow socks and bracelet for high fall risk patients  - Consider moving patient to room near nurses station  Outcome: Progressing  Goal: Maintain or return to baseline ADL function  Description: INTERVENTIONS:  -  Assess patient's ability to carry out ADLs; assess patient's baseline for ADL function and identify physical deficits which impact ability to perform ADLs (bathing, care of mouth/teeth, toileting, grooming, dressing, etc.)  - Assess/evaluate cause of self-care deficits   - Assess range of motion  - Assess patient's mobility; develop plan if impaired  - Assess patient's need for assistive devices and provide as appropriate  - Encourage maximum independence but intervene and supervise when necessary  - Involve family in performance of ADLs  - Assess for home care needs following discharge   - Consider OT consult to assist with ADL evaluation and planning for discharge  - Provide patient education as appropriate  Outcome: Progressing  Goal: Maintains/Returns to pre admission functional level  Description: INTERVENTIONS:  - Perform AM-PAC 6 Click Basic Mobility/ Daily Activity assessment daily.  - Set and communicate daily mobility goal to care team and patient/family/caregiver.   - Collaborate with rehabilitation services on mobility goals if consulted  - Perform Range of Motion times a day.  - Reposition patient every  hours.  - Dangle patient  times a day  - Stand patient  times a day  - Ambulate patient  times a day  - Out of bed to chair  times a day   - Out of bed for meals  times a day  - Out of bed for toileting  - Record patient progress and toleration of activity level   Outcome: Progressing     Problem: DISCHARGE PLANNING  Goal: Discharge to home or other facility with appropriate resources  Description: INTERVENTIONS:  - Identify barriers to discharge w/patient and caregiver  - Arrange for  needed discharge resources and transportation as appropriate  - Identify discharge learning needs (meds, wound care, etc.)  - Arrange for interpretive services to assist at discharge as needed  - Refer to Case Management Department for coordinating discharge planning if the patient needs post-hospital services based on physician/advanced practitioner order or complex needs related to functional status, cognitive ability, or social support system  Outcome: Progressing     Problem: Knowledge Deficit  Goal: Patient/family/caregiver demonstrates understanding of disease process, treatment plan, medications, and discharge instructions  Description: Complete learning assessment and assess knowledge base.  Interventions:  - Provide teaching at level of understanding  - Provide teaching via preferred learning methods  Outcome: Progressing

## 2024-04-25 NOTE — H&P
[facility]  H&P  Name: Gloria Patel 83 y.o. female I MRN: [id number]  Unit/Bed#: W -01 I Date of Admission: 4/24/2024   Date of Service: 4/25/2024 I Hospital Day: 0      Assessment/Plan   * Generalized weakness  Assessment & Plan  - Generalized weakness, poor appetite over the past month  - Symptoms started after receiving Shingles vaccination  - Lives at home with ex-. Takes care of ADLs independently  - Nonfocal neurological examination  - Ambulates with walker  - TSH 1.470, normal. No history of hypothyroidism    Plan:  - PT/OT consult  - Encourage oral intake  - Check iron panel  - Check B12, folate    Constipation  Assessment & Plan  - Last bowel movement was 4 days ago (4/21). Soft in character  - Normally has bowel movement every other day  - Has been taking morphine 15 mg BID for chronic back pain for years  - Suspect-opioid induced constipation  - XR abdomen - moderate bowel gas and stool burden  - Given 1 dose of Relistor 6 mg subq in the ED    Plan:  - Give 2 more doses of Relistor 6 mg subq  - Bowel regimen with scheduled MiraLAX and Senokot 2 tablets  - Dulcolax suppository prn    Abdominal pain  Assessment & Plan  - Has been having intermittent left-sided abdominal pain for the past month.   - Multiple recent ED visits for the same  - CTAP (4/2/24) - Probable cystitis. 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 Follow-up with pancreatic MRI. Moderate colonic stool burden. No bowel obstruction. Predominantly distal diverticulosis without diverticulitis  - CTAP (4/10/24) - No evidence of bowel obstruction, colitis or diverticulitis. Findings compatible with constipation.  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 In retrospect this observation can be partially observed on CT of the  chest dated 10/2/2018 which further indicates benign/indolent process. Typically no further follow-up is recommended if the patient has reached the age of 80.     Plan:  - Check lipase  - Not currently having abdominal pain at this time. Continue to monitor  - Follow up with GI as outpatient    Chronic back pain  Assessment & Plan  - Home med: morphine 15 mg BID, gabapentin 300 mg QID  - Hx of 3 x spinal surgeries  - No saddle anesthesia, urine/stool incontinence    Plan:  - Continue home morphine and gabapentin  - Tylenol 975 mg q8h prn  - See plan for bowel regimen under constipation    Essential hypertension  Assessment & Plan  - Home meds: Losartan 50 mg, diltiazem 240 mg   - Blood pressures have been adequately controlled    Plan:  - Continue home losartan and diltiazem    Hyperlipidemia  Assessment & Plan  - Lipid panel (8/21/2023) - , TG 78, LDL 59, HDL 69  - Home med: atorvastatin 40 mg    Plan:  - Continue home atorvastatin    Type 2 diabetes mellitus, without long-term current use of insulin (HCC)  Assessment & Plan  Lab Results   Component Value Date    HGBA1C 6.8 (H) 08/21/2023     - Home meds: Metformin 1000 mg qAM and 500 mg qPM, Januvia 100 mg  - Blood glucose 123 on CMP    Plan:  - Hold home antihyperglycemic agents  - SSI only algorithm 3  - Carbohydrate level 2 diet  - Hypoglycemia protocol  - Check HgbA1c    Iron deficiency anemia  Assessment & Plan  Lab Results   Component Value Date/Time    Hemoglobin 9.9 (L) 04/25/2024 04:56 AM    Hemoglobin 10.2 (L) 04/24/2024 09:52 PM    Hemoglobin 10.8 (L) 04/10/2024 02:09 PM    MCV 87 04/25/2024 04:56 AM    MCV 83 04/24/2024 09:52 PM    MCV 82 04/10/2024 02:09 PM     Lab Results   Component Value Date/Time    IRON 80 07/16/2019 09:18 AM    FERRITIN 87 07/16/2019 09:18 AM    TIBC 296 07/16/2019 09:18 AM      - Hgb at baseline (10-11)  - Previously used to take oral iron tablets. No longer taking due to constipation    Plan:  - Check iron panel  - Check  B12, folate for completeness sake    COPD (chronic obstructive pulmonary disease) ([facility])  Assessment & Plan  - Baseline is 3 L NC  - Has nonproductive chronic cough  - Former smoker  - Not currently in exacerbation  - Home med: Breztri 2 puffs BID   - CXR - wet read, no acute cardiopulmonary abnormality  - Flu/RSV/COVID negative  - Currently on 3 L NC, saturating upper 90%    Plan:  - Continue formulary equivalent Symbicort 2 puffs BID  - Supplemental oxygen as necessary    GERD (gastroesophageal reflux disease)  Assessment & Plan  - Continue home Protonix 40 mg BID    Hypomagnesemia  Assessment & Plan  - Mg upon arrival 1.7  - Given MagOx 800 mg in the ED    Plan:  - Check Mg in the morning  - Replenish as necessary    Restless leg syndrome  Assessment & Plan  - Home: Ropinirole 0.5 mg qhs    Plan:  - Continue home ropinirole  - Check ferritin    Obesity (BMI 30-39.9)  Assessment & Plan  - BMI 30.99           VTE Pharmacologic Prophylaxis: VTE Score: 5 High Risk (Score >/= 5) - Pharmacological DVT Prophylaxis Ordered: enoxaparin (Lovenox). Sequential Compression Devices Ordered.  Code Status: Level 3 - DNAR and DNI   Discussion with family: Patient declined call to .     Anticipated Length of Stay: Patient will be admitted on an observation basis with an anticipated length of stay of less than 2 midnights secondary to generalized weakness.    Chief Complaint: Generalized weakness    History of Present Illness:  Gloria Patel is a 83 y.o. female with a PMH of HTN, HLD, DM2, COPD on 3 L NC, chronic back pain, GERD, NATHANIEL, restless legs syndrome, paraesophageal hernia repair (2022), cardiac ablation for arrhythmia (2003), ASD repair (1965) who presents with generalized weakness and decreased appetite for the past month. She states that symptoms started after receiving Shingles vaccination. She lives at home with ex-, takes care of ADLs independently, walks with walker. She states that she was in  bed all day and was too weak to get up. She also complains of intermittent abdominal pain and constipation. Her last bowel movement was 4 days ago (4/21), soft in character. She normally has a bowel movement every other day. Denies abdominal pain at this time. She denies nausea, vomiting, diarrhea, fevers, hematuria, dysuria, urinary frequency, numbness, tingling. She recently finished a course of antibiotics (either Bactrim or Macrobid) for UTI. She denies chest pain, difficulty breathing, palpitations, orthopnea, GARZA, leg swelling, lightheadedness.     In the ED, vital signs were stable. Afebrile, 140/70, HR 75, RR 20, initially 88% on RA, which improved to 91% on 3 L NC (baseline). CBC remarkable for Hgb 10.2 (baseline is 10-11). CMP unremarkable. Hypomagnesemia at 1.7. Negative troponins. EKG shows NSR 63, LAD, RBBB seen on prior EKG, QTc 425. TSH normal at 1.470. CXR wet read shows no acute cardiopulmonary abnormality. Abdominal XR wet read shows moderate bowel gas and stool pattern. Magnesium was replenished with MagOx 800 mg and was given a dose of subcutaneous Relistor. Admitted under observation for further management of generalized weakness.      Review of Systems:  Review of Systems   Constitutional:  Positive for chills. Negative for fever.   Respiratory:  Positive for cough (Chronic).    Cardiovascular:  Negative for chest pain, palpitations and leg swelling.   Gastrointestinal:  Positive for abdominal pain and constipation. Negative for diarrhea, nausea and vomiting.   Endocrine: Negative for polyuria.   Genitourinary:  Negative for dysuria and hematuria.   Musculoskeletal:  Positive for back pain. Negative for neck pain.   Skin:  Negative for rash and wound.   Neurological:  Positive for weakness (Generalized). Negative for dizziness, syncope, light-headedness and headaches.       Past Medical and Surgical History:   Past Medical History:   Diagnosis Date    Arthritis     Cardiac disease     COPD  (chronic obstructive pulmonary disease) (HCC)     COVID-19 2021    was in hospital for sx and was dx with covid    Diabetes mellitus (HCC)     GERD (gastroesophageal reflux disease)     Hiatal hernia     Hypertension     PUD (peptic ulcer disease)     Residual ASD (atrial septal defect) following repair        Past Surgical History:   Procedure Laterality Date    ABDOMINAL ADHESION SURGERY N/A 09/26/2022    Procedure: LYSIS ADHESIONS;  Surgeon: Petty Johnson MD;  Location: [facility];  Service: Thoracic    ASD REPAIR      BACK SURGERY      x3    CARDIAC SURGERY      Atrial septic defect    ESOPHAGOGASTRODUODENOSCOPY N/A 09/26/2022    Procedure: ESOPHAGOGASTRODUODENOSCOPY (EGD);  Surgeon: Petty Johnson MD;  Location: [facility];  Service: Thoracic    JOINT REPLACEMENT      bilateral knee surgery    JOINT REPLACEMENT      KNEE SURGERY      PARAESOPHAGEAL HERNIA REPAIR N/A 09/26/2022    Procedure: REPAIR HERNIA PARAESOPHAGEAL LAPAROSCOPIC W ROBOTICS, gastropexy, mesh placement;  Surgeon: Petty Johnson MD;  Location: [facility];  Service: Thoracic    CT EXCISION GANGLION WRIST DORSAL/VOLAR PRIMARY Left 7/21/2023    Procedure: EXCISION GANGLION CYST;  Surgeon: Jaja Pantoja MD;  Location: [facility];  Service: Orthopedics    SHOULDER SURGERY         Meds/Allergies:  Prior to Admission medications    Medication Sig Start Date End Date Taking? Authorizing Provider   albuterol (PROVENTIL HFA,VENTOLIN HFA) 90 mcg/act inhaler Inhale 2 puffs every 6 (six) hours as needed for wheezing 9/20/21   Arlen Grant DO   albuterol (PROVENTIL HFA,VENTOLIN HFA) 90 mcg/act inhaler Inhale 2 puffs every 6 (six) hours as needed for wheezing  Patient not taking: Reported on 2/28/2024    Historical Provider, MD   Alcohol Swabs (Alcohol Prep) PADS USE TO TEST BLOOD SUGAR TWICE A DAY AND AS NEEDED    Historical Provider, MD   amoxicillin (AMOXIL) 500 mg capsule Take 4 capsules by mouth as needed (prior to dental  appointment)  Patient not taking: Reported on 2/28/2024    Historical Provider, MD   ascorbic acid (VITAMIN C) 1000 MG tablet Take 1,000 mg by mouth 2 (two) times a day    Historical Provider, MD   Ascorbic Acid (vitamin C) 1000 MG tablet Take 1,000 mg by mouth 2 (two) times a day  Patient not taking: Reported on 7/21/2023    Historical Provider, MD   aspirin 81 mg chewable tablet Chew 1 tablet (81 mg total) daily 4/23/22   Julius Lees DO   aspirin 81 mg chewable tablet Chew 81 mg daily  Patient not taking: Reported on 8/24/2023    Historical Provider, MD   atorvastatin (LIPITOR) 40 mg tablet Take 1 tablet (40 mg total) by mouth every evening 4/22/22 2/28/24  Julius Lees DO   atorvastatin (LIPITOR) 40 mg tablet Take 40 mg by mouth daily  Patient not taking: Reported on 7/21/2023    Historical Provider, MD   brimonidine tartrate 0.2 % ophthalmic solution Administer 1 drop into the left eye 2 (two) times a day 4/22/22   Julius Lees DO   brimonidine tartrate 0.2 % ophthalmic solution Administer 1 drop into the left eye 2 (two) times a day  Patient not taking: Reported on 7/21/2023    Historical Provider, MD   Budeson-Glycopyrrol-Formoterol (Breztri Aerosphere) 160-9-4.8 MCG/ACT AERO Inhale 2 puffs  in the morning and 2 puffs before bedtime. Rinse mouth after use.. 5/16/22   Arlen Grant DO   Budeson-Glycopyrrol-Formoterol 160-9-4.8 MCG/ACT AERO Inhale 2 puffs Rinse mouth after use.  Patient not taking: Reported on 8/24/2023    Historical Provider, MD   dexamethasone (DECADRON) 4 mg/mL Inject 1 mL by intramuscular route.  Patient not taking: Reported on 2/28/2024 12/19/23   Historical Provider, MD   diltiazem (CARDIZEM CD) 180 mg 24 hr capsule Take 180 mg by mouth daily  Patient not taking: Reported on 7/21/2023    Historical Provider, MD   diltiazem (CARDIZEM CD) 180 mg 24 hr capsule Take 180 mg by mouth daily    Historical Provider, MD   diltiazem (CARDIZEM CD) 240 mg 24 hr capsule Take 1 capsule by  mouth daily  Patient not taking: Reported on 2/28/2024 12/16/23   Historical Provider, MD   diltiazem (CARDIZEM CD) 240 mg 24 hr capsule Take 240 mg by mouth daily  Patient not taking: Reported on 2/28/2024 12/16/23   Historical Provider, MD   ferrous sulfate 325 (65 FE) MG EC tablet Take 1 tablet by mouth daily with breakfast  Patient not taking: Reported on 2/28/2024    Historical Provider, MD   gabapentin (NEURONTIN) 300 mg capsule Take 300 mg by mouth 4 (four) times a day 3/23/20   Historical Provider, MD   gabapentin (NEURONTIN) 300 mg capsule Take 300 mg by mouth 4 (four) times a day  Patient not taking: Reported on 7/21/2023    Historical Provider, MD   lidocaine (XYLOCAINE) 1 % Take 0.5 mL by injection route.  Patient not taking: Reported on 2/28/2024 12/19/23   Historical Provider, MD   losartan (COZAAR) 25 mg tablet Take 50 mg by mouth daily 12/4/23   Historical Provider, MD   losartan (COZAAR) 50 mg tablet TAKE 1 TABLET BY MOUTH EVERY DAY  Patient not taking: Reported on 8/24/2023 7/3/21   Historical Provider, MD   losartan (COZAAR) 50 mg tablet Take 50 mg by mouth daily  Patient not taking: Reported on 8/24/2023    Historical Provider, MD   metFORMIN (GLUCOPHAGE) 1000 MG tablet 1000 mg QAM and 500 mg QPM 4/13/20   ROMANA Duran   metFORMIN (GLUCOPHAGE) 500 mg tablet TAKE 2 TABS BY MOUTH IN IN THE MORNING AND 1 TAB BY MOUTH IN IN THE EVENING 12/11/22   Historical Provider, MD   metFORMIN (GLUCOPHAGE) 500 mg tablet Take 1,000 mg by mouth daily with breakfast  Patient not taking: Reported on 8/24/2023    Historical Provider, MD   metFORMIN (GLUCOPHAGE) 500 mg tablet Take 500 mg by mouth every evening  Patient not taking: Reported on 8/2/2023    Historical Provider, MD   methocarbamol (ROBAXIN) 500 mg tablet  12/1/22   Historical Provider, MD   methocarbamol (ROBAXIN) 500 mg tablet Take 500 mg by mouth 4 (four) times a day  Patient not taking: Reported on 6/1/2023    Historical Provider, MD    methylPREDNISolone 4 MG tablet therapy pack Use as directed on package  Patient not taking: Reported on 7/18/2023 4/16/23   Carolyn Sarabia MD   morphine (MS CONTIN) 15 mg 12 hr tablet  2/14/24   Historical Provider, MD   morphine (MSIR) 15 mg tablet Take 15 mg by mouth 2 (two) times a day Every 12 hrs.    Historical Provider, MD   ondansetron (ZOFRAN) 4 mg tablet Take 1 tablet (4 mg total) by mouth every 8 (eight) hours as needed for nausea or vomiting for up to 10 doses  Patient not taking: Reported on 2/28/2024 3/10/23   Tor Gómez DO   oxyCODONE (OxyCONTIN) 10 mg 12 hr tablet Take 13.5 mg by mouth every 12 (twelve) hours  Patient not taking: Reported on 6/1/2023    Historical Provider, MD   oxyCODONE ER (Xtampza ER) 13.5 MG C12A Take 13.5 mg by mouth 2 (two) times a day Max Daily Amount: 27 mg  Patient not taking: Reported on 6/1/2023 5/16/22   Arlen Grant DO   oxygen gas Inhale 3 L/min continuous as needed    Historical Provider, MD   oxymetazoline ([facility] Nasal Spray) 0.05 % nasal spray INSTILL 2 SPRAYS INTO EACH NOSTRIL EVERY 12 HOURS AS NEEDED FOR CONGESTION  Patient not taking: Reported on 2/28/2024    Historical Provider, MD   pantoprazole (PROTONIX) 40 mg tablet Take 40 mg by mouth every 12 (twelve) hours    Historical Provider, MD   pantoprazole (PROTONIX) 40 mg tablet Take 40 mg by mouth daily  Patient not taking: Reported on 2/28/2024    Historical Provider, MD   polyethylene glycol (GLYCOLAX) 17 GM/SCOOP powder Take 17 g by mouth daily 4/10/24 5/10/24  Annamarie Henry DO   rOPINIRole (REQUIP) 0.5 mg tablet 2-3 tablets PO at HS 4/13/20   ROMANA Duran   rOPINIRole (REQUIP) 0.5 mg tablet Take 0.5 mg by mouth daily at bedtime Take 3 tablets PO before bed  Patient not taking: Reported on 7/21/2023    Historical Provider, MD   Saccharomyces boulardii (Digestive Probiotic) 250 MG CAPS Take 1 capsule by mouth 2 (two) times a day  Patient not taking: Reported on 2/28/2024    " Historical Provider, MD   sitaGLIPtin (JANUVIA) 100 mg tablet Take 1 tablet (100 mg total) by mouth daily  Patient not taking: Reported on 2023   ROMANA Duran   sitaGLIPtin (JANUVIA) 100 mg tablet Take 100 mg by mouth daily  Patient not taking: Reported on 2023    Historical Provider, MD   sulfamethoxazole-trimethoprim (BACTRIM DS) 800-160 mg per tablet Take 1 tablet by mouth every 12 (twelve) hours  Patient not taking: Reported on 2024    Historical Provider, MD   triamcinolone 40 MG/ML SUSP Take 1 mL by injection route.  Patient not taking: Reported on 23   Historical Provider, MD     I have reviewed home medications with patient personally.    Allergies:   Allergies   Allergen Reactions    Metamucil [Fiber] Lightheadedness    Prednisone      The patient reports \"they make me goofy \"  No true allergic reaction    Prednisone Lightheadedness    Psyllium Hives    Tetanus Toxoids        Social History:  Marital Status:    Occupation: Retired cardiac nurse  Patient Pre-hospital Living Situation: Home  Patient Pre-hospital Level of Mobility: walks with walker  Patient Pre-hospital Diet Restrictions: Diabetic  Substance Use History:   Social History     Substance and Sexual Activity   Alcohol Use Not Currently     Social History     Tobacco Use   Smoking Status Former    Current packs/day: 0.00    Average packs/day: 1 pack/day for 64.1 years (64.1 ttl pk-yrs)    Types: Cigarettes    Start date:     Quit date: 2020    Years since quittin.2   Smokeless Tobacco Never   Tobacco Comments    stopped smoking 2 days ago     Social History     Substance and Sexual Activity   Drug Use Never       Family History:  Family History   Problem Relation Age of Onset    Cancer Mother     Asthma Father        Physical Exam:     Vitals:   Blood Pressure: 122/73 (24)  Pulse: 62 (24)  Temperature: 98.2 °F (36.8 °C) (24)  Temp Source: Oral " (04/24/24 2108)  Respirations: 18 (04/25/24 0045)  SpO2: 97 % (04/25/24 0045)    Physical Exam  Constitutional:       Appearance: Normal appearance.   HENT:      Head: Normocephalic and atraumatic.      Mouth/Throat:      Mouth: Mucous membranes are moist.   Cardiovascular:      Rate and Rhythm: Normal rate and regular rhythm.      Heart sounds: No murmur heard.  Pulmonary:      Effort: No respiratory distress.      Breath sounds: Rhonchi (Mild) present. No wheezing or rales.      Comments: On 3 L NC  Abdominal:      General: There is no distension.      Tenderness: There is abdominal tenderness (Mild epigastric, LUQ). There is no guarding or rebound.      Comments: Hypoactive bowel sounds   Musculoskeletal:         General: No swelling, tenderness or signs of injury. Normal range of motion.      Cervical back: Normal range of motion and neck supple.      Right lower leg: No edema.      Left lower leg: No edema.   Skin:     General: Skin is warm.      Findings: No erythema or rash.   Neurological:      General: No focal deficit present.      Mental Status: She is alert and oriented to person, place, and time.      Comments: Moves all 4 extremities.        Additional Data:     Lab Results:  Results from last 7 days   Lab Units 04/25/24  0456 04/24/24  2152   WBC Thousand/uL 5.69 7.20   HEMOGLOBIN g/dL 9.9* 10.2*   HEMATOCRIT % 34.6* 34.5*   PLATELETS Thousands/uL 224 234   SEGS PCT %  --  64   LYMPHO PCT %  --  18   MONO PCT %  --  15*   EOS PCT %  --  1     Results from last 7 days   Lab Units 04/25/24  0456 04/24/24  2152   SODIUM mmol/L 138 139   POTASSIUM mmol/L 4.1 4.2   CHLORIDE mmol/L 102 100   CO2 mmol/L 29 30   BUN mg/dL 12 11   CREATININE mg/dL 0.86 0.84   ANION GAP mmol/L 7 9   CALCIUM mg/dL 8.9 9.2   ALBUMIN g/dL  --  3.7   TOTAL BILIRUBIN mg/dL  --  0.31   ALK PHOS U/L  --  66   ALT U/L  --  7   AST U/L  --  11*   GLUCOSE RANDOM mg/dL 179* 123                       Lines/Drains:  Invasive Devices        Peripheral Intravenous Line  Duration             Peripheral IV 04/24/24 Proximal;Right;Ventral (anterior) Forearm <1 day                    Imaging: Reviewed radiology reports from this admission including: chest xray and xray(s)  XR chest 1 view portable   ED Interpretation by Derrick Mckinley MD (04/24 2215)   No acute changes.      XR abdomen 1 view kub   ED Interpretation by Derrick Mckinley MD (04/24 2216)   Moderate bowel gas and stool throughout colon.          EKG and Other Studies Reviewed on Admission:   EKG: NSR. HR 63. RBBB. LAD. QTc 425    ** Please Note: This note has been constructed using a voice recognition system. **

## 2024-04-25 NOTE — ASSESSMENT & PLAN NOTE
- Has been having intermittent left-sided abdominal pain for the past month.   - Multiple recent ED visits for the same  - CTAP (4/2/24) - Probable cystitis. 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 Follow-up with pancreatic MRI. Moderate colonic stool burden. No bowel obstruction. Predominantly distal diverticulosis without diverticulitis  - CTAP (4/10/24) - No evidence of bowel obstruction, colitis or diverticulitis. Findings compatible with constipation.  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 In retrospect this observation can be partially observed on CT of the chest dated 10/2/2018 which further indicates benign/indolent process. Typically no further follow-up is recommended if the patient has reached the age of 80.     Plan:  - Check lipase  - Not currently having abdominal pain at this time. Continue to monitor  - Follow up with GI as outpatient

## 2024-04-25 NOTE — PLAN OF CARE
Problem: PAIN - ADULT  Goal: Verbalizes/displays adequate comfort level or baseline comfort level  Description: Interventions:  - Encourage patient to monitor pain and request assistance  - Assess pain using appropriate pain scale  - Administer analgesics based on type and severity of pain and evaluate response  - Implement non-pharmacological measures as appropriate and evaluate response  - Consider cultural and social influences on pain and pain management  - Notify physician/advanced practitioner if interventions unsuccessful or patient reports new pain  Outcome: Progressing     Problem: INFECTION - ADULT  Goal: Absence or prevention of progression during hospitalization  Description: INTERVENTIONS:  - Assess and monitor for signs and symptoms of infection  - Monitor lab/diagnostic results  - Monitor all insertion sites, i.e. indwelling lines, tubes, and drains  - Monitor endotracheal if appropriate and nasal secretions for changes in amount and color  - [place] appropriate cooling/warming therapies per order  - Administer medications as ordered  - Instruct and encourage patient and family to use good hand hygiene technique  - Identify and instruct in appropriate isolation precautions for identified infection/condition  Outcome: Progressing  Goal: Absence of fever/infection during neutropenic period  Description: INTERVENTIONS:  - Monitor WBC    Outcome: Progressing     Problem: SAFETY ADULT  Goal: Patient will remain free of falls  Description: INTERVENTIONS:  - Educate patient/family on patient safety including physical limitations  - Instruct patient to call for assistance with activity   - Consult OT/PT to assist with strengthening/mobility   - Keep Call bell within reach  - Keep bed low and locked with side rails adjusted as appropriate  - Keep care items and personal belongings within reach  - Initiate and maintain comfort rounds  - Make Fall Risk Sign visible to staff  - Offer Toileting ev Hours, in  advance of need  - Initiate/Maintain alarm  - Obtain necessary fall risk management equipment:   - Apply yellow socks and bracelet for high fall risk patients  - Consider moving patient to room near nurses station  Outcome: Progressing  Goal: Maintain or return to baseline ADL function  Description: INTERVENTIONS:  -  Assess patient's ability to carry out ADLs; assess patient's baseline for ADL function and identify physical deficits which impact ability to perform ADLs (bathing, care of mouth/teeth, toileting, grooming, dressing, etc.)  - Assess/evaluate cause of self-care deficits   - Assess range of motion  - Assess patient's mobility; develop plan if impaired  - Assess patient's need for assistive devices and provide as appropriate  - Encourage maximum independence but intervene and supervise when necessary  - Involve family in performance of ADLs  - Assess for home care needs following discharge   - Consider OT consult to assist with ADL evaluation and planning for discharge  - Provide patient education as appropriate  Outcome: Progressing  Goal: Maintains/Returns to pre admission functional level  Description: INTERVENTIONS:  - Perform AM-PAC 6 Click Basic Mobility/ Daily Activity assessment daily.  - Set and communicate daily mobility goal to care team and patient/family/caregiver.   - Collaborate with rehabilitation services on mobility goals if consulted  - Perform Range of [place] times a day.  - Reposition patient every  hours.  - Dangle patient  times a day  - Stand patient  times a day  - Ambulate patient  times a day  - Out of bed to chair  times a day   - Out of bed for meals  times a day  - Out of bed for toileting  - Record patient progress and toleration of activity level   Outcome: Progressing     Problem: DISCHARGE PLANNING  Goal: Discharge to home or other facility with appropriate resources  Description: INTERVENTIONS:  - Identify barriers to discharge w/patient and caregiver  - Arrange for  needed discharge resources and transportation as appropriate  - Identify discharge learning needs (meds, wound care, etc.)  - Arrange for interpretive services to assist at discharge as needed  - Refer to Case Management Department for coordinating discharge planning if the patient needs post-hospital services based on physician/advanced practitioner order or complex needs related to functional status, cognitive ability, or social support system  Outcome: Progressing     Problem: Knowledge Deficit  Goal: Patient/family/caregiver demonstrates understanding of disease process, treatment plan, medications, and discharge instructions  Description: Complete learning assessment and assess knowledge base.  Interventions:  - Provide teaching at level of understanding  - Provide teaching via preferred learning methods  Outcome: Progressing

## 2024-04-25 NOTE — ED PROVIDER NOTES
History  Chief Complaint   Patient presents with    Abdominal Pain     Pt reports abdominal pain,weakness, and decreased appetite starting about a week ago. Denies nausea/vomiting. Chronically on 3L NC at home       History provided by:  Parent, medical records and spouse   used: No    Abdominal Pain  Associated symptoms: chills, constipation, diarrhea, fatigue and shortness of breath    Associated symptoms: no chest pain, no cough, no dysuria, no fever, no nausea and no vomiting      83-year-old female presented for evaluation of worsening generalized weakness, poor appetite over the last few weeks.  She states today she was in bed all day and was too weak to get up.  Typically does laundry, cleans in the kitchen but felt like she could do any those things today.  She has a history of COPD with chronic respiratory failure on 3 L nasal cannula chronic dyspnea.  Denies any worsening shortness of breath today.  No chest pain.  Also reports feeling cold inside which does not improve with warmer temperatures or use of blankets or clothing.  She had been constipated as well and had trialed MiraLAX but reports that given her diarrhea.  Takes 30 mg of morphine daily which may exacerbate constipation.  She is still using Colace.  She does still produce soft bowel movements but has been having fairly consistent left-sided abdominal pain.  Has been seen in the emergency department multiple times over the last few weeks and had abdominal CTs showing constipation.  Lab work overall has been unremarkable.      Prior to Admission Medications   Prescriptions Last Dose Informant Patient Reported? Taking?   Alcohol Swabs (Alcohol Prep) PADS  Self Yes No   Sig: USE TO TEST BLOOD SUGAR TWICE A DAY AND AS NEEDED   Ascorbic Acid (vitamin C) 1000 MG tablet  Self Yes No   Sig: Take 1,000 mg by mouth 2 (two) times a day   Patient not taking: Reported on 7/21/2023   Budeson-Glycopyrrol-Formoterol (Breztri Aerosphere)  160-9-4.8 MCG/ACT AERO  Self No No   Sig: Inhale 2 puffs  in the morning and 2 puffs before bedtime. Rinse mouth after use..   Budeson-Glycopyrrol-Formoterol 160-9-4.8 MCG/ACT AERO  Self Yes No   Sig: Inhale 2 puffs Rinse mouth after use.   Patient not taking: Reported on 8/24/2023   Saccharomyces boulardii (Digestive Probiotic) 250 MG CAPS  Self Yes No   Sig: Take 1 capsule by mouth 2 (two) times a day   Patient not taking: Reported on 2/28/2024   albuterol (PROVENTIL HFA,VENTOLIN HFA) 90 mcg/act inhaler  Self No No   Sig: Inhale 2 puffs every 6 (six) hours as needed for wheezing   albuterol (PROVENTIL HFA,VENTOLIN HFA) 90 mcg/act inhaler  Self Yes No   Sig: Inhale 2 puffs every 6 (six) hours as needed for wheezing   Patient not taking: Reported on 2/28/2024   amoxicillin (AMOXIL) 500 mg capsule  Self Yes No   Sig: Take 4 capsules by mouth as needed (prior to dental appointment)   Patient not taking: Reported on 2/28/2024   ascorbic acid (VITAMIN C) 1000 MG tablet  Self Yes No   Sig: Take 1,000 mg by mouth 2 (two) times a day   aspirin 81 mg chewable tablet  Self No No   Sig: Chew 1 tablet (81 mg total) daily   aspirin 81 mg chewable tablet  Self Yes No   Sig: Chew 81 mg daily   Patient not taking: Reported on 8/24/2023   atorvastatin (LIPITOR) 40 mg tablet  Self No No   Sig: Take 1 tablet (40 mg total) by mouth every evening   atorvastatin (LIPITOR) 40 mg tablet  Self Yes No   Sig: Take 40 mg by mouth daily   Patient not taking: Reported on 7/21/2023   brimonidine tartrate 0.2 % ophthalmic solution  Self No No   Sig: Administer 1 drop into the left eye 2 (two) times a day   brimonidine tartrate 0.2 % ophthalmic solution  Self Yes No   Sig: Administer 1 drop into the left eye 2 (two) times a day   Patient not taking: Reported on 7/21/2023   dexamethasone (DECADRON) 4 mg/mL  Self Yes No   Sig: Inject 1 mL by intramuscular route.   Patient not taking: Reported on 2/28/2024   diltiazem (CARDIZEM CD) 180 mg 24 hr  capsule  Self Yes No   Sig: Take 180 mg by mouth daily   Patient not taking: Reported on 2023   diltiazem (CARDIZEM CD) 180 mg 24 hr capsule  Self Yes No   Sig: Take 180 mg by mouth daily   diltiazem (CARDIZEM CD) 240 mg 24 hr capsule  Self Yes No   Sig: Take 1 capsule by mouth daily   Patient not taking: Reported on 2024   diltiazem (CARDIZEM CD) 240 mg 24 hr capsule  Self Yes No   Sig: Take 240 mg by mouth daily   Patient not taking: Reported on 2024   ferrous sulfate 325 (65 FE) MG EC tablet  Self Yes No   Sig: Take 1 tablet by mouth daily with breakfast   Patient not taking: Reported on 2024   gabapentin (NEURONTIN) 300 mg capsule  Self Yes No   Sig: Take 300 mg by mouth 4 (four) times a day   gabapentin (NEURONTIN) 300 mg capsule  Self Yes No   Sig: Take 300 mg by mouth 4 (four) times a day   Patient not taking: Reported on 2023   lidocaine (XYLOCAINE) 1 %  Self Yes No   Sig: Take 0.5 mL by injection route.   Patient not taking: Reported on 2024   losartan (COZAAR) 25 mg tablet  Self Yes No   Sig: Take 50 mg by mouth daily   losartan (COZAAR) 50 mg tablet  Self Yes No   Sig: TAKE 1 TABLET BY MOUTH EVERY DAY   Patient not taking: Reported on 2023   losartan (COZAAR) 50 mg tablet  Self Yes No   Sig: Take 50 mg by mouth daily   Patient not taking: Reported on 2023   metFORMIN (GLUCOPHAGE) 1000 MG tablet  Self No No   Si mg QAM and 500 mg QPM   metFORMIN (GLUCOPHAGE) 500 mg tablet  Self Yes No   Sig: TAKE 2 TABS BY MOUTH IN IN THE MORNING AND 1 TAB BY MOUTH IN IN THE EVENING   metFORMIN (GLUCOPHAGE) 500 mg tablet  Self Yes No   Sig: Take 1,000 mg by mouth daily with breakfast   Patient not taking: Reported on 2023   metFORMIN (GLUCOPHAGE) 500 mg tablet  Self Yes No   Sig: Take 500 mg by mouth every evening   Patient not taking: Reported on 2023   methocarbamol (ROBAXIN) 500 mg tablet  Self Yes No   methocarbamol (ROBAXIN) 500 mg tablet  Self Yes No   Sig: Take  500 mg by mouth 4 (four) times a day   Patient not taking: Reported on 2023   methylPREDNISolone 4 MG tablet therapy pack  Self No No   Sig: Use as directed on package   Patient not taking: Reported on 2023   morphine (MS CONTIN) 15 mg 12 hr tablet  Self Yes No   Patient not taking: Reported on 2024   morphine (MSIR) 15 mg tablet  Self Yes No   Sig: Take 15 mg by mouth 2 (two) times a day Every 12 hrs.   ondansetron (ZOFRAN) 4 mg tablet  Self No No   Sig: Take 1 tablet (4 mg total) by mouth every 8 (eight) hours as needed for nausea or vomiting for up to 10 doses   Patient not taking: Reported on 2024   oxyCODONE (OxyCONTIN) 10 mg 12 hr tablet  Self Yes No   Sig: Take 13.5 mg by mouth every 12 (twelve) hours   Patient not taking: Reported on 2023   oxyCODONE ER (Xtampza ER) 13.5 MG C12A  Self No No   Sig: Take 13.5 mg by mouth 2 (two) times a day Max Daily Amount: 27 mg   Patient not taking: Reported on 2023   oxygen gas  Self Yes No   Sig: Inhale 3 L/min continuous as needed   oxymetazoline ([facility] Nasal Spray) 0.05 % nasal spray  Self Yes No   Sig: INSTILL 2 SPRAYS INTO EACH NOSTRIL EVERY 12 HOURS AS NEEDED FOR CONGESTION   Patient not taking: Reported on 2024   pantoprazole (PROTONIX) 40 mg tablet  Self Yes No   Sig: Take 40 mg by mouth every 12 (twelve) hours   pantoprazole (PROTONIX) 40 mg tablet  Self Yes No   Sig: Take 40 mg by mouth daily   Patient not taking: Reported on 2024   polyethylene glycol (GLYCOLAX) 17 GM/SCOOP powder   No No   Sig: Take 17 g by mouth daily   rOPINIRole (REQUIP) 0.5 mg tablet  Self No No   Si-3 tablets PO at HS   rOPINIRole (REQUIP) 0.5 mg tablet  Self Yes No   Sig: Take 0.5 mg by mouth daily at bedtime Take 3 tablets PO before bed   Patient not taking: Reported on 2023   sitaGLIPtin (JANUVIA) 100 mg tablet  Self No No   Sig: Take 1 tablet (100 mg total) by mouth daily   Patient not taking: Reported on 2023   sitaGLIPtin (JANUVIA) 100  mg tablet  Self Yes No   Sig: Take 100 mg by mouth daily   Patient not taking: Reported on 7/21/2023   sulfamethoxazole-trimethoprim (BACTRIM DS) 800-160 mg per tablet  Self Yes No   Sig: Take 1 tablet by mouth every 12 (twelve) hours   Patient not taking: Reported on 2/28/2024   triamcinolone 40 MG/ML SUSP  Self Yes No   Sig: Take 1 mL by injection route.   Patient not taking: Reported on 2/28/2024      Facility-Administered Medications: None       Past Medical History:   Diagnosis Date    Arthritis     Cardiac disease     COPD (chronic obstructive pulmonary disease) (HCC)     COVID-19 2021    was in hospital for sx and was dx with covid    Diabetes mellitus (HCC)     GERD (gastroesophageal reflux disease)     Hiatal hernia     Hypertension     PUD (peptic ulcer disease)     Residual ASD (atrial septal defect) following repair        Past Surgical History:   Procedure Laterality Date    ABDOMINAL ADHESION SURGERY N/A 09/26/2022    Procedure: LYSIS ADHESIONS;  Surgeon: Petty Johnson MD;  Location: [facility];  Service: Thoracic    ASD REPAIR      BACK SURGERY      x3    CARDIAC SURGERY      Atrial septic defect    ESOPHAGOGASTRODUODENOSCOPY N/A 09/26/2022    Procedure: ESOPHAGOGASTRODUODENOSCOPY (EGD);  Surgeon: Petty Johnson MD;  Location: [facility];  Service: Thoracic    JOINT REPLACEMENT      bilateral knee surgery    JOINT REPLACEMENT      KNEE SURGERY      PARAESOPHAGEAL HERNIA REPAIR N/A 09/26/2022    Procedure: REPAIR HERNIA PARAESOPHAGEAL LAPAROSCOPIC W ROBOTICS, gastropexy, mesh placement;  Surgeon: Petty Johnson MD;  Location: [facility];  Service: Thoracic    NC EXCISION GANGLION WRIST DORSAL/VOLAR PRIMARY Left 7/21/2023    Procedure: EXCISION GANGLION CYST;  Surgeon: Jaja Pantoja MD;  Location: [facility];  Service: Orthopedics    SHOULDER SURGERY         Family History   Problem Relation Age of Onset    Cancer Mother     Asthma Father      I have reviewed and agree with the  history as documented.    E-Cigarette/Vaping    E-Cigarette Use Never User      E-Cigarette/Vaping Substances    Nicotine No     THC No     CBD No     Flavoring No     Other No     Unknown No      Social History     Tobacco Use    Smoking status: Former     Current packs/day: 0.00     Average packs/day: 1 pack/day for 64.1 years (64.1 ttl pk-yrs)     Types: Cigarettes     Start date:      Quit date: 2020     Years since quittin.2    Smokeless tobacco: Never    Tobacco comments:     stopped smoking 2 days ago   Vaping Use    Vaping status: Never Used   Substance Use Topics    Alcohol use: Yes     Alcohol/week: 1.0 standard drink of alcohol     Types: 1 Glasses of wine per week    Drug use: Never       Review of Systems   Constitutional:  Positive for activity change, appetite change, chills and fatigue. Negative for fever.   Respiratory:  Positive for shortness of breath. Negative for cough and chest tightness.    Cardiovascular:  Negative for chest pain.   Gastrointestinal:  Positive for abdominal pain, constipation and diarrhea. Negative for nausea and vomiting.   Genitourinary:  Negative for dysuria and frequency.   Musculoskeletal:  Negative for back pain and neck pain.   Skin:  Negative for color change and wound.   Neurological:  Negative for dizziness, weakness and light-headedness.   All other systems reviewed and are negative.      Physical Exam  Physical Exam  Vitals and nursing note reviewed.   Constitutional:       Appearance: She is well-developed.   HENT:      Head: Normocephalic and atraumatic.      Mouth/Throat:      Mouth: Mucous membranes are moist.   Cardiovascular:      Rate and Rhythm: Normal rate and regular rhythm.   Pulmonary:      Comments: Tachypneic.  Conversational dyspnea.  Diminished breath sounds throughout.  Abdominal:      Palpations: Abdomen is soft.      Hernia: No hernia is present.      Comments: Left mid abdominal tenderness without guarding. No masses.   Skin:      General: Skin is warm and dry.      Capillary Refill: Capillary refill takes less than 2 seconds.      Comments: Acrocyanosis.   Neurological:      General: No focal deficit present.      Mental Status: She is alert and oriented to person, place, and time.   Psychiatric:         Mood and Affect: Mood normal.         Behavior: Behavior normal.         Vital Signs  ED Triage Vitals   Temperature Pulse Respirations Blood Pressure SpO2   04/24/24 2108 04/24/24 2107 04/24/24 2107 04/24/24 2108 04/24/24 2107   98.1 °F (36.7 °C) 75 20 140/70 (!) 88 %      Temp Source Heart Rate Source Patient Position - Orthostatic VS BP Location FiO2 (%)   04/24/24 2108 04/24/24 2107 -- 04/24/24 2108 --   Oral Monitor  Right arm       Pain Score       --                  Vitals:    04/24/24 2107 04/24/24 2108   BP:  140/70   Pulse: 75          Visual Acuity      ED Medications  Medications   magnesium Oxide (MAG-OX) tablet 800 mg (has no administration in time range)   methylnaltrexone (Relistor) subcutaneous injection 6 mg (has no administration in time range)       Diagnostic Studies  Results Reviewed       Procedure Component Value Units Date/Time    TSH, 3rd generation with Free T4 reflex [[id number]]  (Normal) Collected: 04/24/24 2152    Lab Status: Final result Specimen: Blood from Arm, Right Updated: 04/24/24 2306     TSH 3RD GENERATON 1.470 uIU/mL     HS Troponin I 2hr [[id number]]     Lab Status: No result Specimen: Blood     HS Troponin 0hr (reflex protocol) [[id number]]  (Normal) Collected: 04/24/24 2152    Lab Status: Final result Specimen: Blood from Arm, Right Updated: 04/24/24 2257     hs TnI 0hr 6 ng/L     Comprehensive metabolic panel [[id number]]  (Abnormal) Collected: 04/24/24 2152    Lab Status: Final result Specimen: Blood from Arm, Right Updated: 04/24/24 2254     Sodium 139 mmol/L      Potassium 4.2 mmol/L      Chloride 100 mmol/L      CO2 30 mmol/L      ANION GAP 9 mmol/L      BUN 11 mg/dL      Creatinine 0.84 mg/dL       Glucose 123 mg/dL      Calcium 9.2 mg/dL      AST 11 U/L      ALT 7 U/L      Alkaline Phosphatase 66 U/L      Total Protein 6.2 g/dL      Albumin 3.7 g/dL      Total Bilirubin 0.31 mg/dL      eGFR 64 ml/min/1.73sq m     Narrative:      [employer] guidelines for Chronic Kidney Disease (CKD):     Stage 1 with normal or high GFR (GFR > 90 mL/min/1.73 square meters)    Stage 2 Mild CKD (GFR = 60-89 mL/min/1.73 square meters)    Stage 3A Moderate CKD (GFR = 45-59 mL/min/1.73 square meters)    Stage 3B Moderate CKD (GFR = 30-44 mL/min/1.73 square meters)    Stage 4 Severe CKD (GFR = 15-29 mL/min/1.73 square meters)    Stage 5 End Stage CKD (GFR <15 mL/min/1.73 square meters)  Note: GFR calculation is accurate only with a steady state creatinine    Magnesium [[id number]]  (Abnormal) Collected: 04/24/24 2152    Lab Status: Final result Specimen: Blood from Arm, Right Updated: 04/24/24 2254     Magnesium 1.7 mg/dL     Phosphorus [[id number]]  (Normal) Collected: 04/24/24 2152    Lab Status: Final result Specimen: Blood from Arm, Right Updated: 04/24/24 2254     Phosphorus 3.5 mg/dL     CBC and differential [[id number]]  (Abnormal) Collected: 04/24/24 2152    Lab Status: Final result Specimen: Blood from Arm, Right Updated: 04/24/24 2241     WBC 7.20 Thousand/uL      RBC 4.15 Million/uL      Hemoglobin 10.2 g/dL      Hematocrit 34.5 %      MCV 83 fL      MCH 24.6 pg      MCHC 29.6 g/dL      RDW 17.8 %      MPV 9.6 fL      Platelets 234 Thousands/uL      nRBC 0 /100 WBCs      Segmented % 64 %      Immature Grans % 1 %      Lymphocytes % 18 %      Monocytes % 15 %      Eosinophils Relative 1 %      Basophils Relative 1 %      Absolute Neutrophils 4.64 Thousands/µL      Absolute Immature Grans 0.05 Thousand/uL      Absolute Lymphocytes 1.26 Thousands/µL      Absolute Monocytes 1.10 Thousand/µL      Eosinophils Absolute 0.09 Thousand/µL      Basophils Absolute 0.06 Thousands/µL                    XR chest  1 view portable   ED Interpretation by Derrick Mckinley MD (04/24 2215)   No acute changes.      XR abdomen 1 view kub   ED Interpretation by Derrick Mckinley MD (04/24 2216)   Moderate bowel gas and stool throughout colon.                 Procedures  ECG 12 Lead Documentation Only    Date/Time: 4/24/2024 9:48 PM    Performed by: Derrick Mckinley MD  Authorized by: Derrick Mckinley MD    Indications / Diagnosis:  Weakness  ECG reviewed by me, the ED Provider: yes    Patient location:  ED  Previous ECG:     Previous ECG:  Compared to current    Comparison ECG info:  4/10/24    Similarity:  No change  Rate:     ECG rate:  63  Rhythm:     Rhythm: sinus rhythm    Ectopy:     Ectopy: none    QRS:     QRS axis:  Left  Conduction:     Conduction: abnormal      Abnormal conduction: complete RBBB    ST segments:     ST segments:  Normal  T waves:     T waves: normal             ED Course  ED Course as of 04/24/24 2337   Wed Apr 24, 2024   2255 Hemoglobin(!): 10.2  Stable anemia.   2255 MAGNESIUM(!): 1.7   2255 Baseline renal function.   2309 TSH 3RD GENERATON: 1.470   2315 Patient resting comfortably at the moment.  Plan admission for continued workup and further management.                                             Medical Decision Making  83-year-old female presented for evaluation of worsening generalized weakness, poor appetite over the last few weeks.  She states today she was in bed all day and was too weak to get up.  Typically does laundry, cleans in the kitchen but felt like she could do any those things today.  She has a history of COPD with chronic respiratory failure on 3 L nasal cannula chronic dyspnea.  Denies any worsening shortness of breath today.  No chest pain.  Also reports feeling cold inside which does not improve with warmer temperatures or use of blankets or clothing.  She had been constipated as well and had trialed MiraLAX but reports that given her diarrhea.  Takes 30 mg of morphine daily  which may exacerbate constipation.  She is still using Colace.  She does still produce soft bowel movements but has been having fairly consistent left-sided abdominal pain.  Has been seen in the emergency department multiple times over the last few weeks and had abdominal CTs showing constipation.  Lab work overall has been unremarkable.  Today troponin, CMP, TSH, CBC at baseline.  Mild magnesemia.  Giving magnesium oxide.  KUB today still shows constipation.  Giving methylnaltrexone for likely opioid-induced constipation.  Admitting to medical service for continued workup and management.    Amount and/or Complexity of Data Reviewed  Labs: ordered. Decision-making details documented in ED Course.  Radiology: ordered and independent interpretation performed.    Risk  OTC drugs.  Prescription drug management.  Decision regarding hospitalization.             Disposition  Final diagnoses:   Failure to thrive in adult   Generalized weakness   Constipation   Hypomagnesemia     Time reflects when diagnosis was documented in both MDM as applicable and the Disposition within this note       Time User Action Codes Description Comment    4/24/2024 11:19 PM Derrick Mckinley Add [R62.7] Failure to thrive in adult     4/24/2024 11:19 PM Derrick Mckinley Add [R53.1] Generalized weakness     4/24/2024 11:19 PM Derrick Mckinley Add [K59.00] Constipation     4/24/2024 11:23 PM Derrick Mckinley Add [E83.42] Hypomagnesemia           ED Disposition       ED Disposition   Admit    Condition   Stable    Date/Time   Wed Apr 24, 2024 11:37 PM    Comment   Case was discussed with [facility] resident and the patient's admission status was agreed to be Admission Status: observation status to the service of Dr. Martinez .               Follow-up Information    None         Patient's Medications   Discharge Prescriptions    No medications on file       No discharge procedures on file.    PDMP Review         Value Time User    PDMP Reviewed  Yes  4/24/2024  9:31 PM Derrick Mckinley MD            ED Provider  Electronically Signed by             Derrick Mckinley MD  04/24/24 6980

## 2024-04-25 NOTE — ASSESSMENT & PLAN NOTE
- Baseline is 3 L NC  - Has nonproductive chronic cough  - Former smoker  - Not currently in exacerbation  - Home med: Breztri 2 puffs BID   - CXR - wet read, no acute cardiopulmonary abnormality  - Currently on 3 L NC, saturating upper 90%    Plan:  - Continue formulary equivalent Symbicort 2 puffs BID  - Supplemental oxygen as necessary

## 2024-04-25 NOTE — ASSESSMENT & PLAN NOTE
- Generalized weakness, poor appetite over the past month  - Symptoms started after receiving Shingles vaccination  - Lives at home with ex-. Takes care of ADLs independently  - Nonfocal neurological examination  - Ambulates with walker  - TSH 1.470, normal. No history of hypothyroidism  - B12 low   - Iron low    Plan:  - PT/OT consult  - Encourage oral intake  - B12 shot q30 days  - 300mg Venofer x1

## 2024-04-25 NOTE — ASSESSMENT & PLAN NOTE
Lab Results   Component Value Date/Time    Hemoglobin 10.2 (L) 04/24/2024 09:52 PM    Hemoglobin 10.8 (L) 04/10/2024 02:09 PM    Hemoglobin 10.5 (L) 04/02/2024 12:53 PM    MCV 83 04/24/2024 09:52 PM    MCV 82 04/10/2024 02:09 PM    MCV 82 04/02/2024 12:53 PM     Plan:  - Hgb at baseline  - Check iron panel  - Check B12, folate

## 2024-04-25 NOTE — ASSESSMENT & PLAN NOTE
Lab Results   Component Value Date    HGBA1C 6.8 (H) 08/21/2023     - Home meds: Metformin 1000 mg qAM and 500 mg qPM, Januvia 100 mg  - Blood glucose 123 on CMP    Plan:  - Hold home antihyperglycemic agents  - SSI only algorithm 3  - Carbohydrate level 2 diet  - Hypoglycemia protocol  - Check HgbA1c

## 2024-04-25 NOTE — UTILIZATION REVIEW
Initial Clinical Review  OBSERVATION  4/24/24 @ 2336  CONVERTED TO INPATIENT ADMISSION 4/25/24 @ 1548  DUE TO CONTINUED STAY REQUIRED TO EVALUATE AND TREAT PATIENT WITH GENERALIZED WEAKNESS, CONSTIPATION WITH ONGOING BOWEL REGIME.PT/OT .     Admission: Date/Time/Statement:   Admission Orders (From admission, onward)       Ordered        04/25/24 1548  INPATIENT ADMISSION  Once            04/24/24 2336  Place in Observation  Once                          Orders Placed This Encounter   Procedures    INPATIENT ADMISSION     Standing Status:   Standing     Number of Occurrences:   1     Order Specific Question:   Level of Care     Answer:   Med Surg [16]     Order Specific Question:   Estimated length of stay     Answer:   More than 2 Midnights     Order Specific Question:   Certification     Answer:   I certify that inpatient services are medically necessary for this patient for a duration of greater than two midnights. See H&P and MD Progress Notes for additional information about the patient's course of treatment.     ED Arrival Information       Expected   -    Arrival   4/24/2024 20:56    Acuity   Urgent              Means of arrival   Walk-In    Escorted by   [facility]    Service   Hospitalist    Admission type   Emergency              Arrival complaint   Unable to eat  Shaking  Cold             Chief Complaint   Patient presents with    Abdominal Pain     Pt reports abdominal pain,weakness, and decreased appetite starting about a week ago. Denies nausea/vomiting. Chronically on 3L NC at home       Initial Presentation: 83 y.o. female with hx HTN, HLD, DM2, COPD on 3 L NC as baseline , chronic back pain, GERD, NATHANIEL, restless legs syndrome, paraesophageal hernia repair (2022), cardiac ablation for arrhythmia (2003), ASD repair (1965), recent UTI- completed abx  who presents to ED from home with generalized weakness and decreased appetite for the past month. She states that symptoms started after receiving Shingles  vaccination. Pt felt to weak to get OOB all day, also complains of intermittent abdominal pain and constipation. Last bowel movement was 4 days ago (4/21) . On exam, RA sat 88 %, O2 @ 3 L applied with sat 91 %. Rhonchi noted. Hypoactive bowel sounds with epigastric and LUQ abdominal tenderness. HOLMAN .  Labs hgb 10.2 with baseline 10-11, mag 1.7 . TSH normal    ECG NSR 63, LAD, RBBB seen on prior EKG, QTc 425. CXR shows nothing acute on prelom read . Abdominal XR shows moderate bowel gas and stool pattern on prelim read . Pt given mag po and SC Relistor in ED. Admitted as OBS with generalized weakness, abdominal pain, constipation . Plan - PT/OT . Check iron panel, B12, folate . Give 2 more doses of Relistor 6 mg subq  .Bowel regimen with scheduled MiraLAX and Senokot Call or return to clinic prn if these symptoms worsen or fail to improve as anticipated. Dulcolax suppos.     Anticipated Length of Stay/Certification Statement: Anticipate discharge to discharge location to be determined pending rehab evaluations.     Date: 4/25 Converted to IP   Pt complains of being cold. Receiving daily Relistor and prn Dulcolax suppos  today as well as Miralax  and senna - has not had a bowel movement in 4 days.Morphine changed from scheduled to prn - attempt to wean as able . Pt ordered mag citrate x 1 .  Labs : B12 and iron low. Ordered daily venofer , start B12 IM today,  q 30 days .   PT- AM-PAC Basic Mobility Inpatient Short Form Raw Score is 17. A Raw score of greater than 16 suggests the patient may benefit from discharge to home . Pt is close to her mobility baseline.  She is at risk for falls based on infrequent impulsivity and obesity.    Recommend next several sessions focus on continued mobility training including increased activity tolerance, self regulated pacing, LE strengthening.   OT-  Level III rehab resource intensity . Pt requires supervision for UB ADLs, GISELLA for LB ADLs, MOD I for bed mobility, supervision for  transfers and CGA for functional mobility household distance with RW.     Date: 4/26   Day 3: Has surpassed a 2nd midnight with active treatments and services.  Pt had small bm overnight, had several large loose bm's today.  Relistor , senna and  Miralax d/c'ed as scheduled. PRN Miralax and senna.  Pt AAO x 4 . + GARZA , lungs clear . O2 @ 3 L . PLan upon d/c will be to home w/ [facility] . IPCM working on obtaining longer O2 tubing for pt .     ED Triage Vitals   Temperature Pulse Respirations Blood Pressure SpO2   04/24/24 2108 04/24/24 2107 04/24/24 2107 04/24/24 2108 04/24/24 2107   98.1 °F (36.7 °C) 75 20 140/70 (!) 88 %      Temp Source Heart Rate Source Patient Position - Orthostatic VS BP Location FiO2 (%)   04/24/24 2108 04/24/24 2107 04/24/24 2346 04/24/24 2108 --   Oral Monitor Sitting Right arm       Pain Score       04/25/24 0100       No Pain          Wt Readings from Last 1 Encounters:   04/25/24 76.2 kg (168 lb)     Additional Vital Signs:   Date/Time Temp Pulse Resp BP MAP (mmHg) SpO2 Calculated FIO2 (%) - Nasal Cannula Nasal Cannula O2 Flow Rate (L/min) O2 Device   04/26/24 11:09:27 97.6 °F (36.4 °C) 68 18 123/61 82 98 % -- -- --   04/26/24 0827 -- -- -- 117/68 -- -- -- -- --   04/26/24 07:38:35 98.2 °F (36.8 °C) 56 18 117/68 84 98 % -- -- --   04/25/24 22:57:52 98.3 °F (36.8 °C) 66 20 133/70 91 96 % -- -- --   04/25/24 2013 -- -- -- -- -- -- 32 3 L/min Nasal cannula   04/25/24 15:14:39 98.4 °F (36.9 °C) 61 12 115/62 80 95 % -- -- --     Date/Time Temp Pulse Resp BP MAP (mmHg) SpO2 Calculated FIO2 (%) - Nasal Cannula Nasal Cannula O2 Flow Rate (L/min) O2 Device   04/25/24 07:33:05 98.1 °F (36.7 °C) 61 18 123/72 89 98 % -- -- --   04/25/24 0100 -- -- -- -- -- -- 32 3 L/min Nasal cannula   04/25/24 00:45:32 98.2 °F (36.8 °C) 62 18 122/73 89 97 % -- -- --   04/24/24 2346 -- 62 22 146/69 99 98 % 32 3 L/min Nasal cannula   04/24/24 2108 98.1 °F (36.7 °C) -- -- 140/70 99 91 % 32 3 L/min  Nasal cannula      Pertinent Labs/Diagnostic Test Results:    4/24 ECG-ED read ECG rate:  63   Rhythm:     Rhythm: sinus rhythm    Ectopy:     Ectopy: none    QRS:     QRS axis:  Left   Conduction:     Conduction: abnormal      Abnormal conduction: complete RBBB    ST segments:     ST segments:  Normal   T waves:     T waves: normal     XR chest 1 view portable   ED Interpretation by Derrick Mckinley MD (04/24 2215)   No acute changes.      Final Result by Toni Dennison MD (04/25 0842)      No acute cardiopulmonary disease.            Workstation performed: [id number]         XR abdomen 1 view kub   ED Interpretation by Derrick Mckinley MD (04/24 2216)   Moderate bowel gas and stool throughout colon.      Final Result by Toni Dennison MD (04/25 0843)      Mildly increased colonic stool burden, similar to prior CT. Nonobstructive bowel gas pattern.               Workstation performed: [id number]           Results from last 7 days   Lab Units 04/25/24  0120   SARS-COV-2  Negative     Results from last 7 days   Lab Units 04/26/24  0446 04/25/24  0456 04/24/24  2152   WBC Thousand/uL 5.73 5.69 7.20   HEMOGLOBIN g/dL 9.4* 9.9* 10.2*   HEMATOCRIT % 31.8* 34.6* 34.5*   PLATELETS Thousands/uL 231 224 234   TOTAL NEUT ABS Thousands/µL 3.61  --  4.64         Results from last 7 days   Lab Units 04/26/24  0446 04/25/24  0456 04/24/24  2152   SODIUM mmol/L 138 138 139   POTASSIUM mmol/L 4.0 4.1 4.2   CHLORIDE mmol/L 99 102 100   CO2 mmol/L 33* 29 30   ANION GAP mmol/L 6 7 9   BUN mg/dL 14 12 11   CREATININE mg/dL 0.85 0.86 0.84   EGFR ml/min/1.73sq m 63 62 64   CALCIUM mg/dL 9.0 8.9 9.2   MAGNESIUM mg/dL 2.6 1.9 1.7*   PHOSPHORUS mg/dL  --   --  3.5     Results from last 7 days   Lab Units 04/24/24  2152   AST U/L 11*   ALT U/L 7   ALK PHOS U/L 66   TOTAL PROTEIN g/dL 6.2*   ALBUMIN g/dL 3.7   TOTAL BILIRUBIN mg/dL 0.31     Results from last 7 days   Lab Units 04/26/24  1108 04/26/24  0740 04/25/24  2035 04/25/24  1511  04/25/24  1113 04/25/24  0734   POC GLUCOSE mg/dl 216* 136 186* 198* 117 133     Results from last 7 days   Lab Units 04/26/24  0446 04/25/24  0456 04/24/24  2152   GLUCOSE RANDOM mg/dL 182* 179* 123         Results from last 7 days   Lab Units 04/24/24  2152   HEMOGLOBIN A1C % 8.4*   EAG mg/dl 194     BETA-HYDROXYBUTYRATE   Date Value Ref Range Status   09/26/2022 0.8 (H) <0.6 mmol/L Final                      Results from last 7 days   Lab Units 04/25/24  0456 04/24/24  2345 04/24/24  2152   HS TNI 0HR ng/L  --   --  6   HS TNI 2HR ng/L  --  6  --    HSTNI D2 ng/L  --  0  --    HS TNI 4HR ng/L 7  --   --    HSTNI D4 ng/L 1  --   --              Results from last 7 days   Lab Units 04/24/24  2152   TSH 3RD GENERATON uIU/mL 1.470                         Results from last 7 days   Lab Units 04/24/24  2152   FERRITIN ng/mL 8*   IRON SATURATION % 5*   IRON ug/dL 21*   TIBC ug/dL 382      Latest Reference Range & Units 04/24/24 21:52   Vitamin B-12 180 - 914 pg/mL 178 (L)   (L): Data is abnormally low              Results from last 7 days   Lab Units 04/25/24  0456   LIPASE u/L 23               Results from last 7 days   Lab Units 04/25/24  0120   INFLUENZA A PCR  Negative   INFLUENZA B PCR  Negative   RSV PCR  Negative             ED Treatment:   Medication Administration from 04/24/2024 2055 to 04/25/2024 0023         Date/Time Order Dose Route Action     04/24/2024 2348 EDT magnesium Oxide (MAG-OX) tablet 800 mg 800 mg Oral Given     04/24/2024 2348 EDT methylnaltrexone (Relistor) subcutaneous injection 6 mg 6 mg Subcutaneous Given          Past Medical History:   Diagnosis Date    Arthritis     Cardiac disease     COPD (chronic obstructive pulmonary disease) (HCC)     COVID-19 2021    was in hospital for sx and was dx with covid    Diabetes mellitus (HCC)     GERD (gastroesophageal reflux disease)     Hiatal hernia     Hypertension     PUD (peptic ulcer disease)     Residual ASD (atrial septal defect) following  repair      Present on Admission:   Generalized weakness   Essential hypertension   COPD (chronic obstructive pulmonary disease) (HCC)   GERD (gastroesophageal reflux disease)   Chronic back pain   Abdominal pain   Iron deficiency anemia   Hyperlipidemia      Admitting Diagnosis: Hypomagnesemia [E83.42]  Constipation [K59.00]  Failure to thrive in adult [R62.7]  Generalized weakness [R53.1]  Age/Sex: 83 y.o. female  Admission Orders:  Scheduled Medications:  aspirin, 81 mg, Oral, Daily  atorvastatin, 40 mg, Oral, QPM  budesonide-formoterol, 2 puff, Inhalation, BID  cyanocobalamin, 1,000 mcg, Intramuscular, Q30 Days  diltiazem, 240 mg, Oral, Daily  enoxaparin, 40 mg, Subcutaneous, Daily  gabapentin, 300 mg, Oral, 4x Daily  insulin lispro, 1-6 Units, Subcutaneous, 4x Daily (AC & HS)  iron sucrose, 200 mg, Intravenous, Daily  losartan, 50 mg, Oral, Daily  pantoprazole, 40 mg, Oral, Q12H  rOPINIRole, 0.5 mg, Oral, HS    morphine (MSIR) IR tablet 15 mg  Dose: 15 mg  Freq: 2 times daily Route: PO  Start: 04/25/24 0900 End: 04/25/24 1145   magnesium citrate (CITROMA) oral solution 296 mL  Dose: 296 mL  Freq: Once Route: PO  x1 4/25 @ 1931  senna (SENOKOT) tablet 17.2 mg  Dose: 2 tablet  Freq: Daily Route: PO  Indications of Use: CONSTIPATION  Start: 04/25/24 0900 End: 04/26/24 0715   polyethylene glycol (MIRALAX) packet 17 g  Dose: 17 g  Freq: Daily Route: PO  Start: 04/25/24 0900 End: 04/26/24 0715   methylnaltrexone (Relistor) subcutaneous injection 6 mg  Dose: 6 mg  Freq: Daily Route: SC  Start: 04/25/24 2200 End: 04/26/24 0714       Continuous IV Infusions:     PRN Meds:  acetaminophen, 975 mg, Oral, Q8H PRN  albuterol, 2 puff, Inhalation, Q6H PRN  bisacodyl, 10 mg, Rectal, Daily PRN x1 4/25   morphine, 7.5 mg, Oral, BID PRN  ondansetron, 4 mg, Intravenous, Q6H PRN  polyethylene glycol (MIRALAX) packet 17 g  Dose: 17 g  Freq: Daily PRN Route: PO  PRN Reason: constipation  Start: 04/26/24 0730      senna (SENOKOT)  tablet 17.2 mg  Dose: 2 tablet  Freq: Daily at bedtime PRN Route: PO  PRN Reason: constipation  Indications of Use: CONSTIPATION  Start: 04/26/24 0730          Level 2 carb diet    OOB as william   SCD      Network Utilization Review Department  ATTENTION: Please call with any questions or concerns to [phone] and carefully listen to the prompts so that you are directed to the right person. All voicemails are confidential.   For Discharge needs, contact Care Management DC Support Team at [phone] opt. [phone]  Send all requests for admission clinical reviews, approved or denied determinations and any other requests to dedicated fax number below belonging to the campus where the patient is receiving treatment. List of dedicated fax numbers for the Facilities:  FACILITY NAME UR FAX NUMBER   ADMISSION DENIALS (Administrative/Medical Necessity) [phone]   DISCHARGE SUPPORT TEAM (NETWORK) [phone]   PARENT CHILD HEALTH (Maternity/NICU/Pediatrics) [phone]   [facility] [phone]   [facility] [phone]   [facility] [phone]   [facility] [phone]   [facility] [phone]   [facility] [phone]   [facility] [phone]   [facility] [phone]   [facility] [phone]   [facility] [phone]   [facility] [phone]   [facility] [phone]

## 2024-04-25 NOTE — ASSESSMENT & PLAN NOTE
- Last bowel movement was 4 days ago (4/21). Soft in character  - Normally has bowel movement every other day  - Has been taking morphine 15 mg BID for chronic back pain for years  - Suspect-opioid induced constipation  - XR abdomen - moderate bowel gas and stool burden  - Given 1 dose of Relistor 6 mg subq in the ED    Plan:  - Give 2 more doses of Relistor 6 mg subq  - Bowel regimen with scheduled MiraLAX and Senokot 2 tablets  - Dulcolax suppository prn

## 2024-04-25 NOTE — PHYSICAL THERAPY NOTE
"PHYSICAL THERAPY EVALUATION  NAME:  Gloria Patel  DATE: 04/25/24    AGE:   83 y.o.  Mrn:   [id number]  Principal problem: Principal Problem:    Generalized weakness  Active Problems:    GERD (gastroesophageal reflux disease)    Abdominal pain    Iron deficiency anemia    Hyperlipidemia    COPD (chronic obstructive pulmonary disease) (HCC)    Essential hypertension    Chronic back pain    Constipation    Type 2 diabetes mellitus, without long-term current use of insulin (HCC)    Restless leg syndrome    Hypomagnesemia    Obesity (BMI 30-39.9)      Vitals:    04/24/24 2346 04/25/24 0045 04/25/24 0733 04/25/24 1421   BP: 146/69 122/73 123/72    BP Location: Right arm      Pulse: 62 62 61    Resp: 22 18 18    Temp:  98.2 °F (36.8 °C) 98.1 °F (36.7 °C)    TempSrc:       SpO2: 98% 97% 98%    Weight:    76.2 kg (168 lb)       Length Of Stay: 0  Performed at least 2 patient identifiers during session: Name and Birthday  PHYSICAL THERAPY EVALUATION :    04/25/24 1241   PT Last Visit   PT Visit Date 04/25/24   Note Type   Note type Evaluation   Pain Assessment   Pain Assessment Tool 0-10   Pain Score 3  (chornic back pain, does not change with position and \"never goes lower than a 3\")   Effect of Pain on Daily Activities Pt reports that it does not change with position   Patient's Stated Pain Goal No pain   Restrictions/Precautions   Weight Bearing Precautions Per Order No   Other Precautions Chair Alarm;Bed Alarm;Fall Risk;Pain;O2  (3l/m O2)   Home Living   Type of Home House   Home Layout Two level  (0STE)   Home Equipment Walker;Stair glide  (has 2 rollators, one on each  floor of the home;  stairt glide to second floor)   Prior Function   Level of [place] Independent with ADLs;Independent with functional mobility;Independent with IADLS   Lives With Other (Comment)  (Ex )   Receives Help From Family;Home health  (HHA 1x/week for 4 hours to assist with ADLs and home managment tasks)   IADLs Independent with " meal prep;Independent with driving;Independent with medication management   Falls in the last 6 months 0  (Pt denies)   Vocational Retired   Comments Per OT: HHA assist with bathing as needed, able to compelte dressings tasks independently.   General   Family/Caregiver Present No   Cognition   Overall Cognitive Status WFL   Attention Within functional limits   Orientation Level Oriented X4   Memory Within functional limits   Following Commands Follows all commands and directions without difficulty   Subjective   Subjective Pt reports not feeling as weak but still feels weak, flat affect. Requested to not walk until her IV (ringing) was addressed.   RUE Assessment   RUE Assessment WFL   RUE Strength   RUE Overall Strength Within Functional Limits - able to perform ADL tasks with strength   LUE Assessment   LUE Assessment WFL   LUE Strength   LUE Overall Strength Within Functional Limits - able to perform ADL tasks with strength   Strength RLE   R Hip Flexion 3+/5   R Ankle Dorsiflexion 4/5   R Knee Extension 4-/5   LLE Assessment   LLE Assessment   (tremors w/ static hold)   Strength LLE   L Hip Flexion 3/5   L Knee Extension 4-/5   L Ankle Dorsiflexion 4/5   Bed Mobility   Supine to Sit Unable to assess  (Pt OOB upon entering room)   Sit to Supine 6  Modified independent   Additional items Assist x 1;Increased time required;HOB elevated   Transfers   Sit to Stand 6  Modified independent   Additional items Armrests;Impulsive   Stand to Sit 6  Modified independent   Additional items Armrests   Ambulation/Elevation   Gait pattern Step through pattern;Excessively slow   Gait Assistance 5  Supervision   Additional items Verbal cues;Assist x 1  (for O2 line/tank follow)   Assistive Device Rolling walker  (rollator unavailable)   Distance 70' w/ instruction of which room to go back into during trial. Pt w/ increased speed when close to the bed.   Stair Management Assistance Not tested  (Pt has stairglide and no ASH)  "  Balance   Static Sitting Good   Static Standing Fair +   Ambulatory Fair   Endurance Deficit   Endurance Deficit Yes   Endurance Deficit Description increased SOB and WOB during end of gait trial despite Spo2   Activity Tolerance   Activity Tolerance Patient limited by fatigue;Other (Comment)  (SOB/increased WOB)   Medical Staff Made Aware spoke to OT   Nurse Made Aware spoke to RN during and post session     Assessment:   Pt is a 83 y.o. female seen for PT evaluation s/p admit to [facility] on 4/24/2024 w/ Generalized weakness.  Order placed for PT.      Prior to admission: Pt lived w/ ex  in 2 story home w/ no ASH and stairglide inside home. Pt used rollator walkers on each level.  Upon evaluation: Pt needed no physical A for bed mobility, transfers, nor ambulation--but did have O2 line assistance during gait trials.      Pt's clinical presentation is currently evolving given the functional mobility deficits above, especially (but not limited to) pain and decreased functional mobility tolerance, and combined with medical complications including abnormal H&H and low SpO2 values.  Pt IS CLOSE to her mobility baseline.  She is at risk for falls based on infrequent impulsivity and obesity.       During this admission, pt MAY benefit from continued skilled inpatient PT in the acute care setting in order to address deficits as defined above to maximize function and mobility.      Recommendations:    From a PT standpoint, recommend next several sessions focus on continued mobility training including increased activity tolerance, self regulated pacing, LE strengthening.     Prognosis Fair   Problem List Decreased strength;Decreased endurance;Impaired balance;Decreased mobility;Impaired hearing;Obesity;Pain  (gait deviations)   Goals   Patient Goals \"to do the things I was doing before all this\" and be able to have a BM   STG Expiration Date 05/05/24   Short Term Goal #1 Goals: Pt will: Perform rolling  " and supine<>sit bed mobility tasks with modified I to prepare for transfers and reposition in bed. Perform transfers with modified I to promote proper hand placement and approach. Perform ambulation with LRAD for up to 150' w/ self regulated rest breaks with Supervision to increase Indep in home environment and community . Increased comfortable gait speed up to .40 m/s to progress to limited community amb pace.   PT Treatment Day 0   Plan   Treatment/Interventions Functional transfer training;LE strengthening/ROM;Therapeutic exercise;Endurance training;Cognitive reorientation;Patient/family training;Equipment eval/education;Bed mobility;Gait training;Spoke to nursing;OT   PT Frequency 1-2x/wk   Discharge Recommendation   Rehab Resource Intensity Level, PT III (Minimum Resource Intensity)   Equipment Recommended Walker  (has 2 rollators)   Walker Package Recommended Rollator   Additional Comments 2 Personal factors affecting pt at time of IE include: advanced age, behavioral pattern, inability to ambulate household distances, and inability to navigate community distances.   AM-PAC Basic Mobility Inpatient   Turning in Flat Bed Without Bedrails 4   Lying on Back to Sitting on Edge of Flat Bed Without Bedrails 3   Moving Bed to Chair 3   Standing Up From Chair Using Arms 3   Walk in Room 3   Climb 3-5 Stairs With Railing 1   Basic Mobility Inpatient Raw Score 17   Basic Mobility Standardized Score 39.67   [facility] Highest Level Of Mobility   -HLM Goal 5: Stand one or more mins   -HLM Achieved 7: Walk 25 feet or more   End of Consult   Patient Position at End of Consult All needs within reach;Supine   (Please find full objective findings from PT assessment regarding body systems outlined above).     The patient's AM-PAC Basic Mobility Inpatient Short Form Raw Score is 17. A Raw score of greater than 16 suggests the patient may benefit from discharge to home, which DOES coincide with CURRENT above PT  "recommendations.     However please refer to therapist recommendation for discharge planning given other factors that may influence destination.     Adapted from Rodrigo HAMILTON, Caren J, Michelle J, Willie J. Association of AM-PAC “6-Clicks” Basic Mobility and Daily Activity Scores With Discharge Destination. Physical Therapy, 2021;101:1-9. DOI: [email or web address]/[email or web address]    Portions of the record may have been created with voice recognition software.  Occasional wrong word or \"sound a like\" substitutions may have occurred due to the inherent limitations of voice recognition software.  Read the chart carefully and recognize, using context, where substitutions have occurred    "

## 2024-04-25 NOTE — ASSESSMENT & PLAN NOTE
- Generalized weakness, poor appetite over the past month  - Symptoms started after receiving Shingles vaccination  - Lives at home with ex-. Takes care of ADLs independently  - Nonfocal neurological examination  - Ambulates with walker  - TSH 1.470    Plan:  - PT/OT consult  - Encourage oral intake

## 2024-04-25 NOTE — ASSESSMENT & PLAN NOTE
- Home meds: Losartan 50 mg, diltiazem 240 mg   - Blood pressures have been adequately controlled    Plan:  - Continue home losartan and diltiazem

## 2024-04-25 NOTE — ASSESSMENT & PLAN NOTE
Lab Results   Component Value Date/Time    Hemoglobin 9.9 (L) 04/25/2024 04:56 AM    Hemoglobin 10.2 (L) 04/24/2024 09:52 PM    Hemoglobin 10.8 (L) 04/10/2024 02:09 PM    MCV 87 04/25/2024 04:56 AM    MCV 83 04/24/2024 09:52 PM    MCV 82 04/10/2024 02:09 PM     Lab Results   Component Value Date/Time    IRON 80 07/16/2019 09:18 AM    FERRITIN 87 07/16/2019 09:18 AM    TIBC 296 07/16/2019 09:18 AM      - Hgb at baseline (10-11)  - Previously used to take oral iron tablets. No longer taking due to constipation    Plan:  - Low iron; Venofer while inpatient

## 2024-04-25 NOTE — ASSESSMENT & PLAN NOTE
- Mg upon arrival 1.7  - Given MagOx 800 mg in the ED    Plan:  - Check Mg in the morning  - Replenish as necessary

## 2024-04-25 NOTE — ASSESSMENT & PLAN NOTE
- Home med: morphine 15 mg BID, gabapentin 300 mg QID  - Hx of 3 x spinal surgeries  - No saddle anesthesia, urine/stool incontinence    Plan:  - Continue home morphine and gabapentin  - Tylenol 975 mg q8h prn  - See plan for bowel regimen under constipation

## 2024-04-25 NOTE — PLAN OF CARE
Problem: PHYSICAL THERAPY ADULT  Goal: Performs mobility at highest level of function for planned discharge setting.  See evaluation for individualized goals.  Description: Treatment/Interventions: Functional transfer training, LE strengthening/ROM, Therapeutic exercise, Endurance training, Cognitive reorientation, Patient/family training, Equipment eval/education, Bed mobility, Gait training, Spoke to nursing, OT  Equipment Recommended: Walker (has 2 rollators)       See flowsheet documentation for full assessment, interventions and recommendations.  Note: Prognosis: Fair  Problem List: Decreased strength, Decreased endurance, Impaired balance, Decreased mobility, Impaired hearing, Obesity, Pain (gait deviations)  Assessment: Pt is a 83 y.o. female seen for PT evaluation s/p admit to [facility] on 4/24/2024 w/ Generalized weakness.  Order placed for PT.      Prior to admission: Pt lived w/ ex  in 2 story home w/ no ASH and stairglide inside home. Pt used rollator walkers on each level.  Upon evaluation: Pt needed no physical A for bed mobility, transfers, nor ambulation--but did have O2 line assistance during gait trials.      Pt's clinical presentation is currently evolving given the functional mobility deficits above, especially (but not limited to) pain and decreased functional mobility tolerance, and combined with medical complications including abnormal H&H and low SpO2 values.  Pt IS CLOSE to her mobility baseline.  She is at risk for falls based on infrequent impulsivity and obesity.       During this admission, pt MAY benefit from continued skilled inpatient PT in the acute care setting in order to address deficits as defined above to maximize function and mobility.      Recommendations:     From a PT standpoint, recommend next several sessions focus on continued mobility training including increased activity tolerance, self regulated pacing, LE strengthening.        Rehab Resource  Intensity Level, PT: III (Minimum Resource Intensity)    See flowsheet documentation for full assessment.

## 2024-04-25 NOTE — ASSESSMENT & PLAN NOTE
- Baseline is 3 L NC  - Has nonproductive chronic cough  - Former smoker  - Not currently in exacerbation  - Home med: Breztri 2 puffs BID   - CXR - wet read, no acute cardiopulmonary abnormality  - Flu/RSV/COVID negative  - Currently on 3 L NC, saturating upper 90%    Plan:  - Continue formulary equivalent Symbicort 2 puffs BID  - Supplemental oxygen as necessary

## 2024-04-25 NOTE — PLAN OF CARE
Problem: OCCUPATIONAL THERAPY ADULT  Goal: Performs self-care activities at highest level of function for planned discharge setting.  See evaluation for individualized goals.  Description: Treatment Interventions: ADL retraining, Functional transfer training, UE strengthening/ROM, Endurance training, Patient/family training, Equipment evaluation/education, Compensatory technique education, Energy conservation, Activityengagement          See flowsheet documentation for full assessment, interventions and recommendations.   Note: Limitation: Decreased ADL status, Decreased UE strength, Decreased endurance, Decreased self-care trans, Decreased high-level ADLs  Prognosis: Good  Assessment: Patient is a 83 y.o. female seen for OT evaluation at [facility] following admission on 4/24/2024  s/p Generalized weakness. Please see above for comprehensive list of comorbidities and significant PMHx impacting functional performance.  At baseline, Pt lives w/ ex-, in a 2SH, chair glide access to second level. Independent with most ADLs and IADLs. MOD I for functional mobility w/ use of RW. (-) falls. (+) driving. Retired. Upon initial evaluation, pt appears to be performing below baseline functional status. Pt requires supervision for UB ADLs, GISELLA for LB ADLs, MOD I  for bed mobility, supervision for transfers and CGA for functional mobility household distance with RW. The AM-PAC & Barthel Index outcome tools were used to assist in determining pt safety w/self care /mobility and appropriate d/c recommendations, see above for score. Occupational performance is affected by the following deficits: endurance ,  decreased muscular strength , decreased standing tolerance for self care tasks , and decreased activity tolerance . Personal/Environmental factors impacting D/C include: decreased caregiver status , Assistance needed for ADLs and functional mobility, and High fall risk . Supporting factors include: able  to maintain FFSU, accessible home environment, support system available, and attitude towards recovery Patient would benefit from OT services within the acute care setting to maximize level of functional independence in the following areas fall prevention , self-care transfers, functional mobility, and ADLs.  From OT standpoint, recommendation at time of D/C would be level III (minimum resource intensity).     Rehab Resource Intensity Level, OT: III (Minimum Resource Intensity)     Annamarie Mcnally MS OTR/L   [place] Licensure# [id number]

## 2024-04-25 NOTE — CASE MANAGEMENT
Case Management Assessment & Discharge Planning Note    Patient name Gloria Patel  Location W /W -01 MRN [id number]  : 1940 Date 2024       Current Admission Date: 2024  Current Admission Diagnosis:Generalized weakness   Patient Active Problem List    Diagnosis Date Noted    Constipation 2024    Type 2 diabetes mellitus, without long-term current use of insulin ([facility]) 2024    Restless leg syndrome 2024    Hypomagnesemia 2024    Obesity (BMI 30-39.9) 2024    Peripheral vascular disease (HCC) 2024    Stage 3b chronic kidney disease ([facility]) 2024    Mass of finger of left hand 2023    Tachycardia 2023    COPD (chronic obstructive pulmonary disease) ([facility]) 2023    Type 2 diabetes mellitus with hyperglycemia, without long-term current use of insulin ([facility]) 2023    Essential hypertension 2023    Hyponatremia 2023    Hyperlipidemia 2023    Chronic back pain 2023    chronic respiratory failure ([facility]), acute component resolved 2023    GERD (gastroesophageal reflux disease) 2023    Esophageal hiatal hernia 10/06/2022    Generalized weakness 10/06/2022    Abdominal pain 2022    Low blood pressure reading 2022    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 2022    Unintentional weight loss 2022    BMI 31.0-31.9,adult 2022    Retinal artery occlusion, branch, left 2022    Chronic pain 2022    COVID-19 2022    Backache 2022    Onychomycosis 2021    Pain in toe 2021    Instability of foot joint 2021    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    ASD (atrial septal defect) 2020    Exertional dyspnea 2020    Acquired pes planus of right foot 10/28/2019    Microscopic hematuria 2019    Urge incontinence 2019    Ankle pain 2019    Primary localized osteoarthrosis of ankle and foot  07/09/2019    History of iron deficiency anemia 04/11/2018    Chronic fatigue 12/14/2017    Chronic hypoxemic respiratory failure (HCC) 05/13/2017    GERD (gastroesophageal reflux disease) 05/13/2017    Hyponatremia 05/13/2017    Cigarette nicotine dependence in remission 05/13/2017    Controlled substance agreement signed 12/27/2016    Depression 03/14/2016    Diabetes mellitus (HCC) 03/04/2016    GI bleed 07/23/2015    DDD (degenerative disc disease), lumbosacral 05/13/2015    Arthropathy 04/15/2014    Iron deficiency anemia 05/30/2013    COPD, severe (HCC) 12/19/2012    Benign essential hypertension 12/19/2012    Hyperlipidemia 12/19/2012    Disc disorder of lumbar region 12/19/2012    Type II diabetes mellitus (HCC) 12/19/2012      LOS (days): 0  Geometric Mean LOS (GMLOS) (days):   Days to GMLOS:     OBJECTIVE:              Current admission status: Inpatient       Preferred Pharmacy:   [id number] - [place] - [address]  [address] [place] [address]  Phone: [phone] Fax: [phone]    [place], [place] - [address] [facility]  [address] [facility] [address]  Phone: [phone] Fax: [phone]    Primary Care Provider: Christine Charles DO    Primary Insurance: [employer] RE[employer]  Secondary Insurance:     ASSESSMENT:  Active Health Care Proxies    There are no active Health Care Proxies on file.    Readmission Root Cause  30 Day Readmission: No    Patient Information  Admitted from:: Home  Mental Status: Alert  During Assessment patient was accompanied by: Not accompanied during assessment  Assessment information provided by:: Patient  Primary Caregiver: Self  Support Systems: Spouse/significant other, Children, Family members  County of Residence: [place]  What city do you live in?: [place]  Home entry access options. Select all that apply.: No steps to enter home  Type of Current Residence: 2 story home  Upon entering residence, is there a  bedroom on the main floor (no further steps)?: No  A bedroom is located on the following floor levels of residence (select all that apply):: 2nd Floor  Upon entering residence, is there a bathroom on the main floor (no further steps)?: No  Indicate which floors of current residence have a bathroom (select all the apply):: 2nd Floor  Number of steps to 2nd floor from main floor: One Flight  Living Arrangements: Lives w/ Spouse/significant other (ex)    Activities of Daily Living Prior to Admission  Functional Status: Independent  Completes ADLs independently?: Yes  Ambulates independently?: Yes  Does patient use assisted devices?: Yes  Assisted Devices (DME) used: Straight Cane, Walker, Home Oxygen concentrator, Portable Oxygen concentrator  DME Company Name (respiratory supplies): [employer]  O2 Rate(s): 3L  Does patient currently own DME?: Yes  What DME does the patient currently own?: Home Oxygen concentrator, Straight Cane, Walker, Portable Oxygen concentrator  Does patient have a history of Outpatient Therapy (PT/OT)?: Yes  Does the patient have a history of Short-Term Rehab?: Yes  Does patient have a history of HHC?: Yes  Does patient currently have HHC?: No    Patient Information Continued  Income Source: Pension/[employer]  Does patient have prescription coverage?: Yes  Does patient receive dialysis treatments?: No  Does patient have a history of substance abuse?: No    Means of Transportation  Means of Transport to Appts:: Drives Self    Social Determinants of Health (SDOH)      Flowsheet Row Most Recent Value   Housing Stability    In the last 12 months, was there a time when you were not able to pay the mortgage or rent on time? N   In the last 12 months, how many places have you lived? 1   In the last 12 months, was there a time when you did not have a steady place to sleep or slept in a shelter (including now)? N   Transportation Needs    In the past 12 months, has lack of transportation kept you from  medical appointments or from getting medications? no   In the past 12 months, has lack of transportation kept you from meetings, work, or from getting things needed for daily living? No   Food Insecurity    Within the past 12 months, you worried that your food would run out before you got the money to buy more. Never true   Within the past 12 months, the food you bought just didn't last and you didn't have money to get more. Never true   Utilities    In the past 12 months has the electric, gas, oil, or water company threatened to shut off services in your home? No     DISCHARGE DETAILS:    Discharge planning discussed with:: patient at bedside,  left for sonThang  Freedom of Choice: Yes  Comments - [place] of Choice: HHC/ DME  CM contacted family/caregiver?: Yes  Were Treatment Team discharge recommendations reviewed with patient/caregiver?: Yes  Did patient/caregiver verbalize understanding of patient care needs?: Yes  Were patient/caregiver advised of the risks associated with not following Treatment Team discharge recommendations?: Yes    Contacts  Patient Contacts: Thang  Relationship to Patient:: Family (son)  Contact Method: Phone ()  Phone Number: [phone]  Reason/Outcome: Discharge Planning, Referral, Continuity of Care, Emergency Contact    Requested Home Health Care         Is the patient interested in HHC at discharge?: Yes  Home Health Discipline requested:: Nursing, Occupational Therapy, Physical Therapy  Home Health Agency Name:: Other (pending referrals)  Home Health Follow-Up Provider:: PCP  Home Health Services Needed:: Strengthening/Theraputic Exercises to Improve Function, Oxygen Via Nasal Cannula, Evaluate Functional Status and Safety, Gait/ADL Training  Homebound Criteria Met:: Uses an Assist Device (i.e. cane, walker, etc)  Supporting Clincal Findings:: Limited Endurance, Requires Oxygen    DME Referral Provided  Referral made for DME?: Yes  DME referral completed for the  following items:: Other (7ft O2 tubing)  DME Supplier Name:: [employer]    Other Referral/Resources/Interventions Provided:  Interventions: HHC, DME  Referral Comments: Patient admitted to [facility] due to generalized weakness. CM met with patient at bedside to complete assessment/ discuss dcp. Patient lives with her ex-  in a 2 story home- 0STE, full flight to bed and bath- patient does have a chair glide. Patient also uses and owns a walker and has home O2- supplied via [employer]. Patient has a history of HHC and STR, also has a HHA that comes once per week. CM discussed therapy recommendation of HHC- patient agreeable to this, referrals placed via [facility]. Patient also requesting longer O2 tubing- order placed for 7ft tubing via [place]. VM left for son Thang to discuss dcp- call back number provided. CM to follow up as able to continue with dcp.    Would you like to participate in our [facility] service program?  : No - Declined    Treatment Team Recommendation: Home with Home Health Care  Discharge Destination Plan:: Home with Home Health Care  Transport at Discharge : Family

## 2024-04-25 NOTE — ASSESSMENT & PLAN NOTE
- Lipid panel (8/21/2023) - , TG 78, LDL 59, HDL 69  - Home med: atorvastatin 40 mg    Plan:  - Continue home atorvastatin

## 2024-04-25 NOTE — OCCUPATIONAL THERAPY NOTE
Occupational Therapy Evaluation     Patient Name: Gloria Patel  Today's Date: 4/25/2024  Problem List  Principal Problem:    Generalized weakness  Active Problems:    GERD (gastroesophageal reflux disease)    Abdominal pain    Iron deficiency anemia    Hyperlipidemia    COPD (chronic obstructive pulmonary disease) (Formerly [facility])    Essential hypertension    Chronic back pain    Constipation    Type 2 diabetes mellitus, without long-term current use of insulin (HCC)    Restless leg syndrome    Hypomagnesemia    Obesity (BMI 30-39.9)    Past Medical History  Past Medical History:   Diagnosis Date    Arthritis     Cardiac disease     COPD (chronic obstructive pulmonary disease) (Formerly [facility])     COVID-19 2021    was in hospital for sx and was dx with covid    Diabetes mellitus (Formerly [facility])     GERD (gastroesophageal reflux disease)     Hiatal hernia     Hypertension     PUD (peptic ulcer disease)     Residual ASD (atrial septal defect) following repair      Past Surgical History  Past Surgical History:   Procedure Laterality Date    ABDOMINAL ADHESION SURGERY N/A 09/26/2022    Procedure: LYSIS ADHESIONS;  Surgeon: Petty Johnson MD;  [facility]: [facility];  Service: Thoracic    ASD REPAIR      BACK SURGERY      x3    CARDIAC SURGERY      Atrial septic defect    ESOPHAGOGASTRODUODENOSCOPY N/A 09/26/2022    Procedure: ESOPHAGOGASTRODUODENOSCOPY (EGD);  Surgeon: Petty Johnson MD;  [facility];  Service: Thoracic    JOINT REPLACEMENT      bilateral knee surgery    JOINT REPLACEMENT      KNEE SURGERY      PARAESOPHAGEAL HERNIA REPAIR N/A 09/26/2022    Procedure: REPAIR HERNIA PARAESOPHAGEAL LAPAROSCOPIC W ROBOTICS, gastropexy, mesh placement;  Surgeon: Petty Johnson MD;  [facility];  Service: Thoracic    WI EXCISION GANGLION WRIST DORSAL/VOLAR PRIMARY Left 7/21/2023    Procedure: EXCISION GANGLION CYST;  Surgeon: Jaja Pantoja MD;  [facility];  Service: Orthopedics    SHOULDER SURGERY    "        04/25/24 0944   OT Last Visit   OT Visit Date 04/25/24   Note Type   Note type Evaluation   Pain Assessment   Pain Assessment Tool 0-10   Pain Score No Pain   Restrictions/Precautions   Weight Bearing Precautions Per Order No   Other Precautions Chair Alarm;Bed Alarm;Fall Risk;Pain;O2  (3L NC)   Home Living   Type of Home House   Home Layout Two level  (0 ASH)   Bathroom Shower/Tub   (Pt sponge bathes at baseline)   Bathroom Toilet Standard   Bathroom Accessibility Accessible   Home Equipment Walker;Stair glide  (RW on both floors of the home; chair lift to second floor)   Prior Function   Level of [place] Independent with ADLs;Independent with functional mobility;Independent with IADLS   Lives With Other (Comment)  (Ex )   Receives Help From Family;Home health  (HHA 1x/week for 4 hours to assist with ADLs and home managment tasks)   IADLs Independent with meal prep;Independent with driving;Independent with medication management   Falls in the last 6 months 0  (Pt denies)   Vocational Retired   Comments HHA assist with bathing as needed, able to compelte dressings tasks independently.   Lifestyle   Autonomy Pt lives w/ ex-, in a 2SH, chair glide access to second level. Independent with most ADLs and IADLs. MOD I for functional mobility w/ use of RW. (-) falls. (+) driving. Retired   Reciprocal Relationships Supportive family   Service to Others Retired   General   Additional Pertinent History Pt admit with worsening generalized weakness and poor appetite. PMHx: COPD on 3L chronically   Family/Caregiver Present No   Subjective   Subjective \"I am just so tired\"   ADL   Where Assessed Edge of bed   Eating Assistance 7  Independent   Grooming Assistance 6  Modified Independent   UB Bathing Assistance 5  Supervision/Setup   LB Bathing Assistance 4  Minimal Assistance   UB Dressing Assistance 5  Supervision/Setup   LB Dressing Assistance 4  Minimal Assistance   LB Dressing Deficit Don/doff R " sock;Don/doff L sock;Setup;Thread RLE into underwear;Thread LLE into underwear;Pull up over hips  (supervision to bertha B socks; increased assist to manage underwear over hips posteriorly)   Toileting Assistance  4  Minimal Assistance   Additional Comments Unable to formally assess bating, UB dressing, grooming or toileting at time of eval. The above levels of assistance are anticipated based on functional performance deficits with use of clinical judgement. Pt is limited by fatigue, SOB/GARZA.   Bed Mobility   Supine to Sit 6  Modified independent   Additional items HOB elevated   Sit to Supine Unable to assess   Additional items   (pt OOB to recliner chair)   Additional Comments Pt denies dizziness/lightheadedness w/ postural changes. Increased time for completion of ADL tasks while sitting at the EOB d/t SOB/GARZA and fatigue.   Transfers   Sit to Stand 5  Supervision   Additional items Assist x 1;Increased time required;Verbal cues   Stand to Sit 5  Supervision   Additional items Assist x 1;Increased time required;Verbal cues   Additional Comments Additional STS completed x 3 for participation in ADL tasks. Pt requires frequent rest breaks d/t fatigue. Spo2 maintains >90% t/o.   Functional Mobility   Functional Mobility 4  Minimal assistance  (CGA)   Additional Comments CGA to ambulate functional househol distance in the room w/ use of RW. Able to manage O2 line without assistance.   Additional items Rolling walker   Balance   Static Sitting Good   Dynamic Sitting Fair +   Static Standing Fair   Dynamic Standing Fair -   Activity Tolerance   Activity Tolerance Patient limited by fatigue;Other (Comment)  (SOB/GARZA)   Medical Staff Made Aware Spoke with CM, PT   Nurse Made Aware Spoke with RN pre/post   RUE Assessment   RUE Assessment WFL   RUE Strength   RUE Overall Strength Within Functional Limits - able to perform ADL tasks with strength   LUE Assessment   LUE Assessment WFL   LUE Strength   LUE Overall Strength  Within Functional Limits - able to perform ADL tasks with strength   Hand Function   Gross Motor Coordination Functional   Fine Motor Coordination Functional   Cognition   Overall Cognitive Status WFL   Arousal/Participation Alert;Responsive;Cooperative   Attention Within functional limits   Orientation Level Oriented X4   Memory Within functional limits   Following Commands Follows all commands and directions without difficulty   Comments Pt ID via wristband, name and . Pt is very pleasent and cooperative.   Assessment   Limitation Decreased ADL status;Decreased UE strength;Decreased endurance;Decreased self-care trans;Decreased high-level ADLs   Prognosis Good   Assessment Patient is a 83 y.o. female seen for OT evaluation at [facility] following admission on 2024  s/p Generalized weakness. Please see above for comprehensive list of comorbidities and significant PMHx impacting functional performance.  At baseline, Pt lives w/ ex-, in a 2SH, chair glide access to second level. Independent with most ADLs and IADLs. MOD I for functional mobility w/ use of RW. (-) falls. (+) driving. Retired. Upon initial evaluation, pt appears to be performing below baseline functional status. Pt requires supervision for UB ADLs, GISELLA for LB ADLs, MOD I  for bed mobility, supervision for transfers and CGA for functional mobility household distance with RW. The AM-PAC & Barthel Index outcome tools were used to assist in determining pt safety w/self care /mobility and appropriate d/c recommendations, see above for score. Occupational performance is affected by the following deficits: endurance ,  decreased muscular strength , decreased standing tolerance for self care tasks , and decreased activity tolerance . Personal/Environmental factors impacting D/C include: decreased caregiver status , Assistance needed for ADLs and functional mobility, and High fall risk . Supporting factors include: able to  "maintain FFSU, accessible home environment, support system available, and attitude towards recovery Patient would benefit from OT services within the acute care setting to maximize level of functional independence in the following areas fall prevention , self-care transfers, functional mobility, and ADLs.  From OT standpoint, recommendation at time of D/C would be level III (minimum resource intensity).   Goals   Patient Goals \"to do the things I was doing before all this\"   LTG Time Frame 10-14   Long Term Goal See below   Plan   Treatment Interventions ADL retraining;Functional transfer training;UE strengthening/ROM;Endurance training;Patient/family training;Equipment evaluation/education;Compensatory technique education;Energy conservation;Activityengagement   Goal Expiration Date 05/05/24   OT Treatment Day 0   OT Frequency 2-3x/wk   Discharge Recommendation   Rehab Resource Intensity Level, OT III (Minimum Resource Intensity)   Additional Comments  The patient's raw score on the -PAC Daily Activity Inpatient Short Form is 21. A raw score of greater than or equal to 19 suggests the patient may benefit from discharge to home. Please refer to the recommendation of the Occupational Therapist for safe discharge planning.   AM-PAC Daily Activity Inpatient   Lower Body Dressing 3   Bathing 3   Toileting 3   Upper Body Dressing 4   Grooming 4   Eating 4   Daily Activity Raw Score 21   Daily Activity Standardized Score (Calc for Raw Score >=11) 44.27   AM-PAC Applied Cognition Inpatient   Following a Speech/Presentation 4   Understanding Ordinary Conversation 4   Taking Medications 4   Remembering Where Things Are Placed or Put Away 4   Remembering List of 4-5 Errands 4   Taking Care of Complicated Tasks 4   Applied Cognition Raw Score 24   Applied Cognition Standardized Score 62.21   Barthel Index   Feeding 10   Bathing 0   Grooming Score 5   Dressing Score 5   Bladder Score 5   Bowels Score 10   Toilet Use Score 5 " "  Transfers (Bed/Chair) Score 10   Mobility (Level Surface) Score 0   Stairs Score 0   Barthel Index Score 50   End of Consult   Education Provided Yes   Patient Position at End of Consult Bedside chair;Bed/Chair alarm activated;All needs within reach   Nurse Communication Nurse aware of consult       Goals established on initial evaluation in order to achieve pt's goal of \"being able to do the things I was doing before this\"      Pt will complete UB ADLs Mod independent  for increased ADL independence within 10 days.     Pt will complete LB ADLs Mod independent  for increased ADL independence within 10 days.     Pt will complete toileting Mod independent  with use of DME for increased ADL independence within 10 days.     Pt will demonstrate proper body mechanics to complete self-care transfers and functional mobility with Mod independent  and use of LRAD for increased safety and functional independence within 10 days.     Pt will demonstrate standing tolerance of 5 min with Mod independent  and use of LRAD for increased activity tolerance during ADL/IADL tasks within 10 days.     Pt will demonstrate proper body mechanics and fall prevention strategies during 100% of tx sessions for increased safety awareness during ADL/IADLs    Pt will improve functional activity tolerance to 25 mins of sustained functional tasks to increase participation in basic self-care tasks and minimize assistance level.     Pt will verbalize and demonstrate understanding of energy conservation/deep breathing techniques for increased activity tolerance and endurance during meaningful activities.     Patient will participate in 15mins of UE therex to increase overall stamina/activity tolerance for purposeful tasks.     Annamarie Mcnally MS OTR/L   [place] Licensure# [id number]                "

## 2024-04-25 NOTE — CASE MANAGEMENT
Case Management Progress Note    Patient name Gloria Patel  Location W /W -01 MRN [id number]  : 1940 Date 2024       LOS (days): 0  Geometric Mean LOS (GMLOS) (days):   Days to GMLOS:        OBJECTIVE:        Current admission status: Inpatient  Preferred Pharmacy:   [facility]/pharmacy #[id number] - [place], [place] - [address]  [address]  [facility] [address]  Phone: [phone] Fax: [phone]    [place], [place] - [address] [facility]  [address] [facility] [address]  Phone: [phone] Fax: [phone]    Primary Care Provider: Christine Charles DO    Primary Insurance: [employer] REP  Secondary Insurance:     PROGRESS NOTE:  CM received phone call from Yolette shen/ [facility], who states patient is not current with their for O2 (despite prior chart review). Patient unsure who she is current with. CM to follow up with [employer] liaison to see if she is current with them. Also will await for call back from son, to see if he is aware.

## 2024-04-26 ENCOUNTER — HOME HEALTH ADMISSION (OUTPATIENT)
Dept: HOME HEALTH SERVICES | Facility: HOME HEALTHCARE | Age: 84
End: 2024-04-26
Payer: COMMERCIAL

## 2024-04-26 PROBLEM — J96.10 CHRONIC RESPIRATORY FAILURE (HCC): Status: ACTIVE | Noted: 2023-03-01

## 2024-04-26 LAB
ANION GAP SERPL CALCULATED.3IONS-SCNC: 6 MMOL/L (ref 4–13)
BASOPHILS # BLD AUTO: 0.07 THOUSANDS/ÂΜL (ref 0–0.1)
BASOPHILS NFR BLD AUTO: 1 % (ref 0–1)
BUN SERPL-MCNC: 14 MG/DL (ref 5–25)
CALCIUM SERPL-MCNC: 9 MG/DL (ref 8.4–10.2)
CHLORIDE SERPL-SCNC: 99 MMOL/L (ref 96–108)
CO2 SERPL-SCNC: 33 MMOL/L (ref 21–32)
CREAT SERPL-MCNC: 0.85 MG/DL (ref 0.6–1.3)
DME PARACHUTE DELIVERY DATE REQUESTED: NORMAL
DME PARACHUTE ITEM DESCRIPTION: NORMAL
DME PARACHUTE ORDER STATUS: NORMAL
DME PARACHUTE SUPPLIER NAME: NORMAL
DME PARACHUTE SUPPLIER PHONE: NORMAL
EOSINOPHIL # BLD AUTO: 0.09 THOUSAND/ÂΜL (ref 0–0.61)
EOSINOPHIL NFR BLD AUTO: 2 % (ref 0–6)
ERYTHROCYTE [DISTWIDTH] IN BLOOD BY AUTOMATED COUNT: 18 % (ref 11.6–15.1)
GFR SERPL CREATININE-BSD FRML MDRD: 63 ML/MIN/1.73SQ M
GLUCOSE SERPL-MCNC: 130 MG/DL (ref 65–140)
GLUCOSE SERPL-MCNC: 136 MG/DL (ref 65–140)
GLUCOSE SERPL-MCNC: 166 MG/DL (ref 65–140)
GLUCOSE SERPL-MCNC: 182 MG/DL (ref 65–140)
GLUCOSE SERPL-MCNC: 216 MG/DL (ref 65–140)
HCT VFR BLD AUTO: 31.8 % (ref 34.8–46.1)
HGB BLD-MCNC: 9.4 G/DL (ref 11.5–15.4)
IMM GRANULOCYTES # BLD AUTO: 0.05 THOUSAND/UL (ref 0–0.2)
IMM GRANULOCYTES NFR BLD AUTO: 1 % (ref 0–2)
LYMPHOCYTES # BLD AUTO: 1.14 THOUSANDS/ÂΜL (ref 0.6–4.47)
LYMPHOCYTES NFR BLD AUTO: 20 % (ref 14–44)
MAGNESIUM SERPL-MCNC: 2.6 MG/DL (ref 1.9–2.7)
MCH RBC QN AUTO: 24.4 PG (ref 26.8–34.3)
MCHC RBC AUTO-ENTMCNC: 29.6 G/DL (ref 31.4–37.4)
MCV RBC AUTO: 83 FL (ref 82–98)
MONOCYTES # BLD AUTO: 0.77 THOUSAND/ÂΜL (ref 0.17–1.22)
MONOCYTES NFR BLD AUTO: 13 % (ref 4–12)
NEUTROPHILS # BLD AUTO: 3.61 THOUSANDS/ÂΜL (ref 1.85–7.62)
NEUTS SEG NFR BLD AUTO: 63 % (ref 43–75)
NRBC BLD AUTO-RTO: 0 /100 WBCS
PLATELET # BLD AUTO: 231 THOUSANDS/UL (ref 149–390)
PMV BLD AUTO: 9.5 FL (ref 8.9–12.7)
POTASSIUM SERPL-SCNC: 4 MMOL/L (ref 3.5–5.3)
RBC # BLD AUTO: 3.85 MILLION/UL (ref 3.81–5.12)
SODIUM SERPL-SCNC: 138 MMOL/L (ref 135–147)
WBC # BLD AUTO: 5.73 THOUSAND/UL (ref 4.31–10.16)

## 2024-04-26 PROCEDURE — 80048 BASIC METABOLIC PNL TOTAL CA: CPT

## 2024-04-26 PROCEDURE — 82948 REAGENT STRIP/BLOOD GLUCOSE: CPT

## 2024-04-26 PROCEDURE — 85025 COMPLETE CBC W/AUTO DIFF WBC: CPT

## 2024-04-26 PROCEDURE — 99232 SBSQ HOSP IP/OBS MODERATE 35: CPT | Performed by: INTERNAL MEDICINE

## 2024-04-26 PROCEDURE — 83735 ASSAY OF MAGNESIUM: CPT

## 2024-04-26 RX ORDER — SENNOSIDES 8.6 MG
2 TABLET ORAL
Status: DISCONTINUED | OUTPATIENT
Start: 2024-04-26 | End: 2024-04-27 | Stop reason: HOSPADM

## 2024-04-26 RX ORDER — POLYETHYLENE GLYCOL 3350 17 G/17G
17 POWDER, FOR SOLUTION ORAL DAILY PRN
Status: DISCONTINUED | OUTPATIENT
Start: 2024-04-26 | End: 2024-04-27 | Stop reason: HOSPADM

## 2024-04-26 RX ADMIN — PANTOPRAZOLE SODIUM 40 MG: 40 TABLET, DELAYED RELEASE ORAL at 12:39

## 2024-04-26 RX ADMIN — BUDESONIDE AND FORMOTEROL FUMARATE DIHYDRATE 2 PUFF: 160; 4.5 AEROSOL RESPIRATORY (INHALATION) at 17:17

## 2024-04-26 RX ADMIN — GABAPENTIN 300 MG: 300 CAPSULE ORAL at 12:39

## 2024-04-26 RX ADMIN — ENOXAPARIN SODIUM 40 MG: 40 INJECTION SUBCUTANEOUS at 08:29

## 2024-04-26 RX ADMIN — ASPIRIN 81 MG CHEWABLE TABLET 81 MG: 81 TABLET CHEWABLE at 08:29

## 2024-04-26 RX ADMIN — PANTOPRAZOLE SODIUM 40 MG: 40 TABLET, DELAYED RELEASE ORAL at 01:37

## 2024-04-26 RX ADMIN — GABAPENTIN 300 MG: 300 CAPSULE ORAL at 08:29

## 2024-04-26 RX ADMIN — ROPINIROLE 0.5 MG: 0.25 TABLET, FILM COATED ORAL at 21:32

## 2024-04-26 RX ADMIN — MORPHINE SULFATE 7.5 MG: 15 TABLET ORAL at 15:31

## 2024-04-26 RX ADMIN — IRON SUCROSE 200 MG: 20 INJECTION, SOLUTION INTRAVENOUS at 08:30

## 2024-04-26 RX ADMIN — INSULIN LISPRO 1 UNITS: 100 INJECTION, SOLUTION INTRAVENOUS; SUBCUTANEOUS at 21:32

## 2024-04-26 RX ADMIN — LOSARTAN POTASSIUM 50 MG: 50 TABLET, FILM COATED ORAL at 08:29

## 2024-04-26 RX ADMIN — INSULIN LISPRO 2 UNITS: 100 INJECTION, SOLUTION INTRAVENOUS; SUBCUTANEOUS at 12:39

## 2024-04-26 RX ADMIN — GABAPENTIN 300 MG: 300 CAPSULE ORAL at 17:17

## 2024-04-26 RX ADMIN — ATORVASTATIN CALCIUM 40 MG: 40 TABLET, FILM COATED ORAL at 17:17

## 2024-04-26 RX ADMIN — DILTIAZEM HYDROCHLORIDE 240 MG: 240 CAPSULE, COATED, EXTENDED RELEASE ORAL at 08:27

## 2024-04-26 RX ADMIN — GABAPENTIN 300 MG: 300 CAPSULE ORAL at 21:32

## 2024-04-26 RX ADMIN — BUDESONIDE AND FORMOTEROL FUMARATE DIHYDRATE 2 PUFF: 160; 4.5 AEROSOL RESPIRATORY (INHALATION) at 08:29

## 2024-04-26 NOTE — PLAN OF CARE
Problem: PAIN - ADULT  Goal: Verbalizes/displays adequate comfort level or baseline comfort level  Description: Interventions:  - Encourage patient to monitor pain and request assistance  - Assess pain using appropriate pain scale  - Administer analgesics based on type and severity of pain and evaluate response  - Implement non-pharmacological measures as appropriate and evaluate response  - Consider cultural and social influences on pain and pain management  - Notify physician/advanced practitioner if interventions unsuccessful or patient reports new pain  Outcome: Progressing     Problem: INFECTION - ADULT  Goal: Absence or prevention of progression during hospitalization  Description: INTERVENTIONS:  - Assess and monitor for signs and symptoms of infection  - Monitor lab/diagnostic results  - Monitor all insertion sites, i.e. indwelling lines, tubes, and drains  - Monitor endotracheal if appropriate and nasal secretions for changes in amount and color  - [place] appropriate cooling/warming therapies per order  - Administer medications as ordered  - Instruct and encourage patient and family to use good hand hygiene technique  - Identify and instruct in appropriate isolation precautions for identified infection/condition  Outcome: Progressing  Goal: Absence of fever/infection during neutropenic period  Description: INTERVENTIONS:  - Monitor WBC    Outcome: Progressing     Problem: SAFETY ADULT  Goal: Patient will remain free of falls  Description: INTERVENTIONS:  - Educate patient/family on patient safety including physical limitations  - Instruct patient to call for assistance with activity   - Consult OT/PT to assist with strengthening/mobility   - Keep Call bell within reach  - Keep bed low and locked with side rails adjusted as appropriate  - Keep care items and personal belongings within reach  - Initiate and maintain comfort rounds  - Make Fall Risk Sign visible to staff  - Offer Toileting ev Hours, in  advance of need  - Initiate/Maintain alarm  - Obtain necessary fall risk management equipment:   - Apply yellow socks and bracelet for high fall risk patients  - Consider moving patient to room near nurses station  Outcome: Progressing  Goal: Maintain or return to baseline ADL function  Description: INTERVENTIONS:  -  Assess patient's ability to carry out ADLs; assess patient's baseline for ADL function and identify physical deficits which impact ability to perform ADLs (bathing, care of mouth/teeth, toileting, grooming, dressing, etc.)  - Assess/evaluate cause of self-care deficits   - Assess range of motion  - Assess patient's mobility; develop plan if impaired  - Assess patient's need for assistive devices and provide as appropriate  - Encourage maximum independence but intervene and supervise when necessary  - Involve family in performance of ADLs  - Assess for home care needs following discharge   - Consider OT consult to assist with ADL evaluation and planning for discharge  - Provide patient education as appropriate  Outcome: Progressing  Goal: Maintains/Returns to pre admission functional level  Description: INTERVENTIONS:  - Perform AM-PAC 6 Click Basic Mobility/ Daily Activity assessment daily.  - Set and communicate daily mobility goal to care team and patient/family/caregiver.   - Collaborate with rehabilitation services on mobility goals if consulted  - Perform Range of [place] times a day.  - Reposition patient every  hours.  - Dangle patient  times a day  - Stand patient  times a day  - Ambulate patient  times a day  - Out of bed to chair  times a day   - Out of bed for meals  times a day  - Out of bed for toileting  - Record patient progress and toleration of activity level   Outcome: Progressing     Problem: DISCHARGE PLANNING  Goal: Discharge to home or other facility with appropriate resources  Description: INTERVENTIONS:  - Identify barriers to discharge w/patient and caregiver  - Arrange for  needed discharge resources and transportation as appropriate  - Identify discharge learning needs (meds, wound care, etc.)  - Arrange for interpretive services to assist at discharge as needed  - Refer to Case Management Department for coordinating discharge planning if the patient needs post-hospital services based on physician/advanced practitioner order or complex needs related to functional status, cognitive ability, or social support system  Outcome: Progressing     Problem: Knowledge Deficit  Goal: Patient/family/caregiver demonstrates understanding of disease process, treatment plan, medications, and discharge instructions  Description: Complete learning assessment and assess knowledge base.  Interventions:  - Provide teaching at level of understanding  - Provide teaching via preferred learning methods  Outcome: Progressing      Normal for race

## 2024-04-26 NOTE — ASSESSMENT & PLAN NOTE
- Last bowel movement was 4 days prior to admission (4/21). Soft in character  - Normally has bowel movement every other day  - Has been taking morphine 15 mg BID for chronic back pain for years  - Suspect-opioid induced constipation  - XR abdomen - moderate bowel gas and stool burden  Patient responded well with Relistor doses x 2 as well as adequate bowel regimen  Patient also was recommended to decrease or cut down chronic opioid use to improve constipation as patient was agreeable to this and was able to successfully do so    Plan  Patient will continue with bowel regimen as needed recommend continue adequate titration of opioids in the outpatient setting

## 2024-04-26 NOTE — CASE MANAGEMENT
Case Management Discharge Planning Note    Patient name Gloria Patel  Location W /W -01 MRN [id number]  : 1940 Date 2024       Current Admission Date: 2024  Current Admission Diagnosis:Generalized weakness   Patient Active Problem List    Diagnosis Date Noted    Constipation 2024    Type 2 diabetes mellitus, without long-term current use of insulin ([facility]) 2024    Restless leg syndrome 2024    Hypomagnesemia 2024    Obesity (BMI 30-39.9) 2024    Peripheral vascular disease (HCC) 2024    Stage 3b chronic kidney disease (HCC) 2024    Mass of finger of left hand 2023    Tachycardia 2023    Chronic respiratory failure ([facility]) 2023    Type 2 diabetes mellitus with hyperglycemia, without long-term current use of insulin ([facility]) 2023    Essential hypertension 2023    Hyponatremia 2023    Hyperlipidemia 2023    Chronic back pain 2023    chronic respiratory failure ([facility]), acute component resolved 2023    GERD (gastroesophageal reflux disease) 2023    Esophageal hiatal hernia 10/06/2022    Generalized weakness 10/06/2022    Abdominal pain 2022    Low blood pressure reading 2022    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 2022    Unintentional weight loss 2022    BMI 31.0-31.9,adult 2022    Retinal artery occlusion, branch, left 2022    Chronic pain 2022    COVID-19 2022    Backache 2022    Onychomycosis 2021    Pain in toe 2021    Instability of foot joint 2021    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    ASD (atrial septal defect) 2020    Exertional dyspnea 2020    Acquired pes planus of right foot 10/28/2019    Microscopic hematuria 2019    Urge incontinence 2019    Ankle pain 2019    Primary localized osteoarthrosis of ankle and foot 2019    History of iron  deficiency anemia 04/11/2018    Chronic fatigue 12/14/2017    Chronic hypoxemic respiratory failure (HCC) 05/13/2017    GERD (gastroesophageal reflux disease) 05/13/2017    Hyponatremia 05/13/2017    Cigarette nicotine dependence in remission 05/13/2017    Controlled substance agreement signed 12/27/2016    Depression 03/14/2016    Diabetes mellitus (HCC) 03/04/2016    GI bleed 07/23/2015    DDD (degenerative disc disease), lumbosacral 05/13/2015    Arthropathy 04/15/2014    Iron deficiency anemia 05/30/2013    COPD, severe (HCC) 12/19/2012    Benign essential hypertension 12/19/2012    Hyperlipidemia 12/19/2012    Disc disorder of lumbar region 12/19/2012    Type II diabetes mellitus (HCC) 12/19/2012      LOS (days): 1  Geometric Mean LOS (GMLOS) (days): 2.6  Days to GMLOS:1.6     OBJECTIVE:  Risk of Unplanned Readmission Score: 24.75         Current admission status: Inpatient   Preferred Pharmacy:   [facility]/pharmacy #[id number] - [place], [place] - [address] [address]  Phone: [phone] Fax: [phone]    [place], [place] - [address] [facility]  [address] [facility] [address]  Phone: [phone] Fax: [phone]    Primary Care Provider: Christine Charles DO    Primary Insurance: [employer] RE[employer]  Secondary Insurance:     DISCHARGE DETAILS:    Discharge planning discussed with:: patient at bedside  Freedom of Choice: Yes  Comments - Freedom of Choice: [facility] SHELLEY contacted family/caregiver?: No- see comments (patient declined, stating she can update her family herself)    Requested Home Health Care         Is the patient interested in HHC at discharge?: Yes  Home Health Discipline requested:: Nursing, Occupational Therapy, Physical Therapy  Home Health Agency Name:: [facility]  Home Health Follow-Up Provider:: PCP  Home Health Services Needed:: Strengthening/Theraputic Exercises to Improve Function, Oxygen Via Nasal Cannula, Gait/ADL Training,  Evaluate Functional Status and Safety  Homebound Criteria Met:: Uses an Assist Device (i.e. cane, walker, etc)  Supporting Clincal Findings:: Limited Endurance, Requires Oxygen    DME Referral Provided  Referral made for DME?: Yes  DME referral completed for the following items::  (7ft O2 tubing)  DME Supplier Name:: [employer]    Other Referral/Resources/Interventions Provided:  Interventions: HHC, DME  Referral Comments: [facility] patients O2 is supplied through them. 7ft tubing ordered via [employer] and delivered to bedside by  liaison. DERRICK able to accept patient, patient agreeable to this- reserved in [facility]IDIN and added to follow up providers. Patient stating she will update family herself. CM to follow up as needed.    Would you like to participate in our [facility] service program?  : No - Declined    Treatment Team Recommendation: Home with Home Health Care  Discharge Destination Plan:: Home with Home Health Care  Transport at Discharge : Family

## 2024-04-26 NOTE — ASSESSMENT & PLAN NOTE
- Mg upon arrival 1.7  -Follow-up with primary care provider within 1 week for further evaluation upon.

## 2024-04-26 NOTE — ASSESSMENT & PLAN NOTE
Lab Results   Component Value Date    HGBA1C 8.4 (H) 04/24/2024     - Home meds: Metformin 1000 mg qAM and 500 mg qPM, Januvia 100 mg  - Blood glucose 123 on CMP    Plan:(P) 170.7  Continue home meds upon discharge  Follow-up with primary care provider within 1 week  Encourage carbohydrate controlled diet

## 2024-04-26 NOTE — ASSESSMENT & PLAN NOTE
Lab Results   Component Value Date/Time    Hemoglobin 9.1 (L) 04/27/2024 04:52 AM    Hemoglobin 9.4 (L) 04/26/2024 04:46 AM    Hemoglobin 9.9 (L) 04/25/2024 04:56 AM    MCV 83 04/27/2024 04:52 AM    MCV 83 04/26/2024 04:46 AM    MCV 87 04/25/2024 04:56 AM     Lab Results   Component Value Date/Time    IRON 21 (L) 04/24/2024 09:52 PM    IRON 80 07/16/2019 09:18 AM    FERRITIN 8 (L) 04/24/2024 09:52 PM    FERRITIN 87 07/16/2019 09:18 AM    TIBC 382 04/24/2024 09:52 PM    TIBC 296 07/16/2019 09:18 AM      - Hgb at baseline (10-11)  - Previously used to take oral iron tablets. No longer taking due to constipation    Plan  During admission patient received 3 doses IV Venofer while inpatient  Upon discharge recommended to continue with oral ferrous sulfate every other day to limit or avoid constipation

## 2024-04-26 NOTE — ASSESSMENT & PLAN NOTE
POA  generalized weakness, poor appetite over the past month  Patient symptoms started after receiving Shingles vaccination  - Lives at home with ex-. Takes care of ADLs independently  - Nonfocal neurological examination  - Ambulates with walker  - TSH 1.470, normal. No history of hypothyroidism  - B12 low  - Iron low  Generalized weakness likely secondary to iron deficiency anemia, B12 deficiency as well as deconditioning    Plan  PT OT consulted patient will follow-up on the outpatient setting Home with home health

## 2024-04-26 NOTE — DISCHARGE INSTR - AVS FIRST PAGE
Dear Gloria Patel,     It was our pleasure to care for you here at [facility].  It is our hope that we were always able to exceed the expected standards for your care during your stay.  You were hospitalized due to ***.  You were cared for on the *** floor by Mariella Dumont MD under the service of Sb Sanches MD with the [facility] who covers for your primary care physician (PCP), Christine Charles DO, while you were hospitalized.  If you have any questions or concerns related to this hospitalization, you may contact us at .  For follow up as well as any medication refills, we recommend that you follow up with your primary care physician.  A registered nurse will reach out to you by phone within a few days after your discharge to answer any additional questions that you may have after going home.  However, at this time we provide for you here, the most important instructions / recommendations at discharge:     Notable Medication Adjustments -   Decrease Morphine 7.5mg twice a day as needed.   Start Ferrous Sulfate 325mg once every other day  Start Cyanocobalamin 1000 mcg (Vitamin b12 daily supplementation)    Testing Required after Discharge -   Repeat CBC 1-2 months monitor Anemia  Repeat Iron Panel in about 3 months  ** Please contact your PCP to request testing orders for any of the testing recommended here **  Important follow up information -   Follow up with Primary Care Provider within 1 week Coordinate Vitamin B12 Supplementations   Follow up with Pain Medicine Doctor to downtitrate Morphine tolerance if able  Other Instructions -     Please review this entire after visit summary as additional general instructions including medication list, appointments, activity, diet, any pertinent wound care, and other additional recommendations from your care team that may be provided for you.      Sincerely,     Mariella  Elayne Dumont MD

## 2024-04-26 NOTE — PROGRESS NOTES
[facility]  Progress Note  Name: Gloria Patel I  MRN: [id number]  Unit/Bed#: W -01 I Date of Admission: 4/24/2024   Date of Service: 4/26/2024 I Hospital Day: 1    Assessment/Plan   * Generalized weakness  Assessment & Plan  - Generalized weakness, poor appetite over the past month  - Symptoms started after receiving Shingles vaccination  - Lives at home with ex-. Takes care of ADLs independently  - Nonfocal neurological examination  - Ambulates with walker  - TSH 1.470, normal. No history of hypothyroidism  - B12 low   - Iron low    Plan:  - PT/OT consult  - Encourage oral intake  - B12 shot q30 days  - 300mg Venofer x2    Constipation  Assessment & Plan  - Last bowel movement was 4 days ago (4/21). Soft in character  - Normally has bowel movement every other day  - Has been taking morphine 15 mg BID for chronic back pain for years  - Suspect-opioid induced constipation  - XR abdomen - moderate bowel gas and stool burden  - Given 1 dose of Relistor 6 mg subq in the ED    Plan:  - D/c bowel regimen as patient had many bowel movements s/p Relistor/Miralax/Senokot    Obesity (BMI 30-39.9)  Assessment & Plan  - BMI 30.99    Hypomagnesemia  Assessment & Plan  - Mg upon arrival 1.7  - Given MagOx 800 mg in the ED    Plan:  - Check Mg in the morning  - Replenish as necessary    Restless leg syndrome  Assessment & Plan  - Home: Ropinirole 0.5 mg qhs    Plan:  - Continue home ropinirole    Essential hypertension  Assessment & Plan  - Home meds: Losartan 50 mg, diltiazem 240 mg   - Blood pressures have been adequately controlled    Plan:  - Continue home losartan and diltiazem    Chronic respiratory failure (HCC)  Assessment & Plan  - Baseline is 3 L NC  - Has nonproductive chronic cough  - Former smoker  - Not currently in COPD exacerbation  - Home med: Breztri 2 puffs BID   - CXR - wet read, no acute cardiopulmonary abnormality  - Flu/RSV/COVID negative  - Currently on 3 L NC,  saturating upper 90%    Plan:  - Continue formulary equivalent Symbicort 2 puffs BID  - Supplemental oxygen as necessary    Hyperlipidemia  Assessment & Plan  - Lipid panel (8/21/2023) - , TG 78, LDL 59, HDL 69  - Home med: atorvastatin 40 mg    Plan:  - Continue home atorvastatin    Iron deficiency anemia  Assessment & Plan  Lab Results   Component Value Date/Time    Hemoglobin 9.4 (L) 04/26/2024 04:46 AM    Hemoglobin 9.9 (L) 04/25/2024 04:56 AM    Hemoglobin 10.2 (L) 04/24/2024 09:52 PM    MCV 83 04/26/2024 04:46 AM    MCV 87 04/25/2024 04:56 AM    MCV 83 04/24/2024 09:52 PM     Lab Results   Component Value Date/Time    IRON 21 (L) 04/24/2024 09:52 PM    IRON 80 07/16/2019 09:18 AM    FERRITIN 8 (L) 04/24/2024 09:52 PM    FERRITIN 87 07/16/2019 09:18 AM    TIBC 382 04/24/2024 09:52 PM    TIBC 296 07/16/2019 09:18 AM      - Hgb at baseline (10-11)  - Previously used to take oral iron tablets. No longer taking due to constipation    Plan:  - Low iron; Venofer while inpatient     GERD (gastroesophageal reflux disease)  Assessment & Plan  - Continue home Protonix 40 mg BID               VTE Pharmacologic Prophylaxis: VTE Score: 5 High Risk (Score >/= 5) - Pharmacological DVT Prophylaxis Ordered: enoxaparin (Lovenox). Sequential Compression Devices Ordered.    Mobility:   Basic Mobility Inpatient Raw Score: 17  JH-HLM Goal: 5: Stand one or more mins  JH-HLM Achieved: 6: Walk 10 steps or more  JH-HLM Goal achieved. Continue to encourage appropriate mobility.    Patient Centered Rounds: I performed bedside rounds with nursing staff today.  Discussions with Specialists or Other Care Team Provider: pt/ot    Education and Discussions with Family / Patient: Updated  (son) at bedside.    Current Length of Stay: 1 day(s)  Current Patient Status: Inpatient   Discharge Plan: Anticipate discharge tomorrow to discharge location to be determined pending rehab evaluations.    Code Status: Level 3 - DNAR and  DNI    Subjective:   No acute events overnight.  Patient feels better today. Denies headache, chest pain, shortness of breath, N/V/D.  States that she has not had a bowel movement in 4 days.    Objective:     Vitals:   Temp (24hrs), Av.1 °F (36.7 °C), Min:97.6 °F (36.4 °C), Max:98.4 °F (36.9 °C)    Temp:  [97.6 °F (36.4 °C)-98.4 °F (36.9 °C)] 97.6 °F (36.4 °C)  HR:  [56-68] 68  Resp:  [12-20] 18  BP: (115-133)/(61-70) 123/61  SpO2:  [95 %-98 %] 98 %  Body mass index is 31.74 kg/m².     Input and Output Summary (last 24 hours):     Intake/Output Summary (Last 24 hours) at 2024 1336  Last data filed at 2024 0926  Gross per 24 hour   Intake 720 ml   Output --   Net 720 ml       Physical Exam:   Physical Exam  Constitutional:       Appearance: Normal appearance.   HENT:      Head: Normocephalic and atraumatic.      Mouth/Throat:      Mouth: Mucous membranes are moist.   Eyes:      Conjunctiva/sclera: Conjunctivae normal.   Cardiovascular:      Rate and Rhythm: Normal rate and regular rhythm.      Pulses: Normal pulses.      Heart sounds: No murmur heard.  Pulmonary:      Effort: No respiratory distress.      Breath sounds: No wheezing, rhonchi or rales.      Comments: On 3 L NC  Abdominal:      General: There is no distension.      Tenderness: There is no abdominal tenderness. There is no guarding or rebound.      Comments: Hyperactive bowel sounds   Musculoskeletal:         General: No swelling, tenderness or signs of injury. Normal range of motion.      Cervical back: Normal range of motion and neck supple.      Right lower leg: No edema.      Left lower leg: No edema.   Skin:     General: Skin is warm.      Findings: No erythema or rash.   Neurological:      General: No focal deficit present.      Mental Status: She is alert and oriented to person, place, and time.      Comments: Moves all 4 extremities.           Additional Data:     Labs:  Results from last 7 days   Lab Units 24  0446   WBC  Thousand/uL 5.73   HEMOGLOBIN g/dL 9.4*   HEMATOCRIT % 31.8*   PLATELETS Thousands/uL 231   SEGS PCT % 63   LYMPHO PCT % 20   MONO PCT % 13*   EOS PCT % 2     Results from last 7 days   Lab Units 04/26/24  0446 04/25/24  0456 04/24/24  2152   SODIUM mmol/L 138   < > 139   POTASSIUM mmol/L 4.0   < > 4.2   CHLORIDE mmol/L 99   < > 100   CO2 mmol/L 33*   < > 30   BUN mg/dL 14   < > 11   CREATININE mg/dL 0.85   < > 0.84   ANION GAP mmol/L 6   < > 9   CALCIUM mg/dL 9.0   < > 9.2   ALBUMIN g/dL  --   --  3.7   TOTAL BILIRUBIN mg/dL  --   --  0.31   ALK PHOS U/L  --   --  66   ALT U/L  --   --  7   AST U/L  --   --  11*   GLUCOSE RANDOM mg/dL 182*   < > 123    < > = values in this interval not displayed.         Results from last 7 days   Lab Units 04/26/24  1108 04/26/24  0740 04/25/24  2035 04/25/24  1511 04/25/24  1113 04/25/24  0734   POC GLUCOSE mg/dl 216* 136 186* 198* 117 133     Results from last 7 days   Lab Units 04/24/24  2152   HEMOGLOBIN A1C % 8.4*           Lines/Drains:  Invasive Devices       Peripheral Intravenous Line  Duration             Peripheral IV 04/24/24 Proximal;Right;Ventral (anterior) Forearm 1 day                          Imaging: Reviewed radiology reports from this admission including: chest xray    Recent Cultures (last 7 days):         Last 24 Hours Medication List:   Current Facility-Administered Medications   Medication Dose Route Frequency Provider Last Rate    acetaminophen  975 mg Oral Q8H PRN Shreyan Morataya, DO      albuterol  2 puff Inhalation Q6H PRN Shreyan Morataya, DO      aspirin  81 mg Oral Daily Shreyan Morataya, DO      atorvastatin  40 mg Oral QPM Shreyan Morataya, DO      bisacodyl  10 mg Rectal Daily PRN Shreyan Morataya, DO      budesonide-formoterol  2 puff Inhalation BID Shreyan Morataya, DO      cyanocobalamin  1,000 mcg Intramuscular Q30 Days Roopa Cordon, DO      diltiazem  240 mg Oral Daily Shreyan Morataya, DO      enoxaparin  40 mg Subcutaneous Daily Drew Morataya DO       gabapentin  300 mg Oral 4x Daily Drwe Morataya DO      insulin lispro  1-6 Units Subcutaneous 4x Daily (AC & HS) Drew Morataya DO      iron sucrose  200 mg Intravenous Daily Mariella Dumont MD      losartan  50 mg Oral Daily Drew Morataya, DO      morphine  7.5 mg Oral BID PRN Whitney Camacho MD      ondansetron  4 mg Intravenous Q6H PRN Drew Morataya DO      pantoprazole  40 mg Oral Q12H Drew Morataya,       polyethylene glycol  17 g Oral Daily PRN Roopa Cordon DO      rOPINIRole  0.5 mg Oral HS Drew Morataya DO      senna  2 tablet Oral HS PRN Roopa Cordon DO          Today, Patient Was Seen By: Roopa Cordon DO    **Please Note: This note may have been constructed using a voice recognition system.**

## 2024-04-26 NOTE — PROGRESS NOTES
Patient:    MRN:  [id number]    Janak Request ID:  [id number]    Level of care reserved:  Home Health Agency    Partner Reserved:  [facility], [place] [address] [phone]    Clinical needs requested:    Geography searched:  [phone]    Start of Service:    Request sent:  3:13pm EDT on 4/25/2024 by Junito Greene    Partner reserved:  2:03pm EDT on 4/26/2024 by Junito Greene    Choice list shared:

## 2024-04-26 NOTE — PLAN OF CARE
Problem: PAIN - ADULT  Goal: Verbalizes/displays adequate comfort level or baseline comfort level  Description: Interventions:  - Encourage patient to monitor pain and request assistance  - Assess pain using appropriate pain scale  - Administer analgesics based on type and severity of pain and evaluate response  - Implement non-pharmacological measures as appropriate and evaluate response  - Consider cultural and social influences on pain and pain management  - Notify physician/advanced practitioner if interventions unsuccessful or patient reports new pain  Outcome: Progressing     Problem: INFECTION - ADULT  Goal: Absence or prevention of progression during hospitalization  Description: INTERVENTIONS:  - Assess and monitor for signs and symptoms of infection  - Monitor lab/diagnostic results  - Monitor all insertion sites, i.e. indwelling lines, tubes, and drains  - Monitor endotracheal if appropriate and nasal secretions for changes in amount and color  - [place] appropriate cooling/warming therapies per order  - Administer medications as ordered  - Instruct and encourage patient and family to use good hand hygiene technique  - Identify and instruct in appropriate isolation precautions for identified infection/condition  Outcome: Progressing  Goal: Absence of fever/infection during neutropenic period  Description: INTERVENTIONS:  - Monitor WBC    Outcome: Progressing     Problem: SAFETY ADULT  Goal: Patient will remain free of falls  Description: INTERVENTIONS:  - Educate patient/family on patient safety including physical limitations  - Instruct patient to call for assistance with activity   - Consult OT/PT to assist with strengthening/mobility   - Keep Call bell within reach  - Keep bed low and locked with side rails adjusted as appropriate  - Keep care items and personal belongings within reach  - Initiate and maintain comfort rounds  - Make Fall Risk Sign visible to staff  - Offer Toileting ev Hours, in  advance of need  - Initiate/Maintain alarm  - Obtain necessary fall risk management equipment:   - Apply yellow socks and bracelet for high fall risk patients  - Consider moving patient to room near nurses station  Outcome: Progressing  Goal: Maintain or return to baseline ADL function  Description: INTERVENTIONS:  -  Assess patient's ability to carry out ADLs; assess patient's baseline for ADL function and identify physical deficits which impact ability to perform ADLs (bathing, care of mouth/teeth, toileting, grooming, dressing, etc.)  - Assess/evaluate cause of self-care deficits   - Assess range of motion  - Assess patient's mobility; develop plan if impaired  - Assess patient's need for assistive devices and provide as appropriate  - Encourage maximum independence but intervene and supervise when necessary  - Involve family in performance of ADLs  - Assess for home care needs following discharge   - Consider OT consult to assist with ADL evaluation and planning for discharge  - Provide patient education as appropriate  Outcome: Progressing  Goal: Maintains/Returns to pre admission functional level  Description: INTERVENTIONS:  - Perform AM-PAC 6 Click Basic Mobility/ Daily Activity assessment daily.  - Set and communicate daily mobility goal to care team and patient/family/caregiver.   - Collaborate with rehabilitation services on mobility goals if consulted  - Perform Range of [place] times a day.  - Reposition patient every  hours.  - Dangle patient  times a day  - Stand patient  times a day  - Ambulate patient  times a day  - Out of bed to chair  times a day   - Out of bed for meals  times a day  - Out of bed for toileting  - Record patient progress and toleration of activity level   Outcome: Progressing     Problem: DISCHARGE PLANNING  Goal: Discharge to home or other facility with appropriate resources  Description: INTERVENTIONS:  - Identify barriers to discharge w/patient and caregiver  - Arrange for  needed discharge resources and transportation as appropriate  - Identify discharge learning needs (meds, wound care, etc.)  - Arrange for interpretive services to assist at discharge as needed  - Refer to Case Management Department for coordinating discharge planning if the patient needs post-hospital services based on physician/advanced practitioner order or complex needs related to functional status, cognitive ability, or social support system  Outcome: Progressing     Problem: Knowledge Deficit  Goal: Patient/family/caregiver demonstrates understanding of disease process, treatment plan, medications, and discharge instructions  Description: Complete learning assessment and assess knowledge base.  Interventions:  - Provide teaching at level of understanding  - Provide teaching via preferred learning methods  Outcome: Progressing

## 2024-04-26 NOTE — ASSESSMENT & PLAN NOTE
- Baseline is 3 L NC  - Has nonproductive chronic cough  - Former smoker  - Not currently in COPD exacerbation  - Home med: Breztri 2 puffs BID   - CXR - wet read, no acute cardiopulmonary abnormality  - Flu/RSV/COVID negative  - Currently on 3 L NC, saturating upper 90%    Plan:  -Continue home Bretztri   - Supplemental oxygen as necessary  Follow-up with primary care provider within 1 week

## 2024-04-27 VITALS
DIASTOLIC BLOOD PRESSURE: 82 MMHG | SYSTOLIC BLOOD PRESSURE: 146 MMHG | HEART RATE: 63 BPM | TEMPERATURE: 97.6 F | WEIGHT: 168 LBS | BODY MASS INDEX: 31.74 KG/M2 | RESPIRATION RATE: 18 BRPM | OXYGEN SATURATION: 97 %

## 2024-04-27 PROBLEM — K59.00 CONSTIPATION: Status: RESOLVED | Noted: 2024-04-25 | Resolved: 2024-04-27

## 2024-04-27 PROBLEM — R10.9 ABDOMINAL PAIN: Status: RESOLVED | Noted: 2022-09-26 | Resolved: 2024-04-27

## 2024-04-27 LAB
ANION GAP SERPL CALCULATED.3IONS-SCNC: 5 MMOL/L (ref 4–13)
BASOPHILS # BLD AUTO: 0.06 THOUSANDS/ÂΜL (ref 0–0.1)
BASOPHILS NFR BLD AUTO: 1 % (ref 0–1)
BUN SERPL-MCNC: 9 MG/DL (ref 5–25)
CALCIUM SERPL-MCNC: 8.6 MG/DL (ref 8.4–10.2)
CHLORIDE SERPL-SCNC: 98 MMOL/L (ref 96–108)
CO2 SERPL-SCNC: 28 MMOL/L (ref 21–32)
CREAT SERPL-MCNC: 0.8 MG/DL (ref 0.6–1.3)
EOSINOPHIL # BLD AUTO: 0.1 THOUSAND/ÂΜL (ref 0–0.61)
EOSINOPHIL NFR BLD AUTO: 2 % (ref 0–6)
ERYTHROCYTE [DISTWIDTH] IN BLOOD BY AUTOMATED COUNT: 18.2 % (ref 11.6–15.1)
GFR SERPL CREATININE-BSD FRML MDRD: 68 ML/MIN/1.73SQ M
GLUCOSE SERPL-MCNC: 181 MG/DL (ref 65–140)
GLUCOSE SERPL-MCNC: 244 MG/DL (ref 65–140)
GLUCOSE SERPL-MCNC: 315 MG/DL (ref 65–140)
HCT VFR BLD AUTO: 30.9 % (ref 34.8–46.1)
HGB BLD-MCNC: 9.1 G/DL (ref 11.5–15.4)
IMM GRANULOCYTES # BLD AUTO: 0.07 THOUSAND/UL (ref 0–0.2)
IMM GRANULOCYTES NFR BLD AUTO: 1 % (ref 0–2)
LYMPHOCYTES # BLD AUTO: 0.93 THOUSANDS/ÂΜL (ref 0.6–4.47)
LYMPHOCYTES NFR BLD AUTO: 17 % (ref 14–44)
MAGNESIUM SERPL-MCNC: 2.5 MG/DL (ref 1.9–2.7)
MCH RBC QN AUTO: 24.5 PG (ref 26.8–34.3)
MCHC RBC AUTO-ENTMCNC: 29.4 G/DL (ref 31.4–37.4)
MCV RBC AUTO: 83 FL (ref 82–98)
MONOCYTES # BLD AUTO: 0.6 THOUSAND/ÂΜL (ref 0.17–1.22)
MONOCYTES NFR BLD AUTO: 11 % (ref 4–12)
NEUTROPHILS # BLD AUTO: 3.57 THOUSANDS/ÂΜL (ref 1.85–7.62)
NEUTS SEG NFR BLD AUTO: 68 % (ref 43–75)
NRBC BLD AUTO-RTO: 0 /100 WBCS
PLATELET # BLD AUTO: 191 THOUSANDS/UL (ref 149–390)
PMV BLD AUTO: 10 FL (ref 8.9–12.7)
POTASSIUM SERPL-SCNC: 4.1 MMOL/L (ref 3.5–5.3)
RBC # BLD AUTO: 3.72 MILLION/UL (ref 3.81–5.12)
SODIUM SERPL-SCNC: 131 MMOL/L (ref 135–147)
WBC # BLD AUTO: 5.33 THOUSAND/UL (ref 4.31–10.16)

## 2024-04-27 PROCEDURE — 82948 REAGENT STRIP/BLOOD GLUCOSE: CPT

## 2024-04-27 PROCEDURE — 83735 ASSAY OF MAGNESIUM: CPT

## 2024-04-27 PROCEDURE — 85025 COMPLETE CBC W/AUTO DIFF WBC: CPT

## 2024-04-27 PROCEDURE — 80048 BASIC METABOLIC PNL TOTAL CA: CPT

## 2024-04-27 PROCEDURE — 99239 HOSP IP/OBS DSCHRG MGMT >30: CPT | Performed by: INTERNAL MEDICINE

## 2024-04-27 RX ORDER — SENNOSIDES 8.6 MG
17.2 TABLET ORAL
Qty: 14 TABLET | Refills: 0 | Status: SHIPPED | OUTPATIENT
Start: 2024-04-27 | End: 2024-05-11

## 2024-04-27 RX ORDER — BISACODYL 10 MG
10 SUPPOSITORY, RECTAL RECTAL DAILY PRN
Qty: 12 SUPPOSITORY | Refills: 0 | Status: SHIPPED | OUTPATIENT
Start: 2024-04-27

## 2024-04-27 RX ORDER — MORPHINE SULFATE 15 MG/1
15 TABLET ORAL 2 TIMES DAILY PRN
Status: CANCELLED
Start: 2024-04-27 | End: 2024-05-04

## 2024-04-27 RX ADMIN — ASPIRIN 81 MG CHEWABLE TABLET 81 MG: 81 TABLET CHEWABLE at 08:15

## 2024-04-27 RX ADMIN — GABAPENTIN 300 MG: 300 CAPSULE ORAL at 11:30

## 2024-04-27 RX ADMIN — DILTIAZEM HYDROCHLORIDE 240 MG: 240 CAPSULE, COATED, EXTENDED RELEASE ORAL at 08:15

## 2024-04-27 RX ADMIN — INSULIN LISPRO 1 UNITS: 100 INJECTION, SOLUTION INTRAVENOUS; SUBCUTANEOUS at 11:30

## 2024-04-27 RX ADMIN — PANTOPRAZOLE SODIUM 40 MG: 40 TABLET, DELAYED RELEASE ORAL at 00:50

## 2024-04-27 RX ADMIN — BUDESONIDE AND FORMOTEROL FUMARATE DIHYDRATE 2 PUFF: 160; 4.5 AEROSOL RESPIRATORY (INHALATION) at 08:16

## 2024-04-27 RX ADMIN — LOSARTAN POTASSIUM 50 MG: 50 TABLET, FILM COATED ORAL at 08:15

## 2024-04-27 RX ADMIN — GABAPENTIN 300 MG: 300 CAPSULE ORAL at 08:15

## 2024-04-27 RX ADMIN — ENOXAPARIN SODIUM 40 MG: 40 INJECTION SUBCUTANEOUS at 08:16

## 2024-04-27 RX ADMIN — IRON SUCROSE 200 MG: 20 INJECTION, SOLUTION INTRAVENOUS at 08:30

## 2024-04-27 RX ADMIN — PANTOPRAZOLE SODIUM 40 MG: 40 TABLET, DELAYED RELEASE ORAL at 12:34

## 2024-04-27 RX ADMIN — MORPHINE SULFATE 7.5 MG: 15 TABLET ORAL at 08:14

## 2024-04-27 RX ADMIN — INSULIN LISPRO 3 UNITS: 100 INJECTION, SOLUTION INTRAVENOUS; SUBCUTANEOUS at 08:17

## 2024-04-27 NOTE — PLAN OF CARE
Problem: PAIN - ADULT  Goal: Verbalizes/displays adequate comfort level or baseline comfort level  Description: Interventions:  - Encourage patient to monitor pain and request assistance  - Assess pain using appropriate pain scale  - Administer analgesics based on type and severity of pain and evaluate response  - Implement non-pharmacological measures as appropriate and evaluate response  - Consider cultural and social influences on pain and pain management  - Notify physician/advanced practitioner if interventions unsuccessful or patient reports new pain  Outcome: Progressing     Problem: INFECTION - ADULT  Goal: Absence or prevention of progression during hospitalization  Description: INTERVENTIONS:  - Assess and monitor for signs and symptoms of infection  - Monitor lab/diagnostic results  - Monitor all insertion sites, i.e. indwelling lines, tubes, and drains  - Monitor endotracheal if appropriate and nasal secretions for changes in amount and color  - [place] appropriate cooling/warming therapies per order  - Administer medications as ordered  - Instruct and encourage patient and family to use good hand hygiene technique  - Identify and instruct in appropriate isolation precautions for identified infection/condition  Outcome: Progressing  Goal: Absence of fever/infection during neutropenic period  Description: INTERVENTIONS:  - Monitor WBC    Outcome: Progressing     Problem: DISCHARGE PLANNING  Goal: Discharge to home or other facility with appropriate resources  Description: INTERVENTIONS:  - Identify barriers to discharge w/patient and caregiver  - Arrange for needed discharge resources and transportation as appropriate  - Identify discharge learning needs (meds, wound care, etc.)  - Arrange for interpretive services to assist at discharge as needed  - Refer to Case Management Department for coordinating discharge planning if the patient needs post-hospital services based on physician/advanced  practitioner order or complex needs related to functional status, cognitive ability, or social support system  Outcome: Progressing

## 2024-04-27 NOTE — ASSESSMENT & PLAN NOTE
- Home med: morphine 15 mg BID, gabapentin 300 mg QID  - Hx of 3 x spinal surgeries  - No saddle anesthesia, urine/stool incontinence    Plan:  -Continue home gabapentin  Recommended continue with weaning of morphine as inpatient to 7.5 mg twice a day as needed  Recommend to continue follow-up with outpatient orthopedics for further down titration of chronic opiates

## 2024-04-27 NOTE — DISCHARGE SUMMARY
[facility]  Discharge- Gloria Patel 1940, 83 y.o. female MRN: [id number]  Unit/Bed#: W -01 Encounter: [id number]  Primary Care Provider: Christine Charles DO   Date and time admitted to hospital: 4/24/2024  9:01 PM    * Generalized weakness  Assessment & Plan  POA  generalized weakness, poor appetite over the past month  Patient symptoms started after receiving Shingles vaccination  - Lives at home with ex-. Takes care of ADLs independently  - Nonfocal neurological examination  - Ambulates with walker  - TSH 1.470, normal. No history of hypothyroidism  - B12 low  - Iron low  Generalized weakness likely secondary to iron deficiency anemia, B12 deficiency as well as deconditioning    Plan  PT OT consulted patient will follow-up on the outpatient setting Home with home health      Iron deficiency anemia  Assessment & Plan  Lab Results   Component Value Date/Time    Hemoglobin 9.1 (L) 04/27/2024 04:52 AM    Hemoglobin 9.4 (L) 04/26/2024 04:46 AM    Hemoglobin 9.9 (L) 04/25/2024 04:56 AM    MCV 83 04/27/2024 04:52 AM    MCV 83 04/26/2024 04:46 AM    MCV 87 04/25/2024 04:56 AM     Lab Results   Component Value Date/Time    IRON 21 (L) 04/24/2024 09:52 PM    IRON 80 07/16/2019 09:18 AM    FERRITIN 8 (L) 04/24/2024 09:52 PM    FERRITIN 87 07/16/2019 09:18 AM    TIBC 382 04/24/2024 09:52 PM    TIBC 296 07/16/2019 09:18 AM      - Hgb at baseline (10-11)  - Previously used to take oral iron tablets. No longer taking due to constipation    Plan  During admission patient received 3 doses IV Venofer while inpatient  Upon discharge recommended to continue with oral ferrous sulfate every other day to limit or avoid constipation    Constipation-resolved as of 4/27/2024  Assessment & Plan  - Last bowel movement was 4 days prior to admission (4/21). Soft in character  - Normally has bowel movement every other day  - Has been taking morphine 15 mg BID for chronic back pain for years  -  Suspect-opioid induced constipation  - XR abdomen - moderate bowel gas and stool burden  Patient responded well with Relistor doses x 2 as well as adequate bowel regimen  Patient also was recommended to decrease or cut down chronic opioid use to improve constipation as patient was agreeable to this and was able to successfully do so    Plan  Patient will continue with bowel regimen as needed recommend continue adequate titration of opioids in the outpatient setting    Chronic respiratory failure (HCC)  Assessment & Plan  - Baseline is 3 L NC  - Has nonproductive chronic cough  - Former smoker  - Not currently in COPD exacerbation  - Home med: Breztri 2 puffs BID   - CXR - wet read, no acute cardiopulmonary abnormality  - Flu/RSV/COVID negative  - Currently on 3 L NC, saturating upper 90%    Plan:  -Continue home Bretztri   - Supplemental oxygen as necessary  Follow-up with primary care provider within 1 week    Chronic back pain  Assessment & Plan  - Home med: morphine 15 mg BID, gabapentin 300 mg QID  - Hx of 3 x spinal surgeries  - No saddle anesthesia, urine/stool incontinence    Plan:  -Continue home gabapentin  Recommended continue with weaning of morphine as inpatient to 7.5 mg twice a day as needed  Recommend to continue follow-up with outpatient orthopedics for further down titration of chronic opiates    Abdominal pain-resolved as of 4/27/2024  Assessment & Plan  - Has been having intermittent left-sided abdominal pain for the past month.   - Multiple recent ED visits for the same  - CTAP (4/2/24) - Probable cystitis. 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 Follow-up with pancreatic MRI. Moderate colonic stool burden. No bowel obstruction. Predominantly distal diverticulosis without diverticulitis  - CTAP (4/10/24) - No evidence of bowel obstruction, colitis or diverticulitis. Findings compatible with constipation.  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 In retrospect this observation can be partially observed on CT of the chest dated 10/2/2018 which further indicates benign/indolent process. Typically no further follow-up is recommended if the patient has reached the age of 80.     - Likely due to constipation in the setting of chronic opiate use  - Bowel regimen   - Follow up with GI as outpatient      GERD (gastroesophageal reflux disease)  Assessment & Plan  - Continue home Protonix 40 mg BID    Type 2 diabetes mellitus, without long-term current use of insulin ([facility])  Assessment & Plan  Lab Results   Component Value Date    HGBA1C 8.4 (H) 04/24/2024     - Home meds: Metformin 1000 mg qAM and 500 mg qPM, Januvia 100 mg  - Blood glucose 123 on CMP    Plan:(P) 170.7  Continue home meds upon discharge  Follow-up with primary care provider within 1 week  Encourage carbohydrate controlled diet    Hyperlipidemia  Assessment & Plan  - Lipid panel (8/21/2023) - , TG 78, LDL 59, HDL 69  - Home med: atorvastatin 40 mg    Plan:  - Continue home atorvastatin    Essential hypertension  Assessment & Plan  - Home meds: Losartan 50 mg, diltiazem 240 mg   - Blood pressures have been adequately controlled    Plan:  - Continue home losartan and diltiazem    Restless leg syndrome  Assessment & Plan  - Home: Ropinirole 0.5 mg qhs    Plan:  - Continue home ropinirole    Hypomagnesemia  Assessment & Plan  - Mg upon arrival 1.7  -Follow-up with primary care provider within 1 week for further evaluation upon.    Obesity (BMI 30-39.9)  Assessment & Plan  - BMI 30.99        Medical Problems       Resolved Problems  Date Reviewed: 4/27/2024            Resolved    Abdominal pain 4/27/2024     Resolved by  Mariella Dumont MD    Constipation 4/27/2024     Resolved by  Mariella Dumont MD        Discharging Resident: Mariella  Elayne Dumont MD  Discharging Attending: No att. providers found  PCP: Christine Charles DO  Admission Date:   Admission Orders (From admission, onward)       Ordered        04/25/24 1548  INPATIENT ADMISSION  Once            04/24/24 2336  Place in Observation  Once                          Discharge Date: 04/27/24    Consultations During Hospital Stay:  None    Procedures Performed:   None    Significant Findings / Test Results:   XR chest 1 view portable   ED Interpretation by Derrick Mckinley MD (04/24 2215)   No acute changes.      Final Result by Toni Dennison MD (04/25 0842)      No acute cardiopulmonary disease.            Workstation performed: [id number]         XR abdomen 1 view kub   ED Interpretation by Derrick Mckinley MD (04/24 2216)   Moderate bowel gas and stool throughout colon.      Final Result by Toni Dennison MD (04/25 0843)      Mildly increased colonic stool burden, similar to prior CT. Nonobstructive bowel gas pattern.               Workstation performed: [id number]              Incidental Findings:   None      Test Results Pending at Discharge (will require follow up):  None     Outpatient Tests Requested:  None    Complications: None     Reason for Admission: Abdominal pain generalized weakness    Hospital Course:   Gloria Patel is a 83 y.o. female patient who originally presented to the hospital on 4/24/2024 due to generalized weakness and decreased appetite over the past month prior to admission.  Patient initially attributed this due to shingles immunization however upon evaluation patient was noted with iron deficiency anemia, vitamin B12 deficiency for which was treated with IV Venofer and intramuscular vitamin B12.  Patient was noted on imaging with significant constipation for which 2 doses of Relistor and 1 dose of magnesium citrate was given with improvement in symptoms it was thought this was secondary to chronic open dependence due to chronic back pain.   During admission patient was able to decrease her morphine from 15 mg twice a day to 7.5 mg twice a day as needed only require 1-2 doses daily.    Today patient was deemed stable for discharge expected to follow-up with primary care provider on the outpatient setting, counseled to start ferrous sulfate 1 tablet every other day as well as vitamin B12 replacement either intramuscular or oral as levels were significantly low.  Patient was also counseled to follow-up with orthopedic care specialist who is currently managing her opiate agreement if able to tolerate further weaning of her medications.    Please see above list of diagnoses and related plan for additional information.     Condition at Discharge: stable    Discharge Day Visit / Exam:   Subjective: No acute overnight events or primary syndrome.  Patient seen at bedside denies any acute complaint, nurse tolerating oral diet without difficulties has been having regular bowel movements with down titrated off medications as well as and bowel regimen.  Endorsed that her fatigue/generalized weakness have slowly improved.  Vitals: Blood Pressure: 146/82 (04/27/24 0718)  Pulse: 63 (04/27/24 0718)  Temperature: 97.6 °F (36.4 °C) (04/27/24 0718)  Temp Source: Oral (04/24/24 2108)  Respirations: 18 (04/27/24 0718)  Weight - Scale: 76.2 kg (168 lb) (bedscale) (04/25/24 1421)  SpO2: 97 % (04/27/24 0718)  Exam:   Physical Exam  Vitals and nursing note reviewed.   Constitutional:       General: She is not in acute distress.     Appearance: She is well-developed. She is obese.   HENT:      Head: Normocephalic and atraumatic.      Right Ear: External ear normal.      Left Ear: External ear normal.      Nose: Nose normal.      Mouth/Throat:      Mouth: Mucous membranes are moist.   Eyes:      Extraocular Movements: Extraocular movements intact.      Conjunctiva/sclera: Conjunctivae normal.      Pupils: Pupils are equal, round, and reactive to light.   Cardiovascular:      Rate  and Rhythm: Normal rate and regular rhythm.      Pulses: Normal pulses.      Heart sounds: Normal heart sounds. No murmur heard.  Pulmonary:      Effort: Pulmonary effort is normal. No respiratory distress.      Breath sounds: Normal breath sounds.   Abdominal:      General: Bowel sounds are normal. There is no distension.      Palpations: Abdomen is soft.      Tenderness: There is no abdominal tenderness.   Musculoskeletal:         General: No swelling.      Cervical back: Neck supple.      Right lower leg: No edema.      Left lower leg: No edema.   Skin:     General: Skin is warm and dry.      Capillary Refill: Capillary refill takes less than 2 seconds.      Coloration: Skin is pale.   Neurological:      Mental Status: She is alert.   Psychiatric:         Mood and Affect: Mood normal.          Discussion with Family: Patient declined call to .     Discharge instructions/Information to patient and family:   See after visit summary for information provided to patient and family.      Provisions for Follow-Up Care:  See after visit summary for information related to follow-up care and any pertinent home health orders.      Mobility at time of Discharge:   Basic Mobility Inpatient Raw Score: 17  JH-HLM Goal: 5: Stand one or more mins  JH-HLM Achieved: 7: Walk 25 feet or more  HLM Goal achieved. Continue to encourage appropriate mobility.     Disposition:   Home with VNA Services (Reminder: Complete face to face encounter)    Planned Readmission: None    Discharge Medications:  See after visit summary for reconciled discharge medications provided to patient and/or family.      **Please Note: This note may have been constructed using a voice recognition system**

## 2024-04-27 NOTE — PLAN OF CARE
Problem: PAIN - ADULT  Goal: Verbalizes/displays adequate comfort level or baseline comfort level  Description: Interventions:  - Encourage patient to monitor pain and request assistance  - Assess pain using appropriate pain scale  - Administer analgesics based on type and severity of pain and evaluate response  - Implement non-pharmacological measures as appropriate and evaluate response  - Consider cultural and social influences on pain and pain management  - Notify physician/advanced practitioner if interventions unsuccessful or patient reports new pain  4/27/2024 1326 by Adina Cleaning RN  Outcome: Adequate for Discharge  4/27/2024 1326 by Adina Cleaning RN  Outcome: Adequate for Discharge  4/27/2024 0949 by Adina Cleaning RN  Outcome: Progressing     Problem: INFECTION - ADULT  Goal: Absence or prevention of progression during hospitalization  Description: INTERVENTIONS:  - Assess and monitor for signs and symptoms of infection  - Monitor lab/diagnostic results  - Monitor all insertion sites, i.e. indwelling lines, tubes, and drains  - Monitor endotracheal if appropriate and nasal secretions for changes in amount and color  - [place] appropriate cooling/warming therapies per order  - Administer medications as ordered  - Instruct and encourage patient and family to use good hand hygiene technique  - Identify and instruct in appropriate isolation precautions for identified infection/condition  4/27/2024 1326 by Adina Cleaning RN  Outcome: Adequate for Discharge  4/27/2024 1326 by Adina Cleaning RN  Outcome: Adequate for Discharge  4/27/2024 0949 by Adina Cleaning RN  Outcome: Progressing  Goal: Absence of fever/infection during neutropenic period  Description: INTERVENTIONS:  - Monitor WBC    4/27/2024 1326 by Adina Cleaning RN  Outcome: Adequate for Discharge  4/27/2024 1326 by Adina Cleaning RN  Outcome: Adequate for Discharge  4/27/2024 0949 by Adina Cleaning RN  Outcome:  Progressing     Problem: SAFETY ADULT  Goal: Patient will remain free of falls  Description: INTERVENTIONS:  - Educate patient/family on patient safety including physical limitations  - Instruct patient to call for assistance with activity   - Consult OT/PT to assist with strengthening/mobility   - Keep Call bell within reach  - Keep bed low and locked with side rails adjusted as appropriate  - Keep care items and personal belongings within reach  - Initiate and maintain comfort rounds  - Make Fall Risk Sign visible to staff  - Offer Toileting ev Hours, in advance of need  - Initiate/Maintain alarm  - Obtain necessary fall risk management equipment:   - Apply yellow socks and bracelet for high fall risk patients  - Consider moving patient to room near nurses station  4/27/2024 1326 by Adina Cleaning RN  Outcome: Adequate for Discharge  4/27/2024 1326 by Adina Cleaning RN  Outcome: Adequate for Discharge  4/27/2024 0949 by Adina Cleaning RN  Outcome: Progressing  Goal: Maintain or return to baseline ADL function  Description: INTERVENTIONS:  -  Assess patient's ability to carry out ADLs; assess patient's baseline for ADL function and identify physical deficits which impact ability to perform ADLs (bathing, care of mouth/teeth, toileting, grooming, dressing, etc.)  - Assess/evaluate cause of self-care deficits   - Assess range of motion  - Assess patient's mobility; develop plan if impaired  - Assess patient's need for assistive devices and provide as appropriate  - Encourage maximum independence but intervene and supervise when necessary  - Involve family in performance of ADLs  - Assess for home care needs following discharge   - Consider OT consult to assist with ADL evaluation and planning for discharge  - Provide patient education as appropriate  4/27/2024 1326 by Adina Cleaning RN  Outcome: Adequate for Discharge  4/27/2024 1326 by Adina Cleaning RN  Outcome: Adequate for Discharge  4/27/2024 0949  by Adina Cleaning RN  Outcome: Progressing  Goal: Maintains/Returns to pre admission functional level  Description: INTERVENTIONS:  - Perform AM-PAC 6 Click Basic Mobility/ Daily Activity assessment daily.  - Set and communicate daily mobility goal to care team and patient/family/caregiver.   - Collaborate with rehabilitation services on mobility goals if consulted  - Perform Range of Motio times a day.  - Reposition patient every  hours.  - Dangle patient  times a day  - Stand patient  times a day  - Ambulate patient  times a day  - Out of bed to chair  times a day   - Out of bed for meals  times a day  - Out of bed for toileting  - Record patient progress and toleration of activity level   4/27/2024 1326 by Adina Cleaning RN  Outcome: Adequate for Discharge  4/27/2024 1326 by Adina Cleaning RN  Outcome: Adequate for Discharge  4/27/2024 0949 by Adina Cleaning RN  Outcome: Progressing     Problem: DISCHARGE PLANNING  Goal: Discharge to home or other facility with appropriate resources  Description: INTERVENTIONS:  - Identify barriers to discharge w/patient and caregiver  - Arrange for needed discharge resources and transportation as appropriate  - Identify discharge learning needs (meds, wound care, etc.)  - Arrange for interpretive services to assist at discharge as needed  - Refer to Case Management Department for coordinating discharge planning if the patient needs post-hospital services based on physician/advanced practitioner order or complex needs related to functional status, cognitive ability, or social support system  4/27/2024 1326 by Adina Cleaning RN  Outcome: Adequate for Discharge  4/27/2024 1326 by Adina Cleaning RN  Outcome: Adequate for Discharge  4/27/2024 0949 by Adina Cleaning RN  Outcome: Progressing     Problem: Knowledge Deficit  Goal: Patient/family/caregiver demonstrates understanding of disease process, treatment plan, medications, and discharge  instructions  Description: Complete learning assessment and assess knowledge base.  Interventions:  - Provide teaching at level of understanding  - Provide teaching via preferred learning methods  4/27/2024 1326 by Adina Cleaning RN  Outcome: Adequate for Discharge  4/27/2024 1326 by Adina Cleaning RN  Outcome: Adequate for Discharge  4/27/2024 0949 by Adina Cleaning RN  Outcome: Progressing

## 2024-04-27 NOTE — ASSESSMENT & PLAN NOTE
- Has been having intermittent left-sided abdominal pain for the past month.   - Multiple recent ED visits for the same  - CTAP (4/2/24) - Probable cystitis. 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 Follow-up with pancreatic MRI. Moderate colonic stool burden. No bowel obstruction. Predominantly distal diverticulosis without diverticulitis  - CTAP (4/10/24) - No evidence of bowel obstruction, colitis or diverticulitis. Findings compatible with constipation.  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 In retrospect this observation can be partially observed on CT of the chest dated 10/2/2018 which further indicates benign/indolent process. Typically no further follow-up is recommended if the patient has reached the age of 80.     - Likely due to constipation in the setting of chronic opiate use  - Bowel regimen   - Follow up with GI as outpatient

## 2024-04-27 NOTE — PLAN OF CARE
Problem: PAIN - ADULT  Goal: Verbalizes/displays adequate comfort level or baseline comfort level  Description: Interventions:  - Encourage patient to monitor pain and request assistance  - Assess pain using appropriate pain scale  - Administer analgesics based on type and severity of pain and evaluate response  - Implement non-pharmacological measures as appropriate and evaluate response  - Consider cultural and social influences on pain and pain management  - Notify physician/advanced practitioner if interventions unsuccessful or patient reports new pain  Outcome: Progressing     Problem: INFECTION - ADULT  Goal: Absence or prevention of progression during hospitalization  Description: INTERVENTIONS:  - Assess and monitor for signs and symptoms of infection  - Monitor lab/diagnostic results  - Monitor all insertion sites, i.e. indwelling lines, tubes, and drains  - Monitor endotracheal if appropriate and nasal secretions for changes in amount and color  - [place] appropriate cooling/warming therapies per order  - Administer medications as ordered  - Instruct and encourage patient and family to use good hand hygiene technique  - Identify and instruct in appropriate isolation precautions for identified infection/condition  Outcome: Progressing  Goal: Absence of fever/infection during neutropenic period  Description: INTERVENTIONS:  - Monitor WBC    Outcome: Progressing     Problem: SAFETY ADULT  Goal: Patient will remain free of falls  Description: INTERVENTIONS:  - Educate patient/family on patient safety including physical limitations  - Instruct patient to call for assistance with activity   - Consult OT/PT to assist with strengthening/mobility   - Keep Call bell within reach  - Keep bed low and locked with side rails adjusted as appropriate  - Keep care items and personal belongings within reach  - Initiate and maintain comfort rounds  - Make Fall Risk Sign visible to staff  - Offer Toileting ev Hours, in  advance of need  - Initiate/Maintain alarm  - Obtain necessary fall risk management equipment:   - Apply yellow socks and bracelet for high fall risk patients  - Consider moving patient to room near nurses station  Outcome: Progressing  Goal: Maintain or return to baseline ADL function  Description: INTERVENTIONS:  -  Assess patient's ability to carry out ADLs; assess patient's baseline for ADL function and identify physical deficits which impact ability to perform ADLs (bathing, care of mouth/teeth, toileting, grooming, dressing, etc.)  - Assess/evaluate cause of self-care deficits   - Assess range of motion  - Assess patient's mobility; develop plan if impaired  - Assess patient's need for assistive devices and provide as appropriate  - Encourage maximum independence but intervene and supervise when necessary  - Involve family in performance of ADLs  - Assess for home care needs following discharge   - Consider OT consult to assist with ADL evaluation and planning for discharge  - Provide patient education as appropriate  Outcome: Progressing  Goal: Maintains/Returns to pre admission functional level  Description: INTERVENTIONS:  - Perform AM-PAC 6 Click Basic Mobility/ Daily Activity assessment daily.  - Set and communicate daily mobility goal to care team and patient/family/caregiver.   - Collaborate with rehabilitation services on mobility goals if consulted  - Perform Range of Motio times a day.  - Reposition patient every  hours.  - Dangle patient  times a day  - Stand patient  times a day  - Ambulate patient  times a day  - Out of bed to chair  times a day   - Out of bed for meals  times a day  - Out of bed for toileting  - Record patient progress and toleration of activity level   Outcome: Progressing     Problem: DISCHARGE PLANNING  Goal: Discharge to home or other facility with appropriate resources  Description: INTERVENTIONS:  - Identify barriers to discharge w/patient and caregiver  - Arrange for  needed discharge resources and transportation as appropriate  - Identify discharge learning needs (meds, wound care, etc.)  - Arrange for interpretive services to assist at discharge as needed  - Refer to Case Management Department for coordinating discharge planning if the patient needs post-hospital services based on physician/advanced practitioner order or complex needs related to functional status, cognitive ability, or social support system  Outcome: Progressing

## 2024-04-29 ENCOUNTER — HOME CARE VISIT (OUTPATIENT)
Dept: HOME HEALTH SERVICES | Facility: HOME HEALTHCARE | Age: 84
End: 2024-04-29

## 2024-04-29 NOTE — UTILIZATION REVIEW
NOTIFICATION OF ADMISSION DISCHARGE   This is a Notification of Discharge from [facility]. Please be advised that this patient has been discharge from our facility. Below you will find the admission and discharge date and time including the patient’s disposition.   UTILIZATION REVIEW CONTACT:  Valarie Menard  Utilization   Network Utilization Review Department  Phone: [phone] carefully listen to the prompts. All voicemails are confidential.  Email: [email or web address]     ADMISSION INFORMATION  PRESENTATION DATE: 4/24/2024  9:01 PM  OBERVATION ADMISSION DATE:   INPATIENT ADMISSION DATE: 4/25/24  3:48 PM   DISCHARGE DATE: 4/27/2024  2:53 PM   DISPOSITION:Home with Home Health Care    Network Utilization Review Department  ATTENTION: Please call with any questions or concerns to [phone] and carefully listen to the prompts so that you are directed to the right person. All voicemails are confidential.   For Discharge needs, contact Care Management DC Support Team at [phone] opt. [phone]  Send all requests for admission clinical reviews, approved or denied determinations and any other requests to dedicated fax number below belonging to the campus where the patient is receiving treatment. List of dedicated fax numbers for the Facilities:  FACILITY NAME UR FAX NUMBER   ADMISSION DENIALS (Administrative/Medical Necessity) [phone]   DISCHARGE SUPPORT TEAM ([facility]) [phone]   PARENT CHILD HEALTH (Maternity/NICU/Pediatrics) [phone]   [facility] [phone]   [facility] [phone]   [facility] [phone]   [facility] [phone]   [facility] [phone]   [facility] [phone]   [facility] [phone]   [facility]  [phone]   [facility] [phone]   [facility] [phone]   [facility] [phone]   [facility] [phone]

## 2024-04-30 ENCOUNTER — HOME CARE VISIT (OUTPATIENT)
Dept: HOME HEALTH SERVICES | Facility: HOME HEALTHCARE | Age: 84
End: 2024-04-30
Payer: COMMERCIAL

## 2024-04-30 ENCOUNTER — HOME CARE VISIT (OUTPATIENT)
Dept: HOME HEALTH SERVICES | Facility: HOME HEALTHCARE | Age: 84
End: 2024-04-30

## 2024-04-30 VITALS
DIASTOLIC BLOOD PRESSURE: 64 MMHG | HEART RATE: 64 BPM | RESPIRATION RATE: 20 BRPM | TEMPERATURE: 98 F | OXYGEN SATURATION: 96 % | SYSTOLIC BLOOD PRESSURE: 110 MMHG

## 2024-04-30 PROCEDURE — 400013 VN SOC

## 2024-04-30 PROCEDURE — G0299 HHS/HOSPICE OF RN EA 15 MIN: HCPCS

## 2024-05-01 ENCOUNTER — HOME CARE VISIT (OUTPATIENT)
Dept: HOME HEALTH SERVICES | Facility: HOME HEALTHCARE | Age: 84
End: 2024-05-01
Payer: COMMERCIAL

## 2024-05-01 VITALS — HEART RATE: 67 BPM | SYSTOLIC BLOOD PRESSURE: 118 MMHG | OXYGEN SATURATION: 95 % | DIASTOLIC BLOOD PRESSURE: 64 MMHG

## 2024-05-01 PROCEDURE — G0151 HHCP-SERV OF PT,EA 15 MIN: HCPCS

## 2024-05-02 ENCOUNTER — HOME CARE VISIT (OUTPATIENT)
Dept: HOME HEALTH SERVICES | Facility: HOME HEALTHCARE | Age: 84
End: 2024-05-02
Payer: COMMERCIAL

## 2024-05-02 VITALS
OXYGEN SATURATION: 96 % | DIASTOLIC BLOOD PRESSURE: 62 MMHG | HEART RATE: 68 BPM | RESPIRATION RATE: 18 BRPM | SYSTOLIC BLOOD PRESSURE: 126 MMHG | TEMPERATURE: 97.8 F

## 2024-05-02 PROCEDURE — G0300 HHS/HOSPICE OF LPN EA 15 MIN: HCPCS

## 2024-05-03 ENCOUNTER — HOME CARE VISIT (OUTPATIENT)
Dept: HOME HEALTH SERVICES | Facility: HOME HEALTHCARE | Age: 84
End: 2024-05-03
Payer: COMMERCIAL

## 2024-05-06 ENCOUNTER — HOME CARE VISIT (OUTPATIENT)
Dept: HOME HEALTH SERVICES | Facility: HOME HEALTHCARE | Age: 84
End: 2024-05-06
Payer: COMMERCIAL

## 2024-05-06 VITALS — DIASTOLIC BLOOD PRESSURE: 62 MMHG | SYSTOLIC BLOOD PRESSURE: 118 MMHG | OXYGEN SATURATION: 93 %

## 2024-05-06 PROCEDURE — G0151 HHCP-SERV OF PT,EA 15 MIN: HCPCS

## 2024-05-07 ENCOUNTER — HOME CARE VISIT (OUTPATIENT)
Dept: HOME HEALTH SERVICES | Facility: HOME HEALTHCARE | Age: 84
End: 2024-05-07
Payer: COMMERCIAL

## 2024-05-07 VITALS
TEMPERATURE: 97.3 F | OXYGEN SATURATION: 99 % | HEART RATE: 70 BPM | SYSTOLIC BLOOD PRESSURE: 116 MMHG | DIASTOLIC BLOOD PRESSURE: 60 MMHG | RESPIRATION RATE: 20 BRPM

## 2024-05-07 PROCEDURE — G0299 HHS/HOSPICE OF RN EA 15 MIN: HCPCS

## 2024-05-07 PROCEDURE — G0152 HHCP-SERV OF OT,EA 15 MIN: HCPCS

## 2024-05-07 PROCEDURE — G0156 HHCP-SVS OF AIDE,EA 15 MIN: HCPCS

## 2024-05-08 ENCOUNTER — HOME CARE VISIT (OUTPATIENT)
Dept: HOME HEALTH SERVICES | Facility: HOME HEALTHCARE | Age: 84
End: 2024-05-08
Payer: COMMERCIAL

## 2024-05-08 VITALS — OXYGEN SATURATION: 88 % | DIASTOLIC BLOOD PRESSURE: 66 MMHG | SYSTOLIC BLOOD PRESSURE: 142 MMHG

## 2024-05-08 VITALS — DIASTOLIC BLOOD PRESSURE: 60 MMHG | HEART RATE: 72 BPM | OXYGEN SATURATION: 99 % | SYSTOLIC BLOOD PRESSURE: 116 MMHG

## 2024-05-08 PROCEDURE — G0151 HHCP-SERV OF PT,EA 15 MIN: HCPCS

## 2024-05-09 ENCOUNTER — HOME CARE VISIT (OUTPATIENT)
Dept: HOME HEALTH SERVICES | Facility: HOME HEALTHCARE | Age: 84
End: 2024-05-09
Payer: COMMERCIAL

## 2024-05-09 PROCEDURE — G0180 MD CERTIFICATION HHA PATIENT: HCPCS | Performed by: INTERNAL MEDICINE

## 2024-05-10 ENCOUNTER — HOME CARE VISIT (OUTPATIENT)
Dept: HOME HEALTH SERVICES | Facility: HOME HEALTHCARE | Age: 84
End: 2024-05-10
Payer: COMMERCIAL

## 2024-05-10 VITALS — HEART RATE: 88 BPM | SYSTOLIC BLOOD PRESSURE: 126 MMHG | OXYGEN SATURATION: 81 % | DIASTOLIC BLOOD PRESSURE: 80 MMHG

## 2024-05-10 PROCEDURE — G0152 HHCP-SERV OF OT,EA 15 MIN: HCPCS

## 2024-05-13 ENCOUNTER — HOME CARE VISIT (OUTPATIENT)
Dept: HOME HEALTH SERVICES | Facility: HOME HEALTHCARE | Age: 84
End: 2024-05-13
Payer: COMMERCIAL

## 2024-05-13 PROCEDURE — G0156 HHCP-SVS OF AIDE,EA 15 MIN: HCPCS

## 2024-05-14 ENCOUNTER — HOME CARE VISIT (OUTPATIENT)
Dept: HOME HEALTH SERVICES | Facility: HOME HEALTHCARE | Age: 84
End: 2024-05-14
Payer: COMMERCIAL

## 2024-05-14 VITALS
SYSTOLIC BLOOD PRESSURE: 142 MMHG | RESPIRATION RATE: 20 BRPM | OXYGEN SATURATION: 96 % | HEART RATE: 74 BPM | TEMPERATURE: 97.4 F | DIASTOLIC BLOOD PRESSURE: 72 MMHG

## 2024-05-14 VITALS — DIASTOLIC BLOOD PRESSURE: 68 MMHG | OXYGEN SATURATION: 94 % | HEART RATE: 70 BPM | SYSTOLIC BLOOD PRESSURE: 122 MMHG

## 2024-05-14 PROCEDURE — G0151 HHCP-SERV OF PT,EA 15 MIN: HCPCS

## 2024-05-14 PROCEDURE — G0299 HHS/HOSPICE OF RN EA 15 MIN: HCPCS

## 2024-05-15 LAB
DME PARACHUTE DELIVERY DATE ACTUAL: NORMAL
DME PARACHUTE DELIVERY DATE REQUESTED: NORMAL
DME PARACHUTE ITEM DESCRIPTION: NORMAL
DME PARACHUTE ORDER STATUS: NORMAL
DME PARACHUTE SUPPLIER NAME: NORMAL
DME PARACHUTE SUPPLIER PHONE: NORMAL

## 2024-05-16 ENCOUNTER — HOME CARE VISIT (OUTPATIENT)
Dept: HOME HEALTH SERVICES | Facility: HOME HEALTHCARE | Age: 84
End: 2024-05-16
Payer: COMMERCIAL

## 2024-05-16 VITALS — OXYGEN SATURATION: 96 % | SYSTOLIC BLOOD PRESSURE: 138 MMHG | DIASTOLIC BLOOD PRESSURE: 68 MMHG

## 2024-05-16 PROCEDURE — G0151 HHCP-SERV OF PT,EA 15 MIN: HCPCS

## 2025-05-11 ENCOUNTER — HOSPITAL ENCOUNTER (INPATIENT)
Facility: HOSPITAL | Age: 85
LOS: 2 days | Discharge: HOME HEALTH | DRG: 189 | End: 2025-05-14
Attending: EMERGENCY MEDICINE | Admitting: INTERNAL MEDICINE
Payer: COMMERCIAL

## 2025-05-11 ENCOUNTER — APPOINTMENT (EMERGENCY)
Dept: RADIOLOGY | Facility: HOSPITAL | Age: 85
DRG: 189 | End: 2025-05-11
Payer: COMMERCIAL

## 2025-05-11 ENCOUNTER — APPOINTMENT (EMERGENCY)
Dept: CT IMAGING | Facility: HOSPITAL | Age: 85
DRG: 189 | End: 2025-05-11
Payer: COMMERCIAL

## 2025-05-11 DIAGNOSIS — R79.89 ELEVATED D-DIMER: ICD-10-CM

## 2025-05-11 DIAGNOSIS — J96.01 ACUTE HYPOXIC RESPIRATORY FAILURE (HCC): Primary | ICD-10-CM

## 2025-05-11 DIAGNOSIS — R06.00 DYSPNEA: ICD-10-CM

## 2025-05-11 PROBLEM — J96.21 ACUTE ON CHRONIC HYPOXIC RESPIRATORY FAILURE (HCC): Status: ACTIVE | Noted: 2025-05-11

## 2025-05-11 LAB
2HR DELTA HS TROPONIN: -1 NG/L
4HR DELTA HS TROPONIN: 0 NG/L
ALBUMIN SERPL BCG-MCNC: 4 G/DL (ref 3.5–5)
ALP SERPL-CCNC: 83 U/L (ref 34–104)
ALT SERPL W P-5'-P-CCNC: 10 U/L (ref 7–52)
ANION GAP SERPL CALCULATED.3IONS-SCNC: 8 MMOL/L (ref 4–13)
APTT PPP: 29 SECONDS (ref 23–34)
AST SERPL W P-5'-P-CCNC: 14 U/L (ref 13–39)
ATRIAL RATE: 64 BPM
BASOPHILS # BLD AUTO: 0.07 THOUSANDS/ÂΜL (ref 0–0.1)
BASOPHILS NFR BLD AUTO: 1 % (ref 0–1)
BILIRUB SERPL-MCNC: 0.45 MG/DL (ref 0.2–1)
BUN SERPL-MCNC: 18 MG/DL (ref 5–25)
CALCIUM SERPL-MCNC: 9.1 MG/DL (ref 8.4–10.2)
CARDIAC TROPONIN I PNL SERPL HS: 6 NG/L (ref ?–50)
CARDIAC TROPONIN I PNL SERPL HS: 7 NG/L (ref ?–50)
CARDIAC TROPONIN I PNL SERPL HS: 7 NG/L (ref ?–50)
CHLORIDE SERPL-SCNC: 101 MMOL/L (ref 96–108)
CO2 SERPL-SCNC: 29 MMOL/L (ref 21–32)
CREAT SERPL-MCNC: 1 MG/DL (ref 0.6–1.3)
D DIMER PPP FEU-MCNC: 2.48 UG/ML FEU
EOSINOPHIL # BLD AUTO: 0.42 THOUSAND/ÂΜL (ref 0–0.61)
EOSINOPHIL NFR BLD AUTO: 5 % (ref 0–6)
ERYTHROCYTE [DISTWIDTH] IN BLOOD BY AUTOMATED COUNT: 15.8 % (ref 11.6–15.1)
FLUAV AG UPPER RESP QL IA.RAPID: NEGATIVE
FLUBV AG UPPER RESP QL IA.RAPID: NEGATIVE
GFR SERPL CREATININE-BSD FRML MDRD: 51 ML/MIN/1.73SQ M
GLUCOSE SERPL-MCNC: 141 MG/DL (ref 65–140)
HCT VFR BLD AUTO: 34.7 % (ref 34.8–46.1)
HGB BLD-MCNC: 10.6 G/DL (ref 11.5–15.4)
IMM GRANULOCYTES # BLD AUTO: 0.03 THOUSAND/UL (ref 0–0.2)
IMM GRANULOCYTES NFR BLD AUTO: 0 % (ref 0–2)
INR PPP: 1.03 (ref 0.85–1.19)
LACTATE SERPL-SCNC: 1.8 MMOL/L (ref 0.5–2)
LYMPHOCYTES # BLD AUTO: 1.32 THOUSANDS/ÂΜL (ref 0.6–4.47)
LYMPHOCYTES NFR BLD AUTO: 16 % (ref 14–44)
MCH RBC QN AUTO: 26.1 PG (ref 26.8–34.3)
MCHC RBC AUTO-ENTMCNC: 30.5 G/DL (ref 31.4–37.4)
MCV RBC AUTO: 86 FL (ref 82–98)
MONOCYTES # BLD AUTO: 1.27 THOUSAND/ÂΜL (ref 0.17–1.22)
MONOCYTES NFR BLD AUTO: 15 % (ref 4–12)
NEUTROPHILS # BLD AUTO: 5.35 THOUSANDS/ÂΜL (ref 1.85–7.62)
NEUTS SEG NFR BLD AUTO: 63 % (ref 43–75)
NRBC BLD AUTO-RTO: 0 /100 WBCS
P AXIS: 10 DEGREES
PLATELET # BLD AUTO: 239 THOUSANDS/UL (ref 149–390)
PMV BLD AUTO: 9.5 FL (ref 8.9–12.7)
POTASSIUM SERPL-SCNC: 3.8 MMOL/L (ref 3.5–5.3)
PR INTERVAL: 118 MS
PROT SERPL-MCNC: 6.5 G/DL (ref 6.4–8.4)
PROTHROMBIN TIME: 14.2 SECONDS (ref 12.3–15)
QRS AXIS: -80 DEGREES
QRSD INTERVAL: 140 MS
QT INTERVAL: 442 MS
QTC INTERVAL: 455 MS
RBC # BLD AUTO: 4.06 MILLION/UL (ref 3.81–5.12)
SARS-COV+SARS-COV-2 AG RESP QL IA.RAPID: NEGATIVE
SODIUM SERPL-SCNC: 138 MMOL/L (ref 135–147)
T WAVE AXIS: 28 DEGREES
TSH SERPL DL<=0.05 MIU/L-ACNC: 1.2 UIU/ML (ref 0.45–4.5)
VENTRICULAR RATE: 64 BPM
WBC # BLD AUTO: 8.46 THOUSAND/UL (ref 4.31–10.16)

## 2025-05-11 PROCEDURE — 96361 HYDRATE IV INFUSION ADD-ON: CPT

## 2025-05-11 PROCEDURE — 93010 ELECTROCARDIOGRAM REPORT: CPT | Performed by: STUDENT IN AN ORGANIZED HEALTH CARE EDUCATION/TRAINING PROGRAM

## 2025-05-11 PROCEDURE — 87804 INFLUENZA ASSAY W/OPTIC: CPT

## 2025-05-11 PROCEDURE — 83605 ASSAY OF LACTIC ACID: CPT

## 2025-05-11 PROCEDURE — 84484 ASSAY OF TROPONIN QUANT: CPT

## 2025-05-11 PROCEDURE — 85025 COMPLETE CBC W/AUTO DIFF WBC: CPT

## 2025-05-11 PROCEDURE — 84443 ASSAY THYROID STIM HORMONE: CPT

## 2025-05-11 PROCEDURE — 96375 TX/PRO/DX INJ NEW DRUG ADDON: CPT

## 2025-05-11 PROCEDURE — 85379 FIBRIN DEGRADATION QUANT: CPT

## 2025-05-11 PROCEDURE — 87811 SARS-COV-2 COVID19 W/OPTIC: CPT

## 2025-05-11 PROCEDURE — 96365 THER/PROPH/DIAG IV INF INIT: CPT

## 2025-05-11 PROCEDURE — 99285 EMERGENCY DEPT VISIT HI MDM: CPT

## 2025-05-11 PROCEDURE — 85610 PROTHROMBIN TIME: CPT

## 2025-05-11 PROCEDURE — 71045 X-RAY EXAM CHEST 1 VIEW: CPT

## 2025-05-11 PROCEDURE — 71275 CT ANGIOGRAPHY CHEST: CPT

## 2025-05-11 PROCEDURE — 94644 CONT INHLJ TX 1ST HOUR: CPT

## 2025-05-11 PROCEDURE — 80053 COMPREHEN METABOLIC PANEL: CPT

## 2025-05-11 PROCEDURE — 85730 THROMBOPLASTIN TIME PARTIAL: CPT

## 2025-05-11 PROCEDURE — 87040 BLOOD CULTURE FOR BACTERIA: CPT

## 2025-05-11 PROCEDURE — 93005 ELECTROCARDIOGRAM TRACING: CPT

## 2025-05-11 PROCEDURE — 36415 COLL VENOUS BLD VENIPUNCTURE: CPT

## 2025-05-11 RX ORDER — AZITHROMYCIN 250 MG/1
500 TABLET, FILM COATED ORAL ONCE
Status: COMPLETED | OUTPATIENT
Start: 2025-05-11 | End: 2025-05-11

## 2025-05-11 RX ORDER — ENOXAPARIN SODIUM 100 MG/ML
40 INJECTION SUBCUTANEOUS DAILY
Status: DISCONTINUED | OUTPATIENT
Start: 2025-05-12 | End: 2025-05-12

## 2025-05-11 RX ORDER — INSULIN LISPRO 100 [IU]/ML
1-6 INJECTION, SOLUTION INTRAVENOUS; SUBCUTANEOUS
Status: DISCONTINUED | OUTPATIENT
Start: 2025-05-12 | End: 2025-05-12

## 2025-05-11 RX ORDER — PANTOPRAZOLE SODIUM 40 MG/1
40 TABLET, DELAYED RELEASE ORAL EVERY 12 HOURS
Status: DISCONTINUED | OUTPATIENT
Start: 2025-05-11 | End: 2025-05-14 | Stop reason: HOSPADM

## 2025-05-11 RX ORDER — ASPIRIN 81 MG/1
81 TABLET, CHEWABLE ORAL DAILY
Status: DISCONTINUED | OUTPATIENT
Start: 2025-05-12 | End: 2025-05-14 | Stop reason: HOSPADM

## 2025-05-11 RX ORDER — IPRATROPIUM BROMIDE AND ALBUTEROL SULFATE 2.5; .5 MG/3ML; MG/3ML
3 SOLUTION RESPIRATORY (INHALATION) 3 TIMES DAILY
Status: DISCONTINUED | OUTPATIENT
Start: 2025-05-11 | End: 2025-05-12

## 2025-05-11 RX ORDER — METHYLPREDNISOLONE SODIUM SUCCINATE 125 MG/2ML
60 INJECTION, POWDER, LYOPHILIZED, FOR SOLUTION INTRAMUSCULAR; INTRAVENOUS ONCE
Status: COMPLETED | OUTPATIENT
Start: 2025-05-11 | End: 2025-05-11

## 2025-05-11 RX ORDER — AZITHROMYCIN 250 MG/1
500 TABLET, FILM COATED ORAL EVERY 24 HOURS
Status: DISPENSED | OUTPATIENT
Start: 2025-05-12 | End: 2025-05-13

## 2025-05-11 RX ORDER — MORPHINE SULFATE 15 MG/1
15 TABLET ORAL 2 TIMES DAILY
Refills: 0 | Status: DISCONTINUED | OUTPATIENT
Start: 2025-05-11 | End: 2025-05-14 | Stop reason: HOSPADM

## 2025-05-11 RX ORDER — ATORVASTATIN CALCIUM 40 MG/1
40 TABLET, FILM COATED ORAL EVERY EVENING
Status: DISCONTINUED | OUTPATIENT
Start: 2025-05-11 | End: 2025-05-14 | Stop reason: HOSPADM

## 2025-05-11 RX ORDER — FERROUS SULFATE 325(65) MG
325 TABLET ORAL
Status: DISCONTINUED | OUTPATIENT
Start: 2025-05-13 | End: 2025-05-14 | Stop reason: HOSPADM

## 2025-05-11 RX ORDER — MORPHINE SULFATE 15 MG/1
15 TABLET ORAL 2 TIMES DAILY
Status: DISCONTINUED | OUTPATIENT
Start: 2025-05-12 | End: 2025-05-11

## 2025-05-11 RX ORDER — LOSARTAN POTASSIUM 50 MG/1
50 TABLET ORAL DAILY
Status: DISCONTINUED | OUTPATIENT
Start: 2025-05-12 | End: 2025-05-14 | Stop reason: HOSPADM

## 2025-05-11 RX ORDER — GABAPENTIN 300 MG/1
300 CAPSULE ORAL 4 TIMES DAILY
Status: DISCONTINUED | OUTPATIENT
Start: 2025-05-11 | End: 2025-05-14 | Stop reason: HOSPADM

## 2025-05-11 RX ORDER — BUDESONIDE AND FORMOTEROL FUMARATE DIHYDRATE 160; 4.5 UG/1; UG/1
2 AEROSOL RESPIRATORY (INHALATION) 2 TIMES DAILY
Status: DISCONTINUED | OUTPATIENT
Start: 2025-05-12 | End: 2025-05-14 | Stop reason: HOSPADM

## 2025-05-11 RX ORDER — BRIMONIDINE TARTRATE 2 MG/ML
1 SOLUTION/ DROPS OPHTHALMIC 2 TIMES DAILY
Status: DISCONTINUED | OUTPATIENT
Start: 2025-05-11 | End: 2025-05-14 | Stop reason: HOSPADM

## 2025-05-11 RX ORDER — PREDNISONE 20 MG/1
40 TABLET ORAL DAILY
Status: DISCONTINUED | OUTPATIENT
Start: 2025-05-12 | End: 2025-05-12

## 2025-05-11 RX ORDER — ASCORBIC ACID, THIAMINE MONONITRATE,RIBOFLAVIN, NIACINAMIDE, PYRIDOXINE HYDROCHLORIDE, FOLIC ACID, CYANOCOBALAMIN, BIOTIN, CALCIUM PANTOTHENATE, 100; 1.5; 1.7; 20; 10; 1; 6000; 150000; 5 MG/1; MG/1; MG/1; MG/1; MG/1; MG/1; UG/1; UG/1; MG/1
1 CAPSULE, LIQUID FILLED ORAL
Status: DISCONTINUED | OUTPATIENT
Start: 2025-05-12 | End: 2025-05-14 | Stop reason: HOSPADM

## 2025-05-11 RX ORDER — BISACODYL 10 MG
10 SUPPOSITORY, RECTAL RECTAL DAILY PRN
Status: DISCONTINUED | OUTPATIENT
Start: 2025-05-11 | End: 2025-05-14 | Stop reason: HOSPADM

## 2025-05-11 RX ORDER — ALBUTEROL SULFATE 5 MG/ML
5 SOLUTION RESPIRATORY (INHALATION) ONCE
Status: DISCONTINUED | OUTPATIENT
Start: 2025-05-11 | End: 2025-05-11

## 2025-05-11 RX ORDER — ALBUTEROL SULFATE 5 MG/ML
10 SOLUTION RESPIRATORY (INHALATION) ONCE
Status: COMPLETED | OUTPATIENT
Start: 2025-05-11 | End: 2025-05-11

## 2025-05-11 RX ORDER — INSULIN LISPRO 100 [IU]/ML
1-5 INJECTION, SOLUTION INTRAVENOUS; SUBCUTANEOUS
Status: DISCONTINUED | OUTPATIENT
Start: 2025-05-12 | End: 2025-05-11

## 2025-05-11 RX ORDER — SODIUM CHLORIDE FOR INHALATION 0.9 %
12 VIAL, NEBULIZER (ML) INHALATION ONCE
Status: COMPLETED | OUTPATIENT
Start: 2025-05-11 | End: 2025-05-11

## 2025-05-11 RX ORDER — SENNOSIDES 8.6 MG
1 TABLET ORAL
Status: DISCONTINUED | OUTPATIENT
Start: 2025-05-11 | End: 2025-05-14 | Stop reason: HOSPADM

## 2025-05-11 RX ORDER — DILTIAZEM HYDROCHLORIDE 240 MG/1
240 CAPSULE, COATED, EXTENDED RELEASE ORAL DAILY
Status: DISCONTINUED | OUTPATIENT
Start: 2025-05-12 | End: 2025-05-14 | Stop reason: HOSPADM

## 2025-05-11 RX ADMIN — METHYLPREDNISOLONE SODIUM SUCCINATE 60 MG: 125 INJECTION, POWDER, FOR SOLUTION INTRAMUSCULAR; INTRAVENOUS at 16:45

## 2025-05-11 RX ADMIN — AZITHROMYCIN DIHYDRATE 500 MG: 250 TABLET ORAL at 16:47

## 2025-05-11 RX ADMIN — IPRATROPIUM BROMIDE 1 MG: 0.5 SOLUTION RESPIRATORY (INHALATION) at 16:19

## 2025-05-11 RX ADMIN — SODIUM CHLORIDE 500 ML: 0.9 INJECTION, SOLUTION INTRAVENOUS at 16:47

## 2025-05-11 RX ADMIN — MORPHINE SULFATE 15 MG: 15 TABLET ORAL at 23:22

## 2025-05-11 RX ADMIN — CEFTRIAXONE 1000 MG: 10 INJECTION, POWDER, FOR SOLUTION INTRAVENOUS at 16:46

## 2025-05-11 RX ADMIN — IOHEXOL 60 ML: 350 INJECTION, SOLUTION INTRAVENOUS at 18:35

## 2025-05-11 RX ADMIN — ATORVASTATIN CALCIUM 40 MG: 40 TABLET, FILM COATED ORAL at 23:18

## 2025-05-11 RX ADMIN — ROPINIROLE HYDROCHLORIDE 1.5 MG: 1 TABLET, FILM COATED ORAL at 23:18

## 2025-05-11 RX ADMIN — SENNOSIDES 8.6 MG: 8.6 TABLET, FILM COATED ORAL at 23:18

## 2025-05-11 RX ADMIN — PANTOPRAZOLE SODIUM 40 MG: 40 TABLET, DELAYED RELEASE ORAL at 23:18

## 2025-05-11 RX ADMIN — ALBUTEROL SULFATE 10 MG: 2.5 SOLUTION RESPIRATORY (INHALATION) at 16:19

## 2025-05-11 RX ADMIN — ISODIUM CHLORIDE 12 ML: 0.03 SOLUTION RESPIRATORY (INHALATION) at 16:19

## 2025-05-11 RX ADMIN — SODIUM CHLORIDE 500 ML: 0.9 INJECTION, SOLUTION INTRAVENOUS at 20:50

## 2025-05-11 RX ADMIN — GABAPENTIN 300 MG: 300 CAPSULE ORAL at 23:18

## 2025-05-11 NOTE — ED ATTENDING ATTESTATION
5/11/2025  I, Tawny Hummel MD, saw and evaluated the patient. I have discussed the patient with the resident/non-physician practitioner and agree with the resident's/non-physician practitioner's findings, Plan of Care, and MDM as documented in the resident's/non-physician practitioner's note, except where noted. All available labs and Radiology studies were reviewed.  I was present for key portions of any procedure(s) performed by the resident/non-physician practitioner and I was immediately available to provide assistance.       At this point I agree with the current assessment done in the Emergency Department.  I have conducted an independent evaluation of this patient a history and physical is as follows:    This is an 84-year-old female patient with a relevant past medical history of hypertension, atrial fibrillation on aspirin, COPD on 3 L of oxygen at home, diabetes, presenting to the ED today for complaint of generalized weakness.  Patient states that she has had symptoms for the past 24 to 48 hours.  Patient has chronic shortness of breath, which is slightly worsened.  She also has some calf pain, with some pitting edema on exam, with tenderness in her calf with at home and evaluation.  Her lungs did not yield any adventitious sounds.  Her exam otherwise aside from her tachypnea on initial presentation which likely can be accounted by the fact that she did not have her oxygen on from home as well as her hypoxemia which can be explained by the above was unremarkable.  Her differential diagnosis includes: Pneumonia versus VTE versus atypical ACS versus other.  Patient had a chest x-ray looking like it may have been pneumonia.  She had an elevated D-dimer.  For that reason she underwent a CT PE.  Patient did not have any evidence of VTE.  Patient was given antibiotics on initial presentation.  Due to her weakness, shortness of breath, patient was requested be hospitalized by the hospitalist Yamile  excepted without any further was requested.    Critical Care Time Statement: Upon my evaluation, this patient had a high probability of imminent or life-threatening deterioration due to shortness of breath, generalized weakness, which required my direct attention, intervention, and personal management.  I spent a total of 90 minutes directly providing critical care services, including interpretation of complex medical databases, evaluating for the presence of life-threatening injuries or illnesses, management of organ system failure(s) , and complex medical decision making (to support/prevent further life-threatening deterioration).. This time is exclusive of procedures, teaching, treating other patients, family meetings, and any prior time recorded by providers other than myself.        ED Course         Critical Care Time  Procedures

## 2025-05-11 NOTE — ED PROVIDER NOTES
Time reflects when diagnosis was documented in both MDM as applicable and the Disposition within this note       Time User Action Codes Description Comment    5/11/2025  7:09 PM Waldrop, Nina Add [R06.00] Dyspnea     5/11/2025  7:10 PM Waldrop, Nina Add [J96.01] Acute hypoxic respiratory failure (HCC)     5/11/2025  7:10 PM Waldrop, Nina Modify [R06.00] Dyspnea     5/11/2025  7:10 PM Waldrop, Nina Modify [J96.01] Acute hypoxic respiratory failure (HCC)     5/11/2025  8:41 PM Waldrop, Nina Add [R79.89] Elevated d-dimer           ED Disposition       ED Disposition   Admit    Condition   Stable    Date/Time   Sun May 11, 2025  5:00 PM    Comment   --             Assessment & Plan       Medical Decision Making  Gloria Patel is a 84 y.o. female presenting with shortness of breath and generalized weakness. Vitals remarkable for report of initial hypoxia on room air, in the mid-90s on 3 L NC. Exam remarkable tired-appearing female, lungs clear lung sounds bilaterally, no focal neurologic weakness or sensation deficits, abdomen soft, non-tender.    DDX including but not limited to: pneumonia, pleural effusion, CHF, PE, PTX, ACS, MI, asthma exacerbation, COPD exacerbation, COVID 19, EVALI, anemia, metabolic abnormality, renal failure.    See ED course for further updates and interpretation of results.    Based on these results and H&P, unclear exact etiology of patient symptoms, though suspect bronchitis vs COPD exacerbation except she has no wheezes. Given Heart-neb, prednisone, azithromycin and ceftriaxone. CTA PE study thankfully negative for acute findings. Lab work grossly unremarkable. Following workup, patient was unable to be weaned from oxygen, intermittently between 3 L and 5 L NC. Given this, she would most benefit from admission.    Results, clinical impressions, and plan were discussed with patient. They expressed understanding and were in agreement with plan. Patient was given the  opportunity to ask questions in ED. All questions and concerns were addressed in ED.    After evaluation and workup in the emergency department Discussed patient's case with [facility] regarding admission who accepted the patient for further evaluation and management.    Amount and/or Complexity of Data Reviewed  Labs: ordered. Decision-making details documented in ED Course.  Radiology: ordered and independent interpretation performed. Decision-making details documented in ED Course.  ECG/medicine tests:  Decision-making details documented in ED Course.    Risk  Prescription drug management.  Decision regarding hospitalization.        ED Course as of 05/13/25 1923   Sun May 11, 2025   1604 93% 2 L NC    Will give   Heart neb   1615 ECG 12 lead  Procedure Note: ECG Interpretation  Date/Time: 05/11/25  Interpreted by: Nina Waldrop  Indications / Diagnosis: Shortness of breath  ECG reviewed by me, the ED Provider: yes  The ECG demonstrates:  Rhythm: normal sinus with PACs  Intervals: QRS widened, 140 ms  Axis: left axis deviation  QRS/Blocks: , wide. Right bundle branch block  ST Changes: no acute ST Changes  Compared to prior on 4/24/2024, now with PACs, otherwise grossly unchanged   1639 FLU/COVID Rapid Antigen (30 min. TAT) - Preferred screening test in ED  Normal   1710 WBC: 8.46  Normal   1711 Hemoglobin(!): 10.6  Grossly at patient's baseline   1711 Platelet Count: 239  Normal   1711 LACTIC ACID: 1.8  Normal   1711 Comprehensive metabolic panel(!)  Grossly within normal limits except for mildly elevated glucose   1716 TSH 3RD GENERATON: 1.203  Normal   1716 hs TnI 0hr: 7  Will need 2 hour   1716 Protime-INR  Normal   1716 D-Dimer, Quant(!): 2.48  CT PE study ordered   1719 PTT: 29  Normal   1720 Re-evaluated at bedside. Discussed all results.  She does report she feels better.   1824 Still pending PE study   1832 CTA chest pe study  Still pending PE study   1908 Delta 2hr hsTnI: -1   1959 CTA chest pe  study  Still pending CT read   2021 CTA chest pe study  IMPRESSION:     No pulmonary embolus. Minimal bronchiolitis in the right upper lobe.       Medications   albuterol inhalation solution 10 mg (10 mg Nebulization Given 5/11/25 1619)   ipratropium (ATROVENT) 0.02 % inhalation solution 1 mg (1 mg Nebulization Given 5/11/25 1619)   sodium chloride 0.9 % inhalation solution 12 mL (12 mL Nebulization Given 5/11/25 1619)   methylPREDNISolone sodium succinate (Solu-MEDROL) injection 60 mg (60 mg Intravenous Given 5/11/25 1645)   azithromycin (ZITHROMAX) tablet 500 mg (500 mg Oral Given 5/11/25 1647)   ceftriaxone (ROCEPHIN) 1 g/50 mL in dextrose IVPB (0 mg Intravenous Stopped 5/11/25 1720)   sodium chloride 0.9 % bolus 500 mL (0 mL Intravenous Stopped 5/11/25 1801)   iohexol (OMNIPAQUE) 350 MG/ML injection (MULTI-DOSE) 60 mL (60 mL Intravenous Given 5/11/25 1835)   sodium chloride 0.9 % bolus 500 mL (500 mL Intravenous New Bag 5/11/25 2050)       ED Risk Strat Scores   HEART Risk Score      Flowsheet Row Most Recent Value   Heart Score Risk Calculator    History 0 Filed at: 05/11/2025 1717   ECG 1 Filed at: 05/11/2025 1717   Age 2 Filed at: 05/11/2025 1717   Risk Factors 2 Filed at: 05/11/2025 1717   Troponin 0 Filed at: 05/11/2025 1717   HEART Score 5 Filed at: 05/11/2025 1717          HEART Risk Score      Flowsheet Row Most Recent Value   Heart Score Risk Calculator    History 0 Filed at: 05/11/2025 1717   ECG 1 Filed at: 05/11/2025 1717   Age 2 Filed at: 05/11/2025 1717   Risk Factors 2 Filed at: 05/11/2025 1717   Troponin 0 Filed at: 05/11/2025 1717   HEART Score 5 Filed at: 05/11/2025 1717                      No data recorded                            History of Present Illness       Chief Complaint   Patient presents with    Shortness of Breath     PT hx of COPD, states has been having worsening SOB and weakness over the past week. Denies current CP. Pt on 3L NC at home.        Past Medical History:    Diagnosis Date    Abdominal pain 2022    Arthritis     Cardiac disease     Constipation 2024    COPD (chronic obstructive pulmonary disease) (HCC)     COVID-19     was in hospital for sx and was dx with covid    Diabetes mellitus (HCC)     GERD (gastroesophageal reflux disease)     Hiatal hernia     Hypertension     PUD (peptic ulcer disease)     Residual ASD (atrial septal defect) following repair       Past Surgical History:   Procedure Laterality Date    ABDOMINAL ADHESION SURGERY N/A 2022    Procedure: LYSIS ADHESIONS;  Surgeon: Petty Johnson MD;  Location: [facility];  Service: Thoracic    ASD REPAIR      BACK SURGERY      x3    CARDIAC SURGERY      Atrial septic defect    ESOPHAGOGASTRODUODENOSCOPY N/A 2022    Procedure: ESOPHAGOGASTRODUODENOSCOPY (EGD);  Surgeon: Petty Johnson MD;  Location: [facility];  Service: Thoracic    JOINT REPLACEMENT      bilateral knee surgery    JOINT REPLACEMENT      KNEE SURGERY      PARAESOPHAGEAL HERNIA REPAIR N/A 2022    Procedure: REPAIR HERNIA PARAESOPHAGEAL LAPAROSCOPIC W ROBOTICS, gastropexy, mesh placement;  Surgeon: Petty Johnson MD;  Location: [facility];  Service: Thoracic    NJ EXCISION GANGLION WRIST DORSAL/VOLAR PRIMARY Left 2023    Procedure: EXCISION GANGLION CYST;  Surgeon: Jaja Pantoja MD;  Location: [facility];  Service: Orthopedics    SHOULDER SURGERY        Family History   Problem Relation Age of Onset    Cancer Mother     Asthma Father       Social History     Tobacco Use    Smoking status: Former     Current packs/day: 0.00     Average packs/day: 1 pack/day for 64.1 years (64.1 ttl pk-yrs)     Types: Cigarettes     Start date:      Quit date: 2020     Years since quittin.2    Smokeless tobacco: Never    Tobacco comments:     stopped smoking 2 days ago   Vaping Use    Vaping status: Never Used   Substance Use Topics    Alcohol use: Not Currently    Drug use: Never       E-Cigarette/Vaping    E-Cigarette Use Never User       E-Cigarette/Vaping Substances    Nicotine No     THC No     CBD No     Flavoring No     Other No     Unknown No       I have reviewed and agree with the history as documented.     Gloria Patel is a 84 y.o. female with history of COPD, HTN, HLD, T2DM, CKD presenting for shortness of breath.    Patient reports gradual onset of generalized weakness and shortness of breath over the last week. Reports associated generalized weakness, decreased appetite as well. Denies cough. Denies known sick contacts. Denies denies fevers, chills, headaches, dizziness, chest pain, palpitations, abdominal pain, nausea, vomiting, constipation, diarrhea, urinary frequency, dysuria, and new focal extremity weakness, swelling and pain. Has right lower extremity arthritis with associated swelling which she reports is chronic and unchanged. No active cancer, not bed ridden, no paralysis paresis or recent plaster immobilization, no recent surgeries.          Review of Systems   Constitutional:  Positive for fatigue. Negative for chills and fever.   HENT:  Negative for congestion, hearing loss and trouble swallowing.    Eyes:  Negative for pain and visual disturbance.   Respiratory:  Positive for cough and shortness of breath.    Cardiovascular:  Negative for chest pain, palpitations and leg swelling.   Gastrointestinal:  Negative for abdominal pain, constipation, diarrhea, nausea and vomiting.   Genitourinary:  Negative for dysuria, frequency and hematuria.   Musculoskeletal:  Negative for arthralgias and back pain.   Skin:  Negative for color change and rash.   Neurological:  Positive for weakness (Generalized weakness). Negative for dizziness, seizures, syncope, light-headedness and headaches.   Psychiatric/Behavioral:  Negative for dysphoric mood.    All other systems reviewed and are negative.          Objective       ED Triage Vitals   Temperature Pulse Blood Pressure Respirations  SpO2 Patient Position - Orthostatic VS   05/11/25 1551 05/11/25 1550 05/11/25 1550 05/11/25 1550 05/11/25 1550 05/11/25 1550   97.9 °F (36.6 °C) 65 143/73 (!) 36 90 % Lying      Temp Source Heart Rate Source BP Location FiO2 (%) Pain Score    05/11/25 1551 05/11/25 1550 05/11/25 1550 -- 05/11/25 2322    Oral Monitor Right arm  8      Vitals      Date and Time Temp Pulse SpO2 Resp BP Pain Score FACES Pain Rating User   05/13/25 1734 -- -- -- -- -- 7 -- GM   05/13/25 1554 -- -- -- -- 131/64 -- -- MB   05/13/25 1517 -- 73 95 % 16 131/64 -- -- DII   05/13/25 1512 98.1 °F (36.7 °C) -- -- -- -- -- -- MB   05/13/25 1155 -- -- -- -- 122/58 -- -- MB   05/13/25 1104 98.1 °F (36.7 °C) 73 96 % 16 122/58 -- -- DII   05/13/25 0956 -- -- -- -- -- No Pain -- MNG   05/13/25 0811 -- -- -- -- -- 8 -- GM   05/13/25 0750 -- -- -- -- 157/71 -- -- MB   05/13/25 0715 98.1 °F (36.7 °C) 77 95 % 16 157/71 -- -- DII   05/13/25 0409 -- 75 97 % 18 128/67 -- -- DII   05/13/25 0224 98 °F (36.7 °C) 70 96 % -- 120/55 -- -- DII   05/13/25 0006 98.2 °F (36.8 °C) 72 97 % 18 123/60 -- -- DII   05/12/25 2000 -- -- -- -- -- 7 -- SB   05/12/25 2000 -- -- -- -- 106/52 -- -- SCP   05/12/25 1945 -- -- 99 % -- -- -- -- KN   05/12/25 1845 97.8 °F (36.6 °C) 68 95 % 16 106/52 -- -- DII   05/12/25 1742 -- -- -- -- -- 9 -- GM   05/12/25 1552 -- -- -- -- 109/50 -- -- SCP   05/12/25 1519 -- -- -- -- 109/50 -- -- SCP   05/12/25 1517 98.5 °F (36.9 °C) 74 95 % 18 115/44 -- -- DII   05/12/25 1402 -- -- -- -- -- No Pain -- CC   05/12/25 1307 -- -- -- -- -- No Pain -- GM   05/12/25 1307 -- -- 98 % -- -- -- -- AJ   05/12/25 1202 -- 73 95 % 22 133/70 -- -- DII   05/12/25 0740 97.2 °F (36.2 °C) 66 95 % 17 118/60 -- -- DII   05/12/25 0738 97.2 °F (36.2 °C) 64 97 % 16 118/60 -- -- DII   05/12/25 0702 -- -- 97 % -- -- -- -- DA   05/12/25 0332 -- -- -- -- 117/53 -- -- MEGHAN   05/12/25 0257 98 °F (36.7 °C) 65 95 % -- 117/53 -- -- DII   05/11/25 2327 -- -- 95 % -- -- -- -- MEGHAN    05/11/25 2325 -- -- 94 % -- -- 8 -- MEGHAN   05/11/25 2322 -- -- -- -- -- 8 -- MEGHAN   05/11/25 2256 98.2 °F (36.8 °C) 64 92 % -- 112/53 -- -- DII   05/11/25 2241 -- 69 93 % 34 108/56 -- -- JH   05/11/25 1945 -- 62 92 % -- 109/53 -- -- JH   05/11/25 1930 -- 68 93 % -- 109/53 -- -- JH   05/11/25 1620 -- -- 93 % -- -- -- -- DA   05/11/25 1551 97.9 °F (36.6 °C) -- -- -- -- -- -- AW   05/11/25 1550 -- 65 90 % 36 143/73 -- -- AW            Physical Exam  Vitals and nursing note reviewed.   Constitutional:       Appearance: She is well-developed. She is ill-appearing.   HENT:      Head: Normocephalic and atraumatic.      Mouth/Throat:      Mouth: Mucous membranes are dry.      Pharynx: Oropharynx is clear.   Eyes:      Extraocular Movements: Extraocular movements intact.      Conjunctiva/sclera: Conjunctivae normal.      Pupils: Pupils are equal, round, and reactive to light.   Cardiovascular:      Rate and Rhythm: Normal rate and regular rhythm.      Pulses: Normal pulses.      Heart sounds: Normal heart sounds.   Pulmonary:      Effort: Pulmonary effort is normal. No respiratory distress.      Breath sounds: Normal breath sounds. No wheezing, rhonchi or rales.      Comments: Tight  Abdominal:      General: Abdomen is flat.      Palpations: Abdomen is soft.      Tenderness: There is no abdominal tenderness.   Musculoskeletal:         General: Normal range of motion.      Cervical back: Normal range of motion.      Right lower leg: Edema present.      Left lower leg: Edema present.      Comments: R>L lower extremity swelling and edema   Skin:     General: Skin is warm and dry.      Capillary Refill: Capillary refill takes less than 2 seconds.   Neurological:      General: No focal deficit present.      Mental Status: She is alert and oriented to person, place, and time.      Sensory: No sensory deficit.      Motor: Motor function is intact. No weakness.   Psychiatric:         Mood and Affect: Mood normal.         Behavior:  Behavior normal.         Results Reviewed       Procedure Component Value Units Date/Time    Blood culture #1 [[id number]] Collected: 05/11/25 1614    Lab Status: Preliminary result Specimen: Blood from Arm, Left Updated: 05/12/25 2301     Blood Culture No Growth at 24 hrs.    Blood culture #2 [[id number]] Collected: 05/11/25 1614    Lab Status: Preliminary result Specimen: Blood from Arm, Right Updated: 05/12/25 2301     Blood Culture No Growth at 24 hrs.    HS Troponin I 4hr [[id number]]  (Normal) Collected: 05/11/25 2015    Lab Status: Final result Specimen: Blood from Arm, Right Updated: 05/11/25 2044     hs TnI 4hr 7 ng/L      Delta 4hr hsTnI 0 ng/L     HS Troponin I 2hr [[id number]]  (Normal) Collected: 05/11/25 1811    Lab Status: Final result Specimen: Blood from Arm, Right Updated: 05/11/25 1839     hs TnI 2hr 6 ng/L      Delta 2hr hsTnI -1 ng/L     D-dimer, quantitative [[id number]]  (Abnormal) Collected: 05/11/25 1643    Lab Status: Final result Specimen: Blood from Arm, Right Updated: 05/11/25 1714     D-Dimer, Quant 2.48 ug/ml FEU     Narrative:      In the evaluation for possible pulmonary embolism, in the appropriate (Well's Score of 4 or less) patient, the age adjusted d-dimer cutoff for this patient can be calculated as:    Age x 0.01 (in ug/mL) for Age-adjusted D-dimer exclusion threshold for a patient over 50 years.    Protime-INR [[id number]]  (Normal) Collected: 05/11/25 1643    Lab Status: Final result Specimen: Blood from Arm, Right Updated: 05/11/25 1711     Protime 14.2 seconds      INR 1.03    Narrative:      INR Therapeutic Range    Indication                                             INR Range      Atrial Fibrillation                                               2.0-3.0  Hypercoagulable State                                    2.0.2.3  Left Ventricular Asist Device                            2.0-3.0  Mechanical Heart Valve                                  -    Aortic(with afib, MI, embolism,  HF, LA enlargement,    and/or coagulopathy)                                     2.0-3.0 (2.5-3.5)     Mitral                                                             2.5-3.5  Prosthetic/Bioprosthetic Heart Valve               2.0-3.0  Venous thromboembolism (VTE: VT, PE        2.0-3.0    APTT [[id number]]  (Normal) Collected: 05/11/25 1643    Lab Status: Final result Specimen: Blood from Arm, Right Updated: 05/11/25 1711     PTT 29 seconds     TSH, 3rd generation with Free T4 reflex [[id number]]  (Normal) Collected: 05/11/25 1608    Lab Status: Final result Specimen: Blood from Arm, Right Updated: 05/11/25 1659     TSH 3RD GENERATON 1.203 uIU/mL     CBC and differential [[id number]]  (Abnormal) Collected: 05/11/25 1643    Lab Status: Final result Specimen: Blood from Arm, Right Updated: 05/11/25 1656     WBC 8.46 Thousand/uL      RBC 4.06 Million/uL      Hemoglobin 10.6 g/dL      Hematocrit 34.7 %      MCV 86 fL      MCH 26.1 pg      MCHC 30.5 g/dL      RDW 15.8 %      MPV 9.5 fL      Platelets 239 Thousands/uL      nRBC 0 /100 WBCs      Segmented % 63 %      Immature Grans % 0 %      Lymphocytes % 16 %      Monocytes % 15 %      Eosinophils Relative 5 %      Basophils Relative 1 %      Absolute Neutrophils 5.35 Thousands/µL      Absolute Immature Grans 0.03 Thousand/uL      Absolute Lymphocytes 1.32 Thousands/µL      Absolute Monocytes 1.27 Thousand/µL      Eosinophils Absolute 0.42 Thousand/µL      Basophils Absolute 0.07 Thousands/µL     HS Troponin 0hr (reflex protocol) [[id number]]  (Normal) Collected: 05/11/25 1608    Lab Status: Final result Specimen: Blood from Arm, Right Updated: 05/11/25 1652     hs TnI 0hr 7 ng/L     Lactic acid, plasma (w/reflex if result > 2.0) [[id number]]  (Normal) Collected: 05/11/25 1614    Lab Status: Final result Specimen: Blood from Arm, Right Updated: 05/11/25 1647     LACTIC ACID 1.8 mmol/L     Narrative:      Result may be elevated if tourniquet was used during collection.     Comprehensive metabolic panel [[id number]]  (Abnormal) Collected: 05/11/25 1608    Lab Status: Final result Specimen: Blood from Arm, Right Updated: 05/11/25 1643     Sodium 138 mmol/L      Potassium 3.8 mmol/L      Chloride 101 mmol/L      CO2 29 mmol/L      ANION GAP 8 mmol/L      BUN 18 mg/dL      Creatinine 1.00 mg/dL      Glucose 141 mg/dL      Calcium 9.1 mg/dL      AST 14 U/L      ALT 10 U/L      Alkaline Phosphatase 83 U/L      Total Protein 6.5 g/dL      Albumin 4.0 g/dL      Total Bilirubin 0.45 mg/dL      eGFR 51 ml/min/1.73sq m     Narrative:      National Kid[employer] Disease Foundation guidelines for Chronic Kidney Disease (CKD):     Stage 1 with normal or high GFR (GFR > 90 mL/min/1.73 square meters)    Stage 2 Mild CKD (GFR = 60-89 mL/min/1.73 square meters)    Stage 3A Moderate CKD (GFR = 45-59 mL/min/1.73 square meters)    Stage 3B Moderate CKD (GFR = 30-44 mL/min/1.73 square meters)    Stage 4 Severe CKD (GFR = 15-29 mL/min/1.73 square meters)    Stage 5 End Stage CKD (GFR <15 mL/min/1.73 square meters)  Note: GFR calculation is accurate only with a steady state creatinine    FLU/COVID Rapid Antigen (30 min. TAT) - Preferred screening test in ED [[id number]]  (Normal) Collected: 05/11/25 1614    Lab Status: Final result Specimen: Nares from Nose Updated: 05/11/25 1638     SARS COV Rapid Antigen Negative     Influenza A Rapid Antigen Negative     Influenza B Rapid Antigen Negative    Narrative:      This test has been performed using the Quidel Judy 2 FLU+SARS Antigen test under the Emergency Use Authorization (EUA). This test has been validated by the  and verified by the performing laboratory. The Judy uses lateral flow immunofluorescent sandwich assay to detect SARS-COV, Influenza A and Influenza B Antigen.     The Quidel Judy 2 SARS Antigen test does not differentiate between SARS-CoV and SARS-CoV-2.     Negative results are presumptive and may be confirmed with a molecular assay, if  necessary, for patient management. Negative results do not rule out SARS-CoV-2 or influenza infection and should not be used as the sole basis for treatment or patient management decisions. A negative test result may occur if the level of antigen in a sample is below the limit of detection of this test.     Positive results are indicative of the presence of viral antigens, but do not rule out bacterial infection or co-infection with other viruses.     All test results should be used as an adjunct to clinical observations and other information available to the provider.    FOR PEDIATRIC PATIENTS - copy/paste COVID Guidelines URL to browser: [email or web address]            CTA chest pe study   Final Interpretation by John English MD (05/11 2017)      No pulmonary embolus. Minimal bronchiolitis in the right upper lobe.                  Workstation performed: [id number]         XR chest portable   ED Interpretation by Nina Waldrop MD (05/11 2641)   Per my interpretation: Right lower lobe opacification suspicious for pneumonia      Final Interpretation by Jefferson Guthrie DO (05/12 7221)      No focal infiltrate. Right upper lobe bronchiolitis noted on subsequent same day CTA chest study, not well visualized on this exam. Please see full CT report for further discussion.         Resident: Storm Nguyen I, the attending radiologist, have reviewed the images and agree with the final report above.      Workstation performed: [id number]             ECG 12 Lead Documentation Only    Date/Time: 5/11/2025 4:02 PM    Performed by: Nina Waldrop MD  Authorized by: Nina Waldrop MD    Indications / Diagnosis:  Shorntess of breath  ECG reviewed by me, the ED Provider: yes    Patient location:  ED  Previous ECG:     Previous ECG:  Compared to current    Comparison ECG info:  4/24/2024    Similarity:  No change  Interpretation:     Interpretation:  abnormal    Rate:     ECG rate:  64    ECG rate assessment: normal    Rhythm:     Rhythm: sinus rhythm    Ectopy:     Ectopy: PAC    QRS:     QRS axis:  Left    QRS intervals:  Wide (140)  Conduction:     Conduction: abnormal      Abnormal conduction: complete RBBB    ST segments:     ST segments:  Non-specific  T waves:     T waves: non-specific    Comments:      QTc 455      ED Medication and Procedure Management   Prior to Admission Medications   Prescriptions Last Dose Informant Patient Reported? Taking?   Alcohol Swabs (Alcohol Prep) PADS  Self Yes No   Sig: USE TO TEST BLOOD SUGAR TWICE A DAY AND AS NEEDED   B Complex-C-Folic Acid (B COMPLEX + C TR PO)   Yes No   Sig: Take 1 tablet by mouth in the morning. Indications: low vitamin b   Budeson-Glycopyrrol-Formoterol (Breztri Aerosphere) 160-9-4.8 MCG/ACT AERO  Self No No   Sig: Inhale 2 puffs  in the morning and 2 puffs before bedtime. Rinse mouth after use..   Saccharomyces boulardii (Digestive Probiotic) 250 MG CAPS  Self Yes No   Sig: Take 1 capsule by mouth 2 (two) times a day   Patient not taking: Reported on 2/28/2024   albuterol (PROVENTIL HFA,VENTOLIN HFA) 90 mcg/act inhaler  Self No No   Sig: Inhale 2 puffs every 6 (six) hours as needed for wheezing   aspirin 81 mg chewable tablet  Self No No   Sig: Chew 1 tablet (81 mg total) daily   atorvastatin (LIPITOR) 40 mg tablet  Self No No   Sig: Take 1 tablet (40 mg total) by mouth every evening   bisacodyl (DULCOLAX) 10 mg suppository   No No   Sig: Insert 1 suppository (10 mg total) into the rectum daily as needed for constipation   brimonidine tartrate 0.2 % ophthalmic solution  Self No No   Sig: Administer 1 drop into the left eye 2 (two) times a day   brimonidine tartrate 0.2 % ophthalmic solution  Self Yes No   Sig: Administer 1 drop into the left eye 2 (two) times a day   Patient not taking: Reported on 7/21/2023   diltiazem (CARDIZEM CD) 240 mg 24 hr capsule  Self Yes No   Sig: Take 1 capsule by mouth  daily   ferrous sulfate 324 (65 Fe) mg   Yes No   Sig: Take 324 mg by mouth every other day. Indications: Anemia From Inadequate Iron in the Body   gabapentin (NEURONTIN) 300 mg capsule  Self Yes No   Sig: Take 300 mg by mouth 4 (four) times a day   losartan (COZAAR) 25 mg tablet  Self Yes No   Sig: Take 50 mg by mouth daily   metFORMIN (GLUCOPHAGE) 500 mg tablet  Self Yes No   Sig: Take 1,000 mg by mouth daily with breakfast   metFORMIN (GLUCOPHAGE) 500 mg tablet  Self Yes No   Sig: Take 500 mg by mouth every evening   morphine (MSIR) 15 mg tablet  Self Yes No   Sig: Take 15 mg by mouth 2 (two) times a day Every 12 hrs.   naloxegol oxalate (Movantik) 25 MG tablet   Yes No   Sig: TAKE ONE TABLET BY MOUTH DAILY FOR OPIOID INDUCED CONSTIPATION. ONGOING THERAPY.   nitrofurantoin (MACROBID) 100 mg capsule   Yes No   Sig: TAKE 1 CAPSULE BY MOUTH TWICE A DAY FOR 7 DAYS   ondansetron (ZOFRAN) 4 mg tablet  Self No No   Sig: Take 1 tablet (4 mg total) by mouth every 8 (eight) hours as needed for nausea or vomiting for up to 10 doses   Patient not taking: Reported on 2/28/2024   oxygen gas  Self Yes No   Sig: Inhale 3 L/min continuous   pantoprazole (PROTONIX) 40 mg tablet  Self Yes No   Sig: Take 40 mg by mouth every 12 (twelve) hours   polyethylene glycol (GLYCOLAX) 17 GM/SCOOP powder   No No   Sig: Take 17 g by mouth daily   Patient not taking: Reported on 5/7/2024   rOPINIRole (REQUIP) 0.5 mg tablet  Self Yes No   Sig: Take 0.5 mg by mouth daily at bedtime Take 3 tablets PO before bed   senna (SENOKOT) 8.6 mg   No No   Sig: Take 2 tablets (17.2 mg total) by mouth daily at bedtime as needed for constipation for up to 14 days   sitaGLIPtin (JANUVIA) 100 mg tablet  Self No No   Sig: Take 1 tablet (100 mg total) by mouth daily      Facility-Administered Medications: None     Current Discharge Medication List        CONTINUE these medications which have NOT CHANGED    Details   albuterol (PROVENTIL HFA,VENTOLIN HFA) 90 mcg/act  inhaler Inhale 2 puffs every 6 (six) hours as needed for wheezing  Qty: 54 g, Refills: 3    Comments: Substitution to a formulary equivalent within the same pharmaceutical class is authorized.  Associated Diagnoses: COPD, severe (HCC)      Alcohol Swabs (Alcohol Prep) PADS USE TO TEST BLOOD SUGAR TWICE A DAY AND AS NEEDED      aspirin 81 mg chewable tablet Chew 1 tablet (81 mg total) daily  Qty: 30 tablet, Refills: 0    Associated Diagnoses: 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      atorvastatin (LIPITOR) 40 mg tablet Take 1 tablet (40 mg total) by mouth every evening  Qty: 30 tablet, Refills: 0    Associated Diagnoses: 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      B Complex-C-Folic Acid (B COMPLEX + C TR PO) Take 1 tablet by mouth in the morning. Indications: low vitamin b      bisacodyl (DULCOLAX) 10 mg suppository Insert 1 suppository (10 mg total) into the rectum daily as needed for constipation  Qty: 12 suppository, Refills: 0    Associated Diagnoses: Constipation; Chronic low back pain without sciatica, unspecified back pain laterality      !! brimonidine tartrate 0.2 % ophthalmic solution Administer 1 drop into the left eye 2 (two) times a day  Qty: 5 mL, Refills: 0    Associated Diagnoses: 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      !! brimonidine tartrate 0.2 % ophthalmic solution Administer 1 drop into the left eye 2 (two) times a day      Budeson-Glycopyrrol-Formoterol (Breztri Aerosphere) 160-9-4.8 MCG/ACT AERO Inhale 2 puffs  in the morning and 2 puffs before bedtime. Rinse mouth after use..  Qty: 31.1 g, Refills: 3    Associated Diagnoses: COPD, severe (HCC)      diltiazem (CARDIZEM CD) 240 mg 24 hr capsule Take 1 capsule by mouth daily      ferrous sulfate 324 (65 Fe) mg Take 324 mg by mouth every other day. Indications: Anemia From Inadequate Iron in the Body      gabapentin (NEURONTIN) 300 mg capsule Take 300 mg by mouth 4 (four) times a day      losartan (COZAAR) 25 mg tablet Take 50  mg by mouth daily      !! metFORMIN (GLUCOPHAGE) 500 mg tablet Take 1,000 mg by mouth daily with breakfast      !! metFORMIN (GLUCOPHAGE) 500 mg tablet Take 500 mg by mouth every evening      morphine (MSIR) 15 mg tablet Take 15 mg by mouth 2 (two) times a day Every 12 hrs.      naloxegol oxalate (Movantik) 25 MG tablet TAKE ONE TABLET BY MOUTH DAILY FOR OPIOID INDUCED CONSTIPATION. ONGOING THERAPY.      nitrofurantoin (MACROBID) 100 mg capsule TAKE 1 CAPSULE BY MOUTH TWICE A DAY FOR 7 DAYS      ondansetron (ZOFRAN) 4 mg tablet Take 1 tablet (4 mg total) by mouth every 8 (eight) hours as needed for nausea or vomiting for up to 10 doses  Qty: 20 tablet, Refills: 0    Associated Diagnoses: Nausea      oxygen gas Inhale 3 L/min continuous      pantoprazole (PROTONIX) 40 mg tablet Take 40 mg by mouth every 12 (twelve) hours      polyethylene glycol (GLYCOLAX) 17 GM/SCOOP powder Take 17 g by mouth daily  Qty: 510 g, Refills: 0    Associated Diagnoses: Constipation      rOPINIRole (REQUIP) 0.5 mg tablet Take 0.5 mg by mouth daily at bedtime Take 3 tablets PO before bed      Saccharomyces boulardii (Digestive Probiotic) 250 MG CAPS Take 1 capsule by mouth 2 (two) times a day      senna (SENOKOT) 8.6 mg Take 2 tablets (17.2 mg total) by mouth daily at bedtime as needed for constipation for up to 14 days  Qty: 14 tablet, Refills: 0    Associated Diagnoses: Constipation; Chronic low back pain without sciatica, unspecified back pain laterality      sitaGLIPtin (JANUVIA) 100 mg tablet Take 1 tablet (100 mg total) by mouth daily  Qty: 30 tablet, Refills: 0    Associated Diagnoses: Type 2 diabetes mellitus without complication, unspecified whether long term insulin use (HCC)       !! - Potential duplicate medications found. Please discuss with provider.        No discharge procedures on file.  ED SEPSIS DOCUMENTATION   Time reflects when diagnosis was documented in both MDM as applicable and the Disposition within this note        Time User Action Codes Description Comment    5/11/2025  7:09 PM Nina Waldrop Add [R06.00] Dyspnea     5/11/2025  7:10 PM Nina Waldrop Add [J96.01] Acute hypoxic respiratory failure (HCC)     5/11/2025  7:10 PM Nina Waldrop Modify [R06.00] Dyspnea     5/11/2025  7:10 PM Nina Waldrop Modify [J96.01] Acute hypoxic respiratory failure (HCC)     5/11/2025  8:41 PM Nina Waldrop Add [R79.89] Elevated d-dimer                  Nina Waldrop MD  05/13/25 1923

## 2025-05-12 PROBLEM — R01.1 MURMUR, CARDIAC: Status: ACTIVE | Noted: 2025-05-12

## 2025-05-12 PROBLEM — N17.9 ACUTE KIDNEY FAILURE (HCC): Status: ACTIVE | Noted: 2025-05-12

## 2025-05-12 LAB
ANION GAP SERPL CALCULATED.3IONS-SCNC: 6 MMOL/L (ref 4–13)
BACTERIA UR QL AUTO: ABNORMAL /HPF
BILIRUB UR QL STRIP: NEGATIVE
BUN SERPL-MCNC: 24 MG/DL (ref 5–25)
CALCIUM SERPL-MCNC: 8.7 MG/DL (ref 8.4–10.2)
CHLORIDE SERPL-SCNC: 99 MMOL/L (ref 96–108)
CLARITY UR: CLEAR
CO2 SERPL-SCNC: 27 MMOL/L (ref 21–32)
COLOR UR: YELLOW
CREAT SERPL-MCNC: 1.32 MG/DL (ref 0.6–1.3)
ERYTHROCYTE [DISTWIDTH] IN BLOOD BY AUTOMATED COUNT: 15.9 % (ref 11.6–15.1)
GFR SERPL CREATININE-BSD FRML MDRD: 37 ML/MIN/1.73SQ M
GLUCOSE SERPL-MCNC: 161 MG/DL (ref 65–140)
GLUCOSE SERPL-MCNC: 192 MG/DL (ref 65–140)
GLUCOSE SERPL-MCNC: 219 MG/DL (ref 65–140)
GLUCOSE SERPL-MCNC: 245 MG/DL (ref 65–140)
GLUCOSE SERPL-MCNC: 297 MG/DL (ref 65–140)
GLUCOSE SERPL-MCNC: 385 MG/DL (ref 65–140)
GLUCOSE SERPL-MCNC: 416 MG/DL (ref 65–140)
GLUCOSE SERPL-MCNC: 421 MG/DL (ref 65–140)
GLUCOSE SERPL-MCNC: 458 MG/DL (ref 65–140)
GLUCOSE SERPL-MCNC: 463 MG/DL (ref 65–140)
GLUCOSE SERPL-MCNC: 510 MG/DL (ref 65–140)
GLUCOSE SERPL-MCNC: 512 MG/DL (ref 65–140)
GLUCOSE UR STRIP-MCNC: ABNORMAL MG/DL
HCT VFR BLD AUTO: 34.3 % (ref 34.8–46.1)
HGB BLD-MCNC: 10.4 G/DL (ref 11.5–15.4)
HGB UR QL STRIP.AUTO: NEGATIVE
KETONES UR STRIP-MCNC: NEGATIVE MG/DL
LEUKOCYTE ESTERASE UR QL STRIP: ABNORMAL
MCH RBC QN AUTO: 26.5 PG (ref 26.8–34.3)
MCHC RBC AUTO-ENTMCNC: 30.3 G/DL (ref 31.4–37.4)
MCV RBC AUTO: 88 FL (ref 82–98)
NITRITE UR QL STRIP: NEGATIVE
NON-SQ EPI CELLS URNS QL MICRO: ABNORMAL /HPF
PH UR STRIP.AUTO: 6 [PH]
PLATELET # BLD AUTO: 189 THOUSANDS/UL (ref 149–390)
PMV BLD AUTO: 9.7 FL (ref 8.9–12.7)
POTASSIUM SERPL-SCNC: 4.8 MMOL/L (ref 3.5–5.3)
PROT UR STRIP-MCNC: NEGATIVE MG/DL
RBC # BLD AUTO: 3.92 MILLION/UL (ref 3.81–5.12)
RBC #/AREA URNS AUTO: ABNORMAL /HPF
SODIUM SERPL-SCNC: 132 MMOL/L (ref 135–147)
SP GR UR STRIP.AUTO: 1.04 (ref 1–1.03)
UROBILINOGEN UR STRIP-ACNC: <2 MG/DL
WBC # BLD AUTO: 3.74 THOUSAND/UL (ref 4.31–10.16)
WBC #/AREA URNS AUTO: ABNORMAL /HPF

## 2025-05-12 PROCEDURE — 97163 PT EVAL HIGH COMPLEX 45 MIN: CPT

## 2025-05-12 PROCEDURE — 94640 AIRWAY INHALATION TREATMENT: CPT

## 2025-05-12 PROCEDURE — 85027 COMPLETE CBC AUTOMATED: CPT

## 2025-05-12 PROCEDURE — 82948 REAGENT STRIP/BLOOD GLUCOSE: CPT

## 2025-05-12 PROCEDURE — 80048 BASIC METABOLIC PNL TOTAL CA: CPT

## 2025-05-12 PROCEDURE — 81001 URINALYSIS AUTO W/SCOPE: CPT

## 2025-05-12 PROCEDURE — 94664 DEMO&/EVAL PT USE INHALER: CPT

## 2025-05-12 PROCEDURE — 94760 N-INVAS EAR/PLS OXIMETRY 1: CPT

## 2025-05-12 PROCEDURE — 99223 1ST HOSP IP/OBS HIGH 75: CPT | Performed by: INTERNAL MEDICINE

## 2025-05-12 RX ORDER — SODIUM CHLORIDE, SODIUM GLUCONATE, SODIUM ACETATE, POTASSIUM CHLORIDE, MAGNESIUM CHLORIDE, SODIUM PHOSPHATE, DIBASIC, AND POTASSIUM PHOSPHATE .53; .5; .37; .037; .03; .012; .00082 G/100ML; G/100ML; G/100ML; G/100ML; G/100ML; G/100ML; G/100ML
100 INJECTION, SOLUTION INTRAVENOUS CONTINUOUS
Status: DISPENSED | OUTPATIENT
Start: 2025-05-12 | End: 2025-05-12

## 2025-05-12 RX ORDER — ENOXAPARIN SODIUM 100 MG/ML
30 INJECTION SUBCUTANEOUS DAILY
Status: DISCONTINUED | OUTPATIENT
Start: 2025-05-12 | End: 2025-05-13

## 2025-05-12 RX ORDER — LEVALBUTEROL INHALATION SOLUTION 1.25 MG/3ML
1.25 SOLUTION RESPIRATORY (INHALATION)
Status: DISCONTINUED | OUTPATIENT
Start: 2025-05-12 | End: 2025-05-13

## 2025-05-12 RX ADMIN — BRIMONIDINE TARTRATE 1 DROP: 2 SOLUTION OPHTHALMIC at 21:53

## 2025-05-12 RX ADMIN — GABAPENTIN 300 MG: 300 CAPSULE ORAL at 17:42

## 2025-05-12 RX ADMIN — BUDESONIDE AND FORMOTEROL FUMARATE DIHYDRATE 2 PUFF: 160; 4.5 AEROSOL RESPIRATORY (INHALATION) at 17:43

## 2025-05-12 RX ADMIN — INSULIN LISPRO 6 UNITS: 100 INJECTION, SOLUTION INTRAVENOUS; SUBCUTANEOUS at 07:59

## 2025-05-12 RX ADMIN — ATORVASTATIN CALCIUM 40 MG: 40 TABLET, FILM COATED ORAL at 17:42

## 2025-05-12 RX ADMIN — ENOXAPARIN SODIUM 30 MG: 30 INJECTION SUBCUTANEOUS at 09:59

## 2025-05-12 RX ADMIN — LEVALBUTEROL HYDROCHLORIDE 1.25 MG: 1.25 SOLUTION RESPIRATORY (INHALATION) at 13:06

## 2025-05-12 RX ADMIN — PANTOPRAZOLE SODIUM 40 MG: 40 TABLET, DELAYED RELEASE ORAL at 10:47

## 2025-05-12 RX ADMIN — GABAPENTIN 300 MG: 300 CAPSULE ORAL at 14:07

## 2025-05-12 RX ADMIN — Medication 1 CAPSULE: at 17:42

## 2025-05-12 RX ADMIN — IPRATROPIUM BROMIDE 0.5 MG: 0.5 SOLUTION RESPIRATORY (INHALATION) at 13:06

## 2025-05-12 RX ADMIN — BUDESONIDE AND FORMOTEROL FUMARATE DIHYDRATE 2 PUFF: 160; 4.5 AEROSOL RESPIRATORY (INHALATION) at 09:57

## 2025-05-12 RX ADMIN — GABAPENTIN 300 MG: 300 CAPSULE ORAL at 21:50

## 2025-05-12 RX ADMIN — ASPIRIN 81 MG: 81 TABLET, CHEWABLE ORAL at 10:47

## 2025-05-12 RX ADMIN — IPRATROPIUM BROMIDE 0.5 MG: 0.5 SOLUTION RESPIRATORY (INHALATION) at 07:02

## 2025-05-12 RX ADMIN — LEVALBUTEROL HYDROCHLORIDE 1.25 MG: 1.25 SOLUTION RESPIRATORY (INHALATION) at 19:45

## 2025-05-12 RX ADMIN — SODIUM CHLORIDE 15 UNITS/HR: 9 INJECTION, SOLUTION INTRAVENOUS at 12:24

## 2025-05-12 RX ADMIN — INSULIN HUMAN 10 UNITS: 100 INJECTION, SOLUTION PARENTERAL at 08:33

## 2025-05-12 RX ADMIN — BRIMONIDINE TARTRATE 1 DROP: 2 SOLUTION OPHTHALMIC at 09:57

## 2025-05-12 RX ADMIN — ROPINIROLE HYDROCHLORIDE 1.5 MG: 1 TABLET, FILM COATED ORAL at 21:50

## 2025-05-12 RX ADMIN — IPRATROPIUM BROMIDE 0.5 MG: 0.5 SOLUTION RESPIRATORY (INHALATION) at 19:45

## 2025-05-12 RX ADMIN — SODIUM CHLORIDE, SODIUM GLUCONATE, SODIUM ACETATE, POTASSIUM CHLORIDE, MAGNESIUM CHLORIDE, SODIUM PHOSPHATE, DIBASIC, AND POTASSIUM PHOSPHATE 75 ML/HR: .53; .5; .37; .037; .03; .012; .00082 INJECTION, SOLUTION INTRAVENOUS at 09:57

## 2025-05-12 RX ADMIN — INSULIN LISPRO 6 UNITS: 100 INJECTION, SOLUTION INTRAVENOUS; SUBCUTANEOUS at 11:02

## 2025-05-12 RX ADMIN — SODIUM CHLORIDE 9 UNITS/HR: 9 INJECTION, SOLUTION INTRAVENOUS at 18:40

## 2025-05-12 RX ADMIN — AZITHROMYCIN DIHYDRATE 500 MG: 250 TABLET ORAL at 17:42

## 2025-05-12 RX ADMIN — PANTOPRAZOLE SODIUM 40 MG: 40 TABLET, DELAYED RELEASE ORAL at 21:50

## 2025-05-12 RX ADMIN — MORPHINE SULFATE 15 MG: 15 TABLET ORAL at 17:42

## 2025-05-12 RX ADMIN — LEVALBUTEROL HYDROCHLORIDE 1.25 MG: 1.25 SOLUTION RESPIRATORY (INHALATION) at 07:02

## 2025-05-12 RX ADMIN — SENNOSIDES 8.6 MG: 8.6 TABLET, FILM COATED ORAL at 21:50

## 2025-05-12 NOTE — ED NOTES
Patient stand and pivoted to bed side toilet w/ shuffling gait and hand held assistance. Call button within reach.     Lety Barrios RN  05/11/25 2039

## 2025-05-12 NOTE — ASSESSMENT & PLAN NOTE
-CBC on admission showed Hgb 10.6 (baseline appears 9.1-10.8)    Plan:  - Continue PTA ferrous sulfate 325 every other day

## 2025-05-12 NOTE — ASSESSMENT & PLAN NOTE
-Unchanged, lower midline lumbar region, 8/10 on admission.    Plan:  -Continue PTA morphine 15 mg twice daily  -Continue PTA gabapentin 300 mg 4 times daily

## 2025-05-12 NOTE — ASSESSMENT & PLAN NOTE
-SOB on exertion worse over the past 1 week  -Initially hypoxic to the 70s and tachypneic upon arrival to the ED requiring 5L NC, now back to baseline 3 LNC.  -uses 3LNC qhs and prn during day  -conversational dyspnea on exam  -In the ED patient received duoneb X1, NS nebs,, azithromycin, Rocephin, Solu-Medrol 60, 500 cc NS X2.  -CTA PE (d-dimer 2.48): No PE, minimal bronchiolitis RUL  -Last echo March 2024 demonstrated EF 70%, hyperdynamic with normal wall motion.  Mild RV and bilateral atrial dilation, mild AS.  -favor viral bronchiolitis     Plan:  -Symbicort 2 puff twice daily while inpatient  -DuoNebs 3 times daily scheduled  -Prednisone 40 for additional 2 days   -finish 3 day course azithromycin

## 2025-05-12 NOTE — PHYSICAL THERAPY NOTE
Physical Therapy Evaluation    Patient's Name: Gloria Patel    Admitting Diagnosis  Dyspnea [R06.00]  Elevated d-dimer [R79.89]  Acute hypoxic respiratory failure (HCC) [J96.01]    Problem List  Patient Active Problem List   Diagnosis    COPD, severe (HCC)    Diabetes mellitus (HCC)    Chronic hypoxemic respiratory failure (HCC)    GERD (gastroesophageal reflux disease)    Hyponatremia    Cigarette nicotine dependence in remission    Exertional dyspnea    ASD (atrial septal defect)    Retinal artery occlusion, branch, left    Chronic pain    BMI 31.0-31.9,adult    Unintentional weight loss    Low blood pressure reading    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    Acquired pes planus of right foot    Iron deficiency anemia    Ankle pain    Arthropathy    Benign essential hypertension    Chronic fatigue    Controlled substance agreement signed    COVID-19    DDD (degenerative disc disease), lumbosacral    Depression    GI bleed    History of iron deficiency anemia    Hyperlipidemia    Instability of foot joint    Microscopic hematuria    Onychomycosis    Pain in toe    Primary localized osteoarthrosis of ankle and foot    Sinus tarsi syndrome of right ankle    Disc disorder of lumbar region    Urge incontinence    Backache    Type II diabetes mellitus (HCC)    Esophageal hiatal hernia    Generalized weakness    Chronic respiratory failure (HCC)    Type 2 diabetes mellitus with hyperglycemia, without long-term current use of insulin (HCC)    Essential hypertension    Hyponatremia    Hyperlipidemia    Chronic back pain    chronic respiratory failure (HCC), acute component resolved    GERD (gastroesophageal reflux disease)    Tachycardia    Mass of finger of left hand    Peripheral vascular disease (HCC)    Stage 3b chronic kidney disease (HCC)    Type 2 diabetes mellitus, without long-term current use of insulin (HCC)    Restless leg syndrome    Hypomagnesemia    Obesity (BMI 30-39.9)    Acute on  chronic hypoxic respiratory failure (HCC)    Acute kidney failure (HCC)       Past Medical History  Past Medical History:   Diagnosis Date    Abdominal pain 09/26/2022    Arthritis     Cardiac disease     Constipation 04/25/2024    COPD (chronic obstructive pulmonary disease) (HCC)     COVID-19 2021    was in hospital for sx and was dx with covid    Diabetes mellitus (HCC)     GERD (gastroesophageal reflux disease)     Hiatal hernia     Hypertension     PUD (peptic ulcer disease)     Residual ASD (atrial septal defect) following repair        Past Surgical History  Past Surgical History:   Procedure Laterality Date    ABDOMINAL ADHESION SURGERY N/A 09/26/2022    Procedure: LYSIS ADHESIONS;  Surgeon: Petty Johnson MD;  Location: [facility];  Service: Thoracic    ASD REPAIR      BACK SURGERY      x3    CARDIAC SURGERY      Atrial septic defect    ESOPHAGOGASTRODUODENOSCOPY N/A 09/26/2022    Procedure: ESOPHAGOGASTRODUODENOSCOPY (EGD);  Surgeon: Petty Johnson MD;  Location: [facility];  Service: Thoracic    JOINT REPLACEMENT      bilateral knee surgery    JOINT REPLACEMENT      KNEE SURGERY      PARAESOPHAGEAL HERNIA REPAIR N/A 09/26/2022    Procedure: REPAIR HERNIA PARAESOPHAGEAL LAPAROSCOPIC W ROBOTICS, gastropexy, mesh placement;  Surgeon: Petty Johnson MD;  Location: [facility];  Service: Thoracic    HI EXCISION GANGLION WRIST DORSAL/VOLAR PRIMARY Left 7/21/2023    Procedure: EXCISION GANGLION CYST;  Surgeon: Jaja Pantoja MD;  Location: [facility];  Service: Orthopedics    SHOULDER SURGERY            05/12/25 1402   PT Last Visit   PT Visit Date 05/12/25   Note Type   Note type Evaluation   Pain Assessment   Pain Assessment Tool 0-10   Pain Score No Pain   Restrictions/Precautions   Weight Bearing Precautions Per Order No   Other Precautions Chair Alarm;Cognitive;Bed Alarm;Fall Risk;Multiple lines;Telemetry;O2  (chronically 3L O2 via NC)   Home Living   Type of Home Other  "(Comment)  (Condo.)   Home Layout Multi-level  (0 ASH)   Bathroom Shower/Tub Tub/shower unit   Bathroom Toilet Standard   Home Equipment Stair glide  (rollator on each floor, concentrator on each floor)   Prior Function   Lives With   (ex )   Receives Help From Family  (son checks in weekly, HHA 2x/day)   IADLs Family/Friend/Other provides transportation;Independent with medication management   Falls in the last 6 months 1 to 4  (x2)   Comments at baseline ambulates mod I with rollator   General   Family/Caregiver Present No   Cognition   Orientation Level Oriented X4   Following Commands Follows one step commands with increased time or repetition   Comments pt ID by wristband, name and    Subjective   Subjective \"when am I going to eat?\"   RLE Assessment   RLE Assessment X  (grossly assessed to at least 3+/5 with mobility)   LLE Assessment   LLE Assessment X  (grossly assessed to at least 3+/5 with mobility)   Vision-Basic Assessment   Current Vision Wears glasses all the time   Bed Mobility   Supine to Sit 5  Supervision   Additional items HOB elevated;Increased time required   Additional Comments sits EOB, reports SOB/GARZA, denies light headedness/dizziness. SP O2 92-94%   Transfers   Sit to Stand 5  Supervision   Additional items Assist x 1;Increased time required;Verbal cues   Stand to Sit 5  Supervision   Additional items Assist x 1;Increased time required;Verbal cues   Additional Comments pulls from RW, vc for hand placement. attempted 30s STS from recliner, pt completes 4 repetitions in 20 seconds, declines further participation   Ambulation/Elevation   Gait pattern Narrow YUMIKO;Forward Flexion;Decreased foot clearance   Gait Assistance 5  Supervision   Additional items Assist x 1;Verbal cues   Assistive Device Rolling walker   Distance 3'x1   Ambulation/Elevation Additional Comments demonstrates steady gait without path deviation/LOB, limited distances due to GARZA/SOB, SP O2 greater than 90%   Balance "   Static Sitting Fair +   Dynamic Sitting Fair   Static Standing Fair -  (RW)   Dynamic Standing Poor +   Ambulatory Poor +  (RW)   Endurance Deficit   Endurance Deficit Yes   Endurance Deficit Description GARZA/SOB, limited activity tolerance   Activity Tolerance   Activity Tolerance Patient limited by fatigue  (SOB/GARZA)   Medical Staff Made Aware spoke with CM   Nurse Made Aware FABIANO arceo pre/post   Assessment   Prognosis Fair   Problem List Decreased strength;Decreased endurance;Impaired balance;Decreased mobility;Obesity   Assessment Pt is a 84 y.o. female seen for PT evaluation s/p admit to [facility] on 5/11/2025. Pt was admitted with a primary dx of: acute on chronic hypoxic respiratory failure, dypnea, elevated d-dimer.  PT now consulted for assessment of mobility and d/c needs. Pt with OOB to chair orders.  Pts current comorbidities and personal factors effecting treatment include: BMI, COPD, DM, GERD, ASD, chronic pain, depression, DDD. Pts current clinical presentation is Unstable/Unpredictable (high complexity) due to Ongoing medical management for primary dx, Increased reliance on more restrictive AD compared to baseline, Decreased activity tolerance compared to baseline, Fall risk, Increased assistance needed from caregiver at current time. Prior to admission, pt was independent with use of RW. Upon evaluation, pt currently is requiring Supervision for bed mobility; Supervision for transfers and Supervision for ambulation 3 ft w/ RW. Pt presents at PT eval functioning below baseline and currently w/ overall mobility deficits 2* to: BLE weakness, impaired balance, gait deviations, decreased activity tolerance compared to baseline, decreased functional mobility tolerance compared to baseline, fall risk, SOB upon exertion. Pt currently at a fall risk 2* to impairments listed above.  Pt will continue to benefit from skilled acute PT interventions to address stated impairments; to maximize functional  mobility; for ongoing pt/ family training; and DME needs. At conclusion of PT session chair alarm engaged, all needs in reach, RN notified of session findings/recommendations, and pt returned back in recliner chair with phone and call bell within reach. Pt denies any further questions at this time. Recommend Level III (Minimum Resource Intensity)  upon hospital D/C.   Goals   Patient Goals to eat lunch   STG Expiration Date 05/22/25   Short Term Goal #1 In 10 days pt will be able to: 1. Demonstrate ability to perform all aspects of bed mobility independently to improve functional safety.  2. Perform functional transfers with RW independently to facilitate safe return to previous living environment.  3.  Ambulate 150 ft with RW independently with stable vitals to improve safety with household distances and reduce fall risk.  4. Improve LE strength grades by 1 to increase ease of functional mobility with transfers and gait. 5. Pt will demonstrate improved balance by one grade in order to decrease risk of falls.   PT Treatment Day 0   Plan   Treatment/Interventions Functional transfer training;LE strengthening/ROM;Elevations;Therapeutic exercise;Patient/family training;Equipment eval/education;Endurance training;Bed mobility;Gait training;Spoke to nursing;Spoke to case management;OT   PT Frequency 3-5x/wk   Discharge Recommendation   Rehab Resource Intensity Level, PT III (Minimum Resource Intensity)  (anticipate level III with progressed mobility and medical optimization)   AM-PAC Basic Mobility Inpatient   Turning in Flat Bed Without Bedrails 3   Lying on Back to Sitting on Edge of Flat Bed Without Bedrails 3   Moving Bed to Chair 3   Standing Up From Chair Using Arms 3   Walk in Room 3   Climb 3-5 Stairs With Railing 1   Basic Mobility Inpatient Raw Score 16   Basic Mobility Standardized Score 38.32   [facility] Highest Level Of Mobility   -HLM Goal 5: Stand one or more mins   -HLM Achieved 4: Move to  chair/commode  (limited activity tolerance)   End of Consult   Patient Position at End of Consult Bedside chair;Bed/Chair alarm activated;All needs within reach   The patient's AM-PAC Basic Mobility Inpatient Short Form Raw Score is 16. A Raw score of less than or equal to 16 suggests the patient may benefit from discharge to post-acute rehabilitation services. Please also refer to the recommendation of the Physical Therapist for safe discharge planning.    Sterling Ladd, PT

## 2025-05-12 NOTE — PLAN OF CARE
Problem: PHYSICAL THERAPY ADULT  Goal: Performs mobility at highest level of function for planned discharge setting.  See evaluation for individualized goals.  Description: Treatment/Interventions: Functional transfer training, LE strengthening/ROM, Elevations, Therapeutic exercise, Patient/family training, Equipment eval/education, Endurance training, Bed mobility, Gait training, Spoke to nursing, Spoke to case management, OT          See flowsheet documentation for full assessment, interventions and recommendations.  Note: Prognosis: Fair  Problem List: Decreased strength, Decreased endurance, Impaired balance, Decreased mobility, Obesity  Assessment: Pt is a 84 y.o. female seen for PT evaluation s/p admit to [facility] on 5/11/2025. Pt was admitted with a primary dx of: acute on chronic hypoxic respiratory failure, dypnea, elevated d-dimer.  PT now consulted for assessment of mobility and d/c needs. Pt with OOB to chair orders.  Pts current comorbidities and personal factors effecting treatment include: BMI, COPD, DM, GERD, ASD, chronic pain, depression, DDD. Pts current clinical presentation is Unstable/Unpredictable (high complexity) due to Ongoing medical management for primary dx, Increased reliance on more restrictive AD compared to baseline, Decreased activity tolerance compared to baseline, Fall risk, Increased assistance needed from caregiver at current time. Prior to admission, pt was independent with use of RW. Upon evaluation, pt currently is requiring Supervision for bed mobility; Supervision for transfers and Supervision for ambulation 3 ft w/ RW. Pt presents at PT eval functioning below baseline and currently w/ overall mobility deficits 2* to: BLE weakness, impaired balance, gait deviations, decreased activity tolerance compared to baseline, decreased functional mobility tolerance compared to baseline, fall risk, SOB upon exertion. Pt currently at a fall risk 2* to impairments listed  above.  Pt will continue to benefit from skilled acute PT interventions to address stated impairments; to maximize functional mobility; for ongoing pt/ family training; and DME needs. At conclusion of PT session chair alarm engaged, all needs in reach, RN notified of session findings/recommendations, and pt returned back in recliner chair with phone and call bell within reach. Pt denies any further questions at this time. Recommend Level III (Minimum Resource Intensity)  upon hospital D/C.        Rehab Resource Intensity Level, PT: III (Minimum Resource Intensity) (anticipate level III with progressed mobility and medical optimization)    See flowsheet documentation for full assessment.

## 2025-05-12 NOTE — ASSESSMENT & PLAN NOTE
Lab Results   Component Value Date    EGFR 51 05/11/2025    EGFR 63 02/18/2025    EGFR 65 09/30/2024    CREATININE 1.00 05/11/2025    CREATININE 0.9 02/18/2025    CREATININE 0.88 09/30/2024

## 2025-05-12 NOTE — H&P
"H&P - Hospitalist   Name: Gloria Patel 84 y.o. female I MRN: [id number]  Unit/Bed#: ED-17 I Date of Admission: 5/11/2025   Date of Service: 5/11/2025 I Hospital Day: 0     Assessment & Plan  Acute on chronic hypoxic respiratory failure (HCC)  -SOB on exertion worse over the past 1 week  -Initially hypoxic to the 70s and tachypneic upon arrival to the ED requiring 5L NC, now back to baseline 3 LNC.  -uses 3LNC qhs and prn during day  -conversational dyspnea on exam  -In the ED patient received duoneb X1, NS nebs,, azithromycin, Rocephin, Solu-Medrol 60, 500 cc NS X2.  -CTA PE (d-dimer 2.48): No PE, minimal bronchiolitis RUL  -Last echo March 2024 demonstrated EF 70%, hyperdynamic with normal wall motion.  Mild RV and bilateral atrial dilation, mild AS.  -favor viral bronchiolitis     Plan:  -Symbicort 2 puff twice daily while inpatient  -DuoNebs 3 times daily scheduled  -Prednisone 40 for additional 2 days   -finish 3 day course azithromycin    Generalized weakness  -feeling weak over the past one week  -still feels steady when ambulating with walker, no falls   -able to use walker to get around grocery store at baseline without breaks   - Denies orthostatic symptoms  -dec PO >50% last week     Plan:  -PT/OT  -orthostatics   COPD, severe (HCC)  -45 pack years quit at 59yo  -PTA Breztri Aerosphere 2 puffs twice daily  - S/p duoneb X1, NS nebs,, azithromycin, Rocephin, Solu-Medrol 60 in the ED    Plan:  -Symbicort 2 puff twice daily while inpatient  -DuoNebs 3 times daily scheduled  -Prednisone 40 for additional 2 days   -finish 3 day course azithromycin  Diabetes mellitus (HCC)  Lab Results   Component Value Date    HGBA1C 6.9 (H) 02/18/2025       No results for input(s): \"POCGLU\" in the last 72 hours.    Blood Sugar Average: Last 72 hrs:    -PTA metformin 1000am 500pm and januvia 100 qd    Plan:  -carb controlled diet   -SSI algorithm 3  Benign essential hypertension  -Continue PTA Cardizem  - Continue PTA " losartan  Chronic back pain  -Unchanged, lower midline lumbar region, 8/10 on admission.    Plan:  -Continue PTA morphine 15 mg twice daily  -Continue PTA gabapentin 300 mg 4 times daily  Stage 3b chronic kidney disease (HCC)  Lab Results   Component Value Date    EGFR 51 05/11/2025    EGFR 63 02/18/2025    EGFR 65 09/30/2024    CREATININE 1.00 05/11/2025    CREATININE 0.9 02/18/2025    CREATININE 0.88 09/30/2024     Iron deficiency anemia  -CBC on admission showed Hgb 10.6 (baseline appears 9.1-10.8)    Plan:  - Continue PTA ferrous sulfate 325 every other day      VTE Pharmacologic Prophylaxis:   Moderate Risk (Score 3-4) - Pharmacological DVT Prophylaxis Ordered: enoxaparin (Lovenox).  Code Status: Prior Level 1  Discussion with family: Attempted to update  (son) via phone. Left voicemail.     Anticipated Length of Stay: Patient will be admitted on an observation basis with an anticipated length of stay of less than 2 midnights secondary to acute hypoxic respiratory failure.    History of Present Illness   Chief Complaint: Shortness of breath on exertion and generalized weakness for the past 1 week    Gloria Patel is a 84 y.o. female with a PMH of COPD, HTN, HLD, T2DM, CKD 3B who presents with Shortness of breath on exertion and generalized weakness for the past 1 week. Patient presents from home, uses walker at baseline. She also endorses decreased p.o. intake over this time, eating less than 50% of her usual meals. She continues to ambulate with her walker has not felt unsteady on her feet.  She denies cough, palpitations, chest pain, fever/chills, lightheadedness, dizziness or increase in her lower extremity swelling which she states is at baseline.    In the ED patient was initially hypoxic to 70s requiring 5LNC, then improved back to baseline 3LNC. Received duoneb X1, NS nebs,, azithromycin, Rocephin, Solu-Medrol 60, 500 cc NS X2. CMP, TSH, troponin, LA, flu COVID RSV, CBC unremarkable.   D-dimer is elevated 2.48, CTA PE study demonstrated no PE with minimal bronchiolitis in the right upper lobe.  Admitting for observation to treat COPD exacerbation and likely viral bronchiolitis.  Of note, pt does have bedbug exposure at home that has not been treated.     Review of Systems   Constitutional:  Positive for appetite change. Negative for chills and fever.   HENT:  Negative for sinus pressure, sore throat and trouble swallowing.    Eyes:  Negative for photophobia and visual disturbance.   Respiratory:  Positive for shortness of breath. Negative for cough.    Cardiovascular:  Negative for chest pain and palpitations.   Gastrointestinal:  Negative for abdominal pain, blood in stool, constipation, diarrhea, nausea and vomiting.   Endocrine: Negative for polyuria.   Genitourinary:  Negative for dysuria, hematuria and urgency.   Musculoskeletal:  Positive for back pain (chronic lower back midline, unchanged.).   Skin:  Negative for rash.   Neurological:  Positive for weakness. Negative for dizziness, syncope and light-headedness.       Historical Information   Past Medical History:   Diagnosis Date    Abdominal pain 09/26/2022    Arthritis     Cardiac disease     Constipation 04/25/2024    COPD (chronic obstructive pulmonary disease) (HCC)     COVID-19 2021    was in hospital for sx and was dx with covid    Diabetes mellitus (HCC)     GERD (gastroesophageal reflux disease)     Hiatal hernia     Hypertension     PUD (peptic ulcer disease)     Residual ASD (atrial septal defect) following repair      Past Surgical History:   Procedure Laterality Date    ABDOMINAL ADHESION SURGERY N/A 09/26/2022    Procedure: LYSIS ADHESIONS;  Surgeon: Petty Johnson MD;  Location: [facility];  Service: Thoracic    ASD REPAIR      BACK SURGERY      x3    CARDIAC SURGERY      Atrial septic defect    ESOPHAGOGASTRODUODENOSCOPY N/A 09/26/2022    Procedure: ESOPHAGOGASTRODUODENOSCOPY (EGD);  Surgeon: Petty Johnson MD;   Location: [facility];  Service: Thoracic    JOINT REPLACEMENT      bilateral knee surgery    JOINT REPLACEMENT      KNEE SURGERY      PARAESOPHAGEAL HERNIA REPAIR N/A 2022    Procedure: REPAIR HERNIA PARAESOPHAGEAL LAPAROSCOPIC W ROBOTICS, gastropexy, mesh placement;  Surgeon: Petty Johnson MD;  Location: [facility];  Service: Thoracic    SD EXCISION GANGLION WRIST DORSAL/VOLAR PRIMARY Left 2023    Procedure: EXCISION GANGLION CYST;  Surgeon: Jaja Pantoja MD;  Location: [facility];  Service: Orthopedics    SHOULDER SURGERY       Social History     Tobacco Use    Smoking status: Former     Current packs/day: 0.00     Average packs/day: 1 pack/day for 64.1 years (64.1 ttl pk-yrs)     Types: Cigarettes     Start date:      Quit date: 2020     Years since quittin.2    Smokeless tobacco: Never    Tobacco comments:     stopped smoking 2 days ago   Vaping Use    Vaping status: Never Used   Substance and Sexual Activity    Alcohol use: Not Currently    Drug use: Never    Sexual activity: Not Currently     E-Cigarette/Vaping    E-Cigarette Use Never User      E-Cigarette/Vaping Substances    Nicotine No     THC No     CBD No     Flavoring No     Other No     Unknown No      Family History   Problem Relation Age of Onset    Cancer Mother     Asthma Father      Social History:  Marital Status:    Occupation: retired  Patient Pre-hospital Living Situation: Home  Patient Pre-hospital Level of Mobility: walks with walker  Patient Pre-hospital Diet Restrictions: N/a    Meds/Allergies   I have reviewed home medications with patient personally.  Prior to Admission medications    Medication Sig Start Date End Date Taking? Authorizing Provider   albuterol (PROVENTIL HFA,VENTOLIN HFA) 90 mcg/act inhaler Inhale 2 puffs every 6 (six) hours as needed for wheezing 21   Arlen Grant, DO   Alcohol Swabs (Alcohol Prep) PADS USE TO TEST BLOOD SUGAR TWICE A DAY AND AS NEEDED    Historical  Provider, MD   aspirin 81 mg chewable tablet Chew 1 tablet (81 mg total) daily 4/23/22   Julius Lees DO   atorvastatin (LIPITOR) 40 mg tablet Take 1 tablet (40 mg total) by mouth every evening 4/22/22 4/25/24  Julius Lees DO   B Complex-C-Folic Acid (B COMPLEX + C TR PO) Take 1 tablet by mouth in the morning. Indications: low vitamin b    Historical Provider, MD   bisacodyl (DULCOLAX) 10 mg suppository Insert 1 suppository (10 mg total) into the rectum daily as needed for constipation 4/27/24   Mariella Dumont MD   brimonidine tartrate 0.2 % ophthalmic solution Administer 1 drop into the left eye 2 (two) times a day 4/22/22   Julius Lees DO   brimonidine tartrate 0.2 % ophthalmic solution Administer 1 drop into the left eye 2 (two) times a day  Patient not taking: Reported on 7/21/2023    Historical Provider, MD   Budeson-Glycopyrrol-Formoterol (Breztri Aerosphere) 160-9-4.8 MCG/ACT AERO Inhale 2 puffs  in the morning and 2 puffs before bedtime. Rinse mouth after use.. 5/16/22   Arlen Grant DO   diltiazem (CARDIZEM CD) 240 mg 24 hr capsule Take 1 capsule by mouth daily 12/16/23   Historical Provider, MD   ferrous sulfate 324 (65 Fe) mg Take 324 mg by mouth every other day. Indications: Anemia From Inadequate Iron in the Body    Historical Provider, MD   gabapentin (NEURONTIN) 300 mg capsule Take 300 mg by mouth 4 (four) times a day 3/23/20   Historical Provider, MD   losartan (COZAAR) 25 mg tablet Take 50 mg by mouth daily 12/4/23   Historical Provider, MD   metFORMIN (GLUCOPHAGE) 500 mg tablet Take 1,000 mg by mouth daily with breakfast    Historical Provider, MD   metFORMIN (GLUCOPHAGE) 500 mg tablet Take 500 mg by mouth every evening    Historical Provider, MD   morphine (MSIR) 15 mg tablet Take 15 mg by mouth 2 (two) times a day Every 12 hrs.    Historical Provider, MD   naloxegol oxalate (Movantik) 25 MG tablet TAKE ONE TABLET BY MOUTH DAILY FOR OPIOID INDUCED CONSTIPATION. ONGOING  "THERAPY.    Historical Provider, MD   nitrofurantoin (MACROBID) 100 mg capsule TAKE 1 CAPSULE BY MOUTH TWICE A DAY FOR 7 DAYS    Historical Provider, MD   ondansetron (ZOFRAN) 4 mg tablet Take 1 tablet (4 mg total) by mouth every 8 (eight) hours as needed for nausea or vomiting for up to 10 doses  Patient not taking: Reported on 2/28/2024 3/10/23   Tor Gómez, DO   oxygen gas Inhale 3 L/min continuous    Historical Provider, MD   pantoprazole (PROTONIX) 40 mg tablet Take 40 mg by mouth every 12 (twelve) hours    Historical Provider, MD   polyethylene glycol (GLYCOLAX) 17 GM/SCOOP powder Take 17 g by mouth daily  Patient not taking: Reported on 5/7/2024 4/10/24 5/10/24  Annamarie Henry,    rOPINIRole (REQUIP) 0.5 mg tablet Take 0.5 mg by mouth daily at bedtime Take 3 tablets PO before bed    Historical Provider, MD   Saccharomyces boulardii (Digestive Probiotic) 250 MG CAPS Take 1 capsule by mouth 2 (two) times a day  Patient not taking: Reported on 2/28/2024    Historical Provider, MD   senna (SENOKOT) 8.6 mg Take 2 tablets (17.2 mg total) by mouth daily at bedtime as needed for constipation for up to 14 days 4/27/24 5/11/24  Mariella Dumont MD   sitaGLIPtin (JANUVIA) 100 mg tablet Take 1 tablet (100 mg total) by mouth daily 4/13/20   ROMANA Duran     Allergies   Allergen Reactions    Metamucil [Fiber] Lightheadedness    Prednisone      The patient reports \"they make me goofy \"  No true allergic reaction    Prednisone Lightheadedness    Psyllium Hives    Tetanus Toxoids        Objective :  Temp:  [97.9 °F (36.6 °C)] 97.9 °F (36.6 °C)  HR:  [62-68] 62  BP: (109-143)/(53-73) 109/53  Resp:  [36] 36  SpO2:  [90 %-93 %] 92 %  O2 Device: Nasal cannula  Nasal Cannula O2 Flow Rate (L/min):  [3 L/min-5 L/min] 3 L/min    Physical Exam  Vitals and nursing note reviewed.   Constitutional:       General: She is not in acute distress.     Appearance: She is well-developed. She is not " toxic-appearing.   HENT:      Head: Normocephalic and atraumatic.   Eyes:      Conjunctiva/sclera: Conjunctivae normal.   Cardiovascular:      Rate and Rhythm: Normal rate and regular rhythm.      Heart sounds: No murmur heard.  Pulmonary:      Effort: Respiratory distress (Conversational dyspnea with mild accessory muscle use.) present.      Breath sounds: Normal breath sounds. No wheezing or rales.   Abdominal:      General: Bowel sounds are normal.      Palpations: Abdomen is soft.      Tenderness: There is no abdominal tenderness. There is no guarding.   Musculoskeletal:         General: No tenderness.      Cervical back: Neck supple.      Right lower leg: Edema present.      Left lower leg: Edema present.   Skin:     General: Skin is warm and dry.      Capillary Refill: Capillary refill takes less than 2 seconds.   Neurological:      Mental Status: She is alert.   Psychiatric:         Mood and Affect: Mood normal.          Lines/Drains:            Lab Results: I have reviewed the following results:  Results from last 7 days   Lab Units 05/11/25  1643   WBC Thousand/uL 8.46   HEMOGLOBIN g/dL 10.6*   HEMATOCRIT % 34.7*   PLATELETS Thousands/uL 239   SEGS PCT % 63   LYMPHO PCT % 16   MONO PCT % 15*   EOS PCT % 5     Results from last 7 days   Lab Units 05/11/25  1608   SODIUM mmol/L 138   POTASSIUM mmol/L 3.8   CHLORIDE mmol/L 101   CO2 mmol/L 29   BUN mg/dL 18   CREATININE mg/dL 1.00   ANION GAP mmol/L 8   CALCIUM mg/dL 9.1   ALBUMIN g/dL 4.0   TOTAL BILIRUBIN mg/dL 0.45   ALK PHOS U/L 83   ALT U/L 10   AST U/L 14   GLUCOSE RANDOM mg/dL 141*     Results from last 7 days   Lab Units 05/11/25  1643   INR  1.03         Lab Results   Component Value Date    HGBA1C 6.9 (H) 02/18/2025    HGBA1C 6.9 (H) 09/30/2024    HGBA1C 8.4 (H) 04/24/2024     Results from last 7 days   Lab Units 05/11/25  1614   LACTIC ACID mmol/L 1.8       Imaging Results Review: I reviewed radiology reports from this admission including: CXR, CTA  PE.  Other Study Results Review: EKG was reviewed.     Administrative Statements       ** Please Note: This note has been constructed using a voice recognition system. **

## 2025-05-12 NOTE — ASSESSMENT & PLAN NOTE
-feeling weak over the past one week  -still feels steady when ambulating with walker, no falls   -able to use walker to get around grocery store at baseline without breaks   - Denies orthostatic symptoms  -dec PO >50% last week     Plan:  -PT/OT  -orthostatics

## 2025-05-12 NOTE — ASSESSMENT & PLAN NOTE
-45 pack years quit at 59yo  -PTA Breztri Aerosphere 2 puffs twice daily  - S/p duoneb X1, NS nebs,, azithromycin, Rocephin, Solu-Medrol 60 in the ED    Plan:  -Symbicort 2 puff twice daily while inpatient  -DuoNebs 3 times daily scheduled  -Prednisone 40 for additional 2 days   -finish 3 day course azithromycin

## 2025-05-12 NOTE — ASSESSMENT & PLAN NOTE
"Lab Results   Component Value Date    HGBA1C 6.9 (H) 02/18/2025       No results for input(s): \"POCGLU\" in the last 72 hours.    Blood Sugar Average: Last 72 hrs:    -PTA metformin 1000am 500pm and januvia 100 qd    Plan:  -carb controlled diet   -SSI algorithm 3  "

## 2025-05-12 NOTE — QUICK NOTE
Acute on chronic hypoxic respiratory failure (HCC)  -SOB on exertion worse over the past 1 week  -Initially hypoxic to the 70s and tachypneic upon arrival to the ED requiring 5L NC, now back to baseline 3 LNC.  -uses 3LNC qhs and prn during day  -conversational dyspnea on exam  -In the ED patient received duoneb X1, NS nebs,, azithromycin, Rocephin, Solu-Medrol 60, 500 cc NS X2.  -CTA PE (d-dimer 2.48): No PE, minimal bronchiolitis RUL  -Last echo March 2024 demonstrated EF 70%, hyperdynamic with normal wall motion.  Mild RV and bilateral atrial dilation, mild AS.  -favor viral bronchiolitis      Plan:  Currently on baseline 3 L nasal cannula oxygen. titrate oxygen as needed to maintain O2 sat greater than 90%  -Symbicort 2 puff twice daily while inpatient  -DuoNebs 3 times daily scheduled  Prednisone held due to blood glucose spike. Acute dyspnea resolving and oxygen saturation stable.   -finish 3 day course azithromycin    Diabetes mellitus (HCC)    Blood Sugar Average: Last 72 hrs:     -PTA metformin 1000am 500pm and januvia 100 qd     Plan:  -carb controlled diet   Blood glucose today was in the 400s several times despite various doses of subcutaneous and IV insulin. Likely secondary to steroid use.   Prednisone held.  Will consider restarting if patient develops wheezing or worsening shortness of breath on exam  Patient started on non-DKA insulin drip.   MOISES  Creatinine today was 1.32 , her baseline is usually 0.9-1.   Patient started on Plasmalyte 75ml/hr.  Monitor I/Os  Holding at home losartan  Consider decreasing dose of gabapentin if patient's creatinine continues to be elevated despite fluid treatment for MOISES  Murmur:  Patient has history of aortic valve surgery approximately 60 years ago  Has heart murmur on exam  Last echocardiogram 3/2024 EF of 70%  Patient has chronic lower extremity edema but has no other signs of volume overload  Will consider updated echocardiogram if patient appears volume  overloaded, especially after receiving fluids for MOISES    Attempted to call patient's son Thang SAMPSON  Voicemail no identifying information.  Will attempt to call again tomorrow.     Subjective  Patient seen and examined this morning at bedside. Denies pain or back pain, currently getting Morphine and Gabapentin scheduled for chronic back pain. Patient denies SOB, O2 saturation >95% on 3L NC. Endorses good appetite, LBM 2 days ago. Voiding with no issues. Denies CP, N/V, palpitations.     Physical Exam  Vitals and nursing note reviewed.   Constitutional:       General: She is not in acute distress.     Appearance: She is well-developed.      Comments: Fatigued   HENT:      Head: Normocephalic and atraumatic.   Eyes:      Conjunctiva/sclera: Conjunctivae normal.   Cardiovascular:      Rate and Rhythm: Normal rate and regular rhythm.      Heart sounds: Murmur heard.   Pulmonary:      Effort: Pulmonary effort is normal. No respiratory distress.      Breath sounds: Normal breath sounds.      Comments: Deminished  Abdominal:      Palpations: Abdomen is soft.      Tenderness: There is no abdominal tenderness.   Musculoskeletal:         General: No swelling.      Cervical back: Neck supple.      Right lower leg: Edema present.      Left lower leg: Edema present.   Skin:     General: Skin is warm and dry.      Capillary Refill: Capillary refill takes less than 2 seconds.   Neurological:      Mental Status: She is alert.   Psychiatric:         Mood and Affect: Mood normal.

## 2025-05-12 NOTE — UTILIZATION REVIEW
Initial Clinical Review  OBSERVATION ADMISSION 5/11/2025 2153  CONVERTED TO  INPATIENT ADMISSION 5/12/2025 1142 REQ > 2 MN  FOR MANAGEMENT OF SOB REQ PREDNISONE RESULTING IN INCREASED SERUM GLUC NEEDING IV INSULIN; CR INCREASE NEEDING IVF    Admission: Date/Time/Statement:   Admission Orders (From admission, onward)       Ordered        05/12/25 1142  INPATIENT ADMISSION  Once            05/11/25 2153  Place in Observation  Once                          Orders Placed This Encounter   Procedures     Standing Status:   Standing     Number of Occurrences:   1     Level of Care:   Med Surg [16]     Estimated length of stay:   More than 2 Midnights     Certification:   I certify that inpatient services are medically necessary for this patient for a duration of greater than two midnights. See H&P and MD Progress Notes for additional information about the patient's course of treatment.     ED Arrival Information       Expected   -    Arrival   5/11/2025 15:34    Acuity   Emergent              Means of arrival   Wheelchair    Escorted by   Family Member    Service   Hospitalist    Admission type   Emergency              Arrival complaint   SOB             Chief Complaint   Patient presents with    Shortness of Breath     PT hx of COPD, states has been having worsening SOB and weakness over the past week. Denies current CP. Pt on 3L NC at home.        Initial Presentation: 84 y.o. female to ED as walk in w progressively worsening shortness of breath on exertion and generalized weakness. Presents from home, uses walker at baseline. Endorses decreased p.o. intake over this time, eating less than 50% of her usual meals. Continues to ambulate with her walker has not felt unsteady on her feet. Of note, pt does have bedbug exposure at home that has not been treated.    PMH COPD, HTN, HLD, T2DM, CKD 3B,  D-dimer is elevated 2.48,   Exam  conversational dyspnea; hypoxic to 70s requiring 5LNC,  improved back to baseline 3LNC.   CTA  PE study demonstrated no PE with minimal bronchiolitis in the right upper lobe.   Given duoneb X1, NS nebs,, azithromycin, Rocephin, Solu-Medrol 60, 500 cc NS X2. PLAN Observation admission due to acute on chronic hypoxic respiratory failure, gen weakness, severe COPD  Anticipated Length of Stay/Certification Statement: Patient will be admitted on an observation basis with an anticipated length of stay of less than 2 midnights secondary to acute hypoxic respiratory failure.   Date: 5/12/2025   Day 2: changed to Inpatient  Cont Symbicort 2 puff twice daily while inpatient  Duo Nebs 3 times daily scheduled  Prednisone 40 for additional 2 days   finish 3 day course azithromycin, obtain orthostatic vitals, consult PT/OT, SSI, cont PTA pain meds    PM Update    moderate right basilar rales and minimal left basilar rales. Very transient minimal wheeze in the right base, at 1 point they look like the patient might of been using some neck muscles for respiration as well.   Noted increased blood glucose to 400s from Prednisone requiring multiple insulin bolus w eventual IV insulin GTT, cont IV insulin GTT, glucose checks until obtain insulin sliding scale hold further Prednisone unless increasing SOB.   Cont serial exams, monitoring POX.  CR increased to 1.32 baseline 0.9-1, monitor I/O & cont IVF;   Follow temps AM labs    ED Treatment-Medication Administration from 05/11/2025 1534 to 05/11/2025 2252         Date/Time Order Dose Route Action     05/11/2025 1619 albuterol inhalation solution 10 mg 10 mg Nebulization Given     05/11/2025 1619 ipratropium (ATROVENT) 0.02 % inhalation solution 1 mg 1 mg Nebulization Given     05/11/2025 1619 sodium chloride 0.9 % inhalation solution 12 mL 12 mL Nebulization Given     05/11/2025 1645 methylPREDNISolone sodium succinate (Solu-MEDROL) injection 60 mg 60 mg Intravenous Given     05/11/2025 1647 azithromycin (ZITHROMAX) tablet 500 mg 500 mg Oral Given     05/11/2025 1646 ceftriaxone  (ROCEPHIN) 1 g/50 mL in dextrose IVPB 1,000 mg Intravenous New Bag     05/11/2025 1647 sodium chloride 0.9 % bolus 500 mL 500 mL Intravenous New Bag     05/11/2025 1835 iohexol (OMNIPAQUE) 350 MG/ML injection (MULTI-DOSE) 60 mL 60 mL Intravenous Given     05/11/2025 2050 sodium chloride 0.9 % bolus 500 mL 500 mL Intravenous New Bag            Scheduled Medications:  aspirin, 81 mg, Oral, Daily  atorvastatin, 40 mg, Oral, QPM  azithromycin, 500 mg, Oral, Q24H  brimonidine tartrate, 1 drop, Left Eye, BID  budesonide-formoterol, 2 puff, Inhalation, BID  diltiazem, 240 mg, Oral, Daily  enoxaparin, 30 mg, Subcutaneous, Daily  [START ON 5/13/2025] ferrous sulfate, 325 mg, Oral, Once per day on Tuesday Thursday Saturday  gabapentin, 300 mg, Oral, 4x Daily  ipratropium, 0.5 mg, Nebulization, TID  levalbuterol, 1.25 mg, Nebulization, TID  [Held by provider] losartan, 50 mg, Oral, Daily  morphine, 15 mg, Oral, BID  pantoprazole, 40 mg, Oral, Q12H  predniSONE, 40 mg, Oral, Daily  rOPINIRole, 1.5 mg, Oral, HS  senna, 1 tablet, Oral, HS  vit B complex-vit C-folic acid, 1 capsule, Oral, Daily With Dinner      Continuous IV Infusions:  insulin regular (HumuLIN R,NovoLIN R) 1 Units/mL in sodium chloride 0.9 % 100 mL infusion, 0.3-21 Units/hr, Intravenous, Titrated    multi-electrolyte, 75 mL/hr, Intravenous, Continuous      PRN Meds:  bisacodyl, 10 mg, Rectal, Daily PRN      ED Triage Vitals   Temperature Pulse Respirations Blood Pressure SpO2 Pain Score   05/11/25 1551 05/11/25 1550 05/11/25 1550 05/11/25 1550 05/11/25 1550 05/11/25 2322   97.9 °F (36.6 °C) 65 (!) 36 143/73 90 % 8     Weight (last 2 days)       Date/Time Weight    05/11/25 2325 79.8 (175.93)            Vital Signs (last 3 days)       Date/Time Temp Pulse Resp BP MAP (mmHg) SpO2 Calculated FIO2 (%) - Nasal Cannula Nasal Cannula O2 Flow Rate (L/min) O2 Device Patient Position - Orthostatic VS Pain    05/12/25 3307 -- -- -- -- -- 98 % -- -- -- -- No Pain    05/12/25  12:02:17 -- 73 22 133/70 91 95 % -- -- -- -- --    05/12/25 07:40:16 97.2 °F (36.2 °C) 66 17 118/60 79 95 % -- -- -- -- --    05/12/25 07:38:17 97.2 °F (36.2 °C) 64 16 118/60 79 97 % -- -- -- -- --    05/12/25 0702 -- -- -- -- -- 97 % 32 3 L/min Nasal cannula -- --    05/12/25 0332 -- -- -- 117/53 -- -- -- -- -- -- --    05/12/25 02:57:02 98 °F (36.7 °C) 65 -- 117/53 74 95 % -- -- -- -- --    05/11/25 2327 -- -- -- -- -- 95 % 32 3 L/min Nasal cannula -- --    05/11/25 2325 -- -- -- -- -- 94 % 32 3 L/min Nasal cannula -- 8    05/11/25 2322 -- -- -- -- -- -- -- -- -- -- 8 05/11/25 22:56:55 98.2 °F (36.8 °C) 64 -- 112/53 73 92 % -- -- -- -- --    05/11/25 2241 -- 69 34 108/56 -- 93 % 32 3 L/min Nasal cannula Sitting --    05/11/25 1945 -- 62 -- 109/53 76 92 % -- -- -- -- --    05/11/25 1930 -- 68 -- 109/53 76 93 % 32 3 L/min Nasal cannula Lying --    05/11/25 1620 -- -- -- -- -- 93 % -- -- -- -- --    05/11/25 1551 97.9 °F (36.6 °C) -- -- -- -- -- -- -- -- -- --    05/11/25 1550 -- 65 36 143/73 -- 90 % 40 5 L/min Nasal cannula Lying --              Pertinent Labs/Diagnostic Test Results:   Radiology:  CTA chest pe study   Final Interpretation by John English MD (05/11 2017)      No pulmonary embolus. Minimal bronchiolitis in the right upper lobe.                  Workstation performed: [id number]         XR chest portable   ED Interpretation by Nina Waldrop MD (05/11 4504)   Per my interpretation: Right lower lobe opacification suspicious for pneumonia      Final Interpretation by Jefferson Guthrie DO (05/12 1104)      No focal infiltrate. Right upper lobe bronchiolitis noted on subsequent same day CTA chest study, not well visualized on this exam. Please see full CT report for further discussion.         Resident: Storm Nguyen I, the attending radiologist, have reviewed the images and agree with the final report above.      Workstation performed: [id number]           Cardiology:  ECG 12 lead    Final Result by Deisy Jenkins MD (05/11 1822)   Sinus rhythm with Premature atrial complexes   Left axis deviation   Right bundle branch block   Abnormal ECG   When compared with ECG of 24-Apr-2024 21:44,   Premature atrial complexes are now Present   Confirmed by Deisy Jenkins ([id number]) on 5/11/2025 6:22:38 PM        GI:  No orders to display           Results from last 7 days   Lab Units 05/12/25  0624 05/11/25  1643   WBC Thousand/uL 3.74* 8.46   HEMOGLOBIN g/dL 10.4* 10.6*   HEMATOCRIT % 34.3* 34.7*   PLATELETS Thousands/uL 189 239   TOTAL NEUT ABS Thousands/µL  --  5.35         Results from last 7 days   Lab Units 05/12/25  0624 05/11/25  1608   SODIUM mmol/L 132* 138   POTASSIUM mmol/L 4.8 3.8   CHLORIDE mmol/L 99 101   CO2 mmol/L 27 29   ANION GAP mmol/L 6 8   BUN mg/dL 24 18   CREATININE mg/dL 1.32* 1.00   EGFR ml/min/1.73sq m 37 51   CALCIUM mg/dL 8.7 9.1     Results from last 7 days   Lab Units 05/11/25  1608   AST U/L 14   ALT U/L 10   ALK PHOS U/L 83   TOTAL PROTEIN g/dL 6.5   ALBUMIN g/dL 4.0   TOTAL BILIRUBIN mg/dL 0.45     Results from last 7 days   Lab Units 05/12/25  1315 05/12/25  1201 05/12/25  1041 05/12/25  0952 05/12/25  0822 05/12/25  0739   POC GLUCOSE mg/dl 385* 512* 416* 421* 458* 463*     Results from last 7 days   Lab Units 05/12/25  0624 05/11/25  1608   GLUCOSE RANDOM mg/dL 510* 141*             BETA-HYDROXYBUTYRATE   Date Value Ref Range Status   09/26/2022 0.8 (H) <0.6 mmol/L Final                      Results from last 7 days   Lab Units 05/11/25 2015 05/11/25  1811 05/11/25  1608   HS TNI 0HR ng/L  --   --  7   HS TNI 2HR ng/L  --  6  --    HSTNI D2 ng/L  --  -1  --    HS TNI 4HR ng/L 7  --   --    HSTNI D4 ng/L 0  --   --      Results from last 7 days   Lab Units 05/11/25  1643   D-DIMER QUANTITATIVE ug/ml FEU 2.48*     Results from last 7 days   Lab Units 05/11/25  1643   PROTIME seconds 14.2   INR  1.03   PTT seconds 29     Results from last 7 days   Lab Units 05/11/25  0638    TSH 3RD GENERATON uIU/mL 1.203         Results from last 7 days   Lab Units 05/11/25  1614   LACTIC ACID mmol/L 1.8                                                 Results from last 7 days   Lab Units 05/12/25  1204   CLARITY UA  Clear   COLOR UA  Yellow   SPEC GRAV UA  1.043*   PH UA  6.0   GLUCOSE UA mg/dl >=1000 (1%)*   KETONES UA mg/dl Negative   BLOOD UA  Negative   PROTEIN UA mg/dl Negative   NITRITE UA  Negative   BILIRUBIN UA  Negative   UROBILINOGEN UA (BE) mg/dl <2.0   LEUKOCYTES UA  Trace*   WBC UA /hpf 2-4*   RBC UA /hpf 1-2   BACTERIA UA /hpf Occasional   EPITHELIAL CELLS WET PREP /hpf Occasional                                 Results from last 7 days   Lab Units 05/11/25  1614   BLOOD CULTURE  Received in Microbiology Lab. Culture in Progress.  Received in Microbiology Lab. Culture in Progress.                   Past Medical History:   Diagnosis Date    Abdominal pain 09/26/2022    Arthritis     Cardiac disease     Constipation 04/25/2024    COPD (chronic obstructive pulmonary disease) (HCC)     COVID-19 2021    was in hospital for sx and was dx with covid    Diabetes mellitus (HCC)     GERD (gastroesophageal reflux disease)     Hiatal hernia     Hypertension     PUD (peptic ulcer disease)     Residual ASD (atrial septal defect) following repair      Present on Admission:   Benign essential hypertension   Chronic back pain   COPD, severe (HCC)   Diabetes mellitus (HCC)   Iron deficiency anemia   Stage 3b chronic kidney disease (HCC)   Generalized weakness      Admitting Diagnosis: Dyspnea [R06.00]  Elevated d-dimer [R79.89]  Acute hypoxic respiratory failure (HCC) [J96.01]  Age/Sex: 84 y.o. female    Network Utilization Review Department  ATTENTION: Please call with any questions or concerns to [phone] and carefully listen to the prompts so that you are directed to the right person. All voicemails are confidential.   For Discharge needs, contact Care Management DC Support Team at [phone]  opt. 2  Send all requests for admission clinical reviews, approved or denied determinations and any other requests to dedicated fax number below belonging to the campus where the patient is receiving treatment. List of dedicated fax numbers for the Facilities:  FACILITY NAME UR FAX NUMBER   ADMISSION DENIALS (Administrative/Medical Necessity) [phone]   DISCHARGE SUPPORT TEAM (NETWORK) [phone]   PARENT CHILD HEALTH (Maternity/NICU/Pediatrics) [phone]   [facility] [phone]   [facility] [phone]   [facility] [phone]   [facility] [phone]   Critical access hospital [phone]   [facility] [phone]   [facility] [phone]   [facility] [phone]   [facility] [phone]   [facility] [phone]   [facility] [phone]   [facility] [phone]

## 2025-05-12 NOTE — ASSESSMENT & PLAN NOTE
Patient has history of aortic valve surgery approximately 60 years ago  Has heart murmur on exam  Last echocardiogram 3/2024 EF of 70%  Patient has chronic lower extremity edema but has no other signs of volume overload

## 2025-05-12 NOTE — PLAN OF CARE
Problem: RESPIRATORY - ADULT  Goal: Achieves optimal ventilation and oxygenation  Description: INTERVENTIONS:- Assess for changes in respiratory status- Assess for changes in mentation and behavior- Position to facilitate oxygenation and minimize respiratory effort- Oxygen administered by appropriate delivery if ordered- Initiate smoking cessation education as indicated- Encourage broncho-pulmonary hygiene including cough, deep breathe, Incentive Spirometry- Assess the need for suctioning and aspirate as needed- Assess and instruct to report SOB or any respiratory difficulty- Respiratory Therapy support as indicated  5/12/2025 0036 by Yolette Naranjo RN  Outcome: Progressing  5/12/2025 0035 by Yolette Naranjo RN  Outcome: Progressing

## 2025-05-13 ENCOUNTER — HOME HEALTH ADMISSION (OUTPATIENT)
Dept: HOME HEALTH SERVICES | Facility: HOME HEALTHCARE | Age: 85
End: 2025-05-13
Payer: COMMERCIAL

## 2025-05-13 PROBLEM — N17.9 ACUTE KIDNEY FAILURE (HCC): Status: RESOLVED | Noted: 2025-05-12 | Resolved: 2025-05-13

## 2025-05-13 LAB
ANION GAP SERPL CALCULATED.3IONS-SCNC: 6 MMOL/L (ref 4–13)
BUN SERPL-MCNC: 21 MG/DL (ref 5–25)
CALCIUM SERPL-MCNC: 8.9 MG/DL (ref 8.4–10.2)
CHLORIDE SERPL-SCNC: 100 MMOL/L (ref 96–108)
CO2 SERPL-SCNC: 27 MMOL/L (ref 21–32)
CREAT SERPL-MCNC: 0.94 MG/DL (ref 0.6–1.3)
ERYTHROCYTE [DISTWIDTH] IN BLOOD BY AUTOMATED COUNT: 16.2 % (ref 11.6–15.1)
GFR SERPL CREATININE-BSD FRML MDRD: 55 ML/MIN/1.73SQ M
GLUCOSE SERPL-MCNC: 100 MG/DL (ref 65–140)
GLUCOSE SERPL-MCNC: 142 MG/DL (ref 65–140)
GLUCOSE SERPL-MCNC: 144 MG/DL (ref 65–140)
GLUCOSE SERPL-MCNC: 145 MG/DL (ref 65–140)
GLUCOSE SERPL-MCNC: 157 MG/DL (ref 65–140)
GLUCOSE SERPL-MCNC: 158 MG/DL (ref 65–140)
GLUCOSE SERPL-MCNC: 190 MG/DL (ref 65–140)
GLUCOSE SERPL-MCNC: 206 MG/DL (ref 65–140)
GLUCOSE SERPL-MCNC: 63 MG/DL (ref 65–140)
GLUCOSE SERPL-MCNC: 80 MG/DL (ref 65–140)
GLUCOSE SERPL-MCNC: 87 MG/DL (ref 65–140)
GLUCOSE SERPL-MCNC: 97 MG/DL (ref 65–140)
HCT VFR BLD AUTO: 33.4 % (ref 34.8–46.1)
HGB BLD-MCNC: 10.2 G/DL (ref 11.5–15.4)
MCH RBC QN AUTO: 26.2 PG (ref 26.8–34.3)
MCHC RBC AUTO-ENTMCNC: 30.5 G/DL (ref 31.4–37.4)
MCV RBC AUTO: 86 FL (ref 82–98)
PLATELET # BLD AUTO: 227 THOUSANDS/UL (ref 149–390)
PMV BLD AUTO: 9.6 FL (ref 8.9–12.7)
POTASSIUM SERPL-SCNC: 4.9 MMOL/L (ref 3.5–5.3)
RBC # BLD AUTO: 3.9 MILLION/UL (ref 3.81–5.12)
SODIUM SERPL-SCNC: 133 MMOL/L (ref 135–147)
WBC # BLD AUTO: 13.68 THOUSAND/UL (ref 4.31–10.16)

## 2025-05-13 PROCEDURE — 94640 AIRWAY INHALATION TREATMENT: CPT

## 2025-05-13 PROCEDURE — 99232 SBSQ HOSP IP/OBS MODERATE 35: CPT | Performed by: HOSPITALIST

## 2025-05-13 PROCEDURE — 97167 OT EVAL HIGH COMPLEX 60 MIN: CPT

## 2025-05-13 PROCEDURE — 80048 BASIC METABOLIC PNL TOTAL CA: CPT

## 2025-05-13 PROCEDURE — 85027 COMPLETE CBC AUTOMATED: CPT

## 2025-05-13 PROCEDURE — 82948 REAGENT STRIP/BLOOD GLUCOSE: CPT

## 2025-05-13 PROCEDURE — 97535 SELF CARE MNGMENT TRAINING: CPT

## 2025-05-13 PROCEDURE — 94760 N-INVAS EAR/PLS OXIMETRY 1: CPT

## 2025-05-13 RX ORDER — LEVALBUTEROL INHALATION SOLUTION 1.25 MG/3ML
1.25 SOLUTION RESPIRATORY (INHALATION)
Status: DISCONTINUED | OUTPATIENT
Start: 2025-05-13 | End: 2025-05-14 | Stop reason: HOSPADM

## 2025-05-13 RX ORDER — ENOXAPARIN SODIUM 100 MG/ML
40 INJECTION SUBCUTANEOUS DAILY
Status: DISCONTINUED | OUTPATIENT
Start: 2025-05-13 | End: 2025-05-14 | Stop reason: HOSPADM

## 2025-05-13 RX ORDER — INSULIN LISPRO 100 [IU]/ML
1-5 INJECTION, SOLUTION INTRAVENOUS; SUBCUTANEOUS
Status: DISCONTINUED | OUTPATIENT
Start: 2025-05-13 | End: 2025-05-14 | Stop reason: HOSPADM

## 2025-05-13 RX ADMIN — BRIMONIDINE TARTRATE 1 DROP: 2 SOLUTION OPHTHALMIC at 08:13

## 2025-05-13 RX ADMIN — BRIMONIDINE TARTRATE 1 DROP: 2 SOLUTION OPHTHALMIC at 21:15

## 2025-05-13 RX ADMIN — SENNOSIDES 8.6 MG: 8.6 TABLET, FILM COATED ORAL at 21:13

## 2025-05-13 RX ADMIN — INSULIN LISPRO 1 UNITS: 100 INJECTION, SOLUTION INTRAVENOUS; SUBCUTANEOUS at 08:19

## 2025-05-13 RX ADMIN — Medication 1 CAPSULE: at 17:34

## 2025-05-13 RX ADMIN — ASPIRIN 81 MG: 81 TABLET, CHEWABLE ORAL at 08:11

## 2025-05-13 RX ADMIN — GABAPENTIN 300 MG: 300 CAPSULE ORAL at 11:27

## 2025-05-13 RX ADMIN — INSULIN LISPRO 1 UNITS: 100 INJECTION, SOLUTION INTRAVENOUS; SUBCUTANEOUS at 11:27

## 2025-05-13 RX ADMIN — IPRATROPIUM BROMIDE 0.5 MG: 0.5 SOLUTION RESPIRATORY (INHALATION) at 07:31

## 2025-05-13 RX ADMIN — MORPHINE SULFATE 15 MG: 15 TABLET ORAL at 17:34

## 2025-05-13 RX ADMIN — GABAPENTIN 300 MG: 300 CAPSULE ORAL at 21:13

## 2025-05-13 RX ADMIN — PANTOPRAZOLE SODIUM 40 MG: 40 TABLET, DELAYED RELEASE ORAL at 11:27

## 2025-05-13 RX ADMIN — DILTIAZEM HYDROCHLORIDE 240 MG: 240 CAPSULE, EXTENDED RELEASE ORAL at 08:11

## 2025-05-13 RX ADMIN — LEVALBUTEROL HYDROCHLORIDE 1.25 MG: 1.25 SOLUTION RESPIRATORY (INHALATION) at 07:31

## 2025-05-13 RX ADMIN — PANTOPRAZOLE SODIUM 40 MG: 40 TABLET, DELAYED RELEASE ORAL at 21:51

## 2025-05-13 RX ADMIN — ENOXAPARIN SODIUM 40 MG: 40 INJECTION SUBCUTANEOUS at 08:11

## 2025-05-13 RX ADMIN — FERROUS SULFATE TAB 325 MG (65 MG ELEMENTAL FE) 325 MG: 325 (65 FE) TAB at 08:11

## 2025-05-13 RX ADMIN — MORPHINE SULFATE 15 MG: 15 TABLET ORAL at 08:11

## 2025-05-13 RX ADMIN — INSULIN LISPRO 1 UNITS: 100 INJECTION, SOLUTION INTRAVENOUS; SUBCUTANEOUS at 17:36

## 2025-05-13 RX ADMIN — GABAPENTIN 300 MG: 300 CAPSULE ORAL at 17:34

## 2025-05-13 RX ADMIN — ROPINIROLE HYDROCHLORIDE 1.5 MG: 1 TABLET, FILM COATED ORAL at 21:13

## 2025-05-13 RX ADMIN — BUDESONIDE AND FORMOTEROL FUMARATE DIHYDRATE 2 PUFF: 160; 4.5 AEROSOL RESPIRATORY (INHALATION) at 17:37

## 2025-05-13 RX ADMIN — IPRATROPIUM BROMIDE 0.5 MG: 0.5 SOLUTION RESPIRATORY (INHALATION) at 21:37

## 2025-05-13 RX ADMIN — LEVALBUTEROL HYDROCHLORIDE 1.25 MG: 1.25 SOLUTION RESPIRATORY (INHALATION) at 21:37

## 2025-05-13 RX ADMIN — ATORVASTATIN CALCIUM 40 MG: 40 TABLET, FILM COATED ORAL at 17:34

## 2025-05-13 RX ADMIN — BUDESONIDE AND FORMOTEROL FUMARATE DIHYDRATE 2 PUFF: 160; 4.5 AEROSOL RESPIRATORY (INHALATION) at 08:16

## 2025-05-13 NOTE — OCCUPATIONAL THERAPY NOTE
Occupational Therapy Evaluation     Patient Name: Gloria Patel  Today's Date: 5/13/2025  Problem List  Principal Problem:    Acute on chronic hypoxic respiratory failure (HCC)  Active Problems:    COPD, severe (HCC)    Diabetes mellitus (HCC)    Iron deficiency anemia    Benign essential hypertension    Generalized weakness    Chronic back pain    Stage 3b chronic kidney disease (HCC)    Acute kidney failure (HCC)    Murmur, cardiac    Past Medical History  Past Medical History:   Diagnosis Date    Abdominal pain 09/26/2022    Arthritis     Cardiac disease     Constipation 04/25/2024    COPD (chronic obstructive pulmonary disease) (HCC)     COVID-19 2021    was in hospital for sx and was dx with covid    Diabetes mellitus (HCC)     GERD (gastroesophageal reflux disease)     Hiatal hernia     Hypertension     PUD (peptic ulcer disease)     Residual ASD (atrial septal defect) following repair      Past Surgical History  Past Surgical History:   Procedure Laterality Date    ABDOMINAL ADHESION SURGERY N/A 09/26/2022    Procedure: LYSIS ADHESIONS;  Surgeon: Petty Johnson MD;  [facility]: [facility];  Service: Thoracic    ASD REPAIR      BACK SURGERY      x3    CARDIAC SURGERY      Atrial septic defect    ESOPHAGOGASTRODUODENOSCOPY N/A 09/26/2022    Procedure: ESOPHAGOGASTRODUODENOSCOPY (EGD);  Surgeon: Petty Johnson MD;  [facility]: [facility];  Service: Thoracic    JOINT REPLACEMENT      bilateral knee surgery    JOINT REPLACEMENT      KNEE SURGERY      PARAESOPHAGEAL HERNIA REPAIR N/A 09/26/2022    Procedure: REPAIR HERNIA PARAESOPHAGEAL LAPAROSCOPIC W ROBOTICS, gastropexy, mesh placement;  Surgeon: Petty Johnson MD;  [facility]: [facility];  Service: Thoracic    VT EXCISION GANGLION WRIST DORSAL/VOLAR PRIMARY Left 7/21/2023    Procedure: EXCISION GANGLION CYST;  Surgeon: Jaja Pantoja MD;  Location: [facility];  Service: Orthopedics    SHOULDER SURGERY               05/13/25 0956   OT Last  Visit   OT Visit Date 05/13/25   Note Type   Note type Evaluation  (+ treatment [id number])   Pain Assessment   Pain Assessment Tool 0-10   Pain Score No Pain   Restrictions/Precautions   Weight Bearing Precautions Per Order No   Other Precautions Contact/isolation;Fall Risk;Pain;Multiple lines;O2;Chair Alarm;Bed Alarm;Cognitive;Impulsive  (3L o2 via NC, worn at baseline. Contact precautions maintained d/t +bed bugs on admission)   Home Living   Type of Home   (condo)   Home Layout Access;Performs ADLs on one level;Able to live on main level with bedroom/bathroom;Two level;Laundry in basement  (0 ASH, stairglide to 2nd floor bedroom. does not access laundry.)   Bathroom Shower/Tub Tub/shower unit  (spongebathes at baseline)   Bathroom Toilet Standard   Bathroom Accessibility Accessible   Home Equipment Stair glide;Walker  (rollator on each floor, O2 concentrator on each floor)   Prior Function   Level of [place] Independent with functional mobility;Needs assistance with IADLS  (initially reports (I) spongebathing, later reports HHA A with aspects of spongebathing. (I) all other ADLs)   Lives With   (ex )   Receives Help From   (son checks in weekly, HHA 2x/week for 2hrs each.)   IADLs Family/Friend/Other provides transportation;Family/Friend/Other provides meals;Independent with medication management  (son and ex  assist with laundry and meals.)   Falls in the last 6 months 1 to 4  (2 per pt.)   Vocational Retired   Comments pt reports primarily limited to household distances c rollator at baseline.   Lifestyle   Autonomy PTA pt is (I) c BADLs, A with spongebathing. A with IADLs. mod (I) c rollator. Lives c ex . (-) driving + falls.   Reciprocal Relationships ex , son, HHA   Service to Others retired RN for [employer]   Additional Pertinent History Pt admitted d/t acute > chronic hypoxia resp failure, severe COPD. Complicated by generalized weakness, cardiac murmer, HTN, DM,  chronic back pain., CKD, acute kidney failure, obesity, hypomagesemia, PVD, DDD, GARZA.   Family/Caregiver Present No   Subjective   Subjective Agreeable to OT session with encouragement. intermittently appears irritable, resistent to therapist education at times. however verbalizes at end of session feeling better after getting washed up ( in treatment session)   ADL   Where Assessed   (commode)   Eating Assistance 6  Modified independent   Grooming Assistance 5  Supervision/Setup   UB Bathing Assistance 4  Minimal Assistance   LB Bathing Assistance 4  Minimal Assistance   UB Dressing Assistance 5  Supervision/Setup   LB Dressing Assistance 5  Supervision/Setup   Toileting Assistance  5  Supervision/Setup   Toileting Deficit   (hugo care and clothing mgmt c close supervision.)   Functional Assistance 4  Minimal Assistance   Bed Mobility   Supine to Sit 5  Supervision   Sit to Supine   (seated OOB in recliner at end of session)   Transfers   Sit to Stand 5  Supervision   Additional items Assist x 1;Increased time required;Verbal cues   Stand to Sit 4  Minimal assistance   Additional items Assist x 1;Increased time required;Verbal cues   Stand pivot 4  Minimal assistance   Additional items Assist x 1;Increased time required;Verbal cues   Toilet transfer 4  Minimal assistance   Additional items Verbal cues;Commode;Armrests;Assist x 1   Additional Comments impulsive transfer d/t urgency to urinate, no AD. see treatment session below for progression.   Functional Mobility   Functional Mobility 4  Minimal assistance  (CGA)   Additional Comments household distances within room. decreased awareness of O2 line requiring cueing for correction, WBs through forearms onto walker. progresses in treatment session   Additional items Rolling walker   Balance   Static Sitting Fair +   Dynamic Sitting Fair   Static Standing Fair -   Dynamic Standing Poor +   Activity Tolerance   Activity Tolerance Patient limited by fatigue  (SOB /  GARZA)   Medical Staff Made Aware Communication chun calzada   Nurse Made Aware RN Alix, JO Hopper   RUE Assessment   RUE Assessment WFL  (MMT grossly 3+/5 based on functional assessment)   LUE Assessment   LUE Assessment WFL  (MMT grossly 3+/5 based on functional assessment)   Hand Function   Gross Motor Coordination Functional   Fine Motor Coordination Functional   Sensation   Light Touch No apparent deficits   Cognition   Overall Cognitive Status WFL  (higher level deficits anticipated)   Arousal/Participation Alert;Cooperative   Attention Within functional limits   Orientation Level Oriented to place;Oriented to person;Oriented to situation  (grossly month and year `)   Memory Decreased short term memory;Decreased recall of recent events;Decreased recall of precautions   Following Commands Follows one step commands with increased time or repetition   Comments resistent to therapist recommendations and suggestions throughout session. Intermittently irritable but cooperates throughout session.   Assessment   Limitation Decreased ADL status;Decreased UE strength;Decreased Safe judgement during ADL;Decreased cognition;Decreased endurance;Decreased self-care trans;Decreased high-level ADLs   Prognosis Good   Assessment Patient is a 84 y.o. female seen for OT evaluation and treatment  at [facility] following admission on 5/11/2025  s/p Acute on chronic hypoxic respiratory failure (HCC). Please see above for comprehensive list of comorbidities and significant PMHx impacting functional performance.  Upon initial evaluation, pt appears to be performing below baseline functional status.   Occupational performance is affected by the following deficits: endurance ,  decreased muscular strength , decreased balance , decreased standing tolerance for self care tasks , decreased activity tolerance , impaired judgement and problem solving , and impaired safety awareness . Personal/Environmental factors impacting D/C  include: Assistance needed for ADLs. Supporting factors include: support system available Patient would benefit from OT services within the acute care setting to maximize level of functional independence in the following areas self-care transfers, functional mobility, and ADLs.  From OT standpoint, recommendation at time of D/C would be Level 3: minimum resource intensity .   Goals   Patient Goals to return home   Plan   Treatment Interventions ADL retraining;Functional transfer training;Endurance training;Patient/family training;Equipment evaluation/education;Cognitive reorientation;Neuromuscular reeducation;Compensatory technique education;Activityengagement;Energy conservation   Goal Expiration Date 05/23/25   OT Treatment Day 0   OT Frequency 2-3x/wk   Discharge Recommendation   Rehab Resource Intensity Level, OT III (Minimum Resource Intensity)   AM-PAC Daily Activity Inpatient   Lower Body Dressing 3   Bathing 3   Toileting 3   Upper Body Dressing 3   Grooming 3   Eating 4   Daily Activity Raw Score 19   Daily Activity Standardized Score (Calc for Raw Score >=11) 40.22   AM-PAC Applied Cognition Inpatient   Following a Speech/Presentation 3   Understanding Ordinary Conversation 4   Taking Medications 3   Remembering Where Things Are Placed or Put Away 4   Remembering List of 4-5 Errands 3   Taking Care of Complicated Tasks 3   Applied Cognition Raw Score 20   Applied Cognition Standardized Score 41.76   Additional Treatment Session   Start Time 1010   End Time 1038   Treatment Assessment Pt participated in additional tx session following IE with intervention focus on challenging activity tolerance and functional independence. Pt completing bathing, dressing and grooming tasks in bathroom sitting vs standing at sink.  Completed UB bathing c Min A for assistance only for back, Supervision UB and LB dressing (donned/doff socks, underwear, and gown). Pt quick to request help but able to complete without assistance  following encouragement. Min A for LB bathing d/t A for feet only. Functional standing tolerance 2+2+1 min with supervision, steadying on countertop. Significant GARZA noted however SpO2 maintained 89-93% on 3L , HR 80-90s. Cueing for initiation of rest breaks. Sit<>stand x 5 completed with supervision, however poor application of hand placements / body mechanics despite continued education. Progresses to supervision for functional mobility c RW and improved posture household distance back to recliner. Continue to recommend level III minimal resource intensity at time of DC   Additional Treatment Day 1   End of Consult   Education Provided Yes   Patient Position at End of Consult Bedside chair;Bed/Chair alarm activated;All needs within reach   Nurse Communication Nurse aware of consult   Goals established on initial evaluation in order to achieve pt's goal of maximizing functional independence during ADL tasks.      Pt will complete UB ADLs Mod independent   for increased ADL independence within 10 days.     Pt will complete LB ADLs Mod independent   for increased ADL independence within 10 days.     Pt will complete toileting Mod independent   with use of DME for increased ADL independence within 10 days.     Pt will demonstrate proper body mechanics to complete self-care transfers and functional mobility with Mod independent  and use of LRAD for increased safety and functional independence within 10 days.     Pt will demonstrate standing tolerance of 5 min  for increased activity tolerance during ADL/IADL tasks within 10 days. PROGRESSING     Pt will demonstrate proper body mechanics and fall prevention strategies during 100% of tx sessions for increased safety awareness during ADL/IADLs    Pt will demonstrate activity tolerance of 45 min in therapeutic tasks for increased participation in meaningful activities upon D/C.    Pt will participate in ongoing cognitive assessments to assist with safe D/C planning and  supervision/assistance recommendations.      Pt will verbalize and demonstrate understanding of energy conservation/deep breathing techniques for increased activity tolerance and endurance during meaningful activities.     Pt will demonstrate OOB sitting tolerance of 2-4 hr/day for increased activity tolerance and engagement in leisure activities within 10 days.     Deisy Osborne, OT

## 2025-05-13 NOTE — PROGRESS NOTES
Progress Note - Hospitalist   Name: Gloria Patel 84 y.o. female I MRN: [id number]  Unit/Bed#: S -01 I Date of Admission: 5/11/2025   Date of Service: 5/13/2025 I Hospital Day: 1    Assessment & Plan  Acute on chronic hypoxic respiratory failure (HCC)  -SOB on exertion worse over the past 1 week  -Initially hypoxic to the 70s and tachypneic upon arrival to the ED requiring 5L NC, now back to baseline 3 LNC.  -uses 3LNC qhs and prn during day  -conversational dyspnea on exam  -In the ED patient received duoneb X1, NS nebs,, azithromycin, Rocephin, Solu-Medrol 60, 500 cc NS X2.  -CTA PE (d-dimer 2.48): No PE, minimal bronchiolitis RUL  -Last echo March 2024 demonstrated EF 70%, hyperdynamic with normal wall motion.  Mild RV and bilateral atrial dilation, mild AS.  -favor viral bronchiolitis     Plan:  Currently on baseline 3 L nasal cannula oxygen. titrate oxygen as needed to maintain O2 sat greater than 90%  Symbicort 2 puff twice daily while inpatient  Xopenex BID  Atrovent BID  Prednisone held due to blood glucose spike. Acute dyspnea resolving and oxygen saturation stable.   BG stable at 140-190 5/13. Off non-DKA insulin gtt and on SSI  finish 3 day course azithromycin  Generalized weakness  -feeling weak over the past one week  -still feels steady when ambulating with walker, no falls   -able to use walker to get around grocery store at baseline without breaks   - Denies orthostatic symptoms  -dec PO >50% last week     Plan:  -PT/OT pending  -orthostatics   COPD, severe (HCC)  -45 pack years quit at 61yo  -PTA Breztri Aerosphere 2 puffs twice daily  - S/p duoneb X1, NS nebs,, azithromycin, Rocephin, Solu-Medrol 60 in the ED    Plan:  See plan under acute on chronic hypoxic respiratory failure   Diabetes mellitus (HCC)  Lab Results   Component Value Date    HGBA1C 6.9 (H) 02/18/2025       Recent Labs     05/13/25  0602 05/13/25  0718 05/13/25  0819 05/13/25  1103   POCGLU 142* 157* 158* 190*       Blood  Sugar Average: Last 72 hrs:  (P) 244.55  -PTA metformin 1000am 500pm and januvia 100 qd    Plan:  CHO diet  OFF non-DKA insulin gtt. On SSI  Benign essential hypertension  -Continue PTA Cardizem  - Continue PTA losartan  Chronic back pain  -Unchanged, lower midline lumbar region, 8/10 on admission.    Plan:  -Continue PTA morphine 15 mg twice daily  -Continue PTA gabapentin 300 mg 4 times daily  Stage 3b chronic kidney disease (HCC)  Lab Results   Component Value Date    EGFR 55 05/13/2025    EGFR 37 05/12/2025    EGFR 51 05/11/2025    CREATININE 0.94 05/13/2025    CREATININE 1.32 (H) 05/12/2025    CREATININE 1.00 05/11/2025     Iron deficiency anemia  -CBC on admission showed Hgb 10.6 (baseline appears 9.1-10.8)    Plan:  - Continue PTA ferrous sulfate 325 every other day  Acute kidney failure (HCC) (Resolved: 5/13/2025)  Resolved to 0.94 s/p holding Losartan and IVF  On gabapentin 300 mg qid. Can decrease dose if Cr uptrends  Continue to monitor kidney function   Murmur, cardiac  Patient has history of aortic valve surgery approximately 60 years ago  Has heart murmur on exam  Last echocardiogram 3/2024 EF of 70%  Patient has chronic lower extremity edema but has no other signs of volume overload    VTE Pharmacologic Prophylaxis: VTE Score: 4 Moderate Risk (Score 3-4) - Pharmacological DVT Prophylaxis Ordered: enoxaparin (Lovenox).    Mobility:   Basic Mobility Inpatient Raw Score: 16  JH-HLM Goal: 5: Stand one or more mins  JH-HLM Achieved: 4: Move to chair/commode  JH-HLM Goal NOT achieved. Continue with multidisciplinary rounding and encourage appropriate mobility to improve upon JH-HLM goals.    Patient Centered Rounds: I performed bedside rounds with nursing staff today. Team performed bedside rounds w/ nursing this am  Discussions with Specialists or Other Care Team Provider: N/A    Education and Discussions with Family / Patient: Attempted to update  (son) via phone. Unable to contact. No  identifying info in     Current Length of Stay: 1 day(s)  Current Patient Status: Inpatient   Certification Statement: The patient will continue to require additional inpatient hospital stay due to PT/OT evaluation  Discharge Plan: Anticipate discharge tomorrow to discharge location to be determined pending rehab evaluations.    Code Status: Level 1 - Full Code    Subjective   No overnight events. Awaiting PT/OT evaluations. Currently on baseline O2.      Objective :  Temp:  [97.8 °F (36.6 °C)-98.5 °F (36.9 °C)] 98.1 °F (36.7 °C)  HR:  [68-77] 73  BP: (106-157)/(44-71) 122/58  Resp:  [16-18] 16  SpO2:  [95 %-99 %] 96 %  O2 Device: Nasal cannula  Nasal Cannula O2 Flow Rate (L/min):  [3 L/min] 3 L/min    Body mass index is 33.88 kg/m².     Input and Output Summary (last 24 hours):     Intake/Output Summary (Last 24 hours) at 5/13/2025 1257  Last data filed at 5/13/2025 1001  Gross per 24 hour   Intake 1620 ml   Output 825 ml   Net 795 ml       Physical Exam  Vitals and nursing note reviewed.   Constitutional:       General: She is not in acute distress.     Appearance: She is well-developed.   HENT:      Head: Normocephalic and atraumatic.   Eyes:      Conjunctiva/sclera: Conjunctivae normal.   Cardiovascular:      Rate and Rhythm: Normal rate and regular rhythm.      Heart sounds: Murmur heard.   Pulmonary:      Effort: Pulmonary effort is normal.      Comments: No significant wheezing noted on exam  Abdominal:      Palpations: Abdomen is soft.      Tenderness: There is no abdominal tenderness.   Musculoskeletal:         General: No swelling.      Cervical back: Neck supple.   Skin:     General: Skin is warm and dry.      Capillary Refill: Capillary refill takes less than 2 seconds.   Neurological:      Mental Status: She is alert.   Psychiatric:         Mood and Affect: Mood normal.           Lines/Drains:              Lab Results: I have reviewed the following results:   Results from last 7 days   Lab Units  05/13/25  0422 05/12/25  0624 05/11/25  1643   WBC Thousand/uL 13.68*   < > 8.46   HEMOGLOBIN g/dL 10.2*   < > 10.6*   HEMATOCRIT % 33.4*   < > 34.7*   PLATELETS Thousands/uL 227   < > 239   SEGS PCT %  --   --  63   LYMPHO PCT %  --   --  16   MONO PCT %  --   --  15*   EOS PCT %  --   --  5    < > = values in this interval not displayed.     Results from last 7 days   Lab Units 05/13/25  0422 05/12/25  0624 05/11/25  1608   SODIUM mmol/L 133*   < > 138   POTASSIUM mmol/L 4.9   < > 3.8   CHLORIDE mmol/L 100   < > 101   CO2 mmol/L 27   < > 29   BUN mg/dL 21   < > 18   CREATININE mg/dL 0.94   < > 1.00   ANION GAP mmol/L 6   < > 8   CALCIUM mg/dL 8.9   < > 9.1   ALBUMIN g/dL  --   --  4.0   TOTAL BILIRUBIN mg/dL  --   --  0.45   ALK PHOS U/L  --   --  83   ALT U/L  --   --  10   AST U/L  --   --  14   GLUCOSE RANDOM mg/dL 97   < > 141*    < > = values in this interval not displayed.     Results from last 7 days   Lab Units 05/11/25  1643   INR  1.03     Results from last 7 days   Lab Units 05/13/25  1103 05/13/25  0819 05/13/25  0718 05/13/25  0602 05/13/25  0429 05/13/25  0358 05/13/25  0223 05/13/25  0157 05/13/25  0006 05/12/25  2159 05/12/25 2011 05/12/25  1842   POC GLUCOSE mg/dl 190* 158* 157* 142* 100 63* 87 80 145* 161* 192* 219*         Results from last 7 days   Lab Units 05/11/25  1614   LACTIC ACID mmol/L 1.8       Recent Cultures (last 7 days):   Results from last 7 days   Lab Units 05/11/25  1614   BLOOD CULTURE  No Growth at 24 hrs.  No Growth at 24 hrs.             Last 24 Hours Medication List:     Current Facility-Administered Medications:     aspirin chewable tablet 81 mg, Daily    atorvastatin (LIPITOR) tablet 40 mg, QPM    azithromycin (ZITHROMAX) tablet 500 mg, Q24H    bisacodyl (DULCOLAX) rectal suppository 10 mg, Daily PRN    brimonidine tartrate 0.2 % ophthalmic solution 1 drop, BID    budesonide-formoterol (SYMBICORT) 160-4.5 mcg/act inhaler 2 puff, BID    diltiazem (CARDIZEM CD) 24 hr  capsule 240 mg, Daily    enoxaparin (LOVENOX) subcutaneous injection 40 mg, Daily    ferrous sulfate tablet 325 mg, Once per day on Tuesday Thursday Saturday    gabapentin (NEURONTIN) capsule 300 mg, 4x Daily    insulin lispro (HumALOG/ADMELOG) 100 units/mL subcutaneous injection 1-5 Units, TID AC **AND** Fingerstick Glucose (POCT), TID AC    ipratropium (ATROVENT) 0.02 % inhalation solution 0.5 mg, BID    levalbuterol (XOPENEX) inhalation solution 1.25 mg, BID    [Held by provider] losartan (COZAAR) tablet 50 mg, Daily    morphine (MSIR) IR tablet 15 mg, BID    pantoprazole (PROTONIX) EC tablet 40 mg, Q12H    rOPINIRole (REQUIP) tablet 1.5 mg, HS    senna (SENOKOT) tablet 8.6 mg, HS    vit B complex-vit C-folic acid (Renal Caps) capsule 1 capsule, Daily With Dinner    Administrative Statements   Today, Patient Was Seen By: Chary Garcias DO      **Please Note: This note may have been constructed using a voice recognition system.**

## 2025-05-13 NOTE — ASSESSMENT & PLAN NOTE
Lab Results   Component Value Date    EGFR 55 05/13/2025    EGFR 37 05/12/2025    EGFR 51 05/11/2025    CREATININE 0.94 05/13/2025    CREATININE 1.32 (H) 05/12/2025    CREATININE 1.00 05/11/2025

## 2025-05-13 NOTE — PLAN OF CARE
Problem: OCCUPATIONAL THERAPY ADULT  Goal: Performs self-care activities at highest level of function for planned discharge setting.  See evaluation for individualized goals.  Description: Treatment Interventions: ADL retraining, Functional transfer training, Endurance training, Patient/family training, Equipment evaluation/education, Cognitive reorientation, Neuromuscular reeducation, Compensatory technique education, Activityengagement, Energy conservation          See flowsheet documentation for full assessment, interventions and recommendations.   Outcome: Progressing  Note: Limitation: Decreased ADL status, Decreased UE strength, Decreased Safe judgement during ADL, Decreased cognition, Decreased endurance, Decreased self-care trans, Decreased high-level ADLs  Prognosis: Good  Assessment: Patient is a 84 y.o. female seen for OT evaluation and treatment  at [facility] following admission on 5/11/2025  s/p Acute on chronic hypoxic respiratory failure (HCC). Please see above for comprehensive list of comorbidities and significant PMHx impacting functional performance.  Upon initial evaluation, pt appears to be performing below baseline functional status.   Occupational performance is affected by the following deficits: endurance ,  decreased muscular strength , decreased balance , decreased standing tolerance for self care tasks , decreased activity tolerance , impaired judgement and problem solving , and impaired safety awareness . Personal/Environmental factors impacting D/C include: Assistance needed for ADLs. Supporting factors include: support system available Patient would benefit from OT services within the acute care setting to maximize level of functional independence in the following areas self-care transfers, functional mobility, and ADLs.  From OT standpoint, recommendation at time of D/C would be Level 3: minimum resource intensity .     Rehab Resource Intensity Level, OT: III  (Minimum Resource Intensity)  Deisy Osborne, OT

## 2025-05-13 NOTE — ASSESSMENT & PLAN NOTE
-45 pack years quit at 61yo  -PTA Breztri Aerosphere 2 puffs twice daily  - S/p duoneb X1, NS nebs,, azithromycin, Rocephin, Solu-Medrol 60 in the ED    Plan:  See plan under acute on chronic hypoxic respiratory failure

## 2025-05-13 NOTE — ASSESSMENT & PLAN NOTE
-SOB on exertion worse over the past 1 week  -Initially hypoxic to the 70s and tachypneic upon arrival to the ED requiring 5L NC, now back to baseline 3 LNC.  -uses 3LNC qhs and prn during day  -conversational dyspnea on exam  -In the ED patient received duoneb X1, NS nebs,, azithromycin, Rocephin, Solu-Medrol 60, 500 cc NS X2.  -CTA PE (d-dimer 2.48): No PE, minimal bronchiolitis RUL  -Last echo March 2024 demonstrated EF 70%, hyperdynamic with normal wall motion.  Mild RV and bilateral atrial dilation, mild AS.  -favor viral bronchiolitis     Plan:  Currently on baseline 3 L nasal cannula oxygen. titrate oxygen as needed to maintain O2 sat greater than 90%  Symbicort 2 puff twice daily while inpatient  Xopenex BID  Atrovent BID  Prednisone held due to blood glucose spike. Acute dyspnea resolving and oxygen saturation stable.   BG stable at 140-190 5/13. Off non-DKA insulin gtt and on SSI  finish 3 day course azithromycin

## 2025-05-13 NOTE — UTILIZATION REVIEW
Continued Stay Review    SEE INITIAL REVIEW AT BOTTOM    Date: 5/13  Day 3: Has surpassed a 2nd midnight with active treatments and services.              Current Patient Class: Inpatient  Current Level of Care: med/surg    HPI:84 y.o. female initially admitted on 5/11 obs to  5/12   Current Diagnosis: Acute on chronic hypoxic respiratory failure 2/2 viral bronchiolitis    Assessment/Plan: No overnight events. Currently on baseline O2. Continue scheduled nebulizers along with pt's home Symbicort. Pt briefly on oral steroids however developed severe hyperglycemia and his symptoms rapidly improved. Today will monitor off steroids, her respiratory exam remained stable to improving. PT/OT recommending HHC. Continue supportive care.     Medications:   Scheduled Medications:  aspirin, 81 mg, Oral, Daily  atorvastatin, 40 mg, Oral, QPM  brimonidine tartrate, 1 drop, Left Eye, BID  budesonide-formoterol, 2 puff, Inhalation, BID  diltiazem, 240 mg, Oral, Daily  enoxaparin, 40 mg, Subcutaneous, Daily  ferrous sulfate, 325 mg, Oral, Once per day on Tuesday Thursday Saturday  gabapentin, 300 mg, Oral, 4x Daily  insulin lispro, 1-5 Units, Subcutaneous, TID AC  ipratropium, 0.5 mg, Nebulization, BID  levalbuterol, 1.25 mg, Nebulization, BID  [Held by provider] losartan, 50 mg, Oral, Daily  morphine, 15 mg, Oral, BID  pantoprazole, 40 mg, Oral, Q12H  rOPINIRole, 1.5 mg, Oral, HS  senna, 1 tablet, Oral, HS  vit B complex-vit C-folic acid, 1 capsule, Oral, Daily With Dinner    PRN Meds:  bisacodyl, 10 mg, Rectal, Daily PRN      Discharge Plan: TBD    Vital Signs (last 3 days)       Date/Time Temp Pulse Resp BP MAP (mmHg) SpO2 Calculated FIO2 (%) - Nasal Cannula Nasal Cannula O2 Flow Rate (L/min) O2 Device Patient Position - Orthostatic VS Pain    05/13/25 1554 -- -- -- 131/64 -- -- -- -- -- -- --    05/13/25 15:17:30 -- 73 16 131/64 86 95 % -- -- -- -- --    05/13/25 1512 98.1 °F (36.7 °C) -- -- -- -- -- -- -- -- -- --    05/13/25  1155 -- -- -- 122/58 -- -- -- -- -- -- --    05/13/25 11:04:08 98.1 °F (36.7 °C) 73 16 122/58 79 96 % -- -- -- -- --    05/13/25 0956 -- -- -- -- -- -- -- -- -- -- No Pain    05/13/25 0811 -- -- -- -- -- -- -- -- -- -- 8    05/13/25 0750 -- -- -- 157/71 -- -- -- -- -- -- --    05/13/25 07:15:58 98.1 °F (36.7 °C) 77 16 157/71 100 95 % -- -- -- -- --    05/13/25 04:09:16 -- 75 18 128/67 87 97 % -- -- -- -- --    05/13/25 02:24:40 98 °F (36.7 °C) 70 -- 120/55 77 96 % -- -- -- -- --    05/13/25 00:06:21 98.2 °F (36.8 °C) 72 18 123/60 81 97 % -- -- -- -- --    05/12/25 2000 -- -- -- 106/52 -- -- -- -- -- -- 7    05/12/25 1945 -- -- -- -- -- 99 % 32 3 L/min Nasal cannula -- --    05/12/25 18:45:07 97.8 °F (36.6 °C) 68 16 106/52 70 95 % -- -- -- -- --    05/12/25 1742 -- -- -- -- -- -- -- -- -- -- 9    05/12/25 1552 -- -- -- 109/50 -- -- -- -- -- -- --    05/12/25 1519 -- -- -- 109/50 -- -- -- -- -- -- --    05/12/25 15:17:14 98.5 °F (36.9 °C) 74 18 115/44 68 95 % -- -- -- Lying --    05/12/25 1402 -- -- -- -- -- -- -- -- -- -- No Pain    05/12/25 1307 -- -- -- -- -- 98 % -- -- -- -- No Pain    05/12/25 12:02:17 -- 73 22 133/70 91 95 % -- -- -- -- --    05/12/25 07:40:16 97.2 °F (36.2 °C) 66 17 118/60 79 95 % -- -- -- -- --       Weight (last 2 days)       Date/Time Weight    05/13/25 0429 78.7 (173.5)    05/11/25 2325 79.8 (175.93)            Pertinent Labs/Diagnostic Results:     Results from last 7 days   Lab Units 05/13/25 0422 05/12/25 0624 05/11/25  1643   WBC Thousand/uL 13.68* 3.74* 8.46   HEMOGLOBIN g/dL 10.2* 10.4* 10.6*   HEMATOCRIT % 33.4* 34.3* 34.7*   PLATELETS Thousands/uL 227 189 239   TOTAL NEUT ABS Thousands/µL  --   --  5.35       Results from last 7 days   Lab Units 05/13/25 0422 05/12/25 0624 05/11/25  1608   SODIUM mmol/L 133* 132* 138   POTASSIUM mmol/L 4.9 4.8 3.8   CHLORIDE mmol/L 100 99 101   CO2 mmol/L 27 27 29   ANION GAP mmol/L 6 6 8   BUN mg/dL 21 24 18   CREATININE mg/dL 0.94 1.32* 1.00    EGFR ml/min/1.73sq m 55 37 51   CALCIUM mg/dL 8.9 8.7 9.1     Results from last 7 days   Lab Units 05/13/25  1651 05/13/25  1103 05/13/25  0819 05/13/25  0718 05/13/25  0602 05/13/25  0429 05/13/25  0358 05/13/25  0223 05/13/25  0157 05/13/25  0006 05/12/25  2159 05/12/25 2011   POC GLUCOSE mg/dl 206* 190* 158* 157* 142* 100 63* 87 80 145* 161* 192*     Results from last 7 days   Lab Units 05/13/25  0422 05/12/25  0624 05/11/25  1608   GLUCOSE RANDOM mg/dL 97 510* 141*       Results from last 7 days   Lab Units 05/11/25  1614   BLOOD CULTURE  No Growth at 24 hrs.  No Growth at 24 hrs.       Network Utilization Review Department  ATTENTION: Please call with any questions or concerns to [phone] and carefully listen to the prompts so that you are directed to the right person. All voicemails are confidential.   For Discharge needs, contact Care Management DC Support Team at [phone] opt. 2  Send all requests for admission clinical reviews, approved or denied determinations and any other requests to dedicated fax number below belonging to the campus where the patient is receiving treatment. List of dedicated fax numbers for the Facilities:  FACILITY NAME UR FAX NUMBER   ADMISSION DENIALS (Administrative/Medical Necessity) [phone]   DISCHARGE SUPPORT TEAM (NETWORK) [phone]   PARENT CHILD HEALTH (Maternity/NICU/Pediatrics) [phone]   [facility] [phone]   [facility] [phone]   [facility] [phone]   [facility] [phone]   [facility] [phone]   [facility] [phone]   [facility] [phone]   [facility] [phone]   [facility] [phone]   [facility] [phone]   [facility]  [place] [phone]   [facility] [place] [phone]

## 2025-05-13 NOTE — ASSESSMENT & PLAN NOTE
-CBC on admission showed Hgb 10.6 (baseline appears 9.1-10.8)    Plan:  - Continue PTA ferrous sulfate 325 every other day   Patient was not available for the therapy session at this time.  Reason not seen: Other (comment) (Pt still receiving blood) (08/27/21 1341).    Re-Attempt Plan: Will re-attempt per established treatment plan (08/27/21 1341).

## 2025-05-13 NOTE — ASSESSMENT & PLAN NOTE
Resolved to 0.94 s/p holding Losartan and IVF  On gabapentin 300 mg qid. Can decrease dose if Cr uptrends  Continue to monitor kidney function

## 2025-05-13 NOTE — CASE MANAGEMENT
Case Management Assessment & Discharge Planning Note    Patient name Gloria EMMANUEL /S -01 MRN [id number]  : 1940 Date 2025       Current Admission Date: 2025  Current Admission Diagnosis:Acute on chronic hypoxic respiratory failure (HCC)   Patient Active Problem List    Diagnosis Date Noted Date Diagnosed    Acute kidney failure (HCC) 2025     Murmur, cardiac 2025     Acute on chronic hypoxic respiratory failure (HCC) 2025     Type 2 diabetes mellitus, without long-term current use of insulin ([facility]) 2024     Restless leg syndrome 2024     Hypomagnesemia 2024     Obesity (BMI 30-39.9) 2024     Peripheral vascular disease (HCC) 2024     Stage 3b chronic kidney disease ([facility]) 2024     Mass of finger of left hand 2023     Tachycardia 2023     Chronic respiratory failure ([facility]) 2023     Type 2 diabetes mellitus with hyperglycemia, without long-term current use of insulin ([facility]) 2023     Essential hypertension 2023     Hyponatremia 2023     Hyperlipidemia 2023     Chronic back pain 2023     chronic respiratory failure ([facility]), acute component resolved 2023     GERD (gastroesophageal reflux disease) 2023     Esophageal hiatal hernia 10/06/2022     Generalized weakness 10/06/2022     Low blood pressure reading 2022     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 2022     Unintentional weight loss 2022     BMI 31.0-31.9,adult 2022     Retinal artery occlusion, branch, left 2022     Chronic pain 2022     COVID-19 2022     Backache 2022     Onychomycosis 2021     Pain in toe 2021     Instability of foot joint 2021     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     ASD (atrial septal defect) 2020     Exertional dyspnea 2020     Acquired pes planus of right foot 10/28/2019      Microscopic hematuria 08/21/2019     Urge incontinence 08/21/2019     Ankle pain 07/09/2019     Primary localized osteoarthrosis of ankle and foot 07/09/2019     History of iron deficiency anemia 04/11/2018     Chronic fatigue 12/14/2017     Chronic hypoxemic respiratory failure (HCC) 05/13/2017     GERD (gastroesophageal reflux disease) 05/13/2017     Hyponatremia 05/13/2017     Cigarette nicotine dependence in remission 05/13/2017     Controlled substance agreement signed 12/27/2016     Depression 03/14/2016     Diabetes mellitus (HCC) 03/04/2016     GI bleed 07/23/2015     DDD (degenerative disc disease), lumbosacral 05/13/2015     Arthropathy 04/15/2014     Iron deficiency anemia 05/30/2013     COPD, severe (HCC) 12/19/2012     Benign essential hypertension 12/19/2012     Hyperlipidemia 12/19/2012     Disc disorder of lumbar region 12/19/2012     Type II diabetes mellitus (HCC) 12/19/2012       LOS (days): 1  Geometric Mean LOS (GMLOS) (days): 3.5  Days to GMLOS:2.5     OBJECTIVE:    Risk of Unplanned Readmission Score: 15.65         Current admission status: Inpatient       Preferred Pharmacy:   [facility][id number] - [address] [address]  Phone: [phone] Fax: [phone]    Primary Care Provider: Christine Charles DO    Primary Insurance: [employer] RE[employer]  Secondary Insurance:     ASSESSMENT:  Active Health Care Proxies    There are no active Health Care Proxies on file.    Readmission Root Cause  30 Day Readmission: No    Patient Information  Admitted from:: Home  Mental Status: Alert  During Assessment patient was accompanied by: Not accompanied during assessment  Assessment information provided by:: Patient  Primary Caregiver: Self  Support Systems: Spouse/significant other  County of Residence: [place]  What city do you live in?: [place]  Home entry access options. Select all that apply.: No steps to enter home  Type of Current Residence: 2 story  home  Upon entering residence, is there a bedroom on the main floor (no further steps)?: No  A bedroom is located on the following floor levels of residence (select all that apply):: 2nd Floor  Upon entering residence, is there a bathroom on the main floor (no further steps)?: No  Indicate which floors of current residence have a bathroom (select all the apply):: 2nd Floor  Number of steps to 2nd floor from main floor: One Flight  Living Arrangements: Lives w/ Spouse/significant other (ex)    Activities of Daily Living Prior to Admission  Functional Status: Assistance  Completes ADLs independently?: No  Level of ADL dependence: Assistance  Ambulates independently?: Yes  Does patient use assisted devices?: Yes  Assisted Devices (DME) used: Home Oxygen concentrator, Portable Oxygen tanks, Rollator, Stair Chair/Fraser  DME Company Name (respiratory supplies): [employer]  O2 Rate(s): 3L  Does patient currently own DME?: Yes  What DME does the patient currently own?: Home Oxygen concentrator, Portable Oxygen tanks, Rollator, Stair Chair/Glide  Does patient have a history of Outpatient Therapy (PT/OT)?: Yes  Does the patient have a history of Short-Term Rehab?: Yes  Does patient have a history of HHC?: Yes    Current Home Health Care  Home Health Agency Name:: [facility]    Patient Information Continued  Income Source: SSI/SSD  Does patient have prescription coverage?: Yes  Can the patient afford their medications and any related supplies (such as glucometers or test strips)?: Yes  Does patient receive dialysis treatments?: No  Does patient have a history of substance abuse?: No    Means of Transportation  Means of Transport to [facility]:: Family transport    DISCHARGE DETAILS:    Discharge planning discussed with:: patient- at bedside,  left for Thang barajas  Freedom of Choice: Yes  Comments - Freedom of Choice: home w/ [facility]  CM contacted family/caregiver?: Yes  Were Treatment Team discharge recommendations reviewed  with patient/caregiver?: Yes  Did patient/caregiver verbalize understanding of patient care needs?: Yes  Were patient/caregiver advised of the risks associated with not following Treatment Team discharge recommendations?: Yes    Contacts  Patient Contacts: Thang  Relationship to Patient:: Family (son)  Contact Method: Phone (TRELL)  Phone Number: [phone]  Reason/Outcome: Discharge Planning, Referral, Continuity of Care, Emergency Contact    Requested Home Health Care         Is the patient interested in HHC at discharge?: Yes  Home Health Discipline requested:: Nursing, Occupational Therapy, Physical Therapy  Home Health Agency Name:: [facility]  Home Health Follow-Up Provider:: PCP  Home Health Services Needed:: Evaluate Functional Status and Safety, Gait/ADL Training, Strengthening/Theraputic Exercises to Improve Function  Homebound Criteria Met:: Uses an Assist Device (i.e. cane, walker, etc)  Supporting Clincal Findings:: Limited Endurance    DME Referral Provided  Referral made for DME?: No    Other Referral/Resources/Interventions Provided:  Interventions: HHC  Referral Comments: Patient admitted to [facility] d/t acute on chronic hypoxic respiratory failure. CM met with patient at bedside to complete assessment/ discuss dcp. Patient lives with ex- in a 2 story home; bed ad bath on second floor; has chair glide. Patient requires some assistance with ADLs (has caregivers via waiver that come 2x per week, 4hrs each time, ex- assists otherwise), ambulates with a rollator, has home O2 that is supplied by [employer]-- uses 3L at baseline. Patient has history of STR and HHC. Patient has documented PCP (Dr. Charles) and pharmacy ([facility]) .     CM discussed therapy recommendation of HHC-- patient agreeable. Referrals in [facility], [facility] able to accept, which patient is agreeable to. Agency reserved in AIDIN and added to follow up providers. VM left for sonThang notifying of [facility]. No further CM  needs.    Would you like to participate in our [facility] service program?  : No - Declined    Treatment Team Recommendation: Home with Home Health Care  Discharge Destination Plan:: Home with Home Health Care  Transport at Discharge : Family

## 2025-05-13 NOTE — ASSESSMENT & PLAN NOTE
Lab Results   Component Value Date    HGBA1C 6.9 (H) 02/18/2025       Recent Labs     05/13/25  0602 05/13/25  0718 05/13/25  0819 05/13/25  1103   POCGLU 142* 157* 158* 190*       Blood Sugar Average: Last 72 hrs:  (P) 244.55  -PTA metformin 1000am 500pm and januvia 100 qd    Plan:  CHO diet  OFF non-DKA insulin gtt. On SSI

## 2025-05-13 NOTE — PROGRESS NOTES
Patient:    MRN:  [id number]    Janak Request ID:  [id number]    Level of care reserved:  Home Health Agency    Partner Reserved:  [facility], [place] [address] [phone]    Clinical needs requested:    Geography searched:  [id number]    Start of Service:    Request sent:  9:00am EDT on 5/13/2025 by Junito Greene    Partner reserved:  9:11am EDT on 5/13/2025 by Junito Greene    Choice list shared:  9:10am EDT on 5/13/2025 by Junito Greene

## 2025-05-13 NOTE — ASSESSMENT & PLAN NOTE
-feeling weak over the past one week  -still feels steady when ambulating with walker, no falls   -able to use walker to get around grocery store at baseline without breaks   - Denies orthostatic symptoms  -dec PO >50% last week     Plan:  -PT/OT pending  -orthostatics

## 2025-05-14 VITALS
HEIGHT: 60 IN | OXYGEN SATURATION: 94 % | RESPIRATION RATE: 16 BRPM | BODY MASS INDEX: 34.28 KG/M2 | HEART RATE: 67 BPM | TEMPERATURE: 97.6 F | DIASTOLIC BLOOD PRESSURE: 68 MMHG | SYSTOLIC BLOOD PRESSURE: 129 MMHG | WEIGHT: 174.6 LBS

## 2025-05-14 LAB
ANION GAP SERPL CALCULATED.3IONS-SCNC: 7 MMOL/L (ref 4–13)
BILIRUB UR QL STRIP: NEGATIVE
BUN SERPL-MCNC: 18 MG/DL (ref 5–25)
CALCIUM SERPL-MCNC: 9.1 MG/DL (ref 8.4–10.2)
CHLORIDE SERPL-SCNC: 99 MMOL/L (ref 96–108)
CLARITY UR: CLEAR
CO2 SERPL-SCNC: 31 MMOL/L (ref 21–32)
COLOR UR: COLORLESS
CREAT SERPL-MCNC: 0.94 MG/DL (ref 0.6–1.3)
DME PARACHUTE DELIVERY DATE REQUESTED: NORMAL
DME PARACHUTE ITEM DESCRIPTION: NORMAL
DME PARACHUTE ORDER STATUS: NORMAL
DME PARACHUTE SUPPLIER NAME: NORMAL
DME PARACHUTE SUPPLIER PHONE: NORMAL
ERYTHROCYTE [DISTWIDTH] IN BLOOD BY AUTOMATED COUNT: 16.3 % (ref 11.6–15.1)
GFR SERPL CREATININE-BSD FRML MDRD: 55 ML/MIN/1.73SQ M
GLUCOSE SERPL-MCNC: 128 MG/DL (ref 65–140)
GLUCOSE SERPL-MCNC: 146 MG/DL (ref 65–140)
GLUCOSE SERPL-MCNC: 170 MG/DL (ref 65–140)
GLUCOSE SERPL-MCNC: 254 MG/DL (ref 65–140)
GLUCOSE UR STRIP-MCNC: NEGATIVE MG/DL
HCT VFR BLD AUTO: 35.4 % (ref 34.8–46.1)
HGB BLD-MCNC: 10.9 G/DL (ref 11.5–15.4)
HGB UR QL STRIP.AUTO: NEGATIVE
KETONES UR STRIP-MCNC: NEGATIVE MG/DL
LEUKOCYTE ESTERASE UR QL STRIP: NEGATIVE
MAGNESIUM SERPL-MCNC: 2 MG/DL (ref 1.9–2.7)
MCH RBC QN AUTO: 26.8 PG (ref 26.8–34.3)
MCHC RBC AUTO-ENTMCNC: 30.8 G/DL (ref 31.4–37.4)
MCV RBC AUTO: 87 FL (ref 82–98)
NITRITE UR QL STRIP: NEGATIVE
PH UR STRIP.AUTO: 6 [PH]
PLATELET # BLD AUTO: 229 THOUSANDS/UL (ref 149–390)
PMV BLD AUTO: 9.2 FL (ref 8.9–12.7)
POTASSIUM SERPL-SCNC: 4.8 MMOL/L (ref 3.5–5.3)
PROT UR STRIP-MCNC: NEGATIVE MG/DL
RBC # BLD AUTO: 4.07 MILLION/UL (ref 3.81–5.12)
SODIUM SERPL-SCNC: 137 MMOL/L (ref 135–147)
SP GR UR STRIP.AUTO: 1.01 (ref 1–1.03)
UROBILINOGEN UR STRIP-ACNC: <2 MG/DL
WBC # BLD AUTO: 8.29 THOUSAND/UL (ref 4.31–10.16)

## 2025-05-14 PROCEDURE — 81003 URINALYSIS AUTO W/O SCOPE: CPT

## 2025-05-14 PROCEDURE — 85027 COMPLETE CBC AUTOMATED: CPT

## 2025-05-14 PROCEDURE — 83735 ASSAY OF MAGNESIUM: CPT

## 2025-05-14 PROCEDURE — 99239 HOSP IP/OBS DSCHRG MGMT >30: CPT | Performed by: HOSPITALIST

## 2025-05-14 PROCEDURE — 94640 AIRWAY INHALATION TREATMENT: CPT

## 2025-05-14 PROCEDURE — 80048 BASIC METABOLIC PNL TOTAL CA: CPT

## 2025-05-14 PROCEDURE — 82948 REAGENT STRIP/BLOOD GLUCOSE: CPT

## 2025-05-14 PROCEDURE — 94760 N-INVAS EAR/PLS OXIMETRY 1: CPT

## 2025-05-14 RX ORDER — LEVALBUTEROL INHALATION SOLUTION 1.25 MG/3ML
1.25 SOLUTION RESPIRATORY (INHALATION)
Qty: 180 ML | Refills: 0 | Status: SHIPPED | OUTPATIENT
Start: 2025-05-14

## 2025-05-14 RX ADMIN — IPRATROPIUM BROMIDE 0.5 MG: 0.5 SOLUTION RESPIRATORY (INHALATION) at 07:26

## 2025-05-14 RX ADMIN — ASPIRIN 81 MG: 81 TABLET, CHEWABLE ORAL at 08:09

## 2025-05-14 RX ADMIN — MORPHINE SULFATE 15 MG: 15 TABLET ORAL at 08:09

## 2025-05-14 RX ADMIN — PANTOPRAZOLE SODIUM 40 MG: 40 TABLET, DELAYED RELEASE ORAL at 10:15

## 2025-05-14 RX ADMIN — BRIMONIDINE TARTRATE 1 DROP: 2 SOLUTION OPHTHALMIC at 08:10

## 2025-05-14 RX ADMIN — GABAPENTIN 300 MG: 300 CAPSULE ORAL at 08:09

## 2025-05-14 RX ADMIN — INSULIN LISPRO 2 UNITS: 100 INJECTION, SOLUTION INTRAVENOUS; SUBCUTANEOUS at 13:29

## 2025-05-14 RX ADMIN — LEVALBUTEROL HYDROCHLORIDE 1.25 MG: 1.25 SOLUTION RESPIRATORY (INHALATION) at 07:26

## 2025-05-14 RX ADMIN — ENOXAPARIN SODIUM 40 MG: 40 INJECTION SUBCUTANEOUS at 08:09

## 2025-05-14 RX ADMIN — LOSARTAN POTASSIUM 50 MG: 50 TABLET, FILM COATED ORAL at 10:15

## 2025-05-14 RX ADMIN — BUDESONIDE AND FORMOTEROL FUMARATE DIHYDRATE 2 PUFF: 160; 4.5 AEROSOL RESPIRATORY (INHALATION) at 08:10

## 2025-05-14 RX ADMIN — GABAPENTIN 300 MG: 300 CAPSULE ORAL at 13:28

## 2025-05-14 RX ADMIN — DILTIAZEM HYDROCHLORIDE 240 MG: 240 CAPSULE, EXTENDED RELEASE ORAL at 08:09

## 2025-05-14 NOTE — ASSESSMENT & PLAN NOTE
-SOB on exertion worse over the past 1 week  -Initially hypoxic to the 70s and tachypneic upon arrival to the ED requiring 5L NC, now back to baseline 3 LNC.  -uses 3LNC qhs and prn during day  -conversational dyspnea on exam  -In the ED patient received duoneb X1, NS nebs,, azithromycin, Rocephin, Solu-Medrol 60, 500 cc NS X2.  -CTA PE (d-dimer 2.48): No PE, minimal bronchiolitis RUL  -Last echo March 2024 demonstrated EF 70%, hyperdynamic with normal wall motion.  Mild RV and bilateral atrial dilation, mild AS.  -favor viral bronchiolitis     Plan:  Currently on baseline 3 L nasal cannula oxygen at time of discharge  Continue at home inhalers on discharge  Will prescribe Xopenex as needed for wheezing, shortness of breath, difficulty breathing on discharge  Prednisone held due to blood glucose spike. Acute dyspnea resolving and oxygen saturation stable without steroid.   BG stable at time of discharge

## 2025-05-14 NOTE — DISCHARGE SUMMARY
Discharge Summary - Hospitalist   Name: Gloria Patel 84 y.o. female I MRN: [id number]  Unit/Bed#: S -01 I Date of Admission: 5/11/2025   Date of Service: 5/14/2025 I Hospital Day: 2     Assessment & Plan  Acute on chronic hypoxic respiratory failure (HCC)  -SOB on exertion worse over the past 1 week  -Initially hypoxic to the 70s and tachypneic upon arrival to the ED requiring 5L NC, now back to baseline 3 LNC.  -uses 3LNC qhs and prn during day  -conversational dyspnea on exam  -In the ED patient received duoneb X1, NS nebs,, azithromycin, Rocephin, Solu-Medrol 60, 500 cc NS X2.  -CTA PE (d-dimer 2.48): No PE, minimal bronchiolitis RUL  -Last echo March 2024 demonstrated EF 70%, hyperdynamic with normal wall motion.  Mild RV and bilateral atrial dilation, mild AS.  -favor viral bronchiolitis     Plan:  Currently on baseline 3 L nasal cannula oxygen at time of discharge  Continue at home inhalers on discharge  Will prescribe Xopenex as needed for wheezing, shortness of breath, difficulty breathing on discharge  Prednisone held due to blood glucose spike. Acute dyspnea resolving and oxygen saturation stable without steroid.   BG stable at time of discharge  Generalized weakness  -feeling weak over the past one week  -still feels steady when ambulating with walker, no falls   -able to use walker to get around grocery store at baseline without breaks   - Denies orthostatic symptoms  -dec PO >50% last week     Plan:  PT/OT recommending level 3  Patient to be d/c with [facility]  COPD, severe (HCC)  -45 pack years quit at 61yo  -PTA Breztri Aerosphere 2 puffs twice daily  - S/p duoneb X1, NS nebs,, azithromycin, Rocephin, Solu-Medrol 60 in the ED    Plan:  See plan under acute on chronic hypoxic respiratory failure   Diabetes mellitus (HCC)  Lab Results   Component Value Date    HGBA1C 6.9 (H) 02/18/2025       Recent Labs     05/13/25  1103 05/13/25  1651 05/13/25  2059 05/14/25  0106   POCGLU 190* 206* 144* 146*        Blood Sugar Average: Last 72 hrs:  (P) [id number]  -PTA metformin 1000am 500pm and januvia 100 qd    Plan:  CHO diet  Continue POA DM medications on discharge  Benign essential hypertension  Continue PTA Cardizem  Continue PTA losartan on discharge (held during admission d/t MOISES)  Chronic back pain  -Unchanged, lower midline lumbar region, 8/10 on admission.    Plan:  -Continue PTA morphine 15 mg twice daily  -Continue PTA gabapentin 300 mg 4 times daily  Stage 3b chronic kidney disease (HCC)  Lab Results   Component Value Date    EGFR 55 05/14/2025    EGFR 55 05/13/2025    EGFR 37 05/12/2025    CREATININE 0.94 05/14/2025    CREATININE 0.94 05/13/2025    CREATININE 1.32 (H) 05/12/2025     Iron deficiency anemia  -CBC on admission showed Hgb 10.6 (baseline appears 9.1-10.8)    Plan:  - Continue PTA ferrous sulfate 325 every other day  Murmur, cardiac  Patient has history of aortic valve surgery approximately 60 years ago  Has heart murmur on exam  Last echocardiogram 3/2024 EF of 70%  Patient has chronic lower extremity edema but has no other signs of volume overload  Discussed with patient's daughter-in-law that patient appears to have been lost to follow-up for cardiology.  Patient previously seen by Dr. Balderrama 2/2024 at [facility] cardiology in [place].  Provided daughter with name and phone number to call and schedule cardiology appointment     Medical Problems       Resolved Problems  Date Reviewed: 5/11/2025          Resolved    Acute kidney failure (HCC) 5/13/2025     Resolved by  Chary Garcias DO        Discharging Physician / Practitioner: Chary Garcias DO  PCP: Christine Charles DO  Admission Date:   Admission Orders (From admission, onward)       Ordered        05/12/25 1142  INPATIENT ADMISSION  Once            05/11/25 2153  Place in Observation  Once                          Discharge Date: 05/14/25    Consultations During Hospital Stay:  None    Procedures Performed:    None    Significant Findings / Test Results:   CTA chest pe study   Final Result by John English MD (05/11 2017)      No pulmonary embolus. Minimal bronchiolitis in the right upper lobe.                  Workstation performed: [id number]         XR chest portable   ED Interpretation by Nina Waldrop MD (05/11 1744)   Per my interpretation: Right lower lobe opacification suspicious for pneumonia      Final Result by Jefferson Guthrie DO (05/12 1104)      No focal infiltrate. Right upper lobe bronchiolitis noted on subsequent same day CTA chest study, not well visualized on this exam. Please see full CT report for further discussion.         Resident: Storm Nguyen I, the attending radiologist, have reviewed the images and agree with the final report above.      Workstation performed: [id number]            Incidental Findings:   None     Test Results Pending at Discharge (will require follow up):   None     Outpatient Tests Requested:  None    Complications:  None    Reason for Admission: SOB, generalized weakness    Hospital Course:   Gloria Patel is a 84 y.o. female patient who originally presented to the hospital on 5/11/2025 due to SOB, generalized weakness    85 y/o F PMHX of COPD, HTN, HLD, T2DM, and CKD 3B who presented to the ED 5/12 with a chief complaint of SOB and generalized weakness. Typically usees 3 L O2 at baseline but was requiring 5 L O2 at time of admission. Patient also found to have MOISES. Patient was initially treated with prednisone, duonebs, and azithromycin due to concern for COPD exacerbation. However, patient's BG levels rapidly increased to >500 requiring non-DKA insulin gtt. Prednisone was stopped. BG were trended until within goal and then patient taken off insulin gtt and placed on SSI. Patient continued at home inhalers and scheduled nebs while inpatient. Patient was able to be weaned down to her baseline 3 L of home O2. Patient seen and evaluated by PT/OT who  recommended level 3. Patient agreeable to [facility] and states this will make her feel more comfortable going home.    Patient was placed on contact precautions due to history of untreated bedbugs.     At time of discharge, patient reports significant improvement in symptoms since time of admission.  Patient is comfortable with and understanding of discharge preparations, plans, and appropriate follow-ups.  Patient and family agreeable to discharge.  All questions answered to the best of my ability and strict return precautions discussed.     Please see above list of diagnoses and related plan for additional information.     Condition at Discharge: stable    Discharge Day Visit / Exam:   Subjective:  No overnight events. Patient on baseline 3 L NC at time of discharge. States she is agreeable to [facility] and discharge to home.     Vitals: Blood Pressure: 143/68 (05/14/25 0234)  Pulse: 70 (05/14/25 0234)  Temperature: 97.7 °F (36.5 °C) (05/14/25 0234)  Temp Source: Oral (05/12/25 1517)  Respirations: 16 (05/13/25 1517)  Height: 5' (152.4 cm) (05/11/25 8867)  Weight - Scale: 79.2 kg (174 lb 9.7 oz) (05/14/25 6245)  SpO2: 94 % (05/14/25 0234)  Physical Exam  Vitals and nursing note reviewed.   Constitutional:       General: She is not in acute distress.     Appearance: She is well-developed.   HENT:      Head: Normocephalic and atraumatic.     Eyes:      Conjunctiva/sclera: Conjunctivae normal.       Cardiovascular:      Rate and Rhythm: Normal rate and regular rhythm.      Heart sounds: No murmur heard.  Pulmonary:      Effort: Pulmonary effort is normal. No respiratory distress.      Breath sounds: Wheezing present.      Comments: Wheezing in bilateral lower lung fields  Abdominal:      Palpations: Abdomen is soft.      Tenderness: There is no abdominal tenderness.     Musculoskeletal:         General: No swelling.      Cervical back: Neck supple.      Right lower leg: Edema present.      Left lower leg: Edema present.       Comments: Nonpitting edema bilaterally     Skin:     General: Skin is warm and dry.      Capillary Refill: Capillary refill takes less than 2 seconds.     Neurological:      Mental Status: She is alert.     Psychiatric:         Mood and Affect: Mood normal.          Discussion with Family: Updated  (daughter in law) via phone.    Discharge instructions/Information to patient and family:   See after visit summary for information provided to patient and family.      Provisions for Follow-Up Care:  See after visit summary for information related to follow-up care and any pertinent home health orders.      Mobility at time of Discharge:   Basic Mobility Inpatient Raw Score: 16  JH-HLM Goal: 5: Stand one or more mins  JH-HLM Achieved: 5: Stand (1 or more minutes)  HLM Goal NOT achieved. Continue to encourage mobility in post discharge setting.     Disposition:   Home with [facility]NA Services (Reminder: Complete face to face encounter)    Planned Readmission: None    Discharge Medications:  See after visit summary for reconciled discharge medications provided to patient and/or family.      Administrative Statements   Discharge Statement:  I have spent a total time of 30 minutes in caring for this patient on the day of the visit/encounter..    **Please Note: This note may have been constructed using a voice recognition system**

## 2025-05-14 NOTE — CASE MANAGEMENT
Case Management Discharge Planning Note    Patient name Gloria EMMANUEL /S -01 MRN [id number]  : 1940 Date 2025       Current Admission Date: 2025  Current Admission Diagnosis:Acute on chronic hypoxic respiratory failure (HCC)   Patient Active Problem List    Diagnosis Date Noted    Murmur, cardiac 2025    Acute on chronic hypoxic respiratory failure (HCC) 2025    Type 2 diabetes mellitus, without long-term current use of insulin (HCC) 2024    Restless leg syndrome 2024    Hypomagnesemia 2024    Obesity (BMI 30-39.9) 2024    Peripheral vascular disease (HCC) 2024    Stage 3b chronic kidney disease (HCC) 2024    Mass of finger of left hand 2023    Tachycardia 2023    Chronic respiratory failure (HCC) 2023    Type 2 diabetes mellitus with hyperglycemia, without long-term current use of insulin ([facility]) 2023    Essential hypertension 2023    Hyponatremia 2023    Hyperlipidemia 2023    Chronic back pain 2023    chronic respiratory failure ([facility]), acute component resolved 2023    GERD (gastroesophageal reflux disease) 2023    Esophageal hiatal hernia 10/06/2022    Generalized weakness 10/06/2022    Low blood pressure reading 2022    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 2022    Unintentional weight loss 2022    BMI 31.0-31.9,adult 2022    Retinal artery occlusion, branch, left 2022    Chronic pain 2022    COVID-19 2022    Backache 2022    Onychomycosis 2021    Pain in toe 2021    Instability of foot joint 2021    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    ASD (atrial septal defect) 2020    Exertional dyspnea 2020    Acquired pes planus of right foot 10/28/2019    Microscopic hematuria 2019    Urge incontinence 2019    Ankle pain 2019    Primary localized  osteoarthrosis of ankle and foot 07/09/2019    History of iron deficiency anemia 04/11/2018    Chronic fatigue 12/14/2017    Chronic hypoxemic respiratory failure (HCC) 05/13/2017    GERD (gastroesophageal reflux disease) 05/13/2017    Hyponatremia 05/13/2017    Cigarette nicotine dependence in remission 05/13/2017    Controlled substance agreement signed 12/27/2016    Depression 03/14/2016    Diabetes mellitus (HCC) 03/04/2016    GI bleed 07/23/2015    DDD (degenerative disc disease), lumbosacral 05/13/2015    Arthropathy 04/15/2014    Iron deficiency anemia 05/30/2013    COPD, severe (HCC) 12/19/2012    Benign essential hypertension 12/19/2012    Hyperlipidemia 12/19/2012    Disc disorder of lumbar region 12/19/2012    Type II diabetes mellitus (HCC) 12/19/2012      LOS (days): 2  Geometric Mean LOS (GMLOS) (days): 3.5  Days to GMLOS:1.4     OBJECTIVE:  Risk of Unplanned Readmission Score: 16.65         Current admission status: Inpatient   Preferred Pharmacy:   [facility]/pharmacy #[id number] - [address] [address]  Phone: [phone] Fax: [phone]    Primary Care Provider: Christine Charles DO    Primary Insurance: [employer]  Secondary Insurance:     DISCHARGE DETAILS:    Discharge planning discussed with:: Pt  Freedom of Choice: Yes  Comments - Freedom of Choice: [facility]    DME Referral Provided  Referral made for DME?: Yes  DME referral completed for the following items:: Nebulizer  DME Supplier Name:: [facility]    Other Referral/Resources/Interventions Provided:  Interventions: DME  Referral Comments: CM notified pt will need nebulizer at [place]. CM ordered via parachute. Nebulizer approved and cost is $1.60. CM met with pt and delivered nebulizer. She is aware of cost and that she will get a bill in the mail. Pt reports she has transport at [place].    Treatment Team Recommendation: Home with Home Health Care  Discharge Destination Plan:: Home with Home Health Care

## 2025-05-14 NOTE — UTILIZATION REVIEW
Initial Clinical Review  OBSERVATION ADMISSION 5/11/2025 2153  CONVERTED TO  INPATIENT ADMISSION 5/12/2025 1142 REQ > 2 MN  FOR MANAGEMENT OF SOB REQ PREDNISONE RESULTING IN INCREASED SERUM GLUC NEEDING IV INSULIN; CR INCREASE NEEDING IVF    Admission: Date/Time/Statement:   Admission Orders (From admission, onward)       Ordered        05/12/25 1142  INPATIENT ADMISSION  Once            05/11/25 2153  Place in Observation  Once                          Orders Placed This Encounter   Procedures     Standing Status:   Standing     Number of Occurrences:   1     Level of Care:   Med Surg [16]     Estimated length of stay:   More than 2 Midnights     Certification:   I certify that inpatient services are medically necessary for this patient for a duration of greater than two midnights. See H&P and MD Progress Notes for additional information about the patient's course of treatment.     ED Arrival Information       Expected   -    Arrival   5/11/2025 15:34    Acuity   Emergent              Means of arrival   Wheelchair    Escorted by   Family Member    Service   Hospitalist    Admission type   Emergency              Arrival complaint   SOB             Chief Complaint   Patient presents with    Shortness of Breath     PT hx of COPD, states has been having worsening SOB and weakness over the past week. Denies current CP. Pt on 3L NC at home.        Initial Presentation: 84 y.o. female to ED as walk in w progressively worsening shortness of breath on exertion and generalized weakness. Presents from home, uses walker at baseline. Endorses decreased p.o. intake over this time, eating less than 50% of her usual meals. Continues to ambulate with her walker has not felt unsteady on her feet. Of note, pt does have bedbug exposure at home that has not been treated.    PMH COPD, HTN, HLD, T2DM, CKD 3B,  D-dimer is elevated 2.48,   Exam  conversational dyspnea; hypoxic to 70s requiring 5LNC,  improved back to baseline 3LNC.   CTA  PE study demonstrated no PE with minimal bronchiolitis in the right upper lobe.   Given duoneb X1, NS nebs,, azithromycin, Rocephin, Solu-Medrol 60, 500 cc NS X2. PLAN Observation admission due to acute on chronic hypoxic respiratory failure, gen weakness, severe COPD  Anticipated Length of Stay/Certification Statement: Patient will be admitted on an observation basis with an anticipated length of stay of less than 2 midnights secondary to acute hypoxic respiratory failure.   Date: 5/12/2025   Day 2: changed to Inpatient  Cont Symbicort 2 puff twice daily while inpatient  Duo Nebs 3 times daily scheduled  Prednisone 40 for additional 2 days   finish 3 day course azithromycin, obtain orthostatic vitals, consult PT/OT, SSI, cont PTA pain meds    PM Update    moderate right basilar rales and minimal left basilar rales. Very transient minimal wheeze in the right base, at 1 point they look like the patient might of been using some neck muscles for respiration as well.   Noted increased blood glucose to 400s from Prednisone requiring multiple insulin bolus w eventual IV insulin GTT, cont IV insulin GTT, glucose checks until obtain insulin sliding scale hold further Prednisone unless increasing SOB.   Cont serial exams, monitoring POX.  CR increased to 1.32 baseline 0.9-1, monitor I/O & cont IVF;   Follow temps AM labs    ED Treatment-Medication Administration from 05/11/2025 1534 to 05/11/2025 2252         Date/Time Order Dose Route Action     05/11/2025 1619 albuterol inhalation solution 10 mg 10 mg Nebulization Given     05/11/2025 1619 ipratropium (ATROVENT) 0.02 % inhalation solution 1 mg 1 mg Nebulization Given     05/11/2025 1619 sodium chloride 0.9 % inhalation solution 12 mL 12 mL Nebulization Given     05/11/2025 1645 methylPREDNISolone sodium succinate (Solu-MEDROL) injection 60 mg 60 mg Intravenous Given     05/11/2025 1647 azithromycin (ZITHROMAX) tablet 500 mg 500 mg Oral Given     05/11/2025 1646 ceftriaxone  (ROCEPHIN) 1 g/50 mL in dextrose IVPB 1,000 mg Intravenous New Bag     05/11/2025 1647 sodium chloride 0.9 % bolus 500 mL 500 mL Intravenous New Bag     05/11/2025 1835 iohexol (OMNIPAQUE) 350 MG/ML injection (MULTI-DOSE) 60 mL 60 mL Intravenous Given     05/11/2025 2050 sodium chloride 0.9 % bolus 500 mL 500 mL Intravenous New Bag            Scheduled Medications:  aspirin, 81 mg, Oral, Daily  atorvastatin, 40 mg, Oral, QPM  azithromycin, 500 mg, Oral, Q24H  brimonidine tartrate, 1 drop, Left Eye, BID  budesonide-formoterol, 2 puff, Inhalation, BID  diltiazem, 240 mg, Oral, Daily  enoxaparin, 30 mg, Subcutaneous, Daily  [START ON 5/13/2025] ferrous sulfate, 325 mg, Oral, Once per day on Tuesday Thursday Saturday  gabapentin, 300 mg, Oral, 4x Daily  ipratropium, 0.5 mg, Nebulization, TID  levalbuterol, 1.25 mg, Nebulization, TID  [Held by provider] losartan, 50 mg, Oral, Daily  morphine, 15 mg, Oral, BID  pantoprazole, 40 mg, Oral, Q12H  predniSONE, 40 mg, Oral, Daily  rOPINIRole, 1.5 mg, Oral, HS  senna, 1 tablet, Oral, HS  vit B complex-vit C-folic acid, 1 capsule, Oral, Daily With Dinner      Continuous IV Infusions:  insulin regular (HumuLIN R,NovoLIN R) 1 Units/mL in sodium chloride 0.9 % 100 mL infusion, 0.3-21 Units/hr, Intravenous, Titrated    multi-electrolyte, 75 mL/hr, Intravenous, Continuous      PRN Meds:  bisacodyl, 10 mg, Rectal, Daily PRN      ED Triage Vitals   Temperature Pulse Respirations Blood Pressure SpO2 Pain Score   05/11/25 1551 05/11/25 1550 05/11/25 1550 05/11/25 1550 05/11/25 1550 05/11/25 2322   97.9 °F (36.6 °C) 65 (!) 36 143/73 90 % 8     Weight (last 2 days)       Date/Time Weight    05/14/25 0549 79.2 (174.6)    05/13/25 0429 78.7 (173.5)            Vital Signs (last 3 days)       Date/Time Temp Pulse Resp BP MAP (mmHg) SpO2 Calculated FIO2 (%) - Nasal Cannula Nasal Cannula O2 Flow Rate (L/min) O2 Device Patient Position - Orthostatic VS Pain    05/14/25 07:48:18 97.9 °F (36.6 °C)  66 16 168/85 113 99 % -- -- -- -- --    05/14/25 02:34:43 97.7 °F (36.5 °C) 70 -- 143/68 93 94 % -- -- -- -- --    05/13/25 2300 -- -- -- -- -- -- -- -- -- -- No Pain    05/13/25 22:39:48 97.6 °F (36.4 °C) 68 -- 142/68 93 94 % -- -- -- -- --    05/13/25 19:54:18 98.2 °F (36.8 °C) 63 -- 149/69 96 96 % -- -- -- -- --    05/13/25 1734 -- -- -- -- -- -- -- -- -- -- 7    05/13/25 1554 -- -- -- 131/64 -- -- -- -- -- -- --    05/13/25 15:17:30 -- 73 16 131/64 86 95 % -- -- -- -- --    05/13/25 1512 98.1 °F (36.7 °C) -- -- -- -- -- -- -- -- -- --    05/13/25 1155 -- -- -- 122/58 -- -- -- -- -- -- --    05/13/25 11:04:08 98.1 °F (36.7 °C) 73 16 122/58 79 96 % -- -- -- -- --    05/13/25 0956 -- -- -- -- -- -- -- -- -- -- No Pain    05/13/25 0811 -- -- -- -- -- -- -- -- -- -- 8    05/13/25 0750 -- -- -- 157/71 -- -- -- -- -- -- --    05/13/25 07:15:58 98.1 °F (36.7 °C) 77 16 157/71 100 95 % -- -- -- -- --    05/13/25 04:09:16 -- 75 18 128/67 87 97 % -- -- -- -- --    05/13/25 02:24:40 98 °F (36.7 °C) 70 -- 120/55 77 96 % -- -- -- -- --    05/13/25 00:06:21 98.2 °F (36.8 °C) 72 18 123/60 81 97 % -- -- -- -- --    05/12/25 2000 -- -- -- 106/52 -- -- -- -- -- -- 7    05/12/25 1945 -- -- -- -- -- 99 % 32 3 L/min Nasal cannula -- --    05/12/25 18:45:07 97.8 °F (36.6 °C) 68 16 106/52 70 95 % -- -- -- -- --    05/12/25 1742 -- -- -- -- -- -- -- -- -- -- 9    05/12/25 1552 -- -- -- 109/50 -- -- -- -- -- -- --    05/12/25 1519 -- -- -- 109/50 -- -- -- -- -- -- --    05/12/25 15:17:14 98.5 °F (36.9 °C) 74 18 115/44 68 95 % -- -- -- Lying --    05/12/25 1402 -- -- -- -- -- -- -- -- -- -- No Pain    05/12/25 1307 -- -- -- -- -- 98 % -- -- -- -- No Pain    05/12/25 12:02:17 -- 73 22 133/70 91 95 % -- -- -- -- --    05/12/25 07:40:16 97.2 °F (36.2 °C) 66 17 118/60 79 95 % -- -- -- -- --    05/12/25 07:38:17 97.2 °F (36.2 °C) 64 16 118/60 79 97 % -- -- -- -- --    05/12/25 0702 -- -- -- -- -- 97 % 32 3 L/min Nasal cannula -- --    05/12/25  0332 -- -- -- 117/53 -- -- -- -- -- -- --    05/12/25 02:57:02 98 °F (36.7 °C) 65 -- 117/53 74 95 % -- -- -- -- --    05/11/25 2327 -- -- -- -- -- 95 % 32 3 L/min Nasal cannula -- --    05/11/25 2325 -- -- -- -- -- 94 % 32 3 L/min Nasal cannula -- 8 05/11/25 2322 -- -- -- -- -- -- -- -- -- -- 8 05/11/25 22:56:55 98.2 °F (36.8 °C) 64 -- 112/53 73 92 % -- -- -- -- --    05/11/25 2241 -- 69 34 108/56 -- 93 % 32 3 L/min Nasal cannula Sitting --    05/11/25 1945 -- 62 -- 109/53 76 92 % -- -- -- -- --    05/11/25 1930 -- 68 -- 109/53 76 93 % 32 3 L/min Nasal cannula Lying --    05/11/25 1620 -- -- -- -- -- 93 % -- -- -- -- --    05/11/25 1551 97.9 °F (36.6 °C) -- -- -- -- -- -- -- -- -- --    05/11/25 1550 -- 65 36 143/73 -- 90 % 40 5 L/min Nasal cannula Lying --              Pertinent Labs/Diagnostic Test Results:   Radiology:  CTA chest pe study   Final Interpretation by John English MD (05/11 2017)      No pulmonary embolus. Minimal bronchiolitis in the right upper lobe.                  Workstation performed: [id number]         XR chest portable   ED Interpretation by Nina Waldrop MD (05/11 5514)   Per my interpretation: Right lower lobe opacification suspicious for pneumonia      Final Interpretation by Jefferson Guthrie DO (05/12 1104)      No focal infiltrate. Right upper lobe bronchiolitis noted on subsequent same day CTA chest study, not well visualized on this exam. Please see full CT report for further discussion.         Resident: Storm Nguyen I, the attending radiologist, have reviewed the images and agree with the final report above.      Workstation performed: [id number]           Cardiology:  ECG 12 lead   Final Result by Deisy Jenkins MD (05/11 1822)   Sinus rhythm with Premature atrial complexes   Left axis deviation   Right bundle branch block   Abnormal ECG   When compared with ECG of 24-Apr-2024 21:44,   Premature atrial complexes are now Present   Confirmed by Gregory,  Deisy ([id number]) on 5/11/2025 6:22:38 PM        GI:  No orders to display           Results from last 7 days   Lab Units 05/14/25  0535 05/13/25  0422 05/12/25  0624 05/11/25  1643   WBC Thousand/uL 8.29 13.68* 3.74* 8.46   HEMOGLOBIN g/dL 10.9* 10.2* 10.4* 10.6*   HEMATOCRIT % 35.4 33.4* 34.3* 34.7*   PLATELETS Thousands/uL 229 227 189 239   TOTAL NEUT ABS Thousands/µL  --   --   --  5.35         Results from last 7 days   Lab Units 05/14/25  0535 05/13/25  0422 05/12/25  0624 05/11/25  1608   SODIUM mmol/L 137 133* 132* 138   POTASSIUM mmol/L 4.8 4.9 4.8 3.8   CHLORIDE mmol/L 99 100 99 101   CO2 mmol/L 31 27 27 29   ANION GAP mmol/L 7 6 6 8   BUN mg/dL 18 21 24 18   CREATININE mg/dL 0.94 0.94 1.32* 1.00   EGFR ml/min/1.73sq m 55 55 37 51   CALCIUM mg/dL 9.1 8.9 8.7 9.1   MAGNESIUM mg/dL 2.0  --   --   --      Results from last 7 days   Lab Units 05/11/25  1608   AST U/L 14   ALT U/L 10   ALK PHOS U/L 83   TOTAL PROTEIN g/dL 6.5   ALBUMIN g/dL 4.0   TOTAL BILIRUBIN mg/dL 0.45     Results from last 7 days   Lab Units 05/14/25  0801 05/14/25  0106 05/13/25  2059 05/13/25  1651 05/13/25  1103 05/13/25  0819 05/13/25  0718 05/13/25  0602 05/13/25  0429 05/13/25  0358 05/13/25  0223 05/13/25  0157   POC GLUCOSE mg/dl 128 146* 144* 206* 190* 158* 157* 142* 100 63* 87 80     Results from last 7 days   Lab Units 05/14/25  0535 05/13/25  0422 05/12/25  0624 05/11/25  1608   GLUCOSE RANDOM mg/dL 170* 97 510* 141*             BETA-HYDROXYBUTYRATE   Date Value Ref Range Status   09/26/2022 0.8 (H) <0.6 mmol/L Final                      Results from last 7 days   Lab Units 05/11/25 2015 05/11/25  1811 05/11/25  1608   HS TNI 0HR ng/L  --   --  7   HS TNI 2HR ng/L  --  6  --    HSTNI D2 ng/L  --  -1  --    HS TNI 4HR ng/L 7  --   --    HSTNI D4 ng/L 0  --   --      Results from last 7 days   Lab Units 05/11/25  1643   D-DIMER QUANTITATIVE ug/ml FEU 2.48*     Results from last 7 days   Lab Units 05/11/25  1643   PROTIME seconds  14.2   INR  1.03   PTT seconds 29     Results from last 7 days   Lab Units 05/11/25  1608   TSH 3RD GENERATON uIU/mL 1.203         Results from last 7 days   Lab Units 05/11/25  1614   LACTIC ACID mmol/L 1.8                                                 Results from last 7 days   Lab Units 05/14/25  0934 05/12/25  1204   CLARITY UA  Clear Clear   COLOR UA  Colorless Yellow   SPEC GRAV UA  1.006 1.043*   PH UA  6.0 6.0   GLUCOSE UA mg/dl Negative >=1000 (1%)*   KETONES UA mg/dl Negative Negative   BLOOD UA  Negative Negative   PROTEIN UA mg/dl Negative Negative   NITRITE UA  Negative Negative   BILIRUBIN UA  Negative Negative   UROBILINOGEN UA (BE) mg/dl <2.0 <2.0   LEUKOCYTES UA  Negative Trace*   WBC UA /hpf  --  2-4*   RBC UA /hpf  --  1-2   BACTERIA UA /hpf  --  Occasional   EPITHELIAL CELLS WET PREP /hpf  --  Occasional                                 Results from last 7 days   Lab Units 05/11/25  1614   BLOOD CULTURE  No Growth at 48 hrs.  No Growth at 48 hrs.                   Past Medical History:   Diagnosis Date    Abdominal pain 09/26/2022    Arthritis     Cardiac disease     Constipation 04/25/2024    COPD (chronic obstructive pulmonary disease) (HCC)     COVID-19 2021    was in hospital for sx and was dx with covid    Diabetes mellitus (HCC)     GERD (gastroesophageal reflux disease)     Hiatal hernia     Hypertension     PUD (peptic ulcer disease)     Residual ASD (atrial septal defect) following repair      Present on Admission:   Benign essential hypertension   Chronic back pain   COPD, severe (HCC)   Diabetes mellitus (HCC)   Iron deficiency anemia   Stage 3b chronic kidney disease (HCC)   Generalized weakness      Admitting Diagnosis: Dyspnea [R06.00]  Elevated d-dimer [R79.89]  Acute hypoxic respiratory failure (HCC) [J96.01]  Age/Sex: 84 y.o. female    Network Utilization Review Department  ATTENTION: Please call with any questions or concerns to [phone] and carefully listen to the prompts  so that you are directed to the right person. All voicemails are confidential.   For Discharge needs, contact Care Management DC Support Team at [phone] opt. 2  Send all requests for admission clinical reviews, approved or denied determinations and any other requests to dedicated fax number below belonging to the campus where the patient is receiving treatment. List of dedicated fax numbers for the Facilities:  FACILITY NAME UR FAX NUMBER   ADMISSION DENIALS (Administrative/Medical Necessity) [phone]   DISCHARGE SUPPORT TEAM (NETWORK) [phone]   PARENT CHILD HEALTH (Maternity/NICU/Pediatrics) [phone]   [facility] [phone]   [facility] [phone]   [facility] [phone]   [facility] [phone]   [facility] [phone]   [facility] [phone]   [facility] [phone]   [facility] [phone]   [facility] [phone]   [facility] [phone]   [facility] [phone]   [facility] [phone]

## 2025-05-14 NOTE — ASSESSMENT & PLAN NOTE
-feeling weak over the past one week  -still feels steady when ambulating with walker, no falls   -able to use walker to get around grocery store at baseline without breaks   - Denies orthostatic symptoms  -dec PO >50% last week     Plan:  PT/OT recommending level 3  Patient to be d/c with HHS

## 2025-05-14 NOTE — ASSESSMENT & PLAN NOTE
-45 pack years quit at 59yo  -PTA Breztri Aerosphere 2 puffs twice daily  - S/p duoneb X1, NS nebs,, azithromycin, Rocephin, Solu-Medrol 60 in the ED    Plan:  See plan under acute on chronic hypoxic respiratory failure

## 2025-05-14 NOTE — ASSESSMENT & PLAN NOTE
Patient has history of aortic valve surgery approximately 60 years ago  Has heart murmur on exam  Last echocardiogram 3/2024 EF of 70%  Patient has chronic lower extremity edema but has no other signs of volume overload  Discussed with patient's daughter-in-law that patient appears to have been lost to follow-up for cardiology.  Patient previously seen by Dr. Balderrama 2/2024 at [facility] cardiology in [place].  Provided daughter with name and phone number to call and schedule cardiology appointment

## 2025-05-14 NOTE — HOSPITAL COURSE
83 y/o F PMHX of COPD, HTN, HLD, T2DM, and CKD 3B who presented to the ED 5/12 with a chief complaint of SOB and generalized weakness. Typically usees 3 L O2 at baseline but was requiring 5 L O2 at time of admission. Patient also found to have MOISES. Patient was initially treated with prednisone, duonebs, and azithromycin due to concern for COPD exacerbation. However, patient's BG levels rapidly increased to >500 requiring non-DKA insulin gtt. Prednisone was stopped. BG were trended until within goal and then patient taken off insulin gtt and placed on SSI. Patient continued at home inhalers and scheduled nebs while inpatient. Patient was able to be weaned down to her baseline 3 L of home O2. Patient seen and evaluated by PT/OT who recommended level 3. Patient agreeable to HHS and states this will make her feel more comfortable going home.    Patient was placed on contact precautions due to history of untreated bedbugs.     At time of discharge, patient reports significant improvement in symptoms since time of admission.  Patient is comfortable with and understanding of discharge preparations, plans, and appropriate follow-ups.  Patient and family agreeable to discharge.  All questions answered to the best of my ability and strict return precautions discussed.

## 2025-05-14 NOTE — DISCHARGE INSTR - AVS FIRST PAGE
Dear Gloria Patel,     It was our pleasure to care for you here at [facility].  It is our hope that we were always able to exceed the expected standards for your care during your stay.  You were hospitalized due to increased oxygen demand.  You were cared for on the 2nd floor by Chary Garcias DO under the service of Jayme Ang MD with the [facility] who covers for your primary care physician (PCP), Christine Charles DO, while you were hospitalized.  If you have any questions or concerns related to this hospitalization, you may contact us at .  For follow up as well as any medication refills, we recommend that you follow up with your primary care physician.  A registered nurse will reach out to you by phone within a few days after your discharge to answer any additional questions that you may have after going home.  However, at this time we provide for you here, the most important instructions / recommendations at discharge:     Notable Medication Adjustments -   Please START using Xopenex 1.25 mg/3 mL nebulizer two times per day AS NEEDED for wheezing, shortness of breath, or difficulty breathing.   Important follow up information -   Please follow-up with your PCP within a week of discharge  Other Instructions -   Please take all your medications regularly as directed  If symptoms return/persist contact your PCP if unable then visit to nearest ER  Please review this entire after visit summary as additional general instructions including medication list, appointments, activity, diet, any pertinent wound care, and other additional recommendations from your care team that may be provided for you.      Sincerely,     Chary Garcias DO

## 2025-05-14 NOTE — ASSESSMENT & PLAN NOTE
Lab Results   Component Value Date    EGFR 55 05/14/2025    EGFR 55 05/13/2025    EGFR 37 05/12/2025    CREATININE 0.94 05/14/2025    CREATININE 0.94 05/13/2025    CREATININE 1.32 (H) 05/12/2025

## 2025-05-14 NOTE — PLAN OF CARE
Problem: RESPIRATORY - ADULT  Goal: Achieves optimal ventilation and oxygenation  Description: INTERVENTIONS:- Assess for changes in respiratory status- Assess for changes in mentation and behavior- Position to facilitate oxygenation and minimize respiratory effort- Oxygen administered by appropriate delivery if ordered- Initiate smoking cessation education as indicated- Encourage broncho-pulmonary hygiene including cough, deep breathe, Incentive Spirometry- Assess the need for suctioning and aspirate as needed- Assess and instruct to report SOB or any respiratory difficulty- Respiratory Therapy support as indicated  Outcome: Progressing     Problem: PAIN - ADULT  Goal: Verbalizes/displays adequate comfort level or baseline comfort level  Description: Interventions:- Encourage patient to monitor pain and request assistance- Assess pain using appropriate pain scale- Administer analgesics based on type and severity of pain and evaluate response- Implement non-pharmacological measures as appropriate and evaluate response- Consider cultural and social influences on pain and pain management- Notify physician/advanced practitioner if interventions unsuccessful or patient reports new pain  Outcome: Progressing     Problem: INFECTION - ADULT  Goal: Absence or prevention of progression during hospitalization  Description: INTERVENTIONS:- Assess and monitor for signs and symptoms of infection- Monitor lab/diagnostic results- Monitor all insertion sites, i.e. indwelling lines, tubes, and drains- Monitor endotracheal if appropriate and nasal secretions for changes in amount and color- [place] appropriate cooling/warming therapies per order- Administer medications as ordered- Instruct and encourage patient and family to use good hand hygiene technique- Identify and instruct in appropriate isolation precautions for identified infection/condition  Outcome: Progressing

## 2025-05-14 NOTE — ASSESSMENT & PLAN NOTE
Lab Results   Component Value Date    HGBA1C 6.9 (H) 02/18/2025       Recent Labs     05/13/25  1103 05/13/25  1651 05/13/25 2059 05/14/25  0106   POCGLU 190* 206* 144* 146*       Blood Sugar Average: Last 72 hrs:  (P) [id number]  -PTA metformin 1000am 500pm and januvia 100 qd    Plan:  CHO diet  Continue POA DM medications on discharge

## 2025-05-15 ENCOUNTER — HOME CARE VISIT (OUTPATIENT)
Dept: HOME HEALTH SERVICES | Facility: HOME HEALTHCARE | Age: 85
End: 2025-05-15

## 2025-05-15 NOTE — UTILIZATION REVIEW
NOTIFICATION OF ADMISSION DISCHARGE   This is a Notification of Discharge from [facility]. Please be advised that this patient has been discharge from our facility. Below you will find the admission and discharge date and time including the patient’s disposition.   UTILIZATION REVIEW CONTACT:  Utilization Review Assistants  Network Utilization Review Department  Phone: [phone] x carefully listen to the prompts. All voicemails are confidential.  Email: [email or web address]     ADMISSION INFORMATION  PRESENTATION DATE: 5/11/2025  3:41 PM  OBERVATION ADMISSION DATE: 05/11/2025 2153  INPATIENT ADMISSION DATE: 5/12/25 11:42 AM   DISCHARGE DATE: 5/14/2025  3:30 PM   DISPOSITION:Home with Home Health Care    Network Utilization Review Department  ATTENTION: Please call with any questions or concerns to [phone] and carefully listen to the prompts so that you are directed to the right person. All voicemails are confidential.   For Discharge needs, contact Care Management DC Support Team at [phone] opt. 2  Send all requests for admission clinical reviews, approved or denied determinations and any other requests to dedicated fax number below belonging to the campus where the patient is receiving treatment. List of dedicated fax numbers for the Facilities:  FACILITY NAME UR FAX NUMBER   ADMISSION DENIALS (Administrative/Medical Necessity) [phone]   DISCHARGE SUPPORT TEAM ([facility]) [phone]   PARENT CHILD HEALTH (Maternity/NICU/Pediatrics) [phone]   [facility] [phone]   [facility] [phone]   [facility] [phone]   [facility] [phone]   [facility] [phone]   [facility] [phone]   [facility] [phone]   [facility]  [phone]   [facility] [phone]   [facility] [phone]   [facility] [phone]   [facility] [phone]

## 2025-05-15 NOTE — UTILIZATION REVIEW
NOTIFICATION OF ADMISSION DISCHARGE   This is a Notification of Discharge from [facility]. Please be advised that this patient has been discharge from our facility. Below you will find the admission and discharge date and time including the patient’s disposition.   UTILIZATION REVIEW CONTACT:  Utilization Review Assistants  Network Utilization Review Department  Phone: [phone] x carefully listen to the prompts. All voicemails are confidential.  Email: [email or web address]     ADMISSION INFORMATION  PRESENTATION DATE: 5/11/2025  3:41 PM  OBERVATION ADMISSION DATE: 05/11/2025 2153  INPATIENT ADMISSION DATE: 5/12/25 11:42 AM   DISCHARGE DATE: 5/14/2025  3:30 PM   DISPOSITION:Home with Home Health Care    Network Utilization Review Department  ATTENTION: Please call with any questions or concerns to [phone] and carefully listen to the prompts so that you are directed to the right person. All voicemails are confidential.   For Discharge needs, contact Care Management DC Support Team at [phone] opt. 2  Send all requests for admission clinical reviews, approved or denied determinations and any other requests to dedicated fax number below belonging to the campus where the patient is receiving treatment. List of dedicated fax numbers for the Facilities:  FACILITY NAME UR FAX NUMBER   ADMISSION DENIALS (Administrative/Medical Necessity) [phone]   DISCHARGE SUPPORT TEAM ([facility]) [phone]   PARENT CHILD HEALTH (Maternity/NICU/Pediatrics) [phone]   [facility] [phone]   [facility] [phone]   [facility] [phone]   [facility] [phone]   [facility] [phone]   [facility] [phone]   [facility] [phone]   [facility]  [phone]   [facility] [phone]   [facility] [phone]   [facility] [phone]   [facility] [phone]

## 2025-05-16 LAB
BACTERIA BLD CULT: NORMAL
BACTERIA BLD CULT: NORMAL

## 2025-05-17 ENCOUNTER — HOME CARE VISIT (OUTPATIENT)
Dept: HOME HEALTH SERVICES | Facility: HOME HEALTHCARE | Age: 85
End: 2025-05-17
Payer: COMMERCIAL

## 2025-05-17 VITALS
HEIGHT: 60 IN | BODY MASS INDEX: 31.41 KG/M2 | HEART RATE: 75 BPM | RESPIRATION RATE: 16 BRPM | WEIGHT: 160 LBS | DIASTOLIC BLOOD PRESSURE: 80 MMHG | OXYGEN SATURATION: 90 % | SYSTOLIC BLOOD PRESSURE: 160 MMHG | TEMPERATURE: 97 F

## 2025-05-17 PROCEDURE — 400013 VN SOC

## 2025-05-17 PROCEDURE — G0299 HHS/HOSPICE OF RN EA 15 MIN: HCPCS

## 2025-05-19 ENCOUNTER — HOME CARE VISIT (OUTPATIENT)
Dept: HOME HEALTH SERVICES | Facility: HOME HEALTHCARE | Age: 85
End: 2025-05-19
Payer: COMMERCIAL

## 2025-05-19 VITALS — HEART RATE: 81 BPM | SYSTOLIC BLOOD PRESSURE: 170 MMHG | OXYGEN SATURATION: 87 % | DIASTOLIC BLOOD PRESSURE: 82 MMHG

## 2025-05-19 PROCEDURE — G0151 HHCP-SERV OF PT,EA 15 MIN: HCPCS

## 2025-05-20 ENCOUNTER — HOME CARE VISIT (OUTPATIENT)
Dept: HOME HEALTH SERVICES | Facility: HOME HEALTHCARE | Age: 85
End: 2025-05-20
Payer: COMMERCIAL

## 2025-05-20 VITALS
TEMPERATURE: 97.6 F | HEART RATE: 90 BPM | DIASTOLIC BLOOD PRESSURE: 100 MMHG | RESPIRATION RATE: 20 BRPM | OXYGEN SATURATION: 98 % | SYSTOLIC BLOOD PRESSURE: 180 MMHG

## 2025-05-20 PROCEDURE — G0299 HHS/HOSPICE OF RN EA 15 MIN: HCPCS

## 2025-05-22 ENCOUNTER — HOME CARE VISIT (OUTPATIENT)
Dept: HOME HEALTH SERVICES | Facility: HOME HEALTHCARE | Age: 85
End: 2025-05-22
Payer: COMMERCIAL

## 2025-05-22 VITALS
DIASTOLIC BLOOD PRESSURE: 80 MMHG | TEMPERATURE: 97.9 F | HEART RATE: 78 BPM | RESPIRATION RATE: 20 BRPM | OXYGEN SATURATION: 95 % | SYSTOLIC BLOOD PRESSURE: 168 MMHG

## 2025-05-22 VITALS — DIASTOLIC BLOOD PRESSURE: 88 MMHG | SYSTOLIC BLOOD PRESSURE: 160 MMHG | HEART RATE: 75 BPM

## 2025-05-22 PROCEDURE — G0151 HHCP-SERV OF PT,EA 15 MIN: HCPCS

## 2025-05-22 PROCEDURE — G0299 HHS/HOSPICE OF RN EA 15 MIN: HCPCS

## 2025-05-23 ENCOUNTER — HOME CARE VISIT (OUTPATIENT)
Dept: HOME HEALTH SERVICES | Facility: HOME HEALTHCARE | Age: 85
End: 2025-05-23
Payer: COMMERCIAL

## 2025-05-23 PROCEDURE — G0152 HHCP-SERV OF OT,EA 15 MIN: HCPCS

## 2025-05-27 ENCOUNTER — HOME CARE VISIT (OUTPATIENT)
Dept: HOME HEALTH SERVICES | Facility: HOME HEALTHCARE | Age: 85
End: 2025-05-27
Payer: COMMERCIAL

## 2025-05-27 VITALS
SYSTOLIC BLOOD PRESSURE: 152 MMHG | DIASTOLIC BLOOD PRESSURE: 90 MMHG | RESPIRATION RATE: 22 BRPM | TEMPERATURE: 97.2 F | OXYGEN SATURATION: 91 % | HEART RATE: 88 BPM

## 2025-05-27 PROCEDURE — G0300 HHS/HOSPICE OF LPN EA 15 MIN: HCPCS

## 2025-05-28 VITALS — SYSTOLIC BLOOD PRESSURE: 106 MMHG | DIASTOLIC BLOOD PRESSURE: 72 MMHG | OXYGEN SATURATION: 97 % | HEART RATE: 81 BPM

## 2025-05-29 ENCOUNTER — HOME CARE VISIT (OUTPATIENT)
Dept: HOME HEALTH SERVICES | Facility: HOME HEALTHCARE | Age: 85
End: 2025-05-29
Payer: COMMERCIAL

## 2025-05-29 VITALS
RESPIRATION RATE: 19 BRPM | HEART RATE: 78 BPM | OXYGEN SATURATION: 97 % | TEMPERATURE: 97.9 F | SYSTOLIC BLOOD PRESSURE: 110 MMHG | DIASTOLIC BLOOD PRESSURE: 68 MMHG

## 2025-05-29 VITALS — HEART RATE: 85 BPM | SYSTOLIC BLOOD PRESSURE: 142 MMHG | DIASTOLIC BLOOD PRESSURE: 64 MMHG | OXYGEN SATURATION: 79 %

## 2025-05-29 PROCEDURE — G0151 HHCP-SERV OF PT,EA 15 MIN: HCPCS

## 2025-05-29 PROCEDURE — G0299 HHS/HOSPICE OF RN EA 15 MIN: HCPCS

## 2025-05-30 ENCOUNTER — HOME CARE VISIT (OUTPATIENT)
Dept: HOME HEALTH SERVICES | Facility: HOME HEALTHCARE | Age: 85
End: 2025-05-30
Payer: COMMERCIAL

## 2025-05-31 ENCOUNTER — APPOINTMENT (EMERGENCY)
Dept: RADIOLOGY | Facility: HOSPITAL | Age: 85
DRG: 189 | End: 2025-05-31
Payer: COMMERCIAL

## 2025-05-31 ENCOUNTER — HOME CARE VISIT (OUTPATIENT)
Dept: HOME HEALTH SERVICES | Facility: HOME HEALTHCARE | Age: 85
End: 2025-05-31
Payer: COMMERCIAL

## 2025-05-31 ENCOUNTER — HOSPITAL ENCOUNTER (INPATIENT)
Facility: HOSPITAL | Age: 85
LOS: 4 days | Discharge: HOME HEALTH | DRG: 189 | End: 2025-06-04
Attending: EMERGENCY MEDICINE | Admitting: HOSPITALIST
Payer: COMMERCIAL

## 2025-05-31 DIAGNOSIS — J96.01 ACUTE HYPOXIC RESPIRATORY FAILURE (HCC): Primary | ICD-10-CM

## 2025-05-31 DIAGNOSIS — J44.1 COPD EXACERBATION (HCC): ICD-10-CM

## 2025-05-31 DIAGNOSIS — K59.00 CONSTIPATION: ICD-10-CM

## 2025-05-31 DIAGNOSIS — R09.02 HYPOXIA: ICD-10-CM

## 2025-05-31 DIAGNOSIS — E11.9 TYPE 2 DIABETES MELLITUS WITHOUT COMPLICATION, UNSPECIFIED WHETHER LONG TERM INSULIN USE (HCC): ICD-10-CM

## 2025-05-31 PROBLEM — R60.0 BILATERAL LOWER EXTREMITY EDEMA: Status: ACTIVE | Noted: 2025-05-31

## 2025-05-31 LAB
2HR DELTA HS TROPONIN: 1 NG/L
4HR DELTA HS TROPONIN: -1 NG/L
ALBUMIN SERPL BCG-MCNC: 4.3 G/DL (ref 3.5–5)
ALP SERPL-CCNC: 88 U/L (ref 34–104)
ALT SERPL W P-5'-P-CCNC: 10 U/L (ref 7–52)
ANION GAP SERPL CALCULATED.3IONS-SCNC: 9 MMOL/L (ref 4–13)
AST SERPL W P-5'-P-CCNC: 25 U/L (ref 13–39)
BASE EX.OXY STD BLDV CALC-SCNC: 87.5 % (ref 60–80)
BASE EXCESS BLDV CALC-SCNC: 2 MMOL/L
BASOPHILS # BLD AUTO: 0.07 THOUSANDS/ÂΜL (ref 0–0.1)
BASOPHILS NFR BLD AUTO: 1 % (ref 0–1)
BILIRUB SERPL-MCNC: 0.55 MG/DL (ref 0.2–1)
BNP SERPL-MCNC: 72 PG/ML (ref 0–100)
BUN SERPL-MCNC: 13 MG/DL (ref 5–25)
CALCIUM SERPL-MCNC: 9.8 MG/DL (ref 8.4–10.2)
CARDIAC TROPONIN I PNL SERPL HS: 5 NG/L (ref ?–50)
CARDIAC TROPONIN I PNL SERPL HS: 6 NG/L (ref ?–50)
CARDIAC TROPONIN I PNL SERPL HS: 7 NG/L (ref ?–50)
CHLORIDE SERPL-SCNC: 99 MMOL/L (ref 96–108)
CO2 SERPL-SCNC: 30 MMOL/L (ref 21–32)
CREAT SERPL-MCNC: 0.75 MG/DL (ref 0.6–1.3)
EOSINOPHIL # BLD AUTO: 0.42 THOUSAND/ÂΜL (ref 0–0.61)
EOSINOPHIL NFR BLD AUTO: 6 % (ref 0–6)
ERYTHROCYTE [DISTWIDTH] IN BLOOD BY AUTOMATED COUNT: 15.9 % (ref 11.6–15.1)
FLUAV RNA RESP QL NAA+PROBE: NEGATIVE
FLUBV RNA RESP QL NAA+PROBE: NEGATIVE
GFR SERPL CREATININE-BSD FRML MDRD: 73 ML/MIN/1.73SQ M
GLUCOSE SERPL-MCNC: 273 MG/DL (ref 65–140)
GLUCOSE SERPL-MCNC: 97 MG/DL (ref 65–140)
HCO3 BLDV-SCNC: 28 MMOL/L (ref 24–30)
HCT VFR BLD AUTO: 39.7 % (ref 34.8–46.1)
HGB BLD-MCNC: 12 G/DL (ref 11.5–15.4)
IMM GRANULOCYTES # BLD AUTO: 0.02 THOUSAND/UL (ref 0–0.2)
IMM GRANULOCYTES NFR BLD AUTO: 0 % (ref 0–2)
LYMPHOCYTES # BLD AUTO: 0.97 THOUSANDS/ÂΜL (ref 0.6–4.47)
LYMPHOCYTES NFR BLD AUTO: 14 % (ref 14–44)
MCH RBC QN AUTO: 26.6 PG (ref 26.8–34.3)
MCHC RBC AUTO-ENTMCNC: 30.2 G/DL (ref 31.4–37.4)
MCV RBC AUTO: 88 FL (ref 82–98)
MONOCYTES # BLD AUTO: 0.86 THOUSAND/ÂΜL (ref 0.17–1.22)
MONOCYTES NFR BLD AUTO: 12 % (ref 4–12)
NEUTROPHILS # BLD AUTO: 4.66 THOUSANDS/ÂΜL (ref 1.85–7.62)
NEUTS SEG NFR BLD AUTO: 67 % (ref 43–75)
NRBC BLD AUTO-RTO: 0 /100 WBCS
O2 CT BLDV-SCNC: 14.9 ML/DL
PCO2 BLDV: 49.8 MM HG (ref 42–50)
PH BLDV: 7.37 [PH] (ref 7.3–7.4)
PLATELET # BLD AUTO: 231 THOUSANDS/UL (ref 149–390)
PMV BLD AUTO: 10 FL (ref 8.9–12.7)
PO2 BLDV: 55.6 MM HG (ref 35–45)
POTASSIUM SERPL-SCNC: 4.9 MMOL/L (ref 3.5–5.3)
PROCALCITONIN SERPL-MCNC: <0.05 NG/ML
PROT SERPL-MCNC: 7.2 G/DL (ref 6.4–8.4)
RBC # BLD AUTO: 4.51 MILLION/UL (ref 3.81–5.12)
RSV RNA RESP QL NAA+PROBE: NEGATIVE
SARS-COV-2 RNA RESP QL NAA+PROBE: NEGATIVE
SODIUM SERPL-SCNC: 138 MMOL/L (ref 135–147)
WBC # BLD AUTO: 7 THOUSAND/UL (ref 4.31–10.16)

## 2025-05-31 PROCEDURE — 85025 COMPLETE CBC W/AUTO DIFF WBC: CPT

## 2025-05-31 PROCEDURE — 80053 COMPREHEN METABOLIC PANEL: CPT

## 2025-05-31 PROCEDURE — 94002 VENT MGMT INPAT INIT DAY: CPT

## 2025-05-31 PROCEDURE — 82805 BLOOD GASES W/O2 SATURATION: CPT | Performed by: EMERGENCY MEDICINE

## 2025-05-31 PROCEDURE — 93005 ELECTROCARDIOGRAM TRACING: CPT

## 2025-05-31 PROCEDURE — 99223 1ST HOSP IP/OBS HIGH 75: CPT

## 2025-05-31 PROCEDURE — 94760 N-INVAS EAR/PLS OXIMETRY 1: CPT

## 2025-05-31 PROCEDURE — 84145 PROCALCITONIN (PCT): CPT

## 2025-05-31 PROCEDURE — 0241U HB NFCT DS VIR RESP RNA 4 TRGT: CPT

## 2025-05-31 PROCEDURE — 71045 X-RAY EXAM CHEST 1 VIEW: CPT

## 2025-05-31 PROCEDURE — 96375 TX/PRO/DX INJ NEW DRUG ADDON: CPT

## 2025-05-31 PROCEDURE — 99291 CRITICAL CARE FIRST HOUR: CPT | Performed by: EMERGENCY MEDICINE

## 2025-05-31 PROCEDURE — 94664 DEMO&/EVAL PT USE INHALER: CPT

## 2025-05-31 PROCEDURE — 83880 ASSAY OF NATRIURETIC PEPTIDE: CPT

## 2025-05-31 PROCEDURE — 84484 ASSAY OF TROPONIN QUANT: CPT

## 2025-05-31 PROCEDURE — 5A09357 ASSISTANCE WITH RESPIRATORY VENTILATION, LESS THAN 24 CONSECUTIVE HOURS, CONTINUOUS POSITIVE AIRWAY PRESSURE: ICD-10-PCS | Performed by: HOSPITALIST

## 2025-05-31 PROCEDURE — 99285 EMERGENCY DEPT VISIT HI MDM: CPT

## 2025-05-31 PROCEDURE — 96366 THER/PROPH/DIAG IV INF ADDON: CPT

## 2025-05-31 PROCEDURE — 96365 THER/PROPH/DIAG IV INF INIT: CPT

## 2025-05-31 PROCEDURE — 36415 COLL VENOUS BLD VENIPUNCTURE: CPT

## 2025-05-31 PROCEDURE — 82948 REAGENT STRIP/BLOOD GLUCOSE: CPT

## 2025-05-31 RX ORDER — GABAPENTIN 300 MG/1
300 CAPSULE ORAL 4 TIMES DAILY
Status: DISCONTINUED | OUTPATIENT
Start: 2025-05-31 | End: 2025-06-04 | Stop reason: HOSPADM

## 2025-05-31 RX ORDER — ATORVASTATIN CALCIUM 40 MG/1
40 TABLET, FILM COATED ORAL EVERY EVENING
Status: DISCONTINUED | OUTPATIENT
Start: 2025-05-31 | End: 2025-06-04 | Stop reason: HOSPADM

## 2025-05-31 RX ORDER — SENNOSIDES 8.6 MG
17.2 TABLET ORAL
Status: DISCONTINUED | OUTPATIENT
Start: 2025-05-31 | End: 2025-06-04 | Stop reason: HOSPADM

## 2025-05-31 RX ORDER — MAGNESIUM SULFATE HEPTAHYDRATE 40 MG/ML
2 INJECTION, SOLUTION INTRAVENOUS ONCE
Status: COMPLETED | OUTPATIENT
Start: 2025-05-31 | End: 2025-05-31

## 2025-05-31 RX ORDER — METHYLPREDNISOLONE SODIUM SUCCINATE 125 MG/2ML
100 INJECTION, POWDER, LYOPHILIZED, FOR SOLUTION INTRAMUSCULAR; INTRAVENOUS ONCE
Status: COMPLETED | OUTPATIENT
Start: 2025-05-31 | End: 2025-05-31

## 2025-05-31 RX ORDER — ACETAMINOPHEN 325 MG/1
975 TABLET ORAL EVERY 8 HOURS PRN
Status: DISCONTINUED | OUTPATIENT
Start: 2025-05-31 | End: 2025-06-04 | Stop reason: HOSPADM

## 2025-05-31 RX ORDER — ROPINIROLE 0.25 MG/1
0.5 TABLET, FILM COATED ORAL
Status: DISCONTINUED | OUTPATIENT
Start: 2025-05-31 | End: 2025-06-04 | Stop reason: HOSPADM

## 2025-05-31 RX ORDER — ASCORBIC ACID, THIAMINE MONONITRATE,RIBOFLAVIN, NIACINAMIDE, PYRIDOXINE HYDROCHLORIDE, FOLIC ACID, CYANOCOBALAMIN, BIOTIN, CALCIUM PANTOTHENATE, 100; 1.5; 1.7; 20; 10; 1; 6000; 150000; 5 MG/1; MG/1; MG/1; MG/1; MG/1; MG/1; UG/1; UG/1; MG/1
1 CAPSULE, LIQUID FILLED ORAL
Status: DISCONTINUED | OUTPATIENT
Start: 2025-06-01 | End: 2025-06-04 | Stop reason: HOSPADM

## 2025-05-31 RX ORDER — FUROSEMIDE 10 MG/ML
40 INJECTION INTRAMUSCULAR; INTRAVENOUS DAILY
Status: DISCONTINUED | OUTPATIENT
Start: 2025-06-01 | End: 2025-06-01

## 2025-05-31 RX ORDER — LEVALBUTEROL INHALATION SOLUTION 1.25 MG/3ML
1.25 SOLUTION RESPIRATORY (INHALATION)
Status: DISCONTINUED | OUTPATIENT
Start: 2025-05-31 | End: 2025-06-04 | Stop reason: HOSPADM

## 2025-05-31 RX ORDER — PANTOPRAZOLE SODIUM 40 MG/1
40 TABLET, DELAYED RELEASE ORAL EVERY 12 HOURS SCHEDULED
Status: DISCONTINUED | OUTPATIENT
Start: 2025-05-31 | End: 2025-06-04

## 2025-05-31 RX ORDER — ASPIRIN 81 MG/1
81 TABLET, CHEWABLE ORAL DAILY
Status: DISCONTINUED | OUTPATIENT
Start: 2025-06-01 | End: 2025-06-04 | Stop reason: HOSPADM

## 2025-05-31 RX ORDER — BRIMONIDINE TARTRATE 2 MG/ML
1 SOLUTION/ DROPS OPHTHALMIC 2 TIMES DAILY
Status: DISCONTINUED | OUTPATIENT
Start: 2025-06-01 | End: 2025-06-04 | Stop reason: HOSPADM

## 2025-05-31 RX ORDER — DILTIAZEM HYDROCHLORIDE 240 MG/1
240 CAPSULE, COATED, EXTENDED RELEASE ORAL DAILY
Status: DISCONTINUED | OUTPATIENT
Start: 2025-06-01 | End: 2025-06-04 | Stop reason: HOSPADM

## 2025-05-31 RX ORDER — AZITHROMYCIN 250 MG/1
500 TABLET, FILM COATED ORAL
Status: COMPLETED | OUTPATIENT
Start: 2025-05-31 | End: 2025-06-02

## 2025-05-31 RX ORDER — BISACODYL 10 MG
10 SUPPOSITORY, RECTAL RECTAL DAILY PRN
Status: DISCONTINUED | OUTPATIENT
Start: 2025-05-31 | End: 2025-06-04 | Stop reason: HOSPADM

## 2025-05-31 RX ORDER — METHYLPREDNISOLONE SODIUM SUCCINATE 40 MG/ML
40 INJECTION, POWDER, LYOPHILIZED, FOR SOLUTION INTRAMUSCULAR; INTRAVENOUS EVERY 12 HOURS SCHEDULED
Status: DISCONTINUED | OUTPATIENT
Start: 2025-06-01 | End: 2025-06-01

## 2025-05-31 RX ORDER — HEPARIN SODIUM 5000 [USP'U]/ML
5000 INJECTION, SOLUTION INTRAVENOUS; SUBCUTANEOUS EVERY 8 HOURS SCHEDULED
Status: DISCONTINUED | OUTPATIENT
Start: 2025-05-31 | End: 2025-06-04 | Stop reason: HOSPADM

## 2025-05-31 RX ORDER — FERROUS SULFATE 325(65) MG
325 TABLET ORAL EVERY OTHER DAY
Status: DISCONTINUED | OUTPATIENT
Start: 2025-06-01 | End: 2025-06-04 | Stop reason: HOSPADM

## 2025-05-31 RX ORDER — FUROSEMIDE 10 MG/ML
40 INJECTION INTRAMUSCULAR; INTRAVENOUS ONCE
Status: COMPLETED | OUTPATIENT
Start: 2025-05-31 | End: 2025-05-31

## 2025-05-31 RX ORDER — ALBUTEROL SULFATE 90 UG/1
2 INHALANT RESPIRATORY (INHALATION) EVERY 6 HOURS PRN
Status: DISCONTINUED | OUTPATIENT
Start: 2025-05-31 | End: 2025-06-04 | Stop reason: HOSPADM

## 2025-05-31 RX ORDER — MORPHINE SULFATE 15 MG/1
15 TABLET ORAL 2 TIMES DAILY
Status: DISCONTINUED | OUTPATIENT
Start: 2025-05-31 | End: 2025-06-04 | Stop reason: HOSPADM

## 2025-05-31 RX ORDER — IPRATROPIUM BROMIDE AND ALBUTEROL SULFATE 2.5; .5 MG/3ML; MG/3ML
3 SOLUTION RESPIRATORY (INHALATION) ONCE
Status: COMPLETED | OUTPATIENT
Start: 2025-05-31 | End: 2025-05-31

## 2025-05-31 RX ORDER — INSULIN LISPRO 100 [IU]/ML
1-6 INJECTION, SOLUTION INTRAVENOUS; SUBCUTANEOUS
Status: DISCONTINUED | OUTPATIENT
Start: 2025-05-31 | End: 2025-06-02

## 2025-05-31 RX ORDER — LOSARTAN POTASSIUM 50 MG/1
50 TABLET ORAL DAILY
Status: DISCONTINUED | OUTPATIENT
Start: 2025-06-01 | End: 2025-06-04 | Stop reason: HOSPADM

## 2025-05-31 RX ADMIN — HEPARIN SODIUM 5000 UNITS: 5000 INJECTION INTRAVENOUS; SUBCUTANEOUS at 22:55

## 2025-05-31 RX ADMIN — PANTOPRAZOLE SODIUM 40 MG: 40 TABLET, DELAYED RELEASE ORAL at 22:55

## 2025-05-31 RX ADMIN — IPRATROPIUM BROMIDE AND ALBUTEROL SULFATE 3 ML: .5; 3 SOLUTION RESPIRATORY (INHALATION) at 18:24

## 2025-05-31 RX ADMIN — ATORVASTATIN CALCIUM 40 MG: 40 TABLET, FILM COATED ORAL at 22:55

## 2025-05-31 RX ADMIN — IPRATROPIUM BROMIDE AND ALBUTEROL SULFATE 3 ML: .5; 3 SOLUTION RESPIRATORY (INHALATION) at 20:29

## 2025-05-31 RX ADMIN — ROPINIROLE HYDROCHLORIDE 0.5 MG: 0.25 TABLET, FILM COATED ORAL at 22:55

## 2025-05-31 RX ADMIN — INSULIN LISPRO 4 UNITS: 100 INJECTION, SOLUTION INTRAVENOUS; SUBCUTANEOUS at 22:55

## 2025-05-31 RX ADMIN — GABAPENTIN 300 MG: 300 CAPSULE ORAL at 22:55

## 2025-05-31 RX ADMIN — MAGNESIUM SULFATE HEPTAHYDRATE 2 G: 40 INJECTION, SOLUTION INTRAVENOUS at 18:40

## 2025-05-31 RX ADMIN — LEVALBUTEROL HYDROCHLORIDE 1.25 MG: 1.25 SOLUTION RESPIRATORY (INHALATION) at 22:55

## 2025-05-31 RX ADMIN — FUROSEMIDE 40 MG: 10 INJECTION, SOLUTION INTRAVENOUS at 18:41

## 2025-05-31 RX ADMIN — AZITHROMYCIN DIHYDRATE 500 MG: 250 TABLET ORAL at 22:55

## 2025-05-31 RX ADMIN — METHYLPREDNISOLONE SODIUM SUCCINATE 100 MG: 125 INJECTION, POWDER, FOR SOLUTION INTRAMUSCULAR; INTRAVENOUS at 18:39

## 2025-05-31 RX ADMIN — MORPHINE SULFATE 15 MG: 15 TABLET ORAL at 22:55

## 2025-05-31 NOTE — ED ATTENDING ATTESTATION
5/31/2025  I, Lj Neely DO, saw and evaluated the patient. I have discussed the patient with the resident/non-physician practitioner and agree with the resident's/non-physician practitioner's findings, Plan of Care, and MDM as documented in the resident's/non-physician practitioner's note, except where noted. All available labs and Radiology studies were reviewed.  I was present for key portions of any procedure(s) performed by the resident/non-physician practitioner and I was immediately available to provide assistance.       At this point I agree with the current assessment done in the Emergency Department.  I have conducted an independent evaluation of this patient a history and physical is as follows:          1. Acute hypoxic respiratory failure (HCC)    2. COPD exacerbation (HCC)    3. Hypoxia            MDM  Number of Diagnoses or Management Options  Acute hypoxic respiratory failure (HCC)  COPD exacerbation (HCC)  Hypoxia  Diagnosis management comments:       Initial ED assessment:     84-year-old female, presents in significant respiratory distress.  Increased work of breathing, COPD history of CHF.    Pathology at risk for includes but is not limited to:     COPD exacerbation.  CHF exacerbation.,  Pneumonia    Initial ED plan:    significant respiratory distress.  Will start on BiPAP.,  Will check blood work, chest x-ray, IV Lasix due to concern for fluid overload, breathing treatments.,  Admission, ICU versus floor.        Final ED summary/disposition:   After evaluation and workup in the emergency department, patient did very well on BiPAP eventually this was able to be taken off.  Did receive multiple breathing treatments and a dose of Lasix in the ER.  Admitted to internal medicine service.               Time reflects when diagnosis was documented in both MDM as applicable and the Disposition within this note       Time User Action Codes Description Comment    5/31/2025  9:14 PM Kelley Velasquez  Add [J44.1] COPD exacerbation (HCC)     5/31/2025  9:14 PM Kelley Velasquez Add [R09.02] Hypoxia     5/31/2025 11:07 PM Lj Neely Add [J96.01] Acute hypoxic respiratory failure (HCC)     5/31/2025 11:07 PM Lj Neely Modify [J44.1] COPD exacerbation (HCC)     5/31/2025 11:07 PM Lj Neely Modify [J96.01] Acute hypoxic respiratory failure (HCC)           ED Disposition       ED Disposition   Admit    Condition   Stable    Date/Time   Sat May 31, 2025  9:14 PM    Comment   Case was discussed with MAR and the patient's admission status was agreed to be Admission Status: inpatient status to the service of Dr. Ang .               Follow-up Information    None                       Chief Complaint   Patient presents with    Shortness of Breath     Pt hx of COPD and CHF states she is having increased SOB. Denies pain or other sx. Pt on 4L o2 chronically.                 History provided by:  Patient and spouse  Shortness of Breath  Severity:  Severe  Onset quality:  Gradual  Duration:  2 days  Timing:  Constant  Progression:  Worsening  Chronicity:  Recurrent  Context comment:  History of COPD, but presents with increased work of breathing and increased swelling of her bilateral lower extremities.  No associated chest pain.  2 word dyspnea  Relieved by:  Nothing  Exacerbated by: Talking, any activity.  Associated symptoms: cough and wheezing    Associated symptoms: no chest pain and no sputum production                  Visit Vitals  /65   Pulse 101   Temp 98.5 °F (36.9 °C) (Oral)   Resp 20   Wt 75.9 kg (167 lb 5.3 oz)   SpO2 94%   BMI 32.68 kg/m²   OB Status Hysterectomy   Smoking Status Former   BSA 1.73 m²        Physical Exam  Vitals reviewed.   Constitutional:       General: She is in acute distress.      Appearance: She is well-developed. She is ill-appearing. She is not diaphoretic.   HENT:      Head: Normocephalic and atraumatic.      Right Ear: External ear normal.      Left Ear:  External ear normal.      Nose: Nose normal.     Eyes:      General:         Right eye: No discharge.         Left eye: No discharge.      Pupils: Pupils are equal, round, and reactive to light.     Neck:      Trachea: No tracheal deviation.     Cardiovascular:      Rate and Rhythm: Normal rate and regular rhythm.      Heart sounds: Normal heart sounds. No murmur heard.  Pulmonary:      Effort: Tachypnea, accessory muscle usage and respiratory distress present.      Breath sounds: No stridor. Decreased breath sounds, wheezing and rales present.      Comments: Respiratory rate 45 at my initial time of examination.  Abdominal:      General: There is no distension.      Palpations: Abdomen is soft.      Tenderness: There is no abdominal tenderness. There is no guarding or rebound.     Musculoskeletal:         General: Normal range of motion.      Cervical back: Normal range of motion and neck supple.     Skin:     General: Skin is warm and dry.      Coloration: Skin is not pale.      Findings: No erythema.     Neurological:      General: No focal deficit present.      Mental Status: She is alert and oriented to person, place, and time.               ED Course as of 05/31/25 2311   Sat May 31, 2025   1908 Multiple repeat evaluations steady improvement.  Respiratory rate decreased from 40 to 20 while on BiPAP.  Will continue with current settings.          Medications   albuterol (PROVENTIL HFA,VENTOLIN HFA) inhaler 2 puff (has no administration in time range)   aspirin chewable tablet 81 mg (has no administration in time range)   atorvastatin (LIPITOR) tablet 40 mg (40 mg Oral Given 5/31/25 2255)   vit B complex-vit C-folic acid (Renal Caps) capsule 1 capsule (has no administration in time range)   bisacodyl (DULCOLAX) rectal suppository 10 mg (has no administration in time range)   brimonidine tartrate 0.2 % ophthalmic solution 1 drop (has no administration in time range)   diltiazem (CARDIZEM CD) 24 hr capsule 240 mg  (has no administration in time range)   ferrous sulfate tablet 325 mg (has no administration in time range)   gabapentin (NEURONTIN) capsule 300 mg (300 mg Oral Given 5/31/25 2255)   losartan (COZAAR) tablet 50 mg (has no administration in time range)   morphine (MSIR) IR tablet 15 mg (15 mg Oral Given 5/31/25 2255)   pantoprazole (PROTONIX) EC tablet 40 mg (40 mg Oral Given 5/31/25 2255)   rOPINIRole (REQUIP) tablet 0.5 mg (0.5 mg Oral Given 5/31/25 2255)   senna (SENOKOT) tablet 17.2 mg (has no administration in time range)   methylPREDNISolone sodium succinate (Solu-MEDROL) injection 40 mg (has no administration in time range)   azithromycin (ZITHROMAX) tablet 500 mg (500 mg Oral Given 5/31/25 2255)   heparin (porcine) subcutaneous injection 5,000 Units (5,000 Units Subcutaneous Given 5/31/25 2255)   insulin lispro (HumALOG/ADMELOG) 100 units/mL subcutaneous injection 1-6 Units (4 Units Subcutaneous Given 5/31/25 2255)   acetaminophen (TYLENOL) tablet 975 mg (has no administration in time range)   furosemide (LASIX) injection 40 mg (has no administration in time range)   levalbuterol (XOPENEX) inhalation solution 1.25 mg (1.25 mg Nebulization Given 5/31/25 2255)   ipratropium (ATROVENT) 0.02 % inhalation solution 0.5 mg (has no administration in time range)   ipratropium-albuterol (DUO-NEB) 0.5-2.5 mg/3 mL inhalation solution 3 mL (3 mL Nebulization Given 5/31/25 1824)   methylPREDNISolone sodium succinate (Solu-MEDROL) injection 100 mg (100 mg Intravenous Given 5/31/25 1839)   magnesium sulfate 2 g/50 mL IVPB (premix) 2 g (0 g Intravenous Stopped 5/31/25 2038)   furosemide (LASIX) injection 40 mg (40 mg Intravenous Given 5/31/25 1841)   ipratropium-albuterol (DUO-NEB) 0.5-2.5 mg/3 mL inhalation solution 3 mL (3 mL Nebulization Given 5/31/25 2029)             Labs Reviewed   CBC AND DIFFERENTIAL - Abnormal       Result Value Ref Range Status    WBC 7.00  4.31 - 10.16 Thousand/uL Final    RBC 4.51  3.81 - 5.12  Million/uL Final    Hemoglobin 12.0  11.5 - 15.4 g/dL Final    Hematocrit 39.7  34.8 - 46.1 % Final    MCV 88  82 - 98 fL Final    MCH 26.6 (*) 26.8 - 34.3 pg Final    MCHC 30.2 (*) 31.4 - 37.4 g/dL Final    RDW 15.9 (*) 11.6 - 15.1 % Final    MPV 10.0  8.9 - 12.7 fL Final    Platelets 231  149 - 390 Thousands/uL Final    nRBC 0  /100 WBCs Final    Segmented % 67  43 - 75 % Final    Immature Grans % 0  0 - 2 % Final    Lymphocytes % 14  14 - 44 % Final    Monocytes % 12  4 - 12 % Final    Eosinophils Relative 6  0 - 6 % Final    Basophils Relative 1  0 - 1 % Final    Absolute Neutrophils 4.66  1.85 - 7.62 Thousands/µL Final    Absolute Immature Grans 0.02  0.00 - 0.20 Thousand/uL Final    Absolute Lymphocytes 0.97  0.60 - 4.47 Thousands/µL Final    Absolute Monocytes 0.86  0.17 - 1.22 Thousand/µL Final    Eosinophils Absolute 0.42  0.00 - 0.61 Thousand/µL Final    Basophils Absolute 0.07  0.00 - 0.10 Thousands/µL Final   BLOOD GAS, VENOUS - Abnormal    pH, Skip 7.368  7.300 - 7.400 Final    pCO2, Skip 49.8  42.0 - 50.0 mm Hg Final    pO2, Skip 55.6 (*) 35.0 - 45.0 mm Hg Final    HCO3, Skip 28.0  24 - 30 mmol/L Final    Base Excess, Skip 2.0  mmol/L Final    O2 Content, Skip 14.9  ml/dL Final    O2 HGB, VENOUS 87.5 (*) 60.0 - 80.0 % Final   POCT GLUCOSE - Abnormal    POC Glucose 273 (*) 65 - 140 mg/dl Final   COVID19, INFLUENZA A/B, RSV PCR, [facility] - Normal    SARS-CoV-2 Negative  Negative Final    Comment:      INFLUENZA A PCR Negative  Negative Final    Comment:      INFLUENZA B PCR Negative  Negative Final    Comment:      RSV PCR Negative  Negative Final    Comment:      Narrative:     This test has been performed using the CoV-2/Flu/RSV plus assay on the [id number] GeneXpert platform. This test has been validated by the  and verified by the performing laboratory.     This test is designed to amplify and detect the following: nucleocapsid (N), envelope (E), and RNA-dependent RNA polymerase (RdRP) genes of the  "SARS-CoV-2 genome; matrix (M), basic polymerase (PB2), and acidic protein (PA) segments of the influenza A genome; matrix (M) and non-structural protein (NS) segments of the influenza B genome, and the nucleocapsid genes of RSV A and RSV B.     Positive results are indicative of the presence of Flu A, Flu B, RSV, and/or SARS-CoV-2 RNA. Positive results for SARS-CoV-2 or suspected novel influenza should be reported to state, local, or federal health departments according to local reporting requirements.      All results should be assessed in conjunction with clinical presentation and other laboratory markers for clinical management.     FOR PEDIATRIC PATIENTS - copy/paste COVID Guidelines URL to browser: [email or web address]      B-TYPE NATRIURETIC PEPTIDE (BNP) - Normal    BNP 72  0 - 100 pg/mL Final   HS TROPONIN I 0HR - Normal    hs TnI 0hr 6  \"Refer to ACS Flowchart\"- see link ng/L Final    Comment:                                              Initial (time 0) result  If >=50 ng/L, Myocardial injury suggested ;  Type of myocardial injury and treatment strategy  to be determined.  If 5-49 ng/L, a delta result at 2 hours will be needed to further evaluate.  If <4 ng/L, and chest pain has been >3 hours since onset, patient may qualify for discharge based on the HEART score in the ED.  If <5 ng/L and <3hours since onset of chest pain, a delta result at 2 hours will be needed to further evaluate.    HS Troponin 99th Percentile URL of a Health Population=12 ng/L with a 95% Confidence Interval of 8-18 ng/L.    Second Troponin (time 2 hours)  If calculated delta >= 20 ng/L,  Myocardial injury suggested ; Type of myocardial injury and treatment strategy to be determined.  If 5-49 ng/L and the calculated delta is 5-19 ng/L, consult medical service for evaluation.  Continue evaluation for ischemia on ecg and other possible etiology and repeat hs troponin at 4 hours.  If " "delta is <5 ng/L at 2 hours, consider discharge based on risk stratification via the HEART score (if in ED), or SUREKHA risk score in IP/Observation.    HS Troponin 99th Percentile URL of a Health Population=12 ng/L with a 95% Confidence Interval of 8-18 ng/L.   HS TROPONIN I 2HR - Normal    hs TnI 2hr 7  \"Refer to ACS Flowchart\"- see link ng/L Final    Comment:                                              Initial (time 0) result  If >=50 ng/L, Myocardial injury suggested ;  Type of myocardial injury and treatment strategy  to be determined.  If 5-49 ng/L, a delta result at 2 hours will be needed to further evaluate.  If <4 ng/L, and chest pain has been >3 hours since onset, patient may qualify for discharge based on the HEART score in the ED.  If <5 ng/L and <3hours since onset of chest pain, a delta result at 2 hours will be needed to further evaluate.    HS Troponin 99th Percentile URL of a Health Population=12 ng/L with a 95% Confidence Interval of 8-18 ng/L.    Second Troponin (time 2 hours)  If calculated delta >= 20 ng/L,  Myocardial injury suggested ; Type of myocardial injury and treatment strategy to be determined.  If 5-49 ng/L and the calculated delta is 5-19 ng/L, consult medical service for evaluation.  Continue evaluation for ischemia on ecg and other possible etiology and repeat hs troponin at 4 hours.  If delta is <5 ng/L at 2 hours, consider discharge based on risk stratification via the HEART score (if in ED), or SUREKHA risk score in IP/Observation.    HS Troponin 99th Percentile URL of a Health Population=12 ng/L with a 95% Confidence Interval of 8-18 ng/L.    Delta 2hr hsTnI 1  <20 ng/L Final   HS TROPONIN I 4HR - Normal    hs TnI 4hr 5  \"Refer to ACS Flowchart\"- see link ng/L Final    Comment:                                              Initial (time 0) result  If >=50 ng/L, Myocardial injury suggested ;  Type of myocardial injury and treatment strategy  to be determined.  If 5-49 ng/L, a delta " result at 2 hours will be needed to further evaluate.  If <4 ng/L, and chest pain has been >3 hours since onset, patient may qualify for discharge based on the HEART score in the ED.  If <5 ng/L and <3hours since onset of chest pain, a delta result at 2 hours will be needed to further evaluate.    HS Troponin 99th Percentile URL of a Health Population=12 ng/L with a 95% Confidence Interval of 8-18 ng/L.    Second Troponin (time 2 hours)  If calculated delta >= 20 ng/L,  Myocardial injury suggested ; Type of myocardial injury and treatment strategy to be determined.  If 5-49 ng/L and the calculated delta is 5-19 ng/L, consult medical service for evaluation.  Continue evaluation for ischemia on ecg and other possible etiology and repeat hs troponin at 4 hours.  If delta is <5 ng/L at 2 hours, consider discharge based on risk stratification via the HEART score (if in ED), or SUREKHA risk score in IP/Observation.    HS Troponin 99th Percentile URL of a Health Population=12 ng/L with a 95% Confidence Interval of 8-18 ng/L.    Delta 4hr hsTnI -1  <20 ng/L Final   PROCALCITONIN TEST - Normal    Procalcitonin <0.05  <=0.25 ng/ml Final    Comment: Suspected Lower Respiratory Tract Infection (LRTI):  - LESS than or EQUAL to 0.25 ng/mL:   low likelihood for bacterial LRTI; antibiotics DISCOURAGED.  - GREATER than 0.25 ng/mL:   increased likelihood for bacterial LRTI; antibiotics ENCOURAGED.    Suspected Sepsis:  - Strongly consider initiating antibiotics in ALL UNSTABLE patients.  - LESS than or EQUAL to 0.5 ng/mL:   low likelihood for bacterial sepsis; antibiotics DISCOURAGED.  - GREATER than 0.5 ng/mL:   increased likelihood for bacterial sepsis; antibiotics ENCOURAGED.  - GREATER than 2 ng/mL:   high risk for severe sepsis / septic shock; antibiotics strongly ENCOURAGED.    Decisions on antibiotic use should not be based solely on Procalcitonin (PCT) levels. If PCT is low but uncertainty exists with stopping antibiotics,  repeat PCT in 6-24 hours to confirm the low level. If antibiotics are administered (regardless if initial PCT was high or low), repeat PCT every 1-2 days to consider early antibiotic cessation (when GREATER than 80% decrease from the peak OR when PCT drops below designated cutoffs, whichever comes first), so long as the infection is NOT one that typically requires prolonged treatment durations (e.g., bone/joint infections, endocarditis, Staph. aureus bacteremia).    Situations of FALSE-POSITIVE Procalcitonin values:  1) Newborns < 72 hours old  2) Massive stress from severe trauma / burns, major surgery, acute pancreatitis, cardiogenic / hemorrhagic shock, sickle cell crisis, or other organ perfusion abnormalities  3) Malaria and some Candidal infections  4) Treatment with agents that stimulate cytokines (e.g., OKT3, anti-lymphocyte globulins, alemtuzumab, IL-2, granulocyte transfusion [NOT GCSFs])  5) Chronic renal disease causes elevated baseline levels (consider GREATER than 0.75 ng/mL as an abnormal cut-off); initiating HD/CRRT may cause transient decreases  6) Paraneoplastic syndromes from medullary thyroid or SCLC, some forms of vasculitis, and acute pjtdr-xm-lwje disease    Situations of FALSE-NEGATIVE Procalcitonin values:  1) Too early in clinical course for PCT to have reached its peak (may repeat in 6-24 hours to confirm low level)  2) Localized infection WITHOUT systemic (SIRS / sepsis) response (e.g., an abscess, osteomyelitis, cystitis)  3) Mycobacteria (e.g., Tuberculosis, MAC)  4) Cystic fibrosis exacerbations     COMPREHENSIVE METABOLIC PANEL    Sodium 138  135 - 147 mmol/L Final    Potassium 4.9  3.5 - 5.3 mmol/L Final    Comment: Slightly Hemolyzed:Results may be affected.    Chloride 99  96 - 108 mmol/L Final    CO2 30  21 - 32 mmol/L Final    ANION GAP 9  4 - 13 mmol/L Final    BUN 13  5 - 25 mg/dL Final    Creatinine 0.75  0.60 - 1.30 mg/dL Final    Comment: Standardized to IDMS reference  method    Glucose 97  65 - 140 mg/dL Final    Comment: If the patient is fasting, the ADA then defines impaired fasting glucose as > 100 mg/dL and diabetes as > or equal to 123 mg/dL.    Calcium 9.8  8.4 - 10.2 mg/dL Final    AST 25  13 - 39 U/L Final    Comment: Slightly Hemolyzed:Results may be affected.    ALT 10  7 - 52 U/L Final    Comment: Specimen collection should occur prior to Sulfasalazine administration due to the potential for falsely depressed results.     Alkaline Phosphatase 88  34 - 104 U/L Final    Total Protein 7.2  6.4 - 8.4 g/dL Final    Albumin 4.3  3.5 - 5.0 g/dL Final    Total Bilirubin 0.55  0.20 - 1.00 mg/dL Final    Comment: Use of this assay is not recommended for patients undergoing treatment with eltrombopag due to the potential for falsely elevated results.  N-acetyl-p-benzoquinone imine (metabolite of Acetaminophen) will generate erroneously low results in samples for patients that have taken an overdose of Acetaminophen.    eGFR 73  ml/min/1.73sq m Final    Narrative:     National Kidney Disease Foundation guidelines for Chronic Kidney Disease (CKD):     Stage 1 with normal or high GFR (GFR > 90 mL/min/1.73 square meters)    Stage 2 Mild CKD (GFR = 60-89 mL/min/1.73 square meters)    Stage 3A Moderate CKD (GFR = 45-59 mL/min/1.73 square meters)    Stage 3B Moderate CKD (GFR = 30-44 mL/min/1.73 square meters)    Stage 4 Severe CKD (GFR = 15-29 mL/min/1.73 square meters)    Stage 5 End Stage CKD (GFR <15 mL/min/1.73 square meters)  Note: GFR calculation is accurate only with a steady state creatinine         XR chest portable   ED Interpretation   No acute cardiopulmonary pathology.                     Procedures         Critical Care Time Statement: Upon my evaluation, this patient had a high probability of imminent or life-threatening deterioration due to hypoxic respiratory failure, which required my direct attention, intervention, and personal management.  I spent a total of 40  minutes directly providing critical care services, including complex medical decision making (to support/prevent further life-threatening deterioration). This time is exclusive of procedures, teaching, treating other patients, family meetings, and any prior time recorded by providers other than myself.

## 2025-06-01 PROBLEM — R65.10 SIRS (SYSTEMIC INFLAMMATORY RESPONSE SYNDROME) (HCC): Status: ACTIVE | Noted: 2025-06-01

## 2025-06-01 LAB
ANION GAP SERPL CALCULATED.3IONS-SCNC: 9 MMOL/L (ref 4–13)
BASOPHILS # BLD AUTO: 0 THOUSANDS/ÂΜL (ref 0–0.1)
BASOPHILS NFR BLD AUTO: 0 % (ref 0–1)
BUN SERPL-MCNC: 19 MG/DL (ref 5–25)
CALCIUM SERPL-MCNC: 9.1 MG/DL (ref 8.4–10.2)
CHLORIDE SERPL-SCNC: 96 MMOL/L (ref 96–108)
CO2 SERPL-SCNC: 31 MMOL/L (ref 21–32)
CREAT SERPL-MCNC: 0.91 MG/DL (ref 0.6–1.3)
EOSINOPHIL # BLD AUTO: 0 THOUSAND/ÂΜL (ref 0–0.61)
EOSINOPHIL NFR BLD AUTO: 0 % (ref 0–6)
ERYTHROCYTE [DISTWIDTH] IN BLOOD BY AUTOMATED COUNT: 15.8 % (ref 11.6–15.1)
GFR SERPL CREATININE-BSD FRML MDRD: 58 ML/MIN/1.73SQ M
GLUCOSE SERPL-MCNC: 252 MG/DL (ref 65–140)
GLUCOSE SERPL-MCNC: 255 MG/DL (ref 65–140)
GLUCOSE SERPL-MCNC: 366 MG/DL (ref 65–140)
GLUCOSE SERPL-MCNC: 369 MG/DL (ref 65–140)
GLUCOSE SERPL-MCNC: 408 MG/DL (ref 65–140)
GLUCOSE SERPL-MCNC: 411 MG/DL (ref 65–140)
GLUCOSE SERPL-MCNC: 428 MG/DL (ref 65–140)
GLUCOSE SERPL-MCNC: 442 MG/DL (ref 65–140)
HCT VFR BLD AUTO: 34.7 % (ref 34.8–46.1)
HGB BLD-MCNC: 10.6 G/DL (ref 11.5–15.4)
IMM GRANULOCYTES # BLD AUTO: 0.01 THOUSAND/UL (ref 0–0.2)
IMM GRANULOCYTES NFR BLD AUTO: 1 % (ref 0–2)
LYMPHOCYTES # BLD AUTO: 0.19 THOUSANDS/ÂΜL (ref 0.6–4.47)
LYMPHOCYTES NFR BLD AUTO: 9 % (ref 14–44)
MAGNESIUM SERPL-MCNC: 2.2 MG/DL (ref 1.9–2.7)
MCH RBC QN AUTO: 26.8 PG (ref 26.8–34.3)
MCHC RBC AUTO-ENTMCNC: 30.5 G/DL (ref 31.4–37.4)
MCV RBC AUTO: 88 FL (ref 82–98)
MONOCYTES # BLD AUTO: 0.04 THOUSAND/ÂΜL (ref 0.17–1.22)
MONOCYTES NFR BLD AUTO: 2 % (ref 4–12)
NEUTROPHILS # BLD AUTO: 1.97 THOUSANDS/ÂΜL (ref 1.85–7.62)
NEUTS SEG NFR BLD AUTO: 88 % (ref 43–75)
NRBC BLD AUTO-RTO: 0 /100 WBCS
PLATELET # BLD AUTO: 181 THOUSANDS/UL (ref 149–390)
PMV BLD AUTO: 9.4 FL (ref 8.9–12.7)
POTASSIUM SERPL-SCNC: 4.2 MMOL/L (ref 3.5–5.3)
RBC # BLD AUTO: 3.96 MILLION/UL (ref 3.81–5.12)
SODIUM SERPL-SCNC: 136 MMOL/L (ref 135–147)
WBC # BLD AUTO: 2.21 THOUSAND/UL (ref 4.31–10.16)

## 2025-06-01 PROCEDURE — 99232 SBSQ HOSP IP/OBS MODERATE 35: CPT | Performed by: INTERNAL MEDICINE

## 2025-06-01 PROCEDURE — 80048 BASIC METABOLIC PNL TOTAL CA: CPT

## 2025-06-01 PROCEDURE — 83036 HEMOGLOBIN GLYCOSYLATED A1C: CPT | Performed by: INTERNAL MEDICINE

## 2025-06-01 PROCEDURE — 85025 COMPLETE CBC W/AUTO DIFF WBC: CPT

## 2025-06-01 PROCEDURE — 82948 REAGENT STRIP/BLOOD GLUCOSE: CPT

## 2025-06-01 PROCEDURE — 94640 AIRWAY INHALATION TREATMENT: CPT

## 2025-06-01 PROCEDURE — 94760 N-INVAS EAR/PLS OXIMETRY 1: CPT

## 2025-06-01 PROCEDURE — 83735 ASSAY OF MAGNESIUM: CPT

## 2025-06-01 RX ORDER — METHYLPREDNISOLONE SODIUM SUCCINATE 40 MG/ML
40 INJECTION, POWDER, LYOPHILIZED, FOR SOLUTION INTRAMUSCULAR; INTRAVENOUS DAILY
Status: DISCONTINUED | OUTPATIENT
Start: 2025-06-02 | End: 2025-06-01

## 2025-06-01 RX ORDER — PREDNISONE 20 MG/1
40 TABLET ORAL DAILY
Status: DISCONTINUED | OUTPATIENT
Start: 2025-06-02 | End: 2025-06-04

## 2025-06-01 RX ORDER — FUROSEMIDE 10 MG/ML
40 INJECTION INTRAMUSCULAR; INTRAVENOUS
Status: DISCONTINUED | OUTPATIENT
Start: 2025-06-01 | End: 2025-06-03

## 2025-06-01 RX ADMIN — IPRATROPIUM BROMIDE 0.5 MG: 0.5 SOLUTION RESPIRATORY (INHALATION) at 07:44

## 2025-06-01 RX ADMIN — IPRATROPIUM BROMIDE 0.5 MG: 0.5 SOLUTION RESPIRATORY (INHALATION) at 13:13

## 2025-06-01 RX ADMIN — GABAPENTIN 300 MG: 300 CAPSULE ORAL at 17:13

## 2025-06-01 RX ADMIN — LOSARTAN POTASSIUM 50 MG: 50 TABLET, FILM COATED ORAL at 08:07

## 2025-06-01 RX ADMIN — SODIUM CHLORIDE 17 UNITS/HR: 9 INJECTION, SOLUTION INTRAVENOUS at 17:13

## 2025-06-01 RX ADMIN — INSULIN LISPRO 6 UNITS: 100 INJECTION, SOLUTION INTRAVENOUS; SUBCUTANEOUS at 09:42

## 2025-06-01 RX ADMIN — GABAPENTIN 300 MG: 300 CAPSULE ORAL at 21:27

## 2025-06-01 RX ADMIN — Medication 1 CAPSULE: at 17:13

## 2025-06-01 RX ADMIN — FUROSEMIDE 40 MG: 10 INJECTION, SOLUTION INTRAVENOUS at 17:13

## 2025-06-01 RX ADMIN — LEVALBUTEROL HYDROCHLORIDE 1.25 MG: 1.25 SOLUTION RESPIRATORY (INHALATION) at 20:33

## 2025-06-01 RX ADMIN — MORPHINE SULFATE 15 MG: 15 TABLET ORAL at 08:07

## 2025-06-01 RX ADMIN — ASPIRIN 81 MG CHEWABLE TABLET 81 MG: 81 TABLET CHEWABLE at 08:07

## 2025-06-01 RX ADMIN — INSULIN HUMAN 10 UNITS: 100 INJECTION, SOLUTION PARENTERAL at 11:43

## 2025-06-01 RX ADMIN — ATORVASTATIN CALCIUM 40 MG: 40 TABLET, FILM COATED ORAL at 17:13

## 2025-06-01 RX ADMIN — LEVALBUTEROL HYDROCHLORIDE 1.25 MG: 1.25 SOLUTION RESPIRATORY (INHALATION) at 13:12

## 2025-06-01 RX ADMIN — IPRATROPIUM BROMIDE 0.5 MG: 0.5 SOLUTION RESPIRATORY (INHALATION) at 20:33

## 2025-06-01 RX ADMIN — METHYLPREDNISOLONE SODIUM SUCCINATE 40 MG: 40 INJECTION, POWDER, FOR SOLUTION INTRAMUSCULAR; INTRAVENOUS at 08:07

## 2025-06-01 RX ADMIN — HEPARIN SODIUM 5000 UNITS: 5000 INJECTION INTRAVENOUS; SUBCUTANEOUS at 21:27

## 2025-06-01 RX ADMIN — HEPARIN SODIUM 5000 UNITS: 5000 INJECTION INTRAVENOUS; SUBCUTANEOUS at 07:27

## 2025-06-01 RX ADMIN — BRIMONIDINE TARTRATE 1 DROP: 2 SOLUTION/ DROPS OPHTHALMIC at 08:07

## 2025-06-01 RX ADMIN — FERROUS SULFATE TAB 325 MG (65 MG ELEMENTAL FE) 325 MG: 325 (65 FE) TAB at 08:06

## 2025-06-01 RX ADMIN — HEPARIN SODIUM 5000 UNITS: 5000 INJECTION INTRAVENOUS; SUBCUTANEOUS at 14:34

## 2025-06-01 RX ADMIN — MORPHINE SULFATE 15 MG: 15 TABLET ORAL at 21:28

## 2025-06-01 RX ADMIN — SODIUM CHLORIDE 11 UNITS/HR: 9 INJECTION, SOLUTION INTRAVENOUS at 17:33

## 2025-06-01 RX ADMIN — GABAPENTIN 300 MG: 300 CAPSULE ORAL at 11:44

## 2025-06-01 RX ADMIN — PANTOPRAZOLE SODIUM 40 MG: 40 TABLET, DELAYED RELEASE ORAL at 07:26

## 2025-06-01 RX ADMIN — GABAPENTIN 300 MG: 300 CAPSULE ORAL at 07:26

## 2025-06-01 RX ADMIN — ROPINIROLE HYDROCHLORIDE 0.5 MG: 0.25 TABLET, FILM COATED ORAL at 21:27

## 2025-06-01 RX ADMIN — INSULIN LISPRO 6 UNITS: 100 INJECTION, SOLUTION INTRAVENOUS; SUBCUTANEOUS at 11:44

## 2025-06-01 RX ADMIN — AZITHROMYCIN DIHYDRATE 500 MG: 250 TABLET ORAL at 21:28

## 2025-06-01 RX ADMIN — LEVALBUTEROL HYDROCHLORIDE 1.25 MG: 1.25 SOLUTION RESPIRATORY (INHALATION) at 07:44

## 2025-06-01 RX ADMIN — FUROSEMIDE 40 MG: 10 INJECTION, SOLUTION INTRAVENOUS at 08:06

## 2025-06-01 RX ADMIN — PANTOPRAZOLE SODIUM 40 MG: 40 TABLET, DELAYED RELEASE ORAL at 17:13

## 2025-06-01 RX ADMIN — DILTIAZEM HYDROCHLORIDE 240 MG: 240 CAPSULE, EXTENDED RELEASE ORAL at 08:06

## 2025-06-01 NOTE — PROGRESS NOTES
Progress Note - Hospitalist   Name: Gloria Patel 84 y.o. female I MRN: [id number]  Unit/Bed#: -01 I Date of Admission: 5/31/2025   Date of Service: 6/1/2025 I Hospital Day: 1    Assessment & Plan  Acute on chronic respiratory failure with hypoxia (HCC)  Patient presents with respiratory distress from home requiring BiPAP in the emergency department.  Patient has history of chronic respiratory failure on 3 L nasal cannula at baseline.  Currently weaned off BiPAP now on 4 L nasal cannula acutely  CXR: No acute cardiopulmonary disease.  Formal read pending  BNP 74.  VBG pH 7.3, pCO2 49.8, HCO3 28  Given x 1 dose of IV Lasix in the ED for concern of volume overload  Suspect in setting of COPD exacerbation  CXR 5/31 shows no acute cardiopulmonary disease.  Pulmonary artery enlargement which can be seen with pulmonary hypertension.    Plan  Continue azithromycin  Switch Solu-Medrol 40 mg to once daily   Continue scheduled neb treatments Xopenex and Atrovent  Respiratory protocol  Wean oxygen as able  Obtain flu/COVID/RSV  Obtain echocardiogram  COPD exacerbation (HCC)  Continue scheduled neb treatments  Respiratory protocol  Wean oxygen as tolerated  See plan as above  Benign essential hypertension  Currently hemodynamically stable  Continue PT antihypertensives  Type II diabetes mellitus (HCC)  Lab Results   Component Value Date    HGBA1C 6.9 (H) 02/18/2025       Recent Labs     05/31/25  2244 06/01/25  0805 06/01/25  1030   POCGLU 273* 369* 442*       Blood Sugar Average: Last 72 hrs:  (P) [id number] Januvia, metformin  Hold oral antidiabetic agents  Continue sliding scale insulin 4 times daily with meals  Hypoglycemia protocol    Hyperlipidemia  Continue statin therapy  Stage 3b chronic kidney disease (HCC)  Lab Results   Component Value Date    EGFR 58 06/01/2025    EGFR 73 05/31/2025    EGFR 55 05/14/2025    CREATININE 0.91 06/01/2025    CREATININE 0.75 05/31/2025    CREATININE 0.94 05/14/2025    At baseline continue to trend daily labs  Bilateral lower extremity edema  Patient reports new onset lower extremity edema.  +2 bilaterally  BNP 72  Denies any prior history of heart failure  In ED was given IV Lasix 40 mg x 1  Will continue Lasix 40 mg daily  Obtain echocardiogram    VTE Pharmacologic Prophylaxis:   High Risk (Score >/= 5) - Pharmacological DVT Prophylaxis Ordered: heparin. Sequential Compression Devices Ordered.    Mobility:   Basic Mobility Inpatient Raw Score: 18  JH-HLM Goal: 6: Walk 10 steps or more  JH-HLM Achieved: 3: Sit at edge of bed  JH-HLM Goal NOT achieved. Continue with multidisciplinary rounding and encourage appropriate mobility to improve upon JH-HLM goals.    Patient Centered Rounds: I performed bedside rounds with nursing staff today.   Discussions with Specialists or Other Care Team Provider: Attending    Education and Discussions with Family / Patient: Will call family.    Current Length of Stay: 1 day(s)  Current Patient Status: Inpatient   Certification Statement: The patient will continue to require additional inpatient hospital stay due to  acute on chronic hypoxic respiratory failure  Discharge Plan: Anticipate discharge in 24-48 hrs to home.    Code Status: Level 3 - DNAR and DNI    Subjective   Patient evaluated bedside.  She was comfortably sleeping.  She was still on 4 L of oxygen and this morning saw her.  Patient has some remaining mild wheezing in bilateral bases.    Objective :  Temp:  [97.9 °F (36.6 °C)-98.7 °F (37.1 °C)] 98.5 °F (36.9 °C)  HR:  [] 85  BP: (118-159)/(57-74) 120/57  Resp:  [18-32] 20  SpO2:  [90 %-99 %] 94 %  O2 Device: Nasal cannula  Nasal Cannula O2 Flow Rate (L/min):  [4 L/min-5 L/min] 4 L/min  FiO2 (%):  [40] 40    Body mass index is 32.08 kg/m².     Input and Output Summary (last 24 hours):     Intake/Output Summary (Last 24 hours) at 6/1/2025 1200  Last data filed at 6/1/2025 0901  Gross per 24 hour   Intake 900 ml   Output 550 ml    Net 350 ml       Physical Exam  Vitals and nursing note reviewed.   Constitutional:       General: She is not in acute distress.     Appearance: She is well-developed.   HENT:      Head: Normocephalic and atraumatic.     Eyes:      Conjunctiva/sclera: Conjunctivae normal.       Cardiovascular:      Rate and Rhythm: Normal rate and regular rhythm.      Heart sounds: No murmur heard.  Pulmonary:      Effort: Pulmonary effort is normal. No respiratory distress.      Breath sounds: Wheezing present.   Abdominal:      Palpations: Abdomen is soft.      Tenderness: There is no abdominal tenderness.     Musculoskeletal:         General: No swelling.      Cervical back: Neck supple.     Skin:     General: Skin is warm and dry.      Capillary Refill: Capillary refill takes less than 2 seconds.     Neurological:      Mental Status: She is alert.     Psychiatric:         Mood and Affect: Mood normal.           Lines/Drains:  Lines/Drains/Airways       Active Status       Name Placement date Placement time Site Days    External Urinary Catheter 05/31/25 2000  -- less than 1                      Telemetry:  Telemetry Orders (From admission, onward)               24 Hour Telemetry Monitoring  Continuous x 24 Hours (Telem)        Question:  Reason for 24 Hour Telemetry  Answer:  Decompensated CHF- and any one of the following: continuous diuretic infusion or total diuretic dose >200 mg daily, associated electrolyte derangement (I.e. K < 3.0), inotropic drip (continuous infusion), hx of ventricular arrhythmia, or new EF < 35%                     Telemetry Reviewed: Normal Sinus Rhythm  Indication for Continued Telemetry Use: No indication for continued use. Will discontinue.                Lab Results: I have reviewed the following results:   Results from last 7 days   Lab Units 06/01/25  0746   WBC Thousand/uL 2.21*   HEMOGLOBIN g/dL 10.6*   HEMATOCRIT % 34.7*   PLATELETS Thousands/uL 181   SEGS PCT % 88*   LYMPHO PCT % 9*    MONO PCT % 2*   EOS PCT % 0     Results from last 7 days   Lab Units 06/01/25  0746 05/31/25  1836   SODIUM mmol/L 136 138   POTASSIUM mmol/L 4.2 4.9   CHLORIDE mmol/L 96 99   CO2 mmol/L 31 30   BUN mg/dL 19 13   CREATININE mg/dL 0.91 0.75   ANION GAP mmol/L 9 9   CALCIUM mg/dL 9.1 9.8   ALBUMIN g/dL  --  4.3   TOTAL BILIRUBIN mg/dL  --  0.55   ALK PHOS U/L  --  88   ALT U/L  --  10   AST U/L  --  25   GLUCOSE RANDOM mg/dL 411* 97         Results from last 7 days   Lab Units 06/01/25  1030 06/01/25  0805 05/31/25  2244   POC GLUCOSE mg/dl 442* 369* 273*         Results from last 7 days   Lab Units 05/31/25  2059   PROCALCITONIN ng/ml <0.05       Recent Cultures (last 7 days):         Imaging Results Review: I reviewed radiology reports from this admission including: chest xray.  Other Study Results Review: EKG was reviewed.     Last 24 Hours Medication List:     Current Facility-Administered Medications:     acetaminophen (TYLENOL) tablet 975 mg, Q8H PRN    albuterol (PROVENTIL HFA,VENTOLIN HFA) inhaler 2 puff, Q6H PRN    aspirin chewable tablet 81 mg, Daily    atorvastatin (LIPITOR) tablet 40 mg, QPM    azithromycin (ZITHROMAX) tablet 500 mg, Q24H CARRIE    bisacodyl (DULCOLAX) rectal suppository 10 mg, Daily PRN    brimonidine tartrate 0.2 % ophthalmic solution 1 drop, BID    diltiazem (CARDIZEM CD) 24 hr capsule 240 mg, Daily    ferrous sulfate tablet 325 mg, Every Other Day    furosemide (LASIX) injection 40 mg, Daily    gabapentin (NEURONTIN) capsule 300 mg, 4x Daily    heparin (porcine) subcutaneous injection 5,000 Units, Q8H CARRIE    insulin lispro (HumALOG/ADMELOG) 100 units/mL subcutaneous injection 1-6 Units, 4x Daily (AC & HS) **AND** Fingerstick Glucose (POCT), 4x Daily AC and at bedtime    ipratropium (ATROVENT) 0.02 % inhalation solution 0.5 mg, TID    levalbuterol (XOPENEX) inhalation solution 1.25 mg, TID    losartan (COZAAR) tablet 50 mg, Daily    methylPREDNISolone sodium succinate (Solu-MEDROL)  injection 40 mg, Q12H CARRIE    morphine (MSIR) IR tablet 15 mg, BID    pantoprazole (PROTONIX) EC tablet 40 mg, Q12H CARRIE    rOPINIRole (REQUIP) tablet 0.5 mg, HS    senna (SENOKOT) tablet 17.2 mg, HS PRN    vit B complex-vit C-folic acid (Renal Caps) capsule 1 capsule, Daily With Dinner    Nutrition Assessment and Intervention:     Reviewed food recall journal      Physical Activity Assessment and Intervention:    Activity journal reviewed      Administrative Statements   Today, Patient Was Seen By: Keysha Johnson MD      **Please Note: This note may have been constructed using a voice recognition system.**

## 2025-06-01 NOTE — ASSESSMENT & PLAN NOTE
Patient reports new onset lower extremity edema.  +2 bilaterally  BNP 72  Denies any prior history of heart failure  In ED was given IV Lasix 40 mg x 1  Will continue Lasix 40 mg daily  Obtain echocardiogram

## 2025-06-01 NOTE — ASSESSMENT & PLAN NOTE
Lab Results   Component Value Date    EGFR 73 05/31/2025    EGFR 55 05/14/2025    EGFR 55 05/13/2025    CREATININE 0.75 05/31/2025    CREATININE 0.94 05/14/2025    CREATININE 0.94 05/13/2025   At baseline continue to trend daily labs

## 2025-06-01 NOTE — PLAN OF CARE
Problem: Potential for Falls  Goal: Patient will remain free of falls  Description: INTERVENTIONS:  - Educate patient/family on patient safety including physical limitations  - Instruct patient to call for assistance with activity   - Consider consulting OT/PT to assist with strengthening/mobility based on AM PAC & JH-HLM score  - Consult OT/PT to assist with strengthening/mobility   - Keep Call bell within reach  - Keep bed low and locked with side rails adjusted as appropriate  - Keep care items and personal belongings within reach  - Initiate and maintain comfort rounds  - Make Fall Risk Sign visible to staff  - Offer Toileting every 2 Hours, in advance of need  - Initiate/Maintain bed alarm  - Obtain necessary fall risk management equipment: bed alarm   - Apply yellow socks and bracelet for high fall risk patients  - Consider moving patient to room near nurses station  Outcome: Progressing     Problem: PAIN - ADULT  Goal: Verbalizes/displays adequate comfort level or baseline comfort level  Description: Interventions:  - Encourage patient to monitor pain and request assistance  - Assess pain using appropriate pain scale  - Administer analgesics as ordered based on type and severity of pain and evaluate response  - Implement non-pharmacological measures as appropriate and evaluate response  - Consider cultural and social influences on pain and pain management  - Notify physician/advanced practitioner if interventions unsuccessful or patient reports new pain  - Educate patient/family on pain management process including their role and importance of  reporting pain   - Provide non-pharmacologic/complimentary pain relief interventions  Outcome: Progressing     Problem: INFECTION - ADULT  Goal: Absence or prevention of progression during hospitalization  Description: INTERVENTIONS:  - Assess and monitor for signs and symptoms of infection  - Monitor lab/diagnostic results  - Monitor all insertion sites, i.e.  indwelling lines, tubes, and drains  - Monitor endotracheal if appropriate and nasal secretions for changes in amount and color  - [place] appropriate cooling/warming therapies per order  - Administer medications as ordered  - Instruct and encourage patient and family to use good hand hygiene technique  - Identify and instruct in appropriate isolation precautions for identified infection/condition  Outcome: Progressing  Goal: Absence of fever/infection during neutropenic period  Description: INTERVENTIONS:  - Monitor WBC  - Perform strict hand hygiene  - Limit to healthy visitors only  - No plants, dried, fresh or silk flowers with mcdowell in patient room  Outcome: Progressing     Problem: SAFETY ADULT  Goal: Patient will remain free of falls  Description: INTERVENTIONS:  - Educate patient/family on patient safety including physical limitations  - Instruct patient to call for assistance with activity   - Consider consulting OT/PT to assist with strengthening/mobility based on AM PAC & -HLM score  - Consult OT/PT to assist with strengthening/mobility   - Keep Call bell within reach  - Keep bed low and locked with side rails adjusted as appropriate  - Keep care items and personal belongings within reach  - Initiate and maintain comfort rounds  - Make Fall Risk Sign visible to staff  - Offer Toileting every 2 Hours, in advance of need  - Initiate/Maintain bed alarm  - Obtain necessary fall risk management equipment: bed alarm   - Apply yellow socks and bracelet for high fall risk patients  - Consider moving patient to room near nurses station  Outcome: Progressing  Goal: Maintain or return to baseline ADL function  Description: INTERVENTIONS:  -  Assess patient's ability to carry out ADLs; assess patient's baseline for ADL function and identify physical deficits which impact ability to perform ADLs (bathing, care of mouth/teeth, toileting, grooming, dressing, etc.)  - Assess/evaluate cause of self-care deficits   -  Assess range of motion  - Assess patient's mobility; develop plan if impaired  - Assess patient's need for assistive devices and provide as appropriate  - Encourage maximum independence but intervene and supervise when necessary  - Involve family in performance of ADLs  - Assess for home care needs following discharge   - Consider OT consult to assist with ADL evaluation and planning for discharge  - Provide patient education as appropriate  - Monitor functional capacity and physical performance, use of AM PAC & JH-HLM   - Monitor gait, balance and fatigue with ambulation    Outcome: Progressing  Goal: Maintains/Returns to pre admission functional level  Description: INTERVENTIONS:  - Perform AM-PAC 6 Click Basic Mobility/ Daily Activity assessment daily.  - Set and communicate daily mobility goal to care team and patient/family/caregiver.   - Collaborate with rehabilitation services on mobility goals if consulted  - Perform Range of Motion 3 times a day.  - Reposition patient every 2 hours.  - Dangle patient 3 times a day  - Stand patient 3 times a day  - Ambulate patient 3 times a day  - Out of bed to chair 3 times a day   - Out of bed for meals 3 times a day  - Out of bed for toileting  - Record patient progress and toleration of activity level   Outcome: Progressing     Problem: DISCHARGE PLANNING  Goal: Discharge to home or other facility with appropriate resources  Description: INTERVENTIONS:  - Identify barriers to discharge w/patient and caregiver  - Arrange for needed discharge resources and transportation as appropriate  - Identify discharge learning needs (meds, wound care, etc.)  - Arrange for interpretive services to assist at discharge as needed  - Refer to Case Management Department for coordinating discharge planning if the patient needs post-hospital services based on physician/advanced practitioner order or complex needs related to functional status, cognitive ability, or social support  system  Outcome: Progressing     Problem: Knowledge Deficit  Goal: Patient/family/caregiver demonstrates understanding of disease process, treatment plan, medications, and discharge instructions  Description: Complete learning assessment and assess knowledge base.  Interventions:  - Provide teaching at level of understanding  - Provide teaching via preferred learning methods  Outcome: Progressing

## 2025-06-01 NOTE — ASSESSMENT & PLAN NOTE
Continue scheduled neb treatments  Respiratory protocol  Wean oxygen as tolerated  See plan as above

## 2025-06-01 NOTE — ASSESSMENT & PLAN NOTE
Lab Results   Component Value Date    HGBA1C 6.9 (H) 02/18/2025       Recent Labs     05/31/25  2244   POCGLU 273*       Blood Sugar Average: Last 72 hrs:  (P) 273PTA Januvia, metformin  Hold oral antidiabetic agents  Continue sliding scale insulin 4 times daily with meals  Hypoglycemia protocol

## 2025-06-01 NOTE — H&P
H&P - Hospitalist   Name: Gloria Patel 84 y.o. female I MRN: [id number]  Unit/Bed#: ED-38 I Date of Admission: 5/31/2025   Date of Service: 5/31/2025 I Hospital Day: 0     Assessment & Plan  Acute on chronic respiratory failure with hypoxia (HCC)  Patient presents with respiratory distress from home requiring BiPAP in the emergency department.  Patient has history of chronic respiratory failure on 3 L nasal cannula at baseline.  Currently weaned off BiPAP now on 4 L nasal cannula acutely  CXR: No acute cardiopulmonary disease.  Formal read pending  BNP 74.  VBG pH 7.3, pCO2 49.8, HCO3 28  Given x 1 dose of IV Lasix in the ED for concern of volume overload  Suspect in setting of COPD exacerbation  Plan  Continue azithromycin  Continue Solu-Medrol 40 mg twice daily  Continue scheduled neb treatments Xopenex and Atrovent  Respiratory protocol  Wean oxygen as able  Obtain flu/COVID/RSV  Obtain echocardiogram  COPD exacerbation (HCC)  Continue scheduled neb treatments  Respiratory protocol  Wean oxygen as tolerated  See plan as above  Benign essential hypertension  Currently hemodynamically stable  Continue PT antihypertensives  Type II diabetes mellitus (HCC)  Lab Results   Component Value Date    HGBA1C 6.9 (H) 02/18/2025       Recent Labs     05/31/25  2244   POCGLU 273*       Blood Sugar Average: Last 72 hrs:  (P) 273PTA Januvia, metformin  Hold oral antidiabetic agents  Continue sliding scale insulin 4 times daily with meals  Hypoglycemia protocol    Hyperlipidemia  Continue statin therapy  Stage 3b chronic kidney disease (HCC)  Lab Results   Component Value Date    EGFR 73 05/31/2025    EGFR 55 05/14/2025    EGFR 55 05/13/2025    CREATININE 0.75 05/31/2025    CREATININE 0.94 05/14/2025    CREATININE 0.94 05/13/2025   At baseline continue to trend daily labs  Bilateral lower extremity edema  Patient reports new onset lower extremity edema.  +2 bilaterally  BNP 72  Denies any prior history of heart failure  In ED  was given IV Lasix 40 mg x 1  Will continue Lasix 40 mg daily  Obtain echocardiogram      VTE Pharmacologic Prophylaxis:   Moderate Risk (Score 3-4) - Pharmacological DVT Prophylaxis Ordered: heparin.  Code Status: Level 3 - DNAR and DNI   Discussion with family: Patient declined call to .     Anticipated Length of Stay: Patient will be admitted on an inpatient basis with an anticipated length of stay of greater than 2 midnights secondary to COPD exacerbation requiring IV steroids and increased O2.  Question new heart failure will obtain echocardiogram further evaluation.    History of Present Illness   Chief Complaint: Shortness of breath    Gloria Patel is a 84 y.o. female with a PMH of COPD, nicotine dependence, type 2 diabetes, GERD, iron deficiency, RLS, chronic respiratory failure, hyperlipidemia, CKD stage III who presents with shortness of breath from home x 1 day.  In the ED she was in respiratory distress and was started on BiPAP.  At baseline patient wears 3 L nasal cannula.  Patient subsequently weaned off BiPAP and is now on 4 L nasal cannula resting comfortably.  Patient recently admitted in the beginning of the month with COPD exacerbation.  She denies any fevers, chills, chest pain, nausea, vomiting.  Concern for possible heart failure with lower extremity edema patient was given 40 mg IV Lasix in the ED.  Will obtain echocardiogram for further evaluation.  Patient is a former smoker she reports that she quit years ago.  Denies any alcohol or illicit drug use.    Review of Systems   Respiratory:  Positive for shortness of breath and wheezing.        Historical Information   Past Medical History[1]  Past Surgical History[2]  Social History[3]  E-Cigarette/Vaping    E-Cigarette Use Never User      E-Cigarette/Vaping Substances    Nicotine No     THC No     CBD No     Flavoring No     Other No     Unknown No      Family History[4]  Social History:  Marital Status:    Occupation:  N/A  Patient Pre-hospital Living Situation: Home  Patient Pre-hospital Level of Mobility: walks with walker  Patient Pre-hospital Diet Restrictions: None    Meds/Allergies   I have reviewed home medications with patient personally.  Prior to Admission medications    Medication Sig Start Date End Date Taking? Authorizing Provider   albuterol (PROVENTIL HFA,VENTOLIN HFA) 90 mcg/act inhaler Inhale 2 puffs every 6 (six) hours as needed for wheezing 9/20/21   Arlen Grant,    Alcohol Swabs (Alcohol Prep) PADS USE TO TEST BLOOD SUGAR TWICE A DAY AND AS NEEDED    Historical Provider, MD   aspirin 81 mg chewable tablet Chew 1 tablet (81 mg total) daily 4/23/22   Julius Lees DO   atorvastatin (LIPITOR) 40 mg tablet Take 1 tablet (40 mg total) by mouth every evening 4/22/22 4/25/24  Julius Lees DO   B Complex-C-Folic Acid (B COMPLEX + C TR PO) Take 1 tablet by mouth in the morning. Indications: low vitamin b    Historical Provider, MD   bisacodyl (DULCOLAX) 10 mg suppository Insert 1 suppository (10 mg total) into the rectum daily as needed for constipation 4/27/24   Mariella Dumont MD   brimonidine tartrate 0.2 % ophthalmic solution Administer 1 drop into the left eye 2 (two) times a day 4/22/22   Julius Lees DO   Budeson-Glycopyrrol-Formoterol (Breztri Aerosphere) 160-9-4.8 MCG/ACT AERO Inhale 2 puffs  in the morning and 2 puffs before bedtime. Rinse mouth after use.. 5/16/22   Arlen Grant,    diltiazem (CARDIZEM CD) 240 mg 24 hr capsule Take 1 capsule by mouth in the morning. 12/16/23   Historical Provider, MD   ferrous sulfate 324 (65 Fe) mg Take 324 mg by mouth every other day. Indications: Anemia From Inadequate Iron in the Body    Historical Provider, MD   gabapentin (NEURONTIN) 300 mg capsule Take 300 mg by mouth in the morning and 300 mg at noon and 300 mg in the evening and 300 mg before bedtime. 3/23/20   Historical Provider, MD   levalbuterol (XOPENEX) 1.25 mg/3 mL nebulizer  "solution Take 3 mL (1.25 mg total) by nebulization 2 (two) times a day 5/14/25   Chary Garcias DO   losartan (COZAAR) 25 mg tablet Take 50 mg by mouth in the morning. 12/4/23   Historical Provider, MD   metFORMIN (GLUCOPHAGE) 500 mg tablet Take 1,000 mg by mouth daily with breakfast    Historical Provider, MD   metFORMIN (GLUCOPHAGE) 500 mg tablet Take 500 mg by mouth every evening    Historical Provider, MD   morphine (MSIR) 15 mg tablet Take 15 mg by mouth in the morning and 15 mg in the evening. Every 12 hrs. .    Historical Provider, MD   naloxegol oxalate (Movantik) 25 MG tablet TAKE ONE TABLET BY MOUTH DAILY FOR OPIOID INDUCED CONSTIPATION. ONGOING THERAPY.    Historical Provider, MD   oxygen gas Inhale 3 L/min continuous Via nasal cannula.     Historical Provider, MD   pantoprazole (PROTONIX) 40 mg tablet Take 40 mg by mouth every 12 (twelve) hours    Historical Provider, MD   rOPINIRole (REQUIP) 0.5 mg tablet Take 0.5 mg by mouth daily at bedtime Take 3 tablets PO before bed    Historical Provider, MD   senna (SENOKOT) 8.6 mg Take 2 tablets (17.2 mg total) by mouth daily at bedtime as needed for constipation for up to 14 days 4/27/24 5/11/24  Mariella Dumont MD   sitaGLIPtin (JANUVIA) 100 mg tablet Take 1 tablet (100 mg total) by mouth daily 4/13/20   ROMANA Duran     Allergies   Allergen Reactions    Metamucil [Fiber] Lightheadedness    Prednisone      The patient reports \"they make me goofy \"  No true allergic reaction    Prednisone Lightheadedness    Psyllium Hives    Tetanus Toxoids        Objective :  Temp:  [98.5 °F (36.9 °C)] 98.5 °F (36.9 °C)  HR:  [] 101  BP: (118-159)/(60-74) 140/65  Resp:  [18-32] 20  SpO2:  [90 %-99 %] 94 %  O2 Device: Nasal cannula  Nasal Cannula O2 Flow Rate (L/min):  [4 L/min-5 L/min] 4 L/min  FiO2 (%):  [40] 40    Physical Exam  Vitals and nursing note reviewed.   Constitutional:       General: She is not in acute distress.     Appearance: She " is well-developed.   HENT:      Head: Normocephalic and atraumatic.     Eyes:      Conjunctiva/sclera: Conjunctivae normal.       Cardiovascular:      Rate and Rhythm: Normal rate and regular rhythm.      Heart sounds: No murmur heard.  Pulmonary:      Effort: Pulmonary effort is normal. No tachypnea, accessory muscle usage or respiratory distress.      Breath sounds: Wheezing present. No rhonchi or rales.      Comments: On 4 L nasal cannula acutely.  Decreased breath sounds throughout.  Audible wheezing noted.  Mild conversational dyspnea.  Abdominal:      General: Bowel sounds are normal.      Palpations: Abdomen is soft.      Tenderness: There is no abdominal tenderness.     Musculoskeletal:         General: No swelling.      Cervical back: Neck supple.      Right lower le+ Pitting Edema present.      Left lower le+ Pitting Edema present.     Skin:     General: Skin is warm and dry.      Capillary Refill: Capillary refill takes less than 2 seconds.     Neurological:      Mental Status: She is alert.     Psychiatric:         Mood and Affect: Mood normal.          Lines/Drains:  Lines/Drains/Airways       Active Status       Name Placement date Placement time Site Days    External Urinary Catheter 25  -- less than 1                          Lab Results: I have reviewed the following results:  Results from last 7 days   Lab Units 25  1836   WBC Thousand/uL 7.00   HEMOGLOBIN g/dL 12.0   HEMATOCRIT % 39.7   PLATELETS Thousands/uL 231   SEGS PCT % 67   LYMPHO PCT % 14   MONO PCT % 12   EOS PCT % 6     Results from last 7 days   Lab Units 25  1836   SODIUM mmol/L 138   POTASSIUM mmol/L 4.9   CHLORIDE mmol/L 99   CO2 mmol/L 30   BUN mg/dL 13   CREATININE mg/dL 0.75   ANION GAP mmol/L 9   CALCIUM mg/dL 9.8   ALBUMIN g/dL 4.3   TOTAL BILIRUBIN mg/dL 0.55   ALK PHOS U/L 88   ALT U/L 10   AST U/L 25   GLUCOSE RANDOM mg/dL 97         Results from last 7 days   Lab Units 25  2244   POC  GLUCOSE mg/dl 273*     Lab Results   Component Value Date    HGBA1C 6.9 (H) 02/18/2025    HGBA1C 6.9 (H) 09/30/2024    HGBA1C 8.4 (H) 04/24/2024     Results from last 7 days   Lab Units 05/31/25 2059   PROCALCITONIN ng/ml <0.05       Imaging Results Review: I personally reviewed the following image studies/reports in PACS and discussed pertinent findings with Radiology: chest xray. My interpretation of the radiology images/reports is: No acute cardiopulmonary disease.  Other Study Results Review: EKG was reviewed.     Administrative Statements   I have spent a total time of 75 minutes in caring for this patient on the day of the visit/encounter including Diagnostic results, Prognosis, Risks and benefits of tx options, Instructions for management, Patient and family education, Importance of tx compliance, Risk factor reductions, Impressions, Counseling / Coordination of care, Documenting in the medical record, Reviewing/placing orders in the medical record (including tests, medications, and/or procedures), Obtaining or reviewing history  , and Communicating with other healthcare professionals .    ** Please Note: This note has been constructed using a voice recognition system. **         [1]   Past Medical History:  Diagnosis Date    Abdominal pain 09/26/2022    Arthritis     Cardiac disease     Constipation 04/25/2024    COPD (chronic obstructive pulmonary disease) (HCC)     COVID-19 2021    was in hospital for sx and was dx with covid    Diabetes mellitus (HCC)     GERD (gastroesophageal reflux disease)     Hiatal hernia     Hypertension     PUD (peptic ulcer disease)     Residual ASD (atrial septal defect) following repair    [2]   Past Surgical History:  Procedure Laterality Date    ABDOMINAL ADHESION SURGERY N/A 09/26/2022    Procedure: LYSIS ADHESIONS;  Surgeon: Petty Johnson MD;  Location: [facility];  Service: Thoracic    ASD REPAIR      BACK SURGERY      x3    CARDIAC SURGERY      Atrial septic  defect    ESOPHAGOGASTRODUODENOSCOPY N/A 2022    Procedure: ESOPHAGOGASTRODUODENOSCOPY (EGD);  Surgeon: Petty Johnson MD;  Location: [facility];  Service: Thoracic    JOINT REPLACEMENT      bilateral knee surgery    JOINT REPLACEMENT      KNEE SURGERY      PARAESOPHAGEAL HERNIA REPAIR N/A 2022    Procedure: REPAIR HERNIA PARAESOPHAGEAL LAPAROSCOPIC W ROBOTICS, gastropexy, mesh placement;  Surgeon: Petty Johnson MD;  Location: [facility];  Service: Thoracic    OR EXCISION GANGLION WRIST DORSAL/VOLAR PRIMARY Left 2023    Procedure: EXCISION GANGLION CYST;  Surgeon: Jaja Pantoja MD;  Location: [facility];  Service: Orthopedics    SHOULDER SURGERY     [3]   Social History  Tobacco Use    Smoking status: Former     Current packs/day: 0.00     Average packs/day: 1 pack/day for 64.1 years (64.1 ttl pk-yrs)     Types: Cigarettes     Start date:      Quit date: 2020     Years since quittin.3    Smokeless tobacco: Never    Tobacco comments:     stopped smoking 2 days ago   Vaping Use    Vaping status: Never Used   Substance and Sexual Activity    Alcohol use: Not Currently    Drug use: Never    Sexual activity: Not Currently   [4]   Family History  Problem Relation Name Age of Onset    Cancer Mother      Asthma Father

## 2025-06-01 NOTE — ASSESSMENT & PLAN NOTE
Lab Results   Component Value Date    EGFR 58 06/01/2025    EGFR 73 05/31/2025    EGFR 55 05/14/2025    CREATININE 0.91 06/01/2025    CREATININE 0.75 05/31/2025    CREATININE 0.94 05/14/2025   At baseline continue to trend daily labs

## 2025-06-01 NOTE — PLAN OF CARE
Problem: Potential for Falls  Goal: Patient will remain free of falls  Description: INTERVENTIONS:  - Educate patient/family on patient safety including physical limitations  - Instruct patient to call for assistance with activity   - Consider consulting OT/PT to assist with strengthening/mobility based on AM PAC & JH-HLM score  - Consult OT/PT to assist with strengthening/mobility   - Keep Call bell within reach  - Keep bed low and locked with side rails adjusted as appropriate  - Keep care items and personal belongings within reach  - Initiate and maintain comfort rounds  - Make Fall Risk Sign visible to staff  - Offer Toileting every  Hours, in advance of need  - Initiate/Maintain alarm  - Obtain necessary fall risk management equipment:   - Apply yellow socks and bracelet for high fall risk patients  - Consider moving patient to room near nurses station  Outcome: Progressing     Problem: PAIN - ADULT  Goal: Verbalizes/displays adequate comfort level or baseline comfort level  Description: Interventions:  - Encourage patient to monitor pain and request assistance  - Assess pain using appropriate pain scale  - Administer analgesics as ordered based on type and severity of pain and evaluate response  - Implement non-pharmacological measures as appropriate and evaluate response  - Consider cultural and social influences on pain and pain management  - Notify physician/advanced practitioner if interventions unsuccessful or patient reports new pain  - Educate patient/family on pain management process including their role and importance of  reporting pain   - Provide non-pharmacologic/complimentary pain relief interventions  Outcome: Progressing     Problem: INFECTION - ADULT  Goal: Absence or prevention of progression during hospitalization  Description: INTERVENTIONS:  - Assess and monitor for signs and symptoms of infection  - Monitor lab/diagnostic results  - Monitor all insertion sites, i.e. indwelling lines,  tubes, and drains  - Monitor endotracheal if appropriate and nasal secretions for changes in amount and color  - [place] appropriate cooling/warming therapies per order  - Administer medications as ordered  - Instruct and encourage patient and family to use good hand hygiene technique  - Identify and instruct in appropriate isolation precautions for identified infection/condition  Outcome: Progressing  Goal: Absence of fever/infection during neutropenic period  Description: INTERVENTIONS:  - Monitor WBC  - Perform strict hand hygiene  - Limit to healthy visitors only  - No plants, dried, fresh or silk flowers with mcdowell in patient room  Outcome: Progressing     Problem: SAFETY ADULT  Goal: Patient will remain free of falls  Description: INTERVENTIONS:  - Educate patient/family on patient safety including physical limitations  - Instruct patient to call for assistance with activity   - Consider consulting OT/PT to assist with strengthening/mobility based on AM PAC & -HLM score  - Consult OT/PT to assist with strengthening/mobility   - Keep Call bell within reach  - Keep bed low and locked with side rails adjusted as appropriate  - Keep care items and personal belongings within reach  - Initiate and maintain comfort rounds  - Make Fall Risk Sign visible to staff  - Offer Toileting every  Hours, in advance of need  - Initiate/Maintain alarm  - Obtain necessary fall risk management equipment:   - Apply yellow socks and bracelet for high fall risk patients  - Consider moving patient to room near nurses station  Outcome: Progressing  Goal: Maintain or return to baseline ADL function  Description: INTERVENTIONS:  -  Assess patient's ability to carry out ADLs; assess patient's baseline for ADL function and identify physical deficits which impact ability to perform ADLs (bathing, care of mouth/teeth, toileting, grooming, dressing, etc.)  - Assess/evaluate cause of self-care deficits   - Assess range of motion  - Assess  patient's mobility; develop plan if impaired  - Assess patient's need for assistive devices and provide as appropriate  - Encourage maximum independence but intervene and supervise when necessary  - Involve family in performance of ADLs  - Assess for home care needs following discharge   - Consider OT consult to assist with ADL evaluation and planning for discharge  - Provide patient education as appropriate  - Monitor functional capacity and physical performance, use of AM PAC & JH-HLM   - Monitor gait, balance and fatigue with ambulation    Outcome: Progressing  Goal: Maintains/Returns to pre admission functional level  Description: INTERVENTIONS:  - Perform AM-PAC 6 Click Basic Mobility/ Daily Activity assessment daily.  - Set and communicate daily mobility goal to care team and patient/family/caregiver.   - Collaborate with rehabilitation services on mobility goals if consulted  - Perform Range of Motion  times a day.  - Reposition patient every  hours.  - Dangle patient  times a day  - Stand patient  times a day  - Ambulate patient  times a day  - Out of bed to chair  times a day   - Out of bed for meals  times a day  - Out of bed for toileting  - Record patient progress and toleration of activity level   Outcome: Progressing     Problem: INFECTION - ADULT  Goal: Absence or prevention of progression during hospitalization  Description: INTERVENTIONS:  - Assess and monitor for signs and symptoms of infection  - Monitor lab/diagnostic results  - Monitor all insertion sites, i.e. indwelling lines, tubes, and drains  - Monitor endotracheal if appropriate and nasal secretions for changes in amount and color  - [place] appropriate cooling/warming therapies per order  - Administer medications as ordered  - Instruct and encourage patient and family to use good hand hygiene technique  - Identify and instruct in appropriate isolation precautions for identified infection/condition  Outcome: Progressing  Goal: Absence of  fever/infection during neutropenic period  Description: INTERVENTIONS:  - Monitor WBC  - Perform strict hand hygiene  - Limit to healthy visitors only  - No plants, dried, fresh or silk flowers with mcdowell in patient room  Outcome: Progressing     Problem: SAFETY ADULT  Goal: Patient will remain free of falls  Description: INTERVENTIONS:  - Educate patient/family on patient safety including physical limitations  - Instruct patient to call for assistance with activity   - Consider consulting OT/PT to assist with strengthening/mobility based on AM PAC & JH-HLM score  - Consult OT/PT to assist with strengthening/mobility   - Keep Call bell within reach  - Keep bed low and locked with side rails adjusted as appropriate  - Keep care items and personal belongings within reach  - Initiate and maintain comfort rounds  - Make Fall Risk Sign visible to staff  - Offer Toileting every  Hours, in advance of need  - Initiate/Maintain alarm  - Obtain necessary fall risk management equipment:   - Apply yellow socks and bracelet for high fall risk patients  - Consider moving patient to room near nurses station  Outcome: Progressing  Goal: Maintain or return to baseline ADL function  Description: INTERVENTIONS:  -  Assess patient's ability to carry out ADLs; assess patient's baseline for ADL function and identify physical deficits which impact ability to perform ADLs (bathing, care of mouth/teeth, toileting, grooming, dressing, etc.)  - Assess/evaluate cause of self-care deficits   - Assess range of motion  - Assess patient's mobility; develop plan if impaired  - Assess patient's need for assistive devices and provide as appropriate  - Encourage maximum independence but intervene and supervise when necessary  - Involve family in performance of ADLs  - Assess for home care needs following discharge   - Consider OT consult to assist with ADL evaluation and planning for discharge  - Provide patient education as appropriate  - Monitor  functional capacity and physical performance, use of AM PAC & -HLM   - Monitor gait, balance and fatigue with ambulation    Outcome: Progressing  Goal: Maintains/Returns to pre admission functional level  Description: INTERVENTIONS:  - Perform AM-PAC 6 Click Basic Mobility/ Daily Activity assessment daily.  - Set and communicate daily mobility goal to care team and patient/family/caregiver.   - Collaborate with rehabilitation services on mobility goals if consulted  - Perform Range of Motion  times a day.  - Reposition patient every  hours.  - Dangle patient  times a day  - Stand patient  times a day  - Ambulate patient  times a day  - Out of bed to chair  times a day   - Out of bed for marcos times a day  - Out of bed for toileting  - Record patient progress and toleration of activity level   Outcome: Progressing     Problem: DISCHARGE PLANNING  Goal: Discharge to home or other facility with appropriate resources  Description: INTERVENTIONS:  - Identify barriers to discharge w/patient and caregiver  - Arrange for needed discharge resources and transportation as appropriate  - Identify discharge learning needs (meds, wound care, etc.)  - Arrange for interpretive services to assist at discharge as needed  - Refer to Case Management Department for coordinating discharge planning if the patient needs post-hospital services based on physician/advanced practitioner order or complex needs related to functional status, cognitive ability, or social support system  Outcome: Progressing     Problem: Knowledge Deficit  Goal: Patient/family/caregiver demonstrates understanding of disease process, treatment plan, medications, and discharge instructions  Description: Complete learning assessment and assess knowledge base.  Interventions:  - Provide teaching at level of understanding  - Provide teaching via preferred learning methods  Outcome: Progressing     Problem: CARDIOVASCULAR - ADULT  Goal: Maintains optimal cardiac output  and hemodynamic stability  Description: INTERVENTIONS:  - Monitor I/O, vital signs and rhythm  - Monitor for S/S and trends of decreased cardiac output  - Administer and titrate ordered vasoactive medications to optimize hemodynamic stability  - Assess quality of pulses, skin color and temperature  - Assess for signs of decreased coronary artery perfusion  - Instruct patient to report change in severity of symptoms  Outcome: Progressing  Goal: Absence of cardiac dysrhythmias or at baseline rhythm  Description: INTERVENTIONS:  - Continuous cardiac monitoring, vital signs, obtain 12 lead EKG if ordered  - Administer antiarrhythmic and heart rate control medications as ordered  - Monitor electrolytes and administer replacement therapy as ordered  Outcome: Progressing     Problem: RESPIRATORY - ADULT  Goal: Achieves optimal ventilation and oxygenation  Description: INTERVENTIONS:  - Assess for changes in respiratory status  - Assess for changes in mentation and behavior  - Position to facilitate oxygenation and minimize respiratory effort  - Oxygen administered by appropriate delivery if ordered  - Initiate smoking cessation education as indicated  - Encourage broncho-pulmonary hygiene including cough, deep breathe, Incentive Spirometry  - Assess the need for suctioning and aspirate as needed  - Assess and instruct to report SOB or any respiratory difficulty  - Respiratory Therapy support as indicated  Outcome: Progressing     Problem: METABOLIC, FLUID AND ELECTROLYTES - ADULT  Goal: Electrolytes maintained within normal limits  Description: INTERVENTIONS:  - Monitor labs and assess patient for signs and symptoms of electrolyte imbalances  - Administer electrolyte replacement as ordered  - Monitor response to electrolyte replacements, including repeat lab results as appropriate  - Instruct patient on fluid and nutrition as appropriate  Outcome: Progressing  Goal: Fluid balance maintained  Description: INTERVENTIONS:  -  Monitor labs   - Monitor I/O and WT  - Instruct patient on fluid and nutrition as appropriate  - Assess for signs & symptoms of volume excess or deficit  Outcome: Progressing  Goal: Glucose maintained within target range  Description: INTERVENTIONS:  - Monitor Blood Glucose as ordered  - Assess for signs and symptoms of hyperglycemia and hypoglycemia  - Administer ordered medications to maintain glucose within target range  - Assess nutritional intake and initiate nutrition service referral as needed  Outcome: Progressing

## 2025-06-01 NOTE — RESPIRATORY THERAPY NOTE
RT Protocol Note  Gloria Patel 84 y.o. female MRN: [id number]  Unit/Bed#: -01 Encounter: [id number]    Assessment    Principal Problem:    Acute on chronic respiratory failure with hypoxia (HCC)  Active Problems:    Benign essential hypertension    Type II diabetes mellitus (HCC)    Hyperlipidemia    Stage 3b chronic kidney disease (HCC)    COPD exacerbation (HCC)    Bilateral lower extremity edema      Home Pulmonary Medications:  Albuterol Inhaler PRN Q4, Brextri 2 puffs BID, Xoponex Nebulizer BID  Home Devices/Therapy: Home O2 (3L NC)    Past Medical History[1]  Social History[2]    Subjective         Objective    Physical Exam:   Assessment Type: Assess only  General Appearance: Alert, Awake  Respiratory Pattern: Dyspnea at rest  Chest Assessment: Chest expansion symmetrical  Bilateral Breath Sounds: Diminished  Cough: None  O2 Device: 4L NC    Vitals:  Blood pressure 131/62, pulse 91, temperature 97.9 °F (36.6 °C), temperature source Oral, resp. rate 22, height 5' (1.524 m), weight 74.5 kg (164 lb 3.9 oz), SpO2 97%, not currently breastfeeding.          Imaging and other studies: Results Review Statement: I reviewed radiology reports from this admission including: chest xray.    O2 Device: 4L NC     Plan    Respiratory Plan: Mild Distress pathway, Home Bronchodilator Patient pathway, Vent/NIV/HFNC        Resp Comments: Pt with hx of COPD and CHF and use of HOME O2 at 3L. Patient home medications Albuterol inhaler 4x daily, xoponex BID, Brextri MDI 2 puffs BID. Continue with current medication ordered TID Xoponex/Atrovent and reevaluate patient.        [1]   Past Medical History:  Diagnosis Date    Abdominal pain 09/26/2022    Arthritis     Cardiac disease     Constipation 04/25/2024    COPD (chronic obstructive pulmonary disease) (HCC)     COVID-19 2021    was in hospital for sx and was dx with covid    Diabetes mellitus (HCC)     GERD (gastroesophageal reflux disease)     Hiatal hernia      Hypertension     PUD (peptic ulcer disease)     Residual ASD (atrial septal defect) following repair    [2]   Social History  Socioeconomic History    Marital status:    Tobacco Use    Smoking status: Former     Current packs/day: 0.00     Average packs/day: 1 pack/day for 64.1 years (64.1 ttl pk-yrs)     Types: Cigarettes     Start date:      Quit date: 2020     Years since quittin.3    Smokeless tobacco: Never    Tobacco comments:     stopped smoking 2 days ago   Vaping Use    Vaping status: Never Used   Substance and Sexual Activity    Alcohol use: Not Currently    Drug use: Never    Sexual activity: Not Currently   Social History Narrative    ** Merged History Encounter **         Drinks one to two cups of coffee a day     Social Drivers of Health     Food Insecurity: No Food Insecurity (2025)    Nursing - Inadequate Food Risk Classification     Ran Out of Food in the Last Year: Never true   Transportation Needs: No Transportation Needs (2025)    Nursing - Transportation Risk Classification     Lack of Transportation: No   Intimate Partner Violence: Unknown (2025)    Nursing IPS     Physically Hurt by Someone: No     Hurt or Threatened by Someone: No   Housing Stability: Unknown (2025)    Nursing: Inadequate Housing Risk Classification     Unable to Pay for Housing in the Last Year: No     Has Housin

## 2025-06-01 NOTE — ASSESSMENT & PLAN NOTE
Lab Results   Component Value Date    HGBA1C 6.9 (H) 02/18/2025       Recent Labs     05/31/25  2244 06/01/25  0805 06/01/25  1030   POCGLU 273* 369* 442*       Blood Sugar Average: Last 72 hrs:  (P) [id number] Januvia, metformin  Hold oral antidiabetic agents  Continue sliding scale insulin 4 times daily with meals  Hypoglycemia protocol

## 2025-06-01 NOTE — ASSESSMENT & PLAN NOTE
Patient presents with respiratory distress from home requiring BiPAP in the emergency department.  Patient has history of chronic respiratory failure on 3 L nasal cannula at baseline.  Currently weaned off BiPAP now on 4 L nasal cannula acutely  CXR: No acute cardiopulmonary disease.  Formal read pending  BNP 74.  VBG pH 7.3, pCO2 49.8, HCO3 28  Given x 1 dose of IV Lasix in the ED for concern of volume overload  Suspect in setting of COPD exacerbation  CXR 5/31 shows no acute cardiopulmonary disease.  Pulmonary artery enlargement which can be seen with pulmonary hypertension.    Plan  Continue azithromycin  Switch Solu-Medrol 40 mg to once daily   Continue scheduled neb treatments Xopenex and Atrovent  Respiratory protocol  Wean oxygen as able  Obtain flu/COVID/RSV  Obtain echocardiogram

## 2025-06-01 NOTE — ASSESSMENT & PLAN NOTE
Patient presents with respiratory distress from home requiring BiPAP in the emergency department.  Patient has history of chronic respiratory failure on 3 L nasal cannula at baseline.  Currently weaned off BiPAP now on 4 L nasal cannula acutely  CXR: No acute cardiopulmonary disease.  Formal read pending  BNP 74.  VBG pH 7.3, pCO2 49.8, HCO3 28  Given x 1 dose of IV Lasix in the ED for concern of volume overload  Suspect in setting of COPD exacerbation  Plan  Continue azithromycin  Continue Solu-Medrol 40 mg twice daily  Continue scheduled neb treatments Xopenex and Atrovent  Respiratory protocol  Wean oxygen as able  Obtain flu/COVID/RSV  Obtain echocardiogram

## 2025-06-02 ENCOUNTER — HOME CARE VISIT (OUTPATIENT)
Dept: HOME HEALTH SERVICES | Facility: HOME HEALTHCARE | Age: 85
End: 2025-06-02
Payer: COMMERCIAL

## 2025-06-02 ENCOUNTER — APPOINTMENT (INPATIENT)
Dept: NON INVASIVE DIAGNOSTICS | Facility: HOSPITAL | Age: 85
DRG: 189 | End: 2025-06-02
Payer: COMMERCIAL

## 2025-06-02 PROBLEM — R65.10 SIRS (SYSTEMIC INFLAMMATORY RESPONSE SYNDROME) (HCC): Status: RESOLVED | Noted: 2025-06-01 | Resolved: 2025-06-02

## 2025-06-02 PROBLEM — I51.89 DIASTOLIC DYSFUNCTION WITHOUT HEART FAILURE: Status: ACTIVE | Noted: 2025-06-02

## 2025-06-02 PROBLEM — E66.811 CLASS 1 OBESITY WITH BODY MASS INDEX (BMI) OF 33.0 TO 33.9 IN ADULT: Status: ACTIVE | Noted: 2025-06-02

## 2025-06-02 LAB
ANION GAP SERPL CALCULATED.3IONS-SCNC: 5 MMOL/L (ref 4–13)
AORTIC ROOT: 3 CM
AORTIC VALVE MEAN VELOCITY: 19.4 M/S
ASCENDING AORTA: 3.5 CM
ATRIAL RATE: 58 BPM
ATRIAL RATE: 73 BPM
AV AREA BY CONTINUOUS VTI: 2 CM2
AV AREA PEAK VELOCITY: 1.7 CM2
AV LVOT MEAN GRADIENT: 5 MMHG
AV LVOT PEAK GRADIENT: 9 MMHG
AV MEAN PRESS GRAD SYS DOP V1V2: 17 MMHG
AV ORIFICE AREA US: 1.95 CM2
AV PEAK GRADIENT: 31 MMHG
AV VELOCITY RATIO: 0.62
AV VMAX SYS DOP: 2.77 M/S
BSA FOR ECHO PROCEDURE: 1.74 M2
BUN SERPL-MCNC: 26 MG/DL (ref 5–25)
CALCIUM SERPL-MCNC: 9.3 MG/DL (ref 8.4–10.2)
CHLORIDE SERPL-SCNC: 96 MMOL/L (ref 96–108)
CO2 SERPL-SCNC: 35 MMOL/L (ref 21–32)
CREAT SERPL-MCNC: 1.08 MG/DL (ref 0.6–1.3)
DOP CALC AO VTI: 50.5 CM
DOP CALC LVOT AREA: 3.14 CM2
DOP CALC LVOT CARDIAC INDEX: 3.92 L/MIN/M2
DOP CALC LVOT CARDIAC OUTPUT: 6.82 L/MIN
DOP CALC LVOT DIAMETER: 2 CM
DOP CALC LVOT PEAK VEL VTI: 31.39 CM
DOP CALC LVOT PEAK VEL: 1.48 M/S
DOP CALC LVOT STROKE INDEX: 56.3 ML/M2
DOP CALC LVOT STROKE VOLUME: 98.56
E WAVE DECELERATION TIME: 198 MS
E/A RATIO: 0.56
ERYTHROCYTE [DISTWIDTH] IN BLOOD BY AUTOMATED COUNT: 16 % (ref 11.6–15.1)
EST. AVERAGE GLUCOSE BLD GHB EST-MCNC: 154 MG/DL
FRACTIONAL SHORTENING: 40 (ref 28–44)
GFR SERPL CREATININE-BSD FRML MDRD: 47 ML/MIN/1.73SQ M
GLUCOSE SERPL-MCNC: 104 MG/DL (ref 65–140)
GLUCOSE SERPL-MCNC: 121 MG/DL (ref 65–140)
GLUCOSE SERPL-MCNC: 121 MG/DL (ref 65–140)
GLUCOSE SERPL-MCNC: 127 MG/DL (ref 65–140)
GLUCOSE SERPL-MCNC: 165 MG/DL (ref 65–140)
GLUCOSE SERPL-MCNC: 201 MG/DL (ref 65–140)
GLUCOSE SERPL-MCNC: 209 MG/DL (ref 65–140)
GLUCOSE SERPL-MCNC: 265 MG/DL (ref 65–140)
GLUCOSE SERPL-MCNC: 268 MG/DL (ref 65–140)
GLUCOSE SERPL-MCNC: 293 MG/DL (ref 65–140)
GLUCOSE SERPL-MCNC: 81 MG/DL (ref 65–140)
HBA1C MFR BLD: 7 %
HCT VFR BLD AUTO: 32.7 % (ref 34.8–46.1)
HGB BLD-MCNC: 10.5 G/DL (ref 11.5–15.4)
INTERVENTRICULAR SEPTUM IN DIASTOLE (PARASTERNAL SHORT AXIS VIEW): 1.1 CM
INTERVENTRICULAR SEPTUM: 1.1 CM (ref 0.6–1.1)
LAAS-AP2: 27.8 CM2
LAAS-AP4: 22.8 CM2
LEFT ATRIUM SIZE: 4 CM
LEFT ATRIUM VOLUME (MOD BIPLANE): 88 ML
LEFT ATRIUM VOLUME INDEX (MOD BIPLANE): 50.6 ML/M2
LEFT INTERNAL DIMENSION IN SYSTOLE: 3 CM (ref 2.1–4)
LEFT VENTRICULAR INTERNAL DIMENSION IN DIASTOLE: 5 CM (ref 3.5–6)
LEFT VENTRICULAR POSTERIOR WALL IN END DIASTOLE: 1 CM
LEFT VENTRICULAR STROKE VOLUME: 84 ML
LV EF US.2D.A4C+ESTIMATED: 73 %
LVSV (TEICH): 84 ML
MCH RBC QN AUTO: 27.3 PG (ref 26.8–34.3)
MCHC RBC AUTO-ENTMCNC: 32.1 G/DL (ref 31.4–37.4)
MCV RBC AUTO: 85 FL (ref 82–98)
MV E'TISSUE VEL-LAT: 8 CM/S
MV E'TISSUE VEL-SEP: 8 CM/S
MV PEAK A VEL: 0.85 M/S
MV PEAK E VEL: 48 CM/S
P AXIS: 82 DEGREES
PLATELET # BLD AUTO: 227 THOUSANDS/UL (ref 149–390)
PMV BLD AUTO: 9.3 FL (ref 8.9–12.7)
POTASSIUM SERPL-SCNC: 4.4 MMOL/L (ref 3.5–5.3)
PR INTERVAL: 152 MS
QRS AXIS: -87 DEGREES
QRS AXIS: 266 DEGREES
QRSD INTERVAL: 138 MS
QRSD INTERVAL: 140 MS
QT INTERVAL: 416 MS
QT INTERVAL: 418 MS
QTC INTERVAL: 454 MS
QTC INTERVAL: 458 MS
RBC # BLD AUTO: 3.85 MILLION/UL (ref 3.81–5.12)
RIGHT ATRIUM AREA SYSTOLE A4C: 18.9 CM2
RIGHT VENTRICLE ID DIMENSION: 3.2 CM
SINOTUBULAR JUNCTION: 2.4 CM
SL CV LEFT ATRIUM LENGTH A2C: 7.4 CM
SL CV LV EF: 60
SL CV PED ECHO LEFT VENTRICLE DIASTOLIC VOLUME (MOD BIPLANE) 2D: 121 ML
SL CV PED ECHO LEFT VENTRICLE SYSTOLIC VOLUME (MOD BIPLANE) 2D: 36 ML
SL CV SINUS OF VALSALVA 2D: 3 CM
SODIUM SERPL-SCNC: 136 MMOL/L (ref 135–147)
STJ: 2.4 CM
T WAVE AXIS: -34 DEGREES
T WAVE AXIS: 74 DEGREES
TR MAX PG: 29 MMHG
TR PEAK VELOCITY: 2.7 M/S
TRICUSPID ANNULAR PLANE SYSTOLIC EXCURSION: 2.1 CM
TRICUSPID VALVE PEAK REGURGITATION VELOCITY: 2.71 M/S
VENTRICULAR RATE: 71 BPM
VENTRICULAR RATE: 73 BPM
WBC # BLD AUTO: 12.93 THOUSAND/UL (ref 4.31–10.16)

## 2025-06-02 PROCEDURE — 93306 TTE W/DOPPLER COMPLETE: CPT

## 2025-06-02 PROCEDURE — 85027 COMPLETE CBC AUTOMATED: CPT

## 2025-06-02 PROCEDURE — 93306 TTE W/DOPPLER COMPLETE: CPT | Performed by: INTERNAL MEDICINE

## 2025-06-02 PROCEDURE — 82948 REAGENT STRIP/BLOOD GLUCOSE: CPT

## 2025-06-02 PROCEDURE — 93010 ELECTROCARDIOGRAM REPORT: CPT | Performed by: INTERNAL MEDICINE

## 2025-06-02 PROCEDURE — 94760 N-INVAS EAR/PLS OXIMETRY 1: CPT

## 2025-06-02 PROCEDURE — 99232 SBSQ HOSP IP/OBS MODERATE 35: CPT | Performed by: INTERNAL MEDICINE

## 2025-06-02 PROCEDURE — 80048 BASIC METABOLIC PNL TOTAL CA: CPT

## 2025-06-02 PROCEDURE — 94640 AIRWAY INHALATION TREATMENT: CPT

## 2025-06-02 PROCEDURE — 94660 CPAP INITIATION&MGMT: CPT

## 2025-06-02 RX ORDER — FUROSEMIDE 10 MG/ML
40 INJECTION INTRAMUSCULAR; INTRAVENOUS
Status: CANCELLED | OUTPATIENT
Start: 2025-06-03

## 2025-06-02 RX ORDER — SENNOSIDES 8.6 MG
2 TABLET ORAL DAILY
Status: DISCONTINUED | OUTPATIENT
Start: 2025-06-02 | End: 2025-06-04 | Stop reason: HOSPADM

## 2025-06-02 RX ORDER — INSULIN GLARGINE 100 [IU]/ML
15 INJECTION, SOLUTION SUBCUTANEOUS ONCE
Status: COMPLETED | OUTPATIENT
Start: 2025-06-02 | End: 2025-06-02

## 2025-06-02 RX ORDER — POLYETHYLENE GLYCOL 3350 17 G/17G
17 POWDER, FOR SOLUTION ORAL DAILY
Status: DISCONTINUED | OUTPATIENT
Start: 2025-06-02 | End: 2025-06-04 | Stop reason: HOSPADM

## 2025-06-02 RX ORDER — INSULIN LISPRO 100 [IU]/ML
1-5 INJECTION, SOLUTION INTRAVENOUS; SUBCUTANEOUS
Status: DISCONTINUED | OUTPATIENT
Start: 2025-06-02 | End: 2025-06-03

## 2025-06-02 RX ADMIN — GABAPENTIN 300 MG: 300 CAPSULE ORAL at 11:26

## 2025-06-02 RX ADMIN — PANTOPRAZOLE SODIUM 40 MG: 40 TABLET, DELAYED RELEASE ORAL at 05:04

## 2025-06-02 RX ADMIN — LEVALBUTEROL HYDROCHLORIDE 1.25 MG: 1.25 SOLUTION RESPIRATORY (INHALATION) at 20:13

## 2025-06-02 RX ADMIN — FUROSEMIDE 40 MG: 10 INJECTION, SOLUTION INTRAVENOUS at 17:07

## 2025-06-02 RX ADMIN — IPRATROPIUM BROMIDE 0.5 MG: 0.5 SOLUTION RESPIRATORY (INHALATION) at 20:13

## 2025-06-02 RX ADMIN — GABAPENTIN 300 MG: 300 CAPSULE ORAL at 17:06

## 2025-06-02 RX ADMIN — MORPHINE SULFATE 15 MG: 15 TABLET ORAL at 20:19

## 2025-06-02 RX ADMIN — INSULIN GLARGINE 15 UNITS: 100 INJECTION, SOLUTION SUBCUTANEOUS at 11:25

## 2025-06-02 RX ADMIN — INSULIN LISPRO 2 UNITS: 100 INJECTION, SOLUTION INTRAVENOUS; SUBCUTANEOUS at 17:11

## 2025-06-02 RX ADMIN — BRIMONIDINE TARTRATE 1 DROP: 2 SOLUTION/ DROPS OPHTHALMIC at 20:21

## 2025-06-02 RX ADMIN — BRIMONIDINE TARTRATE 1 DROP: 2 SOLUTION/ DROPS OPHTHALMIC at 01:28

## 2025-06-02 RX ADMIN — FUROSEMIDE 40 MG: 10 INJECTION, SOLUTION INTRAVENOUS at 08:52

## 2025-06-02 RX ADMIN — PANTOPRAZOLE SODIUM 40 MG: 40 TABLET, DELAYED RELEASE ORAL at 17:06

## 2025-06-02 RX ADMIN — Medication 1 CAPSULE: at 17:06

## 2025-06-02 RX ADMIN — HEPARIN SODIUM 5000 UNITS: 5000 INJECTION INTRAVENOUS; SUBCUTANEOUS at 05:04

## 2025-06-02 RX ADMIN — LEVALBUTEROL HYDROCHLORIDE 1.25 MG: 1.25 SOLUTION RESPIRATORY (INHALATION) at 13:12

## 2025-06-02 RX ADMIN — IPRATROPIUM BROMIDE 0.5 MG: 0.5 SOLUTION RESPIRATORY (INHALATION) at 07:46

## 2025-06-02 RX ADMIN — POLYETHYLENE GLYCOL 3350 17 G: 17 POWDER, FOR SOLUTION ORAL at 08:51

## 2025-06-02 RX ADMIN — BRIMONIDINE TARTRATE 1 DROP: 2 SOLUTION/ DROPS OPHTHALMIC at 09:00

## 2025-06-02 RX ADMIN — GABAPENTIN 300 MG: 300 CAPSULE ORAL at 05:04

## 2025-06-02 RX ADMIN — AZITHROMYCIN DIHYDRATE 500 MG: 250 TABLET ORAL at 20:19

## 2025-06-02 RX ADMIN — ASPIRIN 81 MG CHEWABLE TABLET 81 MG: 81 TABLET CHEWABLE at 08:51

## 2025-06-02 RX ADMIN — ROPINIROLE HYDROCHLORIDE 0.5 MG: 0.25 TABLET, FILM COATED ORAL at 21:37

## 2025-06-02 RX ADMIN — SODIUM CHLORIDE 1.5 UNITS/HR: 9 INJECTION, SOLUTION INTRAVENOUS at 11:02

## 2025-06-02 RX ADMIN — IPRATROPIUM BROMIDE 0.5 MG: 0.5 SOLUTION RESPIRATORY (INHALATION) at 13:12

## 2025-06-02 RX ADMIN — PREDNISONE 40 MG: 20 TABLET ORAL at 08:51

## 2025-06-02 RX ADMIN — SENNOSIDES 17.2 MG: 8.6 TABLET, FILM COATED ORAL at 08:51

## 2025-06-02 RX ADMIN — INSULIN LISPRO 2 UNITS: 100 INJECTION, SOLUTION INTRAVENOUS; SUBCUTANEOUS at 21:37

## 2025-06-02 RX ADMIN — LEVALBUTEROL HYDROCHLORIDE 1.25 MG: 1.25 SOLUTION RESPIRATORY (INHALATION) at 07:46

## 2025-06-02 RX ADMIN — MORPHINE SULFATE 15 MG: 15 TABLET ORAL at 08:51

## 2025-06-02 RX ADMIN — SODIUM CHLORIDE 6 UNITS/HR: 9 INJECTION, SOLUTION INTRAVENOUS at 08:59

## 2025-06-02 RX ADMIN — DILTIAZEM HYDROCHLORIDE 240 MG: 240 CAPSULE, EXTENDED RELEASE ORAL at 08:51

## 2025-06-02 RX ADMIN — ATORVASTATIN CALCIUM 40 MG: 40 TABLET, FILM COATED ORAL at 17:06

## 2025-06-02 RX ADMIN — HEPARIN SODIUM 5000 UNITS: 5000 INJECTION INTRAVENOUS; SUBCUTANEOUS at 13:05

## 2025-06-02 RX ADMIN — GABAPENTIN 300 MG: 300 CAPSULE ORAL at 21:37

## 2025-06-02 RX ADMIN — HEPARIN SODIUM 5000 UNITS: 5000 INJECTION INTRAVENOUS; SUBCUTANEOUS at 21:37

## 2025-06-02 RX ADMIN — LOSARTAN POTASSIUM 50 MG: 50 TABLET, FILM COATED ORAL at 08:51

## 2025-06-02 NOTE — ASSESSMENT & PLAN NOTE
Left ventricular cavity size is normal. Wall thickness is normal. The left ventricular ejection fraction is 60%. Systolic function is normal. Wall motion is normal. Diastolic function is mildly abnormal, consistent with grade I (abnormal) relaxation.     See plan under hypoxia

## 2025-06-02 NOTE — PROGRESS NOTES
Progress Note - Hospitalist   Name: Gloria Patel 84 y.o. female I MRN: [id number]  Unit/Bed#: S -01 I Date of Admission: 5/31/2025   Date of Service: 6/2/2025 I Hospital Day: 2    Assessment & Plan  Acute on chronic respiratory failure with hypoxia (HCC)  Patient presents with respiratory distress from home requiring BiPAP in the emergency department.  Patient has history of chronic respiratory failure on 3 L nasal cannula at baseline.  Currently weaned off BiPAP now on 4 L nasal cannula acutely  CXR: No acute cardiopulmonary disease.  Formal read pending  BNP 74.  VBG pH 7.3, pCO2 49.8, HCO3 28  Given x 1 dose of IV Lasix in the ED for concern of volume overload  Suspect in setting of COPD exacerbation  CXR 5/31 shows no acute cardiopulmonary disease.  Pulmonary artery enlargement which can be seen with pulmonary hypertension.  6/1:Left ventricular cavity size is normal. Wall thickness is normal. The left ventricular ejection fraction is 60%. Systolic function is normal. Wall motion is normal. Diastolic function is mildly abnormal, consistent with grade I (abnormal) relaxation.     Plan  Azithromycin for 3 days  Prednisone 40 mg daily  Continue scheduled neb treatments Xopenex and Atrovent  Respiratory protocol  Wean oxygen as able  Obtain flu/COVID/RSV  Continue furosemide 40 mg twice daily  COPD exacerbation (HCC)  BiPAP at night?  Continue scheduled neb treatments  Respiratory protocol  Wean oxygen as tolerated  See plan as above  Benign essential hypertension  Currently hemodynamically stable  Continue PT antihypertensives  Type II diabetes mellitus (HCC)  Lab Results   Component Value Date    HGBA1C 7.0 (H) 06/01/2025       Recent Labs     06/02/25  0502 06/02/25  0715 06/02/25  0859 06/02/25  1101   POCGLU 121 165* 268* 127       Blood Sugar Average: Last 72 hrs:  (P) 260PTA Januvia, metformin  Hold oral antidiabetic agents  Continue sliding scale insulin 4 times daily with meals  Hypoglycemia  protocol  Patient initially requiring insulin drip    Endocrinology has been consulted    Hyperlipidemia  Continue statin therapy  Stage 3b chronic kidney disease (HCC)  Lab Results   Component Value Date    EGFR 47 06/02/2025    EGFR 58 06/01/2025    EGFR 73 05/31/2025    CREATININE 1.08 06/02/2025    CREATININE 0.91 06/01/2025    CREATININE 0.75 05/31/2025   At baseline continue to trend daily labs  Bilateral lower extremity edema  Patient reports new onset lower extremity edema.  +2 bilaterally  BNP 72  Denies any prior history of heart failure  In ED was given IV Lasix 40 mg x 1  Will continue Lasix 40 mg twice daily  Obtain echocardiogram  SIRS (systemic inflammatory response syndrome) (HCC) (Resolved: 6/2/2025)    Diastolic dysfunction without heart failure  Left ventricular cavity size is normal. Wall thickness is normal. The left ventricular ejection fraction is 60%. Systolic function is normal. Wall motion is normal. Diastolic function is mildly abnormal, consistent with grade I (abnormal) relaxation.     See plan under hypoxia  Class 1 obesity with body mass index (BMI) of 33.0 to 33.9 in adult  Morbid obesity, treated with CHO diet    VTE Pharmacologic Prophylaxis:   Moderate Risk (Score 3-4) - Pharmacological DVT Prophylaxis Ordered: heparin.    Mobility:   Basic Mobility Inpatient Raw Score: 18  JH-HLM Goal: 6: Walk 10 steps or more  JH-HLM Achieved: 3: Sit at edge of bed  JH-HLM Goal achieved. Continue to encourage appropriate mobility.  PT OT consulted  Patient Centered Rounds: I performed bedside rounds with nursing staff today.   Discussions with Specialists or Other Care Team Provider: Endocrinology    Education and Discussions with Family / Patient: Updated  (son) via phone.    Current Length of Stay: 2 day(s)  Current Patient Status: Inpatient   Certification Statement: The patient will continue to require additional inpatient hospital stay due to hyperglycemia and deconditioning  and hypoxia treated with nebs and diuretics and PT OT eval's  Discharge Plan: Anticipate discharge in 24-48 hrs to discharge location to be determined pending rehab evaluations.    Code Status: Level 3 - DNAR and DNI    Subjective   No events on telemetry.  Patient on insulin drip, patient reported that she is still felt weak and has some shortness of breath.  P.o. intake is adequate    Objective :  Temp:  [98 °F (36.7 °C)-99.4 °F (37.4 °C)] 99.1 °F (37.3 °C)  HR:  [72-81] 72  BP: (110-124)/(53-84) 120/60  Resp:  [18-20] 18  SpO2:  [90 %-95 %] 95 %  O2 Device: Nasal cannula  Nasal Cannula O2 Flow Rate (L/min):  [3 L/min] 3 L/min  FiO2 (%):  [40] 40    Body mass index is 33.2 kg/m².     Input and Output Summary (last 24 hours):     Intake/Output Summary (Last 24 hours) at 6/2/2025 1149  Last data filed at 6/2/2025 1107  Gross per 24 hour   Intake 720 ml   Output 1550 ml   Net -830 ml       Physical Exam  Constitutional:       General: She is not in acute distress.     Appearance: She is ill-appearing. She is not toxic-appearing.     Cardiovascular:      Rate and Rhythm: Normal rate and regular rhythm.   Pulmonary:      Breath sounds: No stridor. No wheezing or rhonchi.      Comments: Increased respiratory effort  Chest:      Chest wall: No tenderness.   Abdominal:      General: There is distension.      Palpations: There is no mass.     Musculoskeletal:         General: Swelling present.     Neurological:      Mental Status: She is oriented to person, place, and time.         Lines/Drains:  Lines/Drains/Airways       Active Status       Name Placement date Placement time Site Days    External Urinary Catheter 05/31/25 2000  -- 1                      Telemetry:  Telemetry Orders (From admission, onward)               24 Hour Telemetry Monitoring  Continuous x 24 Hours (Telem)        Expiring   Question:  Reason for 24 Hour Telemetry  Answer:  Decompensated CHF- and any one of the following: continuous diuretic  infusion or total diuretic dose >200 mg daily, associated electrolyte derangement (I.e. K < 3.0), inotropic drip (continuous infusion), hx of ventricular arrhythmia, or new EF < 35%                     Telemetry Reviewed: Normal Sinus Rhythm  Indication for Continued Telemetry Use: No indication for continued use. Will discontinue.                Lab Results: I have reviewed the following results:   Results from last 7 days   Lab Units 06/02/25  0433 06/01/25  0746   WBC Thousand/uL 12.93* 2.21*   HEMOGLOBIN g/dL 10.5* 10.6*   HEMATOCRIT % 32.7* 34.7*   PLATELETS Thousands/uL 227 181   SEGS PCT %  --  88*   LYMPHO PCT %  --  9*   MONO PCT %  --  2*   EOS PCT %  --  0     Results from last 7 days   Lab Units 06/02/25  0433 06/01/25  0746 05/31/25  1836   SODIUM mmol/L 136   < > 138   POTASSIUM mmol/L 4.4   < > 4.9   CHLORIDE mmol/L 96   < > 99   CO2 mmol/L 35*   < > 30   BUN mg/dL 26*   < > 13   CREATININE mg/dL 1.08   < > 0.75   ANION GAP mmol/L 5   < > 9   CALCIUM mg/dL 9.3   < > 9.8   ALBUMIN g/dL  --   --  4.3   TOTAL BILIRUBIN mg/dL  --   --  0.55   ALK PHOS U/L  --   --  88   ALT U/L  --   --  10   AST U/L  --   --  25   GLUCOSE RANDOM mg/dL 81   < > 97    < > = values in this interval not displayed.         Results from last 7 days   Lab Units 06/02/25  1101 06/02/25  0859 06/02/25  0715 06/02/25  0502 06/02/25  0321 06/02/25  0122 06/02/25  0005 06/01/25  2302 06/01/25  2111 06/01/25  1904 06/01/25  1715 06/01/25  1629   POC GLUCOSE mg/dl 127 268* 165* 121 104 121 201* 252* 255* 366* 408* 428*     Results from last 7 days   Lab Units 06/01/25  1248   HEMOGLOBIN A1C % 7.0*     Results from last 7 days   Lab Units 05/31/25  2059   PROCALCITONIN ng/ml <0.05       Recent Cultures (last 7 days):         No acute cardiopulmonary disease.     Pulmonary artery enlargement which can be seen with pulmonary hypertension.       Last 24 Hours Medication List:     Current Facility-Administered Medications:      acetaminophen (TYLENOL) tablet 975 mg, Q8H PRN    albuterol (PROVENTIL HFA,VENTOLIN HFA) inhaler 2 puff, Q6H PRN    aspirin chewable tablet 81 mg, Daily    atorvastatin (LIPITOR) tablet 40 mg, QPM    azithromycin (ZITHROMAX) tablet 500 mg, Q24H CARRIE    bisacodyl (DULCOLAX) rectal suppository 10 mg, Daily PRN    brimonidine tartrate 0.2 % ophthalmic solution 1 drop, BID    diltiazem (CARDIZEM CD) 24 hr capsule 240 mg, Daily    ferrous sulfate tablet 325 mg, Every Other Day    furosemide (LASIX) injection 40 mg, BID (diuretic)    gabapentin (NEURONTIN) capsule 300 mg, 4x Daily    heparin (porcine) subcutaneous injection 5,000 Units, Q8H CARRIE    insulin lispro (HumALOG/ADMELOG) 100 units/mL subcutaneous injection 1-5 Units, 4x Daily (AC & HS) **AND** Fingerstick Glucose (POCT), 4x Daily AC and at bedtime    insulin regular (HumuLIN R,NovoLIN R) 1 Units/mL in sodium chloride 0.9 % 100 mL infusion, Titrated, Last Rate: 1.5 Units/hr (06/02/25 1102)    ipratropium (ATROVENT) 0.02 % inhalation solution 0.5 mg, TID    levalbuterol (XOPENEX) inhalation solution 1.25 mg, TID    losartan (COZAAR) tablet 50 mg, Daily    morphine (MSIR) IR tablet 15 mg, BID    pantoprazole (PROTONIX) EC tablet 40 mg, Q12H CARRIE    polyethylene glycol (MIRALAX) packet 17 g, Daily    predniSONE tablet 40 mg, Daily    rOPINIRole (REQUIP) tablet 0.5 mg, HS    senna (SENOKOT) tablet 17.2 mg, HS PRN    senna (SENOKOT) tablet 17.2 mg, Daily    vit B complex-vit C-folic acid (Renal Caps) capsule 1 capsule, Daily With Dinner    Administrative Statements   Today, Patient Was Seen By: Michelle Street MD      **Please Note: This note may have been constructed using a voice recognition system.**

## 2025-06-02 NOTE — UTILIZATION REVIEW
Initial Clinical Review    Admission: Date/Time/Statement:   Admission Orders (From admission, onward)       Ordered        05/31/25 2115  INPATIENT ADMISSION  Once                          Orders Placed This Encounter   Procedures    INPATIENT ADMISSION     Standing Status:   Standing     Number of Occurrences:   1     Level of Care:   Med Surg [16]     Estimated length of stay:   More than 2 Midnights     Certification:   I certify that inpatient services are medically necessary for this patient for a duration of greater than two midnights. See H&P and MD Progress Notes for additional information about the patient's course of treatment.     ED Arrival Information       Expected   -    Arrival   5/31/2025 17:45    Acuity   Emergent              Means of arrival   Wheelchair    Escorted by   [place]    Service   Hospitalist    Admission type   Emergency              Arrival complaint   SOB             Chief Complaint   Patient presents with    Shortness of Breath     Pt hx of COPD and CHF states she is having increased SOB. Denies pain or other sx. Pt on 4L o2 chronically.         Initial Presentation: 84 y.o. female with hx of DM2, GERD, NATHANIEL, RLS,  HLD, CKD3 , COPD, Nicotine dependence , chronic respiratory failure on 3 L nc at baseline who presents to ED from home with resp distress. On exam, pt  with decreased breath sounds throughout. Audible wheezing noted. Mild conversational dyspnea. O2 sat 90 % on 5 L nc, pt tachpyneic, and placed in Bi PAP in ED. Pt able to be weaned to 4 L nc . Has 2 + pitting edema  Labs : BNP 74. VBG pH 7.3, pCO2 49.8, HCO3 28  . CXR w/o acute findings . Pt given Duonebs, IV Solumedrol , IV Lasix 40 mg, IV mag in ED. Admitted as Inpatient to telemetry  with acute on chronic resp failure w/ hypoxia- suspect in setting of COPD w/ concern for volume overload . Plan- IV Solumedrol 40 mg BID, scheduloed nebs w/ Xopenex and Atrovent . Azithromycin . Wean O2 as able . Obtain echo . Lasix 40 mg  IV daily   Anticipated Length of Stay/Certification Statement: Patient will be admitted on an inpatient basis with an anticipated length of stay of greater than 2 midnights secondary to COPD exacerbation requiring IV steroids and increased O2.  Question new heart failure will obtain echocardiogram further evaluation.     Date: 6/1    Day 2:      Acute hypoxic respiratory failure as well as volume overload and possible COPD exacerbation.Pt remains on 4 L O2 nc , has responded well to IV lasix  . On exam, BLE edema, JVD . Has some remaining mild wheezing in bilateral bases.  IV lasix increased to 40 mg BID today from daily .  IV Solumedrol decreased to daily today . Continue scheduled neb treatments Xopenex and Atrovent   Also with SIRS criteria overnight. Unfortunately she also has very high blood sugars , 369--->442 this am . Pt ordered reg insulin. Monitor repeat glucose .      Date: 6/2   Day 3: Has surpassed a 2nd midnight with active treatments and services.  Pt  reported that she is still felt weak and has some shortness of breath . On exam, increased resp effort . O2 sat 92-95 % today at rest , on 3 L O2 nc. Added BI PAP at HS . Abdominal distention, BLE edema . + GARZA per nsg . I/O - 830 ml last 24 hrs  . Wt up from admission per recorded wts .    No events on telemetry - continue telemetry . . Echo 6/1 shows LVEF 60 % , G1 DD. .Pt now on po prednisone.  Azithromycin- day 2 of 3 .  Continue furosemide 40 mg IV BID . Pt started on insulin drip yesterday evening . Glucose improving - plan to d/c insulin drip @ 1300 today. Given 15 U Lantus insulin  x 1 @1125 today . SSI QID .CBC, BMP, Mag in am .     ED Treatment-Medication Administration from 05/31/2025 1745 to 06/01/2025 0018         Date/Time Order Dose Route Action     05/31/2025 1824 ipratropium-albuterol (DUO-NEB) 0.5-2.5 mg/3 mL inhalation solution 3 mL 3 mL Nebulization Given     05/31/2025 1839 methylPREDNISolone sodium succinate (Solu-MEDROL) injection  100 mg 100 mg Intravenous Given     05/31/2025 1840 magnesium sulfate 2 g/50 mL IVPB (premix) 2 g 2 g Intravenous New Bag     05/31/2025 1841 furosemide (LASIX) injection 40 mg 40 mg Intravenous Given     05/31/2025 2029 ipratropium-albuterol (DUO-NEB) 0.5-2.5 mg/3 mL inhalation solution 3 mL 3 mL Nebulization Given     05/31/2025 2255 atorvastatin (LIPITOR) tablet 40 mg 40 mg Oral Given     05/31/2025 2255 gabapentin (NEURONTIN) capsule 300 mg 300 mg Oral Given     05/31/2025 2255 morphine (MSIR) IR tablet 15 mg 15 mg Oral Given     05/31/2025 2255 pantoprazole (PROTONIX) EC tablet 40 mg 40 mg Oral Given     05/31/2025 2255 rOPINIRole (REQUIP) tablet 0.5 mg 0.5 mg Oral Given     05/31/2025 2255 azithromycin (ZITHROMAX) tablet 500 mg 500 mg Oral Given     05/31/2025 2255 heparin (porcine) subcutaneous injection 5,000 Units 5,000 Units Subcutaneous Given     05/31/2025 2255 insulin lispro (HumALOG/ADMELOG) 100 units/mL subcutaneous injection 1-6 Units 4 Units Subcutaneous Given     05/31/2025 2255 levalbuterol (XOPENEX) inhalation solution 1.25 mg 1.25 mg Nebulization Given            Scheduled Medications:  aspirin, 81 mg, Oral, Daily  atorvastatin, 40 mg, Oral, QPM  azithromycin, 500 mg, Oral, Q24H [facility]  brimonidine tartrate, 1 drop, Left Eye, BID  diltiazem, 240 mg, Oral, Daily  ferrous sulfate, 325 mg, Oral, Every Other Day  furosemide, 40 mg, Intravenous, BID (diuretic)  gabapentin, 300 mg, Oral, 4x Daily  heparin (porcine), 5,000 Units, Subcutaneous, Q8H [facility]  insulin lispro, 1-5 Units, Subcutaneous, 4x Daily (AC & HS)  ipratropium, 0.5 mg, Nebulization, TID  levalbuterol, 1.25 mg, Nebulization, TID  losartan, 50 mg, Oral, Daily  morphine, 15 mg, Oral, BID  pantoprazole, 40 mg, Oral, Q12H [facility]  polyethylene glycol, 17 g, Oral, Daily  predniSONE, 40 mg, Oral, Daily  rOPINIRole, 0.5 mg, Oral, HS  senna, 2 tablet, Oral, Daily  vit B complex-vit C-folic acid, 1 capsule, Oral, Daily With Dinner    methylPREDNISolone  sodium succinate (Solu-MEDROL) injection 40 mg  Dose: 40 mg  Freq: Every 12 hours scheduled Route: IV  Start: 06/01/25 0900 End: 06/01/25 1231  methylPREDNISolone sodium succinate (Solu-MEDROL) injection 40 mg  Dose: 40 mg  Freq: Daily Route: IV  Start: 06/02/25 0900 End: 06/01/25 1505  insulin regular (HumuLIN R,NovoLIN R) injection 10 Units  Dose: 10 Units  Freq: Once Route: SC  Start: 06/01/25 1045 End: 06/01/25 1143   insulin glargine (LANTUS) subcutaneous injection 15 Units 0.15 mL  Dose: 15 Units  Freq: Once Route: SC  Start: 06/02/25 1115 End: 06/02/25 1125  furosemide (LASIX) injection 40 mg  Dose: 40 mg  Freq: Daily Route: IV  Start: 06/01/25 0900 End: 06/01/25 1504    Continuous IV Infusions:  insulin regular (HumuLIN R,NovoLIN R) 1 Units/mL in sodium chloride 0.9 % 100 mL infusion, 0.3-21 Units/hr, Intravenous, Titrated      PRN Meds:  acetaminophen, 975 mg, Oral, Q8H PRN  albuterol, 2 puff, Inhalation, Q6H PRN  bisacodyl, 10 mg, Rectal, Daily PRN  senna, 17.2 mg, Oral, HS PRN      ED Triage Vitals   Temperature Pulse Respirations Blood Pressure SpO2 Pain Score   05/31/25 1757 05/31/25 1756 05/31/25 1756 05/31/25 1756 05/31/25 1756 06/01/25 0036   98.5 °F (36.9 °C) 80 (!) 32 159/73 90 % No Pain     Weight (last 2 days)       Date/Time Weight    06/02/25 0950 77.1 (170)    06/02/25 0600 77.2 (170.2)    06/01/25 0036 74.5 (164.24)    05/31/25 1755 75.9 (167.33)            Vital Signs (last 3 days)       Date/Time Temp Pulse Resp BP MAP (mmHg) SpO2 FiO2 (%) Calculated FIO2 (%) - Nasal Cannula Nasal Cannula O2 Flow Rate (L/min) O2 Device O2 Interface Device Patient Position - Orthostatic VS [place] Coma Scale Score Pain    06/02/25 0950 -- 72 -- 120/60 -- -- -- -- -- -- -- -- -- --    06/02/25 0851 -- -- -- 120/60 -- -- -- -- -- -- -- -- -- 8    06/02/25 0747 -- -- -- -- -- 95 % -- -- -- -- -- -- -- --    06/02/25 07:11:19 99.1 °F (37.3 °C) 72 18 123/58 80 92 % -- -- -- -- -- Lying -- --    06/02/25 02:23:46  99.4 °F (37.4 °C) 72 18 113/53 73 95 % -- -- -- -- -- -- -- --    06/02/25 0036 -- -- -- -- -- -- 40 -- -- -- -- -- 15 No Pain    06/01/25 2300 -- -- -- -- -- -- -- -- -- -- -- -- -- No Pain    06/01/25 22:24:45 99.3 °F (37.4 °C) 81 -- 113/60 78 94 % -- -- -- -- -- -- -- --    06/01/25 2128 -- -- -- -- -- -- -- -- -- -- -- -- -- No Pain    06/01/25 2034 -- -- -- -- -- 92 % -- 32 3 L/min Nasal cannula -- -- -- --    06/01/25 17:04:51 98 °F (36.7 °C) 81 18 110/84 93 90 % -- -- -- -- -- -- -- --    06/01/25 1700 -- -- -- -- -- -- 40 -- -- -- -- -- 15 --    06/01/25 1500 98.4 °F (36.9 °C) 75 20 124/76 93 91 % -- -- -- -- -- Lying -- --    06/01/25 1313 -- -- -- -- -- 95 % -- -- -- -- -- -- -- --    06/01/25 1100 98.5 °F (36.9 °C) 85 20 120/57 80 94 % -- -- -- Nasal cannula -- Lying -- --    06/01/25 0815 -- -- -- -- -- 97 % -- 36 4 L/min Nasal cannula -- -- 15 --    06/01/25 0806 -- -- -- -- -- -- -- -- -- -- -- -- -- 7    06/01/25 0804 98.7 °F (37.1 °C) 93 20 123/59 85 98 % -- 36 4 L/min Nasal cannula -- Lying -- No Pain    06/01/25 0744 -- -- -- -- -- 96 % -- -- -- -- -- -- -- --    06/01/25 0100 -- -- -- -- -- -- -- -- -- -- -- -- 15 No Pain    06/01/25 0036 97.9 °F (36.6 °C) 91 22 131/62 87 -- -- -- -- -- -- Lying -- No Pain    06/01/25 0000 -- 105 20 146/67 96 97 % -- -- -- Nasal cannula -- Lying -- --    05/31/25 2309 -- -- -- -- -- -- -- -- -- -- -- -- 15 --    05/31/25 2300 -- 101 20 140/65 93 94 % -- 36 4 L/min Nasal cannula -- -- -- --    05/31/25 2230 -- 83 20 145/67 96 93 % -- 36 4 L/min Nasal cannula -- -- -- --    05/31/25 2145 -- 91 18 153/74 107 96 % -- 36 4 L/min Nasal cannula -- -- -- --    05/31/25 2045 -- 66 18 129/70 95 96 % -- 36 4 L/min Nasal cannula -- -- -- --    05/31/25 2042 -- -- -- -- -- 97 % -- 36 4 L/min Nasal cannula -- -- -- --    05/31/25 2029 -- -- -- -- -- 98 % 40 -- -- BiPAP Face mask -- -- --    05/31/25 1945 -- 64 18 118/62 85 99 % -- -- -- BiPAP -- -- -- --    05/31/25 1900 -- 72  24 138/67 96 98 % -- -- -- BiPAP -- Lying -- --    05/31/25 1845 -- 70 26 121/60 86 99 % -- -- -- BiPAP -- -- -- --    05/31/25 1831 -- -- -- -- -- 97 % -- -- -- -- Face mask -- -- --    05/31/25 1757 98.5 °F (36.9 °C) -- -- -- -- -- -- -- -- -- -- -- -- --    05/31/25 1756 -- 80 32 159/73 -- 90 % -- 40 5 L/min Nasal cannula  -- Lying -- --              Pertinent Labs/Diagnostic Test Results:   Radiology:  XR chest portable   ED Interpretation by Lj Neely DO (05/31 1905)   No acute cardiopulmonary pathology.      Final Interpretation by Judy Rausch MD (06/01 0733)      No acute cardiopulmonary disease.      Pulmonary artery enlargement which can be seen with pulmonary hypertension.            Workstation performed: [id number]           Cardiology:  Echo complete w/ contrast if indicated   Final Result by Clover Contreras MD (06/02 1043)        Left Ventricle: Left ventricular cavity size is normal. Wall thickness    is normal. The left ventricular ejection fraction is 60%. Systolic    function is normal. Wall motion is normal. Diastolic function is mildly    abnormal, consistent with grade I (abnormal) relaxation.     Aortic Valve: There is mild stenosis. The aortic valve mean gradient is    17 mmHg. The dimensionless velocity index is 0.62. The aortic valve area    is 1.95 cm2. AV mean gradient is 17 mmHg. DVI is 0.62.     Mitral Valve: There is mild annular calcification.     Tricuspid Valve: There is mild regurgitation.         ECG 12 lead    by Interface, Ris Results In (05/31 1826)      ECG 12 lead    by Interface, Ris Results In (05/31 1801)            Results from last 7 days   Lab Units 05/31/25  2222   SARS-COV-2  Negative     Results from last 7 days   Lab Units 06/02/25  0433 06/01/25  0746 05/31/25  1836   WBC Thousand/uL 12.93* 2.21* 7.00   HEMOGLOBIN g/dL 10.5* 10.6* 12.0   HEMATOCRIT % 32.7* 34.7* 39.7   PLATELETS Thousands/uL 227 181 231   TOTAL NEUT ABS Thousands/µL  --  1.97 4.66          Results from last 7 days   Lab Units 06/02/25  0433 06/01/25  0746 05/31/25  1836   SODIUM mmol/L 136 136 138   POTASSIUM mmol/L 4.4 4.2 4.9   CHLORIDE mmol/L 96 96 99   CO2 mmol/L 35* 31 30   ANION GAP mmol/L 5 9 9   BUN mg/dL 26* 19 13   CREATININE mg/dL 1.08 0.91 0.75   EGFR ml/min/1.73sq m 47 58 73   CALCIUM mg/dL 9.3 9.1 9.8   MAGNESIUM mg/dL  --  2.2  --      Results from last 7 days   Lab Units 05/31/25  1836   AST U/L 25   ALT U/L 10   ALK PHOS U/L 88   TOTAL PROTEIN g/dL 7.2   ALBUMIN g/dL 4.3   TOTAL BILIRUBIN mg/dL 0.55     Results from last 7 days   Lab Units 06/02/25  1101 06/02/25  0859 06/02/25  0715 06/02/25  0502 06/02/25  0321 06/02/25  0122 06/02/25  0005 06/01/25  2302 06/01/25  2111 06/01/25  1904 06/01/25  1715 06/01/25  1629   POC GLUCOSE mg/dl 127 268* 165* 121 104 121 201* 252* 255* 366* 408* 428*     Results from last 7 days   Lab Units 06/02/25  0433 06/01/25  0746 05/31/25  1836   GLUCOSE RANDOM mg/dL 81 411* 97         Results from last 7 days   Lab Units 06/01/25  1248   HEMOGLOBIN A1C % 7.0*   EAG mg/dl 154     BETA-HYDROXYBUTYRATE   Date Value Ref Range Status   09/26/2022 0.8 (H) <0.6 mmol/L Final          Results from last 7 days   Lab Units 05/31/25  1837   PH GAMALIEL  7.368   PCO2 GAMALIEL mm Hg 49.8   PO2 GAMALIEL mm Hg 55.6*   HCO3 GAMALIEL mmol/L 28.0   BASE EXC GAMALIEL mmol/L 2.0   O2 CONTENT GAMALIEL ml/dL 14.9   O2 HGB, VENOUS % 87.5*             Results from last 7 days   Lab Units 05/31/25  2222 05/31/25  2059 05/31/25  1836   HS TNI 0HR ng/L  --   --  6   HS TNI 2HR ng/L  --  7  --    HSTNI D2 ng/L  --  1  --    HS TNI 4HR ng/L 5  --   --    HSTNI D4 ng/L -1  --   --                  Results from last 7 days   Lab Units 05/31/25  2059   PROCALCITONIN ng/ml <0.05                 Results from last 7 days   Lab Units 05/31/25  1836   BNP pg/mL 72                   Results from last 7 days   Lab Units 05/31/25  2222   INFLUENZA A PCR  Negative   INFLUENZA B PCR  Negative   RSV PCR  Negative                      Past Medical History[1]  Present on Admission:   Acute on chronic respiratory failure with hypoxia (HCC)   Type II diabetes mellitus (HCC)   Stage 3b chronic kidney disease (HCC)   Hyperlipidemia   Benign essential hypertension      Admitting Diagnosis: SOB (shortness of breath) [R06.02]  Hypoxia [R09.02]  COPD exacerbation (HCC) [J44.1]  Acute hypoxic respiratory failure (HCC) [J96.01]  Age/Sex: 84 y.o. female    Network Utilization Review Department  ATTENTION: Please call with any questions or concerns to [phone] and carefully listen to the prompts so that you are directed to the right person. All voicemails are confidential.   For Discharge needs, contact Care Management DC Support Team at [phone] opt. [phone]  Send all requests for admission clinical reviews, approved or denied determinations and any other requests to dedicated fax number below belonging to the campus where the patient is receiving treatment. List of dedicated fax numbers for the Facilities:  FACILITY NAME UR FAX NUMBER   ADMISSION DENIALS (Administrative/Medical Necessity) [phone]   DISCHARGE SUPPORT TEAM (NETWORK) [phone]   PARENT CHILD HEALTH (Maternity/NICU/Pediatrics) [phone]   General acute hospital [phone]   [facility] [phone]   [facility] [phone]   [facility] [phone]   [facility] [phone]   [facility] [phone]   General acute hospital [phone]   [facility] [phone]   [facility] [phone]   [facility] [phone]   [facility] [phone]   [facility] [phone]              [1]   Past Medical History:  Diagnosis Date    Abdominal pain 09/26/2022     Arthritis     Cardiac disease     Constipation 04/25/2024    COPD (chronic obstructive pulmonary disease) (HCC)     COVID-19 2021    was in hospital for sx and was dx with covid    Diabetes mellitus (HCC)     GERD (gastroesophageal reflux disease)     Hiatal hernia     Hypertension     PUD (peptic ulcer disease)     Residual ASD (atrial septal defect) following repair

## 2025-06-02 NOTE — PLAN OF CARE
Problem: Potential for Falls  Goal: Patient will remain free of falls  Description: INTERVENTIONS:  - Educate patient/family on patient safety including physical limitations  - Instruct patient to call for assistance with activity   - Consider consulting OT/PT to assist with strengthening/mobility based on AM PAC & JH-HLM score  - Consult OT/PT to assist with strengthening/mobility   - Keep Call bell within reach  - Keep bed low and locked with side rails adjusted as appropriate  - Keep care items and personal belongings within reach  - Initiate and maintain comfort rounds  - Make Fall Risk Sign visible to staff  - Apply yellow socks and bracelet for high fall risk patients  - Consider moving patient to room near nurses station  Outcome: Progressing     Problem: PAIN - ADULT  Goal: Verbalizes/displays adequate comfort level or baseline comfort level  Description: Interventions:  - Encourage patient to monitor pain and request assistance  - Assess pain using appropriate pain scale  - Administer analgesics as ordered based on type and severity of pain and evaluate response  - Implement non-pharmacological measures as appropriate and evaluate response  - Consider cultural and social influences on pain and pain management  - Notify physician/advanced practitioner if interventions unsuccessful or patient reports new pain  - Educate patient/family on pain management process including their role and importance of  reporting pain   - Provide non-pharmacologic/complimentary pain relief interventions  Outcome: Progressing     Problem: INFECTION - ADULT  Goal: Absence or prevention of progression during hospitalization  Description: INTERVENTIONS:  - Assess and monitor for signs and symptoms of infection  - Monitor lab/diagnostic results  - Monitor all insertion sites, i.e. indwelling lines, tubes, and drains  - Monitor endotracheal if appropriate and nasal secretions for changes in amount and color  - [place] appropriate  cooling/warming therapies per order  - Administer medications as ordered  - Instruct and encourage patient and family to use good hand hygiene technique  - Identify and instruct in appropriate isolation precautions for identified infection/condition  Outcome: Progressing  Goal: Absence of fever/infection during neutropenic period  Description: INTERVENTIONS:  - Monitor WBC  - Perform strict hand hygiene  - Limit to healthy visitors only  - No plants, dried, fresh or silk flowers with mcdowell in patient room  Outcome: Progressing     Problem: SAFETY ADULT  Goal: Patient will remain free of falls  Description: INTERVENTIONS:  - Educate patient/family on patient safety including physical limitations  - Instruct patient to call for assistance with activity   - Consider consulting OT/PT to assist with strengthening/mobility based on AM PAC & JH-HLM score  - Consult OT/PT to assist with strengthening/mobility   - Keep Call bell within reach  - Keep bed low and locked with side rails adjusted as appropriate  - Keep care items and personal belongings within reach  - Initiate and maintain comfort rounds  - Make Fall Risk Sign visible to staff  - Apply yellow socks and bracelet for high fall risk patients  - Consider moving patient to room near nurses station  Outcome: Progressing  Goal: Maintain or return to baseline ADL function  Description: INTERVENTIONS:  -  Assess patient's ability to carry out ADLs; assess patient's baseline for ADL function and identify physical deficits which impact ability to perform ADLs (bathing, care of mouth/teeth, toileting, grooming, dressing, etc.)  - Assess/evaluate cause of self-care deficits   - Assess range of motion  - Assess patient's mobility; develop plan if impaired  - Assess patient's need for assistive devices and provide as appropriate  - Encourage maximum independence but intervene and supervise when necessary  - Involve family in performance of ADLs  - Assess for home care needs  following discharge   - Consider OT consult to assist with ADL evaluation and planning for discharge  - Provide patient education as appropriate  - Monitor functional capacity and physical performance, use of AM PAC & JH-HLM   - Monitor gait, balance and fatigue with ambulation    Outcome: Progressing  Goal: Maintains/Returns to pre admission functional level  Description: INTERVENTIONS:  - Perform AM-PAC 6 Click Basic Mobility/ Daily Activity assessment daily.  - Set and communicate daily mobility goal to care team and patient/family/caregiver.   - Collaborate with rehabilitation services on mobility goals if consulted  - Out of bed for toileting  - Record patient progress and toleration of activity level   Outcome: Progressing     Problem: DISCHARGE PLANNING  Goal: Discharge to home or other facility with appropriate resources  Description: INTERVENTIONS:  - Identify barriers to discharge w/patient and caregiver  - Arrange for needed discharge resources and transportation as appropriate  - Identify discharge learning needs (meds, wound care, etc.)  - Arrange for interpretive services to assist at discharge as needed  - Refer to Case Management Department for coordinating discharge planning if the patient needs post-hospital services based on physician/advanced practitioner order or complex needs related to functional status, cognitive ability, or social support system  Outcome: Progressing     Problem: Knowledge Deficit  Goal: Patient/family/caregiver demonstrates understanding of disease process, treatment plan, medications, and discharge instructions  Description: Complete learning assessment and assess knowledge base.  Interventions:  - Provide teaching at level of understanding  - Provide teaching via preferred learning methods  Outcome: Progressing     Problem: CARDIOVASCULAR - ADULT  Goal: Maintains optimal cardiac output and hemodynamic stability  Description: INTERVENTIONS:  - Monitor I/O, vital signs and  rhythm  - Monitor for S/S and trends of decreased cardiac output  - Administer and titrate ordered vasoactive medications to optimize hemodynamic stability  - Assess quality of pulses, skin color and temperature  - Assess for signs of decreased coronary artery perfusion  - Instruct patient to report change in severity of symptoms  Outcome: Progressing  Goal: Absence of cardiac dysrhythmias or at baseline rhythm  Description: INTERVENTIONS:  - Continuous cardiac monitoring, vital signs, obtain 12 lead EKG if ordered  - Administer antiarrhythmic and heart rate control medications as ordered  - Monitor electrolytes and administer replacement therapy as ordered  Outcome: Progressing     Problem: RESPIRATORY - ADULT  Goal: Achieves optimal ventilation and oxygenation  Description: INTERVENTIONS:  - Assess for changes in respiratory status  - Assess for changes in mentation and behavior  - Position to facilitate oxygenation and minimize respiratory effort  - Oxygen administered by appropriate delivery if ordered  - Initiate smoking cessation education as indicated  - Encourage broncho-pulmonary hygiene including cough, deep breathe, Incentive Spirometry  - Assess the need for suctioning and aspirate as needed  - Assess and instruct to report SOB or any respiratory difficulty  - Respiratory Therapy support as indicated  Outcome: Progressing     Problem: METABOLIC, FLUID AND ELECTROLYTES - ADULT  Goal: Electrolytes maintained within normal limits  Description: INTERVENTIONS:  - Monitor labs and assess patient for signs and symptoms of electrolyte imbalances  - Administer electrolyte replacement as ordered  - Monitor response to electrolyte replacements, including repeat lab results as appropriate  - Instruct patient on fluid and nutrition as appropriate  Outcome: Progressing  Goal: Fluid balance maintained  Description: INTERVENTIONS:  - Monitor labs   - Monitor I/O and WT  - Instruct patient on fluid and nutrition as  appropriate  - Assess for signs & symptoms of volume excess or deficit  Outcome: Progressing  Goal: Glucose maintained within target range  Description: INTERVENTIONS:  - Monitor Blood Glucose as ordered  - Assess for signs and symptoms of hyperglycemia and hypoglycemia  - Administer ordered medications to maintain glucose within target range  - Assess nutritional intake and initiate nutrition service referral as needed  Outcome: Progressing

## 2025-06-02 NOTE — ASSESSMENT & PLAN NOTE
Lab Results   Component Value Date    EGFR 47 06/02/2025    EGFR 58 06/01/2025    EGFR 73 05/31/2025    CREATININE 1.08 06/02/2025    CREATININE 0.91 06/01/2025    CREATININE 0.75 05/31/2025   At baseline continue to trend daily labs

## 2025-06-02 NOTE — ASSESSMENT & PLAN NOTE
Lab Results   Component Value Date    HGBA1C 7.0 (H) 06/01/2025       Recent Labs     06/02/25  0502 06/02/25  0715 06/02/25  0859 06/02/25  1101   POCGLU 121 165* 268* 127       Blood Sugar Average: Last 72 hrs:  (P) 260PTA Januvia, metformin  Hold oral antidiabetic agents  Continue sliding scale insulin 4 times daily with meals  Hypoglycemia protocol  Patient initially requiring insulin drip    Endocrinology has been consulted

## 2025-06-02 NOTE — ASSESSMENT & PLAN NOTE
Patient presents with respiratory distress from home requiring BiPAP in the emergency department.  Patient has history of chronic respiratory failure on 3 L nasal cannula at baseline.  Currently weaned off BiPAP now on 4 L nasal cannula acutely  CXR: No acute cardiopulmonary disease.  Formal read pending  BNP 74.  VBG pH 7.3, pCO2 49.8, HCO3 28  Given x 1 dose of IV Lasix in the ED for concern of volume overload  Suspect in setting of COPD exacerbation  CXR 5/31 shows no acute cardiopulmonary disease.  Pulmonary artery enlargement which can be seen with pulmonary hypertension.  6/1:Left ventricular cavity size is normal. Wall thickness is normal. The left ventricular ejection fraction is 60%. Systolic function is normal. Wall motion is normal. Diastolic function is mildly abnormal, consistent with grade I (abnormal) relaxation.     Plan  Azithromycin for 3 days  Prednisone 40 mg daily  Continue scheduled neb treatments Xopenex and Atrovent  Respiratory protocol  Wean oxygen as able  Obtain flu/COVID/RSV  Continue furosemide 40 mg twice daily

## 2025-06-02 NOTE — ASSESSMENT & PLAN NOTE
BiPAP at night?  Continue scheduled neb treatments  Respiratory protocol  Wean oxygen as tolerated  See plan as above

## 2025-06-02 NOTE — ASSESSMENT & PLAN NOTE
Patient reports new onset lower extremity edema.  +2 bilaterally  BNP 72  Denies any prior history of heart failure  In ED was given IV Lasix 40 mg x 1  Will continue Lasix 40 mg twice daily  Obtain echocardiogram

## 2025-06-03 LAB
ANION GAP SERPL CALCULATED.3IONS-SCNC: 7 MMOL/L (ref 4–13)
BUN SERPL-MCNC: 33 MG/DL (ref 5–25)
CALCIUM SERPL-MCNC: 9 MG/DL (ref 8.4–10.2)
CHLORIDE SERPL-SCNC: 92 MMOL/L (ref 96–108)
CO2 SERPL-SCNC: 35 MMOL/L (ref 21–32)
CREAT SERPL-MCNC: 1.29 MG/DL (ref 0.6–1.3)
ERYTHROCYTE [DISTWIDTH] IN BLOOD BY AUTOMATED COUNT: 16.1 % (ref 11.6–15.1)
GFR SERPL CREATININE-BSD FRML MDRD: 38 ML/MIN/1.73SQ M
GLUCOSE SERPL-MCNC: 153 MG/DL (ref 65–140)
GLUCOSE SERPL-MCNC: 170 MG/DL (ref 65–140)
GLUCOSE SERPL-MCNC: 172 MG/DL (ref 65–140)
GLUCOSE SERPL-MCNC: 239 MG/DL (ref 65–140)
GLUCOSE SERPL-MCNC: 338 MG/DL (ref 65–140)
GLUCOSE SERPL-MCNC: 345 MG/DL (ref 65–140)
GLUCOSE SERPL-MCNC: 387 MG/DL (ref 65–140)
GLUCOSE SERPL-MCNC: 395 MG/DL (ref 65–140)
GLUCOSE SERPL-MCNC: 404 MG/DL (ref 65–140)
GLUCOSE SERPL-MCNC: 405 MG/DL (ref 65–140)
HCT VFR BLD AUTO: 34 % (ref 34.8–46.1)
HGB BLD-MCNC: 10.8 G/DL (ref 11.5–15.4)
MAGNESIUM SERPL-MCNC: 2.3 MG/DL (ref 1.9–2.7)
MCH RBC QN AUTO: 26.9 PG (ref 26.8–34.3)
MCHC RBC AUTO-ENTMCNC: 31.8 G/DL (ref 31.4–37.4)
MCV RBC AUTO: 85 FL (ref 82–98)
PHOSPHATE SERPL-MCNC: 3.9 MG/DL (ref 2.3–4.1)
PLATELET # BLD AUTO: 216 THOUSANDS/UL (ref 149–390)
PMV BLD AUTO: 11.1 FL (ref 8.9–12.7)
POTASSIUM SERPL-SCNC: 4.3 MMOL/L (ref 3.5–5.3)
RBC # BLD AUTO: 4.01 MILLION/UL (ref 3.81–5.12)
SODIUM SERPL-SCNC: 134 MMOL/L (ref 135–147)
WBC # BLD AUTO: 9.66 THOUSAND/UL (ref 4.31–10.16)

## 2025-06-03 PROCEDURE — 85027 COMPLETE CBC AUTOMATED: CPT

## 2025-06-03 PROCEDURE — 99232 SBSQ HOSP IP/OBS MODERATE 35: CPT | Performed by: HOSPITALIST

## 2025-06-03 PROCEDURE — 94760 N-INVAS EAR/PLS OXIMETRY 1: CPT

## 2025-06-03 PROCEDURE — 94640 AIRWAY INHALATION TREATMENT: CPT

## 2025-06-03 PROCEDURE — 83735 ASSAY OF MAGNESIUM: CPT

## 2025-06-03 PROCEDURE — 84100 ASSAY OF PHOSPHORUS: CPT

## 2025-06-03 PROCEDURE — 82948 REAGENT STRIP/BLOOD GLUCOSE: CPT

## 2025-06-03 PROCEDURE — 80048 BASIC METABOLIC PNL TOTAL CA: CPT

## 2025-06-03 RX ORDER — INSULIN GLARGINE 100 [IU]/ML
20 INJECTION, SOLUTION SUBCUTANEOUS
Status: DISCONTINUED | OUTPATIENT
Start: 2025-06-03 | End: 2025-06-04 | Stop reason: HOSPADM

## 2025-06-03 RX ORDER — INSULIN LISPRO 100 [IU]/ML
5 INJECTION, SOLUTION INTRAVENOUS; SUBCUTANEOUS ONCE
Status: COMPLETED | OUTPATIENT
Start: 2025-06-03 | End: 2025-06-03

## 2025-06-03 RX ORDER — INSULIN GLARGINE 100 [IU]/ML
15 INJECTION, SOLUTION SUBCUTANEOUS
Status: DISCONTINUED | OUTPATIENT
Start: 2025-06-03 | End: 2025-06-03

## 2025-06-03 RX ORDER — INSULIN GLARGINE 100 [IU]/ML
20 INJECTION, SOLUTION SUBCUTANEOUS
Status: DISCONTINUED | OUTPATIENT
Start: 2025-06-03 | End: 2025-06-03

## 2025-06-03 RX ORDER — INSULIN LISPRO 100 [IU]/ML
2-12 INJECTION, SOLUTION INTRAVENOUS; SUBCUTANEOUS
Status: DISCONTINUED | OUTPATIENT
Start: 2025-06-03 | End: 2025-06-04

## 2025-06-03 RX ORDER — INSULIN LISPRO 100 [IU]/ML
10 INJECTION, SOLUTION INTRAVENOUS; SUBCUTANEOUS ONCE
Status: COMPLETED | OUTPATIENT
Start: 2025-06-03 | End: 2025-06-03

## 2025-06-03 RX ADMIN — ATORVASTATIN CALCIUM 40 MG: 40 TABLET, FILM COATED ORAL at 17:07

## 2025-06-03 RX ADMIN — ROPINIROLE HYDROCHLORIDE 0.5 MG: 0.25 TABLET, FILM COATED ORAL at 21:58

## 2025-06-03 RX ADMIN — LEVALBUTEROL HYDROCHLORIDE 1.25 MG: 1.25 SOLUTION RESPIRATORY (INHALATION) at 08:04

## 2025-06-03 RX ADMIN — BRIMONIDINE TARTRATE 1 DROP: 2 SOLUTION/ DROPS OPHTHALMIC at 21:59

## 2025-06-03 RX ADMIN — DILTIAZEM HYDROCHLORIDE 240 MG: 240 CAPSULE, EXTENDED RELEASE ORAL at 09:26

## 2025-06-03 RX ADMIN — INSULIN LISPRO 5 UNITS: 100 INJECTION, SOLUTION INTRAVENOUS; SUBCUTANEOUS at 16:09

## 2025-06-03 RX ADMIN — HEPARIN SODIUM 5000 UNITS: 5000 INJECTION INTRAVENOUS; SUBCUTANEOUS at 06:03

## 2025-06-03 RX ADMIN — MORPHINE SULFATE 15 MG: 15 TABLET ORAL at 21:58

## 2025-06-03 RX ADMIN — FERROUS SULFATE TAB 325 MG (65 MG ELEMENTAL FE) 325 MG: 325 (65 FE) TAB at 09:25

## 2025-06-03 RX ADMIN — GABAPENTIN 300 MG: 300 CAPSULE ORAL at 11:48

## 2025-06-03 RX ADMIN — LEVALBUTEROL HYDROCHLORIDE 1.25 MG: 1.25 SOLUTION RESPIRATORY (INHALATION) at 20:24

## 2025-06-03 RX ADMIN — SODIUM CHLORIDE 7 UNITS/HR: 9 INJECTION, SOLUTION INTRAVENOUS at 20:00

## 2025-06-03 RX ADMIN — HEPARIN SODIUM 5000 UNITS: 5000 INJECTION INTRAVENOUS; SUBCUTANEOUS at 13:19

## 2025-06-03 RX ADMIN — HEPARIN SODIUM 5000 UNITS: 5000 INJECTION INTRAVENOUS; SUBCUTANEOUS at 21:59

## 2025-06-03 RX ADMIN — SENNOSIDES 17.2 MG: 8.6 TABLET, FILM COATED ORAL at 09:25

## 2025-06-03 RX ADMIN — INSULIN GLARGINE 20 UNITS: 100 INJECTION, SOLUTION SUBCUTANEOUS at 22:15

## 2025-06-03 RX ADMIN — MORPHINE SULFATE 15 MG: 15 TABLET ORAL at 09:26

## 2025-06-03 RX ADMIN — INSULIN LISPRO 1 UNITS: 100 INJECTION, SOLUTION INTRAVENOUS; SUBCUTANEOUS at 11:48

## 2025-06-03 RX ADMIN — INSULIN LISPRO 1 UNITS: 100 INJECTION, SOLUTION INTRAVENOUS; SUBCUTANEOUS at 07:30

## 2025-06-03 RX ADMIN — PANTOPRAZOLE SODIUM 40 MG: 40 TABLET, DELAYED RELEASE ORAL at 06:03

## 2025-06-03 RX ADMIN — INSULIN LISPRO 10 UNITS: 100 INJECTION, SOLUTION INTRAVENOUS; SUBCUTANEOUS at 18:43

## 2025-06-03 RX ADMIN — PREDNISONE 40 MG: 20 TABLET ORAL at 09:25

## 2025-06-03 RX ADMIN — IPRATROPIUM BROMIDE 0.5 MG: 0.5 SOLUTION RESPIRATORY (INHALATION) at 20:24

## 2025-06-03 RX ADMIN — GABAPENTIN 300 MG: 300 CAPSULE ORAL at 21:58

## 2025-06-03 RX ADMIN — BRIMONIDINE TARTRATE 1 DROP: 2 SOLUTION/ DROPS OPHTHALMIC at 09:27

## 2025-06-03 RX ADMIN — IPRATROPIUM BROMIDE 0.5 MG: 0.5 SOLUTION RESPIRATORY (INHALATION) at 08:04

## 2025-06-03 RX ADMIN — ASPIRIN 81 MG CHEWABLE TABLET 81 MG: 81 TABLET CHEWABLE at 09:25

## 2025-06-03 RX ADMIN — LOSARTAN POTASSIUM 50 MG: 50 TABLET, FILM COATED ORAL at 09:25

## 2025-06-03 RX ADMIN — PANTOPRAZOLE SODIUM 40 MG: 40 TABLET, DELAYED RELEASE ORAL at 17:07

## 2025-06-03 RX ADMIN — GABAPENTIN 300 MG: 300 CAPSULE ORAL at 06:03

## 2025-06-03 RX ADMIN — IPRATROPIUM BROMIDE 0.5 MG: 0.5 SOLUTION RESPIRATORY (INHALATION) at 13:27

## 2025-06-03 RX ADMIN — INSULIN LISPRO 12 UNITS: 100 INJECTION, SOLUTION INTRAVENOUS; SUBCUTANEOUS at 16:09

## 2025-06-03 RX ADMIN — Medication 1 CAPSULE: at 17:07

## 2025-06-03 RX ADMIN — FUROSEMIDE 40 MG: 10 INJECTION, SOLUTION INTRAVENOUS at 07:38

## 2025-06-03 RX ADMIN — LEVALBUTEROL HYDROCHLORIDE 1.25 MG: 1.25 SOLUTION RESPIRATORY (INHALATION) at 13:27

## 2025-06-03 RX ADMIN — GABAPENTIN 300 MG: 300 CAPSULE ORAL at 17:07

## 2025-06-03 NOTE — PROGRESS NOTES
Patient:    MRN:  [id number]    Janak Request ID:  [id number]    Level of care reserved:  Home Health Agency    Partner Reserved:  [facility], [place] [address] [phone]    Clinical needs requested:    Geography searched:  [phone]    Start of Service:    Request sent:  11:06am EDT on 6/3/2025 by Buck Sweeney    Partner reserved:  2:02pm EDT on 6/3/2025 by Buck Sweeney    Choice list shared:  2:02pm EDT on 6/3/2025 by Buck Sweeney

## 2025-06-03 NOTE — PROGRESS NOTES
Progress Note - Hospitalist   Name: Gloria Patel 84 y.o. female I MRN: [id number]  Unit/Bed#: S -01 I Date of Admission: 5/31/2025   Date of Service: 6/3/2025 I Hospital Day: 3    Assessment & Plan  Acute on chronic respiratory failure with hypoxia (HCC)  Patient presents with respiratory distress from home requiring BiPAP in the emergency department.  Patient has history of chronic respiratory failure on 3 L nasal cannula at baseline.  Currently weaned off BiPAP now on 4 L nasal cannula acutely  CXR: No acute cardiopulmonary disease pulmonary artery enlargement which can be seen with pulmonary hypertension.  BNP 74.  VBG pH 7.3, pCO2 49.8, HCO3 28  Given x 1 dose of IV Lasix in the ED for concern of volume overload  Suspect in setting of COPD exacerbation  6/1:Left ventricular cavity size is normal. Wall thickness is normal. The left ventricular ejection fraction is 60%. Systolic function is normal. Wall motion is normal. Diastolic function is mildly abnormal, consistent with grade I (abnormal) relaxation.     Plan  Azithromycin for 3 days  Prednisone 40 mg daily (6/02--  Continue scheduled neb treatments Xopenex and Atrovent  Respiratory protocol  Wean oxygen as able  Obtain flu/COVID/RSV  Holding Lasix for now  COPD exacerbation (HCC)  BiPAP at night?  Continue scheduled neb treatments  Respiratory protocol  Wean oxygen as tolerated  See plan as above  Benign essential hypertension  Currently hemodynamically stable  Continue PT antihypertensives  Type II diabetes mellitus (HCC)  Lab Results   Component Value Date    HGBA1C 7.0 (H) 06/01/2025       Recent Labs     06/02/25  1257 06/02/25  1553 06/02/25  2037 06/03/25  0710   POCGLU 209* 265* 293* 170*       Blood Sugar Average: Last 72 hrs:  (P) [id number] Januvia, metformin  Hypoglycemia protocol  Patient initially requiring insulin drip likely due to steroids      Hold oral antidiabetic agents  Continue sliding scale insulin 4 times daily  with meals plus Lantus 15 units at bedtime  Hyperlipidemia  Continue statin therapy  Stage 3b chronic kidney disease (HCC)  Lab Results   Component Value Date    EGFR 38 06/03/2025    EGFR 47 06/02/2025    EGFR 58 06/01/2025    CREATININE 1.29 06/03/2025    CREATININE 1.08 06/02/2025    CREATININE 0.91 06/01/2025   At baseline continue to trend daily labs  Bilateral lower extremity edema  Patient reports new onset lower extremity edema.  +2 bilaterally  BNP 72  Denies any prior history of heart failure  In ED was given IV Lasix 40 mg x 1  Will continue Lasix 40 mg twice daily  Echo as above  Diastolic dysfunction without heart failure  Left ventricular cavity size is normal. Wall thickness is normal. The left ventricular ejection fraction is 60%. Systolic function is normal. Wall motion is normal. Diastolic function is mildly abnormal, consistent with grade I (abnormal) relaxation.     See plan under hypoxia  Class 1 obesity with body mass index (BMI) of 33.0 to 33.9 in adult  Morbid obesity, treated with CHO diet    VTE Pharmacologic Prophylaxis:   Moderate Risk (Score 3-4) - Pharmacological DVT Prophylaxis Ordered: heparin.    Mobility:   Basic Mobility Inpatient Raw Score: 18  JH-HLM Goal: 6: Walk 10 steps or more  JH-HLM Achieved: 6: Walk 10 steps or more  JH-HLM Goal achieved. Continue to encourage appropriate mobility.  PT OT consulted  Patient Centered Rounds: I performed bedside rounds with nursing staff today.   Discussions with Specialists or Other Care Team Provider: Endocrinology    Education and Discussions with Family / Patient: Updated  (son) via phone.    Current Length of Stay: 3 day(s)  Current Patient Status: Inpatient   Certification Statement: The patient will continue to require additional inpatient hospital stay due to hyperglycemia and deconditioning and hypoxia treated with nebs and diuretics and PT OT geoffrey's  Discharge Plan: Anticipate discharge in 24-48 hrs to discharge  location to be determined pending rehab evaluations.    Code Status: Level 3 - DNAR and DNI    Subjective   Patient only tolerated BiPAP at night for 1 hour.  No events overnight, patient met diuresis goal yesterday (-500 cc to 1 L) stated that she is still felt weak and has some shortness of breath but this is improved from yesterday.  She is now on her baseline oxygen.  P.o. intake is adequate, patient had bowel movement overnight.    Objective :  Temp:  [97.5 °F (36.4 °C)-98.6 °F (37 °C)] 97.5 °F (36.4 °C)  HR:  [72-78] 74  BP: (107-133)/(56-72) 133/72  Resp:  [18] 18  SpO2:  [92 %-98 %] 97 %  O2 Device: BiPAP  Nasal Cannula O2 Flow Rate (L/min):  [3 L/min] 3 L/min  FiO2 (%):  [40] 40    Body mass index is 33.34 kg/m².     Input and Output Summary (last 24 hours):     Intake/Output Summary (Last 24 hours) at 6/3/2025 0832  Last data filed at 6/3/2025 0800  Gross per 24 hour   Intake 1740 ml   Output 1605 ml   Net 135 ml       Physical Exam  Constitutional:       General: She is not in acute distress.     Appearance: She is ill-appearing. She is not toxic-appearing.     Cardiovascular:      Rate and Rhythm: Normal rate and regular rhythm.   Pulmonary:      Breath sounds: No stridor. Wheezing present. No rhonchi.      Comments: Increased respiratory effort  Chest:      Chest wall: No tenderness.   Abdominal:      General: There is distension.      Palpations: There is no mass.     Musculoskeletal:         General: No swelling.     Neurological:      Mental Status: She is oriented to person, place, and time.     Patient examines euvolemic    Lines/Drains:  Lines/Drains/Airways       Active Status       Name Placement date Placement time Site Days    External Urinary Catheter 05/31/25 2000  -- 2                        Lab Results: I have reviewed the following results:   Results from last 7 days   Lab Units 06/03/25  0514 06/02/25  0433 06/01/25  0746   WBC Thousand/uL 9.66   < > 2.21*   HEMOGLOBIN g/dL 10.8*   < >  10.6*   HEMATOCRIT % 34.0*   < > 34.7*   PLATELETS Thousands/uL 216   < > 181   SEGS PCT %  --   --  88*   LYMPHO PCT %  --   --  9*   MONO PCT %  --   --  2*   EOS PCT %  --   --  0    < > = values in this interval not displayed.     Results from last 7 days   Lab Units 06/03/25  0514 06/01/25  0746 05/31/25  1836   SODIUM mmol/L 134*   < > 138   POTASSIUM mmol/L 4.3   < > 4.9   CHLORIDE mmol/L 92*   < > 99   CO2 mmol/L 35*   < > 30   BUN mg/dL 33*   < > 13   CREATININE mg/dL 1.29   < > 0.75   ANION GAP mmol/L 7   < > 9   CALCIUM mg/dL 9.0   < > 9.8   ALBUMIN g/dL  --   --  4.3   TOTAL BILIRUBIN mg/dL  --   --  0.55   ALK PHOS U/L  --   --  88   ALT U/L  --   --  10   AST U/L  --   --  25   GLUCOSE RANDOM mg/dL 239*   < > 97    < > = values in this interval not displayed.         Results from last 7 days   Lab Units 06/03/25  0710 06/02/25  2037 06/02/25  1553 06/02/25  1257 06/02/25  1101 06/02/25  0859 06/02/25  0715 06/02/25  0502 06/02/25  0321 06/02/25  0122 06/02/25  0005 06/01/25  2302   POC GLUCOSE mg/dl 170* 293* 265* 209* 127 268* 165* 121 104 121 201* 252*     Results from last 7 days   Lab Units 06/01/25  1248   HEMOGLOBIN A1C % 7.0*     Results from last 7 days   Lab Units 05/31/25  2059   PROCALCITONIN ng/ml <0.05     Lab Results   Component Value Date    IRON 64 09/30/2024    FERRITIN 22.0 09/30/2024    TIBC 371 09/30/2024    CONCFE 17 (L) 09/30/2024      Recent Cultures (last 7 days):         No acute cardiopulmonary disease.     Pulmonary artery enlargement which can be seen with pulmonary hypertension.       Last 24 Hours Medication List:     Current Facility-Administered Medications:     acetaminophen (TYLENOL) tablet 975 mg, Q8H PRN    albuterol (PROVENTIL HFA,VENTOLIN HFA) inhaler 2 puff, Q6H PRN    aspirin chewable tablet 81 mg, Daily    atorvastatin (LIPITOR) tablet 40 mg, QPM    bisacodyl (DULCOLAX) rectal suppository 10 mg, Daily PRN    brimonidine tartrate 0.2 % ophthalmic solution 1 drop,  BID    diltiazem (CARDIZEM CD) 24 hr capsule 240 mg, Daily    ferrous sulfate tablet 325 mg, Every Other Day    [Held by provider] furosemide (LASIX) injection 40 mg, BID (diuretic)    gabapentin (NEURONTIN) capsule 300 mg, 4x Daily    heparin (porcine) subcutaneous injection 5,000 Units, Q8H CARRIE    insulin glargine (LANTUS) subcutaneous injection 15 Units 0.15 mL, HS    insulin lispro (HumALOG/ADMELOG) 100 units/mL subcutaneous injection 1-5 Units, 4x Daily (AC & HS) **AND** Fingerstick Glucose (POCT), 4x Daily AC and at bedtime    ipratropium (ATROVENT) 0.02 % inhalation solution 0.5 mg, TID    levalbuterol (XOPENEX) inhalation solution 1.25 mg, TID    losartan (COZAAR) tablet 50 mg, Daily    morphine (MSIR) IR tablet 15 mg, BID    pantoprazole (PROTONIX) EC tablet 40 mg, Q12H CARRIE    polyethylene glycol (MIRALAX) packet 17 g, Daily    predniSONE tablet 40 mg, Daily    rOPINIRole (REQUIP) tablet 0.5 mg, HS    senna (SENOKOT) tablet 17.2 mg, HS PRN    senna (SENOKOT) tablet 17.2 mg, Daily    vit B complex-vit C-folic acid (Renal Caps) capsule 1 capsule, Daily With Dinner    Administrative Statements   Today, Patient Was Seen By: Michelle Street MD      **Please Note: This note may have been constructed using a voice recognition system.**

## 2025-06-03 NOTE — ED PROVIDER NOTES
Time reflects when diagnosis was documented in both MDM as applicable and the Disposition within this note       Time User Action Codes Description Comment    5/31/2025  9:14 PM Kelley Velasquez Add [J44.1] COPD exacerbation (HCC)     5/31/2025  9:14 PM Kelley Velasquez Add [R09.02] Hypoxia     5/31/2025 11:07 PM Lj Neely Add [J96.01] Acute hypoxic respiratory failure (HCC)     5/31/2025 11:07 PM Lj Neely Modify [J44.1] COPD exacerbation (HCC)     5/31/2025 11:07 PM AgreLj reich Modify [J96.01] Acute hypoxic respiratory failure (HCC)     6/1/2025  4:44 PM Berto Alcaraz Add [E11.9] Type 2 diabetes mellitus without complication, unspecified whether long term insulin use (HCC)           ED Disposition       ED Disposition   Admit    Condition   Stable    Date/Time   Sat May 31, 2025  9:14 PM    Comment   Case was discussed with MAR and the patient's admission status was agreed to be Admission Status: inpatient status to the service of Dr. Ang .               Assessment & Plan       Medical Decision Making  Amount and/or Complexity of Data Reviewed  Labs: ordered.  Radiology: ordered and independent interpretation performed.    Risk  Prescription drug management.  Decision regarding hospitalization.      Gloria Patel is an 84 year old female PMH COPD, chronic repiratory failure on 3L at home and CKD who presented for worsening shortness of breath.   Concern for COPD exacerbation versus new heart failure.  Patient had increased work of breathing, BiPAP initiated.  Patient given DuoNeb, magnesium, Solu-Medrol and dose of IV Lasix.  Patient felt better after BiPAP, was able to wean off to 4 L nasal cannula.  Patient admitted to medicine team for further management.    ED Course as of 06/03/25 1404   Sat May 31, 2025   2006 Patient's lung sounds are clear, will trial off of BiPAP to 4 L nasal cannula and be given DuoNeb treatment.       Medications   albuterol (PROVENTIL HFA,VENTOLIN HFA) inhaler 2  puff (has no administration in time range)   aspirin chewable tablet 81 mg (has no administration in time range)   atorvastatin (LIPITOR) tablet 40 mg (40 mg Oral Given 5/31/25 2255)   vit B complex-vit C-folic acid (Renal Caps) capsule 1 capsule (has no administration in time range)   bisacodyl (DULCOLAX) rectal suppository 10 mg (has no administration in time range)   brimonidine tartrate 0.2 % ophthalmic solution 1 drop (has no administration in time range)   diltiazem (CARDIZEM CD) 24 hr capsule 240 mg (has no administration in time range)   ferrous sulfate tablet 325 mg (has no administration in time range)   gabapentin (NEURONTIN) capsule 300 mg (300 mg Oral Given 5/31/25 2255)   losartan (COZAAR) tablet 50 mg (has no administration in time range)   morphine (MSIR) IR tablet 15 mg (15 mg Oral Given 5/31/25 2255)   pantoprazole (PROTONIX) EC tablet 40 mg (40 mg Oral Given 5/31/25 2255)   rOPINIRole (REQUIP) tablet 0.5 mg (0.5 mg Oral Given 5/31/25 2255)   senna (SENOKOT) tablet 17.2 mg (has no administration in time range)   heparin (porcine) subcutaneous injection 5,000 Units (5,000 Units Subcutaneous Given 5/31/25 2255)   acetaminophen (TYLENOL) tablet 975 mg (has no administration in time range)   levalbuterol (XOPENEX) inhalation solution 1.25 mg (1.25 mg Nebulization Given 5/31/25 2255)   ipratropium (ATROVENT) 0.02 % inhalation solution 0.5 mg (has no administration in time range)   ipratropium-albuterol (DUO-NEB) 0.5-2.5 mg/3 mL inhalation solution 3 mL (3 mL Nebulization Given 5/31/25 1824)   methylPREDNISolone sodium succinate (Solu-MEDROL) injection 100 mg (100 mg Intravenous Given 5/31/25 1839)   magnesium sulfate 2 g/50 mL IVPB (premix) 2 g (0 g Intravenous Stopped 5/31/25 2038)   furosemide (LASIX) injection 40 mg (40 mg Intravenous Given 5/31/25 1841)   ipratropium-albuterol (DUO-NEB) 0.5-2.5 mg/3 mL inhalation solution 3 mL (3 mL Nebulization Given 5/31/25 2029)   azithromycin (ZITHROMAX) tablet  500 mg (500 mg Oral Given 5/31/25 2255)       ED Risk Strat Scores                    No data recorded        SBIRT 20yo+      Flowsheet Row Most Recent Value   Initial Alcohol Screen: US AUDIT-C     1. How often do you have a drink containing alcohol? 0 Filed at: 05/31/2025 2308   2. How many drinks containing alcohol do you have on a typical day you are drinking?  0 Filed at: 05/31/2025 2308   3b. FEMALE Any Age, or MALE 65+: How often do you have 4 or more drinks on one occassion? 0 Filed at: 05/31/2025 2308   Audit-C Score 0 Filed at: 05/31/2025 2308   ALEXX: How many times in the past year have you...    Used an illegal drug or used a prescription medication for non-medical reasons? Never Filed at: 05/31/2025 2308                            History of Present Illness       Chief Complaint   Patient presents with    Shortness of Breath     Pt hx of COPD and CHF states she is having increased SOB. Denies pain or other sx. Pt on 4L o2 chronically.         Past Medical History[1]   Past Surgical History[2]   Family History[3]   Social History[4]   E-Cigarette/Vaping    E-Cigarette Use Never User       E-Cigarette/Vaping Substances    Nicotine No     THC No     CBD No     Flavoring No     Other No     Unknown No       I have reviewed and agree with the history as documented.       Shortness of Breath  Associated symptoms: no abdominal pain, no chest pain, no cough, no fever, no headaches, no neck pain and no vomiting      Gloria Patel is an 84 year old female PMH COPD, chronic repiratory failure on 3L at home and CKD who presented for worsening shortness of breath that started this afternoon.  Denies any fever, chest pain, nausea, vomiting or abdominal pain.  Has not had any medication changes recently.  States that lower extremity edema is at its baseline and not acutely worsened.    Review of Systems   Constitutional:  Negative for chills and fever.   HENT:  Negative for congestion and rhinorrhea.    Eyes:   Negative for visual disturbance.   Respiratory:  Positive for shortness of breath. Negative for cough.    Cardiovascular:  Negative for chest pain.   Gastrointestinal:  Negative for abdominal pain, nausea and vomiting.   Genitourinary:  Negative for dysuria and hematuria.   Musculoskeletal:  Negative for back pain and neck pain.   Skin:  Negative for wound.   Neurological:  Negative for dizziness, light-headedness and headaches.           Objective       ED Triage Vitals   Temperature Pulse Blood Pressure Respirations SpO2 Patient Position - Orthostatic VS   05/31/25 1757 05/31/25 1756 05/31/25 1756 05/31/25 1756 05/31/25 1756 05/31/25 1756   98.5 °F (36.9 °C) 80 159/73 (!) 32 90 % Lying      Temp Source Heart Rate Source BP Location FiO2 (%) Pain Score    05/31/25 1757 05/31/25 1756 05/31/25 1756 05/31/25 2029 06/01/25 0036    Oral Monitor Right arm 40 No Pain      Vitals      Date and Time Temp Pulse SpO2 Resp BP Pain Score FACES Pain Rating User   06/03/25 0926 -- 79 -- -- 120/71 7 -- J   06/03/25 0900 -- -- -- -- -- 7 --    06/03/25 0804 -- -- 97 % -- -- -- -- BM   06/03/25 0714 97.5 °F (36.4 °C) 74 95 % 18 133/72 -- -- DII   06/02/25 2357 -- -- 98 % -- -- -- -- MO   06/02/25 2219 98.5 °F (36.9 °C) 78 92 % 18 107/56 -- -- DII   06/02/25 2022 -- -- -- -- -- 6 -- TA   06/02/25 2019 -- -- -- -- -- 6 -- TA   06/02/25 2013 -- -- 92 % -- -- -- -- MO   06/02/25 1457 98.6 °F (37 °C) 73 93 % -- 120/63 -- -- DII   06/02/25 1312 -- -- 93 % -- -- -- -- AJ   06/02/25 0950 -- 72 -- -- 120/60 -- -- KA   06/02/25 0900 -- -- -- -- -- 8 -- JJ   06/02/25 0851 -- -- -- -- 120/60 8 -- J   06/02/25 0747 -- -- 95 % -- -- -- -- AJ   06/02/25 0711 99.1 °F (37.3 °C) 72 92 % 18 123/58 -- -- DII   06/02/25 0223 99.4 °F (37.4 °C) 72 95 % 18 113/53 -- -- DII   06/02/25 0036 -- -- -- -- -- No Pain -- AV   06/01/25 2300 -- -- -- -- -- No Pain -- AV   06/01/25 2224 99.3 °F (37.4 °C) 81 94 % -- 113/60 -- -- DII   06/01/25 2128 -- -- -- -- --  No Pain -- AV   06/01/25 2034 -- -- 92 % -- -- -- -- MO   06/01/25 1704 98 °F (36.7 °C) 81 90 % 18 110/84 -- -- DII   06/01/25 1500 98.4 °F (36.9 °C) 75 91 % 20 124/76 -- -- EW   06/01/25 1313 -- -- 95 % -- -- -- -- BM   06/01/25 1100 98.5 °F (36.9 °C) 85 94 % 20 120/57 -- -- MV   06/01/25 0815 -- -- 97 % -- -- -- -- BM   06/01/25 0806 -- -- -- -- -- 7 -- SS   06/01/25 0804 98.7 °F (37.1 °C) 93 98 % 20 123/59 No Pain -- SS   06/01/25 0744 -- -- 96 % -- -- -- -- BM   06/01/25 0100 -- -- -- -- -- No Pain -- CK   06/01/25 0036 97.9 °F (36.6 °C) 91 -- 22 131/62 No Pain -- CK   06/01/25 0000 -- 105 97 % 20 146/67 -- -- AW   05/31/25 2300 -- 101 94 % 20 140/65 -- -- LA   05/31/25 2230 -- 83 93 % 20 145/67 -- -- LA   05/31/25 2145 -- 91 96 % 18 153/74 -- -- LA   05/31/25 2045 -- 66 96 % 18 129/70 -- -- LA   05/31/25 2042 -- -- 97 % -- -- -- -- MO   05/31/25 2029 -- -- 98 % -- -- -- -- MO   05/31/25 1945 -- 64 99 % 18 118/62 -- -- LA   05/31/25 1900 -- 72 98 % 24 138/67 -- -- DP   05/31/25 1845 -- 70 99 % 26 121/60 -- -- LA   05/31/25 1831 -- -- 97 % -- -- -- -- AG   05/31/25 1757 98.5 °F (36.9 °C) -- -- -- -- -- -- AW   05/31/25 1756 -- 80 90 % 32 159/73 -- -- AW            Physical Exam  Constitutional:       General: She is in acute distress.      Appearance: She is ill-appearing.   HENT:      Head: Normocephalic and atraumatic.      Mouth/Throat:      Mouth: Mucous membranes are moist.      Pharynx: Oropharynx is clear.     Eyes:      Extraocular Movements: Extraocular movements intact.      Conjunctiva/sclera: Conjunctivae normal.      Pupils: Pupils are equal, round, and reactive to light.       Cardiovascular:      Rate and Rhythm: Normal rate and regular rhythm.      Pulses: Normal pulses.      Heart sounds: Normal heart sounds.   Pulmonary:      Effort: Respiratory distress present.      Breath sounds: Wheezing present.      Comments: Tachypneic  Abdominal:      Palpations: Abdomen is soft.      Tenderness: There  is no abdominal tenderness.     Musculoskeletal:         General: No deformity. Normal range of motion.      Cervical back: Normal range of motion. No rigidity.     Skin:     General: Skin is warm and dry.      Capillary Refill: Capillary refill takes less than 2 seconds.     Neurological:      General: No focal deficit present.      Mental Status: She is alert and oriented to person, place, and time. Mental status is at baseline.         Results Reviewed       Procedure Component Value Units Date/Time    Basic metabolic panel [[id number]]  (Abnormal) Collected: 06/01/25 0746    Lab Status: Final result Specimen: Blood from Hand, Left Updated: 06/01/25 0829     Sodium 136 mmol/L      Potassium 4.2 mmol/L      Chloride 96 mmol/L      CO2 31 mmol/L      ANION GAP 9 mmol/L      BUN 19 mg/dL      Creatinine 0.91 mg/dL      Glucose 411 mg/dL      Calcium 9.1 mg/dL      eGFR 58 ml/min/1.73sq m     Narrative:      National Kidney Disease Foundation guidelines for Chronic Kidney Disease (CKD):     Stage 1 with normal or high GFR (GFR > 90 mL/min/1.73 square meters)    Stage 2 Mild CKD (GFR = 60-89 mL/min/1.73 square meters)    Stage 3A Moderate CKD (GFR = 45-59 mL/min/1.73 square meters)    Stage 3B Moderate CKD (GFR = 30-44 mL/min/1.73 square meters)    Stage 4 Severe CKD (GFR = 15-29 mL/min/1.73 square meters)    Stage 5 End Stage CKD (GFR <15 mL/min/1.73 square meters)  Note: GFR calculation is accurate only with a steady state creatinine    Magnesium [[id number]]  (Normal) Collected: 06/01/25 0746    Lab Status: Final result Specimen: Blood from Hand, Left Updated: 06/01/25 0822     Magnesium 2.2 mg/dL     CBC and differential [[id number]]  (Abnormal) Collected: 06/01/25 0746    Lab Status: Final result Specimen: Blood from Hand, Left Updated: 06/01/25 0802     WBC 2.21 Thousand/uL      RBC 3.96 Million/uL      Hemoglobin 10.6 g/dL      Hematocrit 34.7 %      MCV 88 fL      MCH 26.8 pg      MCHC 30.5 g/dL      RDW 15.8 %       MPV 9.4 fL      Platelets 181 Thousands/uL      nRBC 0 /100 WBCs      Segmented % 88 %      Immature Grans % 1 %      Lymphocytes % 9 %      Monocytes % 2 %      Eosinophils Relative 0 %      Basophils Relative 0 %      Absolute Neutrophils 1.97 Thousands/µL      Absolute Immature Grans 0.01 Thousand/uL      Absolute Lymphocytes 0.19 Thousands/µL      Absolute Monocytes 0.04 Thousand/µL      Eosinophils Absolute 0.00 Thousand/µL      Basophils Absolute 0.00 Thousands/µL     FLU/RSV/COVID - if FLU/RSV clinically relevant [[id number]]  (Normal) Collected: 05/31/25 2222    Lab Status: Final result Specimen: Nares from Nose Updated: 05/31/25 2310     SARS-CoV-2 Negative     INFLUENZA A PCR Negative     INFLUENZA B PCR Negative     RSV PCR Negative    Narrative:      This test has been performed using the CoV-2/Flu/RSV plus assay on the [employer] platform. This test has been validated by the  and verified by the performing laboratory.     This test is designed to amplify and detect the following: nucleocapsid (N), envelope (E), and RNA-dependent RNA polymerase (RdRP) genes of the SARS-CoV-2 genome; matrix (M), basic polymerase (PB2), and acidic protein (PA) segments of the influenza A genome; matrix (M) and non-structural protein (NS) segments of the influenza B genome, and the nucleocapsid genes of RSV A and RSV B.     Positive results are indicative of the presence of Flu A, Flu B, RSV, and/or SARS-CoV-2 RNA. Positive results for SARS-CoV-2 or suspected novel influenza should be reported to state, local, or federal health departments according to local reporting requirements.      All results should be assessed in conjunction with clinical presentation and other laboratory markers for clinical management.     FOR PEDIATRIC PATIENTS - copy/paste COVID Guidelines URL to browser: [email or web address]       HS Troponin I 4hr [[id number]]  (Normal)  Collected: 05/31/25 2222    Lab Status: Final result Specimen: Blood from Arm, Right Updated: 05/31/25 2255     hs TnI 4hr 5 ng/L      Delta 4hr hsTnI -1 ng/L     Fingerstick Glucose (POCT) [[id number]]  (Abnormal) Collected: 05/31/25 2244    Lab Status: Final result Specimen: Blood Updated: 05/31/25 2245     POC Glucose 273 mg/dl     Procalcitonin [[id number]]  (Normal) Collected: 05/31/25 2059    Lab Status: Final result Specimen: Blood from Arm, Right Updated: 05/31/25 2142     Procalcitonin <0.05 ng/ml     HS Troponin I 2hr [[id number]]  (Normal) Collected: 05/31/25 2059    Lab Status: Final result Specimen: Blood from Arm, Right Updated: 05/31/25 2138     hs TnI 2hr 7 ng/L      Delta 2hr hsTnI 1 ng/L     Comprehensive metabolic panel [[id number]] Collected: 05/31/25 1836    Lab Status: Final result Specimen: Blood from Arm, Right Updated: 05/31/25 1926     Sodium 138 mmol/L      Potassium 4.9 mmol/L      Chloride 99 mmol/L      CO2 30 mmol/L      ANION GAP 9 mmol/L      BUN 13 mg/dL      Creatinine 0.75 mg/dL      Glucose 97 mg/dL      Calcium 9.8 mg/dL      AST 25 U/L      ALT 10 U/L      Alkaline Phosphatase 88 U/L      Total Protein 7.2 g/dL      Albumin 4.3 g/dL      Total Bilirubin 0.55 mg/dL      eGFR 73 ml/min/1.73sq m     Narrative:      National Kidney Disease Foundation guidelines for Chronic Kidney Disease (CKD):     Stage 1 with normal or high GFR (GFR > 90 mL/min/1.73 square meters)    Stage 2 Mild CKD (GFR = 60-89 mL/min/1.73 square meters)    Stage 3A Moderate CKD (GFR = 45-59 mL/min/1.73 square meters)    Stage 3B Moderate CKD (GFR = 30-44 mL/min/1.73 square meters)    Stage 4 Severe CKD (GFR = 15-29 mL/min/1.73 square meters)    Stage 5 End Stage CKD (GFR <15 mL/min/1.73 square meters)  Note: GFR calculation is accurate only with a steady state creatinine    HS Troponin 0hr (reflex protocol) [[id number]]  (Normal) Collected: 05/31/25 6966    Lab Status: Final result Specimen: Blood from Arm, Right  Updated: 05/31/25 1921     hs TnI 0hr 6 ng/L     B-Type Natriuretic Peptide(BNP) [[id number]]  (Normal) Collected: 05/31/25 1836    Lab Status: Final result Specimen: Blood from Arm, Right Updated: 05/31/25 1920     BNP 72 pg/mL     Blood gas, venous [[id number]]  (Abnormal) Collected: 05/31/25 1837    Lab Status: Final result Specimen: Blood from Arm, Right Updated: 05/31/25 1854     pH, Skip 7.368     pCO2, Skip 49.8 mm Hg      pO2, Skip 55.6 mm Hg      HCO3, Skip 28.0 mmol/L      Base Excess, Skip 2.0 mmol/L      O2 Content, Skip 14.9 ml/dL      O2 HGB, VENOUS 87.5 %     CBC and differential [[id number]]  (Abnormal) Collected: 05/31/25 1836    Lab Status: Final result Specimen: Blood from Arm, Right Updated: 05/31/25 1853     WBC 7.00 Thousand/uL      RBC 4.51 Million/uL      Hemoglobin 12.0 g/dL      Hematocrit 39.7 %      MCV 88 fL      MCH 26.6 pg      MCHC 30.2 g/dL      RDW 15.9 %      MPV 10.0 fL      Platelets 231 Thousands/uL      nRBC 0 /100 WBCs      Segmented % 67 %      Immature Grans % 0 %      Lymphocytes % 14 %      Monocytes % 12 %      Eosinophils Relative 6 %      Basophils Relative 1 %      Absolute Neutrophils 4.66 Thousands/µL      Absolute Immature Grans 0.02 Thousand/uL      Absolute Lymphocytes 0.97 Thousands/µL      Absolute Monocytes 0.86 Thousand/µL      Eosinophils Absolute 0.42 Thousand/µL      Basophils Absolute 0.07 Thousands/µL             XR chest portable   ED Interpretation by Kelley Velasquez MD (06/03 1403)   No signs of pulmonary congestion      Final Interpretation by Judy Rausch MD (06/01 0733)      No acute cardiopulmonary disease.      Pulmonary artery enlargement which can be seen with pulmonary hypertension.            Workstation performed: [id number]             Procedures    ED Medication and Procedure Management   Prior to Admission Medications   Prescriptions Last Dose Informant Patient Reported? Taking?   Alcohol Swabs (Alcohol Prep) PADS Not Taking Self Yes No   Sig:  USE TO TEST BLOOD SUGAR TWICE A DAY AND AS NEEDED   Patient not taking: Reported on 6/1/2025   B Complex-C-Folic Acid (B COMPLEX + C TR PO) 5/31/2025  Yes Yes   Sig: Take 1 tablet by mouth in the morning   Budeson-Glycopyrrol-Formoterol (Breztri Aerosphere) 160-9-4.8 MCG/ACT AERO 5/31/2025 Self No Yes   Sig: Inhale 2 puffs  in the morning and 2 puffs before bedtime. Rinse mouth after use..   albuterol (PROVENTIL HFA,VENTOLIN HFA) 90 mcg/act inhaler 5/31/2025 Self No Yes   Sig: Inhale 2 puffs every 6 (six) hours as needed for wheezing   aspirin 81 mg chewable tablet 5/31/2025 Self No Yes   Sig: Chew 1 tablet (81 mg total) daily   atorvastatin (LIPITOR) 40 mg tablet 5/31/2025 Self No Yes   Sig: Take 1 tablet (40 mg total) by mouth every evening   bisacodyl (DULCOLAX) 10 mg suppository Unknown  No No   Sig: Insert 1 suppository (10 mg total) into the rectum daily as needed for constipation   brimonidine tartrate 0.2 % ophthalmic solution Past Week Self No Yes   Sig: Administer 1 drop into the left eye 2 (two) times a day   diltiazem (CARDIZEM CD) 240 mg 24 hr capsule 5/31/2025 Self Yes Yes   Sig: Take 1 capsule by mouth in the morning.   ferrous sulfate 324 (65 Fe) mg 5/31/2025  Yes Yes   Sig: Take 324 mg by mouth every other day   gabapentin (NEURONTIN) 300 mg capsule 5/31/2025 Self Yes Yes   Sig: Take 300 mg by mouth in the morning and 300 mg at noon and 300 mg in the evening and 300 mg before bedtime.   levalbuterol (XOPENEX) 1.25 mg/3 mL nebulizer solution 5/31/2025  No Yes   Sig: Take 3 mL (1.25 mg total) by nebulization 2 (two) times a day   losartan (COZAAR) 25 mg tablet 5/31/2025 Self Yes Yes   Sig: Take 50 mg by mouth in the morning.   metFORMIN (GLUCOPHAGE) 500 mg tablet 5/31/2025 Self Yes Yes   Sig: Take 1,000 mg by mouth daily with breakfast   metFORMIN (GLUCOPHAGE) 500 mg tablet Not Taking Self Yes No   Sig: Take 500 mg by mouth every evening   Patient not taking: Reported on 6/1/2025   morphine (MSIR) 15  mg tablet 5/31/2025 Self Yes Yes   Sig: Take 15 mg by mouth in the morning and 15 mg in the evening. Every 12 hrs. .   naloxegol oxalate (Movantik) 25 MG tablet Past Week  Yes Yes   oxygen gas Not Taking  Yes No   Sig: Inhale 3 L/min continuous Via nasal cannula.    Patient not taking: Reported on 6/1/2025   pantoprazole (PROTONIX) 40 mg tablet 5/31/2025 Self Yes Yes   Sig: Take 40 mg by mouth every 12 (twelve) hours   rOPINIRole (REQUIP) 0.5 mg tablet 5/31/2025 Self Yes Yes   Sig: Take 0.5 mg by mouth daily at bedtime Take 3 tablets PO before bed   senna (SENOKOT) 8.6 mg 5/31/2025  No Yes   Sig: Take 2 tablets (17.2 mg total) by mouth daily at bedtime as needed for constipation for up to 14 days   sitaGLIPtin (JANUVIA) 100 mg tablet 5/31/2025 Self No Yes   Sig: Take 1 tablet (100 mg total) by mouth daily      Facility-Administered Medications: None     Current Discharge Medication List        CONTINUE these medications which have NOT CHANGED    Details   albuterol (PROVENTIL HFA,VENTOLIN HFA) 90 mcg/act inhaler Inhale 2 puffs every 6 (six) hours as needed for wheezing  Qty: 54 g, Refills: 3    Comments: Substitution to a formulary equivalent within the same pharmaceutical class is authorized.  Associated Diagnoses: COPD, severe (HCC)      aspirin 81 mg chewable tablet Chew 1 tablet (81 mg total) daily  Qty: 30 tablet, Refills: 0    Associated Diagnoses: 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      atorvastatin (LIPITOR) 40 mg tablet Take 1 tablet (40 mg total) by mouth every evening  Qty: 30 tablet, Refills: 0    Associated Diagnoses: 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      B Complex-C-Folic Acid (B COMPLEX + C TR PO) Take 1 tablet by mouth in the morning      brimonidine tartrate 0.2 % ophthalmic solution Administer 1 drop into the left eye 2 (two) times a day  Qty: 5 mL, Refills: 0    Associated Diagnoses: 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      Budeson-Glycopyrrol-Formoterol (Breztri  Aerosphere) 160-9-4.8 MCG/ACT AERO Inhale 2 puffs  in the morning and 2 puffs before bedtime. Rinse mouth after use..  Qty: 31.1 g, Refills: 3    Associated Diagnoses: COPD, severe (HCC)      diltiazem (CARDIZEM CD) 240 mg 24 hr capsule Take 1 capsule by mouth in the morning.      ferrous sulfate 324 (65 Fe) mg Take 324 mg by mouth every other day      gabapentin (NEURONTIN) 300 mg capsule Take 300 mg by mouth in the morning and 300 mg at noon and 300 mg in the evening and 300 mg before bedtime.      levalbuterol (XOPENEX) 1.25 mg/3 mL nebulizer solution Take 3 mL (1.25 mg total) by nebulization 2 (two) times a day  Qty: 180 mL, Refills: 0    Associated Diagnoses: Acute hypoxic respiratory failure (HCC)      losartan (COZAAR) 25 mg tablet Take 50 mg by mouth in the morning.      !! metFORMIN (GLUCOPHAGE) 500 mg tablet Take 1,000 mg by mouth daily with breakfast      morphine (MSIR) 15 mg tablet Take 15 mg by mouth in the morning and 15 mg in the evening. Every 12 hrs. .      naloxegol oxalate (Movantik) 25 MG tablet       pantoprazole (PROTONIX) 40 mg tablet Take 40 mg by mouth every 12 (twelve) hours      rOPINIRole (REQUIP) 0.5 mg tablet Take 0.5 mg by mouth daily at bedtime Take 3 tablets PO before bed      senna (SENOKOT) 8.6 mg Take 2 tablets (17.2 mg total) by mouth daily at bedtime as needed for constipation for up to 14 days  Qty: 14 tablet, Refills: 0    Associated Diagnoses: Constipation; Chronic low back pain without sciatica, unspecified back pain laterality      sitaGLIPtin (JANUVIA) 100 mg tablet Take 1 tablet (100 mg total) by mouth daily  Qty: 30 tablet, Refills: 0    Associated Diagnoses: Type 2 diabetes mellitus without complication, unspecified whether long term insulin use (HCC)      Alcohol Swabs (Alcohol Prep) PADS USE TO TEST BLOOD SUGAR TWICE A DAY AND AS NEEDED      bisacodyl (DULCOLAX) 10 mg suppository Insert 1 suppository (10 mg total) into the rectum daily as needed for  constipation  Qty: 12 suppository, Refills: 0    Associated Diagnoses: Constipation; Chronic low back pain without sciatica, unspecified back pain laterality      !! metFORMIN (GLUCOPHAGE) 500 mg tablet Take 500 mg by mouth every evening      oxygen gas Inhale 3 L/min continuous Via nasal cannula.        !! - Potential duplicate medications found. Please discuss with provider.        No discharge procedures on file.  ED SEPSIS DOCUMENTATION   Time reflects when diagnosis was documented in both MDM as applicable and the Disposition within this note       Time User Action Codes Description Comment    5/31/2025  9:14 PM Kelley Velasquez [J44.1] COPD exacerbation (HCC)     5/31/2025  9:14 PM Kelley Velasquez [R09.02] Hypoxia     5/31/2025 11:07 PM Lj Neely Add [J96.01] Acute hypoxic respiratory failure (HCC)     5/31/2025 11:07 PM Lj Neely Modify [J44.1] COPD exacerbation (HCC)     5/31/2025 11:07 PM Lj Neely Modify [J96.01] Acute hypoxic respiratory failure (HCC)     6/1/2025  4:44 PM Berto Alcaraz Add [E11.9] Type 2 diabetes mellitus without complication, unspecified whether long term insulin use (HCC)                    [1]   Past Medical History:  Diagnosis Date    Abdominal pain 09/26/2022    Arthritis     Cardiac disease     Constipation 04/25/2024    COPD (chronic obstructive pulmonary disease) (HCC)     COVID-19 2021    was in hospital for sx and was dx with covid    Diabetes mellitus (HCC)     GERD (gastroesophageal reflux disease)     Hiatal hernia     Hypertension     PUD (peptic ulcer disease)     Residual ASD (atrial septal defect) following repair    [2]   Past Surgical History:  Procedure Laterality Date    ABDOMINAL ADHESION SURGERY N/A 09/26/2022    Procedure: LYSIS ADHESIONS;  Surgeon: Petty Johnson MD;  Location: [facility];  Service: Thoracic    ASD REPAIR      BACK SURGERY      x3    CARDIAC SURGERY      Atrial septic defect    ESOPHAGOGASTRODUODENOSCOPY N/A  2022    Procedure: ESOPHAGOGASTRODUODENOSCOPY (EGD);  Surgeon: Petty Johnson MD;  Location: [facility];  Service: Thoracic    JOINT REPLACEMENT      bilateral knee surgery    JOINT REPLACEMENT      KNEE SURGERY      PARAESOPHAGEAL HERNIA REPAIR N/A 2022    Procedure: REPAIR HERNIA PARAESOPHAGEAL LAPAROSCOPIC W ROBOTICS, gastropexy, mesh placement;  Surgeon: Petty Johnsno MD;  Location: [facility];  Service: Thoracic    SC EXCISION GANGLION WRIST DORSAL/VOLAR PRIMARY Left 2023    Procedure: EXCISION GANGLION CYST;  Surgeon: Jaja Pantoja MD;  Location: [facility];  Service: Orthopedics    SHOULDER SURGERY     [3]   Family History  Problem Relation Name Age of Onset    Cancer Mother      Asthma Father     [4]   Social History  Tobacco Use    Smoking status: Former     Current packs/day: 0.00     Average packs/day: 1 pack/day for 64.1 years (64.1 ttl pk-yrs)     Types: Cigarettes     Start date:      Quit date: 2020     Years since quittin.3    Smokeless tobacco: Never    Tobacco comments:     stopped smoking 2 days ago   Vaping Use    Vaping status: Never Used   Substance Use Topics    Alcohol use: Not Currently    Drug use: Never        Kelley Velasquez MD  [id number]

## 2025-06-03 NOTE — PLAN OF CARE
Problem: Potential for Falls  Goal: Patient will remain free of falls  Description: INTERVENTIONS:  - Educate patient/family on patient safety including physical limitations  - Instruct patient to call for assistance with activity   - Consider consulting OT/PT to assist with strengthening/mobility based on AM PAC & JH-HLM score  - Consult OT/PT to assist with strengthening/mobility   - Keep Call bell within reach  - Keep bed low and locked with side rails adjusted as appropriate  - Keep care items and personal belongings within reach  - Initiate and maintain comfort rounds  - Make Fall Risk Sign visible to staff  - Offer Toileting every  Hours, in advance of need  - Initiate/Maintain alarm  - Obtain necessary fall risk management equipment:   - Apply yellow socks and bracelet for high fall risk patients  - Consider moving patient to room near nurses station  Outcome: Progressing     Problem: PAIN - ADULT  Goal: Verbalizes/displays adequate comfort level or baseline comfort level  Description: Interventions:  - Encourage patient to monitor pain and request assistance  - Assess pain using appropriate pain scale  - Administer analgesics as ordered based on type and severity of pain and evaluate response  - Implement non-pharmacological measures as appropriate and evaluate response  - Consider cultural and social influences on pain and pain management  - Notify physician/advanced practitioner if interventions unsuccessful or patient reports new pain  - Educate patient/family on pain management process including their role and importance of  reporting pain   - Provide non-pharmacologic/complimentary pain relief interventions  Outcome: Progressing     Problem: INFECTION - ADULT  Goal: Absence or prevention of progression during hospitalization  Description: INTERVENTIONS:  - Assess and monitor for signs and symptoms of infection  - Monitor lab/diagnostic results  - Monitor all insertion sites, i.e. indwelling lines,  tubes, and drains  - Monitor endotracheal if appropriate and nasal secretions for changes in amount and color  - [place] appropriate cooling/warming therapies per order  - Administer medications as ordered  - Instruct and encourage patient and family to use good hand hygiene technique  - Identify and instruct in appropriate isolation precautions for identified infection/condition  Outcome: Progressing  Goal: Absence of fever/infection during neutropenic period  Description: INTERVENTIONS:  - Monitor WBC  - Perform strict hand hygiene  - Limit to healthy visitors only  - No plants, dried, fresh or silk flowers with mcdowell in patient room  Outcome: Progressing     Problem: SAFETY ADULT  Goal: Patient will remain free of falls  Description: INTERVENTIONS:  - Educate patient/family on patient safety including physical limitations  - Instruct patient to call for assistance with activity   - Consider consulting OT/PT to assist with strengthening/mobility based on AM PAC & -HLM score  - Consult OT/PT to assist with strengthening/mobility   - Keep Call bell within reach  - Keep bed low and locked with side rails adjusted as appropriate  - Keep care items and personal belongings within reach  - Initiate and maintain comfort rounds  - Make Fall Risk Sign visible to staff  - Offer Toileting every  Hours, in advance of need  - Initiate/Maintain alarm  - Obtain necessary fall risk management equipment:   - Apply yellow socks and bracelet for high fall risk patients  - Consider moving patient to room near nurses station  Outcome: Progressing  Goal: Maintain or return to baseline ADL function  Description: INTERVENTIONS:  -  Assess patient's ability to carry out ADLs; assess patient's baseline for ADL function and identify physical deficits which impact ability to perform ADLs (bathing, care of mouth/teeth, toileting, grooming, dressing, etc.)  - Assess/evaluate cause of self-care deficits   - Assess range of motion  - Assess  patient's mobility; develop plan if impaired  - Assess patient's need for assistive devices and provide as appropriate  - Encourage maximum independence but intervene and supervise when necessary  - Involve family in performance of ADLs  - Assess for home care needs following discharge   - Consider OT consult to assist with ADL evaluation and planning for discharge  - Provide patient education as appropriate  - Monitor functional capacity and physical performance, use of AM PAC & JH-HLM   - Monitor gait, balance and fatigue with ambulation    Outcome: Progressing  Goal: Maintains/Returns to pre admission functional level  Description: INTERVENTIONS:  - Perform AM-PAC 6 Click Basic Mobility/ Daily Activity assessment daily.  - Set and communicate daily mobility goal to care team and patient/family/caregiver.   - Collaborate with rehabilitation services on mobility goals if consulted  - Perform Range of Motion  times a day.  - Reposition patient every  hours.  - Dangle patient  times a day  - Stand patient  times a day  - Ambulate patient  times a day  - Out of bed to chair  times a day   - Out of bed for meals  times a day  - Out of bed for toileting  - Record patient progress and toleration of activity level   Outcome: Progressing     Problem: DISCHARGE PLANNING  Goal: Discharge to home or other facility with appropriate resources  Description: INTERVENTIONS:  - Identify barriers to discharge w/patient and caregiver  - Arrange for needed discharge resources and transportation as appropriate  - Identify discharge learning needs (meds, wound care, etc.)  - Arrange for interpretive services to assist at discharge as needed  - Refer to Case Management Department for coordinating discharge planning if the patient needs post-hospital services based on physician/advanced practitioner order or complex needs related to functional status, cognitive ability, or social support system  Outcome: Progressing     Problem: Knowledge  Deficit  Goal: Patient/family/caregiver demonstrates understanding of disease process, treatment plan, medications, and discharge instructions  Description: Complete learning assessment and assess knowledge base.  Interventions:  - Provide teaching at level of understanding  - Provide teaching via preferred learning methods  Outcome: Progressing     Problem: CARDIOVASCULAR - ADULT  Goal: Maintains optimal cardiac output and hemodynamic stability  Description: INTERVENTIONS:  - Monitor I/O, vital signs and rhythm  - Monitor for S/S and trends of decreased cardiac output  - Administer and titrate ordered vasoactive medications to optimize hemodynamic stability  - Assess quality of pulses, skin color and temperature  - Assess for signs of decreased coronary artery perfusion  - Instruct patient to report change in severity of symptoms  Outcome: Progressing  Goal: Absence of cardiac dysrhythmias or at baseline rhythm  Description: INTERVENTIONS:  - Continuous cardiac monitoring, vital signs, obtain 12 lead EKG if ordered  - Administer antiarrhythmic and heart rate control medications as ordered  - Monitor electrolytes and administer replacement therapy as ordered  Outcome: Progressing     Problem: RESPIRATORY - ADULT  Goal: Achieves optimal ventilation and oxygenation  Description: INTERVENTIONS:  - Assess for changes in respiratory status  - Assess for changes in mentation and behavior  - Position to facilitate oxygenation and minimize respiratory effort  - Oxygen administered by appropriate delivery if ordered  - Initiate smoking cessation education as indicated  - Encourage broncho-pulmonary hygiene including cough, deep breathe, Incentive Spirometry  - Assess the need for suctioning and aspirate as needed  - Assess and instruct to report SOB or any respiratory difficulty  - Respiratory Therapy support as indicated  Outcome: Progressing     Problem: METABOLIC, FLUID AND ELECTROLYTES - ADULT  Goal: Electrolytes  maintained within normal limits  Description: INTERVENTIONS:  - Monitor labs and assess patient for signs and symptoms of electrolyte imbalances  - Administer electrolyte replacement as ordered  - Monitor response to electrolyte replacements, including repeat lab results as appropriate  - Instruct patient on fluid and nutrition as appropriate  Outcome: Progressing  Goal: Fluid balance maintained  Description: INTERVENTIONS:  - Monitor labs   - Monitor I/O and WT  - Instruct patient on fluid and nutrition as appropriate  - Assess for signs & symptoms of volume excess or deficit  Outcome: Progressing  Goal: Glucose maintained within target range  Description: INTERVENTIONS:  - Monitor Blood Glucose as ordered  - Assess for signs and symptoms of hyperglycemia and hypoglycemia  - Administer ordered medications to maintain glucose within target range  - Assess nutritional intake and initiate nutrition service referral as needed  Outcome: Progressing     Problem: SKIN/TISSUE INTEGRITY - ADULT  Goal: Skin Integrity remains intact(Skin Breakdown Prevention)  Description: Assess:  -Perform Jonah assessment every   -Clean and moisturize skin every   -Inspect skin when repositioning, toileting, and assisting with ADLS  -Assess under medical devices such as  every   -Assess extremities for adequate circulation and sensation     Bed Management:  -Have minimal linens on bed & keep smooth, unwrinkled  -Change linens as needed when moist or perspiring  -Avoid sitting or lying in one position for more than  hours while in bed  -Keep HOB at degrees     Toileting:  -Offer bedside commode  -Assess for incontinence every   -Use incontinent care products after each incontinent episode such as     Activity:  -Mobilize patient  times a day  -Encourage activity and walks on unit  -Encourage or provide ROM exercises   -Turn and reposition patient every  Hours  -Use appropriate equipment to lift or move patient in bed  -Instruct/ Assist with  weight shifting every  when out of bed in chair  -Consider limitation of chair time  hour intervals    Skin Care:  -Avoid use of baby powder, tape, friction and shearing, hot water or constrictive clothing  -Relieve pressure over bony prominences using   -Do not massage red bony areas    Next Steps:  -Teach patient strategies to minimize risks such as    -Consider consults to  interdisciplinary teams such as   Outcome: Progressing  Goal: Incision(s), wounds(s) or drain site(s) healing without S/S of infection  Description: INTERVENTIONS  - Assess and document dressing, incision, wound bed, drain sites and surrounding tissue  - Provide patient and family education  - Perform skin care/dressing changes every   Outcome: Progressing  Goal: Pressure injury heals and does not worsen  Description: Interventions:  - Implement low air loss mattress or specialty surface (Criteria met)  - Apply silicone foam dressing  - Instruct/assist with weight shifting every  minutes when in chair   - Limit chair time to  hour intervals  - Use special pressure reducing interventions such as  when in chair   - Apply fecal or urinary incontinence containment device   - Perform passive or active ROM every   - Turn and reposition patient & offload bony prominences every  hours   - Utilize friction reducing device or surface for transfers   - Consider consults to  interdisciplinary teams such as   - Use incontinent care products after each incontinent episode such as   - Consider nutrition services referral as needed  Outcome: Progressing

## 2025-06-03 NOTE — ASSESSMENT & PLAN NOTE
Lab Results   Component Value Date    EGFR 38 06/03/2025    EGFR 47 06/02/2025    EGFR 58 06/01/2025    CREATININE 1.29 06/03/2025    CREATININE 1.08 06/02/2025    CREATININE 0.91 06/01/2025   At baseline continue to trend daily labs

## 2025-06-03 NOTE — ASSESSMENT & PLAN NOTE
BiPAP at night?  Continue scheduled neb treatments  Respiratory protocol  Wean oxygen as tolerated  See plan as above   Please review 31 day discharge for cardiac rehab. Pt did not graduate program. Pt wanted to continue with [facility] weight loss program instead.  Thank you

## 2025-06-03 NOTE — PLAN OF CARE
Problem: Potential for Falls  Goal: Patient will remain free of falls  Description: INTERVENTIONS:  - Educate patient/family on patient safety including physical limitations  - Instruct patient to call for assistance with activity   - Consider consulting OT/PT to assist with strengthening/mobility based on AM PAC & JH-HLM score  - Consult OT/PT to assist with strengthening/mobility   - Keep Call bell within reach  - Keep bed low and locked with side rails adjusted as appropriate  - Keep care items and personal belongings within reach  - Initiate and maintain comfort rounds  - Make Fall Risk Sign visible to staff  - Apply yellow socks and bracelet for high fall risk patients  - Consider moving patient to room near nurses station  Outcome: Progressing     Problem: PAIN - ADULT  Goal: Verbalizes/displays adequate comfort level or baseline comfort level  Description: Interventions:  - Encourage patient to monitor pain and request assistance  - Assess pain using appropriate pain scale  - Administer analgesics as ordered based on type and severity of pain and evaluate response  - Implement non-pharmacological measures as appropriate and evaluate response  - Consider cultural and social influences on pain and pain management  - Notify physician/advanced practitioner if interventions unsuccessful or patient reports new pain  - Educate patient/family on pain management process including their role and importance of  reporting pain   - Provide non-pharmacologic/complimentary pain relief interventions  Outcome: Progressing     Problem: INFECTION - ADULT  Goal: Absence or prevention of progression during hospitalization  Description: INTERVENTIONS:  - Assess and monitor for signs and symptoms of infection  - Monitor lab/diagnostic results  - Monitor all insertion sites, i.e. indwelling lines, tubes, and drains  - Monitor endotracheal if appropriate and nasal secretions for changes in amount and color  - [place] appropriate  cooling/warming therapies per order  - Administer medications as ordered  - Instruct and encourage patient and family to use good hand hygiene technique  - Identify and instruct in appropriate isolation precautions for identified infection/condition  Outcome: Progressing  Goal: Absence of fever/infection during neutropenic period  Description: INTERVENTIONS:  - Monitor WBC  - Perform strict hand hygiene  - Limit to healthy visitors only  - No plants, dried, fresh or silk flowers with mcdowell in patient room  Outcome: Progressing     Problem: SAFETY ADULT  Goal: Patient will remain free of falls  Description: INTERVENTIONS:  - Educate patient/family on patient safety including physical limitations  - Instruct patient to call for assistance with activity   - Consider consulting OT/PT to assist with strengthening/mobility based on AM PAC & JH-HLM score  - Consult OT/PT to assist with strengthening/mobility   - Keep Call bell within reach  - Keep bed low and locked with side rails adjusted as appropriate  - Keep care items and personal belongings within reach  - Initiate and maintain comfort rounds  - Make Fall Risk Sign visible to staff  - Apply yellow socks and bracelet for high fall risk patients  - Consider moving patient to room near nurses station  Outcome: Progressing  Goal: Maintain or return to baseline ADL function  Description: INTERVENTIONS:  -  Assess patient's ability to carry out ADLs; assess patient's baseline for ADL function and identify physical deficits which impact ability to perform ADLs (bathing, care of mouth/teeth, toileting, grooming, dressing, etc.)  - Assess/evaluate cause of self-care deficits   - Assess range of motion  - Assess patient's mobility; develop plan if impaired  - Assess patient's need for assistive devices and provide as appropriate  - Encourage maximum independence but intervene and supervise when necessary  - Involve family in performance of ADLs  - Assess for home care needs  following discharge   - Consider OT consult to assist with ADL evaluation and planning for discharge  - Provide patient education as appropriate  - Monitor functional capacity and physical performance, use of AM PAC & JH-HLM   - Monitor gait, balance and fatigue with ambulation    Outcome: Progressing  Goal: Maintains/Returns to pre admission functional level  Description: INTERVENTIONS:  - Perform AM-PAC 6 Click Basic Mobility/ Daily Activity assessment daily.  - Set and communicate daily mobility goal to care team and patient/family/caregiver.   - Collaborate with rehabilitation services on mobility goals if consulted  - Out of bed for toileting  - Record patient progress and toleration of activity level   Outcome: Progressing     Problem: DISCHARGE PLANNING  Goal: Discharge to home or other facility with appropriate resources  Description: INTERVENTIONS:  - Identify barriers to discharge w/patient and caregiver  - Arrange for needed discharge resources and transportation as appropriate  - Identify discharge learning needs (meds, wound care, etc.)  - Arrange for interpretive services to assist at discharge as needed  - Refer to Case Management Department for coordinating discharge planning if the patient needs post-hospital services based on physician/advanced practitioner order or complex needs related to functional status, cognitive ability, or social support system  Outcome: Progressing     Problem: Knowledge Deficit  Goal: Patient/family/caregiver demonstrates understanding of disease process, treatment plan, medications, and discharge instructions  Description: Complete learning assessment and assess knowledge base.  Interventions:  - Provide teaching at level of understanding  - Provide teaching via preferred learning methods  Outcome: Progressing     Problem: CARDIOVASCULAR - ADULT  Goal: Maintains optimal cardiac output and hemodynamic stability  Description: INTERVENTIONS:  - Monitor I/O, vital signs and  rhythm  - Monitor for S/S and trends of decreased cardiac output  - Administer and titrate ordered vasoactive medications to optimize hemodynamic stability  - Assess quality of pulses, skin color and temperature  - Assess for signs of decreased coronary artery perfusion  - Instruct patient to report change in severity of symptoms  Outcome: Progressing  Goal: Absence of cardiac dysrhythmias or at baseline rhythm  Description: INTERVENTIONS:  - Continuous cardiac monitoring, vital signs, obtain 12 lead EKG if ordered  - Administer antiarrhythmic and heart rate control medications as ordered  - Monitor electrolytes and administer replacement therapy as ordered  Outcome: Progressing     Problem: RESPIRATORY - ADULT  Goal: Achieves optimal ventilation and oxygenation  Description: INTERVENTIONS:  - Assess for changes in respiratory status  - Assess for changes in mentation and behavior  - Position to facilitate oxygenation and minimize respiratory effort  - Oxygen administered by appropriate delivery if ordered  - Initiate smoking cessation education as indicated  - Encourage broncho-pulmonary hygiene including cough, deep breathe, Incentive Spirometry  - Assess the need for suctioning and aspirate as needed  - Assess and instruct to report SOB or any respiratory difficulty  - Respiratory Therapy support as indicated  Outcome: Progressing     Problem: METABOLIC, FLUID AND ELECTROLYTES - ADULT  Goal: Electrolytes maintained within normal limits  Description: INTERVENTIONS:  - Monitor labs and assess patient for signs and symptoms of electrolyte imbalances  - Administer electrolyte replacement as ordered  - Monitor response to electrolyte replacements, including repeat lab results as appropriate  - Instruct patient on fluid and nutrition as appropriate  Outcome: Progressing  Goal: Fluid balance maintained  Description: INTERVENTIONS:  - Monitor labs   - Monitor I/O and WT  - Instruct patient on fluid and nutrition as  appropriate  - Assess for signs & symptoms of volume excess or deficit  Outcome: Progressing  Goal: Glucose maintained within target range  Description: INTERVENTIONS:  - Monitor Blood Glucose as ordered  - Assess for signs and symptoms of hyperglycemia and hypoglycemia  - Administer ordered medications to maintain glucose within target range  - Assess nutritional intake and initiate nutrition service referral as needed  Outcome: Progressing

## 2025-06-03 NOTE — ASSESSMENT & PLAN NOTE
Patient presents with respiratory distress from home requiring BiPAP in the emergency department.  Patient has history of chronic respiratory failure on 3 L nasal cannula at baseline.  Currently weaned off BiPAP now on 4 L nasal cannula acutely  CXR: No acute cardiopulmonary disease pulmonary artery enlargement which can be seen with pulmonary hypertension.  BNP 74.  VBG pH 7.3, pCO2 49.8, HCO3 28  Given x 1 dose of IV Lasix in the ED for concern of volume overload  Suspect in setting of COPD exacerbation  6/1:Left ventricular cavity size is normal. Wall thickness is normal. The left ventricular ejection fraction is 60%. Systolic function is normal. Wall motion is normal. Diastolic function is mildly abnormal, consistent with grade I (abnormal) relaxation.     Plan  Azithromycin for 3 days  Prednisone 40 mg daily (6/02--  Continue scheduled neb treatments Xopenex and Atrovent  Respiratory protocol  Wean oxygen as able  Obtain flu/COVID/RSV  Holding Lasix for now

## 2025-06-03 NOTE — CASE MANAGEMENT
Case Management Assessment & Discharge Planning Note    Patient name Gloria Patel  Location S /S -01 MRN [id number]  : 1940 Date 6/3/2025       Current Admission Date: 2025  Current Admission Diagnosis:Acute on chronic respiratory failure with hypoxia (HCC)   Patient Active Problem List    Diagnosis Date Noted    Diastolic dysfunction without heart failure 2025    Class 1 obesity with body mass index (BMI) of 33.0 to 33.9 in adult 2025    COPD exacerbation (HCC) 2025    Bilateral lower extremity edema 2025    Murmur, cardiac 2025    Acute on chronic respiratory failure with hypoxia (HCC) 2025    Type 2 diabetes mellitus, without long-term current use of insulin ([facility]) 2024    Restless leg syndrome 2024    Hypomagnesemia 2024    Obesity (BMI 30-39.9) 2024    Peripheral vascular disease (HCC) 2024    Stage 3b chronic kidney disease (HCC) 2024    Mass of finger of left hand 2023    Tachycardia 2023    Chronic respiratory failure (HCC) 2023    Type 2 diabetes mellitus with hyperglycemia, without long-term current use of insulin ([facility]) 2023    Essential hypertension 2023    Hyponatremia 2023    Hyperlipidemia 2023    Chronic back pain 2023    chronic respiratory failure (HCC), acute component resolved 2023    GERD (gastroesophageal reflux disease) 2023    Esophageal hiatal hernia 10/06/2022    Generalized weakness 10/06/2022    Low blood pressure reading 2022    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 2022    Unintentional weight loss 2022    BMI 31.0-31.9,adult 2022    Retinal artery occlusion, branch, left 2022    Chronic pain 2022    COVID-19 2022    Backache 2022    Onychomycosis 2021    Pain in toe 2021    Instability of foot joint 2021    Sinus tarsi syndrome of right ankle  10/07/2020    ASD (atrial septal defect) 07/30/2020    Exertional dyspnea 04/13/2020    Acquired pes planus of right foot 10/28/2019    Microscopic hematuria 08/21/2019    Urge incontinence 08/21/2019    Ankle pain 07/09/2019    Primary localized osteoarthrosis of ankle and foot 07/09/2019    History of iron deficiency anemia 04/11/2018    Chronic fatigue 12/14/2017    Chronic hypoxemic respiratory failure (HCC) 05/13/2017    GERD (gastroesophageal reflux disease) 05/13/2017    Hyponatremia 05/13/2017    Cigarette nicotine dependence in remission 05/13/2017    Controlled substance agreement signed 12/27/2016    Depression 03/14/2016    Diabetes mellitus (HCC) 03/04/2016    GI bleed 07/23/2015    DDD (degenerative disc disease), lumbosacral 05/13/2015    Arthropathy 04/15/2014    Iron deficiency anemia 05/30/2013    COPD, severe (HCC) 12/19/2012    Benign essential hypertension 12/19/2012    Hyperlipidemia 12/19/2012    Disc disorder of lumbar region 12/19/2012    Type II diabetes mellitus (HCC) 12/19/2012      LOS (days): 3  Geometric Mean LOS (GMLOS) (days): 3.5  Days to GMLOS:0.8     OBJECTIVE:  PATIENT READMITTED TO HOSPITAL  Risk of Unplanned Readmission Score: 26.31         Current admission status: Inpatient       Preferred Pharmacy:   [facility]/pharmacy #[id number] - [address] [address]  Phone: [phone] Fax: [phone]    Primary Care Provider: Christine Charles DO    Primary Insurance: [employer] REP  Secondary Insurance:     ASSESSMENT:  Active Health Care Proxies    There are no active Health Care Proxies on file.                 Readmission Root Cause  30 Day Readmission: Yes  During your hospital stay, did someone (provider, nurse, ) explain your care to you in a way you could understand?: Yes  Did you feel medically stable to leave the hospital?: Yes  Were you able to pay for your medication at the pharmacy?: Yes  Did you have reliable  transportation to take you to your appointments?: Yes  During previous admission, was a post-acute recommendation made?: Yes  What post-acute resources were offered?: HHC  Patient was readmitted due to: respiratory distress from home requiring BiPAP in the emergency department    Patient Information  Admitted from:: Home  Mental Status: Alert  During Assessment patient was accompanied by: Not accompanied during assessment  Assessment information provided by:: Patient  Primary Caregiver: Self  Support Systems: Self, Friends/neighbors, Family members  County of Residence: [place]  What city do you live in?: [place]  Home entry access options. Select all that apply.: No steps to enter home  Type of Current Residence: Other (Comment) (Multi-level condo)  Living Arrangements: Other (Comment) (Ex-)  Is patient a ?: No    Activities of Daily Living Prior to Admission  Functional Status: Assistance  Completes ADLs independently?: No  Level of ADL dependence: Assistance  Ambulates independently?: Yes  Does patient use assisted devices?: Yes  Assisted Devices (DME) used: Rollator, Home Oxygen concentrator, Portable Oxygen tanks  DME Company Name (respiratory supplies): [employer]  O2 Rate(s): 3  Does patient currently own DME?: Yes  What DME does the patient currently own?: Home Oxygen concentrator, Rollator, Portable Oxygen tanks  Does patient have a history of Outpatient Therapy (PT/OT)?: No  Does the patient have a history of Short-Term Rehab?: Yes  Does patient have a history of HHC?: Yes  Does patient currently have HHC?: Yes    Current Home Health Care  Type of Current Home Care Services: Home OT, Home PT, Nurse visit  Home Health Agency Name::  [facility]  Current Home Health Follow-Up Provider:: PCP    Patient Information Continued  Income Source: SSI/SSD  Does patient have prescription coverage?: Yes  Can the patient afford their medications and any related supplies (such as glucometers or test  strips)?: Yes  Does patient receive dialysis treatments?: No  Does patient have a history of substance abuse?: No  Does patient have a history of Mental Health Diagnosis?: No         Means of Transportation  [facility]          DISCHARGE DETAILS:    Discharge planning discussed with:: Patient  Freedom of Choice: Yes  Comments - Freedom of Choice: Patient would like to resume HHC services with [facility]  CM contacted family/caregiver?: No- see comments  Were Treatment Team discharge recommendations reviewed with patient/caregiver?: Yes  Did patient/caregiver verbalize understanding of patient care needs?: Yes  Were patient/caregiver advised of the risks associated with not following Treatment Team discharge recommendations?: Yes    Contacts  Patient Contacts: Patient  Relationship to Patient:: Other (Comment)  Contact Method: In Person  Reason/Outcome: Discharge Planning, Referral, Continuity of Care    Requested Home Health Care         Is the patient interested in HHC at discharge?: Yes  Home Health Discipline requested:: Nursing, Occupational Therapy, Physical Therapy  Home Health Agency Name:: [facility]  HHA External Referral Reason (only applicable if external HHA name selected): Patient has established relationship with provider  Home Health Follow-Up Provider:: PCP  Home Health Services Needed:: Evaluate Functional Status and Safety, Gait/ADL Training, Oxygen Via Nasal Cannula, Strengthening/Theraputic Exercises to Improve Function, COPD Management  Oxygen LPM Ordered (if applicable based on home health services needed):: 3 LPM  Homebound Criteria Met:: Requires the Assistance of Another Person for Safe Ambulation or to Leave the Home, Uses an Assist Device (i.e. cane, walker, etc)  Supporting Clincal Findings:: Requires Oxygen, Limited Endurance, Fatigues Easliy in Short Distances    DME Referral Provided  Referral made for DME?: No    Other  Referral/Resources/Interventions Provided:  Interventions: [facility]         Treatment Team Recommendation: Home with Home Health Care  Discharge Destination Plan:: Home with Home Health Care  Transport at Discharge : Family                             IMM Given (Date):: 06/03/25  IMM Given to:: Patient        IMM reviewed with patient.  Patient agrees with discharge determination.     CM met with patient at bedside.  CM name and role reviewed.  CM assessment completed and charted above.    Patient lives with ex- in a 2 story home; bed ad bath on second floor; has chair glide. Patient requires some assistance with ADLs (has caregivers via waiver that come 2x per week, 4hrs each time, ex- assists otherwise), ambulates with a rollator, has home O2 that is supplied by [employer]-- uses 3L at baseline.     [facility] was set up last admission for SN, PT and OT.  Patient would like [facility] of services at [place].    Referral was made back to [facility] in [place].  Once accepted CM reserved with them and placed their contact information on the AVS.    CM reviewed discharge planning process including the following: identifying caregivers at home, preference for d/c planning needs, [facility] Meds to Bed program, availability of treatment team to discuss questions or concerns patient and/or family may have regarding diagnosis, plan of care, old or new medications and discharge planning.  CM will continue to follow for care coordination and update assessment as necessary.

## 2025-06-03 NOTE — ASSESSMENT & PLAN NOTE
Lab Results   Component Value Date    HGBA1C 7.0 (H) 06/01/2025       Recent Labs     06/02/25  1257 06/02/25  1553 06/02/25 2037 06/03/25  0710   POCGLU 209* 265* 293* 170*       Blood Sugar Average: Last 72 hrs:  (P) [id number] [place], metformin  Hypoglycemia protocol  Patient initially requiring insulin drip likely due to steroids      Hold oral antidiabetic agents  Continue sliding scale insulin 4 times daily with meals plus Lantus 15 units at bedtime

## 2025-06-03 NOTE — ASSESSMENT & PLAN NOTE
Patient reports new onset lower extremity edema.  +2 bilaterally  BNP 72  Denies any prior history of heart failure  In ED was given IV Lasix 40 mg x 1  Will continue Lasix 40 mg twice daily  Echo as above

## 2025-06-04 VITALS
HEIGHT: 60 IN | SYSTOLIC BLOOD PRESSURE: 158 MMHG | RESPIRATION RATE: 19 BRPM | DIASTOLIC BLOOD PRESSURE: 80 MMHG | OXYGEN SATURATION: 96 % | HEART RATE: 86 BPM | TEMPERATURE: 97.7 F | WEIGHT: 172.2 LBS | BODY MASS INDEX: 33.81 KG/M2

## 2025-06-04 PROBLEM — R60.0 BILATERAL LOWER EXTREMITY EDEMA: Status: RESOLVED | Noted: 2025-05-31 | Resolved: 2025-06-04

## 2025-06-04 LAB
ANION GAP SERPL CALCULATED.3IONS-SCNC: 4 MMOL/L (ref 4–13)
BUN SERPL-MCNC: 35 MG/DL (ref 5–25)
CALCIUM SERPL-MCNC: 8.5 MG/DL (ref 8.4–10.2)
CHLORIDE SERPL-SCNC: 94 MMOL/L (ref 96–108)
CO2 SERPL-SCNC: 35 MMOL/L (ref 21–32)
CREAT SERPL-MCNC: 1.06 MG/DL (ref 0.6–1.3)
ERYTHROCYTE [DISTWIDTH] IN BLOOD BY AUTOMATED COUNT: 16 % (ref 11.6–15.1)
GFR SERPL CREATININE-BSD FRML MDRD: 48 ML/MIN/1.73SQ M
GLUCOSE SERPL-MCNC: 130 MG/DL (ref 65–140)
GLUCOSE SERPL-MCNC: 183 MG/DL (ref 65–140)
GLUCOSE SERPL-MCNC: 215 MG/DL (ref 65–140)
GLUCOSE SERPL-MCNC: 225 MG/DL (ref 65–140)
GLUCOSE SERPL-MCNC: 231 MG/DL (ref 65–140)
GLUCOSE SERPL-MCNC: 240 MG/DL (ref 65–140)
HCT VFR BLD AUTO: 31.7 % (ref 34.8–46.1)
HGB BLD-MCNC: 9.8 G/DL (ref 11.5–15.4)
MCH RBC QN AUTO: 26.6 PG (ref 26.8–34.3)
MCHC RBC AUTO-ENTMCNC: 30.9 G/DL (ref 31.4–37.4)
MCV RBC AUTO: 86 FL (ref 82–98)
PLATELET # BLD AUTO: 214 THOUSANDS/UL (ref 149–390)
PMV BLD AUTO: 9.9 FL (ref 8.9–12.7)
POTASSIUM SERPL-SCNC: 4.3 MMOL/L (ref 3.5–5.3)
RBC # BLD AUTO: 3.68 MILLION/UL (ref 3.81–5.12)
SODIUM SERPL-SCNC: 133 MMOL/L (ref 135–147)
WBC # BLD AUTO: 6.76 THOUSAND/UL (ref 4.31–10.16)

## 2025-06-04 PROCEDURE — 82948 REAGENT STRIP/BLOOD GLUCOSE: CPT

## 2025-06-04 PROCEDURE — 85027 COMPLETE CBC AUTOMATED: CPT

## 2025-06-04 PROCEDURE — 94760 N-INVAS EAR/PLS OXIMETRY 1: CPT

## 2025-06-04 PROCEDURE — 94640 AIRWAY INHALATION TREATMENT: CPT

## 2025-06-04 PROCEDURE — 80048 BASIC METABOLIC PNL TOTAL CA: CPT

## 2025-06-04 PROCEDURE — 99238 HOSP IP/OBS DSCHRG MGMT 30/<: CPT | Performed by: HOSPITALIST

## 2025-06-04 RX ORDER — PREDNISONE 10 MG/1
10 TABLET ORAL DAILY
Status: DISCONTINUED | OUTPATIENT
Start: 2025-06-11 | End: 2025-06-04 | Stop reason: HOSPADM

## 2025-06-04 RX ORDER — PANTOPRAZOLE SODIUM 40 MG/1
40 TABLET, DELAYED RELEASE ORAL EVERY 12 HOURS SCHEDULED
Status: DISCONTINUED | OUTPATIENT
Start: 2025-06-04 | End: 2025-06-04 | Stop reason: HOSPADM

## 2025-06-04 RX ORDER — POLYETHYLENE GLYCOL 3350 17 G/17G
17 POWDER, FOR SOLUTION ORAL DAILY
Qty: 238 G | Refills: 0 | Status: SHIPPED | OUTPATIENT
Start: 2025-06-04 | End: 2025-06-18

## 2025-06-04 RX ORDER — INSULIN LISPRO 100 [IU]/ML
2-12 INJECTION, SOLUTION INTRAVENOUS; SUBCUTANEOUS
Status: DISCONTINUED | OUTPATIENT
Start: 2025-06-04 | End: 2025-06-04 | Stop reason: HOSPADM

## 2025-06-04 RX ORDER — PREDNISONE 20 MG/1
20 TABLET ORAL DAILY
Status: DISCONTINUED | OUTPATIENT
Start: 2025-06-08 | End: 2025-06-04 | Stop reason: HOSPADM

## 2025-06-04 RX ORDER — PREDNISONE 20 MG/1
40 TABLET ORAL DAILY
Qty: 4 TABLET | Refills: 0 | Status: SHIPPED | OUTPATIENT
Start: 2025-06-05 | End: 2025-06-07

## 2025-06-04 RX ORDER — PREDNISONE 20 MG/1
40 TABLET ORAL DAILY
Status: COMPLETED | OUTPATIENT
Start: 2025-06-04 | End: 2025-06-04

## 2025-06-04 RX ADMIN — ASPIRIN 81 MG CHEWABLE TABLET 81 MG: 81 TABLET CHEWABLE at 09:31

## 2025-06-04 RX ADMIN — GABAPENTIN 300 MG: 300 CAPSULE ORAL at 05:00

## 2025-06-04 RX ADMIN — MORPHINE SULFATE 15 MG: 15 TABLET ORAL at 09:30

## 2025-06-04 RX ADMIN — LOSARTAN POTASSIUM 50 MG: 50 TABLET, FILM COATED ORAL at 09:30

## 2025-06-04 RX ADMIN — PREDNISONE 40 MG: 20 TABLET ORAL at 09:30

## 2025-06-04 RX ADMIN — HEPARIN SODIUM 5000 UNITS: 5000 INJECTION INTRAVENOUS; SUBCUTANEOUS at 05:00

## 2025-06-04 RX ADMIN — INSULIN LISPRO 2 UNITS: 100 INJECTION, SOLUTION INTRAVENOUS; SUBCUTANEOUS at 09:31

## 2025-06-04 RX ADMIN — PANTOPRAZOLE SODIUM 40 MG: 40 TABLET, DELAYED RELEASE ORAL at 05:00

## 2025-06-04 RX ADMIN — LEVALBUTEROL HYDROCHLORIDE 1.25 MG: 1.25 SOLUTION RESPIRATORY (INHALATION) at 07:03

## 2025-06-04 RX ADMIN — IPRATROPIUM BROMIDE 0.5 MG: 0.5 SOLUTION RESPIRATORY (INHALATION) at 07:03

## 2025-06-04 RX ADMIN — SENNOSIDES 17.2 MG: 8.6 TABLET, FILM COATED ORAL at 09:30

## 2025-06-04 RX ADMIN — DILTIAZEM HYDROCHLORIDE 240 MG: 240 CAPSULE, EXTENDED RELEASE ORAL at 09:30

## 2025-06-04 RX ADMIN — BRIMONIDINE TARTRATE 1 DROP: 2 SOLUTION/ DROPS OPHTHALMIC at 09:31

## 2025-06-04 NOTE — CASE MANAGEMENT
Case Management Discharge Planning Note    Patient name Gloria EMMANUEL /S -01 MRN [id number]  : 1940 Date 2025       Current Admission Date: 2025  Current Admission Diagnosis:Acute on chronic respiratory failure with hypoxia (HCC)   Patient Active Problem List    Diagnosis Date Noted    Diastolic dysfunction without heart failure 2025    Class 1 obesity with body mass index (BMI) of 33.0 to 33.9 in adult 2025    COPD exacerbation (HCC) 2025    Bilateral lower extremity edema 2025    Murmur, cardiac 2025    Acute on chronic respiratory failure with hypoxia (HCC) 2025    Constipation 2024    Type 2 diabetes mellitus, without long-term current use of insulin ([facility]) 2024    Restless leg syndrome 2024    Hypomagnesemia 2024    Obesity (BMI 30-39.9) 2024    Peripheral vascular disease (HCC) 2024    Stage 3b chronic kidney disease (HCC) 2024    Mass of finger of left hand 2023    Tachycardia 2023    Chronic respiratory failure (HCC) 2023    Type 2 diabetes mellitus with hyperglycemia, without long-term current use of insulin ([facility]) 2023    Essential hypertension 2023    Hyponatremia 2023    Hyperlipidemia 2023    Chronic back pain 2023    chronic respiratory failure (HCC), acute component resolved 2023    GERD (gastroesophageal reflux disease) 2023    Esophageal hiatal hernia 10/06/2022    Generalized weakness 10/06/2022    Low blood pressure reading 2022    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 2022    Unintentional weight loss 2022    BMI 31.0-31.9,adult 2022    Retinal artery occlusion, branch, left 2022    Chronic pain 2022    COVID-19 2022    Backache 2022    Onychomycosis 2021    Pain in toe 2021    Instability of foot joint 2021    Sinus tarsi syndrome of  right ankle 10/07/2020    ASD (atrial septal defect) 07/30/2020    Exertional dyspnea 04/13/2020    Acquired pes planus of right foot 10/28/2019    Microscopic hematuria 08/21/2019    Urge incontinence 08/21/2019    Ankle pain 07/09/2019    Primary localized osteoarthrosis of ankle and foot 07/09/2019    History of iron deficiency anemia 04/11/2018    Chronic fatigue 12/14/2017    Chronic hypoxemic respiratory failure (HCC) 05/13/2017    GERD (gastroesophageal reflux disease) 05/13/2017    Hyponatremia 05/13/2017    Cigarette nicotine dependence in remission 05/13/2017    Controlled substance agreement signed 12/27/2016    Depression 03/14/2016    Diabetes mellitus (HCC) 03/04/2016    GI bleed 07/23/2015    DDD (degenerative disc disease), lumbosacral 05/13/2015    Arthropathy 04/15/2014    Iron deficiency anemia 05/30/2013    COPD, severe (HCC) 12/19/2012    Benign essential hypertension 12/19/2012    Hyperlipidemia 12/19/2012    Disc disorder of lumbar region 12/19/2012    Type II diabetes mellitus (HCC) 12/19/2012      LOS (days): 4  Geometric Mean LOS (GMLOS) (days): 3.5  Days to GMLOS:-0.1     OBJECTIVE:  Risk of Unplanned Readmission Score: 28.14         Current admission status: Inpatient   Preferred Pharmacy:   [facility]/pharmacy #[id number] - [place] [facility] [address] [address]  Phone: [phone] Fax: [phone]    Primary Care Provider: Christine Charles DO    Primary Insurance: [employer] RE[employer]  Secondary Insurance:     DISCHARGE DETAILS:    Discharge planning discussed with:: Patient  Freedom of Choice: Yes  Comments - Freedom of Choice: Reviewed plan for DC home today  CM contacted family/caregiver?: No- see comments  Were Treatment Team discharge recommendations reviewed with patient/caregiver?: Yes  Did patient/caregiver verbalize understanding of patient care needs?: Yes  Were patient/caregiver advised of the risks associated with not following Treatment Team discharge  recommendations?: Yes    Contacts  Patient Contacts: Patient  Relationship to Patient:: Other (Comment)  Contact Method: In Person  Reason/Outcome: Discharge Planning, Continuity of Care    Requested Home Health Care         Is the patient interested in HHC at discharge?: Yes  Home Health Discipline requested:: Nursing, Occupational Therapy, Physical Therapy  [facility]  Home Health Follow-Up Provider:: PCP  Home Health Services Needed:: Evaluate Functional Status and Safety, Gait/ADL Training, COPD Management, Strengthening/Theraputic Exercises to Improve Function, Oxygen Via Nasal Cannula  Oxygen LPM Ordered (if applicable based on home health services needed):: 3 LPM  Homebound Criteria Met:: Requires the Assistance of Another Person for Safe Ambulation or to Leave the Home, Uses an Assist Device (i.e. cane, walker, etc)  Supporting Clincal Findings:: Limited Endurance, Requires Oxygen, Fatigues Easliy in Short Distances    DME Referral Provided  Referral made for DME?: No    Other Referral/Resources/Interventions Provided:  Interventions: HHC         Treatment Team Recommendation: Home with Home Health Care  Discharge Destination Plan:: Home with Home Health Care  Transport at Discharge : Family                                         CM notified that patient is medically stable for DC home today.  OMAR with [facility] was arranged and their contact information was placed on the AVS.    Family will be providing transportation home.    No further CM DC needs were discussed or identified at this time.    CM reviewed the availability of treatment team to discuss questions or concerns patient and/or family may have regarding  understanding medications and recognizing signs and symptoms once discharged.  CM also encouraged patient to follow up with all recommended appointments after discharge. Patient advised of importance for patient and family to participate in managing patient's medical  well being.

## 2025-06-04 NOTE — PLAN OF CARE
Problem: Potential for Falls  Goal: Patient will remain free of falls  Description: INTERVENTIONS:  - Educate patient/family on patient safety including physical limitations  - Instruct patient to call for assistance with activity   - Consider consulting OT/PT to assist with strengthening/mobility based on AM PAC & JH-HLM score  - Consult OT/PT to assist with strengthening/mobility   - Keep Call bell within reach  - Keep bed low and locked with side rails adjusted as appropriate  - Keep care items and personal belongings within reach  - Initiate and maintain comfort rounds  - Make Fall Risk Sign visible to staff  - Offer Toileting every  Hours, in advance of need  - Initiate/Maintain alarm  - Obtain necessary fall risk management equipment:   - Apply yellow socks and bracelet for high fall risk patients  - Consider moving patient to room near nurses station  Outcome: Progressing     Problem: PAIN - ADULT  Goal: Verbalizes/displays adequate comfort level or baseline comfort level  Description: Interventions:  - Encourage patient to monitor pain and request assistance  - Assess pain using appropriate pain scale  - Administer analgesics as ordered based on type and severity of pain and evaluate response  - Implement non-pharmacological measures as appropriate and evaluate response  - Consider cultural and social influences on pain and pain management  - Notify physician/advanced practitioner if interventions unsuccessful or patient reports new pain  - Educate patient/family on pain management process including their role and importance of  reporting pain   - Provide non-pharmacologic/complimentary pain relief interventions  Outcome: Progressing     Problem: INFECTION - ADULT  Goal: Absence or prevention of progression during hospitalization  Description: INTERVENTIONS:  - Assess and monitor for signs and symptoms of infection  - Monitor lab/diagnostic results  - Monitor all insertion sites, i.e. indwelling lines,  tubes, and drains  - Monitor endotracheal if appropriate and nasal secretions for changes in amount and color  - [place] appropriate cooling/warming therapies per order  - Administer medications as ordered  - Instruct and encourage patient and family to use good hand hygiene technique  - Identify and instruct in appropriate isolation precautions for identified infection/condition  Outcome: Progressing  Goal: Absence of fever/infection during neutropenic period  Description: INTERVENTIONS:  - Monitor WBC  - Perform strict hand hygiene  - Limit to healthy visitors only  - No plants, dried, fresh or silk flowers with mcdowell in patient room  Outcome: Progressing     Problem: SAFETY ADULT  Goal: Patient will remain free of falls  Description: INTERVENTIONS:  - Educate patient/family on patient safety including physical limitations  - Instruct patient to call for assistance with activity   - Consider consulting OT/PT to assist with strengthening/mobility based on AM PAC & -HLM score  - Consult OT/PT to assist with strengthening/mobility   - Keep Call bell within reach  - Keep bed low and locked with side rails adjusted as appropriate  - Keep care items and personal belongings within reach  - Initiate and maintain comfort rounds  - Make Fall Risk Sign visible to staff  - Offer Toileting every  Hours, in advance of need  - Initiate/Maintain alarm  - Obtain necessary fall risk management equipment:   - Apply yellow socks and bracelet for high fall risk patients  - Consider moving patient to room near nurses station  Outcome: Progressing  Goal: Maintain or return to baseline ADL function  Description: INTERVENTIONS:  -  Assess patient's ability to carry out ADLs; assess patient's baseline for ADL function and identify physical deficits which impact ability to perform ADLs (bathing, care of mouth/teeth, toileting, grooming, dressing, etc.)  - Assess/evaluate cause of self-care deficits   - Assess range of motion  - Assess  patient's mobility; develop plan if impaired  - Assess patient's need for assistive devices and provide as appropriate  - Encourage maximum independence but intervene and supervise when necessary  - Involve family in performance of ADLs  - Assess for home care needs following discharge   - Consider OT consult to assist with ADL evaluation and planning for discharge  - Provide patient education as appropriate  - Monitor functional capacity and physical performance, use of AM PAC & JH-HLM   - Monitor gait, balance and fatigue with ambulation    Outcome: Progressing  Goal: Maintains/Returns to pre admission functional level  Description: INTERVENTIONS:  - Perform AM-PAC 6 Click Basic Mobility/ Daily Activity assessment daily.  - Set and communicate daily mobility goal to care team and patient/family/caregiver.   - Collaborate with rehabilitation services on mobility goals if consulted  - Perform Range of Motion  times a day.  - Reposition patient every  hours.  - Dangle patient  times a day  - Stand patient  times a day  - Ambulate patient  times a day  - Out of bed to chair  times a day   - Out of bed for meals  times a day  - Out of bed for toileting  - Record patient progress and toleration of activity level   Outcome: Progressing     Problem: DISCHARGE PLANNING  Goal: Discharge to home or other facility with appropriate resources  Description: INTERVENTIONS:  - Identify barriers to discharge w/patient and caregiver  - Arrange for needed discharge resources and transportation as appropriate  - Identify discharge learning needs (meds, wound care, etc.)  - Arrange for interpretive services to assist at discharge as needed  - Refer to Case Management Department for coordinating discharge planning if the patient needs post-hospital services based on physician/advanced practitioner order or complex needs related to functional status, cognitive ability, or social support system  Outcome: Progressing     Problem: Knowledge  Deficit  Goal: Patient/family/caregiver demonstrates understanding of disease process, treatment plan, medications, and discharge instructions  Description: Complete learning assessment and assess knowledge base.  Interventions:  - Provide teaching at level of understanding  - Provide teaching via preferred learning methods  Outcome: Progressing     Problem: CARDIOVASCULAR - ADULT  Goal: Maintains optimal cardiac output and hemodynamic stability  Description: INTERVENTIONS:  - Monitor I/O, vital signs and rhythm  - Monitor for S/S and trends of decreased cardiac output  - Administer and titrate ordered vasoactive medications to optimize hemodynamic stability  - Assess quality of pulses, skin color and temperature  - Assess for signs of decreased coronary artery perfusion  - Instruct patient to report change in severity of symptoms  Outcome: Progressing  Goal: Absence of cardiac dysrhythmias or at baseline rhythm  Description: INTERVENTIONS:  - Continuous cardiac monitoring, vital signs, obtain 12 lead EKG if ordered  - Administer antiarrhythmic and heart rate control medications as ordered  - Monitor electrolytes and administer replacement therapy as ordered  Outcome: Progressing     Problem: RESPIRATORY - ADULT  Goal: Achieves optimal ventilation and oxygenation  Description: INTERVENTIONS:  - Assess for changes in respiratory status  - Assess for changes in mentation and behavior  - Position to facilitate oxygenation and minimize respiratory effort  - Oxygen administered by appropriate delivery if ordered  - Initiate smoking cessation education as indicated  - Encourage broncho-pulmonary hygiene including cough, deep breathe, Incentive Spirometry  - Assess the need for suctioning and aspirate as needed  - Assess and instruct to report SOB or any respiratory difficulty  - Respiratory Therapy support as indicated  Outcome: Progressing

## 2025-06-04 NOTE — ASSESSMENT & PLAN NOTE
Lab Results   Component Value Date    HGBA1C 7.0 (H) 06/01/2025       Recent Labs     06/04/25  0214 06/04/25  0400 06/04/25  0710 06/04/25  1110   POCGLU 231* 225* 183* 130       Blood Sugar Average: Last 72 hrs:  (P) [id number] Januvia, metformin  Hypoglycemia protocol  Patient initially requiring insulin drip likely due to steroids, patient received 100 mg of Solu-Medrol in the ER.    Continue metformin and Januvia.

## 2025-06-04 NOTE — ASSESSMENT & PLAN NOTE
Patient presented with increased oxygen requirement and wheezing, initially requiring continuous BiPAP in the ER.  She is chronically on 3 L nasal cannula.  This is her second acute on chronic respiratory failure admission of the year.  Unknown trigger, COVID flu RSV negative.  CXR: No acute cardiopulmonary disease pulmonary artery enlargement which can be seen with pulmonary hypertension.  6/1:Left ventricular cavity size is normal. Wall thickness is normal. The left ventricular ejection fraction is 60%. Systolic function is normal. Wall motion is normal. Diastolic function is mildly abnormal, consistent with grade I (abnormal) relaxation.     Suspect in setting of COPD exacerbation and volume overload, patient was treated with nebulizers and IV diuretics while inpatient with improvement.  Trialed BiPAP qhs, however patient did not tolerate.    Plan  S/p Azithromycin for 3 days while inpatient  Prednisone 40 mg daily (6/02--6/6)  Currently patient is only on LABA and ICS, does not follow with a pulmonologist outpatient, emphasized to patient that she follow-up with a pulmonologist who can optimize her outpatient COPD regimen

## 2025-06-04 NOTE — ASSESSMENT & PLAN NOTE
Patient presents with respiratory distress from home requiring BiPAP in the emergency department.  Patient has history of chronic respiratory failure on 3 L nasal cannula at baseline.  Currently weaned off BiPAP now on 4 L nasal cannula acutely  CXR: No acute cardiopulmonary disease pulmonary artery enlargement which can be seen with pulmonary hypertension.  BNP 74.  VBG pH 7.3, pCO2 49.8, HCO3 28  Given x 1 dose of IV Lasix in the ED for concern of volume overload  Suspect in setting of COPD exacerbation  6/1:Left ventricular cavity size is normal. Wall thickness is normal. The left ventricular ejection fraction is 60%. Systolic function is normal. Wall motion is normal. Diastolic function is mildly abnormal, consistent with grade I (abnormal) relaxation.     Plan  Azithromycin for 3 days  Prednisone 40 mg daily for 3 days, then prednisone taper as ordered  Continue scheduled neb treatments Xopenex and Atrovent  Respiratory protocol  Wean oxygen as able  Obtain flu/COVID/RSV  Patient will need pulmonology follow up

## 2025-06-04 NOTE — DISCHARGE SUMMARY
Discharge Summary - Hospitalist   Name: Gloria Patel 84 y.o. female I MRN: [id number]  Unit/Bed#: S -01 I Date of Admission: 5/31/2025   Date of Service: 6/4/2025 I Hospital Day: 4     Assessment & Plan  Acute on chronic respiratory failure with hypoxia (HCC)  Patient presented with increased oxygen requirement and wheezing, initially requiring continuous BiPAP in the ER.  She is chronically on 3 L nasal cannula.  This is her second acute on chronic respiratory failure admission of the year.  Unknown trigger, COVID flu RSV negative.  CXR: No acute cardiopulmonary disease pulmonary artery enlargement which can be seen with pulmonary hypertension.  6/1:Left ventricular cavity size is normal. Wall thickness is normal. The left ventricular ejection fraction is 60%. Systolic function is normal. Wall motion is normal. Diastolic function is mildly abnormal, consistent with grade I (abnormal) relaxation.     Suspect in setting of COPD exacerbation and volume overload, patient was treated with nebulizers and IV diuretics while inpatient with improvement.  Trialed BiPAP qhs, however patient did not tolerate.    Plan  S/p Azithromycin for 3 days while inpatient  Prednisone 40 mg daily (6/02--6/6)  Currently patient is only on LABA and ICS, does not follow with a pulmonologist outpatient, emphasized to patient that she follow-up with a pulmonologist who can optimize her outpatient COPD regimen  COPD exacerbation (HCC)  See plan under acute on chronic respiratory failure with hypoxia.  Benign essential hypertension  Currently hemodynamically stable  Continue PT antihypertensives  Type II diabetes mellitus (HCC)  Lab Results   Component Value Date    HGBA1C 7.0 (H) 06/01/2025       Recent Labs     06/04/25  0214 06/04/25  0400 06/04/25  0710 06/04/25  1110   POCGLU 231* 225* 183* 130       Blood Sugar Average: Last 72 hrs:  (P) [id number] Januvia, metformin  Hypoglycemia protocol  Patient initially requiring  insulin drip likely due to steroids, patient received 100 mg of Solu-Medrol in the ER.    Continue metformin and Januvia.  Hyperlipidemia  Continue statin therapy  Stage 3b chronic kidney disease (HCC)  Lab Results   Component Value Date    EGFR 48 06/04/2025    EGFR 38 06/03/2025    EGFR 47 06/02/2025    CREATININE 1.06 06/04/2025    CREATININE 1.29 06/03/2025    CREATININE 1.08 06/02/2025   At baseline continue to trend daily labs  Diastolic dysfunction without heart failure  Left ventricular cavity size is normal. Wall thickness is normal. The left ventricular ejection fraction is 60%. Systolic function is normal. Wall motion is normal. Diastolic function is mildly abnormal, consistent with grade I (abnormal) relaxation.     See plan under hypoxia  Class 1 obesity with body mass index (BMI) of 33.0 to 33.9 in adult  Morbid obesity, treated with CHO diet  Constipation  Ordered MiraLAX, senna  Bilateral lower extremity edema (Resolved: 6/4/2025)  Patient reports new onset lower extremity edema.  +2 bilaterally  BNP 72  Denies any prior history of heart failure  In ED was given IV Lasix 40 mg x 1  Will continue Lasix 40 mg twice daily  Echo as above     Medical Problems       Resolved Problems  Date Reviewed: 5/11/2025          Resolved    Bilateral lower extremity edema 6/4/2025     Resolved by  Michelle Street MD    SIRS (systemic inflammatory response syndrome) (HCC) 6/2/2025     Resolved by  Michelle Street MD        Discharging Physician / Practitioner: Michelle Street MD  PCP: Christine Charles DO  Admission Date:   Admission Orders (From admission, onward)       Ordered        05/31/25 2115  INPATIENT ADMISSION  Once                          Discharge Date: 06/04/25    Next Steps for Physician/AP Assuming Care:  Currently patient is only on LABA and ICS, does not follow with a pulmonologist outpatient, emphasized to patient that she follow-up with a pulmonologist who can optimize her outpatient COPD regimen  Offered  pulmonology referral  Sleep medicine referral    Test Results Pending at Discharge (will require follow up):  None    Medication Changes for Discharge & Rationale:   Prednisone 40 mg daily (6/02--6/6)    See after visit summary for reconciled discharge medications provided to patient and/or family.     Consultations During Hospital Stay:  None    Procedures Performed:   None    Significant Findings / Test Results:   XR chest portable  Result Date: 6/3/2025  Impression: No acute cardiopulmonary disease. Pulmonary artery enlargement which can be seen with pulmonary hypertension. Workstation performed: [id number]       No Chest XR results available for this patient.    Results for orders placed during the hospital encounter of 05/31/25    Echo complete w/ contrast if indicated    Interpretation Summary    Left Ventricle: Left ventricular cavity size is normal. Wall thickness is normal. The left ventricular ejection fraction is 60%. Systolic function is normal. Wall motion is normal. Diastolic function is mildly abnormal, consistent with grade I (abnormal) relaxation.    Aortic Valve: There is mild stenosis. The aortic valve mean gradient is 17 mmHg. The dimensionless velocity index is 0.62. The aortic valve area is 1.95 cm2. AV mean gradient is 17 mmHg. DVI is 0.62.    Mitral Valve: There is mild annular calcification.    Tricuspid Valve: There is mild regurgitation.           Hospital Course:   Gloria Patel is a 84 y.o. female patient who originally presented to the hospital on 5/31/2025 due to acute hypoxic respiratory failure.  She was presenting from home and patient was hypoxic requiring continuous BiPAP in the ER.  Chest x-ray was remarkable for pulmonary artery artery enlargement.  Patient examined volume overloaded including lower extremity edema.  Patient was wheezing on examination initially.  Patient was treated with nebulizers and given doses of IV diuretic, with improvement to her oxygenation.  By day 2  of hospitalization patient was back on her home oxygen requirement which is 3 L.  This is her second hospitalization for acute on chronic hypoxic respiratory failure of this year.  We did not identify a cause for a COPD exacerbation, however suspecting that patient's outpatient COPD regimen and nebulizers are suboptimal.  She tested negative for COVID flu and RSV.  No evidence of bacterial infection.  No exposure to triggers.  Patient does not currently follow with a pulmonologist and upon chart review her last pulmonary function test was years ago, FEV1 43%.  Echo showed grade 1 diastolic dysfunction.  Patient had a normal ejection fraction.  Patient's sugars spiked to 400s, she was initially placed on an insulin drip.  Suspecting this was in some part due to IV steroids she received in the ER.  She was then transition to prednisone 40 mg daily for 5 days.  By day of discharge her sugars were 180s.  Counseled patient on the importance of following up with her primary care physician who may make adjustments to her antidiabetic medications once she leaves the hospital.  Urged patient to call and make an appointment with a pulmonologist, we have provided a referral for her.    Please see above list of diagnoses and related plan for additional information.     Discharge Day Visit / Exam:   Subjective: Patient back on her home oxygen, reported feeling well.  Patient is eating and drinking.  She reports that short distances are fatiguing but does not currently feel that her shortness of breath is different from her baseline.  Vitals: Blood Pressure: 158/80 (06/04/25 0741)  Pulse: 86 (06/04/25 0741)  Temperature: 97.7 °F (36.5 °C) (06/04/25 0741)  Temp Source: Oral (06/02/25 0711)  Respirations: 19 (06/04/25 0741)  Height: 5' (152.4 cm) (06/02/25 0950)  Weight - Scale: 78.1 kg (172 lb 3.2 oz) (06/04/25 0600)  SpO2: 96 % (06/04/25 0741)  Physical Exam  Constitutional:       Appearance: Normal appearance. She is obese.      Cardiovascular:      Rate and Rhythm: Normal rate and regular rhythm.   Pulmonary:      Breath sounds: No wheezing, rhonchi or rales.   Chest:      Chest wall: No tenderness.   Abdominal:      General: There is no distension.      Palpations: There is no mass.     Musculoskeletal:         General: No swelling or deformity.     Skin:     Coloration: Skin is not jaundiced.      Findings: No bruising.     Neurological:      Mental Status: She is alert.     Nasal cannula    Discussion with Family: Patient declined call to .     Discharge instructions/Information to patient and family:   See after visit summary for information provided to patient and family.      Provisions for Follow-Up Care:  See after visit summary for information related to follow-up care and any pertinent home health orders.      Mobility at time of Discharge:   Basic Mobility Inpatient Raw Score: 22  JH-HLM Goal: 7: Walk 25 feet or more  JH-HLM Achieved: 6: Walk 10 steps or more  HLM Goal achieved. Continue to encourage appropriate mobility.     Disposition:   Home with VNA Services (Reminder: Complete face to face encounter)    Planned Readmission: No    Administrative Statements   Discharge Statement:  I have spent a total time of 60 minutes in caring for this patient on the day of the visit/encounter. .    **Please Note: This note may have been constructed using a voice recognition system**

## 2025-06-04 NOTE — RESPIRATORY THERAPY NOTE
06/03/25 7679   Respiratory Assessment   Resp Comments Pt refused use of bipap, provider notified

## 2025-06-04 NOTE — DISCHARGE INSTR - AVS FIRST PAGE
Dear Gloria Patel,     It was our pleasure to care for you here at [facility].  It is our hope that we were always able to exceed the expected standards for your care during your stay.  You were hospitalized due to COPD exacerbation.  You were cared for on the 4th floor by Keysha Johnson MD under the service of Izzy Morataya MD with the [facility] who covers for your primary care physician (PCP), Christine Charles DO, while you were hospitalized.  If you have any questions or concerns related to this hospitalization, you may contact us at .  For follow up as well as any medication refills, we recommend that you follow up with your primary care physician.  A registered nurse will reach out to you by phone within a few days after your discharge to answer any additional questions that you may have after going home.  However, at this time we provide for you here, the most important instructions / recommendations at discharge:     Notable Medication Adjustments -   Start taking Prednisone 4 tablets (40 mg total) by mouth daily for 2 days. Do not start before June 5, 2025.   Testing Required after Discharge -   None  ** Please contact your PCP to request testing orders for any of the testing recommended here **  Important follow up information -   Please follow up with your primary care physician in 1 week.  A referral has been provided for Pulmonology, please ensure you see them as well in 1-2 weeks.  Other Instructions -   If you have any increased peeing, increased hunger, please check your blood sugars. If they are higher than 250, please call your doctor.  If you have chest pain, difficulty breathing, increased oxygen requirements, please visit he emergency department.  Please review this entire after visit summary as additional general instructions including medication list, appointments, activity, diet, any pertinent wound care, and other  additional recommendations from your care team that may be provided for you.      Sincerely,     Keysha Johnson MD

## 2025-06-04 NOTE — ASSESSMENT & PLAN NOTE
Lab Results   Component Value Date    EGFR 48 06/04/2025    EGFR 38 06/03/2025    EGFR 47 06/02/2025    CREATININE 1.06 06/04/2025    CREATININE 1.29 06/03/2025    CREATININE 1.08 06/02/2025   At baseline continue to trend daily labs

## 2025-06-04 NOTE — ASSESSMENT & PLAN NOTE
Lab Results   Component Value Date    HGBA1C 7.0 (H) 06/01/2025       Recent Labs     06/04/25  0003 06/04/25  0214 06/04/25  0400 06/04/25  0710   POCGLU 215* 231* 225* 183*       Blood Sugar Average: Last 72 hrs:  (P) [id number] Januvia, metformin  Hypoglycemia protocol  Patient initially requiring insulin drip likely due to steroids      Hold oral antidiabetic agents  Continue sliding scale insulin 4 times daily with meals plus Lantus 15 units at bedtime

## 2025-06-05 ENCOUNTER — HOME CARE VISIT (OUTPATIENT)
Dept: HOME HEALTH SERVICES | Facility: HOME HEALTHCARE | Age: 85
End: 2025-06-05
Payer: COMMERCIAL

## 2025-06-05 VITALS
BODY MASS INDEX: 33.32 KG/M2 | OXYGEN SATURATION: 97 % | SYSTOLIC BLOOD PRESSURE: 136 MMHG | RESPIRATION RATE: 18 BRPM | HEART RATE: 76 BPM | TEMPERATURE: 97.9 F | DIASTOLIC BLOOD PRESSURE: 74 MMHG | WEIGHT: 170.6 LBS

## 2025-06-05 PROCEDURE — G0299 HHS/HOSPICE OF RN EA 15 MIN: HCPCS

## 2025-06-05 NOTE — UTILIZATION REVIEW
NOTIFICATION OF ADMISSION DISCHARGE   This is a Notification of Discharge from [facility]. Please be advised that this patient has been discharge from our facility. Below you will find the admission and discharge date and time including the patient’s disposition.   UTILIZATION REVIEW CONTACT:  Utilization Review Assistants  Network Utilization Review Department  Phone: [phone] x carefully listen to the prompts. All voicemails are confidential.  Email: [email or web address]     ADMISSION INFORMATION  PRESENTATION DATE: 5/31/2025  5:49 PM  OBERVATION ADMISSION DATE: N/A  INPATIENT ADMISSION DATE: 5/31/25  9:15 PM   DISCHARGE DATE: 6/4/2025 11:43 AM   DISPOSITION:Home/Self Care    Network Utilization Review Department  ATTENTION: Please call with any questions or concerns to [phone] and carefully listen to the prompts so that you are directed to the right person. All voicemails are confidential.   For Discharge needs, contact Care Management DC Support Team at [phone] opt. 2  Send all requests for admission clinical reviews, approved or denied determinations and any other requests to dedicated fax number below belonging to the campus where the patient is receiving treatment. List of dedicated fax numbers for the Facilities:  FACILITY NAME UR FAX NUMBER   ADMISSION DENIALS (Administrative/Medical Necessity) [phone]   DISCHARGE SUPPORT TEAM ([facility]) [phone]   PARENT CHILD HEALTH (Maternity/NICU/Pediatrics) [phone]   [facility] [phone]   [facility] [phone]   [facility] [phone]   [facility] [phone]   [facility] [phone]   [facility] [phone]   [facility] [phone]   [facility] [phone]   [facility]  [facility] [phone]   [facility] [phone]   [facility] [phone]   [facility] [phone]

## 2025-06-06 ENCOUNTER — NURSE TRIAGE (OUTPATIENT)
Age: 85
End: 2025-06-06

## 2025-06-06 NOTE — TELEPHONE ENCOUNTER
"Answer Assessment - Initial Assessment Questions  1. MAIN CONCERN OR SYMPTOM: \"What's your main concern?\" (e.g., low oxygen level, breathing difficulty) \"What question do you have?\"      *No Answer*  2.  OXYGEN EQUIPMENT:  Are you having trouble with your oxygen equipment?  (e.g., cannula, mask, tubing, tank, concentrator)      *No Answer*  3. ONSET: \"When did the  *No Answer*  start?\"       *No Answer*  4. OXYGEN THERAPY:       *No Answer*  5. PULSE OXIMETER:       *No Answer*  6. O2 MONITORING: \"What is the oxygen level (pulse ox reading)?\" (e.g., %)      *No Answer*  7. VITAL SIGNS MONITORING: \"Do you monitor/measure your oxygen level or vital signs (e.g., yes, no, measurements are automatically sent to provider/call center). Document CURRENT and NORMAL BASELINE values if available.        *No Answer*  8. BREATHING DIFFICULTY: \"Are you having any difficulty breathing?\" If Yes, ask: \"How bad is it?\"  (e.g., none, mild, moderate, severe)       *No Answer*  9. OTHER SYMPTOMS: \"Do you have any other symptoms?\" (e.g., fever, change in sputum)      *No Answer*  10. SMOKING: \"Do you smoke currently?\" \"Is there anyone that smokes around you?\"  (Note: smoking around oxygen is dangerous!)        *No Answer*    Protocols used: COPD Oxygen Monitoring and Hypoxia-Adult-OH    "

## 2025-06-06 NOTE — TELEPHONE ENCOUNTER
Call placed to pt to assess symptoms. Pt states she is doing well on medications since hospital. Pt took O2 off to get Pulse ox Spo2 89%, pt rechecked 1 minute later with 3L O2 on SpO2 94%.Pt denies having worsening shortness of breath or breathing difficultly. Pt requested to follow up at [place] office. No appt available until August 7. Pt has been hospitalized twice in past month.     Pt requested help with daily functions around house. Call transferred to [facility] [place]. Pt was able to leave a message with previous care taker Evelyn YORK to help get set back up. Pt was not getting services while in hospital.     Pt asking if there is any sooner appt. Please advise.

## 2025-06-06 NOTE — TELEPHONE ENCOUNTER
Called patient to schedule follow up, per referral for COPD exacerbation. Patient requested [place] office.Patient sounds exacerbated and next available seemed too far out, July for [facility] and Aug for [facility]. Please call patient and assist patient scheduling needs in appropriate time frame. Thank you.

## 2025-06-09 ENCOUNTER — HOME CARE VISIT (OUTPATIENT)
Dept: HOME HEALTH SERVICES | Facility: HOME HEALTHCARE | Age: 85
End: 2025-06-09
Payer: COMMERCIAL

## 2025-06-09 PROCEDURE — G0155 HHCP-SVS OF CSW,EA 15 MIN: HCPCS

## 2025-06-10 ENCOUNTER — HOME CARE VISIT (OUTPATIENT)
Dept: HOME HEALTH SERVICES | Facility: HOME HEALTHCARE | Age: 85
End: 2025-06-10
Payer: COMMERCIAL

## 2025-06-10 VITALS — OXYGEN SATURATION: 97 % | HEART RATE: 67 BPM | SYSTOLIC BLOOD PRESSURE: 150 MMHG | DIASTOLIC BLOOD PRESSURE: 80 MMHG

## 2025-06-10 PROCEDURE — G0156 HHCP-SVS OF AIDE,EA 15 MIN: HCPCS

## 2025-06-10 PROCEDURE — G0151 HHCP-SERV OF PT,EA 15 MIN: HCPCS

## 2025-06-10 PROCEDURE — G0152 HHCP-SERV OF OT,EA 15 MIN: HCPCS

## 2025-06-11 VITALS — SYSTOLIC BLOOD PRESSURE: 130 MMHG | HEART RATE: 79 BPM | OXYGEN SATURATION: 97 % | DIASTOLIC BLOOD PRESSURE: 72 MMHG

## 2025-06-12 ENCOUNTER — HOME CARE VISIT (OUTPATIENT)
Dept: HOME HEALTH SERVICES | Facility: HOME HEALTHCARE | Age: 85
End: 2025-06-12
Payer: COMMERCIAL

## 2025-06-12 VITALS
OXYGEN SATURATION: 92 % | SYSTOLIC BLOOD PRESSURE: 130 MMHG | HEART RATE: 89 BPM | DIASTOLIC BLOOD PRESSURE: 80 MMHG | RESPIRATION RATE: 20 BRPM | TEMPERATURE: 97.8 F

## 2025-06-12 PROCEDURE — G0152 HHCP-SERV OF OT,EA 15 MIN: HCPCS

## 2025-06-12 PROCEDURE — G0299 HHS/HOSPICE OF RN EA 15 MIN: HCPCS

## 2025-06-13 ENCOUNTER — HOME CARE VISIT (OUTPATIENT)
Dept: HOME HEALTH SERVICES | Facility: HOME HEALTHCARE | Age: 85
End: 2025-06-13
Payer: COMMERCIAL

## 2025-06-13 VITALS — OXYGEN SATURATION: 93 % | SYSTOLIC BLOOD PRESSURE: 125 MMHG | HEART RATE: 71 BPM | DIASTOLIC BLOOD PRESSURE: 70 MMHG

## 2025-06-13 PROCEDURE — G0156 HHCP-SVS OF AIDE,EA 15 MIN: HCPCS

## 2025-06-16 ENCOUNTER — HOME CARE VISIT (OUTPATIENT)
Dept: HOME HEALTH SERVICES | Facility: HOME HEALTHCARE | Age: 85
End: 2025-06-16
Payer: COMMERCIAL

## 2025-06-16 VITALS — SYSTOLIC BLOOD PRESSURE: 130 MMHG | DIASTOLIC BLOOD PRESSURE: 80 MMHG | OXYGEN SATURATION: 93 % | HEART RATE: 78 BPM

## 2025-06-16 PROCEDURE — G0152 HHCP-SERV OF OT,EA 15 MIN: HCPCS

## 2025-06-17 ENCOUNTER — HOME CARE VISIT (OUTPATIENT)
Dept: HOME HEALTH SERVICES | Facility: HOME HEALTHCARE | Age: 85
End: 2025-06-17
Payer: COMMERCIAL

## 2025-06-17 VITALS — OXYGEN SATURATION: 82 % | DIASTOLIC BLOOD PRESSURE: 80 MMHG | HEART RATE: 87 BPM | SYSTOLIC BLOOD PRESSURE: 160 MMHG

## 2025-06-17 VITALS
TEMPERATURE: 97.4 F | RESPIRATION RATE: 20 BRPM | OXYGEN SATURATION: 93 % | HEART RATE: 72 BPM | DIASTOLIC BLOOD PRESSURE: 78 MMHG | SYSTOLIC BLOOD PRESSURE: 132 MMHG

## 2025-06-17 PROCEDURE — G0156 HHCP-SVS OF AIDE,EA 15 MIN: HCPCS

## 2025-06-17 PROCEDURE — G0300 HHS/HOSPICE OF LPN EA 15 MIN: HCPCS

## 2025-06-17 PROCEDURE — G0151 HHCP-SERV OF PT,EA 15 MIN: HCPCS

## 2025-06-18 ENCOUNTER — HOME CARE VISIT (OUTPATIENT)
Dept: HOME HEALTH SERVICES | Facility: HOME HEALTHCARE | Age: 85
End: 2025-06-18
Payer: COMMERCIAL

## 2025-06-18 PROCEDURE — G0152 HHCP-SERV OF OT,EA 15 MIN: HCPCS

## 2025-06-19 ENCOUNTER — HOME CARE VISIT (OUTPATIENT)
Dept: HOME HEALTH SERVICES | Facility: HOME HEALTHCARE | Age: 85
End: 2025-06-19
Payer: COMMERCIAL

## 2025-06-19 VITALS
HEART RATE: 76 BPM | RESPIRATION RATE: 18 BRPM | OXYGEN SATURATION: 93 % | DIASTOLIC BLOOD PRESSURE: 68 MMHG | SYSTOLIC BLOOD PRESSURE: 126 MMHG | TEMPERATURE: 98.5 F

## 2025-06-19 VITALS — OXYGEN SATURATION: 92 % | HEART RATE: 67 BPM | SYSTOLIC BLOOD PRESSURE: 120 MMHG | DIASTOLIC BLOOD PRESSURE: 65 MMHG

## 2025-06-19 PROCEDURE — G0156 HHCP-SVS OF AIDE,EA 15 MIN: HCPCS

## 2025-06-19 PROCEDURE — G0300 HHS/HOSPICE OF LPN EA 15 MIN: HCPCS

## 2025-06-20 ENCOUNTER — HOME CARE VISIT (OUTPATIENT)
Dept: HOME HEALTH SERVICES | Facility: HOME HEALTHCARE | Age: 85
End: 2025-06-20
Payer: COMMERCIAL

## 2025-06-24 ENCOUNTER — HOME CARE VISIT (OUTPATIENT)
Dept: HOME HEALTH SERVICES | Facility: HOME HEALTHCARE | Age: 85
End: 2025-06-24
Payer: COMMERCIAL

## 2025-06-24 VITALS
RESPIRATION RATE: 20 BRPM | SYSTOLIC BLOOD PRESSURE: 136 MMHG | OXYGEN SATURATION: 97 % | HEART RATE: 62 BPM | TEMPERATURE: 97.6 F | DIASTOLIC BLOOD PRESSURE: 64 MMHG

## 2025-06-24 PROCEDURE — G0299 HHS/HOSPICE OF RN EA 15 MIN: HCPCS

## 2025-07-18 ENCOUNTER — APPOINTMENT (EMERGENCY)
Dept: RADIOLOGY | Facility: HOSPITAL | Age: 85
End: 2025-07-18
Payer: COMMERCIAL

## 2025-07-18 ENCOUNTER — HOSPITAL ENCOUNTER (EMERGENCY)
Facility: HOSPITAL | Age: 85
Discharge: HOME | End: 2025-07-18
Attending: EMERGENCY MEDICINE
Payer: COMMERCIAL

## 2025-07-18 VITALS
DIASTOLIC BLOOD PRESSURE: 63 MMHG | OXYGEN SATURATION: 96 % | TEMPERATURE: 97.8 F | RESPIRATION RATE: 20 BRPM | HEART RATE: 75 BPM | SYSTOLIC BLOOD PRESSURE: 131 MMHG

## 2025-07-18 DIAGNOSIS — J44.1 COPD EXACERBATION (HCC): ICD-10-CM

## 2025-07-18 DIAGNOSIS — R06.02 SOB (SHORTNESS OF BREATH): Primary | ICD-10-CM

## 2025-07-18 LAB
2HR DELTA HS TROPONIN: 0 NG/L
ALBUMIN SERPL BCG-MCNC: 4.2 G/DL (ref 3.5–5)
ALP SERPL-CCNC: 86 U/L (ref 34–104)
ALT SERPL W P-5'-P-CCNC: 6 U/L (ref 7–52)
ANION GAP SERPL CALCULATED.3IONS-SCNC: 10 MMOL/L (ref 4–13)
AST SERPL W P-5'-P-CCNC: 13 U/L (ref 13–39)
ATRIAL RATE: 68 BPM
BASE EX.OXY STD BLDV CALC-SCNC: 91.9 % (ref 60–80)
BASE EXCESS BLDV CALC-SCNC: 3.4 MMOL/L
BASOPHILS # BLD AUTO: 0.04 THOUSANDS/ÂΜL (ref 0–0.1)
BASOPHILS NFR BLD AUTO: 1 % (ref 0–1)
BILIRUB SERPL-MCNC: 0.54 MG/DL (ref 0.2–1)
BUN SERPL-MCNC: 9 MG/DL (ref 5–25)
CALCIUM SERPL-MCNC: 9.6 MG/DL (ref 8.4–10.2)
CARDIAC TROPONIN I PNL SERPL HS: 7 NG/L (ref ?–50)
CARDIAC TROPONIN I PNL SERPL HS: 7 NG/L (ref ?–50)
CHLORIDE SERPL-SCNC: 99 MMOL/L (ref 96–108)
CO2 SERPL-SCNC: 29 MMOL/L (ref 21–32)
CREAT SERPL-MCNC: 0.72 MG/DL (ref 0.6–1.3)
EOSINOPHIL # BLD AUTO: 0.13 THOUSAND/ÂΜL (ref 0–0.61)
EOSINOPHIL NFR BLD AUTO: 2 % (ref 0–6)
ERYTHROCYTE [DISTWIDTH] IN BLOOD BY AUTOMATED COUNT: 15.4 % (ref 11.6–15.1)
GFR SERPL CREATININE-BSD FRML MDRD: 77 ML/MIN/1.73SQ M
GLUCOSE SERPL-MCNC: 125 MG/DL (ref 65–140)
HCO3 BLDV-SCNC: 27.6 MMOL/L (ref 24–30)
HCT VFR BLD AUTO: 37.6 % (ref 34.8–46.1)
HGB BLD-MCNC: 11.6 G/DL (ref 11.5–15.4)
IMM GRANULOCYTES # BLD AUTO: 0.02 THOUSAND/UL (ref 0–0.2)
IMM GRANULOCYTES NFR BLD AUTO: 0 % (ref 0–2)
LYMPHOCYTES # BLD AUTO: 1.01 THOUSANDS/ÂΜL (ref 0.6–4.47)
LYMPHOCYTES NFR BLD AUTO: 18 % (ref 14–44)
MCH RBC QN AUTO: 26.8 PG (ref 26.8–34.3)
MCHC RBC AUTO-ENTMCNC: 30.9 G/DL (ref 31.4–37.4)
MCV RBC AUTO: 87 FL (ref 82–98)
MONOCYTES # BLD AUTO: 0.68 THOUSAND/ÂΜL (ref 0.17–1.22)
MONOCYTES NFR BLD AUTO: 12 % (ref 4–12)
NEUTROPHILS # BLD AUTO: 3.67 THOUSANDS/ÂΜL (ref 1.85–7.62)
NEUTS SEG NFR BLD AUTO: 67 % (ref 43–75)
NRBC BLD AUTO-RTO: 0 /100 WBCS
O2 CT BLDV-SCNC: 17.7 ML/DL
PCO2 BLDV: 40.3 MM HG (ref 42–50)
PH BLDV: 7.45 [PH] (ref 7.3–7.4)
PLATELET # BLD AUTO: 221 THOUSANDS/UL (ref 149–390)
PMV BLD AUTO: 9.5 FL (ref 8.9–12.7)
PO2 BLDV: 71.7 MM HG (ref 35–45)
POTASSIUM SERPL-SCNC: 3.8 MMOL/L (ref 3.5–5.3)
PR INTERVAL: 144 MS
PROT SERPL-MCNC: 6.8 G/DL (ref 6.4–8.4)
QRS AXIS: -76 DEGREES
QRSD INTERVAL: 140 MS
QT INTERVAL: 426 MS
QTC INTERVAL: 452 MS
RBC # BLD AUTO: 4.33 MILLION/UL (ref 3.81–5.12)
SODIUM SERPL-SCNC: 138 MMOL/L (ref 135–147)
T WAVE AXIS: 52 DEGREES
VENTRICULAR RATE: 68 BPM
WBC # BLD AUTO: 5.55 THOUSAND/UL (ref 4.31–10.16)

## 2025-07-18 PROCEDURE — 96365 THER/PROPH/DIAG IV INF INIT: CPT

## 2025-07-18 PROCEDURE — 84484 ASSAY OF TROPONIN QUANT: CPT

## 2025-07-18 PROCEDURE — 94640 AIRWAY INHALATION TREATMENT: CPT

## 2025-07-18 PROCEDURE — 99285 EMERGENCY DEPT VISIT HI MDM: CPT | Performed by: EMERGENCY MEDICINE

## 2025-07-18 PROCEDURE — 99285 EMERGENCY DEPT VISIT HI MDM: CPT

## 2025-07-18 PROCEDURE — 80053 COMPREHEN METABOLIC PANEL: CPT

## 2025-07-18 PROCEDURE — 85025 COMPLETE CBC W/AUTO DIFF WBC: CPT

## 2025-07-18 PROCEDURE — 96375 TX/PRO/DX INJ NEW DRUG ADDON: CPT

## 2025-07-18 PROCEDURE — 82805 BLOOD GASES W/O2 SATURATION: CPT

## 2025-07-18 PROCEDURE — 93005 ELECTROCARDIOGRAM TRACING: CPT

## 2025-07-18 PROCEDURE — 71046 X-RAY EXAM CHEST 2 VIEWS: CPT

## 2025-07-18 PROCEDURE — 36415 COLL VENOUS BLD VENIPUNCTURE: CPT

## 2025-07-18 PROCEDURE — 93010 ELECTROCARDIOGRAM REPORT: CPT | Performed by: INTERNAL MEDICINE

## 2025-07-18 RX ORDER — METHYLPREDNISOLONE SODIUM SUCCINATE 125 MG/2ML
125 INJECTION, POWDER, LYOPHILIZED, FOR SOLUTION INTRAMUSCULAR; INTRAVENOUS ONCE
Status: COMPLETED | OUTPATIENT
Start: 2025-07-18 | End: 2025-07-18

## 2025-07-18 RX ORDER — IPRATROPIUM BROMIDE AND ALBUTEROL SULFATE 2.5; .5 MG/3ML; MG/3ML
3 SOLUTION RESPIRATORY (INHALATION)
Status: COMPLETED | OUTPATIENT
Start: 2025-07-18 | End: 2025-07-18

## 2025-07-18 RX ADMIN — IPRATROPIUM BROMIDE AND ALBUTEROL SULFATE 3 ML: .5; 3 SOLUTION RESPIRATORY (INHALATION) at 18:18

## 2025-07-18 RX ADMIN — METHYLPREDNISOLONE SODIUM SUCCINATE 125 MG: 125 INJECTION, POWDER, FOR SOLUTION INTRAMUSCULAR; INTRAVENOUS at 18:19

## 2025-07-18 RX ADMIN — IPRATROPIUM BROMIDE AND ALBUTEROL SULFATE 3 ML: .5; 3 SOLUTION RESPIRATORY (INHALATION) at 18:19

## 2025-07-18 RX ADMIN — AZITHROMYCIN 500 MG: 500 INJECTION, POWDER, LYOPHILIZED, FOR SOLUTION INTRAVENOUS at 18:20

## 2025-07-18 RX ADMIN — IPRATROPIUM BROMIDE AND ALBUTEROL SULFATE 3 ML: .5; 3 SOLUTION RESPIRATORY (INHALATION) at 18:21

## 2025-07-18 NOTE — ED PROVIDER NOTES
Time reflects when diagnosis was documented in both MDM as applicable and the Disposition within this note       Time User Action Codes Description Comment    7/18/2025  9:39 PM Ren Muir [R06.02] SOB (shortness of breath)     7/18/2025  9:39 PM Ren Muir [J44.1] COPD exacerbation (HCC)           ED Disposition       ED Disposition   Discharge    Condition   Stable    Date/Time   Fri Jul 18, 2025  9:39 PM    Comment   Gloria Patel discharge to home/self care.                   Assessment & Plan       Medical Decision Making  DDX including but not limited to: pneumonia, pleural effusion, CHF, SCAPE, PE, PTX, ACS, COPD exacerbation, anemia, metabolic abnormality, renal failure. SpO2 stable with oxygen therapy. COPD likely based on character of tachypnea with mild wheezes bilaterally and no signs of consolidation on auscultation. Pt responded well to DUO-NEB and Solu-MEDROL with resolution of tachypnea and SOB. No plueral effusion on CXR and no crackles heard. Afebrile, no cough, CXR appearing clear from infectious consolidation, and white count WNL not likely for pneumonia, for which azithromycin was stopped. ECG showed RBBB with no significant changes from previous. Troponin delta of 0 at 2 hours from initial with improvement of symptoms after being treated for COPD exacerbation, not likely for ACS. Pt was comfortable and stable at time of discharge. She agreed to plan for outpatient follow-up with PCP and pulmonology.       Amount and/or Complexity of Data Reviewed  Labs: ordered.  Radiology: ordered.    Risk  Prescription drug management.             Medications   ipratropium-albuterol (DUO-NEB) 0.5-2.5 mg/3 mL inhalation solution 3 mL (3 mL Nebulization Given 7/18/25 1821)   azithromycin (ZITHROMAX) 500 mg in sodium chloride 0.9% 250mL IVPB 500 mg (0 mg Intravenous Stopped 7/18/25 1927)   methylPREDNISolone sodium succinate (Solu-MEDROL) injection 125 mg (125 mg Intravenous Given 7/18/25  1819)       ED Risk Strat Scores                    No data recorded        SBIRT 20yo+      Flowsheet Row Most Recent Value   Initial Alcohol Screen: US AUDIT-C     1. How often do you have a drink containing alcohol? 0 Filed at: 07/18/2025 1740   2. How many drinks containing alcohol do you have on a typical day you are drinking?  0 Filed at: 07/18/2025 1740   3b. FEMALE Any Age, or MALE 65+: How often do you have 4 or more drinks on one occassion? 0 Filed at: 07/18/2025 1740   Audit-C Score 0 Filed at: 07/18/2025 1740   ALEXX: How many times in the past year have you...    Used an illegal drug or used a prescription medication for non-medical reasons? Never Filed at: 07/18/2025 1740                            History of Present Illness       Chief Complaint   Patient presents with    Shortness of Breath     Patient presents with SOB that has worsened this afternoon. She is on 4L NC baseline.        Past Medical History[1]   Past Surgical History[2]   Family History[3]   Social History[4]   E-Cigarette/Vaping    E-Cigarette Use Never User       E-Cigarette/Vaping Substances    Nicotine No     THC No     CBD No     Flavoring No     Other No     Unknown No       I have reviewed and agree with the history as documented.     84 year-old woman with a PMH of COPD presented for SOB that was unresponsive to home medications, including levalbuterol nebulizer. Endorses compliance with home medications. Pt is on 3 L NC oxygen at home. She denies chest pain, lightheadedness, syncope, fevers, new cough, excess sputum, and abdominal pain. Last echo showed 60% EF.       Shortness of Breath  Associated symptoms: no cough        Review of Systems   Constitutional: Negative.    HENT: Negative.     Respiratory:  Positive for shortness of breath. Negative for cough and chest tightness.    Cardiovascular: Negative.    Gastrointestinal: Negative.    Genitourinary: Negative.    Neurological: Negative.            Objective       ED Triage  Vitals   Temperature Pulse Blood Pressure Respirations SpO2 Patient Position - Orthostatic VS   07/18/25 1830 07/18/25 1732 07/18/25 1738 07/18/25 1733 07/18/25 1732 07/18/25 1733   97.8 °F (36.6 °C) 77 166/97 (!) 26 90 % Sitting      Temp src Heart Rate Source BP Location FiO2 (%) Pain Score    -- 07/18/25 1732 07/18/25 1733 -- --     Monitor Right arm        Vitals      Date and Time Temp Pulse SpO2 Resp BP Pain Score FACES Pain Rating User   07/18/25 2130 -- 75 96 % 20 131/63 -- -- KD   07/18/25 1930 -- 71 96 % 20 124/63 -- -- KD   07/18/25 1927 -- 75 96 % 20 116/58 -- -- KD   07/18/25 1844 -- -- -- -- 155/85 -- --    07/18/25 1830 97.8 °F (36.6 °C) 72 95 % 24 -- -- --    07/18/25 1745 -- 69 96 % -- 166/97 -- -- LIZZETTE   07/18/25 1738 -- -- -- -- 166/97 -- --    07/18/25 1733 -- -- -- 26 -- -- --    07/18/25 1732 -- 77 90 % -- -- -- --             Physical Exam  Constitutional:       Appearance: She is not toxic-appearing or diaphoretic.   HENT:      Head: Normocephalic and atraumatic.      Mouth/Throat:      Mouth: Mucous membranes are moist.     Eyes:      Extraocular Movements: Extraocular movements intact.       Cardiovascular:      Rate and Rhythm: Normal rate and regular rhythm.   Pulmonary:      Effort: Tachypnea present.      Breath sounds: Examination of the right-middle field reveals wheezing. Examination of the left-middle field reveals wheezing. Wheezing present. No decreased breath sounds, rhonchi or rales.   Chest:      Chest wall: No mass, deformity or tenderness.   Abdominal:      Palpations: Abdomen is soft.      Tenderness: There is no abdominal tenderness.     Musculoskeletal:      Cervical back: Normal range of motion and neck supple.     Skin:     General: Skin is warm and dry.     Neurological:      General: No focal deficit present.      Mental Status: She is alert and oriented to person, place, and time.     Psychiatric:         Mood and Affect: Mood normal.         Behavior:  Behavior normal.         Results Reviewed       Procedure Component Value Units Date/Time    HS Troponin I 2hr [[id number]]  (Normal) Collected: 07/18/25 2013    Lab Status: Final result Specimen: Blood from Arm, Right Updated: 07/18/25 2043     hs TnI 2hr 7 ng/L      Delta 2hr hsTnI 0 ng/L     HS Troponin 0hr (reflex protocol) [[id number]]  (Normal) Collected: 07/18/25 1815    Lab Status: Final result Specimen: Blood from Arm, Right Updated: 07/18/25 1847     hs TnI 0hr 7 ng/L     Comprehensive metabolic panel [[id number]]  (Abnormal) Collected: 07/18/25 1815    Lab Status: Final result Specimen: Blood from Arm, Right Updated: 07/18/25 1839     Sodium 138 mmol/L      Potassium 3.8 mmol/L      Chloride 99 mmol/L      CO2 29 mmol/L      ANION GAP 10 mmol/L      BUN 9 mg/dL      Creatinine 0.72 mg/dL      Glucose 125 mg/dL      Calcium 9.6 mg/dL      AST 13 U/L      ALT 6 U/L      Alkaline Phosphatase 86 U/L      Total Protein 6.8 g/dL      Albumin 4.2 g/dL      Total Bilirubin 0.54 mg/dL      eGFR 77 ml/min/1.73sq m     Narrative:      [employer] guidelines for Chronic Kidney Disease (CKD):     Stage 1 with normal or high GFR (GFR > 90 mL/min/1.73 square meters)    Stage 2 Mild CKD (GFR = 60-89 mL/min/1.73 square meters)    Stage 3A Moderate CKD (GFR = 45-59 mL/min/1.73 square meters)    Stage 3B Moderate CKD (GFR = 30-44 mL/min/1.73 square meters)    Stage 4 Severe CKD (GFR = 15-29 mL/min/1.73 square meters)    Stage 5 End Stage CKD (GFR <15 mL/min/1.73 square meters)  Note: GFR calculation is accurate only with a steady state creatinine    Blood gas, venous [[id number]]  (Abnormal) Collected: 07/18/25 1815    Lab Status: Final result Specimen: Blood from Arm, Right Updated: 07/18/25 1837     pH, Skip 7.453     pCO2, Skip 40.3 mm Hg      pO2, Skip 71.7 mm Hg      HCO3, Skip 27.6 mmol/L      Base Excess, Skip 3.4 mmol/L      O2 Content, Skip 17.7 ml/dL      O2 HGB, VENOUS 91.9 %     CBC and differential  [[id number]]  (Abnormal) Collected: 07/18/25 1815    Lab Status: Final result Specimen: Blood from Arm, Right Updated: 07/18/25 1821     WBC 5.55 Thousand/uL      RBC 4.33 Million/uL      Hemoglobin 11.6 g/dL      Hematocrit 37.6 %      MCV 87 fL      MCH 26.8 pg      MCHC 30.9 g/dL      RDW 15.4 %      MPV 9.5 fL      Platelets 221 Thousands/uL      nRBC 0 /100 WBCs      Segmented % 67 %      Immature Grans % 0 %      Lymphocytes % 18 %      Monocytes % 12 %      Eosinophils Relative 2 %      Basophils Relative 1 %      Absolute Neutrophils 3.67 Thousands/µL      Absolute Immature Grans 0.02 Thousand/uL      Absolute Lymphocytes 1.01 Thousands/µL      Absolute Monocytes 0.68 Thousand/µL      Eosinophils Absolute 0.13 Thousand/µL      Basophils Absolute 0.04 Thousands/µL             XR chest 2 views    (Results Pending)       ECG 12 Lead Documentation Only    Date/Time: 7/18/2025 11:30 PM    Performed by: Ren Muir MD  Authorized by: Ren Muir MD    Indications / Diagnosis:  SOB  ECG reviewed by me, the ED Provider: yes    Patient location:  ED  Previous ECG:     Previous ECG:  Compared to current    Comparison ECG info:  No significant changes from previous    Similarity:  No change    Comparison to cardiac monitor: Yes    Interpretation:     Interpretation: abnormal    Rate:     ECG rate:  72    ECG rate assessment: normal    Rhythm:     Rhythm: sinus rhythm    QRS:     QRS axis:  Left    QRS intervals:  Normal  Comments:      Right bundle branch block      ED Medication and Procedure Management   Prior to Admission Medications   Prescriptions Last Dose Informant Patient Reported? Taking?   Alcohol Swabs (Alcohol Prep) PADS  Self Yes No   Sig: USE TO TEST BLOOD SUGAR TWICE A DAY AND AS NEEDED   Patient not taking: Reported on 6/1/2025   B Complex-C-Folic Acid (B COMPLEX + C TR PO)   Yes No   Sig: Take 1 tablet by mouth in the morning   Budeson-Glycopyrrol-Formoterol (Breztri Aerosphere) 160-9-4.8  MCG/ACT AERO  Self No No   Sig: Inhale 2 puffs  in the morning and 2 puffs before bedtime. Rinse mouth after use..   albuterol (PROVENTIL HFA,VENTOLIN HFA) 90 mcg/act inhaler  Self No No   Sig: Inhale 2 puffs every 6 (six) hours as needed for wheezing   aspirin 81 mg chewable tablet  Self No No   Sig: Chew 1 tablet (81 mg total) daily   atorvastatin (LIPITOR) 40 mg tablet  Self No No   Sig: Take 1 tablet (40 mg total) by mouth every evening   atorvastatin (Lipitor) 40 mg tablet   Yes No   Sig: Take 40 mg by mouth every evening.   bisacodyl (DULCOLAX) 10 mg suppository   No No   Sig: Insert 1 suppository (10 mg total) into the rectum daily as needed for constipation   brimonidine tartrate 0.2 % ophthalmic solution  Self No No   Sig: Administer 1 drop into the left eye 2 (two) times a day   diltiazem (CARDIZEM CD) 240 mg 24 hr capsule  Self Yes No   Sig: Take 1 capsule by mouth in the morning.   ferrous sulfate 324 (65 Fe) mg   Yes No   Sig: Take 324 mg by mouth every other day   gabapentin (NEURONTIN) 300 mg capsule  Self Yes No   Sig: Take 300 mg by mouth in the morning and 300 mg at noon and 300 mg in the evening and 300 mg before bedtime.   levalbuterol (XOPENEX) 1.25 mg/3 mL nebulizer solution   No No   Sig: Take 3 mL (1.25 mg total) by nebulization 2 (two) times a day   losartan (COZAAR) 25 mg tablet  Self Yes No   Sig: Take 50 mg by mouth in the morning.   metFORMIN (GLUCOPHAGE) 500 mg tablet  Self Yes No   Sig: Take 1,000 mg by mouth daily with breakfast   morphine (MSIR) 15 mg tablet  Self Yes No   Sig: Take 15 mg by mouth in the morning and 15 mg in the evening. Every 12 hrs. .   naloxegol oxalate (Movantik) 25 MG tablet   Yes No   naloxegol oxalate (Movantik) 25 MG tablet   Yes No   Sig: Take 25 mg by mouth daily as needed (Constipation).   oxygen gas   Yes No   Sig: Inhale 3 L/min continuous Via nasal cannula.    Patient not taking: Reported on 6/1/2025   oxygen gas   Yes No   Sig: Inhale 3 L/min  continuous. Via Nasal cannula   pantoprazole (PROTONIX) 40 mg tablet  Self Yes No   Sig: Take 40 mg by mouth every 12 (twelve) hours   polyethylene glycol (MIRALAX) 17 g packet   No No   Sig: Take 17 g by mouth daily for 14 days   rOPINIRole (REQUIP) 0.5 mg tablet  Self Yes No   Sig: Take 0.5 mg by mouth daily at bedtime Take 3 tablets PO before bed   senna (SENOKOT) 8.6 mg   No No   Sig: Take 2 tablets (17.2 mg total) by mouth daily at bedtime as needed for constipation for up to 14 days   senna (SENOKOT) 8.6 mg   Yes No   Sig: Take 8.6 mg by mouth daily at bedtime as needed for constipation. Take 2 tabletd by mouth daily at bedtime as needed   sitaGLIPtin (JANUVIA) 100 mg tablet  Self No No   Sig: Take 1 tablet (100 mg total) by mouth daily      Facility-Administered Medications: None     Discharge Medication List as of 7/18/2025  9:42 PM        CONTINUE these medications which have NOT CHANGED    Details   albuterol (PROVENTIL HFA,VENTOLIN HFA) 90 mcg/act inhaler Inhale 2 puffs every 6 (six) hours as needed for wheezing, Starting Mon 9/20/2021, Normal      Alcohol Swabs (Alcohol Prep) PADS USE TO TEST BLOOD SUGAR TWICE A DAY AND AS NEEDED, Historical Med      aspirin 81 mg chewable tablet Chew 1 tablet (81 mg total) daily, Starting Sat 4/23/2022, Normal      atorvastatin (Lipitor) 40 mg tablet Take 40 mg by mouth every evening., Historical Med      B Complex-C-Folic Acid (B COMPLEX + C TR PO) Take 1 tablet by mouth in the morning, Historical Med      bisacodyl (DULCOLAX) 10 mg suppository Insert 1 suppository (10 mg total) into the rectum daily as needed for constipation, Starting Sat 4/27/2024, Normal      brimonidine tartrate 0.2 % ophthalmic solution Administer 1 drop into the left eye 2 (two) times a day, Starting Fri 4/22/2022, Normal      Budeson-Glycopyrrol-Formoterol (Breztri Aerosphere) 160-9-4.8 MCG/ACT AERO Inhale 2 puffs  in the morning and 2 puffs before bedtime. Rinse mouth after use.., Starting Mon  5/16/2022, Normal      diltiazem (CARDIZEM CD) 240 mg 24 hr capsule Take 1 capsule by mouth in the morning., Starting Sat 12/16/2023, Historical Med      ferrous sulfate 324 (65 Fe) mg Take 324 mg by mouth every other day, Historical Med      gabapentin (NEURONTIN) 300 mg capsule Take 300 mg by mouth in the morning and 300 mg at noon and 300 mg in the evening and 300 mg before bedtime., Starting Mon 3/23/2020, Historical Med      levalbuterol (XOPENEX) 1.25 mg/3 mL nebulizer solution Take 3 mL (1.25 mg total) by nebulization 2 (two) times a day, Starting Wed 5/14/2025, Normal      losartan (COZAAR) 25 mg tablet Take 50 mg by mouth in the morning., Starting Mon 12/4/2023, Historical Med      metFORMIN (GLUCOPHAGE) 500 mg tablet Take 1,000 mg by mouth daily with breakfast, Historical Med      morphine (MSIR) 15 mg tablet Take 15 mg by mouth in the morning and 15 mg in the evening. Every 12 hrs. ., Historical Med      !! naloxegol oxalate (Movantik) 25 MG tablet Historical Med      !! naloxegol oxalate (Movantik) 25 MG tablet Take 25 mg by mouth daily as needed (Constipation)., Historical Med      !! oxygen gas Inhale 3 L/min continuous Via nasal cannula. , Historical Med      !! oxygen gas Inhale 3 L/min continuous. Via Nasal cannula, Historical Med      pantoprazole (PROTONIX) 40 mg tablet Take 40 mg by mouth every 12 (twelve) hours, Historical Med      polyethylene glycol (MIRALAX) 17 g packet Take 17 g by mouth daily for 14 days, Starting Wed 6/4/2025, Until Wed 6/18/2025, Normal      rOPINIRole (REQUIP) 0.5 mg tablet Take 0.5 mg by mouth daily at bedtime Take 3 tablets PO before bed, Historical Med      senna (SENOKOT) 8.6 mg Take 8.6 mg by mouth daily at bedtime as needed for constipation. Take 2 tabletd by mouth daily at bedtime as needed, Historical Med      sitaGLIPtin (JANUVIA) 100 mg tablet Take 1 tablet (100 mg total) by mouth daily, Starting Mon 4/13/2020, No Print       !! - Potential duplicate  medications found. Please discuss with provider.        No discharge procedures on file.  ED SEPSIS DOCUMENTATION   Time reflects when diagnosis was documented in both MDM as applicable and the Disposition within this note       Time User Action Codes Description Comment    7/18/2025  9:39 PM Ren Muir [R06.02] SOB (shortness of breath)     7/18/2025  9:39 PM Ren Muir [J44.1] COPD exacerbation (HCC)                      [1]   Past Medical History:  Diagnosis Date    Abdominal pain 09/26/2022    Arthritis     Cardiac disease     Constipation 04/25/2024    COPD (chronic obstructive pulmonary disease) (HCC)     COVID-19 2021    was in hospital for sx and was dx with covid    Diabetes mellitus (HCC)     GERD (gastroesophageal reflux disease)     Hiatal hernia     Hypertension     PUD (peptic ulcer disease)     Residual ASD (atrial septal defect) following repair    [2]   Past Surgical History:  Procedure Laterality Date    ABDOMINAL ADHESION SURGERY N/A 09/26/2022    Procedure: LYSIS ADHESIONS;  Surgeon: Petty Johnson MD;  Location: [facility];  Service: Thoracic    ASD REPAIR      BACK SURGERY      x3    CARDIAC SURGERY      Atrial septic defect    ESOPHAGOGASTRODUODENOSCOPY N/A 09/26/2022    Procedure: ESOPHAGOGASTRODUODENOSCOPY (EGD);  Surgeon: Petty Johnson MD;  Location: [facility];  Service: Thoracic    JOINT REPLACEMENT      bilateral knee surgery    JOINT REPLACEMENT      KNEE SURGERY      PARAESOPHAGEAL HERNIA REPAIR N/A 09/26/2022    Procedure: REPAIR HERNIA PARAESOPHAGEAL LAPAROSCOPIC W ROBOTICS, gastropexy, mesh placement;  Surgeon: Petty Johnson MD;  Location: [facility];  Service: Thoracic    MI EXCISION GANGLION WRIST DORSAL/VOLAR PRIMARY Left 7/21/2023    Procedure: EXCISION GANGLION CYST;  Surgeon: Jaja Pantoja MD;  Location: [facility];  Service: Orthopedics    SHOULDER SURGERY     [3]   Family History  Problem Relation Name Age of Onset    Cancer  Mother      Asthma Father     [4]   Social History  Tobacco Use    Smoking status: Former     Current packs/day: 0.00     Average packs/day: 1 pack/day for 64.1 years (64.1 ttl pk-yrs)     Types: Cigarettes     Start date:      Quit date: 2020     Years since quittin.4    Smokeless tobacco: Never    Tobacco comments:     stopped smoking 2 days ago   Vaping Use    Vaping status: Never Used   Substance Use Topics    Alcohol use: Not Currently    Drug use: Never        Ren Muir MD  [phone]

## 2025-07-19 NOTE — DISCHARGE INSTRUCTIONS
Continue to use home medications as prescribed, including your nebulizer as needed. Follow up with your primary care provider within 3-5 days. Return to the emergency department if your medications do not alleviate shortness of breath.

## 2025-08-07 ENCOUNTER — OFFICE VISIT (OUTPATIENT)
Dept: PULMONOLOGY | Facility: CLINIC | Age: 85
End: 2025-08-07
Payer: COMMERCIAL

## 2025-08-07 VITALS
SYSTOLIC BLOOD PRESSURE: 130 MMHG | DIASTOLIC BLOOD PRESSURE: 76 MMHG | OXYGEN SATURATION: 95 % | HEART RATE: 95 BPM | TEMPERATURE: 96.7 F | HEIGHT: 60 IN | BODY MASS INDEX: 33.32 KG/M2

## 2025-08-07 DIAGNOSIS — J44.9 COPD, SEVERE (HCC): Primary | ICD-10-CM

## 2025-08-07 DIAGNOSIS — J96.11 CHRONIC HYPOXEMIC RESPIRATORY FAILURE (HCC): ICD-10-CM

## 2025-08-07 DIAGNOSIS — F17.211 CIGARETTE NICOTINE DEPENDENCE IN REMISSION: ICD-10-CM

## 2025-08-07 DIAGNOSIS — E66.01 SEVERE OBESITY (HCC): ICD-10-CM

## 2025-08-07 PROCEDURE — 99214 OFFICE O/P EST MOD 30 MIN: CPT | Performed by: INTERNAL MEDICINE

## 2025-08-07 RX ORDER — ALBUTEROL SULFATE 90 UG/1
2 INHALANT RESPIRATORY (INHALATION) EVERY 6 HOURS PRN
Qty: 54 G | Refills: 3 | Status: SHIPPED | OUTPATIENT
Start: 2025-08-07

## 2025-08-07 RX ORDER — LEVALBUTEROL INHALATION SOLUTION 1.25 MG/3ML
1.25 SOLUTION RESPIRATORY (INHALATION)
Qty: 180 ML | Refills: 0 | Status: SHIPPED | OUTPATIENT
Start: 2025-08-07

## (undated) DEVICE — GAUZE ROLL KITTNER

## (undated) DEVICE — INTENDED FOR TISSUE SEPARATION, AND OTHER PROCEDURES THAT REQUIRE A SHARP SURGICAL BLADE TO PUNCTURE OR CUT.: Brand: BARD-PARKER SAFETY BLADES SIZE 15, STERILE

## (undated) DEVICE — ARM DRAPE

## (undated) DEVICE — GLOVE SRG BIOGEL ECLIPSE 6

## (undated) DEVICE — MINI BLADE ROUND TIP SHARP ON ONE SIDE

## (undated) DEVICE — VESSEL SEALER EXTEND: Brand: ENDOWRIST

## (undated) DEVICE — DRAPE TOWEL: Brand: CONVERTORS

## (undated) DEVICE — OCCLUSIVE GAUZE STRIP,3% BISMUTH TRIBROMOPHENATE IN PETROLATUM BLEND: Brand: XEROFORM

## (undated) DEVICE — AIR AND WATER TUBING/CAP SET FOR OLYMPUS® SCOPES: Brand: ERBE

## (undated) DEVICE — LAPAROSCOPIC TROCAR SLEEVE/SINGLE USE: Brand: KII® OPTICAL ACCESS SYSTEM

## (undated) DEVICE — CANNULA SEAL

## (undated) DEVICE — TIP-UP FENESTRATED GRASPER: Brand: ENDOWRIST

## (undated) DEVICE — ABSORBABLE WOUND CLOSURE DEVICE: Brand: SYNETURE

## (undated) DEVICE — SUT MONOCRYL 4-0 PS-2 18 IN Y496G

## (undated) DEVICE — BITE BLOCK MAXI 60FR LF STRAP

## (undated) DEVICE — TUBING SUCTION 5MM X 12 FT

## (undated) DEVICE — DISPOSABLE EQUIPMENT COVER: Brand: SMALL TOWEL DRAPE

## (undated) DEVICE — VISUALIZATION SYSTEM: Brand: CLEARIFY

## (undated) DEVICE — GLOVE SRG BIOGEL 7

## (undated) DEVICE — TROCAR: Brand: KII FIOS FIRST ENTRY

## (undated) DEVICE — MEGA SUTURECUT ND: Brand: ENDOWRIST

## (undated) DEVICE — Device: Brand: DEFENDO AIR/WATER/SUCTION AND BIOPSY VALVE

## (undated) DEVICE — HEAVY DUTY TABLE COVER: Brand: CONVERTORS

## (undated) DEVICE — KIT, BETHLEHEM THORACIC ROBOT: Brand: CARDINAL HEALTH

## (undated) DEVICE — ADHESIVE SKIN HIGH VISCOSITY EXOFIN 1ML

## (undated) DEVICE — INTENDED FOR TISSUE SEPARATION, AND OTHER PROCEDURES THAT REQUIRE A SHARP SURGICAL BLADE TO PUNCTURE OR CUT.: Brand: BARD-PARKER ® CARBON RIB-BACK BLADES

## (undated) DEVICE — INTENDED FOR TISSUE SEPARATION, AND OTHER PROCEDURES THAT REQUIRE A SHARP SURGICAL BLADE TO PUNCTURE OR CUT.: Brand: BARD-PARKER SAFETY BLADES SIZE 11, STERILE

## (undated) DEVICE — COLUMN DRAPE

## (undated) DEVICE — 60 ML SYRINGE,REGULAR TIP: Brand: MONOJECT

## (undated) DEVICE — TRAY FOLEY 16FR URIMETER SILICONE SURESTEP

## (undated) DEVICE — STRL PENROSE DRAIN 18" X 3/4": Brand: CARDINAL HEALTH

## (undated) DEVICE — SUT VICRYL 0 REEL 54 IN J287G

## (undated) DEVICE — Device: Brand: OMNICLOSE TROCAR SITE CLOSURE DEVICE

## (undated) DEVICE — STERILE BETHLEHEM PLASTIC HAND: Brand: CARDINAL HEALTH

## (undated) DEVICE — GAUZE SPONGES,16 PLY: Brand: CURITY

## (undated) DEVICE — KIT ENDO BUTTON

## (undated) DEVICE — CHLORAPREP HI-LITE 26ML ORANGE

## (undated) DEVICE — SPONGE PVP SCRUB WING STERILE

## (undated) DEVICE — NEEDLE 25G X 1 1/2

## (undated) DEVICE — FIRST STEP BEDSIDE KIT - STAND-UP POUCH, ENDOSCOPIC CLEANING PAD - 1 POUCH: Brand: FIRST STEP BEDSIDE KIT - STAND-UP POUCH, ENDOSCOPIC CLEANING PAD

## (undated) DEVICE — SUT VICRYL 3-0 SH 27 IN J416H

## (undated) DEVICE — SUT ETHIBOND 2-0 SH/SH 36 IN X523H

## (undated) DEVICE — GLOVE INDICATOR PI UNDERGLOVE SZ 7 BLUE

## (undated) DEVICE — ENDOPATH PNEUMONEEDLE INSUFFLATION NEEDLES WITH LUER LOCK CONNECTORS 120MM: Brand: ENDOPATH

## (undated) DEVICE — CUFF TOURNIQUET 18 X 4 IN QUICK CONNECT DISP 1 BLADDER

## (undated) DEVICE — UTILITY MARKER,BLACK WITH LABELS: Brand: DEVON

## (undated) DEVICE — NEEDLE HYPO 22G X 1-1/2 IN

## (undated) DEVICE — ENDOPATH 5MM CURVED SCISSORS WITH MONOPOLAR CAUTERY: Brand: ENDOPATH

## (undated) DEVICE — GLOVE INDICATOR PI UNDERGLOVE SZ 6.5 BLUE

## (undated) DEVICE — SUT ETHIBOND 0 SH 30 IN X834H

## (undated) DEVICE — CADIERE FORCEPS: Brand: ENDOWRIST

## (undated) DEVICE — SUT VICRYL 0 UR-6 27 IN J603H